# Patient Record
Sex: MALE | Race: WHITE | Employment: FULL TIME | URBAN - METROPOLITAN AREA
[De-identification: names, ages, dates, MRNs, and addresses within clinical notes are randomized per-mention and may not be internally consistent; named-entity substitution may affect disease eponyms.]

---

## 2017-01-03 ENCOUNTER — OFFICE VISIT - HEALTHEAST (OUTPATIENT)
Dept: PHYSICAL THERAPY | Facility: REHABILITATION | Age: 57
End: 2017-01-03

## 2017-01-03 DIAGNOSIS — M62.81 MUSCLE WEAKNESS (GENERALIZED): ICD-10-CM

## 2017-01-03 DIAGNOSIS — M53.84 DECREASED ROM OF THORACIC SPINE: ICD-10-CM

## 2017-01-03 DIAGNOSIS — M54.6 ACUTE RIGHT-SIDED THORACIC BACK PAIN: ICD-10-CM

## 2017-01-03 DIAGNOSIS — M53.86 DECREASED ROM OF LUMBAR SPINE: ICD-10-CM

## 2017-01-03 DIAGNOSIS — M54.50 ACUTE RIGHT-SIDED LOW BACK PAIN WITHOUT SCIATICA: ICD-10-CM

## 2017-01-05 ENCOUNTER — OFFICE VISIT - HEALTHEAST (OUTPATIENT)
Dept: PHYSICAL THERAPY | Facility: REHABILITATION | Age: 57
End: 2017-01-05

## 2017-01-05 ENCOUNTER — COMMUNICATION - HEALTHEAST (OUTPATIENT)
Dept: ONCOLOGY | Facility: HOSPITAL | Age: 57
End: 2017-01-05

## 2017-01-05 DIAGNOSIS — M53.84 DECREASED ROM OF THORACIC SPINE: ICD-10-CM

## 2017-01-05 DIAGNOSIS — M54.50 ACUTE RIGHT-SIDED LOW BACK PAIN WITHOUT SCIATICA: ICD-10-CM

## 2017-01-05 DIAGNOSIS — M62.81 MUSCLE WEAKNESS (GENERALIZED): ICD-10-CM

## 2017-01-05 DIAGNOSIS — M53.86 DECREASED ROM OF LUMBAR SPINE: ICD-10-CM

## 2017-01-05 DIAGNOSIS — M54.6 ACUTE RIGHT-SIDED THORACIC BACK PAIN: ICD-10-CM

## 2017-01-06 ENCOUNTER — OFFICE VISIT - HEALTHEAST (OUTPATIENT)
Dept: PULMONOLOGY | Facility: OTHER | Age: 57
End: 2017-01-06

## 2017-01-06 DIAGNOSIS — J90 PLEURAL EFFUSION: ICD-10-CM

## 2017-01-06 ASSESSMENT — MIFFLIN-ST. JEOR: SCORE: 2028.23

## 2017-01-10 ENCOUNTER — OFFICE VISIT - HEALTHEAST (OUTPATIENT)
Dept: PHYSICAL THERAPY | Facility: REHABILITATION | Age: 57
End: 2017-01-10

## 2017-01-10 DIAGNOSIS — M53.86 DECREASED ROM OF LUMBAR SPINE: ICD-10-CM

## 2017-01-10 DIAGNOSIS — M62.81 MUSCLE WEAKNESS (GENERALIZED): ICD-10-CM

## 2017-01-10 DIAGNOSIS — M54.6 ACUTE RIGHT-SIDED THORACIC BACK PAIN: ICD-10-CM

## 2017-01-10 DIAGNOSIS — M54.50 ACUTE RIGHT-SIDED LOW BACK PAIN WITHOUT SCIATICA: ICD-10-CM

## 2017-01-10 DIAGNOSIS — M53.84 DECREASED ROM OF THORACIC SPINE: ICD-10-CM

## 2017-01-11 ENCOUNTER — AMBULATORY - HEALTHEAST (OUTPATIENT)
Dept: ONCOLOGY | Facility: HOSPITAL | Age: 57
End: 2017-01-11

## 2017-01-12 ENCOUNTER — OFFICE VISIT - HEALTHEAST (OUTPATIENT)
Dept: ONCOLOGY | Facility: HOSPITAL | Age: 57
End: 2017-01-12

## 2017-01-12 ENCOUNTER — AMBULATORY - HEALTHEAST (OUTPATIENT)
Dept: INFUSION THERAPY | Facility: HOSPITAL | Age: 57
End: 2017-01-12

## 2017-01-12 DIAGNOSIS — C91.10 CHRONIC LYMPHOCYTIC LEUKEMIA (H): ICD-10-CM

## 2017-01-17 ENCOUNTER — OFFICE VISIT - HEALTHEAST (OUTPATIENT)
Dept: PHYSICAL THERAPY | Facility: REHABILITATION | Age: 57
End: 2017-01-17

## 2017-01-17 DIAGNOSIS — M54.50 ACUTE RIGHT-SIDED LOW BACK PAIN WITHOUT SCIATICA: ICD-10-CM

## 2017-01-17 DIAGNOSIS — M53.86 DECREASED ROM OF LUMBAR SPINE: ICD-10-CM

## 2017-01-17 DIAGNOSIS — M62.81 MUSCLE WEAKNESS (GENERALIZED): ICD-10-CM

## 2017-01-17 DIAGNOSIS — M54.6 ACUTE RIGHT-SIDED THORACIC BACK PAIN: ICD-10-CM

## 2017-01-17 DIAGNOSIS — M53.84 DECREASED ROM OF THORACIC SPINE: ICD-10-CM

## 2017-01-25 ENCOUNTER — APPOINTMENT (OUTPATIENT)
Dept: CT IMAGING | Facility: CLINIC | Age: 57
End: 2017-01-25
Attending: EMERGENCY MEDICINE
Payer: COMMERCIAL

## 2017-01-25 ENCOUNTER — HOSPITAL ENCOUNTER (EMERGENCY)
Facility: CLINIC | Age: 57
Discharge: HOME | End: 2017-01-25
Attending: EMERGENCY MEDICINE | Admitting: EMERGENCY MEDICINE
Payer: COMMERCIAL

## 2017-01-25 ENCOUNTER — RECORDS - HEALTHEAST (OUTPATIENT)
Dept: ADMINISTRATIVE | Facility: OTHER | Age: 57
End: 2017-01-25

## 2017-01-25 VITALS
TEMPERATURE: 98.3 F | OXYGEN SATURATION: 94 % | HEIGHT: 75 IN | SYSTOLIC BLOOD PRESSURE: 151 MMHG | BODY MASS INDEX: 31.71 KG/M2 | DIASTOLIC BLOOD PRESSURE: 85 MMHG | RESPIRATION RATE: 16 BRPM | WEIGHT: 255 LBS

## 2017-01-25 DIAGNOSIS — S09.90XA CLOSED HEAD INJURY, INITIAL ENCOUNTER: ICD-10-CM

## 2017-01-25 DIAGNOSIS — S16.1XXA CERVICAL STRAIN, INITIAL ENCOUNTER: ICD-10-CM

## 2017-01-25 PROCEDURE — 70450 CT HEAD/BRAIN W/O DYE: CPT

## 2017-01-25 PROCEDURE — 72125 CT NECK SPINE W/O DYE: CPT

## 2017-01-25 PROCEDURE — 99284 EMERGENCY DEPT VISIT MOD MDM: CPT | Mod: 25

## 2017-01-25 ASSESSMENT — ENCOUNTER SYMPTOMS
WEAKNESS: 0
ARTHRALGIAS: 0
CONFUSION: 1
WOUND: 1
NECK PAIN: 1
VOMITING: 0
BACK PAIN: 0
NUMBNESS: 0
HEADACHES: 1
ABDOMINAL PAIN: 0
MYALGIAS: 0

## 2017-01-25 NOTE — ED PROVIDER NOTES
"  History     Chief Complaint:  Fall      The history is provided by the patient and the EMS personnel.      Ramu Espinoza is a 56 year old male who presents via EMS after a fall.  While walking into work the patient slipped and fell backwards, hitting his head and suffering loss of consciousness.  A coworker found him lying in the snow and patient is amnestic from between when he was walking into work and when he was sitting in a chair at work.  EMS was contacted who brought him to the emergency department.  Patient has contusion, hematoma, and abrasion to occiput and complains of pain in posterior head and neck.  He denies chest pain, abdominal pain, back pain, vomiting, other injury, numbness or weakness in extremities, anticoagulation, or other complaint.     Allergies:  Zithromax       Medications:    Sertraline  Ativan  Trazodone     Past Medical History:    Depression  Anxiety     Past Surgical History:    History reviewed. No pertinent surgical history.      Family History:    History reviewed. No pertinent family history.       Social History:  Presents via EMS   Tobacco use: Never  Alcohol use: Positive  PCP: Esperanza Talamantes    Marital Status:         Review of Systems   Cardiovascular: Negative for chest pain.   Gastrointestinal: Negative for vomiting and abdominal pain.   Musculoskeletal: Positive for neck pain. Negative for myalgias, back pain and arthralgias.   Skin: Positive for wound.   Neurological: Positive for headaches. Negative for weakness and numbness.   Psychiatric/Behavioral: Positive for confusion.   All other systems reviewed and are negative.      Physical Exam     Patient Vitals for the past 24 hrs:   BP Temp Temp src Heart Rate Resp SpO2 Height Weight   01/25/17 0845 - - - - - 94 % - -   01/25/17 0832 151/85 mmHg 98.3  F (36.8  C) Oral 80 14 95 % 1.905 m (6' 3\") 115.667 kg (255 lb)       Physical Exam  GENERAL: well developed, pleasant  HEAD: Contusion to posterior " occiput with a 6 cm hematoma.    EYES: pupils reactive, extraocular muscles intact, conjunctivae normal  ENT:  mucus membranes moist  NECK:  trachea midline. In a C-collar.  RESPIRATORY: no tachypnea, breath sounds clear to auscultation   CVS: normal S1/S2, no murmurs, intact distal pulses  ABDOMEN: soft, nontender, nondistention  MUSCULOSKELETAL: no deformities  SKIN: warm and dry, no acute rashes or ulceration  NEURO: GCS 15, cranial nerves intact, alert and oriented x3  PSYCH:  Mood/affect normal    Emergency Department Course   Imaging:  Radiographic findings were communicated with the patient who voiced understanding of the findings.    CT Head without contrast:  IMPRESSION:  1. Extracalvarial soft tissue swelling in the posterior midline vertex region. No evidence for intracranial hemorrhage or skull fracture.  2. Normal brain parenchyma.    CT Cervical spine without contrast:  IMPRESSION: Degenerative changes. No evidence for fracture or  malalignment.    Preliminary result per radiology.    Emergency Department Course:  The patient arrived in the emergency department via EMS with C-collar in place.  Past medical records, nursing notes, and vitals reviewed.  0839: I performed an exam of the patient and obtained history, as documented above. GCS 15.   The patient was sent for CT while in the emergency department, findings above.   0952: I rechecked the patient.  C-collar removed.  Findings and plan explained to the Patient. Patient discharged home with instructions regarding supportive care, medications, and reasons to return. The importance of close follow-up was reviewed.      Impression & Plan    Medical Decision Making:  Ramu Espinoza is a 56 year old male presenting after a fall with icy, snowy conditions and resulting loss of consciousness and contusion of posterior occiput.  He has some mild cervical spine tenderness with movement so C-collar was left in place.  Imaging was obtained showing no acute  findings resolving from trauma.  His wounds were cleansed but do not require sutures.      Plan:  Wound care, Tylenol or Motrin.  Work note provided.  Return if worse.    Diagnosis:    ICD-10-CM    1. Closed head injury, initial encounter S09.90XA    2. Cervical strain, initial encounter S16.1XXA        Disposition:  Discharged to home with plan as outlined.      Hiro Borrego  1/25/2017    EMERGENCY DEPARTMENT    I, Hiro Borrego, am serving as a scribe at 8:39 AM on 1/25/2017 to document services personally performed by Laureano Gilmore MD based on my observations and the provider's statements to me.      Laureano Gilmore MD  01/26/17 1437    Laureano Gilmore MD  01/26/17 1438    Laureano Gilmore MD  01/26/17 1431

## 2017-01-25 NOTE — ED AVS SNAPSHOT
Emergency Department    [address] [place] [address]    Phone:  [phone]    Fax:  [phone]                                       Ramu Espinoza   MRN: [id number]    Department:   Emergency Department   Date of Visit:  1/25/2017           After Visit Summary Signature Page     I have received my discharge instructions, and my questions have been answered. I have discussed any challenges I see with this plan with the nurse or doctor.    ..........................................................................................................................................  Patient/Patient Representative Signature      ..........................................................................................................................................  Patient Representative Print Name and Relationship to Patient    ..................................................               ................................................  Date                                            Time    ..........................................................................................................................................  Reviewed by Signature/Title    ...................................................              ..............................................  Date                                                            Time

## 2017-01-25 NOTE — ED AVS SNAPSHOT
Emergency Department    [address] [facility] [address]    Phone:  [phone]    Fax:  [phone]                                       Ramu Espinoza   MRN: [id number]    Department:   Emergency Department   Date of Visit:  1/25/2017           Patient Information     Date Of Birth          1960        Your diagnoses for this visit were:     Closed head injury, initial encounter     Cervical strain, initial encounter        You were seen by Laureano Gilmore MD.      Follow-up Information     Follow up with Esperanza Talamantes MD.    Specialty:  Family Practice    Contact information:    [facility]  [address] [facility] [address]  [phone]          Discharge Instructions       Discharge Instructions  Head Injury    You have been seen today for a head injury. You were checked for serious problems, like bleeding on the brain, but these problems cannot always be found right away.  Due to this risk, you should not be alone for 24 hours after your injury.  Follow up with your regular physician in 2-3 days. If you are taking a blood thinner, such as aspirin, Pradaxa  (dabigatran), Coumadin  (warfarin), or Plavix  (clopidogrel), you are at especially high risk for immediate or delayed bleeding, and need to re-check with a physician in 24 hours, or sooner if any of the symptoms below happen.     Return to the Emergency Department if:    You are confused, have amnesia, or you are not acting right.    Your headache gets worse or you start to have a really bad headache even with your recommended treatment plan.    You vomit more than once.    You have a convulsion or seizure.    You have trouble walking.    You have weakness or paralysis in an arm or a leg.    You have blood or fluid coming from your ears or nose.    You have new symptoms or anything that worries you.    Sleeping:  It is okay for you to sleep, but someone should wake you up as instructed by your doctor, and  someone should check on you at your usual time to wake up.     Activity:    Do not drive for at least 24 hours.    Do not drive if you have dizzy spells or trouble concentrating, or remembering things.    Do not return to any contact sports until cleared by your regular doctor.     Follow-up:  It is very important that you make an appointment with your clinic and go to the appointment.  If you do not follow-up with your regular doctor, it may result in missing an important development which could result in permanent injury or disability and/or lasting pain.  If there is any problem keeping your appointment, call your doctor or return to the Emergency Department.    MORE INFORMATION:    Concussion:  A concussion is a minor head injury that may cause temporary problems with the way your brain works.  Some symptoms include:  confusion, amnesia, nausea and vomiting, dizziness, fatigue, memory or concentration problems, irritability and sleep problems.    CT Scans: Your evaluation today may have included a CT scan (CAT scan) to look for things like bleeding or a skull fracture (break).  CT scans involve radiation and too many CT scans can cause serious health problems like cancer, especially in children.  Because of this, your doctor may not have ordered a CT scan today if they think you are at low risk for a serious or life threatening problem.    If you were given a prescription for medicine here today, be sure to read all of the information (including the package insert) that comes with your prescription.  This will include important information about the medicine, its side effects, and any warnings that you need to know about.  The pharmacist who fills the prescription can provide more information and answer questions you may have about the medicine.  If you have questions or concerns that the pharmacist cannot address, please call or return to the Emergency Department.     Opioid Medication Information    Pain  medications are among the most commonly prescribed medicines, so we are including this information for all our patients. If you did not receive pain medication or get a prescription for pain medicine, you can ignore it.     You may have been given a prescription for an opioid (narcotic) pain medicine and/or have received a pain medicine while here in the Emergency Department. These medicines can make you drowsy or impaired. You must not drive, operate dangerous equipment, or engage in any other dangerous activities while taking these medications. If you drive while taking these medications, you could be arrested for DUI, or driving under the influence. Do not drink any alcohol while you are taking these medications.     Opioid pain medications can cause addiction. If you have a history of chemical dependency of any type, you are at a higher risk of becoming addicted to pain medications.  Only take these prescribed medications to treat your pain when all other options have been tried. Take it for as short a time and as few doses as possible. Store your pain pills in a secure place, as they are frequently stolen and provide a dangerous opportunity for children or visitors in your house to start abusing these powerful medications. We will not replace any lost or stolen medicine.  As soon as your pain is better, you should flush all your remaining medication.     Many prescription pain medications contain Tylenol  (acetaminophen), including Vicodin , Tylenol #3 , Norco , Lortab , and Percocet .  You should not take any extra pills of Tylenol  if you are using these prescription medications or you can get very sick.  Do not ever take more than 3000 mg of acetaminophen in any 24 hour period.    All opioids tend to cause constipation. Drink plenty of water and eat foods that have a lot of fiber, such as fruits, vegetables, prune juice, apple juice and high fiber cereal.  Take a laxative if you don t move your bowels at  least every other day. Miralax , Milk of Magnesia, Colace , or Senna  can be used to keep you regular.      Remember that you can always come back to the Emergency Department if you are not able to see your regular doctor in the amount of time listed above, if you get any new symptoms, or if there is anything that worries you.            Neck Sprain or Strain  A sudden force that causes turning or bending of the neck can cause sprain or strain. An example would be the force from a car accident. This can stretch or tear muscles called a strain. It can also stretch or tear ligaments called a sprain. Either of these can cause neck pain. Sometimes neck pain occurs after a simple awkward movement. In either case, muscle spasm is commonly present and contributes to the pain.    Unless you had a forceful physical injury (for example, a car accident or fall), X-rays are usually not ordered for the initial evaluation of neck pain. If pain continues and dose not respond to medical treatment, X-rays and other tests may be performed at a later time.  Home care    You may feel more soreness and spasm the first few days after the injury. Rest until symptoms begin to improve.    When lying down, use a comfortable pillow or a rolled towel that supports the head and keeps the spine in a neutral position. The position of the head should not be tilted forward or backward.    Apply an ice pack over the injured area for 15 to 20 minutes every 3 to 6 hours. You should do this for the first 24 to 48 hours. You can make an ice pack by filling a plastic bag that seals at the top with ice cubes and then wrapping it with a thin towel. After 48 hours, apply heat (warm shower or warm bath) for 15 to 20 minutes several times a day, or alternate ice and heat.    You may use over-the-counter pain medicine to control pain, unless another pain medicine was prescribed. If you have chronic liver or kidney disease or ever had a stomach ulcer or GI  bleeding, talk with your healthcare provider before using these medicines.    If a soft cervical collar was prescribed, it should be worn only for periods of increased pain. It should not be worn for more than 3 hours a day, or for a period longer than 1 to 2 weeks.  Follow-up care  Follow up with your healthcare provider as directed. Physical therapy may be needed.  Sometimes fractures don t show up on the first X-ray. Bruises and sprains can sometimes hurt as much as a fracture. These injuries can take time to heal completely. If your symptoms don t improve or they get worse, talk with your healthcare provider. You may need a repeat X-ray or other tests. If X-rays were taken, you will be told of any new findings that may affect your care.  Call 911  Call 911 if you have:     Neck swelling, difficulty or painful swallowing    Difficulty breathing    Chest pain  When to seek medical advice  Call your healthcare provider right away if any of these occur:    Pain becomes worse or spreads into your arms    Weakness or numbness in one or both arms    1712-9322 [employer]. [address] [place] [address]. All rights reserved. This information is not intended as a substitute for professional medical care. Always follow your healthcare professional's instructions.          24 Hour Appointment Hotline       To make an appointment at any [facility], call [phone] ([phone]). If you don't have a family doctor or clinic, we will help you find one. [facility] are conveniently located to serve the needs of you and your family.             Review of your medicines      Our records show that you are taking the medicines listed below. If these are incorrect, please call your family doctor or clinic.        Dose / Directions Last dose taken    ACYCLOVIR PO        Take by mouth 2 times daily   Refills:  0        HYDROXYZINE HCL PO        Refills:  0        LORazepam 1 MG tablet    Commonly known as:  ATIVAN   Dose:  1 mg   Quantity:  30 tablet        Take 1 tablet by mouth 2 times daily as needed for anxiety. Take 30 minutes prior to departure.  Do not operate a vehicle after taking this medication   Refills:  0        MELATONIN PO        Refills:  0        sertraline 25 MG tablet   Commonly known as:  ZOLOFT   Dose:  25 mg        Take 25 mg by mouth daily.   Refills:  0        traZODone 50 MG tablet   Commonly known as:  DESYREL   Quantity:  30        ONE TO TWO TABLETS AT BEDTIME AS NEEDED FOR SLEEP   Refills:  0                Procedures and tests performed during your visit     CT Cervical Spine w/o Contrast    CT Head w/o Contrast      Orders Needing Specimen Collection     None      Pending Results     Date and Time Order Name Status Description    1/25/2017 0850 CT Cervical Spine w/o Contrast Preliminary     1/25/2017 0850 CT Head w/o Contrast Preliminary             Pending Culture Results     No orders found from 1/24/2017 to 1/26/2017.       Test Results from your hospital stay           1/25/2017  9:48 AM - Interface, Radiant Ib      Narrative     CT SCAN OF THE HEAD WITHOUT CONTRAST   1/25/2017 9:43 AM     HISTORY: Closed head injury, plus loss of consciousness, contusion to  posterior head.    TECHNIQUE:  Axial images of the head and coronal reformations without  IV contrast material.  Radiation dose for this scan was reduced using  automated exposure control, adjustment of the mA and/or kV according  to patient size, or iterative reconstruction technique.    COMPARISON: None.    FINDINGS: There is some extracalvarial soft tissue swelling in the  posterior midline near the vertex. There is no evidence for any  underlying fracture or any intracranial hemorrhage. The brain  parenchyma is normal. Ventricles and subarachnoid spaces are normal.  The mastoids are clear. Visualized paranasal sinuses are unremarkable.        Impression     IMPRESSION:  1. Extracalvarial soft tissue  swelling in the posterior midline vertex  region. No evidence for intracranial hemorrhage or skull fracture.  2. Normal brain parenchyma.         1/25/2017  9:50 AM - Interface, Radiant Ib      Narrative     CT CERVICAL SPINE WITHOUT CONTRAST   1/25/2017 9:44 AM     HISTORY: Trauma, closed head injury.     TECHNIQUE: Axial images of the cervical spine were obtained without  intravenous contrast. Multiplanar reformations were performed.  Radiation dose for this scan was reduced using automated exposure  control, adjustment of the mA and/or kV according to patient size, or  iterative reconstruction technique.     COMPARISON: None.    FINDINGS: Sagittal reconstructions demonstrate normal posterior  alignment. There is no evidence for fracture. There is mild multilevel  degenerative disc and facet disease. There is no significant central  canal stenosis. Mild to moderate bilateral neural foraminal stenoses  are present at C4-C5 and there is mild right-sided foraminal stenosis  at C5-C6.        Impression     IMPRESSION: Degenerative changes. No evidence for fracture or  malalignment.                Clinical Quality Measure: Blood Pressure Screening     Your blood pressure was checked while you were in the emergency department today. The last reading we obtained was  BP: 151/85 mmHg . Please read the guidelines below about what these numbers mean and what you should do about them.  If your systolic blood pressure (the top number) is less than 120 and your diastolic blood pressure (the bottom number) is less than 80, then your blood pressure is normal. There is nothing more that you need to do about it.  If your systolic blood pressure (the top number) is 120-139 or your diastolic blood pressure (the bottom number) is 80-89, your blood pressure may be higher than it should be. You should have your blood pressure rechecked within a year by a primary care provider.  If your systolic blood pressure (the top number) is 140 or  "greater or your diastolic blood pressure (the bottom number) is 90 or greater, you may have high blood pressure. High blood pressure is treatable, but if left untreated over time it can put you at risk for heart attack, stroke, or kidney failure. You should have your blood pressure rechecked by a primary care provider within the next 4 weeks.  If your provider in the emergency department today gave you specific instructions to follow-up with your doctor or provider even sooner than that, you should follow that instruction and not wait for up to 4 weeks for your follow-up visit.        Thank you for choosing [facility]       Thank you for choosing [facility] for your care. Our goal is always to provide you with excellent care. Hearing back from our patients is one way we can continue to improve our services. Please take a few minutes to complete the written survey that you may receive in the mail after you visit with us. Thank you!        [employer] Information     [employer] lets you send messages to your doctor, view your test results, renew your prescriptions, schedule appointments and more. To sign up, go to [email or web address] . Click on \"Log in\" on the left side of the screen, which will take you to the Welcome page. Then click on \"Sign up Now\" on the right side of the page.     You will be asked to enter the access code listed below, as well as some personal information. Please follow the directions to create your username and password.     Your access code is: [email or web address]  Expires: 2017  9:55 AM     Your access code will  in 90 days. If you need help or a new code, please call your [facility] clinic or [phone].        Care EveryWhere ID     This is your Care EveryWhere ID. This could be used by other organizations to access your [facility] medical records  [id number]        After Visit Summary       This is your record. Keep this with you and show to your community pharmacist(s) and doctor(s) at your " next visit.

## 2017-01-25 NOTE — ED NOTES
Bed: ED15  Expected date:   Expected time:   Means of arrival:   Comments:  rickey Lynch 515 - fall head injury eta 5767

## 2017-01-25 NOTE — DISCHARGE INSTRUCTIONS
Discharge Instructions  Head Injury    You have been seen today for a head injury. You were checked for serious problems, like bleeding on the brain, but these problems cannot always be found right away.  Due to this risk, you should not be alone for 24 hours after your injury.  Follow up with your regular physician in 2-3 days. If you are taking a blood thinner, such as aspirin, Pradaxa  (dabigatran), Coumadin  (warfarin), or Plavix  (clopidogrel), you are at especially high risk for immediate or delayed bleeding, and need to re-check with a physician in 24 hours, or sooner if any of the symptoms below happen.     Return to the Emergency Department if:    You are confused, have amnesia, or you are not acting right.    Your headache gets worse or you start to have a really bad headache even with your recommended treatment plan.    You vomit more than once.    You have a convulsion or seizure.    You have trouble walking.    You have weakness or paralysis in an arm or a leg.    You have blood or fluid coming from your ears or nose.    You have new symptoms or anything that worries you.    Sleeping:  It is okay for you to sleep, but someone should wake you up as instructed by your doctor, and someone should check on you at your usual time to wake up.     Activity:    Do not drive for at least 24 hours.    Do not drive if you have dizzy spells or trouble concentrating, or remembering things.    Do not return to any contact sports until cleared by your regular doctor.     Follow-up:  It is very important that you make an appointment with your clinic and go to the appointment.  If you do not follow-up with your regular doctor, it may result in missing an important development which could result in permanent injury or disability and/or lasting pain.  If there is any problem keeping your appointment, call your doctor or return to the Emergency Department.    MORE INFORMATION:    Concussion:  A concussion is a minor head  injury that may cause temporary problems with the way your brain works.  Some symptoms include:  confusion, amnesia, nausea and vomiting, dizziness, fatigue, memory or concentration problems, irritability and sleep problems.    CT Scans: Your evaluation today may have included a CT scan (CAT scan) to look for things like bleeding or a skull fracture (break).  CT scans involve radiation and too many CT scans can cause serious health problems like cancer, especially in children.  Because of this, your doctor may not have ordered a CT scan today if they think you are at low risk for a serious or life threatening problem.    If you were given a prescription for medicine here today, be sure to read all of the information (including the package insert) that comes with your prescription.  This will include important information about the medicine, its side effects, and any warnings that you need to know about.  The pharmacist who fills the prescription can provide more information and answer questions you may have about the medicine.  If you have questions or concerns that the pharmacist cannot address, please call or return to the Emergency Department.     Opioid Medication Information    Pain medications are among the most commonly prescribed medicines, so we are including this information for all our patients. If you did not receive pain medication or get a prescription for pain medicine, you can ignore it.     You may have been given a prescription for an opioid (narcotic) pain medicine and/or have received a pain medicine while here in the Emergency Department. These medicines can make you drowsy or impaired. You must not drive, operate dangerous equipment, or engage in any other dangerous activities while taking these medications. If you drive while taking these medications, you could be arrested for DUI, or driving under the influence. Do not drink any alcohol while you are taking these medications.     Opioid pain  medications can cause addiction. If you have a history of chemical dependency of any type, you are at a higher risk of becoming addicted to pain medications.  Only take these prescribed medications to treat your pain when all other options have been tried. Take it for as short a time and as few doses as possible. Store your pain pills in a secure place, as they are frequently stolen and provide a dangerous opportunity for children or visitors in your house to start abusing these powerful medications. We will not replace any lost or stolen medicine.  As soon as your pain is better, you should flush all your remaining medication.     Many prescription pain medications contain Tylenol  (acetaminophen), including Vicodin , Tylenol #3 , Norco , Lortab , and Percocet .  You should not take any extra pills of Tylenol  if you are using these prescription medications or you can get very sick.  Do not ever take more than 3000 mg of acetaminophen in any 24 hour period.    All opioids tend to cause constipation. Drink plenty of water and eat foods that have a lot of fiber, such as fruits, vegetables, prune juice, apple juice and high fiber cereal.  Take a laxative if you don t move your bowels at least every other day. Miralax , Milk of Magnesia, Colace , or Senna  can be used to keep you regular.      Remember that you can always come back to the Emergency Department if you are not able to see your regular doctor in the amount of time listed above, if you get any new symptoms, or if there is anything that worries you.            Neck Sprain or Strain  A sudden force that causes turning or bending of the neck can cause sprain or strain. An example would be the force from a car accident. This can stretch or tear muscles called a strain. It can also stretch or tear ligaments called a sprain. Either of these can cause neck pain. Sometimes neck pain occurs after a simple awkward movement. In either case, muscle spasm is commonly  present and contributes to the pain.    Unless you had a forceful physical injury (for example, a car accident or fall), X-rays are usually not ordered for the initial evaluation of neck pain. If pain continues and dose not respond to medical treatment, X-rays and other tests may be performed at a later time.  Home care    You may feel more soreness and spasm the first few days after the injury. Rest until symptoms begin to improve.    When lying down, use a comfortable pillow or a rolled towel that supports the head and keeps the spine in a neutral position. The position of the head should not be tilted forward or backward.    Apply an ice pack over the injured area for 15 to 20 minutes every 3 to 6 hours. You should do this for the first 24 to 48 hours. You can make an ice pack by filling a plastic bag that seals at the top with ice cubes and then wrapping it with a thin towel. After 48 hours, apply heat (warm shower or warm bath) for 15 to 20 minutes several times a day, or alternate ice and heat.    You may use over-the-counter pain medicine to control pain, unless another pain medicine was prescribed. If you have chronic liver or kidney disease or ever had a stomach ulcer or GI bleeding, talk with your healthcare provider before using these medicines.    If a soft cervical collar was prescribed, it should be worn only for periods of increased pain. It should not be worn for more than 3 hours a day, or for a period longer than 1 to 2 weeks.  Follow-up care  Follow up with your healthcare provider as directed. Physical therapy may be needed.  Sometimes fractures don t show up on the first X-ray. Bruises and sprains can sometimes hurt as much as a fracture. These injuries can take time to heal completely. If your symptoms don t improve or they get worse, talk with your healthcare provider. You may need a repeat X-ray or other tests. If X-rays were taken, you will be told of any new findings that may affect your  care.  Call 911  Call 911 if you have:     Neck swelling, difficulty or painful swallowing    Difficulty breathing    Chest pain  When to seek medical advice  Call your healthcare provider right away if any of these occur:    Pain becomes worse or spreads into your arms    Weakness or numbness in one or both arms    [id number] [employer]. [address] [place] [id number]. All rights reserved. This information is not intended as a substitute for professional medical care. Always follow your healthcare professional's instructions.

## 2017-01-27 ENCOUNTER — COMMUNICATION - HEALTHEAST (OUTPATIENT)
Dept: TELEHEALTH | Facility: CLINIC | Age: 57
End: 2017-01-27

## 2017-01-27 ENCOUNTER — OFFICE VISIT - HEALTHEAST (OUTPATIENT)
Dept: FAMILY MEDICINE | Facility: CLINIC | Age: 57
End: 2017-01-27

## 2017-01-27 ENCOUNTER — COMMUNICATION - HEALTHEAST (OUTPATIENT)
Dept: FAMILY MEDICINE | Facility: CLINIC | Age: 57
End: 2017-01-27

## 2017-01-27 ENCOUNTER — COMMUNICATION - HEALTHEAST (OUTPATIENT)
Dept: ONCOLOGY | Facility: HOSPITAL | Age: 57
End: 2017-01-27

## 2017-01-27 DIAGNOSIS — S09.90XD HEAD INJURIES, SUBSEQUENT ENCOUNTER: ICD-10-CM

## 2017-01-30 ENCOUNTER — COMMUNICATION - HEALTHEAST (OUTPATIENT)
Dept: FAMILY MEDICINE | Facility: CLINIC | Age: 57
End: 2017-01-30

## 2017-01-30 DIAGNOSIS — F41.9 ANXIETY: ICD-10-CM

## 2017-01-30 DIAGNOSIS — G47.00 INSOMNIA: ICD-10-CM

## 2017-01-31 ENCOUNTER — COMMUNICATION - HEALTHEAST (OUTPATIENT)
Dept: FAMILY MEDICINE | Facility: CLINIC | Age: 57
End: 2017-01-31

## 2017-02-03 ENCOUNTER — COMMUNICATION - HEALTHEAST (OUTPATIENT)
Dept: FAMILY MEDICINE | Facility: CLINIC | Age: 57
End: 2017-02-03

## 2017-02-08 ENCOUNTER — OFFICE VISIT - HEALTHEAST (OUTPATIENT)
Dept: FAMILY MEDICINE | Facility: CLINIC | Age: 57
End: 2017-02-08

## 2017-02-08 ENCOUNTER — COMMUNICATION - HEALTHEAST (OUTPATIENT)
Dept: NURSING | Facility: CLINIC | Age: 57
End: 2017-02-08

## 2017-02-08 DIAGNOSIS — M54.9 BACK PAIN: ICD-10-CM

## 2017-02-08 ASSESSMENT — MIFFLIN-ST. JEOR: SCORE: 1991.94

## 2017-02-09 ENCOUNTER — RECORDS - HEALTHEAST (OUTPATIENT)
Dept: GENERAL RADIOLOGY | Facility: CLINIC | Age: 57
End: 2017-02-09

## 2017-02-09 ENCOUNTER — HOSPITAL ENCOUNTER (OUTPATIENT)
Dept: RADIOLOGY | Facility: CLINIC | Age: 57
Discharge: HOME | End: 2017-02-09
Attending: FAMILY MEDICINE

## 2017-02-09 DIAGNOSIS — M54.9 BACK PAIN: ICD-10-CM

## 2017-02-09 DIAGNOSIS — M54.9 DORSALGIA, UNSPECIFIED: ICD-10-CM

## 2017-02-14 ENCOUNTER — AMBULATORY - HEALTHEAST (OUTPATIENT)
Dept: FAMILY MEDICINE | Facility: CLINIC | Age: 57
End: 2017-02-14

## 2017-02-14 ENCOUNTER — COMMUNICATION - HEALTHEAST (OUTPATIENT)
Dept: FAMILY MEDICINE | Facility: CLINIC | Age: 57
End: 2017-02-14

## 2017-02-14 DIAGNOSIS — M54.9 BACK PAIN: ICD-10-CM

## 2017-02-16 ENCOUNTER — COMMUNICATION - HEALTHEAST (OUTPATIENT)
Dept: FAMILY MEDICINE | Facility: CLINIC | Age: 57
End: 2017-02-16

## 2017-02-21 ENCOUNTER — OFFICE VISIT - HEALTHEAST (OUTPATIENT)
Dept: PHYSICAL THERAPY | Facility: REHABILITATION | Age: 57
End: 2017-02-21

## 2017-02-21 DIAGNOSIS — M54.2 NECK PAIN ON LEFT SIDE: ICD-10-CM

## 2017-02-21 DIAGNOSIS — M62.81 MUSCLE WEAKNESS (GENERALIZED): ICD-10-CM

## 2017-02-21 DIAGNOSIS — M54.6 ACUTE LEFT-SIDED THORACIC BACK PAIN: ICD-10-CM

## 2017-02-21 DIAGNOSIS — M54.50 ACUTE LEFT-SIDED LOW BACK PAIN WITHOUT SCIATICA: ICD-10-CM

## 2017-02-28 ENCOUNTER — OFFICE VISIT - HEALTHEAST (OUTPATIENT)
Dept: PHYSICAL THERAPY | Facility: REHABILITATION | Age: 57
End: 2017-02-28

## 2017-02-28 DIAGNOSIS — M62.81 MUSCLE WEAKNESS (GENERALIZED): ICD-10-CM

## 2017-02-28 DIAGNOSIS — M54.50 ACUTE LEFT-SIDED LOW BACK PAIN WITHOUT SCIATICA: ICD-10-CM

## 2017-02-28 DIAGNOSIS — M54.6 ACUTE LEFT-SIDED THORACIC BACK PAIN: ICD-10-CM

## 2017-02-28 DIAGNOSIS — M54.2 NECK PAIN ON LEFT SIDE: ICD-10-CM

## 2017-03-07 ENCOUNTER — OFFICE VISIT - HEALTHEAST (OUTPATIENT)
Dept: PHYSICAL THERAPY | Facility: REHABILITATION | Age: 57
End: 2017-03-07

## 2017-03-07 DIAGNOSIS — M54.6 ACUTE LEFT-SIDED THORACIC BACK PAIN: ICD-10-CM

## 2017-03-07 DIAGNOSIS — M54.2 NECK PAIN ON LEFT SIDE: ICD-10-CM

## 2017-03-07 DIAGNOSIS — M62.81 MUSCLE WEAKNESS (GENERALIZED): ICD-10-CM

## 2017-03-07 DIAGNOSIS — M54.50 ACUTE LEFT-SIDED LOW BACK PAIN WITHOUT SCIATICA: ICD-10-CM

## 2017-03-14 ENCOUNTER — OFFICE VISIT - HEALTHEAST (OUTPATIENT)
Dept: PHYSICAL THERAPY | Facility: REHABILITATION | Age: 57
End: 2017-03-14

## 2017-03-14 DIAGNOSIS — M54.50 ACUTE LEFT-SIDED LOW BACK PAIN WITHOUT SCIATICA: ICD-10-CM

## 2017-03-14 DIAGNOSIS — M54.6 ACUTE LEFT-SIDED THORACIC BACK PAIN: ICD-10-CM

## 2017-03-14 DIAGNOSIS — M54.2 NECK PAIN ON LEFT SIDE: ICD-10-CM

## 2017-03-14 DIAGNOSIS — M62.81 MUSCLE WEAKNESS (GENERALIZED): ICD-10-CM

## 2017-03-16 ENCOUNTER — OFFICE VISIT - HEALTHEAST (OUTPATIENT)
Dept: NURSING | Facility: CLINIC | Age: 57
End: 2017-03-16

## 2017-03-16 DIAGNOSIS — G47.00 INSOMNIA, UNSPECIFIED: ICD-10-CM

## 2017-03-16 DIAGNOSIS — F41.1 GENERALIZED ANXIETY DISORDER: ICD-10-CM

## 2017-03-16 DIAGNOSIS — F32.A DEPRESSION, UNSPECIFIED DEPRESSION TYPE: ICD-10-CM

## 2017-03-16 DIAGNOSIS — K21.9 GASTROESOPHAGEAL REFLUX DISEASE WITHOUT ESOPHAGITIS: ICD-10-CM

## 2017-03-16 DIAGNOSIS — E78.00 PURE HYPERCHOLESTEROLEMIA: ICD-10-CM

## 2017-04-11 ENCOUNTER — COMMUNICATION - HEALTHEAST (OUTPATIENT)
Dept: ONCOLOGY | Facility: HOSPITAL | Age: 57
End: 2017-04-11

## 2017-04-13 ENCOUNTER — HOSPITAL ENCOUNTER (OUTPATIENT)
Dept: CARDIOLOGY | Facility: HOSPITAL | Age: 57
Discharge: HOME | End: 2017-04-14

## 2017-04-13 ENCOUNTER — AMBULATORY - HEALTHEAST (OUTPATIENT)
Dept: ONCOLOGY | Facility: HOSPITAL | Age: 57
End: 2017-04-13

## 2017-04-13 ENCOUNTER — OFFICE VISIT - HEALTHEAST (OUTPATIENT)
Dept: ONCOLOGY | Facility: HOSPITAL | Age: 57
End: 2017-04-13

## 2017-04-13 ENCOUNTER — AMBULATORY - HEALTHEAST (OUTPATIENT)
Dept: INFUSION THERAPY | Facility: HOSPITAL | Age: 57
End: 2017-04-13

## 2017-04-13 DIAGNOSIS — C91.10 CHRONIC LYMPHOCYTIC LEUKEMIA (H): ICD-10-CM

## 2017-04-13 DIAGNOSIS — R00.2 PALPITATIONS: ICD-10-CM

## 2017-04-14 ENCOUNTER — OFFICE VISIT - HEALTHEAST (OUTPATIENT)
Dept: FAMILY MEDICINE | Facility: CLINIC | Age: 57
End: 2017-04-14

## 2017-04-14 DIAGNOSIS — R00.2 PALPITATIONS: ICD-10-CM

## 2017-04-14 LAB
ATRIAL RATE - MUSE: 69 BPM
DIASTOLIC BLOOD PRESSURE - MUSE: NORMAL MMHG
INTERPRETATION ECG - MUSE: NORMAL
P AXIS - MUSE: 30 DEGREES
PR INTERVAL - MUSE: 164 MS
QRS DURATION - MUSE: 100 MS
QT - MUSE: 368 MS
QTC - MUSE: 394 MS
R AXIS - MUSE: -53 DEGREES
SYSTOLIC BLOOD PRESSURE - MUSE: NORMAL MMHG
T AXIS - MUSE: 14 DEGREES
VENTRICULAR RATE- MUSE: 69 BPM

## 2017-04-14 ASSESSMENT — MIFFLIN-ST. JEOR: SCORE: 2005.55

## 2017-04-17 ENCOUNTER — COMMUNICATION - HEALTHEAST (OUTPATIENT)
Dept: FAMILY MEDICINE | Facility: CLINIC | Age: 57
End: 2017-04-17

## 2017-04-25 ENCOUNTER — HOSPITAL ENCOUNTER (OUTPATIENT)
Dept: CARDIOLOGY | Facility: CLINIC | Age: 57
Discharge: HOME | End: 2017-04-25
Attending: FAMILY MEDICINE

## 2017-04-25 ENCOUNTER — RECORDS - HEALTHEAST (OUTPATIENT)
Dept: ADMINISTRATIVE | Facility: OTHER | Age: 57
End: 2017-04-25

## 2017-04-25 DIAGNOSIS — R00.2 PALPITATIONS: ICD-10-CM

## 2017-04-27 ENCOUNTER — RECORDS - HEALTHEAST (OUTPATIENT)
Dept: ADMINISTRATIVE | Facility: OTHER | Age: 57
End: 2017-04-27

## 2017-04-28 ENCOUNTER — RECORDS - HEALTHEAST (OUTPATIENT)
Dept: ADMINISTRATIVE | Facility: OTHER | Age: 57
End: 2017-04-28

## 2017-04-28 ENCOUNTER — COMMUNICATION - HEALTHEAST (OUTPATIENT)
Dept: FAMILY MEDICINE | Facility: CLINIC | Age: 57
End: 2017-04-28

## 2017-04-29 ENCOUNTER — RECORDS - HEALTHEAST (OUTPATIENT)
Dept: ADMINISTRATIVE | Facility: OTHER | Age: 57
End: 2017-04-29

## 2017-04-30 ENCOUNTER — RECORDS - HEALTHEAST (OUTPATIENT)
Dept: ADMINISTRATIVE | Facility: OTHER | Age: 57
End: 2017-04-30

## 2017-05-02 ENCOUNTER — RECORDS - HEALTHEAST (OUTPATIENT)
Dept: ADMINISTRATIVE | Facility: OTHER | Age: 57
End: 2017-05-02

## 2017-05-03 ENCOUNTER — RECORDS - HEALTHEAST (OUTPATIENT)
Dept: ADMINISTRATIVE | Facility: OTHER | Age: 57
End: 2017-05-03

## 2017-05-04 ENCOUNTER — RECORDS - HEALTHEAST (OUTPATIENT)
Dept: ADMINISTRATIVE | Facility: OTHER | Age: 57
End: 2017-05-04

## 2017-05-05 ENCOUNTER — RECORDS - HEALTHEAST (OUTPATIENT)
Dept: ADMINISTRATIVE | Facility: OTHER | Age: 57
End: 2017-05-05

## 2017-05-10 ENCOUNTER — COMMUNICATION - HEALTHEAST (OUTPATIENT)
Dept: FAMILY MEDICINE | Facility: CLINIC | Age: 57
End: 2017-05-10

## 2017-05-11 ENCOUNTER — AMBULATORY - HEALTHEAST (OUTPATIENT)
Dept: FAMILY MEDICINE | Facility: CLINIC | Age: 57
End: 2017-05-11

## 2017-05-11 DIAGNOSIS — I48.91 ATRIAL FIBRILLATION (H): ICD-10-CM

## 2017-05-31 ENCOUNTER — OFFICE VISIT - HEALTHEAST (OUTPATIENT)
Dept: CARDIOLOGY | Facility: CLINIC | Age: 57
End: 2017-05-31

## 2017-05-31 DIAGNOSIS — C91.10 CHRONIC LYMPHOCYTIC LEUKEMIA (H): ICD-10-CM

## 2017-05-31 DIAGNOSIS — I48.0 PAROXYSMAL ATRIAL FIBRILLATION (H): ICD-10-CM

## 2017-05-31 ASSESSMENT — MIFFLIN-ST. JEOR: SCORE: 2028.23

## 2017-06-05 ENCOUNTER — COMMUNICATION - HEALTHEAST (OUTPATIENT)
Dept: CARDIOLOGY | Facility: CLINIC | Age: 57
End: 2017-06-05

## 2017-06-06 ENCOUNTER — COMMUNICATION - HEALTHEAST (OUTPATIENT)
Dept: FAMILY MEDICINE | Facility: CLINIC | Age: 57
End: 2017-06-06

## 2017-06-09 ENCOUNTER — AMBULATORY - HEALTHEAST (OUTPATIENT)
Dept: FAMILY MEDICINE | Facility: CLINIC | Age: 57
End: 2017-06-09

## 2017-06-09 DIAGNOSIS — M25.569 KNEE PAIN: ICD-10-CM

## 2017-06-14 ENCOUNTER — RECORDS - HEALTHEAST (OUTPATIENT)
Dept: ADMINISTRATIVE | Facility: OTHER | Age: 57
End: 2017-06-14

## 2017-06-27 ENCOUNTER — OFFICE VISIT - HEALTHEAST (OUTPATIENT)
Dept: PHYSICAL THERAPY | Facility: REHABILITATION | Age: 57
End: 2017-06-27

## 2017-06-27 DIAGNOSIS — M76.51 PATELLAR TENDINITIS OF BOTH KNEES: ICD-10-CM

## 2017-06-27 DIAGNOSIS — C91.10 CHRONIC LYMPHOCYTIC LEUKEMIA (H): ICD-10-CM

## 2017-06-27 DIAGNOSIS — M25.561 ACUTE BILATERAL KNEE PAIN: ICD-10-CM

## 2017-06-27 DIAGNOSIS — M25.562 ACUTE BILATERAL KNEE PAIN: ICD-10-CM

## 2017-06-27 DIAGNOSIS — M76.52 PATELLAR TENDINITIS OF BOTH KNEES: ICD-10-CM

## 2017-06-27 DIAGNOSIS — R29.898 BILATERAL LEG WEAKNESS: ICD-10-CM

## 2017-06-27 DIAGNOSIS — E78.00 PURE HYPERCHOLESTEROLEMIA: ICD-10-CM

## 2017-06-27 DIAGNOSIS — I48.0 PAROXYSMAL ATRIAL FIBRILLATION (H): ICD-10-CM

## 2017-06-27 DIAGNOSIS — M76.50 PATELLAR TENDONITIS: ICD-10-CM

## 2017-07-06 ENCOUNTER — OFFICE VISIT - HEALTHEAST (OUTPATIENT)
Dept: NURSING | Facility: CLINIC | Age: 57
End: 2017-07-06

## 2017-07-06 DIAGNOSIS — F41.1 GENERALIZED ANXIETY DISORDER: ICD-10-CM

## 2017-07-06 DIAGNOSIS — R53.82 CHRONIC FATIGUE: ICD-10-CM

## 2017-07-06 DIAGNOSIS — K21.9 GASTROESOPHAGEAL REFLUX DISEASE WITHOUT ESOPHAGITIS: ICD-10-CM

## 2017-07-06 DIAGNOSIS — G47.00 INSOMNIA, UNSPECIFIED: ICD-10-CM

## 2017-07-10 ENCOUNTER — COMMUNICATION - HEALTHEAST (OUTPATIENT)
Dept: ADMINISTRATIVE | Facility: HOSPITAL | Age: 57
End: 2017-07-10

## 2017-07-11 ENCOUNTER — COMMUNICATION - HEALTHEAST (OUTPATIENT)
Dept: PHYSICAL THERAPY | Facility: REHABILITATION | Age: 57
End: 2017-07-11

## 2017-07-12 ENCOUNTER — AMBULATORY - HEALTHEAST (OUTPATIENT)
Dept: INFUSION THERAPY | Facility: HOSPITAL | Age: 57
End: 2017-07-12

## 2017-07-12 ENCOUNTER — OFFICE VISIT - HEALTHEAST (OUTPATIENT)
Dept: ONCOLOGY | Facility: HOSPITAL | Age: 57
End: 2017-07-12

## 2017-07-12 DIAGNOSIS — C91.10 CHRONIC LYMPHOCYTIC LEUKEMIA (H): ICD-10-CM

## 2017-07-12 ASSESSMENT — MIFFLIN-ST. JEOR: SCORE: 2031.41

## 2017-07-17 ENCOUNTER — COMMUNICATION - HEALTHEAST (OUTPATIENT)
Dept: CARDIOLOGY | Facility: CLINIC | Age: 57
End: 2017-07-17

## 2017-07-17 DIAGNOSIS — R00.2 HEART PALPITATIONS: ICD-10-CM

## 2017-07-18 ENCOUNTER — OFFICE VISIT - HEALTHEAST (OUTPATIENT)
Dept: PHYSICAL THERAPY | Facility: REHABILITATION | Age: 57
End: 2017-07-18

## 2017-07-18 DIAGNOSIS — M25.561 ACUTE BILATERAL KNEE PAIN: ICD-10-CM

## 2017-07-18 DIAGNOSIS — R29.898 BILATERAL LEG WEAKNESS: ICD-10-CM

## 2017-07-18 DIAGNOSIS — E78.00 PURE HYPERCHOLESTEROLEMIA: ICD-10-CM

## 2017-07-18 DIAGNOSIS — M25.562 ACUTE BILATERAL KNEE PAIN: ICD-10-CM

## 2017-07-18 DIAGNOSIS — M76.52 PATELLAR TENDINITIS OF BOTH KNEES: ICD-10-CM

## 2017-07-18 DIAGNOSIS — I48.0 PAROXYSMAL ATRIAL FIBRILLATION (H): ICD-10-CM

## 2017-07-18 DIAGNOSIS — M76.51 PATELLAR TENDINITIS OF BOTH KNEES: ICD-10-CM

## 2017-07-27 ENCOUNTER — COMMUNICATION - HEALTHEAST (OUTPATIENT)
Dept: FAMILY MEDICINE | Facility: CLINIC | Age: 57
End: 2017-07-27

## 2017-08-03 ENCOUNTER — OFFICE VISIT - HEALTHEAST (OUTPATIENT)
Dept: PHYSICAL THERAPY | Facility: REHABILITATION | Age: 57
End: 2017-08-03

## 2017-08-03 DIAGNOSIS — M76.52 PATELLAR TENDINITIS OF BOTH KNEES: ICD-10-CM

## 2017-08-03 DIAGNOSIS — M25.562 ACUTE BILATERAL KNEE PAIN: ICD-10-CM

## 2017-08-03 DIAGNOSIS — M76.51 PATELLAR TENDINITIS OF BOTH KNEES: ICD-10-CM

## 2017-08-03 DIAGNOSIS — R29.898 BILATERAL LEG WEAKNESS: ICD-10-CM

## 2017-08-03 DIAGNOSIS — M25.561 ACUTE BILATERAL KNEE PAIN: ICD-10-CM

## 2017-08-17 ENCOUNTER — OFFICE VISIT - HEALTHEAST (OUTPATIENT)
Dept: PHYSICAL THERAPY | Facility: REHABILITATION | Age: 57
End: 2017-08-17

## 2017-08-17 DIAGNOSIS — M25.562 ACUTE BILATERAL KNEE PAIN: ICD-10-CM

## 2017-08-17 DIAGNOSIS — R29.898 BILATERAL LEG WEAKNESS: ICD-10-CM

## 2017-08-17 DIAGNOSIS — M25.561 ACUTE BILATERAL KNEE PAIN: ICD-10-CM

## 2017-08-17 DIAGNOSIS — M76.52 PATELLAR TENDINITIS OF BOTH KNEES: ICD-10-CM

## 2017-08-17 DIAGNOSIS — M76.51 PATELLAR TENDINITIS OF BOTH KNEES: ICD-10-CM

## 2017-10-09 ENCOUNTER — COMMUNICATION - HEALTHEAST (OUTPATIENT)
Dept: ONCOLOGY | Facility: HOSPITAL | Age: 57
End: 2017-10-09

## 2017-10-11 ENCOUNTER — AMBULATORY - HEALTHEAST (OUTPATIENT)
Dept: ONCOLOGY | Facility: HOSPITAL | Age: 57
End: 2017-10-11

## 2017-10-11 ENCOUNTER — AMBULATORY - HEALTHEAST (OUTPATIENT)
Dept: INFUSION THERAPY | Facility: HOSPITAL | Age: 57
End: 2017-10-11

## 2017-10-11 ENCOUNTER — OFFICE VISIT - HEALTHEAST (OUTPATIENT)
Dept: ONCOLOGY | Facility: HOSPITAL | Age: 57
End: 2017-10-11

## 2017-10-11 DIAGNOSIS — C91.10 CHRONIC LYMPHOCYTIC LEUKEMIA (H): ICD-10-CM

## 2017-10-11 DIAGNOSIS — I48.0 PAROXYSMAL ATRIAL FIBRILLATION (H): ICD-10-CM

## 2017-10-12 ENCOUNTER — OFFICE VISIT - HEALTHEAST (OUTPATIENT)
Dept: NURSING | Facility: CLINIC | Age: 57
End: 2017-10-12

## 2017-10-12 DIAGNOSIS — I48.0 PAROXYSMAL ATRIAL FIBRILLATION (H): ICD-10-CM

## 2017-10-12 DIAGNOSIS — F41.1 GENERALIZED ANXIETY DISORDER: ICD-10-CM

## 2017-10-12 DIAGNOSIS — F32.A DEPRESSION, UNSPECIFIED DEPRESSION TYPE: ICD-10-CM

## 2017-10-22 ENCOUNTER — OFFICE VISIT - HEALTHEAST (OUTPATIENT)
Dept: FAMILY MEDICINE | Facility: CLINIC | Age: 57
End: 2017-10-22

## 2017-10-22 DIAGNOSIS — M25.561 CHRONIC PAIN OF RIGHT KNEE: ICD-10-CM

## 2017-10-22 DIAGNOSIS — G89.29 CHRONIC PAIN OF RIGHT KNEE: ICD-10-CM

## 2017-10-24 ENCOUNTER — COMMUNICATION - HEALTHEAST (OUTPATIENT)
Dept: FAMILY MEDICINE | Facility: CLINIC | Age: 57
End: 2017-10-24

## 2017-11-01 ENCOUNTER — OFFICE VISIT - HEALTHEAST (OUTPATIENT)
Dept: FAMILY MEDICINE | Facility: CLINIC | Age: 57
End: 2017-11-01

## 2017-11-01 ENCOUNTER — HOSPITAL ENCOUNTER (OUTPATIENT)
Dept: MRI IMAGING | Facility: CLINIC | Age: 57
Discharge: HOME | End: 2017-11-01
Attending: NURSE PRACTITIONER

## 2017-11-01 DIAGNOSIS — M25.561 RIGHT KNEE PAIN: ICD-10-CM

## 2017-11-02 ENCOUNTER — AMBULATORY - HEALTHEAST (OUTPATIENT)
Dept: FAMILY MEDICINE | Facility: CLINIC | Age: 57
End: 2017-11-02

## 2017-11-02 DIAGNOSIS — S83.249A MEDIAL MENISCUS TEAR: ICD-10-CM

## 2017-11-06 ENCOUNTER — COMMUNICATION - HEALTHEAST (OUTPATIENT)
Dept: FAMILY MEDICINE | Facility: CLINIC | Age: 57
End: 2017-11-06

## 2017-11-07 ENCOUNTER — COMMUNICATION - HEALTHEAST (OUTPATIENT)
Dept: FAMILY MEDICINE | Facility: CLINIC | Age: 57
End: 2017-11-07

## 2017-11-07 DIAGNOSIS — G47.00 INSOMNIA: ICD-10-CM

## 2017-11-13 ENCOUNTER — OFFICE VISIT - HEALTHEAST (OUTPATIENT)
Dept: FAMILY MEDICINE | Facility: CLINIC | Age: 57
End: 2017-11-13

## 2017-11-13 DIAGNOSIS — Z01.818 PREOPERATIVE CLEARANCE: ICD-10-CM

## 2017-11-13 DIAGNOSIS — M23.209 CHRONIC MENISCAL TEAR OF KNEE: ICD-10-CM

## 2017-11-13 LAB
ATRIAL RATE - MUSE: 68 BPM
DIASTOLIC BLOOD PRESSURE - MUSE: NORMAL MMHG
INTERPRETATION ECG - MUSE: NORMAL
P AXIS - MUSE: 25 DEGREES
PR INTERVAL - MUSE: 170 MS
QRS DURATION - MUSE: 104 MS
QT - MUSE: 366 MS
QTC - MUSE: 389 MS
R AXIS - MUSE: -46 DEGREES
SYSTOLIC BLOOD PRESSURE - MUSE: NORMAL MMHG
T AXIS - MUSE: 20 DEGREES
VENTRICULAR RATE- MUSE: 68 BPM

## 2017-11-13 ASSESSMENT — MIFFLIN-ST. JEOR: SCORE: 2041.84

## 2017-11-14 ENCOUNTER — COMMUNICATION - HEALTHEAST (OUTPATIENT)
Dept: FAMILY MEDICINE | Facility: CLINIC | Age: 57
End: 2017-11-14

## 2017-11-14 DIAGNOSIS — G47.00 INSOMNIA: ICD-10-CM

## 2017-11-17 ENCOUNTER — RECORDS - HEALTHEAST (OUTPATIENT)
Dept: ADMINISTRATIVE | Facility: OTHER | Age: 57
End: 2017-11-17

## 2017-11-30 ENCOUNTER — COMMUNICATION - HEALTHEAST (OUTPATIENT)
Dept: FAMILY MEDICINE | Facility: CLINIC | Age: 57
End: 2017-11-30

## 2017-12-05 ENCOUNTER — OFFICE VISIT - HEALTHEAST (OUTPATIENT)
Dept: CARDIOLOGY | Facility: CLINIC | Age: 57
End: 2017-12-05

## 2017-12-05 DIAGNOSIS — I48.0 PAROXYSMAL ATRIAL FIBRILLATION (H): ICD-10-CM

## 2017-12-05 DIAGNOSIS — F41.1 GENERALIZED ANXIETY DISORDER: ICD-10-CM

## 2017-12-05 DIAGNOSIS — C91.10 CHRONIC LYMPHOCYTIC LEUKEMIA (H): ICD-10-CM

## 2017-12-05 ASSESSMENT — MIFFLIN-ST. JEOR: SCORE: 2037.3

## 2017-12-06 ENCOUNTER — AMBULATORY - HEALTHEAST (OUTPATIENT)
Dept: FAMILY MEDICINE | Facility: CLINIC | Age: 57
End: 2017-12-06

## 2017-12-06 ENCOUNTER — COMMUNICATION - HEALTHEAST (OUTPATIENT)
Dept: FAMILY MEDICINE | Facility: CLINIC | Age: 57
End: 2017-12-06

## 2017-12-06 DIAGNOSIS — M25.569 KNEE PAIN: ICD-10-CM

## 2017-12-07 ENCOUNTER — OFFICE VISIT - HEALTHEAST (OUTPATIENT)
Dept: NURSING | Facility: CLINIC | Age: 57
End: 2017-12-07

## 2017-12-07 ENCOUNTER — OFFICE VISIT - HEALTHEAST (OUTPATIENT)
Dept: PHYSICAL THERAPY | Facility: REHABILITATION | Age: 57
End: 2017-12-07

## 2017-12-07 DIAGNOSIS — F32.A DEPRESSION, UNSPECIFIED DEPRESSION TYPE: ICD-10-CM

## 2017-12-07 DIAGNOSIS — M25.661 DECREASED RANGE OF MOTION OF RIGHT KNEE: ICD-10-CM

## 2017-12-07 DIAGNOSIS — F41.1 GENERALIZED ANXIETY DISORDER: ICD-10-CM

## 2017-12-07 DIAGNOSIS — M25.561 CHRONIC PAIN OF RIGHT KNEE: ICD-10-CM

## 2017-12-07 DIAGNOSIS — G89.29 CHRONIC PAIN OF RIGHT KNEE: ICD-10-CM

## 2017-12-14 ENCOUNTER — OFFICE VISIT - HEALTHEAST (OUTPATIENT)
Dept: PHYSICAL THERAPY | Facility: REHABILITATION | Age: 57
End: 2017-12-14

## 2017-12-14 DIAGNOSIS — M25.661 DECREASED RANGE OF MOTION OF RIGHT KNEE: ICD-10-CM

## 2017-12-14 DIAGNOSIS — G89.29 CHRONIC PAIN OF RIGHT KNEE: ICD-10-CM

## 2017-12-14 DIAGNOSIS — M25.561 CHRONIC PAIN OF RIGHT KNEE: ICD-10-CM

## 2017-12-21 ENCOUNTER — COMMUNICATION - HEALTHEAST (OUTPATIENT)
Dept: FAMILY MEDICINE | Facility: CLINIC | Age: 57
End: 2017-12-21

## 2017-12-21 ENCOUNTER — OFFICE VISIT - HEALTHEAST (OUTPATIENT)
Dept: PHYSICAL THERAPY | Facility: REHABILITATION | Age: 57
End: 2017-12-21

## 2017-12-21 DIAGNOSIS — G89.29 CHRONIC PAIN OF RIGHT KNEE: ICD-10-CM

## 2017-12-21 DIAGNOSIS — M25.561 CHRONIC PAIN OF RIGHT KNEE: ICD-10-CM

## 2017-12-21 DIAGNOSIS — M25.661 DECREASED RANGE OF MOTION OF RIGHT KNEE: ICD-10-CM

## 2017-12-22 ENCOUNTER — AMBULATORY - HEALTHEAST (OUTPATIENT)
Dept: FAMILY MEDICINE | Facility: CLINIC | Age: 57
End: 2017-12-22

## 2017-12-26 ENCOUNTER — COMMUNICATION - HEALTHEAST (OUTPATIENT)
Dept: ONCOLOGY | Facility: HOSPITAL | Age: 57
End: 2017-12-26

## 2017-12-28 ENCOUNTER — OFFICE VISIT - HEALTHEAST (OUTPATIENT)
Dept: NURSING | Facility: CLINIC | Age: 57
End: 2017-12-28

## 2017-12-28 ENCOUNTER — COMMUNICATION - HEALTHEAST (OUTPATIENT)
Dept: NURSING | Facility: CLINIC | Age: 57
End: 2017-12-28

## 2017-12-28 DIAGNOSIS — F32.A DEPRESSION, UNSPECIFIED DEPRESSION TYPE: ICD-10-CM

## 2017-12-28 DIAGNOSIS — F41.1 GENERALIZED ANXIETY DISORDER: ICD-10-CM

## 2017-12-28 DIAGNOSIS — F41.1 GAD (GENERALIZED ANXIETY DISORDER): ICD-10-CM

## 2018-01-02 ENCOUNTER — OFFICE VISIT - HEALTHEAST (OUTPATIENT)
Dept: NURSING | Facility: CLINIC | Age: 58
End: 2018-01-02

## 2018-01-02 ENCOUNTER — COMMUNICATION - HEALTHEAST (OUTPATIENT)
Dept: NURSING | Facility: CLINIC | Age: 58
End: 2018-01-02

## 2018-01-02 DIAGNOSIS — G47.00 INSOMNIA, UNSPECIFIED TYPE: ICD-10-CM

## 2018-01-02 DIAGNOSIS — F41.1 GENERALIZED ANXIETY DISORDER: ICD-10-CM

## 2018-01-08 ENCOUNTER — RECORDS - HEALTHEAST (OUTPATIENT)
Dept: ADMINISTRATIVE | Facility: OTHER | Age: 58
End: 2018-01-08

## 2018-01-10 ENCOUNTER — OFFICE VISIT - HEALTHEAST (OUTPATIENT)
Dept: PHYSICAL THERAPY | Facility: REHABILITATION | Age: 58
End: 2018-01-10

## 2018-01-10 DIAGNOSIS — M25.661 DECREASED RANGE OF MOTION OF RIGHT KNEE: ICD-10-CM

## 2018-01-10 DIAGNOSIS — G89.29 CHRONIC PAIN OF RIGHT KNEE: ICD-10-CM

## 2018-01-10 DIAGNOSIS — M25.561 CHRONIC PAIN OF RIGHT KNEE: ICD-10-CM

## 2018-01-11 ENCOUNTER — OFFICE VISIT - HEALTHEAST (OUTPATIENT)
Dept: ONCOLOGY | Facility: HOSPITAL | Age: 58
End: 2018-01-11

## 2018-01-11 ENCOUNTER — AMBULATORY - HEALTHEAST (OUTPATIENT)
Dept: ONCOLOGY | Facility: HOSPITAL | Age: 58
End: 2018-01-11

## 2018-01-11 ENCOUNTER — AMBULATORY - HEALTHEAST (OUTPATIENT)
Dept: INFUSION THERAPY | Facility: HOSPITAL | Age: 58
End: 2018-01-11

## 2018-01-11 DIAGNOSIS — C91.10 CHRONIC LYMPHOCYTIC LEUKEMIA (H): ICD-10-CM

## 2018-01-11 LAB
ALBUMIN SERPL-MCNC: 3.7 G/DL (ref 3.5–5)
ALP SERPL-CCNC: 60 U/L (ref 45–120)
ALT SERPL W P-5'-P-CCNC: 17 U/L (ref 0–45)
ANION GAP SERPL CALCULATED.3IONS-SCNC: 7 MMOL/L (ref 5–18)
AST SERPL W P-5'-P-CCNC: 18 U/L (ref 0–40)
BASOPHILS # BLD AUTO: 0 THOU/UL (ref 0–0.2)
BASOPHILS NFR BLD AUTO: 0 % (ref 0–2)
BILIRUB SERPL-MCNC: 0.9 MG/DL (ref 0–1)
BUN SERPL-MCNC: 17 MG/DL (ref 8–22)
CALCIUM SERPL-MCNC: 9.3 MG/DL (ref 8.5–10.5)
CHLORIDE BLD-SCNC: 103 MMOL/L (ref 98–107)
CO2 SERPL-SCNC: 27 MMOL/L (ref 22–31)
CREAT SERPL-MCNC: 0.86 MG/DL (ref 0.7–1.3)
EOSINOPHIL # BLD AUTO: 0.1 THOU/UL (ref 0–0.4)
EOSINOPHIL NFR BLD AUTO: 1 % (ref 0–6)
ERYTHROCYTE [DISTWIDTH] IN BLOOD BY AUTOMATED COUNT: 12.3 % (ref 11–14.5)
GFR SERPL CREATININE-BSD FRML MDRD: >60 ML/MIN/1.73M2
GLUCOSE BLD-MCNC: 96 MG/DL (ref 70–125)
HCT VFR BLD AUTO: 42.6 % (ref 40–54)
HGB BLD-MCNC: 14.2 G/DL (ref 14–18)
LYMPHOCYTES # BLD AUTO: 2.7 THOU/UL (ref 0.8–4.4)
LYMPHOCYTES NFR BLD AUTO: 32 % (ref 20–40)
MCH RBC QN AUTO: 29.3 PG (ref 27–34)
MCHC RBC AUTO-ENTMCNC: 33.3 G/DL (ref 32–36)
MCV RBC AUTO: 88 FL (ref 80–100)
MONOCYTES # BLD AUTO: 0.8 THOU/UL (ref 0–0.9)
MONOCYTES NFR BLD AUTO: 10 % (ref 2–10)
NEUTROPHILS # BLD AUTO: 4.7 THOU/UL (ref 2–7.7)
NEUTROPHILS NFR BLD AUTO: 56 % (ref 50–70)
PLATELET # BLD AUTO: 311 THOU/UL (ref 140–440)
PMV BLD AUTO: 9.9 FL (ref 8.5–12.5)
POTASSIUM BLD-SCNC: 4.6 MMOL/L (ref 3.5–5)
PROT SERPL-MCNC: 6.5 G/DL (ref 6–8)
RBC # BLD AUTO: 4.85 MILL/UL (ref 4.4–6.2)
SODIUM SERPL-SCNC: 137 MMOL/L (ref 136–145)
WBC: 8.4 THOU/UL (ref 4–11)

## 2018-01-15 ENCOUNTER — COMMUNICATION - HEALTHEAST (OUTPATIENT)
Dept: ONCOLOGY | Facility: HOSPITAL | Age: 58
End: 2018-01-15

## 2018-01-15 DIAGNOSIS — C91.10 CHRONIC LYMPHOCYTIC LEUKEMIA (H): ICD-10-CM

## 2018-01-16 ENCOUNTER — COMMUNICATION - HEALTHEAST (OUTPATIENT)
Dept: FAMILY MEDICINE | Facility: CLINIC | Age: 58
End: 2018-01-16

## 2018-01-16 ENCOUNTER — AMBULATORY - HEALTHEAST (OUTPATIENT)
Dept: BEHAVIORAL HEALTH | Facility: CLINIC | Age: 58
End: 2018-01-16

## 2018-01-16 ENCOUNTER — OFFICE VISIT - HEALTHEAST (OUTPATIENT)
Dept: NURSING | Facility: CLINIC | Age: 58
End: 2018-01-16

## 2018-01-16 ENCOUNTER — OFFICE VISIT - HEALTHEAST (OUTPATIENT)
Dept: BEHAVIORAL HEALTH | Facility: CLINIC | Age: 58
End: 2018-01-16

## 2018-01-16 DIAGNOSIS — G47.00 INSOMNIA, UNSPECIFIED TYPE: ICD-10-CM

## 2018-01-16 DIAGNOSIS — F41.1 GENERALIZED ANXIETY DISORDER: ICD-10-CM

## 2018-01-17 ENCOUNTER — OFFICE VISIT - HEALTHEAST (OUTPATIENT)
Dept: FAMILY MEDICINE | Facility: CLINIC | Age: 58
End: 2018-01-17

## 2018-01-17 DIAGNOSIS — E78.00 PURE HYPERCHOLESTEROLEMIA: ICD-10-CM

## 2018-01-17 DIAGNOSIS — F51.01 PRIMARY INSOMNIA: ICD-10-CM

## 2018-01-17 DIAGNOSIS — Z00.00 ROUTINE GENERAL MEDICAL EXAMINATION AT A HEALTH CARE FACILITY: ICD-10-CM

## 2018-01-17 DIAGNOSIS — Z12.5 SCREENING FOR PROSTATE CANCER: ICD-10-CM

## 2018-01-17 DIAGNOSIS — E78.00 HYPERCHOLESTEROLEMIA: ICD-10-CM

## 2018-01-17 DIAGNOSIS — F41.1 GENERALIZED ANXIETY DISORDER: ICD-10-CM

## 2018-01-17 ASSESSMENT — MIFFLIN-ST. JEOR: SCORE: 2010.09

## 2018-01-22 ENCOUNTER — COMMUNICATION - HEALTHEAST (OUTPATIENT)
Dept: NURSING | Facility: CLINIC | Age: 58
End: 2018-01-22

## 2018-01-23 ENCOUNTER — COMMUNICATION - HEALTHEAST (OUTPATIENT)
Dept: FAMILY MEDICINE | Facility: CLINIC | Age: 58
End: 2018-01-23

## 2018-01-23 DIAGNOSIS — G47.00 INSOMNIA: ICD-10-CM

## 2018-01-31 ENCOUNTER — COMMUNICATION - HEALTHEAST (OUTPATIENT)
Dept: ONCOLOGY | Facility: HOSPITAL | Age: 58
End: 2018-01-31

## 2018-02-01 ENCOUNTER — OFFICE VISIT - HEALTHEAST (OUTPATIENT)
Dept: PHYSICAL THERAPY | Facility: REHABILITATION | Age: 58
End: 2018-02-01

## 2018-02-01 DIAGNOSIS — G89.29 CHRONIC PAIN OF RIGHT KNEE: ICD-10-CM

## 2018-02-01 DIAGNOSIS — M25.661 DECREASED RANGE OF MOTION OF RIGHT KNEE: ICD-10-CM

## 2018-02-01 DIAGNOSIS — M25.561 CHRONIC PAIN OF RIGHT KNEE: ICD-10-CM

## 2018-02-06 ENCOUNTER — OFFICE VISIT - HEALTHEAST (OUTPATIENT)
Dept: BEHAVIORAL HEALTH | Facility: CLINIC | Age: 58
End: 2018-02-06

## 2018-02-06 DIAGNOSIS — F41.1 GENERALIZED ANXIETY DISORDER: ICD-10-CM

## 2018-02-08 ENCOUNTER — OFFICE VISIT - HEALTHEAST (OUTPATIENT)
Dept: PHYSICAL THERAPY | Facility: REHABILITATION | Age: 58
End: 2018-02-08

## 2018-02-08 DIAGNOSIS — M25.661 DECREASED RANGE OF MOTION OF RIGHT KNEE: ICD-10-CM

## 2018-02-08 DIAGNOSIS — M25.561 CHRONIC PAIN OF RIGHT KNEE: ICD-10-CM

## 2018-02-08 DIAGNOSIS — G89.29 CHRONIC PAIN OF RIGHT KNEE: ICD-10-CM

## 2018-02-11 ENCOUNTER — COMMUNICATION - HEALTHEAST (OUTPATIENT)
Dept: FAMILY MEDICINE | Facility: CLINIC | Age: 58
End: 2018-02-11

## 2018-02-12 ENCOUNTER — COMMUNICATION - HEALTHEAST (OUTPATIENT)
Dept: ONCOLOGY | Facility: HOSPITAL | Age: 58
End: 2018-02-12

## 2018-02-16 ENCOUNTER — COMMUNICATION - HEALTHEAST (OUTPATIENT)
Dept: NURSING | Facility: CLINIC | Age: 58
End: 2018-02-16

## 2018-02-16 ENCOUNTER — OFFICE VISIT - HEALTHEAST (OUTPATIENT)
Dept: NURSING | Facility: CLINIC | Age: 58
End: 2018-02-16

## 2018-02-16 DIAGNOSIS — F41.1 GENERALIZED ANXIETY DISORDER: ICD-10-CM

## 2018-02-16 DIAGNOSIS — F41.1 GAD (GENERALIZED ANXIETY DISORDER): ICD-10-CM

## 2018-02-16 DIAGNOSIS — F51.01 PRIMARY INSOMNIA: ICD-10-CM

## 2018-03-05 ENCOUNTER — OFFICE VISIT - HEALTHEAST (OUTPATIENT)
Dept: PHYSICAL THERAPY | Facility: REHABILITATION | Age: 58
End: 2018-03-05

## 2018-03-05 DIAGNOSIS — M25.561 CHRONIC PAIN OF RIGHT KNEE: ICD-10-CM

## 2018-03-05 DIAGNOSIS — G89.29 CHRONIC PAIN OF RIGHT KNEE: ICD-10-CM

## 2018-03-05 DIAGNOSIS — M25.661 DECREASED RANGE OF MOTION OF RIGHT KNEE: ICD-10-CM

## 2018-03-06 ENCOUNTER — OFFICE VISIT - HEALTHEAST (OUTPATIENT)
Dept: BEHAVIORAL HEALTH | Facility: CLINIC | Age: 58
End: 2018-03-06

## 2018-03-06 DIAGNOSIS — F41.1 GENERALIZED ANXIETY DISORDER: ICD-10-CM

## 2018-03-08 ENCOUNTER — AMBULATORY - HEALTHEAST (OUTPATIENT)
Dept: NURSING | Facility: CLINIC | Age: 58
End: 2018-03-08

## 2018-03-08 DIAGNOSIS — F41.1 GAD (GENERALIZED ANXIETY DISORDER): ICD-10-CM

## 2018-03-09 ENCOUNTER — COMMUNICATION - HEALTHEAST (OUTPATIENT)
Dept: FAMILY MEDICINE | Facility: CLINIC | Age: 58
End: 2018-03-09

## 2018-03-14 ENCOUNTER — COMMUNICATION - HEALTHEAST (OUTPATIENT)
Dept: ADMINISTRATIVE | Facility: CLINIC | Age: 58
End: 2018-03-14

## 2018-03-29 ENCOUNTER — OFFICE VISIT - HEALTHEAST (OUTPATIENT)
Dept: PHYSICAL THERAPY | Facility: REHABILITATION | Age: 58
End: 2018-03-29

## 2018-03-29 DIAGNOSIS — M25.561 CHRONIC PAIN OF RIGHT KNEE: ICD-10-CM

## 2018-03-29 DIAGNOSIS — M25.661 DECREASED RANGE OF MOTION OF RIGHT KNEE: ICD-10-CM

## 2018-03-29 DIAGNOSIS — G89.29 CHRONIC PAIN OF RIGHT KNEE: ICD-10-CM

## 2018-04-03 ENCOUNTER — COMMUNICATION - HEALTHEAST (OUTPATIENT)
Dept: FAMILY MEDICINE | Facility: CLINIC | Age: 58
End: 2018-04-03

## 2018-04-10 ENCOUNTER — COMMUNICATION - HEALTHEAST (OUTPATIENT)
Dept: ONCOLOGY | Facility: HOSPITAL | Age: 58
End: 2018-04-10

## 2018-04-12 ENCOUNTER — OFFICE VISIT - HEALTHEAST (OUTPATIENT)
Dept: ONCOLOGY | Facility: HOSPITAL | Age: 58
End: 2018-04-12

## 2018-04-12 ENCOUNTER — AMBULATORY - HEALTHEAST (OUTPATIENT)
Dept: INFUSION THERAPY | Facility: HOSPITAL | Age: 58
End: 2018-04-12

## 2018-04-12 DIAGNOSIS — C91.10 CHRONIC LYMPHOCYTIC LEUKEMIA (H): ICD-10-CM

## 2018-04-12 LAB
ALBUMIN SERPL-MCNC: 3.7 G/DL (ref 3.5–5)
ALP SERPL-CCNC: 65 U/L (ref 45–120)
ALT SERPL W P-5'-P-CCNC: 19 U/L (ref 0–45)
ANION GAP SERPL CALCULATED.3IONS-SCNC: 10 MMOL/L (ref 5–18)
AST SERPL W P-5'-P-CCNC: 17 U/L (ref 0–40)
BASOPHILS # BLD AUTO: 0.1 THOU/UL (ref 0–0.2)
BASOPHILS NFR BLD AUTO: 1 % (ref 0–2)
BILIRUB SERPL-MCNC: 0.8 MG/DL (ref 0–1)
BUN SERPL-MCNC: 20 MG/DL (ref 8–22)
CALCIUM SERPL-MCNC: 9.4 MG/DL (ref 8.5–10.5)
CHLORIDE BLD-SCNC: 102 MMOL/L (ref 98–107)
CO2 SERPL-SCNC: 28 MMOL/L (ref 22–31)
CREAT SERPL-MCNC: 0.84 MG/DL (ref 0.7–1.3)
EOSINOPHIL # BLD AUTO: 0.2 THOU/UL (ref 0–0.4)
EOSINOPHIL NFR BLD AUTO: 2 % (ref 0–6)
ERYTHROCYTE [DISTWIDTH] IN BLOOD BY AUTOMATED COUNT: 12.3 % (ref 11–14.5)
GFR SERPL CREATININE-BSD FRML MDRD: >60 ML/MIN/1.73M2
GLUCOSE BLD-MCNC: 89 MG/DL (ref 70–125)
HCT VFR BLD AUTO: 42.1 % (ref 40–54)
HGB BLD-MCNC: 14.1 G/DL (ref 14–18)
LYMPHOCYTES # BLD AUTO: 2.9 THOU/UL (ref 0.8–4.4)
LYMPHOCYTES NFR BLD AUTO: 28 % (ref 20–40)
MCH RBC QN AUTO: 29.6 PG (ref 27–34)
MCHC RBC AUTO-ENTMCNC: 33.5 G/DL (ref 32–36)
MCV RBC AUTO: 88 FL (ref 80–100)
MONOCYTES # BLD AUTO: 0.8 THOU/UL (ref 0–0.9)
MONOCYTES NFR BLD AUTO: 8 % (ref 2–10)
NEUTROPHILS # BLD AUTO: 6.4 THOU/UL (ref 2–7.7)
NEUTROPHILS NFR BLD AUTO: 62 % (ref 50–70)
PLATELET # BLD AUTO: 258 THOU/UL (ref 140–440)
PMV BLD AUTO: 10 FL (ref 8.5–12.5)
POTASSIUM BLD-SCNC: 4.1 MMOL/L (ref 3.5–5)
PROT SERPL-MCNC: 6.9 G/DL (ref 6–8)
RBC # BLD AUTO: 4.76 MILL/UL (ref 4.4–6.2)
SODIUM SERPL-SCNC: 140 MMOL/L (ref 136–145)
WBC: 10.4 THOU/UL (ref 4–11)

## 2018-04-13 ENCOUNTER — COMMUNICATION - HEALTHEAST (OUTPATIENT)
Dept: ONCOLOGY | Facility: HOSPITAL | Age: 58
End: 2018-04-13

## 2018-04-19 ENCOUNTER — OFFICE VISIT - HEALTHEAST (OUTPATIENT)
Dept: PHYSICAL THERAPY | Facility: REHABILITATION | Age: 58
End: 2018-04-19

## 2018-04-19 DIAGNOSIS — M25.561 CHRONIC PAIN OF RIGHT KNEE: ICD-10-CM

## 2018-04-19 DIAGNOSIS — G89.29 CHRONIC PAIN OF RIGHT KNEE: ICD-10-CM

## 2018-04-19 DIAGNOSIS — M25.661 DECREASED RANGE OF MOTION OF RIGHT KNEE: ICD-10-CM

## 2018-04-28 ENCOUNTER — COMMUNICATION - HEALTHEAST (OUTPATIENT)
Dept: FAMILY MEDICINE | Facility: CLINIC | Age: 58
End: 2018-04-28

## 2018-05-27 ENCOUNTER — COMMUNICATION - HEALTHEAST (OUTPATIENT)
Dept: FAMILY MEDICINE | Facility: CLINIC | Age: 58
End: 2018-05-27

## 2018-06-12 ENCOUNTER — OFFICE VISIT - HEALTHEAST (OUTPATIENT)
Dept: CARDIOLOGY | Facility: CLINIC | Age: 58
End: 2018-06-12

## 2018-06-12 DIAGNOSIS — R00.2 HEART PALPITATIONS: ICD-10-CM

## 2018-06-12 DIAGNOSIS — R07.89 ATYPICAL CHEST PAIN: ICD-10-CM

## 2018-06-12 DIAGNOSIS — I48.0 PAROXYSMAL ATRIAL FIBRILLATION (H): ICD-10-CM

## 2018-06-12 ASSESSMENT — MIFFLIN-ST. JEOR: SCORE: 2046.38

## 2018-06-25 ENCOUNTER — COMMUNICATION - HEALTHEAST (OUTPATIENT)
Dept: FAMILY MEDICINE | Facility: CLINIC | Age: 58
End: 2018-06-25

## 2018-07-01 ENCOUNTER — COMMUNICATION - HEALTHEAST (OUTPATIENT)
Dept: ONCOLOGY | Facility: HOSPITAL | Age: 58
End: 2018-07-01

## 2018-07-01 DIAGNOSIS — C91.10 CHRONIC LYMPHOCYTIC LEUKEMIA (H): ICD-10-CM

## 2018-07-12 ENCOUNTER — OFFICE VISIT - HEALTHEAST (OUTPATIENT)
Dept: ONCOLOGY | Facility: HOSPITAL | Age: 58
End: 2018-07-12

## 2018-07-12 ENCOUNTER — AMBULATORY - HEALTHEAST (OUTPATIENT)
Dept: INFUSION THERAPY | Facility: HOSPITAL | Age: 58
End: 2018-07-12

## 2018-07-12 DIAGNOSIS — C91.10 CHRONIC LYMPHOCYTIC LEUKEMIA (H): ICD-10-CM

## 2018-07-12 LAB
ALBUMIN SERPL-MCNC: 3.9 G/DL (ref 3.5–5)
ALP SERPL-CCNC: 68 U/L (ref 45–120)
ALT SERPL W P-5'-P-CCNC: 24 U/L (ref 0–45)
ANION GAP SERPL CALCULATED.3IONS-SCNC: 9 MMOL/L (ref 5–18)
AST SERPL W P-5'-P-CCNC: 21 U/L (ref 0–40)
BASOPHILS # BLD AUTO: 0.1 THOU/UL (ref 0–0.2)
BASOPHILS NFR BLD AUTO: 1 % (ref 0–2)
BILIRUB SERPL-MCNC: 0.7 MG/DL (ref 0–1)
BUN SERPL-MCNC: 18 MG/DL (ref 8–22)
CALCIUM SERPL-MCNC: 9.4 MG/DL (ref 8.5–10.5)
CHLORIDE BLD-SCNC: 106 MMOL/L (ref 98–107)
CO2 SERPL-SCNC: 25 MMOL/L (ref 22–31)
CREAT SERPL-MCNC: 0.75 MG/DL (ref 0.7–1.3)
EOSINOPHIL # BLD AUTO: 0.3 THOU/UL (ref 0–0.4)
EOSINOPHIL NFR BLD AUTO: 3 % (ref 0–6)
ERYTHROCYTE [DISTWIDTH] IN BLOOD BY AUTOMATED COUNT: 12.3 % (ref 11–14.5)
GFR SERPL CREATININE-BSD FRML MDRD: >60 ML/MIN/1.73M2
GLUCOSE BLD-MCNC: 81 MG/DL (ref 70–125)
HCT VFR BLD AUTO: 41.7 % (ref 40–54)
HGB BLD-MCNC: 14 G/DL (ref 14–18)
LYMPHOCYTES # BLD AUTO: 2.1 THOU/UL (ref 0.8–4.4)
LYMPHOCYTES NFR BLD AUTO: 26 % (ref 20–40)
MCH RBC QN AUTO: 29.2 PG (ref 27–34)
MCHC RBC AUTO-ENTMCNC: 33.6 G/DL (ref 32–36)
MCV RBC AUTO: 87 FL (ref 80–100)
MONOCYTES # BLD AUTO: 0.7 THOU/UL (ref 0–0.9)
MONOCYTES NFR BLD AUTO: 8 % (ref 2–10)
NEUTROPHILS # BLD AUTO: 5 THOU/UL (ref 2–7.7)
NEUTROPHILS NFR BLD AUTO: 63 % (ref 50–70)
PLATELET # BLD AUTO: 235 THOU/UL (ref 140–440)
PMV BLD AUTO: 10.2 FL (ref 8.5–12.5)
POTASSIUM BLD-SCNC: 4.2 MMOL/L (ref 3.5–5)
PROT SERPL-MCNC: 6.6 G/DL (ref 6–8)
RBC # BLD AUTO: 4.79 MILL/UL (ref 4.4–6.2)
SODIUM SERPL-SCNC: 140 MMOL/L (ref 136–145)
WBC: 8 THOU/UL (ref 4–11)

## 2018-07-16 ENCOUNTER — RECORDS - HEALTHEAST (OUTPATIENT)
Dept: ADMINISTRATIVE | Facility: OTHER | Age: 58
End: 2018-07-16

## 2018-07-24 ENCOUNTER — COMMUNICATION - HEALTHEAST (OUTPATIENT)
Dept: CARDIOLOGY | Facility: CLINIC | Age: 58
End: 2018-07-24

## 2018-07-24 DIAGNOSIS — R00.2 HEART PALPITATIONS: ICD-10-CM

## 2018-07-31 ENCOUNTER — OFFICE VISIT - HEALTHEAST (OUTPATIENT)
Dept: FAMILY MEDICINE | Facility: CLINIC | Age: 58
End: 2018-07-31

## 2018-07-31 DIAGNOSIS — R19.7 DIARRHEA: ICD-10-CM

## 2018-07-31 DIAGNOSIS — I48.0 PAROXYSMAL ATRIAL FIBRILLATION (H): ICD-10-CM

## 2018-07-31 DIAGNOSIS — H93.11 TINNITUS, RIGHT: ICD-10-CM

## 2018-07-31 DIAGNOSIS — F41.1 GENERALIZED ANXIETY DISORDER: ICD-10-CM

## 2018-07-31 DIAGNOSIS — G47.00 INSOMNIA: ICD-10-CM

## 2018-07-31 DIAGNOSIS — R53.83 FATIGUE: ICD-10-CM

## 2018-08-13 ENCOUNTER — AMBULATORY - HEALTHEAST (OUTPATIENT)
Dept: FAMILY MEDICINE | Facility: CLINIC | Age: 58
End: 2018-08-13

## 2018-08-13 ENCOUNTER — AMBULATORY - HEALTHEAST (OUTPATIENT)
Dept: LAB | Facility: CLINIC | Age: 58
End: 2018-08-13

## 2018-08-13 DIAGNOSIS — Z13.220 LIPID SCREENING: ICD-10-CM

## 2018-08-13 DIAGNOSIS — R53.83 FATIGUE: ICD-10-CM

## 2018-08-13 DIAGNOSIS — Z13.1 SCREENING FOR DIABETES MELLITUS: ICD-10-CM

## 2018-08-13 DIAGNOSIS — Z12.5 SCREENING FOR PROSTATE CANCER: ICD-10-CM

## 2018-08-13 LAB
CHOLEST SERPL-MCNC: 266 MG/DL
FASTING STATUS PATIENT QL REPORTED: YES
FASTING STATUS PATIENT QL REPORTED: YES
GLUCOSE BLD-MCNC: 84 MG/DL (ref 70–99)
HDLC SERPL-MCNC: 42 MG/DL
LDLC SERPL CALC-MCNC: 184 MG/DL
PSA SERPL-MCNC: 0.2 NG/ML (ref 0–4.5)
TRIGL SERPL-MCNC: 201 MG/DL
TSH SERPL DL<=0.005 MIU/L-ACNC: 2.23 UIU/ML (ref 0.3–5)

## 2018-08-15 ENCOUNTER — COMMUNICATION - HEALTHEAST (OUTPATIENT)
Dept: FAMILY MEDICINE | Facility: CLINIC | Age: 58
End: 2018-08-15

## 2018-08-26 ENCOUNTER — COMMUNICATION - HEALTHEAST (OUTPATIENT)
Dept: NURSING | Facility: CLINIC | Age: 58
End: 2018-08-26

## 2018-08-26 DIAGNOSIS — H93.19 TINNITUS: ICD-10-CM

## 2018-08-26 DIAGNOSIS — G47.00 INSOMNIA: ICD-10-CM

## 2018-09-26 ENCOUNTER — COMMUNICATION - HEALTHEAST (OUTPATIENT)
Dept: ADMINISTRATIVE | Facility: HOSPITAL | Age: 58
End: 2018-09-26

## 2018-09-28 ENCOUNTER — COMMUNICATION - HEALTHEAST (OUTPATIENT)
Dept: ONCOLOGY | Facility: HOSPITAL | Age: 58
End: 2018-09-28

## 2018-10-01 ENCOUNTER — AMBULATORY - HEALTHEAST (OUTPATIENT)
Dept: INFUSION THERAPY | Facility: HOSPITAL | Age: 58
End: 2018-10-01

## 2018-10-01 ENCOUNTER — OFFICE VISIT - HEALTHEAST (OUTPATIENT)
Dept: ONCOLOGY | Facility: HOSPITAL | Age: 58
End: 2018-10-01

## 2018-10-01 DIAGNOSIS — I48.0 PAROXYSMAL ATRIAL FIBRILLATION (H): ICD-10-CM

## 2018-10-01 DIAGNOSIS — C91.10 CHRONIC LYMPHOCYTIC LEUKEMIA (H): ICD-10-CM

## 2018-10-01 LAB
ALBUMIN SERPL-MCNC: 4 G/DL (ref 3.5–5)
ALP SERPL-CCNC: 68 U/L (ref 45–120)
ALT SERPL W P-5'-P-CCNC: 23 U/L (ref 0–45)
ANION GAP SERPL CALCULATED.3IONS-SCNC: 8 MMOL/L (ref 5–18)
AST SERPL W P-5'-P-CCNC: 20 U/L (ref 0–40)
BASOPHILS # BLD AUTO: 0.1 THOU/UL (ref 0–0.2)
BASOPHILS NFR BLD AUTO: 1 % (ref 0–2)
BILIRUB SERPL-MCNC: 0.6 MG/DL (ref 0–1)
BUN SERPL-MCNC: 17 MG/DL (ref 8–22)
CALCIUM SERPL-MCNC: 9.5 MG/DL (ref 8.5–10.5)
CHLORIDE BLD-SCNC: 105 MMOL/L (ref 98–107)
CO2 SERPL-SCNC: 26 MMOL/L (ref 22–31)
CREAT SERPL-MCNC: 0.84 MG/DL (ref 0.7–1.3)
EOSINOPHIL # BLD AUTO: 0.3 THOU/UL (ref 0–0.4)
EOSINOPHIL NFR BLD AUTO: 4 % (ref 0–6)
ERYTHROCYTE [DISTWIDTH] IN BLOOD BY AUTOMATED COUNT: 12.5 % (ref 11–14.5)
GFR SERPL CREATININE-BSD FRML MDRD: >60 ML/MIN/1.73M2
GLUCOSE BLD-MCNC: 93 MG/DL (ref 70–125)
HCT VFR BLD AUTO: 43.1 % (ref 40–54)
HGB BLD-MCNC: 14.5 G/DL (ref 14–18)
LYMPHOCYTES # BLD AUTO: 2.2 THOU/UL (ref 0.8–4.4)
LYMPHOCYTES NFR BLD AUTO: 24 % (ref 20–40)
MCH RBC QN AUTO: 29.8 PG (ref 27–34)
MCHC RBC AUTO-ENTMCNC: 33.6 G/DL (ref 32–36)
MCV RBC AUTO: 89 FL (ref 80–100)
MONOCYTES # BLD AUTO: 0.8 THOU/UL (ref 0–0.9)
MONOCYTES NFR BLD AUTO: 8 % (ref 2–10)
NEUTROPHILS # BLD AUTO: 6 THOU/UL (ref 2–7.7)
NEUTROPHILS NFR BLD AUTO: 64 % (ref 50–70)
PLATELET # BLD AUTO: 270 THOU/UL (ref 140–440)
PMV BLD AUTO: 9.9 FL (ref 8.5–12.5)
POTASSIUM BLD-SCNC: 4.7 MMOL/L (ref 3.5–5)
PROT SERPL-MCNC: 6.7 G/DL (ref 6–8)
RBC # BLD AUTO: 4.87 MILL/UL (ref 4.4–6.2)
SODIUM SERPL-SCNC: 139 MMOL/L (ref 136–145)
WBC: 9.5 THOU/UL (ref 4–11)

## 2018-10-10 ENCOUNTER — COMMUNICATION - HEALTHEAST (OUTPATIENT)
Dept: ONCOLOGY | Facility: HOSPITAL | Age: 58
End: 2018-10-10

## 2018-10-25 ENCOUNTER — OFFICE VISIT - HEALTHEAST (OUTPATIENT)
Dept: FAMILY MEDICINE | Facility: CLINIC | Age: 58
End: 2018-10-25

## 2018-10-25 DIAGNOSIS — H93.19 TINNITUS: ICD-10-CM

## 2018-10-25 DIAGNOSIS — M72.2 PLANTAR FASCIITIS: ICD-10-CM

## 2018-10-25 DIAGNOSIS — G47.00 INSOMNIA: ICD-10-CM

## 2018-10-25 DIAGNOSIS — E78.5 HYPERLIPIDEMIA: ICD-10-CM

## 2018-10-25 DIAGNOSIS — Z98.890 S/P RIGHT KNEE ARTHROSCOPY: ICD-10-CM

## 2018-10-25 DIAGNOSIS — F41.1 GAD (GENERALIZED ANXIETY DISORDER): ICD-10-CM

## 2018-10-29 ENCOUNTER — RECORDS - HEALTHEAST (OUTPATIENT)
Dept: ADMINISTRATIVE | Facility: OTHER | Age: 58
End: 2018-10-29

## 2018-10-30 ENCOUNTER — OFFICE VISIT - HEALTHEAST (OUTPATIENT)
Dept: CARDIOLOGY | Facility: CLINIC | Age: 58
End: 2018-10-30

## 2018-10-30 DIAGNOSIS — R00.2 PALPITATIONS: ICD-10-CM

## 2018-10-30 DIAGNOSIS — I48.0 PAROXYSMAL ATRIAL FIBRILLATION (H): ICD-10-CM

## 2018-10-30 ASSESSMENT — MIFFLIN-ST. JEOR: SCORE: 2010.09

## 2018-11-01 ENCOUNTER — HOSPITAL ENCOUNTER (OUTPATIENT)
Dept: CARDIOLOGY | Facility: CLINIC | Age: 58
Discharge: HOME | End: 2018-11-01
Attending: INTERNAL MEDICINE

## 2018-11-01 DIAGNOSIS — I48.0 PAROXYSMAL ATRIAL FIBRILLATION (H): ICD-10-CM

## 2018-11-01 DIAGNOSIS — R00.2 PALPITATIONS: ICD-10-CM

## 2018-11-05 ENCOUNTER — RECORDS - HEALTHEAST (OUTPATIENT)
Dept: ADMINISTRATIVE | Facility: OTHER | Age: 58
End: 2018-11-05

## 2018-11-14 ENCOUNTER — RECORDS - HEALTHEAST (OUTPATIENT)
Dept: ADMINISTRATIVE | Facility: OTHER | Age: 58
End: 2018-11-14

## 2018-11-16 ENCOUNTER — COMMUNICATION - HEALTHEAST (OUTPATIENT)
Dept: CARDIOLOGY | Facility: CLINIC | Age: 58
End: 2018-11-16

## 2018-11-16 DIAGNOSIS — R00.2 HEART PALPITATIONS: ICD-10-CM

## 2019-01-04 ENCOUNTER — OFFICE VISIT - HEALTHEAST (OUTPATIENT)
Dept: ONCOLOGY | Facility: HOSPITAL | Age: 59
End: 2019-01-04

## 2019-01-04 ENCOUNTER — AMBULATORY - HEALTHEAST (OUTPATIENT)
Dept: INFUSION THERAPY | Facility: HOSPITAL | Age: 59
End: 2019-01-04

## 2019-01-04 DIAGNOSIS — C91.10 CHRONIC LYMPHOCYTIC LEUKEMIA (H): ICD-10-CM

## 2019-01-04 LAB
ALBUMIN SERPL-MCNC: 3.7 G/DL (ref 3.5–5)
ALP SERPL-CCNC: 73 U/L (ref 45–120)
ALT SERPL W P-5'-P-CCNC: 27 U/L (ref 0–45)
ANION GAP SERPL CALCULATED.3IONS-SCNC: 7 MMOL/L (ref 5–18)
AST SERPL W P-5'-P-CCNC: 22 U/L (ref 0–40)
BASOPHILS # BLD AUTO: 0.1 THOU/UL (ref 0–0.2)
BASOPHILS NFR BLD AUTO: 1 % (ref 0–2)
BILIRUB SERPL-MCNC: 1.2 MG/DL (ref 0–1)
BUN SERPL-MCNC: 17 MG/DL (ref 8–22)
CALCIUM SERPL-MCNC: 9.3 MG/DL (ref 8.5–10.5)
CHLORIDE BLD-SCNC: 103 MMOL/L (ref 98–107)
CO2 SERPL-SCNC: 29 MMOL/L (ref 22–31)
CREAT SERPL-MCNC: 0.78 MG/DL (ref 0.7–1.3)
EOSINOPHIL # BLD AUTO: 0.3 THOU/UL (ref 0–0.4)
EOSINOPHIL NFR BLD AUTO: 3 % (ref 0–6)
ERYTHROCYTE [DISTWIDTH] IN BLOOD BY AUTOMATED COUNT: 12.7 % (ref 11–14.5)
GFR SERPL CREATININE-BSD FRML MDRD: >60 ML/MIN/1.73M2
GLUCOSE BLD-MCNC: 97 MG/DL (ref 70–125)
HCT VFR BLD AUTO: 43.9 % (ref 40–54)
HGB BLD-MCNC: 14.4 G/DL (ref 14–18)
LYMPHOCYTES # BLD AUTO: 1.7 THOU/UL (ref 0.8–4.4)
LYMPHOCYTES NFR BLD AUTO: 20 % (ref 20–40)
MCH RBC QN AUTO: 29.1 PG (ref 27–34)
MCHC RBC AUTO-ENTMCNC: 32.8 G/DL (ref 32–36)
MCV RBC AUTO: 89 FL (ref 80–100)
MONOCYTES # BLD AUTO: 0.7 THOU/UL (ref 0–0.9)
MONOCYTES NFR BLD AUTO: 8 % (ref 2–10)
NEUTROPHILS # BLD AUTO: 5.7 THOU/UL (ref 2–7.7)
NEUTROPHILS NFR BLD AUTO: 68 % (ref 50–70)
PLATELET # BLD AUTO: 235 THOU/UL (ref 140–440)
PMV BLD AUTO: 10.6 FL (ref 8.5–12.5)
POTASSIUM BLD-SCNC: 4.2 MMOL/L (ref 3.5–5)
PROT SERPL-MCNC: 6.5 G/DL (ref 6–8)
RBC # BLD AUTO: 4.94 MILL/UL (ref 4.4–6.2)
SODIUM SERPL-SCNC: 139 MMOL/L (ref 136–145)
WBC: 8.5 THOU/UL (ref 4–11)

## 2019-01-21 ENCOUNTER — COMMUNICATION - HEALTHEAST (OUTPATIENT)
Dept: FAMILY MEDICINE | Facility: CLINIC | Age: 59
End: 2019-01-21

## 2019-01-21 DIAGNOSIS — G47.00 INSOMNIA: ICD-10-CM

## 2019-01-29 ENCOUNTER — COMMUNICATION - HEALTHEAST (OUTPATIENT)
Dept: CARDIOLOGY | Facility: CLINIC | Age: 59
End: 2019-01-29

## 2019-01-29 DIAGNOSIS — R00.2 HEART PALPITATIONS: ICD-10-CM

## 2019-02-06 ENCOUNTER — OFFICE VISIT - HEALTHEAST (OUTPATIENT)
Dept: CARDIOLOGY | Facility: CLINIC | Age: 59
End: 2019-02-06

## 2019-02-06 DIAGNOSIS — C91.10 CHRONIC LYMPHOCYTIC LEUKEMIA (H): ICD-10-CM

## 2019-02-06 DIAGNOSIS — R00.2 PALPITATIONS: ICD-10-CM

## 2019-02-06 DIAGNOSIS — I48.0 PAROXYSMAL ATRIAL FIBRILLATION (H): ICD-10-CM

## 2019-02-06 ASSESSMENT — MIFFLIN-ST. JEOR: SCORE: 2010.09

## 2019-02-15 ENCOUNTER — HOSPITAL ENCOUNTER (OUTPATIENT)
Dept: CARDIOLOGY | Facility: CLINIC | Age: 59
Discharge: HOME | End: 2019-02-15
Attending: INTERNAL MEDICINE

## 2019-02-15 DIAGNOSIS — I48.0 PAROXYSMAL ATRIAL FIBRILLATION (H): ICD-10-CM

## 2019-02-15 DIAGNOSIS — C91.10 CHRONIC LYMPHOCYTIC LEUKEMIA (H): ICD-10-CM

## 2019-02-15 DIAGNOSIS — C91.90 LYMPHOID LEUKEMIA, UNSPECIFIED NOT HAVING ACHIEVED REMISSION (H): ICD-10-CM

## 2019-02-15 LAB
AORTIC ROOT: 3.5 CM
BSA FOR ECHO PROCEDURE: 2.44 M2
CV BLOOD PRESSURE: ABNORMAL MMHG
CV ECHO HEIGHT: 73 IN
CV ECHO WEIGHT: 256 LBS
DOP CALC LVOT AREA: 3.8 CM2
DOP CALC LVOT DIAMETER: 2.2 CM
DOP CALC LVOT PEAK VEL: 117 CM/S
DOP CALC LVOT STROKE VOLUME: 93.5 CM3
DOP CALCLVOT PEAK VEL VTI: 24.6 CM
EJECTION FRACTION: 66 % (ref 55–75)
FRACTIONAL SHORTENING: 19.6 % (ref 28–44)
INTERVENTRICULAR SEPTUM IN END DIASTOLE: 0.99 CM (ref 0.6–1)
IVS/PW RATIO: 1
LA AREA 1: 19.8 CM2
LA AREA 2: 25.5 CM2
LEFT ATRIUM LENGTH: 6.47 CM
LEFT ATRIUM TO AORTIC ROOT RATIO: 1.2 NO UNITS
LEFT ATRIUM VOLUME INDEX: 27.2 ML/M2
LEFT ATRIUM VOLUME: 66.3 ML
LEFT VENTRICLE CARDIAC INDEX: 2.5 L/MIN/M2
LEFT VENTRICLE CARDIAC OUTPUT: 6.2 L/MIN
LEFT VENTRICLE DIASTOLIC VOLUME INDEX: 88.5 CM3/M2 (ref 34–74)
LEFT VENTRICLE DIASTOLIC VOLUME: 216 CM3 (ref 62–150)
LEFT VENTRICLE HEART RATE: 66 BPM
LEFT VENTRICLE MASS INDEX: 78.9 G/M2
LEFT VENTRICLE SYSTOLIC VOLUME INDEX: 29.9 CM3/M2 (ref 11–31)
LEFT VENTRICLE SYSTOLIC VOLUME: 73 CM3 (ref 21–61)
LEFT VENTRICULAR INTERNAL DIMENSION IN DIASTOLE: 5.2 CM (ref 4.2–5.8)
LEFT VENTRICULAR INTERNAL DIMENSION IN SYSTOLE: 4.18 CM (ref 2.5–4)
LEFT VENTRICULAR MASS: 192.6 G
LEFT VENTRICULAR OUTFLOW TRACT MEAN GRADIENT: 3 MMHG
LEFT VENTRICULAR OUTFLOW TRACT MEAN VELOCITY: 78.7 CM/S
LEFT VENTRICULAR OUTFLOW TRACT PEAK GRADIENT: 5 MMHG
LEFT VENTRICULAR POSTERIOR WALL IN END DIASTOLE: 1 CM (ref 0.6–1)
LV STROKE VOLUME INDEX: 38.3 ML/M2
MITRAL VALVE E/A RATIO: 1.2
MV AVERAGE E/E' RATIO: 8.1 CM/S
MV DECELERATION TIME: 222 MS
MV E'TISSUE VEL-LAT: 12.6 CM/S
MV E'TISSUE VEL-MED: 7.21 CM/S
MV LATERAL E/E' RATIO: 6.3
MV MEDIAL E/E' RATIO: 11.1
MV PEAK A VELOCITY: 64.7 CM/S
MV PEAK E VELOCITY: 80 CM/S
NUC REST DIASTOLIC VOLUME INDEX: 4096 LBS
NUC REST SYSTOLIC VOLUME INDEX: 73 IN
PR MAX PG: 5 MMHG
PR PEAK VELOCITY: 110 CM/S
TRICUSPID VALVE ANULAR PLANE SYSTOLIC EXCURSION: 2.3 CM

## 2019-02-15 ASSESSMENT — MIFFLIN-ST. JEOR: SCORE: 2010.09

## 2019-03-12 ENCOUNTER — COMMUNICATION - HEALTHEAST (OUTPATIENT)
Dept: CARDIOLOGY | Facility: CLINIC | Age: 59
End: 2019-03-12

## 2019-03-15 ENCOUNTER — OFFICE VISIT - HEALTHEAST (OUTPATIENT)
Dept: CARDIOLOGY | Facility: CLINIC | Age: 59
End: 2019-03-15

## 2019-03-15 DIAGNOSIS — I48.0 PAF (PAROXYSMAL ATRIAL FIBRILLATION) (H): ICD-10-CM

## 2019-03-15 DIAGNOSIS — F41.1 GENERALIZED ANXIETY DISORDER: ICD-10-CM

## 2019-03-15 DIAGNOSIS — R07.89 ATYPICAL CHEST PAIN: ICD-10-CM

## 2019-03-15 DIAGNOSIS — I48.0 PAROXYSMAL ATRIAL FIBRILLATION WITH RAPID VENTRICULAR RESPONSE (H): ICD-10-CM

## 2019-03-15 ASSESSMENT — MIFFLIN-ST. JEOR: SCORE: 1991.94

## 2019-03-16 ASSESSMENT — MIFFLIN-ST. JEOR: SCORE: 2003.73

## 2019-03-17 ASSESSMENT — MIFFLIN-ST. JEOR: SCORE: 2007.81

## 2019-03-18 ENCOUNTER — SURGERY - HEALTHEAST (OUTPATIENT)
Dept: CARDIOLOGY | Facility: CLINIC | Age: 59
End: 2019-03-18

## 2019-03-18 ENCOUNTER — COMMUNICATION - HEALTHEAST (OUTPATIENT)
Dept: ONCOLOGY | Facility: HOSPITAL | Age: 59
End: 2019-03-18

## 2019-03-18 LAB
ATRIAL RATE - MUSE: 85 BPM
DIASTOLIC BLOOD PRESSURE - MUSE: NORMAL MMHG
INTERPRETATION ECG - MUSE: NORMAL
P AXIS - MUSE: NORMAL DEGREES
PR INTERVAL - MUSE: NORMAL MS
QRS DURATION - MUSE: 94 MS
QT - MUSE: 344 MS
QTC - MUSE: 459 MS
R AXIS - MUSE: -22 DEGREES
SYSTOLIC BLOOD PRESSURE - MUSE: NORMAL MMHG
T AXIS - MUSE: 14 DEGREES
VENTRICULAR RATE- MUSE: 107 BPM

## 2019-03-18 ASSESSMENT — MIFFLIN-ST. JEOR: SCORE: 2008.72

## 2019-03-19 ASSESSMENT — MIFFLIN-ST. JEOR: SCORE: 2012.35

## 2019-03-20 ASSESSMENT — MIFFLIN-ST. JEOR: SCORE: 2012.35

## 2019-03-21 ENCOUNTER — COMMUNICATION - HEALTHEAST (OUTPATIENT)
Dept: CARE COORDINATION | Facility: CLINIC | Age: 59
End: 2019-03-21

## 2019-03-21 ENCOUNTER — AMBULATORY - HEALTHEAST (OUTPATIENT)
Dept: SLEEP MEDICINE | Facility: CLINIC | Age: 59
End: 2019-03-21

## 2019-03-22 ENCOUNTER — HOSPITAL ENCOUNTER (OUTPATIENT)
Dept: ULTRASOUND IMAGING | Facility: CLINIC | Age: 59
Discharge: HOME | End: 2019-03-22
Attending: FAMILY MEDICINE

## 2019-03-22 ENCOUNTER — OFFICE VISIT - HEALTHEAST (OUTPATIENT)
Dept: FAMILY MEDICINE | Facility: CLINIC | Age: 59
End: 2019-03-22

## 2019-03-22 DIAGNOSIS — E04.1 THYROID NODULE: ICD-10-CM

## 2019-03-22 DIAGNOSIS — I31.39 PERICARDIAL EFFUSION: ICD-10-CM

## 2019-03-22 DIAGNOSIS — C91.10 CHRONIC LYMPHOCYTIC LEUKEMIA (H): ICD-10-CM

## 2019-03-22 DIAGNOSIS — I48.0 PAROXYSMAL ATRIAL FIBRILLATION WITH RAPID VENTRICULAR RESPONSE (H): ICD-10-CM

## 2019-03-22 DIAGNOSIS — H93.11 TINNITUS OF RIGHT EAR: ICD-10-CM

## 2019-03-22 LAB — TSH SERPL DL<=0.005 MIU/L-ACNC: 3.11 UIU/ML (ref 0.3–5)

## 2019-03-25 ENCOUNTER — COMMUNICATION - HEALTHEAST (OUTPATIENT)
Dept: FAMILY MEDICINE | Facility: CLINIC | Age: 59
End: 2019-03-25

## 2019-03-26 ENCOUNTER — COMMUNICATION - HEALTHEAST (OUTPATIENT)
Dept: FAMILY MEDICINE | Facility: CLINIC | Age: 59
End: 2019-03-26

## 2019-03-26 LAB — THYROID PEROXIDASE ANTIBODIES - HISTORICAL: <3 IU/ML (ref 0–5.6)

## 2019-03-27 ENCOUNTER — AMBULATORY - HEALTHEAST (OUTPATIENT)
Dept: PHARMACY | Facility: CLINIC | Age: 59
End: 2019-03-27

## 2019-03-27 DIAGNOSIS — C91.10 CHRONIC LYMPHOCYTIC LEUKEMIA (H): ICD-10-CM

## 2019-03-27 DIAGNOSIS — I48.0 PAROXYSMAL ATRIAL FIBRILLATION WITH RAPID VENTRICULAR RESPONSE (H): ICD-10-CM

## 2019-03-27 DIAGNOSIS — F51.01 PRIMARY INSOMNIA: ICD-10-CM

## 2019-03-27 DIAGNOSIS — N40.0 BENIGN PROSTATIC HYPERPLASIA WITHOUT LOWER URINARY TRACT SYMPTOMS: ICD-10-CM

## 2019-03-27 DIAGNOSIS — E78.5 HYPERLIPIDEMIA, UNSPECIFIED HYPERLIPIDEMIA TYPE: ICD-10-CM

## 2019-03-27 DIAGNOSIS — F41.1 GENERALIZED ANXIETY DISORDER: ICD-10-CM

## 2019-03-27 DIAGNOSIS — I31.39 PERICARDIAL EFFUSION: ICD-10-CM

## 2019-04-01 ENCOUNTER — COMMUNICATION - HEALTHEAST (OUTPATIENT)
Dept: ONCOLOGY | Facility: HOSPITAL | Age: 59
End: 2019-04-01

## 2019-04-01 ENCOUNTER — COMMUNICATION - HEALTHEAST (OUTPATIENT)
Dept: NURSING | Facility: CLINIC | Age: 59
End: 2019-04-01

## 2019-04-01 ENCOUNTER — COMMUNICATION - HEALTHEAST (OUTPATIENT)
Dept: CARDIOLOGY | Facility: CLINIC | Age: 59
End: 2019-04-01

## 2019-04-01 DIAGNOSIS — F41.1 GAD (GENERALIZED ANXIETY DISORDER): ICD-10-CM

## 2019-04-01 DIAGNOSIS — F41.1 GENERALIZED ANXIETY DISORDER: ICD-10-CM

## 2019-04-01 DIAGNOSIS — C91.10 CHRONIC LYMPHOCYTIC LEUKEMIA (H): ICD-10-CM

## 2019-04-01 DIAGNOSIS — I31.9 PERICARDITIS: ICD-10-CM

## 2019-04-02 ENCOUNTER — COMMUNICATION - HEALTHEAST (OUTPATIENT)
Dept: PHARMACY | Facility: CLINIC | Age: 59
End: 2019-04-02

## 2019-04-04 ENCOUNTER — AMBULATORY - HEALTHEAST (OUTPATIENT)
Dept: INFUSION THERAPY | Facility: HOSPITAL | Age: 59
End: 2019-04-04

## 2019-04-04 ENCOUNTER — OFFICE VISIT - HEALTHEAST (OUTPATIENT)
Dept: ONCOLOGY | Facility: HOSPITAL | Age: 59
End: 2019-04-04

## 2019-04-04 DIAGNOSIS — C91.10 CHRONIC LYMPHOCYTIC LEUKEMIA (H): ICD-10-CM

## 2019-04-04 DIAGNOSIS — I31.39 PERICARDIAL EFFUSION: ICD-10-CM

## 2019-04-04 LAB
ALBUMIN SERPL-MCNC: 3.4 G/DL (ref 3.5–5)
ALP SERPL-CCNC: 83 U/L (ref 45–120)
ALT SERPL W P-5'-P-CCNC: 18 U/L (ref 0–45)
ANION GAP SERPL CALCULATED.3IONS-SCNC: 9 MMOL/L (ref 5–18)
AST SERPL W P-5'-P-CCNC: 14 U/L (ref 0–40)
BASOPHILS # BLD AUTO: 0 THOU/UL (ref 0–0.2)
BASOPHILS NFR BLD AUTO: 0 % (ref 0–2)
BILIRUB SERPL-MCNC: 0.5 MG/DL (ref 0–1)
BUN SERPL-MCNC: 17 MG/DL (ref 8–22)
CALCIUM SERPL-MCNC: 9.4 MG/DL (ref 8.5–10.5)
CHLORIDE BLD-SCNC: 103 MMOL/L (ref 98–107)
CO2 SERPL-SCNC: 27 MMOL/L (ref 22–31)
CREAT SERPL-MCNC: 0.74 MG/DL (ref 0.7–1.3)
EOSINOPHIL # BLD AUTO: 0.8 THOU/UL (ref 0–0.4)
EOSINOPHIL NFR BLD AUTO: 7 % (ref 0–6)
ERYTHROCYTE [DISTWIDTH] IN BLOOD BY AUTOMATED COUNT: 12.2 % (ref 11–14.5)
GFR SERPL CREATININE-BSD FRML MDRD: >60 ML/MIN/1.73M2
GLUCOSE BLD-MCNC: 96 MG/DL (ref 70–125)
HCT VFR BLD AUTO: 42.5 % (ref 40–54)
HGB BLD-MCNC: 14 G/DL (ref 14–18)
LYMPHOCYTES # BLD AUTO: 1.1 THOU/UL (ref 0.8–4.4)
LYMPHOCYTES NFR BLD AUTO: 10 % (ref 20–40)
MCH RBC QN AUTO: 29 PG (ref 27–34)
MCHC RBC AUTO-ENTMCNC: 32.9 G/DL (ref 32–36)
MCV RBC AUTO: 88 FL (ref 80–100)
MONOCYTES # BLD AUTO: 0.7 THOU/UL (ref 0–0.9)
MONOCYTES NFR BLD AUTO: 7 % (ref 2–10)
NEUTROPHILS # BLD AUTO: 8.4 THOU/UL (ref 2–7.7)
NEUTROPHILS NFR BLD AUTO: 76 % (ref 50–70)
PLATELET # BLD AUTO: 343 THOU/UL (ref 140–440)
PMV BLD AUTO: 9 FL (ref 8.5–12.5)
POTASSIUM BLD-SCNC: 4.5 MMOL/L (ref 3.5–5)
PROT SERPL-MCNC: 6.6 G/DL (ref 6–8)
RBC # BLD AUTO: 4.82 MILL/UL (ref 4.4–6.2)
SODIUM SERPL-SCNC: 139 MMOL/L (ref 136–145)
WBC: 11.2 THOU/UL (ref 4–11)

## 2019-04-09 ENCOUNTER — OFFICE VISIT - HEALTHEAST (OUTPATIENT)
Dept: CARDIOLOGY | Facility: CLINIC | Age: 59
End: 2019-04-09

## 2019-04-09 ENCOUNTER — AMBULATORY - HEALTHEAST (OUTPATIENT)
Dept: CARDIOLOGY | Facility: CLINIC | Age: 59
End: 2019-04-09

## 2019-04-09 DIAGNOSIS — I31.9 PERICARDITIS: ICD-10-CM

## 2019-04-09 DIAGNOSIS — I31.39 PERICARDIAL EFFUSION: ICD-10-CM

## 2019-04-09 DIAGNOSIS — C91.10 CHRONIC LYMPHOCYTIC LEUKEMIA (H): ICD-10-CM

## 2019-04-09 DIAGNOSIS — I30.0 ACUTE IDIOPATHIC PERICARDITIS: ICD-10-CM

## 2019-04-09 DIAGNOSIS — I48.0 PAROXYSMAL ATRIAL FIBRILLATION WITH RAPID VENTRICULAR RESPONSE (H): ICD-10-CM

## 2019-04-09 LAB — C REACTIVE PROTEIN LHE: 4.3 MG/DL (ref 0–0.8)

## 2019-04-09 ASSESSMENT — MIFFLIN-ST. JEOR: SCORE: 2012.35

## 2019-04-16 ENCOUNTER — COMMUNICATION - HEALTHEAST (OUTPATIENT)
Dept: ADMINISTRATIVE | Facility: CLINIC | Age: 59
End: 2019-04-16

## 2019-05-08 ENCOUNTER — AMBULATORY - HEALTHEAST (OUTPATIENT)
Dept: CARDIOLOGY | Facility: CLINIC | Age: 59
End: 2019-05-08

## 2019-05-08 DIAGNOSIS — I31.9 PERICARDITIS: ICD-10-CM

## 2019-05-08 LAB — C REACTIVE PROTEIN LHE: 1.9 MG/DL (ref 0–0.8)

## 2019-05-19 ENCOUNTER — RECORDS - HEALTHEAST (OUTPATIENT)
Dept: SLEEP MEDICINE | Age: 59
End: 2019-05-19

## 2019-05-19 DIAGNOSIS — I48.0 PAROXYSMAL ATRIAL FIBRILLATION (H): ICD-10-CM

## 2019-05-28 ENCOUNTER — COMMUNICATION - HEALTHEAST (OUTPATIENT)
Dept: CARDIOLOGY | Facility: CLINIC | Age: 59
End: 2019-05-28

## 2019-06-06 ENCOUNTER — AMBULATORY - HEALTHEAST (OUTPATIENT)
Dept: SLEEP MEDICINE | Facility: CLINIC | Age: 59
End: 2019-06-06

## 2019-06-06 ENCOUNTER — OFFICE VISIT - HEALTHEAST (OUTPATIENT)
Dept: SLEEP MEDICINE | Facility: CLINIC | Age: 59
End: 2019-06-06

## 2019-06-06 DIAGNOSIS — G47.52 REM SLEEP BEHAVIOR DISORDER: ICD-10-CM

## 2019-06-06 DIAGNOSIS — I48.0 PAROXYSMAL ATRIAL FIBRILLATION WITH RAPID VENTRICULAR RESPONSE (H): ICD-10-CM

## 2019-06-06 DIAGNOSIS — E66.09 CLASS 1 OBESITY DUE TO EXCESS CALORIES WITH SERIOUS COMORBIDITY AND BODY MASS INDEX (BMI) OF 32.0 TO 32.9 IN ADULT: ICD-10-CM

## 2019-06-06 DIAGNOSIS — E66.811 CLASS 1 OBESITY DUE TO EXCESS CALORIES WITH SERIOUS COMORBIDITY AND BODY MASS INDEX (BMI) OF 32.0 TO 32.9 IN ADULT: ICD-10-CM

## 2019-06-06 DIAGNOSIS — G47.33 OSA (OBSTRUCTIVE SLEEP APNEA): ICD-10-CM

## 2019-06-06 ASSESSMENT — MIFFLIN-ST. JEOR: SCORE: 1998.74

## 2019-06-10 ENCOUNTER — COMMUNICATION - HEALTHEAST (OUTPATIENT)
Dept: FAMILY MEDICINE | Facility: CLINIC | Age: 59
End: 2019-06-10

## 2019-06-10 ENCOUNTER — AMBULATORY - HEALTHEAST (OUTPATIENT)
Dept: FAMILY MEDICINE | Facility: CLINIC | Age: 59
End: 2019-06-10

## 2019-06-13 ENCOUNTER — COMMUNICATION - HEALTHEAST (OUTPATIENT)
Dept: FAMILY MEDICINE | Facility: CLINIC | Age: 59
End: 2019-06-13

## 2019-06-17 ENCOUNTER — OFFICE VISIT - HEALTHEAST (OUTPATIENT)
Dept: FAMILY MEDICINE | Facility: CLINIC | Age: 59
End: 2019-06-17

## 2019-06-17 DIAGNOSIS — L84 CORN OR CALLUS: ICD-10-CM

## 2019-06-17 DIAGNOSIS — R07.89 CHEST WALL PAIN: ICD-10-CM

## 2019-06-17 DIAGNOSIS — L60.8 TOENAIL DEFORMITY: ICD-10-CM

## 2019-06-17 DIAGNOSIS — T73.3XXD FATIGUE DUE TO EXCESSIVE EXERTION, SUBSEQUENT ENCOUNTER: ICD-10-CM

## 2019-06-17 DIAGNOSIS — I31.39 PERICARDIAL EFFUSION: ICD-10-CM

## 2019-06-17 LAB — C REACTIVE PROTEIN LHE: 0.7 MG/DL (ref 0–0.8)

## 2019-06-18 LAB — NT-PROBNP SERPL-MCNC: 62 PG/ML (ref 0–125)

## 2019-06-25 ENCOUNTER — COMMUNICATION - HEALTHEAST (OUTPATIENT)
Dept: FAMILY MEDICINE | Facility: CLINIC | Age: 59
End: 2019-06-25

## 2019-06-26 ENCOUNTER — AMBULATORY - HEALTHEAST (OUTPATIENT)
Dept: ONCOLOGY | Facility: HOSPITAL | Age: 59
End: 2019-06-26

## 2019-06-26 DIAGNOSIS — C91.10 CHRONIC LYMPHOCYTIC LEUKEMIA (H): ICD-10-CM

## 2019-06-27 ENCOUNTER — OFFICE VISIT - HEALTHEAST (OUTPATIENT)
Dept: ONCOLOGY | Facility: HOSPITAL | Age: 59
End: 2019-06-27

## 2019-06-27 ENCOUNTER — AMBULATORY - HEALTHEAST (OUTPATIENT)
Dept: INFUSION THERAPY | Facility: HOSPITAL | Age: 59
End: 2019-06-27

## 2019-06-27 ENCOUNTER — COMMUNICATION - HEALTHEAST (OUTPATIENT)
Dept: NURSING | Facility: CLINIC | Age: 59
End: 2019-06-27

## 2019-06-27 DIAGNOSIS — C91.10 CHRONIC LYMPHOCYTIC LEUKEMIA (H): ICD-10-CM

## 2019-06-27 DIAGNOSIS — G47.00 INSOMNIA: ICD-10-CM

## 2019-06-27 DIAGNOSIS — H93.19 TINNITUS: ICD-10-CM

## 2019-06-27 LAB
ALBUMIN SERPL-MCNC: 3.5 G/DL (ref 3.5–5)
ALP SERPL-CCNC: 85 U/L (ref 45–120)
ALT SERPL W P-5'-P-CCNC: 15 U/L (ref 0–45)
ANION GAP SERPL CALCULATED.3IONS-SCNC: 8 MMOL/L (ref 5–18)
AST SERPL W P-5'-P-CCNC: 15 U/L (ref 0–40)
BASOPHILS # BLD AUTO: 0.1 THOU/UL (ref 0–0.2)
BASOPHILS NFR BLD AUTO: 1 % (ref 0–2)
BILIRUB SERPL-MCNC: 0.5 MG/DL (ref 0–1)
BUN SERPL-MCNC: 17 MG/DL (ref 8–22)
CALCIUM SERPL-MCNC: 9.6 MG/DL (ref 8.5–10.5)
CHLORIDE BLD-SCNC: 106 MMOL/L (ref 98–107)
CO2 SERPL-SCNC: 28 MMOL/L (ref 22–31)
CREAT SERPL-MCNC: 0.77 MG/DL (ref 0.7–1.3)
EOSINOPHIL # BLD AUTO: 0.4 THOU/UL (ref 0–0.4)
EOSINOPHIL NFR BLD AUTO: 5 % (ref 0–6)
ERYTHROCYTE [DISTWIDTH] IN BLOOD BY AUTOMATED COUNT: 13.5 % (ref 11–14.5)
GFR SERPL CREATININE-BSD FRML MDRD: >60 ML/MIN/1.73M2
GLUCOSE BLD-MCNC: 71 MG/DL (ref 70–125)
HCT VFR BLD AUTO: 41.1 % (ref 40–54)
HGB BLD-MCNC: 13.5 G/DL (ref 14–18)
LYMPHOCYTES # BLD AUTO: 1.5 THOU/UL (ref 0.8–4.4)
LYMPHOCYTES NFR BLD AUTO: 19 % (ref 20–40)
MCH RBC QN AUTO: 28.4 PG (ref 27–34)
MCHC RBC AUTO-ENTMCNC: 32.8 G/DL (ref 32–36)
MCV RBC AUTO: 87 FL (ref 80–100)
MONOCYTES # BLD AUTO: 0.7 THOU/UL (ref 0–0.9)
MONOCYTES NFR BLD AUTO: 9 % (ref 2–10)
NEUTROPHILS # BLD AUTO: 5.4 THOU/UL (ref 2–7.7)
NEUTROPHILS NFR BLD AUTO: 68 % (ref 50–70)
PLATELET # BLD AUTO: 273 THOU/UL (ref 140–440)
PMV BLD AUTO: 10 FL (ref 8.5–12.5)
POTASSIUM BLD-SCNC: 4.6 MMOL/L (ref 3.5–5)
PROT SERPL-MCNC: 6.4 G/DL (ref 6–8)
RBC # BLD AUTO: 4.75 MILL/UL (ref 4.4–6.2)
SODIUM SERPL-SCNC: 142 MMOL/L (ref 136–145)
WBC: 8.1 THOU/UL (ref 4–11)

## 2019-07-11 ENCOUNTER — COMMUNICATION - HEALTHEAST (OUTPATIENT)
Dept: SCHEDULING | Facility: CLINIC | Age: 59
End: 2019-07-11

## 2019-07-12 ENCOUNTER — OFFICE VISIT - HEALTHEAST (OUTPATIENT)
Dept: FAMILY MEDICINE | Facility: CLINIC | Age: 59
End: 2019-07-12

## 2019-07-12 DIAGNOSIS — L60.0 INGROWN TOENAIL OF LEFT FOOT WITH INFECTION: ICD-10-CM

## 2019-07-22 ENCOUNTER — RECORDS - HEALTHEAST (OUTPATIENT)
Dept: ADMINISTRATIVE | Facility: OTHER | Age: 59
End: 2019-07-22

## 2019-07-26 ENCOUNTER — COMMUNICATION - HEALTHEAST (OUTPATIENT)
Dept: ONCOLOGY | Facility: HOSPITAL | Age: 59
End: 2019-07-26

## 2019-07-29 ENCOUNTER — COMMUNICATION - HEALTHEAST (OUTPATIENT)
Dept: CARDIOLOGY | Facility: CLINIC | Age: 59
End: 2019-07-29

## 2019-07-29 DIAGNOSIS — R00.2 HEART PALPITATIONS: ICD-10-CM

## 2019-07-30 ENCOUNTER — OFFICE VISIT - HEALTHEAST (OUTPATIENT)
Dept: CARDIOLOGY | Facility: CLINIC | Age: 59
End: 2019-07-30

## 2019-07-30 DIAGNOSIS — I48.0 PAROXYSMAL ATRIAL FIBRILLATION WITH RAPID VENTRICULAR RESPONSE (H): ICD-10-CM

## 2019-07-30 DIAGNOSIS — C91.10 CHRONIC LYMPHOCYTIC LEUKEMIA (H): ICD-10-CM

## 2019-07-30 DIAGNOSIS — I30.9 ACUTE PERICARDITIS: ICD-10-CM

## 2019-07-30 DIAGNOSIS — I31.39 PERICARDIAL EFFUSION: ICD-10-CM

## 2019-07-30 ASSESSMENT — MIFFLIN-ST. JEOR: SCORE: 2030.49

## 2019-08-01 ENCOUNTER — OFFICE VISIT - HEALTHEAST (OUTPATIENT)
Dept: PODIATRY | Facility: CLINIC | Age: 59
End: 2019-08-01

## 2019-08-01 DIAGNOSIS — M79.673 PAIN OF FOOT, UNSPECIFIED LATERALITY: ICD-10-CM

## 2019-08-06 ENCOUNTER — AMBULATORY - HEALTHEAST (OUTPATIENT)
Dept: SLEEP MEDICINE | Facility: CLINIC | Age: 59
End: 2019-08-06

## 2019-08-09 ENCOUNTER — COMMUNICATION - HEALTHEAST (OUTPATIENT)
Dept: SCHEDULING | Facility: CLINIC | Age: 59
End: 2019-08-09

## 2019-08-09 ENCOUNTER — AMBULATORY - HEALTHEAST (OUTPATIENT)
Dept: FAMILY MEDICINE | Facility: CLINIC | Age: 59
End: 2019-08-09

## 2019-08-09 DIAGNOSIS — L03.039 PARONYCHIA OF TOE, UNSPECIFIED LATERALITY: ICD-10-CM

## 2019-08-15 ENCOUNTER — OFFICE VISIT - HEALTHEAST (OUTPATIENT)
Dept: PODIATRY | Facility: CLINIC | Age: 59
End: 2019-08-15

## 2019-08-15 DIAGNOSIS — L60.0 INGROWN NAIL: ICD-10-CM

## 2019-08-15 DIAGNOSIS — L03.031 PARONYCHIA OF TOE, RIGHT: ICD-10-CM

## 2019-08-15 ASSESSMENT — MIFFLIN-ST. JEOR: SCORE: 2030.49

## 2019-08-26 ENCOUNTER — OFFICE VISIT - HEALTHEAST (OUTPATIENT)
Dept: PODIATRY | Facility: CLINIC | Age: 59
End: 2019-08-26

## 2019-08-26 DIAGNOSIS — L60.0 INGROWN NAIL: ICD-10-CM

## 2019-08-26 ASSESSMENT — MIFFLIN-ST. JEOR: SCORE: 2030.49

## 2019-08-27 ENCOUNTER — COMMUNICATION - HEALTHEAST (OUTPATIENT)
Dept: ONCOLOGY | Facility: HOSPITAL | Age: 59
End: 2019-08-27

## 2019-09-11 ENCOUNTER — RECORDS - HEALTHEAST (OUTPATIENT)
Dept: ADMINISTRATIVE | Facility: OTHER | Age: 59
End: 2019-09-11

## 2019-09-23 ENCOUNTER — AMBULATORY - HEALTHEAST (OUTPATIENT)
Dept: ONCOLOGY | Facility: HOSPITAL | Age: 59
End: 2019-09-23

## 2019-09-23 DIAGNOSIS — Z00.6 RESEARCH STUDY PATIENT: ICD-10-CM

## 2019-09-23 DIAGNOSIS — C91.10 CHRONIC LYMPHOCYTIC LEUKEMIA (H): ICD-10-CM

## 2019-09-25 ENCOUNTER — AMBULATORY - HEALTHEAST (OUTPATIENT)
Dept: INFUSION THERAPY | Facility: HOSPITAL | Age: 59
End: 2019-09-25

## 2019-09-25 ENCOUNTER — OFFICE VISIT - HEALTHEAST (OUTPATIENT)
Dept: ONCOLOGY | Facility: HOSPITAL | Age: 59
End: 2019-09-25

## 2019-09-25 ENCOUNTER — COMMUNICATION - HEALTHEAST (OUTPATIENT)
Dept: FAMILY MEDICINE | Facility: CLINIC | Age: 59
End: 2019-09-25

## 2019-09-25 DIAGNOSIS — Z00.6 RESEARCH STUDY PATIENT: ICD-10-CM

## 2019-09-25 DIAGNOSIS — C91.10 CHRONIC LYMPHOCYTIC LEUKEMIA (H): ICD-10-CM

## 2019-09-25 DIAGNOSIS — G47.00 INSOMNIA: ICD-10-CM

## 2019-09-25 LAB
ALBUMIN SERPL-MCNC: 3.7 G/DL (ref 3.5–5)
ALP SERPL-CCNC: 76 U/L (ref 45–120)
ALT SERPL W P-5'-P-CCNC: 17 U/L (ref 0–45)
AST SERPL W P-5'-P-CCNC: 17 U/L (ref 0–40)
BASOPHILS # BLD AUTO: 0.1 THOU/UL (ref 0–0.2)
BASOPHILS NFR BLD AUTO: 1 % (ref 0–2)
BILIRUB DIRECT SERPL-MCNC: 0.2 MG/DL
BILIRUB SERPL-MCNC: 0.5 MG/DL (ref 0–1)
CREAT SERPL-MCNC: 0.85 MG/DL (ref 0.7–1.3)
EOSINOPHIL # BLD AUTO: 0.2 THOU/UL (ref 0–0.4)
EOSINOPHIL NFR BLD AUTO: 2 % (ref 0–6)
ERYTHROCYTE [DISTWIDTH] IN BLOOD BY AUTOMATED COUNT: 13.4 % (ref 11–14.5)
GFR SERPL CREATININE-BSD FRML MDRD: >60 ML/MIN/1.73M2
HCT VFR BLD AUTO: 45.1 % (ref 40–54)
HGB BLD-MCNC: 14.7 G/DL (ref 14–18)
LYMPHOCYTES # BLD AUTO: 1.4 THOU/UL (ref 0.8–4.4)
LYMPHOCYTES NFR BLD AUTO: 18 % (ref 20–40)
MCH RBC QN AUTO: 28.5 PG (ref 27–34)
MCHC RBC AUTO-ENTMCNC: 32.6 G/DL (ref 32–36)
MCV RBC AUTO: 88 FL (ref 80–100)
MONOCYTES # BLD AUTO: 0.6 THOU/UL (ref 0–0.9)
MONOCYTES NFR BLD AUTO: 7 % (ref 2–10)
NEUTROPHILS # BLD AUTO: 5.8 THOU/UL (ref 2–7.7)
NEUTROPHILS NFR BLD AUTO: 71 % (ref 50–70)
PLATELET # BLD AUTO: 280 THOU/UL (ref 140–440)
PMV BLD AUTO: 10.1 FL (ref 8.5–12.5)
PROT SERPL-MCNC: 6.3 G/DL (ref 6–8)
RBC # BLD AUTO: 5.15 MILL/UL (ref 4.4–6.2)
WBC: 8.1 THOU/UL (ref 4–11)

## 2019-09-25 ASSESSMENT — MIFFLIN-ST. JEOR: SCORE: 2029.13

## 2019-10-09 ENCOUNTER — OFFICE VISIT - HEALTHEAST (OUTPATIENT)
Dept: SLEEP MEDICINE | Facility: CLINIC | Age: 59
End: 2019-10-09

## 2019-10-09 DIAGNOSIS — G47.33 OSA (OBSTRUCTIVE SLEEP APNEA): ICD-10-CM

## 2019-10-09 ASSESSMENT — MIFFLIN-ST. JEOR: SCORE: 2032.31

## 2019-10-15 ENCOUNTER — COMMUNICATION - HEALTHEAST (OUTPATIENT)
Dept: SCHEDULING | Facility: CLINIC | Age: 59
End: 2019-10-15

## 2019-10-16 ENCOUNTER — OFFICE VISIT - HEALTHEAST (OUTPATIENT)
Dept: INTERNAL MEDICINE | Facility: CLINIC | Age: 59
End: 2019-10-16

## 2019-10-16 DIAGNOSIS — M71.21 BAKER'S CYST OF KNEE, RIGHT: ICD-10-CM

## 2019-10-16 ASSESSMENT — MIFFLIN-ST. JEOR: SCORE: 2044.1

## 2019-12-23 ENCOUNTER — OFFICE VISIT - HEALTHEAST (OUTPATIENT)
Dept: ONCOLOGY | Facility: HOSPITAL | Age: 59
End: 2019-12-23

## 2019-12-23 ENCOUNTER — AMBULATORY - HEALTHEAST (OUTPATIENT)
Dept: INFUSION THERAPY | Facility: HOSPITAL | Age: 59
End: 2019-12-23

## 2019-12-23 DIAGNOSIS — C91.10 CHRONIC LYMPHOCYTIC LEUKEMIA (H): ICD-10-CM

## 2019-12-23 DIAGNOSIS — I31.39 PERICARDIAL EFFUSION: ICD-10-CM

## 2019-12-23 LAB
ALBUMIN SERPL-MCNC: 3.8 G/DL (ref 3.5–5)
ALP SERPL-CCNC: 73 U/L (ref 45–120)
ALT SERPL W P-5'-P-CCNC: 22 U/L (ref 0–45)
ANION GAP SERPL CALCULATED.3IONS-SCNC: 7 MMOL/L (ref 5–18)
AST SERPL W P-5'-P-CCNC: 19 U/L (ref 0–40)
BASOPHILS # BLD AUTO: 0.1 THOU/UL (ref 0–0.2)
BASOPHILS NFR BLD AUTO: 1 % (ref 0–2)
BILIRUB SERPL-MCNC: 0.5 MG/DL (ref 0–1)
BUN SERPL-MCNC: 23 MG/DL (ref 8–22)
CALCIUM SERPL-MCNC: 9.4 MG/DL (ref 8.5–10.5)
CHLORIDE BLD-SCNC: 106 MMOL/L (ref 98–107)
CO2 SERPL-SCNC: 28 MMOL/L (ref 22–31)
CREAT SERPL-MCNC: 0.88 MG/DL (ref 0.7–1.3)
EOSINOPHIL # BLD AUTO: 0.4 THOU/UL (ref 0–0.4)
EOSINOPHIL NFR BLD AUTO: 5 % (ref 0–6)
ERYTHROCYTE [DISTWIDTH] IN BLOOD BY AUTOMATED COUNT: 12.6 % (ref 11–14.5)
GFR SERPL CREATININE-BSD FRML MDRD: >60 ML/MIN/1.73M2
GLUCOSE BLD-MCNC: 70 MG/DL (ref 70–125)
HCT VFR BLD AUTO: 42.1 % (ref 40–54)
HGB BLD-MCNC: 13.2 G/DL (ref 14–18)
LYMPHOCYTES # BLD AUTO: 1.3 THOU/UL (ref 0.8–4.4)
LYMPHOCYTES NFR BLD AUTO: 17 % (ref 20–40)
MCH RBC QN AUTO: 28.4 PG (ref 27–34)
MCHC RBC AUTO-ENTMCNC: 31.4 G/DL (ref 32–36)
MCV RBC AUTO: 91 FL (ref 80–100)
MONOCYTES # BLD AUTO: 0.6 THOU/UL (ref 0–0.9)
MONOCYTES NFR BLD AUTO: 8 % (ref 2–10)
NEUTROPHILS # BLD AUTO: 5.4 THOU/UL (ref 2–7.7)
NEUTROPHILS NFR BLD AUTO: 69 % (ref 50–70)
PLATELET # BLD AUTO: 296 THOU/UL (ref 140–440)
PMV BLD AUTO: 10.1 FL (ref 8.5–12.5)
POTASSIUM BLD-SCNC: 4.3 MMOL/L (ref 3.5–5)
PROT SERPL-MCNC: 6.5 G/DL (ref 6–8)
RBC # BLD AUTO: 4.64 MILL/UL (ref 4.4–6.2)
SODIUM SERPL-SCNC: 141 MMOL/L (ref 136–145)
WBC: 7.8 THOU/UL (ref 4–11)

## 2019-12-31 ENCOUNTER — COMMUNICATION - HEALTHEAST (OUTPATIENT)
Dept: ONCOLOGY | Facility: HOSPITAL | Age: 59
End: 2019-12-31

## 2019-12-31 DIAGNOSIS — C91.10 CHRONIC LYMPHOCYTIC LEUKEMIA (H): ICD-10-CM

## 2020-01-09 ENCOUNTER — OFFICE VISIT - HEALTHEAST (OUTPATIENT)
Dept: SLEEP MEDICINE | Facility: CLINIC | Age: 60
End: 2020-01-09

## 2020-01-09 DIAGNOSIS — G47.10 HYPERSOMNIA: ICD-10-CM

## 2020-01-09 DIAGNOSIS — G47.33 OBSTRUCTIVE SLEEP APNEA: ICD-10-CM

## 2020-01-09 ASSESSMENT — MIFFLIN-ST. JEOR: SCORE: 2089.46

## 2020-02-26 ENCOUNTER — RECORDS - HEALTHEAST (OUTPATIENT)
Dept: ADMINISTRATIVE | Facility: OTHER | Age: 60
End: 2020-02-26

## 2020-03-02 ENCOUNTER — OFFICE VISIT - HEALTHEAST (OUTPATIENT)
Dept: FAMILY MEDICINE | Facility: CLINIC | Age: 60
End: 2020-03-02

## 2020-03-02 DIAGNOSIS — C91.10 CHRONIC LYMPHOCYTIC LEUKEMIA (H): ICD-10-CM

## 2020-03-02 DIAGNOSIS — I48.0 PAROXYSMAL ATRIAL FIBRILLATION WITH RAPID VENTRICULAR RESPONSE (H): ICD-10-CM

## 2020-03-02 DIAGNOSIS — M25.532 PAIN IN BOTH WRISTS: ICD-10-CM

## 2020-03-02 DIAGNOSIS — M25.572 PAIN IN JOINT, ANKLE AND FOOT, LEFT: ICD-10-CM

## 2020-03-02 DIAGNOSIS — M25.531 PAIN IN BOTH WRISTS: ICD-10-CM

## 2020-03-02 LAB
BASOPHILS # BLD AUTO: 0.1 THOU/UL (ref 0–0.2)
BASOPHILS NFR BLD AUTO: 1 % (ref 0–2)
EOSINOPHIL # BLD AUTO: 0.1 THOU/UL (ref 0–0.4)
EOSINOPHIL NFR BLD AUTO: 1 % (ref 0–6)
ERYTHROCYTE [DISTWIDTH] IN BLOOD BY AUTOMATED COUNT: 13.1 % (ref 11–14.5)
ERYTHROCYTE [SEDIMENTATION RATE] IN BLOOD BY WESTERGREN METHOD: 4 MM/HR (ref 0–15)
HCT VFR BLD AUTO: 47 % (ref 40–54)
HGB BLD-MCNC: 15 G/DL (ref 14–18)
LYMPHOCYTES # BLD AUTO: 1.3 THOU/UL (ref 0.8–4.4)
LYMPHOCYTES NFR BLD AUTO: 17 % (ref 20–40)
MCH RBC QN AUTO: 28.5 PG (ref 27–34)
MCHC RBC AUTO-ENTMCNC: 31.9 G/DL (ref 32–36)
MCV RBC AUTO: 89 FL (ref 80–100)
MONOCYTES # BLD AUTO: 0.7 THOU/UL (ref 0–0.9)
MONOCYTES NFR BLD AUTO: 9 % (ref 2–10)
NEUTROPHILS # BLD AUTO: 5.8 THOU/UL (ref 2–7.7)
NEUTROPHILS NFR BLD AUTO: 72 % (ref 50–70)
PLATELET # BLD AUTO: 315 THOU/UL (ref 140–440)
PMV BLD AUTO: 11 FL (ref 8.5–12.5)
RBC # BLD AUTO: 5.26 MILL/UL (ref 4.4–6.2)
WBC: 8.1 THOU/UL (ref 4–11)

## 2020-03-02 ASSESSMENT — ANXIETY QUESTIONNAIRES
2. NOT BEING ABLE TO STOP OR CONTROL WORRYING: NOT AT ALL
4. TROUBLE RELAXING: NOT AT ALL
3. WORRYING TOO MUCH ABOUT DIFFERENT THINGS: NOT AT ALL
1. FEELING NERVOUS, ANXIOUS, OR ON EDGE: SEVERAL DAYS
6. BECOMING EASILY ANNOYED OR IRRITABLE: NOT AT ALL
GAD7 TOTAL SCORE: 1
5. BEING SO RESTLESS THAT IT IS HARD TO SIT STILL: NOT AT ALL
IF YOU CHECKED OFF ANY PROBLEMS ON THIS QUESTIONNAIRE, HOW DIFFICULT HAVE THESE PROBLEMS MADE IT FOR YOU TO DO YOUR WORK, TAKE CARE OF THINGS AT HOME, OR GET ALONG WITH OTHER PEOPLE: NOT DIFFICULT AT ALL
7. FEELING AFRAID AS IF SOMETHING AWFUL MIGHT HAPPEN: NOT AT ALL

## 2020-03-02 ASSESSMENT — MIFFLIN-ST. JEOR: SCORE: 2054.31

## 2020-03-02 ASSESSMENT — PATIENT HEALTH QUESTIONNAIRE - PHQ9: SUM OF ALL RESPONSES TO PHQ QUESTIONS 1-9: 2

## 2020-03-03 LAB
CCP AB SER IA-ACNC: <0.5 U/ML
CRP SERPL HS-MCNC: 1.4 MG/L (ref 0–3)
RHEUMATOID FACT SERPL-ACNC: <15 IU/ML (ref 0–30)
URATE SERPL-MCNC: 6 MG/DL (ref 3–8)

## 2020-03-04 ENCOUNTER — OFFICE VISIT - HEALTHEAST (OUTPATIENT)
Dept: PHARMACY | Facility: CLINIC | Age: 60
End: 2020-03-04

## 2020-03-04 ENCOUNTER — COMMUNICATION - HEALTHEAST (OUTPATIENT)
Dept: PHARMACY | Facility: CLINIC | Age: 60
End: 2020-03-04

## 2020-03-04 DIAGNOSIS — E78.5 HYPERLIPIDEMIA, UNSPECIFIED HYPERLIPIDEMIA TYPE: ICD-10-CM

## 2020-03-04 DIAGNOSIS — N40.0 BENIGN PROSTATIC HYPERPLASIA WITHOUT LOWER URINARY TRACT SYMPTOMS: ICD-10-CM

## 2020-03-04 DIAGNOSIS — C91.10 CHRONIC LYMPHOCYTIC LEUKEMIA (H): ICD-10-CM

## 2020-03-04 DIAGNOSIS — F41.1 GENERALIZED ANXIETY DISORDER: ICD-10-CM

## 2020-03-04 DIAGNOSIS — I31.39 PERICARDIAL EFFUSION: ICD-10-CM

## 2020-03-04 DIAGNOSIS — F51.01 PRIMARY INSOMNIA: ICD-10-CM

## 2020-03-04 DIAGNOSIS — E55.9 VITAMIN D DEFICIENCY: ICD-10-CM

## 2020-03-04 DIAGNOSIS — M19.90 ARTHRITIS: ICD-10-CM

## 2020-03-04 DIAGNOSIS — I48.0 PAROXYSMAL ATRIAL FIBRILLATION WITH RAPID VENTRICULAR RESPONSE (H): ICD-10-CM

## 2020-03-06 ENCOUNTER — COMMUNICATION - HEALTHEAST (OUTPATIENT)
Dept: FAMILY MEDICINE | Facility: CLINIC | Age: 60
End: 2020-03-06

## 2020-03-11 ENCOUNTER — COMMUNICATION - HEALTHEAST (OUTPATIENT)
Dept: FAMILY MEDICINE | Facility: CLINIC | Age: 60
End: 2020-03-11

## 2020-03-18 ENCOUNTER — COMMUNICATION - HEALTHEAST (OUTPATIENT)
Dept: PHARMACY | Facility: CLINIC | Age: 60
End: 2020-03-18

## 2020-03-23 ENCOUNTER — AMBULATORY - HEALTHEAST (OUTPATIENT)
Dept: INFUSION THERAPY | Facility: HOSPITAL | Age: 60
End: 2020-03-23

## 2020-03-23 ENCOUNTER — OFFICE VISIT - HEALTHEAST (OUTPATIENT)
Dept: ONCOLOGY | Facility: HOSPITAL | Age: 60
End: 2020-03-23

## 2020-03-23 ENCOUNTER — COMMUNICATION - HEALTHEAST (OUTPATIENT)
Dept: NURSING | Facility: CLINIC | Age: 60
End: 2020-03-23

## 2020-03-23 ENCOUNTER — COMMUNICATION - HEALTHEAST (OUTPATIENT)
Dept: ONCOLOGY | Facility: HOSPITAL | Age: 60
End: 2020-03-23

## 2020-03-23 DIAGNOSIS — G47.00 INSOMNIA: ICD-10-CM

## 2020-03-23 DIAGNOSIS — C91.10 CHRONIC LYMPHOCYTIC LEUKEMIA (H): ICD-10-CM

## 2020-03-23 DIAGNOSIS — F41.1 GAD (GENERALIZED ANXIETY DISORDER): ICD-10-CM

## 2020-03-23 LAB
ALBUMIN SERPL-MCNC: 3.7 G/DL (ref 3.5–5)
ALP SERPL-CCNC: 70 U/L (ref 45–120)
ALT SERPL W P-5'-P-CCNC: 17 U/L (ref 0–45)
ANION GAP SERPL CALCULATED.3IONS-SCNC: 8 MMOL/L (ref 5–18)
AST SERPL W P-5'-P-CCNC: 13 U/L (ref 0–40)
BASOPHILS # BLD AUTO: 0.1 THOU/UL (ref 0–0.2)
BASOPHILS NFR BLD AUTO: 1 % (ref 0–2)
BILIRUB SERPL-MCNC: 0.6 MG/DL (ref 0–1)
BUN SERPL-MCNC: 19 MG/DL (ref 8–22)
CALCIUM SERPL-MCNC: 9.6 MG/DL (ref 8.5–10.5)
CHLORIDE BLD-SCNC: 106 MMOL/L (ref 98–107)
CO2 SERPL-SCNC: 26 MMOL/L (ref 22–31)
CREAT SERPL-MCNC: 0.84 MG/DL (ref 0.7–1.3)
EOSINOPHIL # BLD AUTO: 0.3 THOU/UL (ref 0–0.4)
EOSINOPHIL NFR BLD AUTO: 4 % (ref 0–6)
ERYTHROCYTE [DISTWIDTH] IN BLOOD BY AUTOMATED COUNT: 13.3 % (ref 11–14.5)
GFR SERPL CREATININE-BSD FRML MDRD: >60 ML/MIN/1.73M2
GLUCOSE BLD-MCNC: 98 MG/DL (ref 70–125)
HCT VFR BLD AUTO: 44.4 % (ref 40–54)
HGB BLD-MCNC: 14.5 G/DL (ref 14–18)
LYMPHOCYTES # BLD AUTO: 1.3 THOU/UL (ref 0.8–4.4)
LYMPHOCYTES NFR BLD AUTO: 16 % (ref 20–40)
MCH RBC QN AUTO: 28.5 PG (ref 27–34)
MCHC RBC AUTO-ENTMCNC: 32.7 G/DL (ref 32–36)
MCV RBC AUTO: 87 FL (ref 80–100)
MONOCYTES # BLD AUTO: 0.7 THOU/UL (ref 0–0.9)
MONOCYTES NFR BLD AUTO: 8 % (ref 2–10)
NEUTROPHILS # BLD AUTO: 5.9 THOU/UL (ref 2–7.7)
NEUTROPHILS NFR BLD AUTO: 71 % (ref 50–70)
PLATELET # BLD AUTO: 257 THOU/UL (ref 140–440)
PMV BLD AUTO: 9.9 FL (ref 8.5–12.5)
POTASSIUM BLD-SCNC: 4.3 MMOL/L (ref 3.5–5)
PROT SERPL-MCNC: 6.7 G/DL (ref 6–8)
RBC # BLD AUTO: 5.08 MILL/UL (ref 4.4–6.2)
SODIUM SERPL-SCNC: 140 MMOL/L (ref 136–145)
WBC: 8.3 THOU/UL (ref 4–11)

## 2020-03-24 ENCOUNTER — COMMUNICATION - HEALTHEAST (OUTPATIENT)
Dept: ONCOLOGY | Facility: HOSPITAL | Age: 60
End: 2020-03-24

## 2020-03-30 ENCOUNTER — COMMUNICATION - HEALTHEAST (OUTPATIENT)
Dept: FAMILY MEDICINE | Facility: CLINIC | Age: 60
End: 2020-03-30

## 2020-03-30 DIAGNOSIS — F41.1 GENERALIZED ANXIETY DISORDER: ICD-10-CM

## 2020-04-10 ENCOUNTER — COMMUNICATION - HEALTHEAST (OUTPATIENT)
Dept: CARDIOLOGY | Facility: CLINIC | Age: 60
End: 2020-04-10

## 2020-04-10 DIAGNOSIS — R00.2 HEART PALPITATIONS: ICD-10-CM

## 2020-04-26 ENCOUNTER — COMMUNICATION - HEALTHEAST (OUTPATIENT)
Dept: FAMILY MEDICINE | Facility: CLINIC | Age: 60
End: 2020-04-26

## 2020-04-26 DIAGNOSIS — F41.1 GENERALIZED ANXIETY DISORDER: ICD-10-CM

## 2020-05-26 ENCOUNTER — COMMUNICATION - HEALTHEAST (OUTPATIENT)
Dept: FAMILY MEDICINE | Facility: CLINIC | Age: 60
End: 2020-05-26

## 2020-05-26 DIAGNOSIS — F41.1 GENERALIZED ANXIETY DISORDER: ICD-10-CM

## 2020-06-03 ENCOUNTER — OFFICE VISIT - HEALTHEAST (OUTPATIENT)
Dept: FAMILY MEDICINE | Facility: CLINIC | Age: 60
End: 2020-06-03

## 2020-06-03 DIAGNOSIS — L60.0 INGROWN RIGHT GREATER TOENAIL: ICD-10-CM

## 2020-06-08 ENCOUNTER — OFFICE VISIT - HEALTHEAST (OUTPATIENT)
Dept: FAMILY MEDICINE | Facility: CLINIC | Age: 60
End: 2020-06-08

## 2020-06-08 DIAGNOSIS — L30.9 DERMATITIS: ICD-10-CM

## 2020-06-12 ENCOUNTER — OFFICE VISIT - HEALTHEAST (OUTPATIENT)
Dept: PODIATRY | Facility: CLINIC | Age: 60
End: 2020-06-12

## 2020-06-12 DIAGNOSIS — L60.0 INGROWN NAIL: ICD-10-CM

## 2020-06-12 DIAGNOSIS — L03.031 PARONYCHIA OF TOE, RIGHT: ICD-10-CM

## 2020-06-12 DIAGNOSIS — B35.1 ONYCHOMYCOSIS: ICD-10-CM

## 2020-06-23 ENCOUNTER — OFFICE VISIT - HEALTHEAST (OUTPATIENT)
Dept: ONCOLOGY | Facility: HOSPITAL | Age: 60
End: 2020-06-23

## 2020-06-23 ENCOUNTER — AMBULATORY - HEALTHEAST (OUTPATIENT)
Dept: INFUSION THERAPY | Facility: HOSPITAL | Age: 60
End: 2020-06-23

## 2020-06-23 DIAGNOSIS — C91.10 CHRONIC LYMPHOCYTIC LEUKEMIA (H): ICD-10-CM

## 2020-06-23 LAB
ALBUMIN SERPL-MCNC: 3.7 G/DL (ref 3.5–5)
ALP SERPL-CCNC: 69 U/L (ref 45–120)
ALT SERPL W P-5'-P-CCNC: 21 U/L (ref 0–45)
ANION GAP SERPL CALCULATED.3IONS-SCNC: 10 MMOL/L (ref 5–18)
AST SERPL W P-5'-P-CCNC: 17 U/L (ref 0–40)
BASOPHILS # BLD AUTO: 0.1 THOU/UL (ref 0–0.2)
BASOPHILS NFR BLD AUTO: 1 % (ref 0–2)
BILIRUB SERPL-MCNC: 0.6 MG/DL (ref 0–1)
BUN SERPL-MCNC: 17 MG/DL (ref 8–22)
CALCIUM SERPL-MCNC: 8.9 MG/DL (ref 8.5–10.5)
CHLORIDE BLD-SCNC: 105 MMOL/L (ref 98–107)
CO2 SERPL-SCNC: 25 MMOL/L (ref 22–31)
CREAT SERPL-MCNC: 0.86 MG/DL (ref 0.7–1.3)
EOSINOPHIL # BLD AUTO: 0.5 THOU/UL (ref 0–0.4)
EOSINOPHIL NFR BLD AUTO: 6 % (ref 0–6)
ERYTHROCYTE [DISTWIDTH] IN BLOOD BY AUTOMATED COUNT: 12.7 % (ref 11–14.5)
GFR SERPL CREATININE-BSD FRML MDRD: >60 ML/MIN/1.73M2
GLUCOSE BLD-MCNC: 86 MG/DL (ref 70–125)
HCT VFR BLD AUTO: 43.6 % (ref 40–54)
HGB BLD-MCNC: 14.1 G/DL (ref 14–18)
LYMPHOCYTES # BLD AUTO: 1.5 THOU/UL (ref 0.8–4.4)
LYMPHOCYTES NFR BLD AUTO: 18 % (ref 20–40)
MCH RBC QN AUTO: 29.5 PG (ref 27–34)
MCHC RBC AUTO-ENTMCNC: 32.3 G/DL (ref 32–36)
MCV RBC AUTO: 91 FL (ref 80–100)
MONOCYTES # BLD AUTO: 0.7 THOU/UL (ref 0–0.9)
MONOCYTES NFR BLD AUTO: 8 % (ref 2–10)
NEUTROPHILS # BLD AUTO: 5.7 THOU/UL (ref 2–7.7)
NEUTROPHILS NFR BLD AUTO: 67 % (ref 50–70)
PLATELET # BLD AUTO: 241 THOU/UL (ref 140–440)
PMV BLD AUTO: 10.5 FL (ref 8.5–12.5)
POTASSIUM BLD-SCNC: 4 MMOL/L (ref 3.5–5)
PROT SERPL-MCNC: 6.6 G/DL (ref 6–8)
RBC # BLD AUTO: 4.78 MILL/UL (ref 4.4–6.2)
SODIUM SERPL-SCNC: 140 MMOL/L (ref 136–145)
WBC: 8.4 THOU/UL (ref 4–11)

## 2020-06-23 ASSESSMENT — MIFFLIN-ST. JEOR: SCORE: 2106.7

## 2020-06-24 ENCOUNTER — OFFICE VISIT - HEALTHEAST (OUTPATIENT)
Dept: PODIATRY | Facility: CLINIC | Age: 60
End: 2020-06-24

## 2020-06-24 ENCOUNTER — COMMUNICATION - HEALTHEAST (OUTPATIENT)
Dept: VASCULAR SURGERY | Facility: CLINIC | Age: 60
End: 2020-06-24

## 2020-06-24 DIAGNOSIS — L60.0 INGROWN NAIL: ICD-10-CM

## 2020-07-04 ENCOUNTER — COMMUNICATION - HEALTHEAST (OUTPATIENT)
Dept: ONCOLOGY | Facility: HOSPITAL | Age: 60
End: 2020-07-04

## 2020-07-04 DIAGNOSIS — C91.10 CHRONIC LYMPHOCYTIC LEUKEMIA (H): ICD-10-CM

## 2020-07-23 ENCOUNTER — COMMUNICATION - HEALTHEAST (OUTPATIENT)
Dept: ONCOLOGY | Facility: HOSPITAL | Age: 60
End: 2020-07-23

## 2020-07-23 ENCOUNTER — COMMUNICATION - HEALTHEAST (OUTPATIENT)
Dept: CARDIOLOGY | Facility: CLINIC | Age: 60
End: 2020-07-23

## 2020-07-23 DIAGNOSIS — C91.10 CHRONIC LYMPHOCYTIC LEUKEMIA (H): ICD-10-CM

## 2020-07-23 DIAGNOSIS — R00.2 HEART PALPITATIONS: ICD-10-CM

## 2020-07-24 ENCOUNTER — COMMUNICATION - HEALTHEAST (OUTPATIENT)
Dept: ONCOLOGY | Facility: HOSPITAL | Age: 60
End: 2020-07-24

## 2020-07-24 DIAGNOSIS — C91.10 CHRONIC LYMPHOCYTIC LEUKEMIA (H): ICD-10-CM

## 2020-08-04 ENCOUNTER — COMMUNICATION - HEALTHEAST (OUTPATIENT)
Dept: ONCOLOGY | Facility: HOSPITAL | Age: 60
End: 2020-08-04

## 2020-08-04 DIAGNOSIS — C91.10 CHRONIC LYMPHOCYTIC LEUKEMIA (H): ICD-10-CM

## 2020-08-16 ENCOUNTER — COMMUNICATION - HEALTHEAST (OUTPATIENT)
Dept: NURSING | Facility: CLINIC | Age: 60
End: 2020-08-16

## 2020-08-16 DIAGNOSIS — G47.00 INSOMNIA: ICD-10-CM

## 2020-08-16 DIAGNOSIS — H93.19 TINNITUS: ICD-10-CM

## 2020-09-23 ENCOUNTER — AMBULATORY - HEALTHEAST (OUTPATIENT)
Dept: INFUSION THERAPY | Facility: HOSPITAL | Age: 60
End: 2020-09-23

## 2020-09-23 ENCOUNTER — OFFICE VISIT - HEALTHEAST (OUTPATIENT)
Dept: ONCOLOGY | Facility: HOSPITAL | Age: 60
End: 2020-09-23

## 2020-09-23 DIAGNOSIS — C91.10 CHRONIC LYMPHOCYTIC LEUKEMIA (H): ICD-10-CM

## 2020-09-23 LAB
ALBUMIN SERPL-MCNC: 3.8 G/DL (ref 3.5–5)
ALP SERPL-CCNC: 76 U/L (ref 45–120)
ALT SERPL W P-5'-P-CCNC: 23 U/L (ref 0–45)
ANION GAP SERPL CALCULATED.3IONS-SCNC: 9 MMOL/L (ref 5–18)
AST SERPL W P-5'-P-CCNC: 21 U/L (ref 0–40)
BASOPHILS # BLD AUTO: 0.1 THOU/UL (ref 0–0.2)
BASOPHILS NFR BLD AUTO: 1 % (ref 0–2)
BILIRUB SERPL-MCNC: 0.5 MG/DL (ref 0–1)
BUN SERPL-MCNC: 15 MG/DL (ref 8–22)
CALCIUM SERPL-MCNC: 9.1 MG/DL (ref 8.5–10.5)
CHLORIDE BLD-SCNC: 106 MMOL/L (ref 98–107)
CO2 SERPL-SCNC: 25 MMOL/L (ref 22–31)
CREAT SERPL-MCNC: 0.83 MG/DL (ref 0.7–1.3)
EOSINOPHIL # BLD AUTO: 0.3 THOU/UL (ref 0–0.4)
EOSINOPHIL NFR BLD AUTO: 3 % (ref 0–6)
ERYTHROCYTE [DISTWIDTH] IN BLOOD BY AUTOMATED COUNT: 12.8 % (ref 11–14.5)
GFR SERPL CREATININE-BSD FRML MDRD: >60 ML/MIN/1.73M2
GLUCOSE BLD-MCNC: 87 MG/DL (ref 70–125)
HCT VFR BLD AUTO: 42.3 % (ref 40–54)
HGB BLD-MCNC: 13.6 G/DL (ref 14–18)
IMM GRANULOCYTES # BLD: 0.1 THOU/UL
IMM GRANULOCYTES NFR BLD: 1 %
LYMPHOCYTES # BLD AUTO: 1.5 THOU/UL (ref 0.8–4.4)
LYMPHOCYTES NFR BLD AUTO: 17 % (ref 20–40)
MCH RBC QN AUTO: 29.1 PG (ref 27–34)
MCHC RBC AUTO-ENTMCNC: 32.2 G/DL (ref 32–36)
MCV RBC AUTO: 91 FL (ref 80–100)
MONOCYTES # BLD AUTO: 0.6 THOU/UL (ref 0–0.9)
MONOCYTES NFR BLD AUTO: 7 % (ref 2–10)
NEUTROPHILS # BLD AUTO: 6.1 THOU/UL (ref 2–7.7)
NEUTROPHILS NFR BLD AUTO: 71 % (ref 50–70)
PLATELET # BLD AUTO: 286 THOU/UL (ref 140–440)
PMV BLD AUTO: 10.3 FL (ref 8.5–12.5)
POTASSIUM BLD-SCNC: 4.6 MMOL/L (ref 3.5–5)
PROT SERPL-MCNC: 6.7 G/DL (ref 6–8)
RBC # BLD AUTO: 4.67 MILL/UL (ref 4.4–6.2)
SODIUM SERPL-SCNC: 140 MMOL/L (ref 136–145)
WBC: 8.6 THOU/UL (ref 4–11)

## 2020-10-19 ENCOUNTER — COMMUNICATION - HEALTHEAST (OUTPATIENT)
Dept: CARDIOLOGY | Facility: CLINIC | Age: 60
End: 2020-10-19

## 2020-10-19 DIAGNOSIS — R00.2 HEART PALPITATIONS: ICD-10-CM

## 2020-12-03 ENCOUNTER — COMMUNICATION - HEALTHEAST (OUTPATIENT)
Dept: FAMILY MEDICINE | Facility: CLINIC | Age: 60
End: 2020-12-03

## 2020-12-03 DIAGNOSIS — R00.2 HEART PALPITATIONS: ICD-10-CM

## 2020-12-08 ENCOUNTER — COMMUNICATION - HEALTHEAST (OUTPATIENT)
Dept: ONCOLOGY | Facility: HOSPITAL | Age: 60
End: 2020-12-08

## 2020-12-18 ENCOUNTER — COMMUNICATION - HEALTHEAST (OUTPATIENT)
Dept: NURSING | Facility: CLINIC | Age: 60
End: 2020-12-18

## 2020-12-18 DIAGNOSIS — G47.00 INSOMNIA: ICD-10-CM

## 2020-12-18 DIAGNOSIS — H93.19 TINNITUS: ICD-10-CM

## 2020-12-23 ENCOUNTER — AMBULATORY - HEALTHEAST (OUTPATIENT)
Dept: INFUSION THERAPY | Facility: HOSPITAL | Age: 60
End: 2020-12-23

## 2020-12-23 ENCOUNTER — OFFICE VISIT - HEALTHEAST (OUTPATIENT)
Dept: ONCOLOGY | Facility: HOSPITAL | Age: 60
End: 2020-12-23

## 2020-12-23 DIAGNOSIS — C91.10 CHRONIC LYMPHOCYTIC LEUKEMIA (H): ICD-10-CM

## 2020-12-23 LAB
ALBUMIN SERPL-MCNC: 3.7 G/DL (ref 3.5–5)
ALP SERPL-CCNC: 71 U/L (ref 45–120)
ALT SERPL W P-5'-P-CCNC: 21 U/L (ref 0–45)
ANION GAP SERPL CALCULATED.3IONS-SCNC: 6 MMOL/L (ref 5–18)
AST SERPL W P-5'-P-CCNC: 20 U/L (ref 0–40)
BASOPHILS # BLD AUTO: 0.1 THOU/UL (ref 0–0.2)
BASOPHILS NFR BLD AUTO: 1 % (ref 0–2)
BILIRUB SERPL-MCNC: 0.9 MG/DL (ref 0–1)
BUN SERPL-MCNC: 16 MG/DL (ref 8–22)
CALCIUM SERPL-MCNC: 8.9 MG/DL (ref 8.5–10.5)
CHLORIDE BLD-SCNC: 105 MMOL/L (ref 98–107)
CO2 SERPL-SCNC: 29 MMOL/L (ref 22–31)
CREAT SERPL-MCNC: 0.8 MG/DL (ref 0.7–1.3)
EOSINOPHIL # BLD AUTO: 0.3 THOU/UL (ref 0–0.4)
EOSINOPHIL NFR BLD AUTO: 4 % (ref 0–6)
ERYTHROCYTE [DISTWIDTH] IN BLOOD BY AUTOMATED COUNT: 12.5 % (ref 11–14.5)
GFR SERPL CREATININE-BSD FRML MDRD: >60 ML/MIN/1.73M2
GLUCOSE BLD-MCNC: 96 MG/DL (ref 70–125)
HCT VFR BLD AUTO: 46.9 % (ref 40–54)
HGB BLD-MCNC: 15.2 G/DL (ref 14–18)
IMM GRANULOCYTES # BLD: 0 THOU/UL
IMM GRANULOCYTES NFR BLD: 1 %
LYMPHOCYTES # BLD AUTO: 1.5 THOU/UL (ref 0.8–4.4)
LYMPHOCYTES NFR BLD AUTO: 19 % (ref 20–40)
MCH RBC QN AUTO: 29.2 PG (ref 27–34)
MCHC RBC AUTO-ENTMCNC: 32.4 G/DL (ref 32–36)
MCV RBC AUTO: 90 FL (ref 80–100)
MONOCYTES # BLD AUTO: 0.8 THOU/UL (ref 0–0.9)
MONOCYTES NFR BLD AUTO: 11 % (ref 2–10)
NEUTROPHILS # BLD AUTO: 5.2 THOU/UL (ref 2–7.7)
NEUTROPHILS NFR BLD AUTO: 65 % (ref 50–70)
PLATELET # BLD AUTO: 261 THOU/UL (ref 140–440)
PMV BLD AUTO: 10.5 FL (ref 8.5–12.5)
POTASSIUM BLD-SCNC: 4.6 MMOL/L (ref 3.5–5)
PROT SERPL-MCNC: 6.4 G/DL (ref 6–8)
RBC # BLD AUTO: 5.21 MILL/UL (ref 4.4–6.2)
SODIUM SERPL-SCNC: 140 MMOL/L (ref 136–145)
WBC: 8 THOU/UL (ref 4–11)

## 2021-01-22 ENCOUNTER — OFFICE VISIT - HEALTHEAST (OUTPATIENT)
Dept: FAMILY MEDICINE | Facility: CLINIC | Age: 61
End: 2021-01-22

## 2021-01-22 DIAGNOSIS — C91.10 CHRONIC LYMPHOCYTIC LEUKEMIA (H): ICD-10-CM

## 2021-01-22 DIAGNOSIS — I48.0 PAROXYSMAL ATRIAL FIBRILLATION WITH RAPID VENTRICULAR RESPONSE (H): ICD-10-CM

## 2021-01-22 DIAGNOSIS — Z12.11 SPECIAL SCREENING FOR MALIGNANT NEOPLASMS, COLON: ICD-10-CM

## 2021-01-22 DIAGNOSIS — R15.9 INCONTINENCE OF FECES, UNSPECIFIED FECAL INCONTINENCE TYPE: ICD-10-CM

## 2021-01-22 DIAGNOSIS — M76.60 ACHILLES BURSITIS, UNSPECIFIED LATERALITY: ICD-10-CM

## 2021-01-22 DIAGNOSIS — Z80.0 FAMILY HISTORY OF COLON CANCER: ICD-10-CM

## 2021-01-22 DIAGNOSIS — M79.661 PAIN OF RIGHT LOWER LEG: ICD-10-CM

## 2021-01-22 ASSESSMENT — MIFFLIN-ST. JEOR: SCORE: 2075.85

## 2021-01-22 ASSESSMENT — ANXIETY QUESTIONNAIRES
5. BEING SO RESTLESS THAT IT IS HARD TO SIT STILL: NOT AT ALL
2. NOT BEING ABLE TO STOP OR CONTROL WORRYING: NOT AT ALL
3. WORRYING TOO MUCH ABOUT DIFFERENT THINGS: NOT AT ALL
1. FEELING NERVOUS, ANXIOUS, OR ON EDGE: NOT AT ALL
7. FEELING AFRAID AS IF SOMETHING AWFUL MIGHT HAPPEN: NOT AT ALL
4. TROUBLE RELAXING: NOT AT ALL
GAD7 TOTAL SCORE: 0
6. BECOMING EASILY ANNOYED OR IRRITABLE: NOT AT ALL

## 2021-01-22 ASSESSMENT — PATIENT HEALTH QUESTIONNAIRE - PHQ9: SUM OF ALL RESPONSES TO PHQ QUESTIONS 1-9: 1

## 2021-01-22 NOTE — ASSESSMENT & PLAN NOTE
"Gee inner ankle  Into achilles  \  Sitting better  Climb stairs >> worst    1st steps \"painful\" tendon back achilles    Injury age 18 tib fib and femur \"oins aknle\"    Decrease mobility     Seen Orthopedic in past \"ankle and it worked\"  \"now its different     Xray 2020  MTP and       "

## 2021-01-25 ENCOUNTER — COMMUNICATION - HEALTHEAST (OUTPATIENT)
Dept: LAB | Facility: CLINIC | Age: 61
End: 2021-01-25

## 2021-01-26 ENCOUNTER — AMBULATORY - HEALTHEAST (OUTPATIENT)
Dept: FAMILY MEDICINE | Facility: CLINIC | Age: 61
End: 2021-01-26

## 2021-01-26 DIAGNOSIS — E04.1 THYROID NODULE: ICD-10-CM

## 2021-01-26 DIAGNOSIS — E66.09 CLASS 1 OBESITY DUE TO EXCESS CALORIES WITH SERIOUS COMORBIDITY AND BODY MASS INDEX (BMI) OF 34.0 TO 34.9 IN ADULT: ICD-10-CM

## 2021-01-26 DIAGNOSIS — E78.5 HYPERLIPIDEMIA, UNSPECIFIED HYPERLIPIDEMIA TYPE: ICD-10-CM

## 2021-01-26 DIAGNOSIS — E66.811 CLASS 1 OBESITY DUE TO EXCESS CALORIES WITH SERIOUS COMORBIDITY AND BODY MASS INDEX (BMI) OF 34.0 TO 34.9 IN ADULT: ICD-10-CM

## 2021-01-26 DIAGNOSIS — Z12.5 SCREENING FOR PROSTATE CANCER: ICD-10-CM

## 2021-01-26 DIAGNOSIS — Z13.1 SCREENING FOR DIABETES MELLITUS: ICD-10-CM

## 2021-01-26 DIAGNOSIS — Z13.21 ENCOUNTER FOR VITAMIN DEFICIENCY SCREENING: ICD-10-CM

## 2021-01-27 ENCOUNTER — AMBULATORY - HEALTHEAST (OUTPATIENT)
Dept: LAB | Facility: CLINIC | Age: 61
End: 2021-01-27

## 2021-01-27 DIAGNOSIS — Z12.5 SCREENING FOR PROSTATE CANCER: ICD-10-CM

## 2021-01-27 DIAGNOSIS — E78.5 HYPERLIPIDEMIA, UNSPECIFIED HYPERLIPIDEMIA TYPE: ICD-10-CM

## 2021-01-27 DIAGNOSIS — E04.1 THYROID NODULE: ICD-10-CM

## 2021-01-27 DIAGNOSIS — Z13.1 SCREENING FOR DIABETES MELLITUS: ICD-10-CM

## 2021-01-27 DIAGNOSIS — Z13.21 ENCOUNTER FOR VITAMIN DEFICIENCY SCREENING: ICD-10-CM

## 2021-01-27 DIAGNOSIS — E66.09 CLASS 1 OBESITY DUE TO EXCESS CALORIES WITH SERIOUS COMORBIDITY AND BODY MASS INDEX (BMI) OF 34.0 TO 34.9 IN ADULT: ICD-10-CM

## 2021-01-27 DIAGNOSIS — E66.811 CLASS 1 OBESITY DUE TO EXCESS CALORIES WITH SERIOUS COMORBIDITY AND BODY MASS INDEX (BMI) OF 34.0 TO 34.9 IN ADULT: ICD-10-CM

## 2021-01-27 LAB
ALBUMIN SERPL-MCNC: 3.9 G/DL (ref 3.5–5)
ALBUMIN UR-MCNC: NEGATIVE MG/DL
ALP SERPL-CCNC: 87 U/L (ref 45–120)
ALT SERPL W P-5'-P-CCNC: 30 U/L (ref 0–45)
ANION GAP SERPL CALCULATED.3IONS-SCNC: 9 MMOL/L (ref 5–18)
APPEARANCE UR: CLEAR
AST SERPL W P-5'-P-CCNC: 26 U/L (ref 0–40)
BACTERIA #/AREA URNS HPF: ABNORMAL HPF
BASOPHILS # BLD AUTO: 0.1 THOU/UL (ref 0–0.2)
BASOPHILS NFR BLD AUTO: 1 % (ref 0–2)
BILIRUB SERPL-MCNC: 1.4 MG/DL (ref 0–1)
BILIRUB UR QL STRIP: NEGATIVE
BUN SERPL-MCNC: 17 MG/DL (ref 8–22)
CALCIUM SERPL-MCNC: 8.9 MG/DL (ref 8.5–10.5)
CHLORIDE BLD-SCNC: 104 MMOL/L (ref 98–107)
CHOLEST SERPL-MCNC: 269 MG/DL
CO2 SERPL-SCNC: 27 MMOL/L (ref 22–31)
COLOR UR AUTO: YELLOW
CREAT SERPL-MCNC: 0.84 MG/DL (ref 0.7–1.3)
CREAT UR-MCNC: 260.4 MG/DL
EOSINOPHIL # BLD AUTO: 0.2 THOU/UL (ref 0–0.4)
EOSINOPHIL NFR BLD AUTO: 3 % (ref 0–6)
ERYTHROCYTE [DISTWIDTH] IN BLOOD BY AUTOMATED COUNT: 12.8 % (ref 11–14.5)
FASTING STATUS PATIENT QL REPORTED: YES
GFR SERPL CREATININE-BSD FRML MDRD: >60 ML/MIN/1.73M2
GLUCOSE BLD-MCNC: 86 MG/DL (ref 70–125)
GLUCOSE UR STRIP-MCNC: NEGATIVE MG/DL
HCT VFR BLD AUTO: 47 % (ref 40–54)
HDLC SERPL-MCNC: 41 MG/DL
HGB BLD-MCNC: 15 G/DL (ref 14–18)
HGB UR QL STRIP: ABNORMAL
IMM GRANULOCYTES # BLD: 0 THOU/UL
IMM GRANULOCYTES NFR BLD: 1 %
KETONES UR STRIP-MCNC: NEGATIVE MG/DL
LDLC SERPL CALC-MCNC: 191 MG/DL
LEUKOCYTE ESTERASE UR QL STRIP: NEGATIVE
LYMPHOCYTES # BLD AUTO: 1.3 THOU/UL (ref 0.8–4.4)
LYMPHOCYTES NFR BLD AUTO: 19 % (ref 20–40)
MCH RBC QN AUTO: 28.6 PG (ref 27–34)
MCHC RBC AUTO-ENTMCNC: 31.9 G/DL (ref 32–36)
MCV RBC AUTO: 90 FL (ref 80–100)
MICROALBUMIN UR-MCNC: 1.01 MG/DL (ref 0–1.99)
MICROALBUMIN/CREAT UR: 3.9 MG/G
MONOCYTES # BLD AUTO: 0.7 THOU/UL (ref 0–0.9)
MONOCYTES NFR BLD AUTO: 9 % (ref 2–10)
MUCOUS THREADS #/AREA URNS LPF: ABNORMAL LPF
NEUTROPHILS # BLD AUTO: 4.8 THOU/UL (ref 2–7.7)
NEUTROPHILS NFR BLD AUTO: 67 % (ref 50–70)
NITRATE UR QL: NEGATIVE
PH UR STRIP: 6 [PH] (ref 5–8)
PLATELET # BLD AUTO: 257 THOU/UL (ref 140–440)
PMV BLD AUTO: 11.1 FL (ref 8.5–12.5)
POTASSIUM BLD-SCNC: 4.6 MMOL/L (ref 3.5–5)
PROT SERPL-MCNC: 6.4 G/DL (ref 6–8)
PSA SERPL-MCNC: 0.3 NG/ML (ref 0–4.5)
RBC # BLD AUTO: 5.25 MILL/UL (ref 4.4–6.2)
RBC #/AREA URNS AUTO: ABNORMAL HPF
SODIUM SERPL-SCNC: 140 MMOL/L (ref 136–145)
SP GR UR STRIP: 1.02 (ref 1–1.03)
SQUAMOUS #/AREA URNS AUTO: ABNORMAL LPF
T4 FREE SERPL-MCNC: 1 NG/DL (ref 0.7–1.8)
TRIGL SERPL-MCNC: 185 MG/DL
TSH SERPL DL<=0.005 MIU/L-ACNC: 5.16 UIU/ML (ref 0.3–5)
UROBILINOGEN UR STRIP-ACNC: ABNORMAL
WBC #/AREA URNS AUTO: ABNORMAL HPF
WBC: 7.1 THOU/UL (ref 4–11)

## 2021-01-28 LAB — 25(OH)D3 SERPL-MCNC: 34.5 NG/ML (ref 30–80)

## 2021-01-31 ENCOUNTER — COMMUNICATION - HEALTHEAST (OUTPATIENT)
Dept: FAMILY MEDICINE | Facility: CLINIC | Age: 61
End: 2021-01-31

## 2021-02-03 ENCOUNTER — COMMUNICATION - HEALTHEAST (OUTPATIENT)
Dept: FAMILY MEDICINE | Facility: CLINIC | Age: 61
End: 2021-02-03

## 2021-02-10 ENCOUNTER — RECORDS - HEALTHEAST (OUTPATIENT)
Dept: ADMINISTRATIVE | Facility: OTHER | Age: 61
End: 2021-02-10

## 2021-02-11 ENCOUNTER — RECORDS - HEALTHEAST (OUTPATIENT)
Dept: ADMINISTRATIVE | Facility: OTHER | Age: 61
End: 2021-02-11

## 2021-02-17 ENCOUNTER — COMMUNICATION - HEALTHEAST (OUTPATIENT)
Dept: FAMILY MEDICINE | Facility: CLINIC | Age: 61
End: 2021-02-17

## 2021-02-18 ENCOUNTER — COMMUNICATION - HEALTHEAST (OUTPATIENT)
Dept: FAMILY MEDICINE | Facility: CLINIC | Age: 61
End: 2021-02-18

## 2021-02-22 ENCOUNTER — OFFICE VISIT - HEALTHEAST (OUTPATIENT)
Dept: FAMILY MEDICINE | Facility: CLINIC | Age: 61
End: 2021-02-22

## 2021-02-22 DIAGNOSIS — R79.89 ELEVATED TSH: ICD-10-CM

## 2021-02-22 DIAGNOSIS — E66.811 CLASS 1 OBESITY DUE TO EXCESS CALORIES WITH SERIOUS COMORBIDITY AND BODY MASS INDEX (BMI) OF 34.0 TO 34.9 IN ADULT: ICD-10-CM

## 2021-02-22 DIAGNOSIS — E66.09 CLASS 1 OBESITY DUE TO EXCESS CALORIES WITH SERIOUS COMORBIDITY AND BODY MASS INDEX (BMI) OF 34.0 TO 34.9 IN ADULT: ICD-10-CM

## 2021-02-22 DIAGNOSIS — Z91.89 AT RISK FOR CORONARY ARTERY DISEASE: ICD-10-CM

## 2021-02-22 DIAGNOSIS — E78.5 HYPERLIPIDEMIA, UNSPECIFIED HYPERLIPIDEMIA TYPE: ICD-10-CM

## 2021-02-24 ENCOUNTER — COMMUNICATION - HEALTHEAST (OUTPATIENT)
Dept: FAMILY MEDICINE | Facility: CLINIC | Age: 61
End: 2021-02-24

## 2021-02-24 DIAGNOSIS — R00.2 HEART PALPITATIONS: ICD-10-CM

## 2021-03-01 ENCOUNTER — COMMUNICATION - HEALTHEAST (OUTPATIENT)
Dept: PHARMACY | Facility: CLINIC | Age: 61
End: 2021-03-01

## 2021-03-01 DIAGNOSIS — F41.1 GENERALIZED ANXIETY DISORDER: ICD-10-CM

## 2021-03-03 ENCOUNTER — COMMUNICATION - HEALTHEAST (OUTPATIENT)
Dept: FAMILY MEDICINE | Facility: CLINIC | Age: 61
End: 2021-03-03

## 2021-03-03 DIAGNOSIS — N40.0 BENIGN PROSTATIC HYPERPLASIA, UNSPECIFIED WHETHER LOWER URINARY TRACT SYMPTOMS PRESENT: ICD-10-CM

## 2021-03-05 ENCOUNTER — HOSPITAL ENCOUNTER (OUTPATIENT)
Dept: CT IMAGING | Facility: CLINIC | Age: 61
Discharge: HOME | End: 2021-03-05
Attending: FAMILY MEDICINE

## 2021-03-05 DIAGNOSIS — E78.5 HYPERLIPIDEMIA, UNSPECIFIED HYPERLIPIDEMIA TYPE: ICD-10-CM

## 2021-03-05 DIAGNOSIS — Z91.89 AT RISK FOR CORONARY ARTERY DISEASE: ICD-10-CM

## 2021-03-05 DIAGNOSIS — E66.811 CLASS 1 OBESITY DUE TO EXCESS CALORIES WITH SERIOUS COMORBIDITY AND BODY MASS INDEX (BMI) OF 34.0 TO 34.9 IN ADULT: ICD-10-CM

## 2021-03-05 DIAGNOSIS — E66.09 CLASS 1 OBESITY DUE TO EXCESS CALORIES WITH SERIOUS COMORBIDITY AND BODY MASS INDEX (BMI) OF 34.0 TO 34.9 IN ADULT: ICD-10-CM

## 2021-03-05 LAB
CV CALCIUM SCORE AGATSTON LM: 0
CV CALCIUM SCORING AGATSON LAD: 0
CV CALCIUM SCORING AGATSTON CX: 0
CV CALCIUM SCORING AGATSTON RCA: 0
CV CALCIUM SCORING AGATSTON TOTAL: 0

## 2021-03-18 ENCOUNTER — COMMUNICATION - HEALTHEAST (OUTPATIENT)
Dept: NURSING | Facility: CLINIC | Age: 61
End: 2021-03-18

## 2021-03-18 DIAGNOSIS — G47.00 INSOMNIA: ICD-10-CM

## 2021-03-18 DIAGNOSIS — F41.1 GENERALIZED ANXIETY DISORDER: ICD-10-CM

## 2021-03-18 DIAGNOSIS — H93.19 TINNITUS: ICD-10-CM

## 2021-03-31 ENCOUNTER — OFFICE VISIT - HEALTHEAST (OUTPATIENT)
Dept: ONCOLOGY | Facility: HOSPITAL | Age: 61
End: 2021-03-31

## 2021-03-31 ENCOUNTER — AMBULATORY - HEALTHEAST (OUTPATIENT)
Dept: INFUSION THERAPY | Facility: HOSPITAL | Age: 61
End: 2021-03-31

## 2021-03-31 DIAGNOSIS — C91.10 CHRONIC LYMPHOCYTIC LEUKEMIA (H): ICD-10-CM

## 2021-03-31 LAB
ALBUMIN SERPL-MCNC: 3.6 G/DL (ref 3.5–5)
ALP SERPL-CCNC: 69 U/L (ref 45–120)
ALT SERPL W P-5'-P-CCNC: 15 U/L (ref 0–45)
ANION GAP SERPL CALCULATED.3IONS-SCNC: 4 MMOL/L (ref 5–18)
AST SERPL W P-5'-P-CCNC: 16 U/L (ref 0–40)
BASOPHILS # BLD AUTO: 0 THOU/UL (ref 0–0.2)
BASOPHILS NFR BLD AUTO: 1 % (ref 0–2)
BILIRUB SERPL-MCNC: 0.4 MG/DL (ref 0–1)
BUN SERPL-MCNC: 18 MG/DL (ref 8–22)
CALCIUM SERPL-MCNC: 8.5 MG/DL (ref 8.5–10.5)
CHLORIDE BLD-SCNC: 106 MMOL/L (ref 98–107)
CO2 SERPL-SCNC: 30 MMOL/L (ref 22–31)
CREAT SERPL-MCNC: 0.74 MG/DL (ref 0.7–1.3)
EOSINOPHIL # BLD AUTO: 0.4 THOU/UL (ref 0–0.4)
EOSINOPHIL NFR BLD AUTO: 5 % (ref 0–6)
ERYTHROCYTE [DISTWIDTH] IN BLOOD BY AUTOMATED COUNT: 12.7 % (ref 11–14.5)
GFR SERPL CREATININE-BSD FRML MDRD: >60 ML/MIN/1.73M2
GLUCOSE BLD-MCNC: 106 MG/DL (ref 70–125)
HCT VFR BLD AUTO: 45.3 % (ref 40–54)
HGB BLD-MCNC: 14.6 G/DL (ref 14–18)
IMM GRANULOCYTES # BLD: 0 THOU/UL
IMM GRANULOCYTES NFR BLD: 0 %
LYMPHOCYTES # BLD AUTO: 1.1 THOU/UL (ref 0.8–4.4)
LYMPHOCYTES NFR BLD AUTO: 15 % (ref 20–40)
MCH RBC QN AUTO: 29.4 PG (ref 27–34)
MCHC RBC AUTO-ENTMCNC: 32.2 G/DL (ref 32–36)
MCV RBC AUTO: 91 FL (ref 80–100)
MONOCYTES # BLD AUTO: 0.5 THOU/UL (ref 0–0.9)
MONOCYTES NFR BLD AUTO: 7 % (ref 2–10)
NEUTROPHILS # BLD AUTO: 5.3 THOU/UL (ref 2–7.7)
NEUTROPHILS NFR BLD AUTO: 72 % (ref 50–70)
PLATELET # BLD AUTO: 251 THOU/UL (ref 140–440)
PMV BLD AUTO: 10.3 FL (ref 8.5–12.5)
POTASSIUM BLD-SCNC: 4.4 MMOL/L (ref 3.5–5)
PROT SERPL-MCNC: 6.3 G/DL (ref 6–8)
RBC # BLD AUTO: 4.97 MILL/UL (ref 4.4–6.2)
SODIUM SERPL-SCNC: 140 MMOL/L (ref 136–145)
WBC: 7.3 THOU/UL (ref 4–11)

## 2021-05-24 ENCOUNTER — COMMUNICATION - HEALTHEAST (OUTPATIENT)
Dept: INTERNAL MEDICINE | Facility: CLINIC | Age: 61
End: 2021-05-24

## 2021-05-24 ENCOUNTER — AMBULATORY - HEALTHEAST (OUTPATIENT)
Dept: FAMILY MEDICINE | Facility: CLINIC | Age: 61
End: 2021-05-24

## 2021-05-24 DIAGNOSIS — S99.919D ANKLE INJURY, UNSPECIFIED LATERALITY, SUBSEQUENT ENCOUNTER: ICD-10-CM

## 2021-05-26 VITALS
TEMPERATURE: 98.4 F | RESPIRATION RATE: 16 BRPM | SYSTOLIC BLOOD PRESSURE: 122 MMHG | HEART RATE: 60 BPM | DIASTOLIC BLOOD PRESSURE: 78 MMHG

## 2021-05-26 NOTE — PATIENT INSTRUCTIONS - HE
Okay to return to work    Finish up your colchicine as well as Advil this is for pericardial effusion    Reviewing your medical cytology there is no evidence of malignancy    Accomplish your sleep test as this is important to sort out whether this is contributing to atrial fibrillation and this might help with her sleep grogginess    On exam today have us tiny nodular thyroid texture I would like you to do a thyroid ultrasound

## 2021-05-26 NOTE — PROGRESS NOTES
ASSESSMENT & PLAN    No problem-specific Assessment & Plan notes found for this encounter.      Ramu was seen today for hospital visit follow up.    Diagnoses and all orders for this visit:    Paroxysmal atrial fibrillation with rapid ventricular response (H)    Chronic lymphocytic leukemia (H)    Pericardial effusion    Tinnitus of right ear    Thyroid nodule  -     US Thyroid; Future  -     Thyroid Stimulating Hormone (TSH)  -     Thyroid Peroxidase Antibody        Patient Instructions   Okay to return to work    Finish up your colchicine as well as Advil this is for pericardial effusion    Reviewing your medical cytology there is no evidence of malignancy    Accomplish your sleep test as this is important to sort out whether this is contributing to atrial fibrillation and this might help with her sleep grogginess    On exam today have us tiny nodular thyroid texture I would like you to do a thyroid ultrasound          No Follow-up on file.            CHIEF COMPLAINT: Ramu Espinoza had concerns including Hospital Visit Follow Up ([facility] 3/16/19- 3/20/19 Pericardial Effusion).    [place]: 1.............. had concerns including Hospital Visit Follow Up ([facility] 3/16/19- 3/20/19 Pericardial Effusion).    1. Paroxysmal atrial fibrillation with rapid ventricular response (H)    2. Chronic lymphocytic leukemia (H)    3. Pericardial effusion    4. Tinnitus of right ear    5. Thyroid nodule          CC:             Why are you here today?                              Hospital follow up [facility], 3/16/19- 3/20/19 medications reconciled    Short version of the story  Patient developed chest pain worse with deep breathing on or around 316 started Saturday morning as he states took a few ibuprofen a little bit better later in the afternoon was worse when in the hospital was found to have a pericardial effusion  Had drainage  Showed white blood cells red blood cells cultures were negative no malignancy and was asked to stop  his study drug  Ibrutinib    This is for CLL  Discharged on Advil and colchicine he takes colchicine for 3 months Advil for 5 days is on the last days of this he had intermittent atrial fibrillation he is scheduled for sleep study this was reiterated the importance of this he otherwise has a little bit of sleep hangover from his trazodone and hydroxyzine complex we asked him to continue this until he has a sleep study    Disc describes no pain describes good stamina he wishes to go back to work he needs a note to go back to work he does have a follow-up with cardiology        CLL in 2012    Any other Problems in order of Priority:        SUBJECTIVE:  Ramu Espinoza is a 58 y.o. male    Past Medical History:   Diagnosis Date     BPH (benign prostatic hyperplasia)      CLL (chronic lymphocytic leukemia) (H)      Lymphadenopathy     Created by Conversion  Replacement Utility updated for latest IMO load     Paroxysmal atrial fibrillation with rapid ventricular response (H)      Past Surgical History:   Procedure Laterality Date     MT TREAT INTER/SUBTROCH FX,W/PLATE/SCREW      Description: Open Treatment Of An Intertrochanteric Fracture;  Recorded: 04/01/2009;     Azithromycin  Current Outpatient Medications   Medication Sig Dispense Refill     acyclovir (ZOVIRAX) 400 MG tablet TAKE 1 TABLET BY MOUTH TWICE A  tablet 2     cholecalciferol, vitamin D3, 1,000 unit tablet Take 2,000 Units by mouth daily.              colchicine 0.6 mg tablet Take 1 tablet (0.6 mg total) by mouth daily. 60 tablet 2     hydrOXYzine HCl (ATARAX) 25 MG tablet Take 12.5 mg by mouth at bedtime.       ibuprofen (ADVIL,MOTRIN) 400 MG tablet Take 1 tablet (400 mg total) by mouth every 6 (six) hours for 5 days. 20 tablet 0     LORazepam (ATIVAN) 1 MG tablet TAKE 1 TABLET BY MOUTH DAILY AS NEEDED. 30 tablet 0     metoprolol succinate (TOPROL-XL) 50 MG 24 hr tablet Take 2 tablets (100 mg total) by mouth daily. 180 tablet 2     sertraline  (ZOLOFT) 100 MG tablet Take 1 tablet (100 mg total) by mouth daily. 90 tablet 1     tamsulosin (FLOMAX) 0.4 mg cap Take 0.4 mg by mouth daily after supper.       traZODone (DESYREL) 50 MG tablet Take 1 tablet (50 mg total) by mouth at bedtime. 90 tablet 2     No current facility-administered medications for this visit.      Family History   Problem Relation Age of Onset     Cancer Father         pacreatic      Prostate cancer Father      Colon cancer Mother      Cancer Mother      Cancer Brother      Lung cancer Brother      Hypertension Brother      No Medical Problems Sister      No Medical Problems Brother      Cancer Brother      Anuerysm Brother      Hypertension Brother      Hypertension Brother      No Medical Problems Brother      Diabetes Paternal Aunt      Diabetes Maternal Aunt      Diabetes Maternal Aunt      Cancer Maternal Grandfather      Social History     Socioeconomic History     Marital status:      Spouse name: None     Number of children: None     Years of education: None     Highest education level: None   Occupational History     None   Social Needs     Financial resource strain: None     Food insecurity:     Worry: None     Inability: None     Transportation needs:     Medical: None     Non-medical: None   Tobacco Use     Smoking status: Former Smoker     Packs/day: 0.50     Years: 25.00     Pack years: 12.50     Types: Cigarettes     Last attempt to quit: 1/3/2005     Years since quittin.2     Smokeless tobacco: Never Used   Substance and Sexual Activity     Alcohol use: Yes     Alcohol/week: 1.8 oz     Types: 1 Glasses of wine, 2 Cans of beer per week     Comment: only on  the weekends     Drug use: No     Sexual activity: No     Partners: Female   Lifestyle     Physical activity:     Days per week: None     Minutes per session: None     Stress: None   Relationships     Social connections:     Talks on phone: None     Gets together: None     Attends Hindu service: None      Active member of club or organization: None     Attends meetings of clubs or organizations: None     Relationship status: None     Intimate partner violence:     Fear of current or ex partner: None     Emotionally abused: None     Physically abused: None     Forced sexual activity: None   Other Topics Concern     None   Social History Narrative     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 His smoking use included cigarettes. He has a 12.50 pack-year smoking history. he has never used smokeless tobacco. He reports that he drinks about 1.8 oz of alcohol per week. He reports that he does not use drugs.    REVIEW OF SYSTEMS:   Family history not pertinent to chief complaint or presenting problem    Review of Systems:      Nervous System:  No headache, paresthesia or dizziness or fainting                                  Ears: No hearing loss or he does have ongoing ringing in the ears    Eyes: No blurring of vision, Double Vision            No redness, itching or dryness.    Nose: No nosebleed or loss of smell    Mouth: No mouth sores or  coated tongue    Throat: No hoarseness or difficulty swallowing    Neck: No enlarged thyroid or lymph nodes.    Heart: Pleuritic chest pain is resolved chest pain, palpitation or irregular heartbeat.                  Lungs: Denies any shortness of breath, wheezing or hemoptysis.    Gastrointestinal: No nausea or vomiting, melena or blood in stools.    Kidney/Bladdr: No polyuria, polydipsia, or hematuria.                              Genital/Sexual: No Sex function Changes                                Skin: No rash    Muscles/Joints/Bones: No Muscle morning stiffness, No Effusion of a Joint     Review of systems otherwise negative as requested from patient, except   Those positive ROS outlined and discussed in [place].    OBJECTIVE:  /70 (Patient Site: Left Arm, Patient Position: Sitting, Cuff Size: Adult Regular)   Wt (!) 255 lb (115.7 kg)   BMI 32.74 kg/m      GENERAL:     No acute distress.   Alert and oriented X 3         Physical:    Oropharynx clear ground off lower teeth bridge on the upper  No cervical or subclavicular clavicular nodes  Thyroid slightly nodular in palpation  Lungs today clear with equal and symmetric range next  Cardiac S1-S2 he is in a regular rhythm no appreciable murmur  Lower extremity without edema  No tremulousness      Recent Results (from the past 240 hour(s))   Comprehensive Metabolic Panel   Result Value Ref Range    Sodium 139 136 - 145 mmol/L    Potassium 4.5 3.5 - 5.0 mmol/L    Chloride 103 98 - 107 mmol/L    CO2 27 22 - 31 mmol/L    Anion Gap, Calculation 9 5 - 18 mmol/L    Glucose 96 70 - 125 mg/dL    BUN 17 8 - 22 mg/dL    Creatinine 0.74 0.70 - 1.30 mg/dL    GFR MDRD Af Amer >60 >60 mL/min/1.73m2    GFR MDRD Non Af Amer >60 >60 mL/min/1.73m2    Bilirubin, Total 0.5 0.0 - 1.0 mg/dL    Calcium 9.4 8.5 - 10.5 mg/dL    Protein, Total 6.6 6.0 - 8.0 g/dL    Albumin 3.4 (L) 3.5 - 5.0 g/dL    Alkaline Phosphatase 83 45 - 120 U/L    AST 14 0 - 40 U/L    ALT 18 0 - 45 U/L   HM1 (CBC with Diff)   Result Value Ref Range    WBC 11.2 (H) 4.0 - 11.0 thou/uL    RBC 4.82 4.40 - 6.20 mill/uL    Hemoglobin 14.0 14.0 - 18.0 g/dL    Hematocrit 42.5 40.0 - 54.0 %    MCV 88 80 - 100 fL    MCH 29.0 27.0 - 34.0 pg    MCHC 32.9 32.0 - 36.0 g/dL    RDW 12.2 11.0 - 14.5 %    Platelets 343 140 - 440 thou/uL    MPV 9.0 8.5 - 12.5 fL    Neutrophils % 76 (H) 50 - 70 %    Lymphocytes % 10 (L) 20 - 40 %    Monocytes % 7 2 - 10  %    Eosinophils % 7 (H) 0 - 6 %    Basophils % 0 0 - 2 %    Neutrophils Absolute 8.4 (H) 2.0 - 7.7 thou/uL    Lymphocytes Absolute 1.1 0.8 - 4.4 thou/uL    Monocytes Absolute 0.7 0.0 - 0.9 thou/uL    Eosinophils Absolute 0.8 (H) 0.0 - 0.4 thou/uL    Basophils Absolute 0.0 0.0 - 0.2 thou/uL       ASSESSMENT & PLAN      Ramu was seen today for hospital visit follow up.    Diagnoses and all orders for this visit:    Paroxysmal atrial fibrillation with rapid ventricular response (H)    Chronic lymphocytic leukemia (H)    Pericardial effusion    Tinnitus of right ear    Thyroid nodule  -     US Thyroid; Future  -     Thyroid Stimulating Hormone (TSH)  -     Thyroid Peroxidase Antibody        No Follow-up on file.       Anticipatory Guidance and Symptomatic Cares Discussed   Advised to call back directly if there are further questions, or if these symptoms fail to improve as anticipated or worsen.  Return to clinic if patient has a clinical concern that warrants an exam.         I spent 25  minutes with this patient face to face, of which 50% or greater was spent in counseling and coordination of care with regards to Ramu was seen today for hospital visit follow up.    Diagnoses and all orders for this visit:    Paroxysmal atrial fibrillation with rapid ventricular response (H)    Chronic lymphocytic leukemia (H)    Pericardial effusion    Tinnitus of right ear    Thyroid nodule  -     US Thyroid; Future  -     Thyroid Stimulating Hormone (TSH)  -     Thyroid Peroxidase Antibody        Ramu Jiménez MD  Family Medicine   [facility] [id number]  [phone]

## 2021-05-27 ENCOUNTER — RECORDS - HEALTHEAST (OUTPATIENT)
Dept: ADMINISTRATIVE | Facility: CLINIC | Age: 61
End: 2021-05-27

## 2021-05-27 ASSESSMENT — PATIENT HEALTH QUESTIONNAIRE - PHQ9
SUM OF ALL RESPONSES TO PHQ QUESTIONS 1-9: 1
SUM OF ALL RESPONSES TO PHQ QUESTIONS 1-9: 2

## 2021-05-27 NOTE — TELEPHONE ENCOUNTER
----- Message -----  From: Ino Arana DO  Sent: 4/3/2019   8:54 AM  To: Lino Parr RN    Also have the patient take omeprazole 20 mg daily while on ibuprofen.      Thanks,     Ronaldo

## 2021-05-27 NOTE — TELEPHONE ENCOUNTER
Patient called and LM for return call regarding recent hospitalization. Reviewed records/notes Will return call for patient. CMM,RN

## 2021-05-27 NOTE — TELEPHONE ENCOUNTER
"Already addressed by Dr. Arana and communicated to patient. Unfortunately, the discharge summary did suggest that ibuprofen should be discontinued, \"patient has been started on ibuprofen and colchicine cardiology recommending  5 more days  of ibuprofen and colchicine 0.6 mg daily for 3 months  cytology pending hematology has also evaluated recommended holding Imbruvica until cytology results back.. Repeat echo essentially unchanged today.\"     "

## 2021-05-27 NOTE — TELEPHONE ENCOUNTER
LM for patient to call back for results.   If patient calls back, ok to relay message below.     KEVEN/ISABELL

## 2021-05-27 NOTE — PROGRESS NOTES
[facility] Hematology and Oncology Progress Note    Patient: Ramu Espinoza  MRN: [id number]  Date of Service: 04/04/19          Reason for Visit    Chief Complaint   Patient presents with     Malignant Hematology     Chronic lymphocytic leukemia       Assessment and Plan    1.  A 59 yo gentleman with CLL stage 4 now, diagnosed in February 2012. 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 Great clinical response to Imbruvica. His lymph nodes have normalized. His CBC is basically normal today. Restart  Imbruvica 140 mg- 3 pills daily. He has been holding for 2-3 weeks due to recent hospitalization and procedure. Return in 3 months with labs and to see Dr. Garza.     2. Paroxysmal A. Fib: could be from Imbruvica (<9% risk) on metoprolol. Continue to follow with Dr. Arana.     3.  Hospitalization for pericardial effusion with pericarditis: He is continuing on colchicine as well as ibuprofen per Dr. Arana from cardiology.  He will follow-up with him and he will manage this going forward.      ECOG Performance   ECOG Performance Status: 1     Distress Assessment  Distress Assessment Score: No distress     Pain  Currently in Pain: Yes  Pain Score (Initial OR Reassessment): 3  Location: chest    Problem List    1. Chronic lymphocytic leukemia (H)  Study Drug Ibrutinib 140 mg capsule 420 mg   2. Pericardial effusion        ______________________________________________________________________________    History of Present Illness    Ramu Espinoza is a very pleasant 58 y.o. old male with a history of CLL diagnosed in 02/2012 presenting with elevated white count in upper 10s/lower 20s with a peripheral blood smear showing atypical lymphocytosis suspicious for CLL.  He was completely asymptomatic, normal hemoglobin and platelet count.  His peripheral blood flow cytometry revealed CD5 co-expressing kappa light chains restricted B cells confirming the diagnosis of CLL.  He has been on observation.  His white count has  been slowly going up.      In June 2014 his white count was over 100,000. So he had a CAT scan and that showed increasing lymphadenopathy and splenectomy. Platelet count has been going down.      I offered participation in clinical study in which patients are randomized between fludarabine and cyclophosphamide Rituxan or Imbruvica and Rituxan. He has been randomized to Imbruvica and rituximab arm. He started on October 2014. In November 2014 he got Rituxan for the first time. Tolerated well. Finished that. Now on Imbruvica alone. Tolerating it well.      Comes in today for a hospital follow up.   For pleuritic chest pain and was found to have a pericardial effusion with acute pericarditis.  He had a pericardiocentesis and they removed 350ml of serosanguinous fluid. He was discharged on ibuprofen as well as colchicine.  He had a pericardial effusion drained and has been holding the Imbruvica for about 2 weeks.  He is now here to get lab work and to likely resume this.  He is feeling good.  He says he has not noticed any episodes of A. fib since his been off the Imbruvica.  He is just very anxious about his cardiac issues and if the Imbruvica is causing them and if they are going to happen again.      Review of Systems    Constitutional  Constitutional (WDL): Exceptions to WDL  Fatigue: No Concerns  Fever: No Concerns  Chills: No Concerns  Weight Gain: No Concerns  Weight Loss: No Concerns  Hot flashes/Night Sweats: Concerns  Neurosensory  Neurosensory (WDL): All neurosensory elements are within defined limits  Peripheral Motor Neuropathy: No Concerns  Ataxia: No Concerns  Peripheral Sensory Neuropathy: No Concerns  Confusion: No Concerns  Dizziness: No Concerns  Syncope: No Concerns  Eye   Eye Disorder (WDL): All eye disorder elements are within defined limits(glasses)  Blurred Vision: No Concerns  Dry Eye: No Concerns  Eye Pain: No Concerns  Watering Eyes: No Concerns  Ear  Ear Disorder (WDL): Exceptions to WDL  Ear  Pain: No Concerns  Tinnitus: Concerns(ongoing for years)  Cardiovascular  Cardiovascular (WDL): Exceptions to WDL  Palpitations: No Concerns(afib)  Edema: No Concerns  SVT, DVT/PE: No Concerns  Chest Pain - Cardiac: Concerns(was inpatient for chest pain)  Pulmonary  Respiratory (WDL): Exceptions to WDL  Cough: No Concerns  Dyspnea: Concerns  Hypoxia: No Concerns  Gastrointestinal  Gastrointestinal (WDL): All gastrointestinal elements are within defined limits  Anorexia: No Concerns  Nausea: No Concerns  Vomiting: No Concerns  Dehydration: No Concerns  Dysgeusia: No Concerns  Dysphagia: No Concerns  Mucositis Oral: No Concerns  Esophagitis: No Concerns  Constipation: No Concerns  Diarrhea: No Concerns  Pharyngitis: No Concerns  Dry Mouth: No Concerns  Genitourinary  Genitourinary (WDL): All genitourinary elements are within defined limits  Urinary Frequency: No Concerns  Urinary Retention: No Concerns  Urinary Tract Pain: No Concerns  Lymphatic  Lymph (WDL): All lymph disorder elements are within defined limits  Lymphedema: No Concerns  Lymph Node Discomfort: No Concerns  Musculoskeletal and Connective Tissue  Musculoskeletal and Connetive Tissue Disorders (WDL): All Musculoskeletal and Connetive Tissue Disorder elements are within defined limits  Arthralgia: No Concerns(relieved with ibuprofen)  Bone Pain: No Concerns  Muscle Weakness : No Concerns  Myalgia: No Concerns  Integumentary  Integumentary (WDL): All integumentary elements are within defined limits  Alopecia: No Concerns  Rash Maculo-Papular: No Concerns  Pruritus: No Concerns  Urticaria: No Concerns  Palmar-Plantar Erythrodysesthesia Syndrome: No Concerns  Flushing: No Concerns  Patient Coping  Patient Coping: Accepting  Accompanied by  Accompanied by: Family Member  Oral Chemo Adherence         Past History  Past Medical History:   Diagnosis Date     BPH (benign prostatic hyperplasia)      CLL (chronic lymphocytic leukemia) (H)      Lymphadenopathy      Created by Conversion  Replacement Utility updated for latest IMO load     Paroxysmal atrial fibrillation with rapid ventricular response (H)        PHYSICAL EXAM:  BP 99/60   Pulse 73   Temp 98.2  F (36.8  C) (Oral)   Wt (!) 248 lb 14.4 oz (112.9 kg)   SpO2 94%   BMI 31.96 kg/m    GENERAL: no acute distress. Cooperative in conversation. Here alone  HEENT: pupils are equal, round and reactive. Oromucosa is clean and intact. No ulcerations or mucositis noted. No bleeding noted.  RESP: lungs are clear bilaterally per auscultation. Regular respiratory rate. No wheezes or rhonchi.  CV: Regular, rate and rhythm. No murmurs.  ABD: soft, nontender. Positive bowel sounds. No organomegaly.   MUSCULOSKELETAL: No lower extremity swelling.   NEURO: non focal. Alert and oriented x3.   PSYCH: within normal limits. No depression or anxiety.  SKIN: warm dry intact   LYMPH: no cervical, supraclavicular, inguinal, or axillary lymphadenopathy      Lab Results    Recent Results (from the past 168 hour(s))   Comprehensive Metabolic Panel   Result Value Ref Range    Sodium 139 136 - 145 mmol/L    Potassium 4.5 3.5 - 5.0 mmol/L    Chloride 103 98 - 107 mmol/L    CO2 27 22 - 31 mmol/L    Anion Gap, Calculation 9 5 - 18 mmol/L    Glucose 96 70 - 125 mg/dL    BUN 17 8 - 22 mg/dL    Creatinine 0.74 0.70 - 1.30 mg/dL    GFR MDRD Af Amer >60 >60 mL/min/1.73m2    GFR MDRD Non Af Amer >60 >60 mL/min/1.73m2    Bilirubin, Total 0.5 0.0 - 1.0 mg/dL    Calcium 9.4 8.5 - 10.5 mg/dL    Protein, Total 6.6 6.0 - 8.0 g/dL    Albumin 3.4 (L) 3.5 - 5.0 g/dL    Alkaline Phosphatase 83 45 - 120 U/L    AST 14 0 - 40 U/L    ALT 18 0 - 45 U/L   HM1 (CBC with Diff)   Result Value Ref Range    WBC 11.2 (H) 4.0 - 11.0 thou/uL    RBC 4.82 4.40 - 6.20 mill/uL    Hemoglobin 14.0 14.0 - 18.0 g/dL    Hematocrit 42.5 40.0 - 54.0 %    MCV 88 80 - 100 fL    MCH 29.0 27.0 - 34.0 pg    MCHC 32.9 32.0 - 36.0 g/dL    RDW 12.2 11.0 - 14.5 %    Platelets 343 140 - 440  thou/uL    MPV 9.0 8.5 - 12.5 fL    Neutrophils % 76 (H) 50 - 70 %    Lymphocytes % 10 (L) 20 - 40 %    Monocytes % 7 2 - 10 %    Eosinophils % 7 (H) 0 - 6 %    Basophils % 0 0 - 2 %    Neutrophils Absolute 8.4 (H) 2.0 - 7.7 thou/uL    Lymphocytes Absolute 1.1 0.8 - 4.4 thou/uL    Monocytes Absolute 0.7 0.0 - 0.9 thou/uL    Eosinophils Absolute 0.8 (H) 0.0 - 0.4 thou/uL    Basophils Absolute 0.0 0.0 - 0.2 thou/uL       Imaging    Us Thyroid    Result Date: 3/22/2019   US THYROID 3/22/2019 1:45 PM INDICATION: Nontoxic single thyroid nodule TECHNIQUE: Routine. COMPARISON: CT chest 3/16/2019 FINDINGS: RIGHT thyroid lobe measures 4.4 x 1.7 x 1.5 cm. Normal echotexture with no focal nodule.   LEFT thyroid lobe measures 4.1 x 1.9 x 1.2 cm. Normal echotexture with no focal nodule.  Isthmus measures 4 mm. No additional findings. No cervical lymphadenopathy.     CONCLUSION: 1.  Normal thyroid ultrasound. No thyromegaly or thyroid nodule.     Cta Chest Pe Run    Result Date: 3/16/2019  EXAM: CTA CHEST PE RUN LOCATION: [facility] DATE/TIME: 3/16/2019 7:50 PM INDICATION: Tachycardia, chest pain, concerning for pe concerning for pe COMPARISON: 12/13/2016. TECHNIQUE: Helical acquisition through the chest was performed during the arterial phase of contrast enhancement using IV contrast. 2D and 3D reconstructions were performed by the CT technologist. Dose reduction techniques were used. IV CONTRAST: Iohexol (Omni) 100 mL FINDINGS: ANGIOGRAM CHEST: No pulmonary embolus. No aortic dissection or aneurysm. LUNGS AND PLEURA: Streaky and basilar atelectasis. Small bilateral pleural effusions. MEDIASTINUM: Moderate pericardial effusion. LIMITED UPPER ABDOMEN: Hepatic cysts. MUSCULOSKELETAL: Degenerative changes of the spine.     CONCLUSION: 1.  Moderate pericardial effusion. 2.  Small bilateral pleural effusions. 3.  No pulmonary embolus.        Signed by: Radha Alarcon, CNP

## 2021-05-27 NOTE — TELEPHONE ENCOUNTER
"Discussion and review of recommendations with patient. Pt verbalized understanding. Pt will start Omeprazole, and continue Ibuprofen taper. Pt was instructed at discharge to \"finish the bottle of Ibuprofen/Rx and stop\". Also reports that during hospital stay, he was instructed by partner of Dr. Garza to stop Ibrutinib. Pt has follow-up visit tomorrow 4/4/19 to discuss plan of care/when to resume medication. Will send letter to the patient with Ibuprofen taper. Pt has OV with MAT 4/9/19. CMM,RN  "

## 2021-05-27 NOTE — TELEPHONE ENCOUNTER
Refill Approved    Rx renewed per Medication Renewal Policy. Medication was last renewed on 10/25/18 for 90/1  Last OV 3/22/19.    Shruti Castaneda, [facility] Triage/Med Refill 4/2/2019     Requested Prescriptions   Pending Prescriptions Disp Refills     sertraline (ZOLOFT) 100 MG tablet [Pharmacy Med Name: SERTRALINE 100MG TABLETS] 90 tablet 0     Sig: TAKE 1 TABLET BY MOUTH EVERY DAY    SSRI Refill Protocol  Passed - 4/2/2019  4:23 AM       Passed - PCP or prescribing provider visit in last year    Last office visit with prescriber/PCP: 4/14/2017 Esperanza Talamantes MD OR same dept: Visit date not found OR same specialty: Visit date not found  Last physical: 1/17/2018 Last MTM visit: 2/16/2018 Che Silva, PharmD   Next visit within 3 mo: Visit date not found  Next physical within 3 mo: Visit date not found  Prescriber OR PCP: Esperanza Talamantes MD  Last diagnosis associated with med order: 1. BRANDEN (generalized anxiety disorder)  - sertraline (ZOLOFT) 100 MG tablet [Pharmacy Med Name: SERTRALINE 100MG TABLETS]; TAKE 1 TABLET BY MOUTH EVERY DAY  Dispense: 90 tablet; Refill: 0    If protocol passes may refill for 12 months if within 3 months of last provider visit (or a total of 15 months).

## 2021-05-27 NOTE — TELEPHONE ENCOUNTER
Patient Returning Call  Reason for call:  Return call  Information relayed to patient:  Patient was informed of his US thyroid results below. Patient was transferred to scheduling.  Patient has additional questions:  No  If YES, what are your questions/concerns:  n/a  Okay to leave a detailed message?: No call back needed

## 2021-05-27 NOTE — TELEPHONE ENCOUNTER
----- Message from Lino Parr RN sent at 4/2/2019 10:09 AM CDT -----  Regarding: Medicaiton safety question  Linn Robertson,    This patient was on Ibuprofen scheduled, then was told to stop and then started colchicine. Currently on Colchicine 0.6 daily. Pt reports that he has been taking Ibuprofen 400-600 mg twice daily to help.    Looking for any safety recommendations in regards to taking both.   Thank you for your time,   Magalys Parr RN

## 2021-05-27 NOTE — TELEPHONE ENCOUNTER
"Spoke with patient today calling in with update.  Pt reports that he restarted taking Ibuprofen, took 600 mg last evening when he got home from work as it was hurting to take a deep breath, the Ibuprofen made it tolerable. He was able to sleep. Pt returned to work today, taking Ibuprofen again this morning. Pt reports that he has some left pectoral area discomfort in one spot. He pushes on it, he can feel it more. Denies that this is the area/spot where his pericardial drain was.  Discussion that this could be muscular pain. Remains concerned that it is hard and uncomfortable to take a deep breath. Encouraged to be cautious at work, as he reports yesterday was his \"hardest day work/activity wise\". Discussion that if the sharp pain returns as before or breathing increasingly becomes difficult to go to an ER to be evaluated. Pt reports that he \"feels good, just hard and uncomforatble to take a deep breath\". Provided reassurance. Pt verbalized understanding. Will call back if any recommendations from MAT.  KUMARRN  "

## 2021-05-27 NOTE — PROGRESS NOTES
MTM Transition of Care Encounter  Assessment & Plan                                                     Post Discharge Medication Reconciliation Status: discharge medications reconciled, continue medications without change    Pericardial effusion: Improved. Completed ibuprofen today. Reviewed role of colchicine and side effects. He will take for 3 months total. Cardiology follow up 4/9/19.     Atrial Fibrillation: Patient is following up with cardiology. Continue current medication at this time.     CLL: Patient to follow up with heme/onc. I will verify with Radha Alarcon CNP about Clive's concern about holding Imbruvica until 4/4.  Per Radha:   OK for him to hold until I see him. We will get his labs and then decide to continue. I feel comfortable with him holding.   K   Called patient and let him know 3/28/19 MK    Anxiety: Anxiety uncontrolled at this time, patient has stressor of cardiac issues currently. Encouraged him to minimize use of lorazepam when he can. Per , he has not refilled for almost 1 year.     Insomnia: Stable. Recommended to continue current regimen. He will discuss a sleep study with Dr. Jiménez and if CPAP started then can consider decreasing insomnia medications.     Hyperlipidemia: Currently taking no medication.  Due to 10-year ASCVD risk =12% a moderate intensity statin would be indicated. However, I think it would be reasonable to check at his upcoming appointment in June with Dr. Jiménez since he claims he is more active. Recommended that he fast.     BPH: Stable. Recommended to continue current regimen.     Follow Up  As needed with MTM     Subjective & Objective                                                       Ramu Espinoza is a 58 y.o. male called for a transitions of care visit. he was discharged from [facility] on 3/20/2019 for chest pain.    Pericardial effusion: Reports prior to admission every time he would take a deep breath it would hurt and would not go  away. CT of chest showed moderate-sized pericardial effusion.  Patient underwent pericardiocentesis on 3/18/19 and started on ibuprofen 400 mg every 6 hours for 5 days and colchicine 0.6 mg daily for 3 months.  Imbruvica was held by hematology for 7 days until cytology results return.  No chest pain now, yesterday he did but it was a different kind. During the day more active. No afib since holding Imbruvica -- follow up 4/4/19. Wonders about an alternative. Denies GI issues from colchicine. Last dose of ibuprofen today.     Atrial Fibrillation: Currently taking metoprolol succinate 50 mg x2 (100 mg) daily. Dose increased on 11/16 after wearing Holter monitor.   Has not noticed any afib since holding Imbruvica.   Imbruvica can increase risk of atrial fib/flutter.   Currently sees a cardiologist.  Is not on anticoagulation.   [id number] score = 0.     CLL: Follows with Radha Alarcon CNP.  Diagnosed February 2012 with great response to Imbruvica -- currently on hold.  No longer on Bactrim, but recommended to continue acyclovir. Follow up on 4/4 and he is worried to hold Imbruvica that long.     Anxiety: Currently taking sertraline 100 mg daily.  Has been needing lorazepam 1 mg about every day recently -- recent heart is a stressor. Helps his sleep -- uses half a tablet. Was using it less before, about 4 times a month. Work is tiring and he sleeps due to fatigue from the day. He is worried about being addicted and is aware to minimize use.    Per [place] , last picked up lorazepam #30 5/30/18  BRANDEN-7 Total: 2 (10/25/2018 10:00 AM)    Insomnia: Still taking hydroxyzine and trazodone. Working nights and wakes up no alarm which is nice. Interested in a sleep study because he snores.     Hyperlipidemia: Currently taking no medication.  10-year ASCVD risk =12%.  Last lipids checked 8/16/18.  Patient wanted to work on diet and exercise and recheck in 6 months. Read about statins and is not interested. Is now a   and is very active.     BPH: Currently taking tamsulosin 0.4 mg daily. Working great. Steady stream. Nocturia rare. Emptying bladder.     PMH: reviewed in EPIC   Allergies/ADRs: reviewed in EPIC   Alcohol: reviewed in EPIC   Tobacco:   Social History     Tobacco Use   Smoking Status Former Smoker     Packs/day: 0.50     Years: 25.00     Pack years: 12.50     Types: Cigarettes     Last attempt to quit: 1/3/2005     Years since quittin.2   Smokeless Tobacco Never Used     Recent Vitals:   BP Readings from Last 3 Encounters:   19 130/70   19 121/67   19 109/70      Wt Readings from Last 3 Encounters:   19 (!) 255 lb (115.7 kg)   19 (!) 253 lb (114.8 kg)   19 (!) 255 lb (115.7 kg)     ----------------    Much or all of the text in this note was generated through the use of Dragon Dictate voice-to-text software. Errors in spelling or words which seem out of context are unintentional. Sound alike errors, in particular, may have escaped editing.    The patient declined an after visit summary    I spent 20 minutes with this patient today;  . All changes were made via collaborative practice agreement with Ramu Jiménez MD. A copy of the visit note was provided to the patient's provider.     Che Silva, Pharm.D., [facility]  Medication Therapy Management Pharmacist  [facility] and [facility]     Current Outpatient Medications   Medication Sig Dispense Refill     acyclovir (ZOVIRAX) 400 MG tablet TAKE 1 TABLET BY MOUTH TWICE A  tablet 2     cholecalciferol, vitamin D3, 1,000 unit tablet Take 2,000 Units by mouth daily.              colchicine 0.6 mg tablet Take 1 tablet (0.6 mg total) by mouth daily. 60 tablet 2     hydrOXYzine HCl (ATARAX) 25 MG tablet Take 12.5 mg by mouth at bedtime.       LORazepam (ATIVAN) 1 MG tablet TAKE 1 TABLET BY MOUTH DAILY AS NEEDED. 30 tablet 0     metoprolol succinate (TOPROL-XL) 50 MG 24 hr tablet Take 2 tablets (100 mg total) by  mouth daily. 180 tablet 2     sertraline (ZOLOFT) 100 MG tablet Take 1 tablet (100 mg total) by mouth daily. 90 tablet 1     tamsulosin (FLOMAX) 0.4 mg cap Take 0.4 mg by mouth daily after supper.       traZODone (DESYREL) 50 MG tablet Take 1 tablet (50 mg total) by mouth at bedtime. 90 tablet 2     No current facility-administered medications for this visit.

## 2021-05-27 NOTE — TELEPHONE ENCOUNTER
Che spoke with the patient last week as part of a hospital follow-up phone call, could you please address this question?

## 2021-05-27 NOTE — TELEPHONE ENCOUNTER
"Incoming call from patient.     Patient went in to ER for sharp, pinpoint chest pain below his breast on left side. Had pericardiocentesis on 3/20 with approx 400cc fluid removal. Discharged once stable on Colchicine and Ibuprofen.    Pt seen by PCP was still on higher dose Ibuprofen, and returned to a full 40 hour work week, last week.     Now noticing, when he isn't doing anything/distracted some discomfort has returned described as, \"not my heart, under my ribcage on the left side, not localized or sharp like before. \"  Description of pain is more diffuse left lower ribcage area. Denies sharp chest pain, denies breathing difficulty, and reports that his Afib has been totally gone away since stopping the IBURUTINIB. Pt is able to work, getting good sleep, but with sleeping propped up with pillows.     Pt took 400 mg of Ibuprofen this am before going to work, and states that it has helped some. Pt calling in concerned that fluid may be returning. Pt unsure if discomfort started because he stopped Ibuprofen (as instructed).     Pt remains working, and doesn't feel he is limited at work by this (he has a very active job, janitorial work/cleaning/moving furniture and items, etc.). Verified with patient that he is still taking Colchicine.  Has OV with MAT 4/8/19. Provided reassurance. Dr. Arana any recommendations prior to OV? KUMAR,RN  "

## 2021-05-27 NOTE — TELEPHONE ENCOUNTER
----- Message from Ramu Jiménez MD sent at 3/23/2019  9:27 AM CDT -----  I am happy to inform Clive that there are no palpable bumps on thyroid.  Let's have him check in for follow up with me in 3 months.  Gali

## 2021-05-28 ASSESSMENT — ANXIETY QUESTIONNAIRES
GAD7 TOTAL SCORE: 1
GAD7 TOTAL SCORE: 0

## 2021-05-29 NOTE — PROGRESS NOTES
Order for Durable Medical Equipment was processed and equipment ordered.     DME provider: [facility]    Date Faxed: 6/6/2019    Ordering Provider: Tyler Mcpherson MD    PAP Order Type: New Device Order    Fax Number: IN HOUSE DME: [id number]

## 2021-05-29 NOTE — PROGRESS NOTES
ASSESSMENT & PLAN    No problem-specific Assessment & Plan notes found for this encounter.      Ramu was seen today for follow up.    Diagnoses and all orders for this visit:    Chest wall pain  -     C-Reactive Protein  -     N-Terminal PRO BNP Outpatient (NTBNP)    Pericardial effusion  -     C-Reactive Protein  -     N-Terminal PRO BNP Outpatient (NTBNP)    Fatigue due to excessive exertion, subsequent encounter  -     C-Reactive Protein  -     N-Terminal PRO BNP Outpatient (NTBNP)    Toenail deformity    Corn or callus        There are no Patient Instructions on file for this visit.    No follow-ups on file.            CHIEF COMPLAINT: Ramu Espinoza had concerns including Follow Up (medication).    [place]: 1.............. had concerns including Follow Up (medication).    1. Chest wall pain    2. Pericardial effusion    3. Fatigue due to excessive exertion, subsequent encounter    4. Toenail deformity    5. Corn or callus          CC:             Why are you here today?                              Medication check    Clive comes in today to follow-up on chronic medical problems he is a 59-year-old history of CLL currently on Ibrutinib which is a CYP 3 A4 inducer    History of pericardial effusion  Has been having more limiting of his exercise  Describes it just does not have the same get up and go  Has a little bit of atypical chest wall pain in the front  Has not gone back and exercise because he is a little afraid    Following up with Dr. Arana in cardiology in July  Scheduled to do obstructive sleep apnea follow-up does have sleep apnea with a 7 cm of water compensation with the use of CPAP    Couple of minor problems  Second toe has thickened toenail yellow discoloration on left foot  He also has a callus to the bottom of his foot is been that way since age 18    Right foot he damaged his nail ended up having a broken off nail which is slowly growing in    He also recently chipped the tooth        Any  other Problems in order of Priority:        SUBJECTIVE:  Ramu Espinoza is a 59 y.o. male    Past Medical History:   Diagnosis Date     BPH (benign prostatic hyperplasia)      CLL (chronic lymphocytic leukemia) (H)      Lymphadenopathy     Created by Conversion  Replacement Utility updated for latest IMO load     Paroxysmal atrial fibrillation with rapid ventricular response (H)      Past Surgical History:   Procedure Laterality Date     ME TREAT INTER/SUBTROCH FX,W/PLATE/SCREW      Description: Open Treatment Of An Intertrochanteric Fracture;  Recorded: 04/01/2009;     Azithromycin  Current Outpatient Medications   Medication Sig Dispense Refill     acyclovir (ZOVIRAX) 400 MG tablet TAKE 1 TABLET BY MOUTH TWICE A  tablet 2     acyclovir (ZOVIRAX) 400 MG tablet TAKE 1 TABLET BY MOUTH TWICE A  tablet 2     cholecalciferol, vitamin D3, 1,000 unit tablet Take 2,000 Units by mouth daily.              colchicine 0.6 mg tablet Take 1 tablet (0.6 mg total) by mouth daily. 60 tablet 2     hydrOXYzine HCl (ATARAX) 25 MG tablet Take 12.5 mg by mouth at bedtime.       LORazepam (ATIVAN) 1 MG tablet TAKE 1 TABLET BY MOUTH DAILY AS NEEDED. 30 tablet 0     metoprolol succinate (TOPROL-XL) 50 MG 24 hr tablet Take 2 tablets (100 mg total) by mouth daily. 180 tablet 2     omeprazole (PRILOSEC OTC) 20 MG tablet Take 20 mg by mouth daily before breakfast. While on Ibuprofen             sertraline (ZOLOFT) 100 MG tablet Take 1 tablet (100 mg total) by mouth daily. 90 tablet 3     tamsulosin (FLOMAX) 0.4 mg cap Take 0.4 mg by mouth daily after supper.       traZODone (DESYREL) 50 MG tablet Take 1 tablet (50 mg total) by mouth at bedtime. 90 tablet 2     No current facility-administered medications for this visit.      Family History   Problem Relation Age of Onset     Cancer Father         pacreatic      Prostate cancer Father      Colon cancer Mother      Cancer Mother      Cancer Brother      Lung cancer Brother       Hypertension Brother      No Medical Problems Sister      No Medical Problems Brother      Cancer Brother      Anuerysm Brother      Hypertension Brother      Hypertension Brother      No Medical Problems Brother      Diabetes Paternal Aunt      Diabetes Maternal Aunt      Diabetes Maternal Aunt      Cancer Maternal Grandfather      Social History     Socioeconomic History     Marital status:      Spouse name: None     Number of children: None     Years of education: None     Highest education level: None   Occupational History     None   Social Needs     Financial resource strain: None     Food insecurity:     Worry: None     Inability: None     Transportation needs:     Medical: None     Non-medical: None   Tobacco Use     Smoking status: Former Smoker     Packs/day: 0.50     Years: 25.00     Pack years: 12.50     Types: Cigarettes     Last attempt to quit: 1/3/2005     Years since quittin.4     Smokeless tobacco: Never Used   Substance and Sexual Activity     Alcohol use: Yes     Alcohol/week: 1.8 oz     Types: 1 Glasses of wine, 2 Cans of beer per week     Comment: only on  the weekends     Drug use: No     Sexual activity: Never     Partners: Female   Lifestyle     Physical activity:     Days per week: None     Minutes per session: None     Stress: None   Relationships     Social connections:     Talks on phone: None     Gets together: None     Attends [employer] service: None     Active member of club or organization: None     Attends meetings of clubs or organizations: None     Relationship status: None     Intimate partner violence:     Fear of current or ex partner: None     Emotionally abused: None     Physically abused: None     Forced sexual activity: None   Other Topics Concern     None   Social History Narrative     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     Patient Active Problem List   Diagnosis     Tinnitus     Generalized Anxiety Disorder     Hyperlipidemia     Dupuytren's Contracture      Insomnia     Chronic lymphocytic leukemia (H)  Cy inducer Ibrutinib     Internal Hemorrhoids With Bleeding     Depression     Paroxysmal atrial fibrillation with rapid ventricular response (H)     Pericardial effusion     Thyroid nodule     DAVID (obstructive sleep apnea)     REM sleep behavior disorder     Class 1 obesity due to excess calories with serious comorbidity in adult                                              SOCIAL: He  reports that he quit smoking about 14 years ago. His smoking use included cigarettes. He has a 12.50 pack-year smoking history. He has never used smokeless tobacco. He reports that he drinks about 1.8 oz of alcohol per week. He reports that he does not use drugs.    REVIEW OF SYSTEMS:   Family history not pertinent to chief complaint or presenting problem    Review of Systems:      Nervous System:  No headache, paresthesia or dizziness or fainting                                  Ears: No hearing loss or ringing in the ears    Eyes: No blurring of vision, Double Vision            No redness, itching or dryness.    Nose: No nosebleed or loss of smell    Mouth: No mouth sores or  coated tongue    Throat: No hoarseness or difficulty swallowing    Neck: No enlarged thyroid or lymph nodes.    Heart: No chest pain, palpitation or irregular heartbeat.                  Lungs: No shortness of breath, wheezing or hemoptysis.    Gastrointestinal: No nausea or vomiting, melena or blood in stools.    Kidney/Bladdr: No polyuria, polydipsia, or hematuria.                             Genital/Sexual: No Sex function Changes                                Skin: No rash    Muscles/Joints/Bones: No Muscle morning stiffness, No Effusion of a Joint     Review of systems otherwise negative as requested from patient, except   Those positive ROS outlined and discussed in [place].    OBJECTIVE:  /64 (Patient Site: Right Arm, Patient Position: Sitting, Cuff Size: Adult Regular)   Pulse 62   Wt (!) 252 lb  (114.3 kg)   SpO2 96%   BMI 32.35 kg/m      GENERAL:     No acute distress.   Alert and oriented X 3         Physical:    Right upper tooth just to the right of the front 2 teeth chipped  Callus 1 x 1 cm ball of his foot over the third metatarsal head left foot  Second toe toenail slightly thickened curled    Right great toe new nail coming in    Lungs are clear  Cardiac S1-S2 regular sinus no appreciable murmur gallop  No crackles  No JVP lying down  No lower extremity edema  Thickened toenail left consistent with normal dystrophic or fungal toenail changes  Callus bottom left foot 1 x 1 cm consistent with a corn over the third metatarsal head  New nail growing in the right great toe  Mild scoliosis of the back        ASSESSMENT & PLAN  For the callus and dystrophic toenails soak 10% solution of vinegar bareback the corn pops possibility there is a foreign body even after 40 years  Trim the toenail straight across  Slowly but get back and exercise and watch her for exercise intolerance  Likely this is deconditioning  We will check a BNP to exclude heart strain  Currently he is a chads score 2 0 not on anticoagulation  Follows with oncology for his CLL  He will get set up for CPAP machine      Ramu was seen today for follow up.    Diagnoses and all orders for this visit:    Chest wall pain  -     C-Reactive Protein  -     N-Terminal PRO BNP Outpatient (NTBNP)    Pericardial effusion  -     C-Reactive Protein  -     N-Terminal PRO BNP Outpatient (NTBNP)    Fatigue due to excessive exertion, subsequent encounter  -     C-Reactive Protein  -     N-Terminal PRO BNP Outpatient (NTBNP)    Toenail deformity    Corn or callus        No follow-ups on file.       Anticipatory Guidance and Symptomatic Cares Discussed   Advised to call back directly if there are further questions, or if these symptoms fail to improve as anticipated or worsen.  Return to clinic if patient has a clinical concern that warrants an exam.          I spent 30 minutes with this patient face to face, of which 50% or greater was spent in counseling and coordination of care with regards to Ramu was seen today for follow up.    Diagnoses and all orders for this visit:    Chest wall pain  -     C-Reactive Protein  -     N-Terminal PRO BNP Outpatient (NTBNP)    Pericardial effusion  -     C-Reactive Protein  -     N-Terminal PRO BNP Outpatient (NTBNP)    Fatigue due to excessive exertion, subsequent encounter  -     C-Reactive Protein  -     N-Terminal PRO BNP Outpatient (NTBNP)    Toenail deformity    Corn or callus        Ramu Jiménez MD  [facility] [id number] [phone]

## 2021-05-29 NOTE — PATIENT INSTRUCTIONS - HE
Toenail deformity of the right likely dated the trauma continue to follow trimmed straight across    Toenail deformity left second toe likely mild fungal issue 10% vinegar soaks trimmed straight across    Deconditioning likely secondary to the fact the have not done much in the in the last several months  Slowly get back into physical exercise if you have any significant chest changes in your concerned about heart we can always do a stress test  We will check his heart strain test called a BNP today  We will check a CRP to show that this is back to normal    Keep your appointment with Dr. Arana    If you would ever like to appear back to [place] your foot to see if there is a foreign body we can do this and a 40-minute visit

## 2021-05-29 NOTE — TELEPHONE ENCOUNTER
Patient is questioning if he should be on colchicine still. Review of patient records, shows continuation for 3 months. Pt verbalized that he is still taking it, just checking. Discussion with patient to schedule a follow-up appointment with MAT. Pt reports that he will call back to make this appt. CHELLE HEATH

## 2021-05-29 NOTE — TELEPHONE ENCOUNTER
Orders being requested: Massage Therapy   Reason service is needed/diagnosis: Patient states that he has been seeing a massage therapist and pays out of pocket for these sessions. Patient states that the owner at [facility] in [place] told him that if his PCP writes an order/prescription that the massages are medically necessary that he will no longer have to pay the sales tax for each session.    Will Ramu Jiménez MD write an order?    When are orders needed by: Today if possible  Where to send Orders: Other:  Patient will come to clinic to  the order once completed. Please call him at [phone]  Okay to leave detailed message?  Yes

## 2021-05-29 NOTE — TELEPHONE ENCOUNTER
LVM: if patient calls back please relay message below and forward to PCP once he lets us know the diagnosis (reason) for massage therapy order.     KEVEN/DOUG

## 2021-05-29 NOTE — TELEPHONE ENCOUNTER
Spoke with patient and he reports he uses the massages for his arthritis, lower back pain, and LT shoulder pain.    Patient does not have any specific injury or concern that he does the massages for, but that he is a  and the massages make him feel better overall.    Patient will  RX/order from the clinic once completed - please call when they are ready for .     Beverly MICHAEL LPN .......... 11:40 AM  06/12/19
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
## 2021-05-30 VITALS — HEIGHT: 73 IN | BODY MASS INDEX: 34.46 KG/M2 | WEIGHT: 260 LBS

## 2021-05-30 VITALS — HEIGHT: 73 IN | BODY MASS INDEX: 33.8 KG/M2 | WEIGHT: 255 LBS

## 2021-05-30 VITALS — BODY MASS INDEX: 31.75 KG/M2 | WEIGHT: 254 LBS

## 2021-05-30 VITALS — WEIGHT: 258.1 LBS | BODY MASS INDEX: 34.05 KG/M2

## 2021-05-30 VITALS — HEIGHT: 73 IN | WEIGHT: 252 LBS | BODY MASS INDEX: 33.4 KG/M2

## 2021-05-30 VITALS — BODY MASS INDEX: 33.54 KG/M2 | WEIGHT: 254.2 LBS

## 2021-05-30 NOTE — TELEPHONE ENCOUNTER
----- Message from Ramu Jiménez MD sent at 6/19/2019  9:49 PM CDT -----  Inform Clive    Stress on the Heart Test BNP  Normal    Inflammation Test NOrmal      No further investigation    Good News      Gali

## 2021-05-30 NOTE — PROGRESS NOTES
[facility] Cancer Care Progress Note    Patient: Ramu Espinoza  MRN: [id number]  Date of Service: 2019        Reason for visit      1. Chronic lymphocytic leukemia (H)  Cy inducer Ibrutinib        Assessment     1.  A 59 y.o. gentleman with CLL stage 4 now, diagnosed in 2012.  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  2.  Very good clinical response to Imbruvica. His lymphnodes have normalized.  His CBC is normal.   3.   In May 2017 diagnosis of paroxysmal Atrial fibrillation. This could be caused by Imbruvica. He also drinks a lot of coffee.  4.  Pericardial effusion in 2019. Held imbruvica for 3 weeks. Had it tapped. Now resumed it. Doing OK.  5.  History of pleurisy right side.  6.  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    Plan     1.   Continue Imbruvica 140 mg 3 pills daily.  He will be on Imbruvica maintenance.  2.  Have him come back in 4 months time .  3.   Continue Bactrim and Acyclovir.  4. Continue with good diet and exercise.  5. Continue close monitoring of his cardiac issues.    Clinical stage      Stage IV    History     Ramu Espinoza is a very pleasant 59 y.o. old male with a history of CLL diagnosed in 2012 presenting with elevated white count in upper 10s/lower 20s with a peripheral blood smear showing atypical lymphocytosis suspicious for CLL.  He was completely asymptomatic, normal hemoglobin and platelet count.  His peripheral blood flow cytometry revealed CD5 co-expressing kappa light chains restricted B cells confirming the diagnosis of CLL.  He has been on observation.  His white count has been slowly going up.    In 2014 his white count was over 100,000.  So he had a CAT scan and that showed increasing lymphadenopathy and splenomegaly.  Platelet count has been going down.    I offered participation in clinical study in which patients are randomized between fludarabine and cyclophosphamide Rituxan or Imbruvica and Rituxan.  He has  been randomized to Imbruvica and rituximab arm. He started on October 2014.  In November 2014 he got Rituxan for the first time.  Tolerated well. Finished that. Now on Imbruvica alone. Tolerating it well.     In May 2017 he was found to have paroxysmal afib when he complained of some fluttering sensation. He had holter monitor which led to the diagnosis. Most days he feels well.     In March 2019 he was found to have pericardial effusion when he started have some chest pain on deep inspiration. Had it tapped. Held Imbruvica for 3 weeks. Then resumed it.  Comes in today for scheduled follow-up. Overall feels good. He feels his heart is doing well.    Past Medical History     Past Medical History:   Diagnosis Date     BPH (benign prostatic hyperplasia)      CLL (chronic lymphocytic leukemia) (H)      Lymphadenopathy     Created by Conversion  Replacement Utility updated for latest IMO load     Paroxysmal atrial fibrillation with rapid ventricular response (H)      Sleep apnea     hypertension, being slightly overweight, anemic, having reflux, anxiety, epididymitis, hyperlipidemia and tinnitus      Review of Systems   Constitutional  Constitutional (WDL): Exceptions to WDL  Fatigue: Fatigue relieved by rest  Neurosensory  Neurosensory (WDL): All neurosensory elements are within defined limits  Cardiovascular  Cardiovascular (WDL): All cardiovascular elements are within defined limits  Pulmonary  Respiratory (WDL): Within Defined Limits  Gastrointestinal  Gastrointestinal (WDL): All gastrointestinal elements are within defined limits  Genitourinary  Genitourinary (WDL): All genitourinary elements are within defined limits  Integumentary  Integumentary (WDL): All integumentary elements are within defined limits  Patient Coping  Patient Coping: Accepting  Accompanied by  Accompanied by: Alone    ECOG performance status and Distress Assessment      ECOG Performance:    ECOG Performance Status: 1    Distress  Assessment  Distress Assessment Score: No distress:     Pain Status  Currently in Pain: No/denies      Vital Signs     Vitals:    06/27/19 1459   BP: 111/67   Pulse: 62   Temp: 98.2  F (36.8  C)   SpO2: 96%       Physical Exam     GENERAL: No acute distress. Cooperative in conversation.   HEENT: Pupils are equal, round and reactive. Oral mucosa is clean and intact. No ulcerations or mucositis noted. No bleeding noted.  RESP:Chest symmetric lungs are clear bilaterally per auscultation. Regular respiratory rate. No wheezes or rhonchi.  CV: Normal S1 S2 Regular, rate and rhythm. No murmurs.  ABD: Nondistended, soft, nontender. Positive bowel sounds. No organomegaly.   EXTREMITIES: No lower extremity edema.   NEURO: Non- focal. Alert and oriented x3.  Cranial nerves appear intact.  PSYCH: Within normal limits. No depression or anxiety.  SKIN: Warm dry intact.  Slight rash on his shoulder and legs.  LYMPH NODES: Bilateral cervical, supraclavicular, axillary lymph node examination was done.  Negative for any palpable adenopathy.      Lab Results     Results for orders placed or performed in visit on 06/27/19   Comprehensive Metabolic Panel   Result Value Ref Range    Sodium 142 136 - 145 mmol/L    Potassium 4.6 3.5 - 5.0 mmol/L    Chloride 106 98 - 107 mmol/L    CO2 28 22 - 31 mmol/L    Anion Gap, Calculation 8 5 - 18 mmol/L    Glucose 71 70 - 125 mg/dL    BUN 17 8 - 22 mg/dL    Creatinine 0.77 0.70 - 1.30 mg/dL    GFR MDRD Af Amer >60 >60 mL/min/1.73m2    GFR MDRD Non Af Amer >60 >60 mL/min/1.73m2    Bilirubin, Total 0.5 0.0 - 1.0 mg/dL    Calcium 9.6 8.5 - 10.5 mg/dL    Protein, Total 6.4 6.0 - 8.0 g/dL    Albumin 3.5 3.5 - 5.0 g/dL    Alkaline Phosphatase 85 45 - 120 U/L    AST 15 0 - 40 U/L    ALT 15 0 - 45 U/L   HM1 (CBC with Diff)   Result Value Ref Range    WBC 8.1 4.0 - 11.0 thou/uL    RBC 4.75 4.40 - 6.20 mill/uL    Hemoglobin 13.5 (L) 14.0 - 18.0 g/dL    Hematocrit 41.1 40.0 - 54.0 %    MCV 87 80 - 100 fL    MCH  28.4 27.0 - 34.0 pg    MCHC 32.8 32.0 - 36.0 g/dL    RDW 13.5 11.0 - 14.5 %    Platelets 273 140 - 440 thou/uL    MPV 10.0 8.5 - 12.5 fL    Neutrophils % 68 50 - 70 %    Lymphocytes % 19 (L) 20 - 40 %    Monocytes % 9 2 - 10 %    Eosinophils % 5 0 - 6 %    Basophils % 1 0 - 2 %    Neutrophils Absolute 5.4 2.0 - 7.7 thou/uL    Lymphocytes Absolute 1.5 0.8 - 4.4 thou/uL    Monocytes Absolute 0.7 0.0 - 0.9 thou/uL    Eosinophils Absolute 0.4 0.0 - 0.4 thou/uL    Basophils Absolute 0.1 0.0 - 0.2 thou/uL         Imaging Results     No results found.      Dennis Garza MD

## 2021-05-30 NOTE — TELEPHONE ENCOUNTER
Left message to call back for: Patient  Information to relay to patient:  Need to inform patient of providers notes below:    ----- Message from Ramu Jiménez MD sent at 6/19/2019  9:49 PM CDT -----  Inform Clive     Stress on the Heart Test BNP  Normal     Inflammation Test NOrmal       No further investigation     Good News       Gali

## 2021-05-30 NOTE — TELEPHONE ENCOUNTER
Refill Approved    Rx renewed per Medication Renewal Policy. Medication was last renewed on 3/16/19.    Sandra Dalal, [facility] Triage/Med Refill 6/27/2019     Requested Prescriptions   Pending Prescriptions Disp Refills     hydrOXYzine HCl (ATARAX) 25 MG tablet [Pharmacy Med Name: HYDROXYZINE HCL 25MG TABS (WHITE)] 45 tablet 0     Sig: TAKE 1/2 TABLET(12.5 MG) BY MOUTH AT BEDTIME AS NEEDED FOR ITCHING       Antihistamine Refill Protocol Passed - 6/27/2019  5:12 AM        Passed - Patient has had office visit/physical in last year     Last office visit with prescriber/PCP: 10/25/2018 Emily Lipscomb CNP OR same dept: Visit date not found OR same specialty: Visit date not found  Last physical: Visit date not found Last MTM visit: 2/16/2018 Che Silva, PharmD   Next visit within 3 mo: Visit date not found  Next physical within 3 mo: Visit date not found  Prescriber OR PCP: Emily Lipscomb CNP  Last diagnosis associated with med order: 1. Tinnitus  - hydrOXYzine HCl (ATARAX) 25 MG tablet [Pharmacy Med Name: HYDROXYZINE HCL 25MG TABS (WHITE)]; TAKE 1/2 TABLET(12.5 MG) BY MOUTH AT BEDTIME AS NEEDED FOR ITCHING  Dispense: 45 tablet; Refill: 0    2. Insomnia  - hydrOXYzine HCl (ATARAX) 25 MG tablet [Pharmacy Med Name: HYDROXYZINE HCL 25MG TABS (WHITE)]; TAKE 1/2 TABLET(12.5 MG) BY MOUTH AT BEDTIME AS NEEDED FOR ITCHING  Dispense: 45 tablet; Refill: 0    If protocol passes may refill for 12 months if within 3 months of last provider visit (or a total of 15 months).

## 2021-05-30 NOTE — TELEPHONE ENCOUNTER
Pt called in states he has toe pain.  It is his right big toe.  The toe look swelling and tender outside of the toe.  It started couple of days ago.  There is pain 1/10 but the pain will increased if he kick something.  It is red.  No fever, no shaking, no chills.  The Pt states it has pus under toenail.  The disposition is to be seen with in 24 hours.  Care advice given per protocol.  Patient agrees with care advice given.   Agreed to call back if he has additional symptoms or questions.      Ken Landaverde RN, [facility] Triage/Med Refill 7/11/2019 3:55 PM      Reason for Disposition    Yellow pus seen in skin around toenail (cuticle area), or pus seen under toenail    Protocols used: TOENAIL - INGROWN-A-

## 2021-05-30 NOTE — PROGRESS NOTES
Assessment/Plan:         Ramu was seen today for toe pain.    Ingrown toenail of left foot with infection: infected ingrown toenail. Discussed options for treatment including removal of the lateral component of the nail. He however is not sure he can do good toenail cares due to limited ROM in his back and knees. So will treat conservatively with antibiotics, soaks (instructions given -see patient instructions), follow-up with podiatry next week.   -     doxycycline (VIBRA-TABS) 100 MG tablet; Take 1 tablet (100 mg total) by mouth 2 (two) times a day for 10 days.  -     Ambulatory referral to Podiatry                Plan of care was discussed with the patient and/or guardian. They verbalize understanding of the treatment options and plan of care.    Sandra Pyle       Subjective:        Ramu Espinoza is a 59 y.o. male who presents for left toenail ingrown.   Has had an ingrown toenail on his left great toe for several months, in the last week or so it has become more swollen and a little red and more painful. Not severe. He can walk without difficulty.   No fever/chills.   No previous ingrown or infected toenails.   He is not diabetic.          Objective:       Blood pressure 122/73, pulse 63, temperature 98.3  F (36.8  C), temperature source Oral, resp. rate 16, weight (!) 256 lb (116.1 kg), SpO2 95 %.   Gen: well appearing, no distress  Toe: left great toe with a lateral ingrown toenail. There is a paronychial abscess that is small but painful to the touch. Normal ROM in the toe itself.No pain on palpation over the toenail or under the toe.

## 2021-05-31 VITALS — WEIGHT: 256 LBS | HEIGHT: 73 IN | BODY MASS INDEX: 33.93 KG/M2

## 2021-05-31 VITALS — BODY MASS INDEX: 34.55 KG/M2 | HEIGHT: 73 IN | WEIGHT: 260.7 LBS

## 2021-05-31 VITALS — BODY MASS INDEX: 34.96 KG/M2 | WEIGHT: 265 LBS

## 2021-05-31 VITALS — WEIGHT: 261 LBS | BODY MASS INDEX: 34.43 KG/M2

## 2021-05-31 VITALS — BODY MASS INDEX: 34.72 KG/M2 | HEIGHT: 73 IN | WEIGHT: 262 LBS

## 2021-05-31 VITALS — BODY MASS INDEX: 34.46 KG/M2 | HEIGHT: 73 IN | WEIGHT: 260 LBS

## 2021-05-31 VITALS — BODY MASS INDEX: 33.65 KG/M2 | WEIGHT: 255.06 LBS

## 2021-05-31 VITALS — BODY MASS INDEX: 34.85 KG/M2 | WEIGHT: 263 LBS | HEIGHT: 73 IN

## 2021-05-31 VITALS — BODY MASS INDEX: 35.29 KG/M2 | WEIGHT: 267.5 LBS

## 2021-05-31 NOTE — PROGRESS NOTES
Podiatry Progress Note        ASSESSMENT: S/P Nail avulsion       TREATMENT:  -Surgical site on the right hallux is progressing well.   -The patient will continue to apply a band aid during the day if drainage is noted, otherwise will avoid a dressing.   -The patient is discharged at this time, but encouraged to return as symptoms dictate.       Ephraim Machado DPM  [facility] Foot & Ankle Surgery/Podiatry      HPI: Ramu Espinoza was seen again today s/p nail avulsion on the right hallux. Doing well. Denies pain.     Past Medical History:   Diagnosis Date     BPH (benign prostatic hyperplasia)      CLL (chronic lymphocytic leukemia) (H)      Lymphadenopathy     Created by Conversion  Replacement Utility updated for latest IMO load     Paroxysmal atrial fibrillation with rapid ventricular response (H)      Sleep apnea        Allergies   Allergen Reactions     Azithromycin Rash         Current Outpatient Medications:      acyclovir (ZOVIRAX) 400 MG tablet, TAKE 1 TABLET BY MOUTH TWICE A DAY, Disp: 120 tablet, Rfl: 2     cholecalciferol, vitamin D3, 1,000 unit tablet, Take 2,000 Units by mouth daily.    , Disp: , Rfl:      colchicine 0.6 mg tablet, Take 1 tablet (0.6 mg total) by mouth daily., Disp: 60 tablet, Rfl: 2     hydrOXYzine HCl (ATARAX) 25 MG tablet, Take 12.5 mg by mouth at bedtime., Disp: , Rfl:      hydrOXYzine HCl (ATARAX) 25 MG tablet, TAKE 1/2 TABLET(12.5 MG) BY MOUTH AT BEDTIME AS NEEDED FOR ITCHING, Disp: 45 tablet, Rfl: 3     ibuprofen (ADVIL,MOTRIN) 200 MG tablet, Take 200 mg by mouth every 6 (six) hours as needed for pain., Disp: , Rfl:      LORazepam (ATIVAN) 1 MG tablet, TAKE 1 TABLET BY MOUTH DAILY AS NEEDED., Disp: 30 tablet, Rfl: 0     metoprolol succinate (TOPROL-XL) 50 MG 24 hr tablet, Take 2 tablets (100 mg total) by mouth daily., Disp: 180 tablet, Rfl: 2     sertraline (ZOLOFT) 100 MG tablet, Take 1 tablet (100 mg total) by mouth daily., Disp: 90 tablet, Rfl: 3     tamsulosin (FLOMAX) 0.4  mg cap, Take 0.4 mg by mouth daily after supper., Disp: , Rfl:      traZODone (DESYREL) 50 MG tablet, Take 1 tablet (50 mg total) by mouth at bedtime., Disp: 90 tablet, Rfl: 2    Review of Systems - Negative       OBJECTIVE:  Appearance: alert, well appearing, and in no distress.    Vitals:    08/26/19 1346   BP: 131/68   Pulse: 68   Resp: 16   Temp: 97.6  F (36.4  C)       Surgical site on the medial border right hallux is intact, no erythema. Neurovascular status intact right.     Imaging: None

## 2021-05-31 NOTE — PROGRESS NOTES
"FOOT AND ANKLE SURGERY/PODIATRY Progress Note        ASSESSMENT:   Ingrown Nail       TREATMENT:  -There is an ingrown nail on the medial border right hallux. We discussed both temporary and permanent nail removal today. Because he has not had an ingrown nail procedure performed in the past, I recommend a temporary nail avulsion today. He understands that the nail may become symptomatic in the future and require a permanent nail removal. He consents to the procedure today.  -5 cc of 1% lidocaine plain was injected into the right hallux. The digit was cleansed with betadine swab. Following the application of a digital Tourni-cot the following procedures were performed.  Attention was directed to the medial border of the right hallux toenail where utilizing an Devens elevator and an English anvil nail splitter the nail was split from distal to proximal to the level of the epionychium.  Next the offending border was removed including any non-viable tissue. The Tourni-cot was removed. A dressing was applied comprising of bacitracin 2x2 and 1\" coban wrap.  The tourniquet was removed and normal color returned.    -Post-op instructions were dispensed. All questions invited and answered. D/c doxycycline. Begin Keflex.   -I would like to see the patient again in one week for follow-up.     Ephraim Machado DPM  [facility] Foot & Ankle Surgery/Podiatry       HPI: I was asked to see Ramu RIVER Hutchinspanfilo today for a painful ingrown nail on the right hallux. He is currently on doxycycline and has noticed a decrease in pain since starting this antibiotics but erythema has persisted. He denies having ingrown nails in the past.     Past Medical History:   Diagnosis Date     BPH (benign prostatic hyperplasia)      CLL (chronic lymphocytic leukemia) (H)      Lymphadenopathy     Created by Conversion  Replacement Utility updated for latest IMO load     Paroxysmal atrial fibrillation with rapid ventricular response (H)      Sleep apnea  "       Past Surgical History:   Procedure Laterality Date     IL TREAT INTER/SUBTROCH FX,W/PLATE/SCREW      Description: Open Treatment Of An Intertrochanteric Fracture;  Recorded: 04/01/2009;       Allergies   Allergen Reactions     Azithromycin Rash         Current Outpatient Medications:      acyclovir (ZOVIRAX) 400 MG tablet, TAKE 1 TABLET BY MOUTH TWICE A DAY, Disp: 120 tablet, Rfl: 2     cholecalciferol, vitamin D3, 1,000 unit tablet, Take 2,000 Units by mouth daily.    , Disp: , Rfl:      colchicine 0.6 mg tablet, Take 1 tablet (0.6 mg total) by mouth daily., Disp: 60 tablet, Rfl: 2     doxycycline (MONODOX) 100 MG capsule, Take 1 capsule (100 mg total) by mouth 2 (two) times a day for 10 days., Disp: 20 capsule, Rfl: 0     hydrOXYzine HCl (ATARAX) 25 MG tablet, Take 12.5 mg by mouth at bedtime., Disp: , Rfl:      hydrOXYzine HCl (ATARAX) 25 MG tablet, TAKE 1/2 TABLET(12.5 MG) BY MOUTH AT BEDTIME AS NEEDED FOR ITCHING, Disp: 45 tablet, Rfl: 3     ibuprofen (ADVIL,MOTRIN) 200 MG tablet, Take 200 mg by mouth every 6 (six) hours as needed for pain., Disp: , Rfl:      LORazepam (ATIVAN) 1 MG tablet, TAKE 1 TABLET BY MOUTH DAILY AS NEEDED., Disp: 30 tablet, Rfl: 0     metoprolol succinate (TOPROL-XL) 50 MG 24 hr tablet, Take 2 tablets (100 mg total) by mouth daily., Disp: 180 tablet, Rfl: 2     sertraline (ZOLOFT) 100 MG tablet, Take 1 tablet (100 mg total) by mouth daily., Disp: 90 tablet, Rfl: 3     tamsulosin (FLOMAX) 0.4 mg cap, Take 0.4 mg by mouth daily after supper., Disp: , Rfl:      traZODone (DESYREL) 50 MG tablet, Take 1 tablet (50 mg total) by mouth at bedtime., Disp: 90 tablet, Rfl: 2    Family History   Problem Relation Age of Onset     Cancer Father         pacreatic      Prostate cancer Father      Colon cancer Mother      Cancer Mother      Cancer Brother      Lung cancer Brother      Hypertension Brother      No Medical Problems Sister      No Medical Problems Brother      Cancer Brother       Anuerysm Brother      Hypertension Brother      Hypertension Brother      No Medical Problems Brother      Diabetes Paternal Aunt      Diabetes Maternal Aunt      Diabetes Maternal Aunt      Cancer Maternal Grandfather        Social History     Socioeconomic History     Marital status:      Spouse name: Not on file     Number of children: Not on file     Years of education: Not on file     Highest education level: Not on file   Occupational History     Not on file   Social Needs     Financial resource strain: Not on file     Food insecurity:     Worry: Not on file     Inability: Not on file     Transportation needs:     Medical: Not on file     Non-medical: Not on file   Tobacco Use     Smoking status: Former Smoker     Packs/day: 0.50     Years: 25.00     Pack years: 12.50     Types: Cigarettes     Last attempt to quit: 1/3/2005     Years since quittin.6     Smokeless tobacco: Never Used   Substance and Sexual Activity     Alcohol use: Yes     Alcohol/week: 1.2 oz     Types: 1 Glasses of wine, 1 Cans of beer per week     Comment: on  a bottle of wine     Drug use: No     Sexual activity: Never     Partners: Female   Lifestyle     Physical activity:     Days per week: Not on file     Minutes per session: Not on file     Stress: Not on file   Relationships     Social connections:     Talks on phone: Not on file     Gets together: Not on file     Attends Spiritism service: Not on file     Active member of club or organization: Not on file     Attends meetings of clubs or organizations: Not on file     Relationship status: Not on file     Intimate partner violence:     Fear of current or ex partner: Not on file     Emotionally abused: Not on file     Physically abused: Not on file     Forced sexual activity: Not on file   Other Topics Concern     Not on file   Social History Narrative     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      OBJECTIVE:  Appearance: alert, well appearing, and in no distress.    Vitals:    08/15/19 1334   BP: 123/64   Pulse: 72   Resp: 16   Temp: 97.9  F (36.6  C)       Vascular: Dorsalis pedis and posterior tibial pulses are palpable. There is pedal hair growth.  CFT < 3 sec from anterior tibial surface to distal digits bilaterally. There is no appreciable edema noted.  Dermatologic: Incurvated nail border along the medial border right hallux. Localized erythema.   Neurologic: All epicritic and proprioceptive sensations are grossly intact bilaterally.   Musculoskeletal: Pain along medial border right hallux.     Imaging: None

## 2021-05-31 NOTE — TELEPHONE ENCOUNTER
Patient is due for follow-up in October 2019.  Nai, his wife calls in asking for a refill to get him to his October appt.  He will run out prior to that appt.  Since he is a research patient, I transferred her through to Chasity Gray, research nurse to discuss.    Penny Mcfadden RN

## 2021-05-31 NOTE — PROGRESS NOTES
Patient was offered choice of vendor and chose [facility].  Patient Ramu Espinoza was set up at [facility] on 8/6/19. Patient received a [employer]med [id number] Auto. Pressures were set at 6-9.   Patient s ramp is 6 cm H2O for off and FLEX/EPR is EPR 2.  Patient received a [employer] Mask name: [id number]  nasal Size med, heated tubing and heated humidifier.    Pacee Her

## 2021-05-31 NOTE — PATIENT INSTRUCTIONS - HE
Post Nail Excision Instructions      Keep bandages clean, dry, and intact for the rest of the day of surgery.     The day after surgery, remove all bandages    Soak foot in warm water with Epsom Salt for 10 minutes    Dry feet thoroughly     Apply a Band-Aid to the affected area    Continue this process daily for the next two weeks following the procedure    General bathing does not replace daily foot soaks    Do not anticipate severe pain. But if you do experience some discomfort, you can take Ibuprofen (Advil)    You can expect some drainage and weeping from the area. This may last anywhere from several days to several weeks. This is NORMAL.    If you experience any increase in pain, any swelling, or odor call our office immediately. As this may be a sign of infection.    If you have any questions in the meantime, please do not hesitate to call Podiatry at [phone]

## 2021-05-31 NOTE — TELEPHONE ENCOUNTER
Doxycycline 100 mg two times a day   Faxed in     Discussed with Patient     Appt with Podiatry 8/15  OK to wait   Gali  RN triage  Patient states that he has been dealing with an ingrown toenail issue. He has been seen in [facility] for this and has a referral to Podiatry appointment on 8/15/19  He is reporting some pus and drainage swelling and redness Some pain when the toe is bumped  Denies  red streaks and he is walking okay. Denies fever.  Patient is requesting an antibiotic so that when he is seen by Podiatry he will be able to it removed.  He states he has used doxycycline in the past that worked well. Please advise thank you.  Patient declines appointment preferring an Rx. Reviewed signs and symptoms of when to call back.    He is requesting [facility] pharmacy for any medications.  Please advise thank you.  Natty Bill, RN  [facility] Triage Nurse  11:09 AM  8/9/2019      Reason for Disposition    Yellow pus seen in skin around toenail (cuticle area), or pus seen under toenail    Protocols used: TOENAIL - INGROWN-A-

## 2021-06-01 VITALS — WEIGHT: 265.06 LBS | BODY MASS INDEX: 34.97 KG/M2

## 2021-06-01 VITALS — BODY MASS INDEX: 34.99 KG/M2 | HEIGHT: 73 IN | WEIGHT: 264 LBS

## 2021-06-01 VITALS — BODY MASS INDEX: 34.93 KG/M2 | WEIGHT: 264.75 LBS

## 2021-06-01 VITALS — WEIGHT: 262.3 LBS | BODY MASS INDEX: 34.61 KG/M2

## 2021-06-01 NOTE — TELEPHONE ENCOUNTER
Refill Approved    Rx renewed per Medication Renewal Policy. Medication was last renewed on 1/21/19.    Sandra Dalal, [facility] Triage/Med Refill 9/26/2019     Requested Prescriptions   Pending Prescriptions Disp Refills     traZODone (DESYREL) 50 MG tablet [Pharmacy Med Name: TRAZODONE 50MG TABLETS] 90 tablet 0     Sig: TAKE 1 TABLET(50 MG) BY MOUTH AT BEDTIME       Tricyclics/Misc Antidepressant/Antianxiety Meds Refill Protocol Passed - 9/25/2019  5:12 AM        Passed - PCP or prescribing provider visit in last year     Last office visit with prescriber/PCP: 10/25/2018 Emily Lipscomb CNP OR same dept: 6/17/2019 Ramu Jiménez MD OR same specialty: 6/17/2019 Ramu Jiménez MD  Last physical: Visit date not found Last MTM visit: Visit date not found   Next visit within 3 mo: Visit date not found  Next physical within 3 mo: Visit date not found  Prescriber OR PCP: Emily Lipscomb CNP  Last diagnosis associated with med order: 1. Insomnia  - traZODone (DESYREL) 50 MG tablet [Pharmacy Med Name: TRAZODONE 50MG TABLETS]; TAKE 1 TABLET(50 MG) BY MOUTH AT BEDTIME  Dispense: 90 tablet; Refill: 0    If protocol passes may refill for 12 months if within 3 months of last provider visit (or a total of 15 months).

## 2021-06-01 NOTE — PROGRESS NOTES
[facility] Hematology and Oncology Progress Note    Patient: Ramu Espinoza  MRN: [id number]  Date of Service: 19          Reason for Visit    Chief Complaint   Patient presents with     HE Cancer     Chronic lymphocytic leukemia       Assessment and Plan    1.  A 59 yo gentleman with CLL stage 4 now, diagnosed in 2012. 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 Great clinical response to Imbruvica. His lymph nodes have normalized. His CBC is basically normal today. Continue Imbruvica 140 mg- 3 pills daily. Return in 3 months with labs and to see Dr. Garza.     2. Paroxysmal A. Fib: could be from Imbruvica (<9% risk) on metoprolol. Continue to follow with Dr. Arana.     3.  Chronic fatigue: I encouraged patient to get good sleep and uses CPAP.  Encouraged him to have a healthy diet and get lots of fluids.    ECOG Performance   ECOG Performance Status: 1     Distress Assessment  Distress Assessment Score: No distress     Pain  Currently in Pain: No/denies    Problem List    1. Chronic lymphocytic leukemia (H)  Cy inducer Ibrutinib  Study Drug Ibrutinib 140 mg capsule 420 mg      ______________________________________________________________________________    History of Present Illness    Ramu Espinoza is a very pleasant 58 y.o. old male with a history of CLL diagnosed in 2012 presenting with elevated white count in upper 10s/lower 20s with a peripheral blood smear showing atypical lymphocytosis suspicious for CLL.  He was completely asymptomatic, normal hemoglobin and platelet count.  His peripheral blood flow cytometry revealed CD5 co-expressing kappa light chains restricted B cells confirming the diagnosis of CLL.  He has been on observation.  His white count has been slowly going up.      In 2014 his white count was over 100,000. So he had a CAT scan and that showed increasing lymphadenopathy and splenectomy. Platelet count has been going down.      I offered participation in  clinical study in which patients are randomized between fludarabine and cyclophosphamide Rituxan or Imbruvica and Rituxan. He has been randomized to Imbruvica and rituximab arm. He started on October 2014. In November 2014 he got Rituxan for the first time. Tolerated well. Finished that. Now on Imbruvica alone. Tolerating it well.      Comes in today for a hospital follow up.   For pleuritic chest pain and was found to have a pericardial effusion with acute pericarditis.  He had a pericardiocentesis and they removed 350ml of serosanguinous fluid. He was discharged on ibuprofen as well as colchicine.  He had a pericardial effusion drained and has been holding the Imbruvica for about 2 weeks.  He is now here to get lab work and to likely resume this.  He is feeling good.  Only issue is chronic fatigue.       Review of Systems    Constitutional  Constitutional (WDL): All constitutional elements are within defined limits  Neurosensory  Neurosensory (WDL): Exceptions to WDL  Ataxia: Concerns  Dizziness: Concerns  Eye   Eye Disorder (WDL): All eye disorder elements are within defined limits(glasses)  Ear  Ear Disorder (WDL): Exceptions to WDL  Tinnitus: Concerns(bilateral)  Cardiovascular  Cardiovascular (WDL): All cardiovascular elements are within defined limits  Pulmonary  Respiratory (WDL): Within Defined Limits  Gastrointestinal  Gastrointestinal (WDL): Exceptions to WDL  Diarrhea: Concerns  Genitourinary  Genitourinary (WDL): All genitourinary elements are within defined limits  Lymphatic  Lymph (WDL): All lymph disorder elements are within defined limits  Musculoskeletal and Connective Tissue  Musculoskeletal and Connetive Tissue Disorders (WDL): Exceptions to WDL  Arthralgia: Concerns  Myalgia: Concerns(hands)  Integumentary  Integumentary (WDL): All integumentary elements are within defined limits  Patient Coping  Patient Coping: Accepting;Open/discussion  Accompanied by  Accompanied by: Alone  Oral Chemo  "Adherence         Past History  Past Medical History:   Diagnosis Date     BPH (benign prostatic hyperplasia)      CLL (chronic lymphocytic leukemia) (H)      Lymphadenopathy     Created by Conversion  Replacement Utility updated for latest IMO load     Paroxysmal atrial fibrillation with rapid ventricular response (H)      Sleep apnea        PHYSICAL EXAM:  /74   Pulse 60   Ht 6' 2\" (1.88 m)   Wt (!) 256 lb 11.2 oz (116.4 kg)   SpO2 98%   BMI 32.96 kg/m    GENERAL: no acute distress. Cooperative in conversation. Here alone  HEENT: pupils are equal, round and reactive. Oromucosa is clean and intact. No ulcerations or mucositis noted. No bleeding noted.  RESP: lungs are clear bilaterally per auscultation. Regular respiratory rate. No wheezes or rhonchi.  CV: Regular, rate and rhythm. No murmurs.  ABD: soft, nontender. Positive bowel sounds. No organomegaly.   MUSCULOSKELETAL: No lower extremity swelling.   NEURO: non focal. Alert and oriented x3.   PSYCH: within normal limits. No depression or anxiety.  SKIN: warm dry intact   LYMPH: no cervical, supraclavicular, inguinal, or axillary lymphadenopathy      Lab Results    Recent Results (from the past 168 hour(s))   Creatinine   Result Value Ref Range    Creatinine 0.85 0.70 - 1.30 mg/dL    GFR MDRD Af Amer >60 >60 mL/min/1.73m2    GFR MDRD Non Af Amer >60 >60 mL/min/1.73m2   Hepatic Profile   Result Value Ref Range    Bilirubin, Total 0.5 0.0 - 1.0 mg/dL    Bilirubin, Direct 0.2 <=0.5 mg/dL    Protein, Total 6.3 6.0 - 8.0 g/dL    Albumin 3.7 3.5 - 5.0 g/dL    Alkaline Phosphatase 76 45 - 120 U/L    AST 17 0 - 40 U/L    ALT 17 0 - 45 U/L   HM1 (CBC with Diff)   Result Value Ref Range    WBC 8.1 4.0 - 11.0 thou/uL    RBC 5.15 4.40 - 6.20 mill/uL    Hemoglobin 14.7 14.0 - 18.0 g/dL    Hematocrit 45.1 40.0 - 54.0 %    MCV 88 80 - 100 fL    MCH 28.5 27.0 - 34.0 pg    MCHC 32.6 32.0 - 36.0 g/dL    RDW 13.4 11.0 - 14.5 %    Platelets 280 140 - 440 thou/uL    MPV " 10.1 8.5 - 12.5 fL    Neutrophils % 71 (H) 50 - 70 %    Lymphocytes % 18 (L) 20 - 40 %    Monocytes % 7 2 - 10 %    Eosinophils % 2 0 - 6 %    Basophils % 1 0 - 2 %    Neutrophils Absolute 5.8 2.0 - 7.7 thou/uL    Lymphocytes Absolute 1.4 0.8 - 4.4 thou/uL    Monocytes Absolute 0.6 0.0 - 0.9 thou/uL    Eosinophils Absolute 0.2 0.0 - 0.4 thou/uL    Basophils Absolute 0.1 0.0 - 0.2 thou/uL       Imaging    No results found.      Signed by: Radha Alarcon, CNP

## 2021-06-02 VITALS — BODY MASS INDEX: 34.63 KG/M2 | WEIGHT: 262.5 LBS

## 2021-06-02 VITALS — WEIGHT: 262 LBS | BODY MASS INDEX: 34.57 KG/M2

## 2021-06-02 VITALS — BODY MASS INDEX: 32.47 KG/M2 | HEIGHT: 74 IN | WEIGHT: 253 LBS

## 2021-06-02 VITALS — BODY MASS INDEX: 33.93 KG/M2 | WEIGHT: 256 LBS | HEIGHT: 73 IN

## 2021-06-02 VITALS — HEIGHT: 73 IN | BODY MASS INDEX: 33.93 KG/M2 | WEIGHT: 256 LBS

## 2021-06-02 VITALS — HEIGHT: 73 IN | WEIGHT: 256 LBS | BODY MASS INDEX: 33.93 KG/M2

## 2021-06-02 VITALS — BODY MASS INDEX: 32.74 KG/M2 | WEIGHT: 255 LBS

## 2021-06-02 VITALS — BODY MASS INDEX: 33.4 KG/M2 | WEIGHT: 252 LBS | HEIGHT: 73 IN

## 2021-06-02 VITALS — BODY MASS INDEX: 33.51 KG/M2 | WEIGHT: 254 LBS

## 2021-06-02 VITALS — BODY MASS INDEX: 31.96 KG/M2 | WEIGHT: 248.9 LBS

## 2021-06-02 NOTE — TELEPHONE ENCOUNTER
Right knee surgery few years ago.    3-4 months ago that same right knee would get sore.    Made appt [facility] and had cortisone shot, pain improved.    Last night back of leg swelled up back of knee and up.    No known new injury.    No shortness of breath    No prior blood clots.    Able to walk.  Hurts more with using it.  Feels better with leg up.    I recommend he be seen today.  Possible DVT.  Transferred to scheduling for an appointment.    Patient states he has to work so he decided to make appointment for tomorrow.    Monique Malcolm, RN, [facility] Nurse Triage/Med Refills RN

## 2021-06-02 NOTE — TELEPHONE ENCOUNTER
Reason for Disposition    SEVERE pain (e.g., excruciating, unable to walk)    Protocols used: KNEE PAIN-A-OH

## 2021-06-02 NOTE — PROGRESS NOTES
"He is a 59 y.o. y/o male patient who comes to the sleep medicine clinic for PAP therapy follow up.    [facility], DME: [facility]    5/19/2019 Polysomnography Split (wt 253 lbs).  Respiratory monitoring showed moderate obstructive sleep apnea (AHI=18/hr).  CPAP pressures from 5 to 7 were sampled during the night. CPAP of 7 was effective at eliminating obstructive events. This was an optimal titration study as demonstrated by the reduction in AHI and sampling of at least a 15-min of supine REM sleep.  There was evidence of increased muscle tone during stage REM sleep that met criteria for REM sleep without atonia (RSWA).   Frequent PVCs with short run of SVT (5 beats).    Initial PAP settings: Auto 6 - 9 cmH2O with nasal mask  Current PAP settings: same    Reports when he wears CPAP he falls asleep fine, sleeps fine and wakes up more rested.  He denies any PAP intolerance.  He has difficulty because he likes to read at bedtime and can't fit reading glasses with CPAP mask so he reads and falls asleep. Lately he hasn't really even been trying to wear CPAP.    30-Days Efficacy and Compliance Data  Residual AHI: 1.9/hr  Leak: 29.9 LPM (95%)  Days used > 4 hours: 7/30 (9/30 nights with any use)  Average usage per night: 4:52 hours  95th percentile P: 9.0 cmH2O  Mask Tolerance: Fine  Skin irritation: Some around nose.    Physical Exam:  /79   Pulse 66   Ht 6' 2\" (1.88 m)   Wt (!) 257 lb 6.4 oz (116.8 kg)   SpO2 95%   BMI 33.05 kg/m    General appearance: No apparent distress, well groomed.  Head: Normocephalic, atraumatic.  Musculoskeletal: No edema noted.  No clubbing or cyanosis.  Skin: Warm, dry, intact.  Neurologic: Alert and oriented to person, place and time   Gait is normal.  Psychiatric: Affect pleasant.  Mood good.     Labs/Studies:  I reviewed the efficacy and compliance report from his device. Data summarized on the HPI.     Assessment and Plan:  1. Moderate Obstructive Sleep Apnea.  Residual " AHI is fine  Compliance is poor  Patient is benefiting from using PAP therapy.    We will change the pressure settings to: 7 - 10 cmH2O    We counseled the patient on the importance of using his PAP device every night and the risks of not treating sleep apnea.      Patient verbalized understanding of these issues, agrees with the plan and all questions were answered today. Patient was given an opportuntity to voice any other symptoms or concerns not listed above. Patient did not have any other symptoms or concerns.      Patient told to return in 2 - 3 months.     Tyler LEIGH Board Certified in Internal Medicine and Sleep Medicine  [facility].

## 2021-06-02 NOTE — PROGRESS NOTES
Order for Durable Medical Equipment was processed and equipment ordered.     DME provider: [facility]    Date Faxed: 10/9/2019    Ordering Provider: Tyler Mcpherson MD    PAP Order Type: Pressure/Settings adjustment order    Fax Number: IN HOUSE DME: [facility]

## 2021-06-03 VITALS
DIASTOLIC BLOOD PRESSURE: 74 MMHG | SYSTOLIC BLOOD PRESSURE: 124 MMHG | HEART RATE: 60 BPM | BODY MASS INDEX: 32.94 KG/M2 | HEIGHT: 74 IN | OXYGEN SATURATION: 98 % | WEIGHT: 256.7 LBS

## 2021-06-03 VITALS
WEIGHT: 260 LBS | OXYGEN SATURATION: 97 % | BODY MASS INDEX: 33.37 KG/M2 | RESPIRATION RATE: 20 BRPM | DIASTOLIC BLOOD PRESSURE: 80 MMHG | HEIGHT: 74 IN | HEART RATE: 61 BPM | SYSTOLIC BLOOD PRESSURE: 116 MMHG | TEMPERATURE: 97.6 F

## 2021-06-03 VITALS — WEIGHT: 257 LBS | BODY MASS INDEX: 32.98 KG/M2 | HEIGHT: 74 IN

## 2021-06-03 VITALS — WEIGHT: 250 LBS | HEIGHT: 74 IN | BODY MASS INDEX: 32.08 KG/M2

## 2021-06-03 VITALS
BODY MASS INDEX: 33.03 KG/M2 | OXYGEN SATURATION: 95 % | WEIGHT: 257.4 LBS | HEART RATE: 66 BPM | DIASTOLIC BLOOD PRESSURE: 79 MMHG | SYSTOLIC BLOOD PRESSURE: 117 MMHG | HEIGHT: 74 IN

## 2021-06-03 VITALS — BODY MASS INDEX: 32.33 KG/M2 | WEIGHT: 251.8 LBS

## 2021-06-03 VITALS — WEIGHT: 257 LBS | HEIGHT: 74 IN | BODY MASS INDEX: 32.98 KG/M2

## 2021-06-03 VITALS — BODY MASS INDEX: 32.98 KG/M2 | WEIGHT: 257 LBS | HEIGHT: 74 IN

## 2021-06-03 VITALS — WEIGHT: 252 LBS | BODY MASS INDEX: 32.35 KG/M2

## 2021-06-03 VITALS — BODY MASS INDEX: 32.87 KG/M2 | WEIGHT: 256 LBS

## 2021-06-04 ENCOUNTER — OFFICE VISIT - HEALTHEAST (OUTPATIENT)
Dept: PHYSICAL THERAPY | Facility: REHABILITATION | Age: 61
End: 2021-06-04

## 2021-06-04 ENCOUNTER — MEDICAL CORRESPONDENCE (OUTPATIENT)
Dept: HEALTH INFORMATION MANAGEMENT | Facility: CLINIC | Age: 61
End: 2021-06-04

## 2021-06-04 VITALS
WEIGHT: 265.7 LBS | HEART RATE: 69 BPM | SYSTOLIC BLOOD PRESSURE: 112 MMHG | BODY MASS INDEX: 34.11 KG/M2 | DIASTOLIC BLOOD PRESSURE: 67 MMHG | TEMPERATURE: 97.7 F | OXYGEN SATURATION: 96 %

## 2021-06-04 VITALS
BODY MASS INDEX: 35.14 KG/M2 | TEMPERATURE: 97.6 F | HEIGHT: 74 IN | WEIGHT: 273.8 LBS | DIASTOLIC BLOOD PRESSURE: 72 MMHG | OXYGEN SATURATION: 96 % | HEART RATE: 63 BPM | SYSTOLIC BLOOD PRESSURE: 128 MMHG

## 2021-06-04 VITALS
HEIGHT: 74 IN | HEART RATE: 64 BPM | DIASTOLIC BLOOD PRESSURE: 80 MMHG | WEIGHT: 262.25 LBS | BODY MASS INDEX: 33.65 KG/M2 | SYSTOLIC BLOOD PRESSURE: 132 MMHG

## 2021-06-04 VITALS
WEIGHT: 270 LBS | DIASTOLIC BLOOD PRESSURE: 78 MMHG | SYSTOLIC BLOOD PRESSURE: 128 MMHG | OXYGEN SATURATION: 95 % | BODY MASS INDEX: 34.65 KG/M2 | HEIGHT: 74 IN | HEART RATE: 71 BPM

## 2021-06-04 VITALS
DIASTOLIC BLOOD PRESSURE: 81 MMHG | SYSTOLIC BLOOD PRESSURE: 121 MMHG | TEMPERATURE: 98.4 F | RESPIRATION RATE: 16 BRPM | OXYGEN SATURATION: 97 % | WEIGHT: 268 LBS | BODY MASS INDEX: 34.41 KG/M2 | HEART RATE: 63 BPM

## 2021-06-04 VITALS
SYSTOLIC BLOOD PRESSURE: 116 MMHG | HEART RATE: 66 BPM | OXYGEN SATURATION: 96 % | TEMPERATURE: 98 F | WEIGHT: 265 LBS | BODY MASS INDEX: 34.02 KG/M2 | RESPIRATION RATE: 16 BRPM | DIASTOLIC BLOOD PRESSURE: 77 MMHG

## 2021-06-04 VITALS
WEIGHT: 263.9 LBS | SYSTOLIC BLOOD PRESSURE: 125 MMHG | TEMPERATURE: 97.9 F | HEART RATE: 61 BPM | BODY MASS INDEX: 33.88 KG/M2 | OXYGEN SATURATION: 93 % | DIASTOLIC BLOOD PRESSURE: 67 MMHG

## 2021-06-04 DIAGNOSIS — M25.571 CHRONIC PAIN OF RIGHT ANKLE: ICD-10-CM

## 2021-06-04 DIAGNOSIS — M19.071 ARTHRITIS OF RIGHT ANKLE: ICD-10-CM

## 2021-06-04 DIAGNOSIS — G89.29 CHRONIC PAIN OF RIGHT ANKLE: ICD-10-CM

## 2021-06-04 NOTE — PROGRESS NOTES
Patient here for labs and provider follow up for Chronic lymphocytic leukemia (H)  Cy inducer Ibrutinib

## 2021-06-04 NOTE — PROGRESS NOTES
[facility] Progress Note    Patient: Ramu Espinoza  MRN: [id number]  Date of Service: 2019        Reason for visit      1. Chronic lymphocytic leukemia (H)  Cy inducer Ibrutinib    2. Pericardial effusion        Assessment     1.  A 59 y.o. gentleman with CLL stage 4 now, diagnosed in 2012.  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  2.  Very good clinical response to Imbruvica. His lymphnodes have normalized.  His CBC is normal.   3.  In May 2017 diagnosis of paroxysmal Atrial fibrillation. This could be caused by Imbruvica. He also drinks a lot of coffee.  4.  Pericardial effusion in 2019. Held imbruvica for 3 weeks.  Now resumed it. Doing OK. We have continued it.  5.  History of pleurisy right side. No recurrence.  6.  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    Plan     1.   Continue Imbruvica 140 mg 3 pills daily.  He will be on Imbruvica maintenance.  2.  Have him come back in 4 months time .  3.   Continue Bactrim and Acyclovir.  4. Continue with good diet and exercise.  5. Continue close monitoring of his cardiac issues. OK to work out if ok with cardiology.    Clinical stage      Stage IV    History     Ramu Espinoza is a very pleasant 59 y.o. old male with a history of CLL diagnosed in 2012 presenting with elevated white count in upper 10s/lower 20s with a peripheral blood smear showing atypical lymphocytosis suspicious for CLL.  He was completely asymptomatic, normal hemoglobin and platelet count.  His peripheral blood flow cytometry revealed CD5 co-expressing kappa light chains restricted B cells confirming the diagnosis of CLL.  He has been on observation.  His white count has been slowly going up.    In 2014 his white count was over 100,000.  So he had a CAT scan and that showed increasing lymphadenopathy and splenomegaly.  Platelet count has been going down.    I offered participation in clinical study in which patients are  randomized between fludarabine and cyclophosphamide Rituxan or Imbruvica and Rituxan.  He has been randomized to Imbruvica and rituximab arm. He started on October 2014.  In November 2014 he got Rituxan for the first time.  Tolerated well. Finished that. Now on Imbruvica alone. Tolerating it well.     In May 2017 he was found to have paroxysmal afib when he complained of some fluttering sensation. He had holter monitor which led to the diagnosis. Most days he feels well.     In March 2019 he was found to have pericardial effusion when he started have some chest pain on deep inspiration. Had it tapped. Held Imbruvica for 3 weeks. Then resumed it.  Tolerated it fine. So continues it.     Comes in today for scheduled follow-up. Overall feels good. He feels his heart is doing well.    Past Medical History     Past Medical History:   Diagnosis Date     BPH (benign prostatic hyperplasia)      CLL (chronic lymphocytic leukemia) (H)      Lymphadenopathy     Created by Conversion  Replacement Utility updated for latest IMO load     Paroxysmal atrial fibrillation with rapid ventricular response (H)      Sleep apnea     hypertension, being slightly overweight, anemic, having reflux, anxiety, epididymitis, hyperlipidemia and tinnitus      Review of Systems   Constitutional  Constitutional (WDL): Exceptions to WDL  Fatigue: Fatigue relieved by rest  Neurosensory  Neurosensory (WDL): Exceptions to WDL  Cardiovascular  Cardiovascular (WDL): All cardiovascular elements are within defined limits  Pulmonary  Respiratory (WDL): Exceptions to WDL  Cough: Mild symptoms, nonprescription intervention indicated(green phlegm worse in am)  Dyspnea: Shortness of breath with moderate exertion  Gastrointestinal  Gastrointestinal (WDL): Exceptions to WDL  Dry Mouth: Symptomatic (e.g., dry or thick saliva) without significant dietary alteration, unstimulated saliva flow >0.2 ml/min(worse at night)  Genitourinary  Genitourinary (WDL): All  genitourinary elements are within defined limits  Integumentary  Integumentary (WDL): All integumentary elements are within defined limits  Patient Coping  Patient Coping: Accepting  Accompanied by  Accompanied by: Alone    ECOG performance status and Distress Assessment      ECOG Performance:    ECOG Performance Status: 0    Distress Assessment  Distress Assessment Score: 3:     Pain Status  Currently in Pain: Yes      Vital Signs     Vitals:    12/23/19 0958   BP: 112/67   Pulse: 69   Temp: 97.7  F (36.5  C)   SpO2: 96%       Physical Exam     GENERAL: No acute distress. Cooperative in conversation.   HEENT: Pupils are equal, round and reactive. Oral mucosa is clean and intact. No ulcerations or mucositis noted. No bleeding noted.  RESP:Chest symmetric lungs are clear bilaterally per auscultation. Regular respiratory rate. No wheezes or rhonchi.  CV: Normal S1 S2 Regular, rate and rhythm. No murmurs.  ABD: Nondistended, soft, nontender. Positive bowel sounds. No organomegaly.   EXTREMITIES: No lower extremity edema.   NEURO: Non- focal. Alert and oriented x3.  Cranial nerves appear intact.  PSYCH: Within normal limits. No depression or anxiety.  SKIN: Warm dry intact.  Slight rash on his shoulder and legs.  LYMPH NODES: Bilateral cervical, supraclavicular, axillary lymph node examination was done.  Negative for any palpable adenopathy.      Lab Results     Results for orders placed or performed in visit on 12/23/19   Comprehensive Metabolic Panel   Result Value Ref Range    Sodium 141 136 - 145 mmol/L    Potassium 4.3 3.5 - 5.0 mmol/L    Chloride 106 98 - 107 mmol/L    CO2 28 22 - 31 mmol/L    Anion Gap, Calculation 7 5 - 18 mmol/L    Glucose 70 70 - 125 mg/dL    BUN 23 (H) 8 - 22 mg/dL    Creatinine 0.88 0.70 - 1.30 mg/dL    GFR MDRD Af Amer >60 >60 mL/min/1.73m2    GFR MDRD Non Af Amer >60 >60 mL/min/1.73m2    Bilirubin, Total 0.5 0.0 - 1.0 mg/dL    Calcium 9.4 8.5 - 10.5 mg/dL    Protein, Total 6.5 6.0 - 8.0  g/dL    Albumin 3.8 3.5 - 5.0 g/dL    Alkaline Phosphatase 73 45 - 120 U/L    AST 19 0 - 40 U/L    ALT 22 0 - 45 U/L   HM1 (CBC with Diff)   Result Value Ref Range    WBC 7.8 4.0 - 11.0 thou/uL    RBC 4.64 4.40 - 6.20 mill/uL    Hemoglobin 13.2 (L) 14.0 - 18.0 g/dL    Hematocrit 42.1 40.0 - 54.0 %    MCV 91 80 - 100 fL    MCH 28.4 27.0 - 34.0 pg    MCHC 31.4 (L) 32.0 - 36.0 g/dL    RDW 12.6 11.0 - 14.5 %    Platelets 296 140 - 440 thou/uL    MPV 10.1 8.5 - 12.5 fL    Neutrophils % 69 50 - 70 %    Lymphocytes % 17 (L) 20 - 40 %    Monocytes % 8 2 - 10 %    Eosinophils % 5 0 - 6 %    Basophils % 1 0 - 2 %    Neutrophils Absolute 5.4 2.0 - 7.7 thou/uL    Lymphocytes Absolute 1.3 0.8 - 4.4 thou/uL    Monocytes Absolute 0.6 0.0 - 0.9 thou/uL    Eosinophils Absolute 0.4 0.0 - 0.4 thou/uL    Basophils Absolute 0.1 0.0 - 0.2 thou/uL         Imaging Results     No results found.      Dennis Garza MD

## 2021-06-05 VITALS
WEIGHT: 262.6 LBS | DIASTOLIC BLOOD PRESSURE: 68 MMHG | SYSTOLIC BLOOD PRESSURE: 125 MMHG | TEMPERATURE: 98 F | BODY MASS INDEX: 33.72 KG/M2 | HEART RATE: 68 BPM | OXYGEN SATURATION: 95 %

## 2021-06-05 VITALS
HEART RATE: 62 BPM | WEIGHT: 268.8 LBS | BODY MASS INDEX: 34.51 KG/M2 | OXYGEN SATURATION: 94 % | TEMPERATURE: 98.4 F | SYSTOLIC BLOOD PRESSURE: 122 MMHG | DIASTOLIC BLOOD PRESSURE: 69 MMHG

## 2021-06-05 VITALS
BODY MASS INDEX: 34.27 KG/M2 | WEIGHT: 267 LBS | HEART RATE: 66 BPM | OXYGEN SATURATION: 98 % | DIASTOLIC BLOOD PRESSURE: 60 MMHG | SYSTOLIC BLOOD PRESSURE: 128 MMHG | HEIGHT: 74 IN

## 2021-06-05 VITALS
WEIGHT: 265 LBS | SYSTOLIC BLOOD PRESSURE: 116 MMHG | HEART RATE: 65 BPM | OXYGEN SATURATION: 95 % | DIASTOLIC BLOOD PRESSURE: 62 MMHG | TEMPERATURE: 98.5 F | BODY MASS INDEX: 34.02 KG/M2

## 2021-06-05 NOTE — PROGRESS NOTES
Dear Dr. Jiménez, Ramu HOSKINS Md  [address] [place] [address]    Thank you for the opportunity to participate in the care of . Ramu Espinoza.    Assessment and Plan:    In summary Ramu Espinoza is a 59 y.o. year old male here for transfer of care.  1. Obstructive sleep apnea  I encouraged the patient try to increase his hours of usage and so far as he can.  I will keep him on same pressure settings for now.  I welcome the patient to follow-up with me in 2 years.    2. Hypersomnia  Improving    History of present illness:    He is a 59 y.o. male who comes to the clinic for the transfer of care of his obstructive sleep apnea.  The patient was actually diagnosed with obstructive sleep apnea on 05/19/2019 with an apnea hypopnea index of 18 events per hour with the lowest O2 sat of 88%.  The patient was started on CPAP therapy.  He admits that he has not been using his CPAP as much as he should due to the fact that he would fall asleep while reading for you to put on the CPAP mask.  When he is able to use it he does feel much more refreshed upon awakening.  He intends to try to increase his hours of usage.  The patient's review of systems otherwise negative.     Ideal Sleep-Wake Cycle(devoid of societal pressure):    Patient would try to initiate sleep at around 11-12 at night with a sleep latency of less than 20 minutes. The patient would have zero awakening. Final wake up time is around 7-9AM.    Compliance Download data for 30 days:  Pressure setting: APAP 7-10 CWP  Residual AHI: 2.8 events per hour  Leak: Minimal  Compliance: 20%  Mask Tolerance: Good  Skin irritation: None    Past Medical History  Past Medical History:   Diagnosis Date     BPH (benign prostatic hyperplasia)      CLL (chronic lymphocytic leukemia) (H)      Lymphadenopathy     Created by Conversion  Replacement Utility updated for latest IMO load     Paroxysmal atrial fibrillation with rapid ventricular response (H)      Sleep apnea          Past Surgical History  Past Surgical History:   Procedure Laterality Date     IA TREAT INTER/SUBTROCH FX,W/PLATE/SCREW      Description: Open Treatment Of An Intertrochanteric Fracture;  Recorded: 04/01/2009;        Meds  Current Outpatient Medications   Medication Sig Dispense Refill     acyclovir (ZOVIRAX) 400 MG tablet Take 1 tablet (400 mg total) by mouth 2 (two) times a day. 120 tablet 2     cholecalciferol, vitamin D3, 1,000 unit tablet Take 2,000 Units by mouth daily.              hydrOXYzine HCl (ATARAX) 25 MG tablet TAKE 1/2 TABLET(12.5 MG) BY MOUTH AT BEDTIME AS NEEDED FOR ITCHING 45 tablet 3     ibrutinib 420 mg Tab Take 420 mg by mouth daily.       ibuprofen (ADVIL,MOTRIN) 200 MG tablet Take 200 mg by mouth every 6 (six) hours as needed for pain.       LORazepam (ATIVAN) 1 MG tablet TAKE 1 TABLET BY MOUTH DAILY AS NEEDED. 30 tablet 0     metoprolol succinate (TOPROL-XL) 50 MG 24 hr tablet Take 2 tablets (100 mg total) by mouth daily. 180 tablet 2     sertraline (ZOLOFT) 100 MG tablet Take 1 tablet (100 mg total) by mouth daily. 90 tablet 3     tamsulosin (FLOMAX) 0.4 mg cap Take 0.4 mg by mouth daily after supper.       traZODone (DESYREL) 50 MG tablet TAKE 1 TABLET(50 MG) BY MOUTH AT BEDTIME 90 tablet 2     No current facility-administered medications for this visit.         Allergies  Azithromycin     Social History  Social History     Socioeconomic History     Marital status:      Spouse name: Not on file     Number of children: Not on file     Years of education: Not on file     Highest education level: Not on file   Occupational History     Not on file   Social Needs     Financial resource strain: Not on file     Food insecurity:     Worry: Not on file     Inability: Not on file     Transportation needs:     Medical: Not on file     Non-medical: Not on file   Tobacco Use     Smoking status: Former Smoker     Packs/day: 0.50     Years: 25.00     Pack years: 12.50     Types: Cigarettes      Last attempt to quit: 1/3/2005     Years since quitting: 15.0     Smokeless tobacco: Never Used   Substance and Sexual Activity     Alcohol use: Yes     Alcohol/week: 14.0 standard drinks     Types: 7 Cans of beer, 7 Glasses of wine per week     Comment: on Fridays a bottle of wine     Drug use: No     Sexual activity: Never     Partners: Female   Lifestyle     Physical activity:     Days per week: Not on file     Minutes per session: Not on file     Stress: Not on file   Relationships     Social connections:     Talks on phone: Not on file     Gets together: Not on file     Attends Latter day service: Not on file     Active member of club or organization: Not on file     Attends meetings of clubs or organizations: Not on file     Relationship status: Not on file     Intimate partner violence:     Fear of current or ex partner: Not on file     Emotionally abused: Not on file     Physically abused: Not on file     Forced sexual activity: Not on file   Other Topics Concern     Not on file   Social History Narrative     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        Family History  Family History   Problem Relation Age of Onset     Cancer Father         pacreatic      Prostate cancer Father      Colon cancer Mother      Cancer Mother      Cancer Brother      Lung cancer Brother      Hypertension Brother      No Medical Problems Sister      Clotting disorder Brother      COPD Brother      Cancer Brother      Anuerysm Brother      Hypertension Brother      Hypertension Brother      No Medical Problems Brother      Diabetes Paternal Aunt      Diabetes Maternal Aunt      Diabetes Maternal Aunt      Cancer Maternal Grandfather      Patient's family history was not pertinent to chief complaint.     Review of Systems:  Constitutional: Negative except as noted in HPI.   Eyes: Negative except as noted in HPI.   ENT: Negative except as noted in HPI.   Cardiovascular: Negative except as noted in HPI.   Respiratory: Negative except as  "noted in HPI.   Gastrointestinal: Negative except as noted in HPI.   Genitourinary: Negative except as noted in HPI.   Musculoskeletal: Negative except as noted in HPI.   Integumentary: Negative except as noted in HPI.   Neurological: Negative except as noted in HPI.   Psychiatric: Negative except as noted in HPI.   Endocrine: Negative except as noted in HPI.   Hematologic/Lymphatic: Negative except as noted in HPI.      STOP BANG 6/6/2019   Do you snore loudly (louder than talking or loud enough to be heard through closed doors)? 0   Do you often feel tired, fatigued, or sleepy during daytime? 1   Has anyone observed you stop breathing in your sleep? 0   Do you have or are you being treated for high blood pressure? 0   BMI more than 35 kg/m2 1   Age over 50 years old? 1   Neck circumference greater than 16 inches? 1   Gender male? 1   Total Score 5     Epworths Sleepiness Scale 6/6/2019 10/9/2019 1/9/2020   Sitting and reading 1 3 3   Watching TV 1 2 1   Sitting, inactive in a public place (e.g. a theatre or a meeting) 0 0 2   As a passenger in a car for an hour without a break 0 0 1   Lying down to rest in the afternoon when circumstances permit 0 2 3   Sitting and talking to someone 0 0 0   Sitting quietly after a lunch without alcohol 0 2 2   In a car, while stopped for a few minutes in traffic 0 0 0   Total score 2 9 12     Rooming 6/6/2019   Usual bedtime 10:30-11:30   Sleep Latency 30-60 minutes   Awakenings maybe 1 time   Wake Up Time 8:30-9   Weekends varies   Energy Drinks 0   Coffee 2-4 cups   Cola 0   Difficulty falling asleep Yes   Difficulty staying asleep No   Excessive daytime tiredness Yes   Excessive daytime sleepiness Yes   Dozing off while driving No   Shift Worker Yes   Sleep Walking? No   Sleep Talking? Yes   Kicking or punching? Yes   Restless legs symptoms No       Physical Exam:  /78   Pulse 71   Ht 6' 2\" (1.88 m)   Wt (!) 270 lb (122.5 kg)   SpO2 95%   BMI 34.67 kg/m    BMI:Body " mass index is 34.67 kg/m .   GEN: NAD, obese  Head: Normocephalic.  EYES: PERRLA, EOMI  ENT: Oropharynx is clear, Tobias class 4+ airway.   Nasal mucosa is moist without erythema  Neck : Thyroid is within normal limits.   CV: Regular rate and rhythm, S1 & S2 positive.  LUNGS: Bilateral breathsounds heard.   ABDOMEN: Positive bowel sounds in all quadrants, soft, no rebound or guarding  MUSCULOSKELETAL: Bilateral trace leg swelling  SKIN: warm, dry, no rashes  Neurological: Alert, oriented to time, place, and person.  Psych: normal mood, normal affect     Labs/Studies:     Lab Results   Component Value Date    WBC 7.8 12/23/2019    HGB 13.2 (L) 12/23/2019    HCT 42.1 12/23/2019    MCV 91 12/23/2019     12/23/2019         Chemistry        Component Value Date/Time     12/23/2019 0953    K 4.3 12/23/2019 0953     12/23/2019 0953    CO2 28 12/23/2019 0953    BUN 23 (H) 12/23/2019 0953    CREATININE 0.88 12/23/2019 0953    GLU 70 12/23/2019 0953        Component Value Date/Time    CALCIUM 9.4 12/23/2019 0953    ALKPHOS 73 12/23/2019 0953    AST 19 12/23/2019 0953    ALT 22 12/23/2019 0953    BILITOT 0.5 12/23/2019 0953            No results found for: FERRITIN  Lab Results   Component Value Date    TSH 3.11 03/22/2019         Patient verbalized understanding of these issues, agrees with the plan and all questions were answered today. Patient was given an opportuntity to voice any other symptoms or concerns not listed above. Patient did not have any other symptoms or concerns.         Thomas Meyer DO  Board Certified in Internal Medicine and Sleep Medicine    (Note created with Dragon voice recognition and unintended spelling errors and word substitutions may occur)

## 2021-06-06 NOTE — TELEPHONE ENCOUNTER
----- Message from Ramu Jiménez MD sent at 3/5/2020  8:50 PM CST -----  Let Clive know     Inflammation tests   Rheumatoid Factor /CRP /CCP antibodies /CBC and ESR are all OK    aGli

## 2021-06-06 NOTE — TELEPHONE ENCOUNTER
Left message to call back for: Patient  Information to relay to patient:  LM please relay providers message below when pt calls back.  Thank you

## 2021-06-06 NOTE — PROGRESS NOTES
ASSESSMENT & PLAN    No problem-specific Assessment & Plan notes found for this encounter.      Ramu was seen today for wrist pain and ankle pain.    Diagnoses and all orders for this visit:    Pain in both wrists  -     Uric Acid  -     Rheumatoid Factor Quant  -     CCP Antibodies  -     Erythrocyte Sedimentation Rate  -     C -Reactive Protein, High Sensitivity  -     HM1(CBC and Differential)  -     HM1 (CBC with Diff)  -     diclofenac sodium (VOLTAREN) 1 % Gel; Apply 4 times daily to ankle or wrist as needed for pain    Chronic lymphocytic leukemia (H)  -     HM1(CBC and Differential)  -     HM1 (CBC with Diff)    Paroxysmal atrial fibrillation with rapid ventricular response (H)    Pain in joint, ankle and foot, left  -     Uric Acid  -     Rheumatoid Factor Quant  -     CCP Antibodies  -     Erythrocyte Sedimentation Rate  -     C -Reactive Protein, High Sensitivity  -     HM1(CBC and Differential)  -     HM1 (CBC with Diff)  -     diclofenac sodium (VOLTAREN) 1 % Gel; Apply 4 times daily to ankle or wrist as needed for pain        Patient Instructions   I think that your pain in the wrist is likely related to some arthritic wear and tear of the wrist joint as well as mild carpal tunnel symptoms  You can use prescription diclofenac gel on the area as needed  Use your wrist brace      Ankle if it is troublesome follow through with Dr. Roland  Likely you have some mild arthritis and nerve irritation which is driven from wear-and-tear from you being active  Try the diclofenac gel in this area as well    Add Benefiber over-the-counter if you want to add a pot version of increasing fiber content  Try ground up Steven seeds 2 tablespoons each morning and a half a cup of yogurt this should help with bulking up the stools    I am checking today for rheumatologic abnormalities    Follow through with Dr. Arana I believe you are in a sinus rhythm today    Other over-the-counter supplements which may help  joints  Omega-3 fatty acids  Curcumin  Lisa and lisa all  Tart cherry juice  Glucosamine/chondroitin MSM        Return in about 3 months (around 6/2/2020).       Little interest or pleasure in doing things: Not at all  Feeling down, depressed, or hopeless: Not at all  Trouble falling or staying asleep, or sleeping too much: Not at all  Feeling tired or having little energy: Several days  Poor appetite or overeating: Several days  Feeling bad about yourself - or that you are a failure or have let yourself or your family down: Not at all  Trouble concentrating on things, such as reading the newspaper or watching television: Not at all  Moving or speaking so slowly that other people could have noticed. Or the opposite - being so fidgety or restless that you have been moving around a lot more than usual: Not at all  Thoughts that you would be better off dead, or of hurting yourself in some way: Not at all  PHQ-9 Total Score: 2  If you checked off any problems, how difficult have these problems made it for you to do your work, take care of things at home, or get along with other people?: Not difficult at all    CHIEF COMPLAINT: Ramu Espinoza had concerns including Wrist Pain (Bilateral wrists. Ongoing x 6 months. ) and Ankle Pain (Cortisone injection right ankle.).    Huslia: 1.............. had concerns including Wrist Pain (Bilateral wrists. Ongoing x 6 months. ) and Ankle Pain (Cortisone injection right ankle.).    1. Pain in both wrists    2. Chronic lymphocytic leukemia (H)    3. Paroxysmal atrial fibrillation with rapid ventricular response (H)    4. Pain in joint, ankle and foot, left          CC:             Why are you here today?                                   Wrist pain    Is it getting better / worse /same ?                                         Same  WHAT IS IT LIKE in one word?:                                            Joint pain  HOW LONG is it ongoing: days, weeks,months?:                 6  "months  What is it WORSE WITH activity? Eating? Movement? :      Twisting/ movement   What makes it BETTER?:                                                      Nothing  Severity:  0/10-10/10:Pain/Intesity                                         3-4      Is this NEW or Recurrent/Continued?                                    New          Pain bilateral wrist left greater than right he is able to shovel he is able to manipulate tools at work  Comes and goes  More pain less numbness  Has had carpal tunnel in the past    He is also seen Dr. Roland for ankle pain right greater than left is a burning type pain at the medial aspect worse when he crosses his legs and puts pressure on better when he just alleviates and stay with sits with his legs apart    He has been going to the bathroom \"more often  Has little bit of hemorrhoids  Is wondering he is never been constipated he does not have diarrhea his stools are not solid but they are not loose  He is wondering if he can do anything about this last colonoscopy was in 2016  No known history of CLL              SUBJECTIVE:  Ramu Espinoza is a 59 y.o. male                                SOCIAL: He  reports that he quit smoking about 15 years ago. His smoking use included cigarettes. He has a 12.50 pack-year smoking history. He has never used smokeless tobacco. He reports current alcohol use of about 14.0 standard drinks of alcohol per week. He reports that he does not use drugs.    REVIEW OF SYSTEMS:   Family history not pertinent to chief complaint or presenting problem    Review of Systems:      Nervous System:  No new or change in headache, paresthesia or tremor                                  Ears: No new hearing loss or ringing in the ears    Eyes: No new blurring of vision, Double Vision                Nose: No new nosebleed or loss of smell    Mouth: No new mouth sores or  coated tongue    Throat: No new hoarseness or difficulty swallowing    Neck: No new neck pain " or mass    Heart: No history of A. fib  Occasional palpitations but currently chest wall pain    Lungs: No new shortness of breath, wheezing or hemoptysis.    Gastrointestinal: No new nausea or vomiting, melena or blood in stools.    Kidney/Bladder: No new polyuria, polydipsia, or hematuria.                             Genital/Sexual: No new Sex function Changes                                Skin: No new rash    Muscles/Joints/Bones: Pain left wrist greater than right medial right ankle    Review of systems otherwise negative as requested from patient, except   Those positive ROS outlined and discussed in [place].      VITALS:      Physical Exam:  Full range of affect  Carpal tunnel provocation maneuvers with Phalen's Tinel's mild numbness left wrist  Some synovial thickening of the wrist  No PIP or DIP abnormalities  No nail pitting  No visualized psoriasis  Crepitus with passive range of motion of right ankle  Lower extremity edema  No ankle clonus  No edema  and calves are supple    Recent Results (from the past 240 hour(s))   Uric Acid   Result Value Ref Range    Uric Acid 6.0 3.0 - 8.0 mg/dL   Rheumatoid Factor Quant   Result Value Ref Range    Rheumatoid Factor Quantitative <15.0 0 - 30 IU/mL   CCP Antibodies   Result Value Ref Range    CCP IgG Antibodies <0.5 <=4.9 U/mL   Erythrocyte Sedimentation Rate   Result Value Ref Range    Sed Rate 4 0 - 15 mm/hr   C -Reactive Protein, High Sensitivity   Result Value Ref Range    CRP, High Sensitivity 1.4 0.0 - 3.0 mg/L   HM1 (CBC with Diff)   Result Value Ref Range    WBC 8.1 4.0 - 11.0 thou/uL    RBC 5.26 4.40 - 6.20 mill/uL    Hemoglobin 15.0 14.0 - 18.0 g/dL    Hematocrit 47.0 40.0 - 54.0 %    MCV 89 80 - 100 fL    MCH 28.5 27.0 - 34.0 pg    MCHC 31.9 (L) 32.0 - 36.0 g/dL    RDW 13.1 11.0 - 14.5 %    Platelets 315 140 - 440 thou/uL    MPV 11.0 8.5 - 12.5 fL    Neutrophils % 72 (H) 50 - 70 %    Lymphocytes % 17 (L) 20 - 40 %    Monocytes % 9 2 - 10 %    Eosinophils %  "1 0 - 6 %    Basophils % 1 0 - 2 %    Neutrophils Absolute 5.8 2.0 - 7.7 thou/uL    Lymphocytes Absolute 1.3 0.8 - 4.4 thou/uL    Monocytes Absolute 0.7 0.0 - 0.9 thou/uL    Eosinophils Absolute 0.1 0.0 - 0.4 thou/uL    Basophils Absolute 0.1 0.0 - 0.2 thou/uL         Vitals:    03/02/20 0942   BP: 132/80   Patient Site: Right Arm   Patient Position: Sitting   Cuff Size: Adult Large   Pulse: 64   Weight: (!) 262 lb 4 oz (119 kg)   Height: 6' 2\" (1.88 m)     Wt Readings from Last 3 Encounters:   03/02/20 (!) 262 lb 4 oz (119 kg)   01/09/20 (!) 270 lb (122.5 kg)   12/23/19 (!) 265 lb 11.2 oz (120.5 kg)     Body mass index is 33.67 kg/m .    PFSH:    Social History     Tobacco Use   Smoking Status Former Smoker     Packs/day: 0.50     Years: 25.00     Pack years: 12.50     Types: Cigarettes     Last attempt to quit: 1/3/2005     Years since quitting: 15.1   Smokeless Tobacco Never Used       Family History   Problem Relation Age of Onset     Cancer Father         pacreatic      Prostate cancer Father      Colon cancer Mother      Cancer Mother      Cancer Brother      Lung cancer Brother      Hypertension Brother      No Medical Problems Sister      Clotting disorder Brother      COPD Brother      Cancer Brother      Anuerysm Brother      Hypertension Brother      Hypertension Brother      No Medical Problems Brother      Diabetes Paternal Aunt      Diabetes Maternal Aunt      Diabetes Maternal Aunt      Cancer Maternal Grandfather        Social History     Socioeconomic History     Marital status:      Spouse name: Not on file     Number of children: Not on file     Years of education: Not on file     Highest education level: Not on file   Occupational History     Not on file   Social Needs     Financial resource strain: Not on file     Food insecurity:     Worry: Not on file     Inability: Not on file     Transportation needs:     Medical: Not on file     Non-medical: Not on file   Tobacco Use     Smoking " status: Former Smoker     Packs/day: 0.50     Years: 25.00     Pack years: 12.50     Types: Cigarettes     Last attempt to quit: 1/3/2005     Years since quitting: 15.1     Smokeless tobacco: Never Used   Substance and Sexual Activity     Alcohol use: Yes     Alcohol/week: 14.0 standard drinks     Types: 7 Cans of beer, 7 Glasses of wine per week     Comment: on  a bottle of wine     Drug use: No     Sexual activity: Never     Partners: Female   Lifestyle     Physical activity:     Days per week: Not on file     Minutes per session: Not on file     Stress: Not on file   Relationships     Social connections:     Talks on phone: Not on file     Gets together: Not on file     Attends Buddhism service: Not on file     Active member of club or organization: Not on file     Attends meetings of clubs or organizations: Not on file     Relationship status: Not on file     Intimate partner violence:     Fear of current or ex partner: Not on file     Emotionally abused: Not on file     Physically abused: Not on file     Forced sexual activity: Not on file   Other Topics Concern     Not on file   Social History Narrative     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       Past Surgical History:   Procedure Laterality Date     [place] TREAT INTER/SUBTROCH FX,W/PLATE/SCREW      Description: Open Treatment Of An Intertrochanteric Fracture;  Recorded: 2009;       Allergies   Allergen Reactions     Azithromycin Rash       Active Ambulatory Problems     Diagnosis Date Noted     Tinnitus      Generalized Anxiety Disorder      Hyperlipidemia      Dupuytren's Contracture      Insomnia      Chronic lymphocytic leukemia (H)  Cy inducer Ibrutinib      Internal Hemorrhoids With Bleeding      Depression 03/10/2016     Paroxysmal atrial fibrillation with rapid ventricular response (H) 2017     Pericardial effusion 2019     Thyroid nodule 2019     DAVID (obstructive sleep apnea) 2019     REM sleep behavior  disorder 06/06/2019     Class 1 obesity due to excess calories with serious comorbidity in adult 06/06/2019     Resolved Ambulatory Problems     Diagnosis Date Noted     Backache      Peripheral Neuropathy      Lymphadenopathy      Foot Pain (Soft Tissue)      Lump In / On The Skin      Acute Bronchitis With Bronchospasm      Thoracic Sprain      Fatigue 03/26/2015     Atypical chest pain 03/15/2019     Chest pain 03/16/2019     Past Medical History:   Diagnosis Date     BPH (benign prostatic hyperplasia)      CLL (chronic lymphocytic leukemia) (H)      Sleep apnea          MEDICATIONS:  Current Outpatient Medications   Medication Sig Dispense Refill     acyclovir (ZOVIRAX) 400 MG tablet Take 1 tablet (400 mg total) by mouth 2 (two) times a day. 120 tablet 2     cholecalciferol, vitamin D3, 1,000 unit tablet Take 2,000 Units by mouth daily.              hydrOXYzine HCl (ATARAX) 25 MG tablet TAKE 1/2 TABLET(12.5 MG) BY MOUTH AT BEDTIME AS NEEDED FOR ITCHING 45 tablet 3     ibrutinib 420 mg Tab Take 420 mg by mouth daily.       ibuprofen (ADVIL,MOTRIN) 200 MG tablet Take 200 mg by mouth every 6 (six) hours as needed for pain.       metoprolol succinate (TOPROL-XL) 50 MG 24 hr tablet Take 2 tablets (100 mg total) by mouth daily. 180 tablet 2     sertraline (ZOLOFT) 100 MG tablet Take 1 tablet (100 mg total) by mouth daily. 90 tablet 3     tamsulosin (FLOMAX) 0.4 mg cap Take 0.4 mg by mouth daily after supper.       traZODone (DESYREL) 50 MG tablet TAKE 1 TABLET(50 MG) BY MOUTH AT BEDTIME 90 tablet 2     diclofenac sodium (VOLTAREN) 1 % Gel Apply 4 times daily to ankle or wrist as needed for pain 100 g 5     LORazepam (ATIVAN) 1 MG tablet Take 1 tablet (1 mg total) by mouth daily as needed. 30 tablet 0     No current facility-administered medications for this visit.               I spent 25 minutes with this patient face to face, of which 50% or greater was spent in counseling and coordination of care with regards to  Ramu was seen today for wrist pain and ankle pain.    Diagnoses and all orders for this visit:    Pain in both wrists  -     Uric Acid  -     Rheumatoid Factor Quant  -     CCP Antibodies  -     Erythrocyte Sedimentation Rate  -     C -Reactive Protein, High Sensitivity  -     HM1(CBC and Differential)  -     HM1 (CBC with Diff)  -     diclofenac sodium (VOLTAREN) 1 % Gel; Apply 4 times daily to ankle or wrist as needed for pain    Chronic lymphocytic leukemia (H)  -     HM1(CBC and Differential)  -     HM1 (CBC with Diff)    Paroxysmal atrial fibrillation with rapid ventricular response (H)    Pain in joint, ankle and foot, left  -     Uric Acid  -     Rheumatoid Factor Quant  -     CCP Antibodies  -     Erythrocyte Sedimentation Rate  -     C -Reactive Protein, High Sensitivity  -     HM1(CBC and Differential)  -     HM1 (CBC with Diff)  -     diclofenac sodium (VOLTAREN) 1 % Gel; Apply 4 times daily to ankle or wrist as needed for pain        Ramu Jiménez MD  [facility] [id number]  [phone]

## 2021-06-06 NOTE — PROGRESS NOTES
MTM Follow Up Encounter  Assessment & Plan                                                       Pain: Some improvement in pain. Recommending trying diclofenac gel per Dr. Jiménez, reviewed dosing.     Pericardial effusion: Symptoms resolved.      Atrial Fibrillation: Stable. Patient to follow with cardiology. Per Dr. Arana note from 5/31/17, Would not start aspirin therapy given it is generally not effective in preventing strokes in A. fib and also patient will have increased risk of bleeding with the use of aspirin and Imbruvica.       CLL: Continue to follow with [facility]. Reviewed adherence to Imbruvica. Per last note from Dr. Garza, was to continue Bactrim but I do not see an rx on file. Note from 4/13/18 states to d/c Bactrim. I will clarify.  PLAN:   1. I will clarify Bactrim with oncology     Anxiety: Well controlled. Is not using lorazepam often. Will request a refill from Dr. Jiménez.     Insomnia: Patient seems to be sleeping well, but might be having a morning hangover feeling possibly from trazodone. Will have him try 25 mg trazodone and monitor if he feels less hungover. If improves but still not sleeping well, could consider mirtazapine in the futrue. Ok to continue hydroxyzine HS. Encouraged him to use his CPAP.   PLAN:   1. Try trazodone 25 mg HS to see if AM hangover improves.     Hyperlipidemia: Currently taking no medication.  10-year ASCVD risk =12%.  Will recheck in 2023 per .      BPH: Stable. Recommended to continue current regimen.     Hx Vitamin D Deficiency: Last Vitamin D level WNL. Continue to monitor and take supplement.     Follow Up  2 week phone call     Subjective & Objective                                                       Ramu Espinoza is a 59 y.o. male coming in for a follow up visit for Medication Therapy Management. He was referred to me from self. Of note, last spoke with patient on phone 3/27/19 [facility] after admission for pericardial effusion.     Chief Complaint:  follow up on all medications. Last seen 3/27/19    Medication Adherence/Access: Admits that he is not as good with taking the Imbruvica on the weekends, but he is working on ways to remember to take.     Pain: Currently taking ibuprofen PRN. Reports using 2-3 tablets and not every day, only once in a while. Was prescribed diclofenac gel 1% by Dr. Jiménez but he did not pick it up yet. Following with [facility] for bilateral ankle arthrosis and bilateral hallux rigidus. Received injections which helped a little -- reports the overall arthritis and joint pain has improved, but he feels a burning sensation.     Pericardial effusion: At last [facility] call, patient was admitted for chest pain and CT of chest showed moderate-sized pericardial effusion.  Patient underwent pericardiocentesis on 3/18/19. Started on ibuprofen 400 mg every 6 hours for 5 days and colchicine 0.6 mg daily for 3 months - done. Denies any symptoms now, but once in a while will feel his heart beating which he wonders is afib.      Atrial Fibrillation: Currently taking metoprolol succinate 50 mg x2 (100 mg) daily. Dose increased on 11/16 after wearing Holter monitor. Reports heart beating hard once in a while but no other symptoms. He was told by Dr. Jiménez that he was not in afib when he was seen on 3/2 and the symptoms occurred.   Imbruvica is associated with atrial fib/flutter and rarely ventricular arrhythmia.  Currently sees a cardiologist.  Is not on anticoagulation.   [id number] score = 0.      CLL: Follows with [facility]. Currently taking Imbruvica 142 mg daily.  Diagnosed February 2012 with great response to Imbruvica. Was on hold when I last spoke with patient, but taking now. Sometimes does forget to take on weekend but he will take when he remembers but without doubling. No longer on Bactrim, but recommended to continue acyclovir for ppx, however last note states to continue Bactrim.      Anxiety: Currently taking sertraline 100 mg  daily.  Has been needing lorazepam 1 mg about once a month. Needs a refill. Denies any issues with his mood and medication is going well.   Per [place] , last picked up lorazepam #30 5/30/18  BRANDEN 7 Total Score: 1 (3/2/2020 10:00 AM)    Insomnia: Still taking hydroxyzine 25 mg half tablet (12.5 mg) and trazodone 50 mg HS. Thinks that the hydroxyzine is helpful -- when he skips a dose he does not sleep as well. Working nights and wakes up no alarm which is nice. Reports a hangover feeling in the morning and it takes him a while to feel normal. Drinking a lot more coffee now. Sleeping well, about 7-8 hours. Underwent sleep study on 5/23/19 which showed moderate obstructive sleep apnea. Was started on PAP, but he does not like wearing it. Thought that REM sleep behavior disorder was likely related to sertraline use.      Hyperlipidemia: Currently taking no medication.  10-year ASCVD risk =12%.  Last lipids checked 8/16/18.  Read about statins and is not interested.     BPH: Currently taking tamsulosin 0.4 mg daily. Working great. Steady stream. Nocturia rare. Emptying bladder.     Hx Vitamin D Deficiency: Taking Vitamin D 2000 IU daily, but not in the summer.   Vitamin D, Total (25-Hydroxy)   Date Value Ref Range Status   10/26/2016 43.6 30.0 - 80.0 ng/mL Final       PMH: reviewed in EPIC   Allergies/ADRs: reviewed in EPIC   Alcohol: reviewed in EPIC  Tobacco:   Social History     Tobacco Use   Smoking Status Former Smoker     Packs/day: 0.50     Years: 25.00     Pack years: 12.50     Types: Cigarettes     Last attempt to quit: 1/3/2005     Years since quitting: 15.1   Smokeless Tobacco Never Used     Today's Vitals: There were no vitals filed for this visit.  ----------------    The patient was given a summary of these recommendations as an after visit summary    I spent 30 minutes with this patient today;   All changes were made via collaborative practice agreement with Ramu Jiménez MD. A copy of the visit note was  provided to the patient's provider.     Katerin DhillonD., [facility]  Medication Therapy Management Pharmacist  [facility]     Current Outpatient Medications   Medication Sig Dispense Refill     acyclovir (ZOVIRAX) 400 MG tablet Take 1 tablet (400 mg total) by mouth 2 (two) times a day. 120 tablet 2     cholecalciferol, vitamin D3, 1,000 unit tablet Take 2,000 Units by mouth daily.              diclofenac sodium (VOLTAREN) 1 % Gel Apply 4 times daily to ankle or wrist as needed for pain 100 g 5     hydrOXYzine HCl (ATARAX) 25 MG tablet TAKE 1/2 TABLET(12.5 MG) BY MOUTH AT BEDTIME AS NEEDED FOR ITCHING 45 tablet 3     ibrutinib 420 mg Tab Take 420 mg by mouth daily.       ibuprofen (ADVIL,MOTRIN) 200 MG tablet Take 200 mg by mouth every 6 (six) hours as needed for pain.       LORazepam (ATIVAN) 1 MG tablet TAKE 1 TABLET BY MOUTH DAILY AS NEEDED. 30 tablet 0     metoprolol succinate (TOPROL-XL) 50 MG 24 hr tablet Take 2 tablets (100 mg total) by mouth daily. 180 tablet 2     sertraline (ZOLOFT) 100 MG tablet Take 1 tablet (100 mg total) by mouth daily. 90 tablet 3     tamsulosin (FLOMAX) 0.4 mg cap Take 0.4 mg by mouth daily after supper.       traZODone (DESYREL) 50 MG tablet TAKE 1 TABLET(50 MG) BY MOUTH AT BEDTIME 90 tablet 2     No current facility-administered medications for this visit.

## 2021-06-06 NOTE — PROGRESS NOTES
MTM Follow Up Encounter  Assessment & Plan                                                       Pain: Some improvement in pain. Recommending trying diclofenac gel per Dr. Jiménez, reviewed dosing.     Pericardial effusion: Symptoms resolved.      Atrial Fibrillation: Stable. Patient to follow with cardiology. Per Dr. Arana note from 5/31/17, Would not start aspirin therapy given it is generally not effective in preventing strokes in A. fib and also patient will have increased risk of bleeding with the use of aspirin and Imbruvica.       CLL: Continue to follow with [facility] oncology. Reviewed adherence to Imbruvica. Per last note from Dr. Garza, was to continue Bactrim but I do not see an rx on file. Note from 4/13/18 states to d/c Bactrim. I will clarify.  PLAN:   1. I will clarify Bactrim with oncology     Anxiety: Well controlled. Is not using lorazepam often. Will request a refill from Dr. Jiménez.     Insomnia: Patient seems to be sleeping well, but might be having a morning hangover feeling possibly from trazodone. Will have him try 25 mg trazodone and monitor if he feels less hungover. If improves but still not sleeping well, could consider mirtazapine in the futrue. Ok to continue hydroxyzine HS. Encouraged him to use his CPAP.   PLAN:   1. Try trazodone 25 mg HS to see if AM hangover improves.     Hyperlipidemia: Currently taking no medication.  10-year ASCVD risk =12%.  Will recheck in 2023 per .      BPH: Stable. Recommended to continue current regimen.     Hx Vitamin D Deficiency: Last Vitamin D level WNL. Continue to monitor and take supplement.     Follow Up  2 week phone call     Subjective & Objective                                                       Ramu Espinoza is a 59 y.o. male coming in for a follow up visit for Medication Therapy Management. He was referred to me from self. Of note, last spoke with patient on phone 3/27/19 [facility] after admission for pericardial effusion.     Chief Complaint:  follow up on all medications. Last seen 3/27/19    Medication Adherence/Access: Admits that he is not as good with taking the Imbruvica on the weekends, but he is working on ways to remember to take.     Pain: Currently taking ibuprofen PRN. Reports using 2-3 tablets and not every day, only once in a while. Was prescribed diclofenac gel 1% by Dr. Jiménez but he did not pick it up yet. Following with [facility] for bilateral ankle arthrosis and bilateral hallux rigidus. Received injections which helped a little -- reports the overall arthritis and joint pain has improved, but he feels a burning sensation.     Pericardial effusion: At last [facility] call, patient was admitted for chest pain and CT of chest showed moderate-sized pericardial effusion.  Patient underwent pericardiocentesis on 3/18/19. Started on ibuprofen 400 mg every 6 hours for 5 days and colchicine 0.6 mg daily for 3 months - done. Denies any symptoms now, but once in a while will feel his heart beating which he wonders is afib.      Atrial Fibrillation: Currently taking metoprolol succinate 50 mg x2 (100 mg) daily. Dose increased on 11/16 after wearing Holter monitor. Reports heart beating hard once in a while but no other symptoms. He was told by Dr. Jiménez that he was not in afib when he was seen on 3/2 and the symptoms occurred.   Imbruvica is associated with atrial fib/flutter and rarely ventricular arrhythmia.  Currently sees a cardiologist.  Is not on anticoagulation.   [id number] score = 0.      CLL: Follows with [facility] oncology. Currently taking Imbruvica 142 mg daily.  Diagnosed February 2012 with great response to Imbruvica. Was on hold when I last spoke with patient, but taking now. Sometimes does forget to take on weekend but he will take when he remembers but without doubling. No longer on Bactrim, but recommended to continue acyclovir for ppx, however last note states to continue Bactrim.      Anxiety: Currently taking sertraline 100 mg  daily.  Has been needing lorazepam 1 mg about once a month. Needs a refill. Denies any issues with his mood and medication is going well.   Per [place] , last picked up lorazepam #30 5/30/18  BRANDEN 7 Total Score: 1 (3/2/2020 10:00 AM)    Insomnia: Still taking hydroxyzine 25 mg half tablet (12.5 mg) and trazodone 50 mg HS. Thinks that the hydroxyzine is helpful -- when he skips a dose he does not sleep as well. Working nights and wakes up no alarm which is nice. Reports a hangover feeling in the morning and it takes him a while to feel normal. Drinking a lot more coffee now. Sleeping well, about 7-8 hours. Underwent sleep study on 5/23/19 which showed moderate obstructive sleep apnea. Was started on PAP, but he does not like wearing it. Thought that REM sleep behavior disorder was likely related to sertraline use.      Hyperlipidemia: Currently taking no medication.  10-year ASCVD risk =12%.  Last lipids checked 8/16/18.  Read about statins and is not interested.     BPH: Currently taking tamsulosin 0.4 mg daily. Working great. Steady stream. Nocturia rare. Emptying bladder.     Hx Vitamin D Deficiency: Taking Vitamin D 2000 IU daily, but not in the summer.   Vitamin D, Total (25-Hydroxy)   Date Value Ref Range Status   10/26/2016 43.6 30.0 - 80.0 ng/mL Final       PMH: reviewed in EPIC   Allergies/ADRs: reviewed in EPIC   Alcohol: reviewed in EPIC  Tobacco:   Social History     Tobacco Use   Smoking Status Former Smoker     Packs/day: 0.50     Years: 25.00     Pack years: 12.50     Types: Cigarettes     Last attempt to quit: 1/3/2005     Years since quitting: 15.1   Smokeless Tobacco Never Used     Today's Vitals: There were no vitals filed for this visit.  ----------------    The patient was given a summary of these recommendations as an after visit summary    I spent 30 minutes with this patient today;   All changes were made via collaborative practice agreement with Ramu Jiménez MD. A copy of the visit note was  provided to the patient's provider.     Katerin DhillonD., [facility]  Medication Therapy Management Pharmacist  [facility]     Current Outpatient Medications   Medication Sig Dispense Refill     acyclovir (ZOVIRAX) 400 MG tablet Take 1 tablet (400 mg total) by mouth 2 (two) times a day. 120 tablet 2     cholecalciferol, vitamin D3, 1,000 unit tablet Take 2,000 Units by mouth daily.              diclofenac sodium (VOLTAREN) 1 % Gel Apply 4 times daily to ankle or wrist as needed for pain 100 g 5     hydrOXYzine HCl (ATARAX) 25 MG tablet TAKE 1/2 TABLET(12.5 MG) BY MOUTH AT BEDTIME AS NEEDED FOR ITCHING 45 tablet 3     ibrutinib 420 mg Tab Take 420 mg by mouth daily.       ibuprofen (ADVIL,MOTRIN) 200 MG tablet Take 200 mg by mouth every 6 (six) hours as needed for pain.       LORazepam (ATIVAN) 1 MG tablet TAKE 1 TABLET BY MOUTH DAILY AS NEEDED. 30 tablet 0     metoprolol succinate (TOPROL-XL) 50 MG 24 hr tablet Take 2 tablets (100 mg total) by mouth daily. 180 tablet 2     sertraline (ZOLOFT) 100 MG tablet Take 1 tablet (100 mg total) by mouth daily. 90 tablet 3     tamsulosin (FLOMAX) 0.4 mg cap Take 0.4 mg by mouth daily after supper.       traZODone (DESYREL) 50 MG tablet TAKE 1 TABLET(50 MG) BY MOUTH AT BEDTIME 90 tablet 2     No current facility-administered medications for this visit.

## 2021-06-06 NOTE — TELEPHONE ENCOUNTER
Patient requests refill of lorazepam today during MTM appt. Per note from today:   Has been needing lorazepam 1 mg about once a month. Needs a refill. Denies any issues with his mood and medication is going well.   Per [place] , last picked up lorazepam #30 5/30/18  BRANDEN 7 Total Score: 1 (3/2/2020 10:00 AM)    Order pending to preferred pharmacy if you agree.     Che Silva, Pharm.D., BCACP  Medication Therapy Management Pharmacist  [facility]

## 2021-06-06 NOTE — PATIENT INSTRUCTIONS - HE
I think that your pain in the wrist is likely related to some arthritic wear and tear of the wrist joint as well as mild carpal tunnel symptoms  You can use prescription diclofenac gel on the area as needed  Use your wrist brace      Ankle if it is troublesome follow through with Dr. Roland  Likely you have some mild arthritis and nerve irritation which is driven from wear-and-tear from you being active  Try the diclofenac gel in this area as well    Add Benefiber over-the-counter if you want to add a pot version of increasing fiber content  Try ground up Steven seeds 2 tablespoons each morning and a half a cup of yogurt this should help with bulking up the stools    I am checking today for rheumatologic abnormalities    Follow through with Dr. Arana I believe you are in a sinus rhythm today    Other over-the-counter supplements which may help joints  Omega-3 fatty acids  Curcumin  Ginger and ginger all  Tart cherry juice  Glucosamine/chondroitin MSM

## 2021-06-06 NOTE — PATIENT INSTRUCTIONS - HE
Recommendations from today's PharmD medication management visit                                                       1. Try half tablet of trazodone at night. See if that helps your hangover. Iwill call in a few weeks to follow up.     2. When you use the diclofenac gel, measure using the card 2 grams for upper body and 4 grams for lower body.     3. I will ask Dr. Jiménez for a refill of lorazepam.     4. I will talk to Dr. Garza about the Bactrim.     Next pharmacist visit: Call in a few weeks     It was great to speak with you today.  I value your experience and would be very thankful for your time with providing feedback on our clinic survey. You may receive a survey via email or text message in the next few days.     My Pharmacist's contact information:                                                       It was a pleasure seeing you today to discuss your medications!  Please feel free to contact me with any questions or concerns you have. I look forward to seeing you again to help you get the most benefit from your medications!    To reschedule your PharmD appointment, please call the clinic directly or you may call the [facility] scheduling line at [phone] or toll-free at [phone]:   [facility] (available for appointments Mondays and Thursdays)  [facility] (available for appointments Tuesday, Wednesday & Friday)     Che Silva, [place], BCACP  Medication Therapy Management Pharmacist  [facility]

## 2021-06-06 NOTE — TELEPHONE ENCOUNTER
Called to follow up on sleep since last MTM appt. No answer, left voicemail. Recommended him to call me back if there are any issues.     No word from Dr. coffman about the Bactrim, therefore recommended to address at upcoming appointment.   Per last note from Dr. Coffman, was to continue Bactrim but I do not see an rx on file. Note from 4/13/18 states to d/c Bactrim. Please clarify.    Che Silva, Pharm.D., BCACP  Medication Therapy Management Pharmacist  [facility]

## 2021-06-07 ENCOUNTER — COMMUNICATION - HEALTHEAST (OUTPATIENT)
Dept: NURSING | Facility: CLINIC | Age: 61
End: 2021-06-07

## 2021-06-07 DIAGNOSIS — G47.00 INSOMNIA: ICD-10-CM

## 2021-06-07 NOTE — PROGRESS NOTES
[facility] Progress Note    Patient: Ramu Espinoza  MRN: [id number]  Date of Service: 3/23/2020        Reason for visit      1. Chronic lymphocytic leukemia (H)  Cy inducer Ibrutinib        Assessment     1.  A 59 y.o. gentleman with CLL stage 4 now, diagnosed in 2012.  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  2.  Very good clinical response to Imbruvica. His lymphnodes have normalized.  His CBC is normal.   3.  In May 2017 diagnosis of paroxysmal Atrial fibrillation. This could be caused by Imbruvica. He also drinks a lot of coffee.  He has not had any more recurrences since then.  4.  Pericardial effusion in 2019. Held imbruvica for 3 weeks.  Now resumed it. Doing OK. We have continued it.  He has not had any recurrence.  5.  History of pleurisy right side. No recurrence.  6.  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    Plan     1.   Continue Imbruvica 140 mg 3 pills daily.  He will be on Imbruvica maintenance.  No change in plan.  2.  Have him come back in 4 months time .  3.   Continue Acyclovir.  He does not need Bactrim since 2018.  4. Continue with good diet and exercise.  5. Continue close monitoring of his cardiac issues. OK to work out if ok with cardiology.    Clinical stage      Stage IV    History     Ramu Espinoza is a very pleasant 59 y.o. old male with a history of CLL diagnosed in 2012 presenting with elevated white count in upper 10s/lower 20s with a peripheral blood smear showing atypical lymphocytosis suspicious for CLL.  He was completely asymptomatic, normal hemoglobin and platelet count.  His peripheral blood flow cytometry revealed CD5 co-expressing kappa light chains restricted B cells confirming the diagnosis of CLL.  He has been on observation.  His white count has been slowly going up.    In 2014 his white count was over 100,000.  So he had a CAT scan and that showed increasing lymphadenopathy and splenomegaly.  Platelet  count has been going down.    I offered participation in clinical study in which patients are randomized between fludarabine and cyclophosphamide Rituxan or Imbruvica and Rituxan.  He has been randomized to Imbruvica and rituximab arm. He started on October 2014.  In November 2014 he got Rituxan for the first time.  Tolerated well. Finished that. Now on Imbruvica alone. Tolerating it well.     In May 2017 he was found to have paroxysmal afib when he complained of some fluttering sensation. He had holter monitor which led to the diagnosis. Most days he feels well.     In March 2019 he was found to have pericardial effusion when he started have some chest pain on deep inspiration. Had it tapped. Held Imbruvica for 3 weeks. Then resumed it.  Tolerated it fine. So continues it.     Comes in today for scheduled follow-up. Overall feels good. He feels his heart is doing well.  He is somewhat worried about the new virus that is going around.    Past Medical History     Past Medical History:   Diagnosis Date     BPH (benign prostatic hyperplasia)      CLL (chronic lymphocytic leukemia) (H)      Lymphadenopathy     Created by Conversion  Replacement Utility updated for latest IMO load     Paroxysmal atrial fibrillation with rapid ventricular response (H)      Sleep apnea     hypertension, being slightly overweight, anemic, having reflux, anxiety, epididymitis, hyperlipidemia and tinnitus      Review of Systems   Constitutional  Constitutional (WDL): Exceptions to WDL  Fatigue: Fatigue relieved by rest  Neurosensory  Neurosensory (WDL): Exceptions to WDL  Cardiovascular  Cardiovascular (WDL): Exceptions to WDL  Pulmonary  Respiratory (WDL): Within Defined Limits  Gastrointestinal  Gastrointestinal (WDL): All gastrointestinal elements are within defined limits  Genitourinary  Genitourinary (WDL): All genitourinary elements are within defined limits  Integumentary  Integumentary (WDL): All integumentary elements are within  defined limits  Patient Coping  Patient Coping: Accepting  Accompanied by  Accompanied by: Alone    ECOG performance status and Distress Assessment      ECOG Performance:    ECOG Performance Status: 0    Distress Assessment  Distress Assessment Score: 2:     Pain Status  Currently in Pain: No/denies      Vital Signs     Vitals:    03/23/20 1013   BP: 125/67   Pulse: 61   Temp: 97.9  F (36.6  C)   SpO2: 93%       Physical Exam     GENERAL: No acute distress. Cooperative in conversation.   HEENT: Pupils are equal, round and reactive. Oral mucosa is clean and intact. No ulcerations or mucositis noted. No bleeding noted.  RESP:Chest symmetric lungs are clear bilaterally per auscultation. Regular respiratory rate. No wheezes or rhonchi.  CV: Normal S1 S2 Regular, rate and rhythm. No murmurs.  ABD: Nondistended, soft, nontender. Positive bowel sounds. No organomegaly.   EXTREMITIES: No lower extremity edema.   NEURO: Non- focal. Alert and oriented x3.  Cranial nerves appear intact.  PSYCH: Within normal limits. No depression or anxiety.  SKIN: Warm dry intact.  Slight rash on his shoulder and legs.  LYMPH NODES: Bilateral cervical, supraclavicular, axillary lymph node examination was done.  Negative for any palpable adenopathy.      Lab Results     Results for orders placed or performed in visit on 03/23/20   Comprehensive Metabolic Panel   Result Value Ref Range    Sodium 140 136 - 145 mmol/L    Potassium 4.3 3.5 - 5.0 mmol/L    Chloride 106 98 - 107 mmol/L    CO2 26 22 - 31 mmol/L    Anion Gap, Calculation 8 5 - 18 mmol/L    Glucose 98 70 - 125 mg/dL    BUN 19 8 - 22 mg/dL    Creatinine 0.84 0.70 - 1.30 mg/dL    GFR MDRD Af Amer >60 >60 mL/min/1.73m2    GFR MDRD Non Af Amer >60 >60 mL/min/1.73m2    Bilirubin, Total 0.6 0.0 - 1.0 mg/dL    Calcium 9.6 8.5 - 10.5 mg/dL    Protein, Total 6.7 6.0 - 8.0 g/dL    Albumin 3.7 3.5 - 5.0 g/dL    Alkaline Phosphatase 70 45 - 120 U/L    AST 13 0 - 40 U/L    ALT 17 0 - 45 U/L   HM1  (CBC with Diff)   Result Value Ref Range    WBC 8.3 4.0 - 11.0 thou/uL    RBC 5.08 4.40 - 6.20 mill/uL    Hemoglobin 14.5 14.0 - 18.0 g/dL    Hematocrit 44.4 40.0 - 54.0 %    MCV 87 80 - 100 fL    MCH 28.5 27.0 - 34.0 pg    MCHC 32.7 32.0 - 36.0 g/dL    RDW 13.3 11.0 - 14.5 %    Platelets 257 140 - 440 thou/uL    MPV 9.9 8.5 - 12.5 fL    Neutrophils % 71 (H) 50 - 70 %    Lymphocytes % 16 (L) 20 - 40 %    Monocytes % 8 2 - 10 %    Eosinophils % 4 0 - 6 %    Basophils % 1 0 - 2 %    Neutrophils Absolute 5.9 2.0 - 7.7 thou/uL    Lymphocytes Absolute 1.3 0.8 - 4.4 thou/uL    Monocytes Absolute 0.7 0.0 - 0.9 thou/uL    Eosinophils Absolute 0.3 0.0 - 0.4 thou/uL    Basophils Absolute 0.1 0.0 - 0.2 thou/uL         Imaging Results     No results found.      Dennis Garza MD

## 2021-06-07 NOTE — TELEPHONE ENCOUNTER
Controlled Substance Refill Request  Medication Name:   Requested Prescriptions     Pending Prescriptions Disp Refills     LORazepam (ATIVAN) 1 MG tablet [Pharmacy Med Name: LORAZEPAM 1 MG TABLET 1 TAB] 30 tablet 0     Sig: TAKE 1 TABLET (1 MG TOTAL) BY MOUTH DAILY AS NEEDED.     Date Last Fill: 3/31/20  [facility]ed [facility]  Submit electronically to pharmacy  Controlled Substance Agreement on file:   Encounter-Level CSA Scan Date:    There are no encounter-level csa scan date.        Last office visit:  3/2/20

## 2021-06-07 NOTE — TELEPHONE ENCOUNTER
Patient had an appointment today in clinic.  Medication and diaries for upcoming cycles were given to patient by nursing staff and previous cycles were collected and given to research staff.  Labs were reviewed and no dose modifications needed per protocol.  Left a message for patient to return my call to discuss any new or worsening symptoms.  Per pill diaries for cycles 64-66, patient missed a total of 7 doses.  Will confirm over the phone that doses were indeed missed.    Ly Martinez RN, Research

## 2021-06-07 NOTE — TELEPHONE ENCOUNTER
Medication: LORazepam (ATIVAN) 1 MG tablet   Pharmacy: [facility]   Last OV: 3/2/20 with Dr. Jiménez  Last refill: 3/31/20, #30 with 0 refills authorized by Dr. Tino MOULTON, [facility]

## 2021-06-07 NOTE — TELEPHONE ENCOUNTER
Controlled Substance Refill Request  Medication Name:   Requested Prescriptions     Pending Prescriptions Disp Refills     LORazepam (ATIVAN) 1 MG tablet [Pharmacy Med Name: LORAZEPAM 1 MG TABLET 1 TAB] 30 tablet 0     Sig: TAKE 1 TABLET (1 MG TOTAL) BY MOUTH DAILY AS NEEDED.     Date Last Fill: 3/5/20  [facility]ed [facility]  Submit electronically to pharmacy  Controlled Substance Agreement on file:   Encounter-Level CSA Scan Date:    There are no encounter-level csa scan date.        Last office visit:  3/2/20

## 2021-06-07 NOTE — TELEPHONE ENCOUNTER
Refill Approved    Rx renewed per Medication Renewal Policy. Medication was last renewed on 4/2/19.9/26/19.    Sandra Dalal, [facility] Triage/Med Refill 3/24/2020     Requested Prescriptions   Pending Prescriptions Disp Refills     sertraline (ZOLOFT) 100 MG tablet [Pharmacy Med Name: SERTRALINE 100MG TABLETS] 90 tablet 3     Sig: TAKE 1 TABLET(100 MG) BY MOUTH DAILY       SSRI Refill Protocol  Passed - 3/23/2020  5:54 AM        Passed - PCP or prescribing provider visit in last year     Last office visit with prescriber/PCP: 3/2/2020 Ramu Jiménez MD OR same dept: Visit date not found OR same specialty: Visit date not found  Last physical: Visit date not found Last MTM visit: 2/16/2018 Che Silva, PharmD   Next visit within 3 mo: Visit date not found  Next physical within 3 mo: Visit date not found  Prescriber OR PCP: Ramu Jiménez MD  Last diagnosis associated with med order: 1. BRANDEN (generalized anxiety disorder)  - sertraline (ZOLOFT) 100 MG tablet [Pharmacy Med Name: SERTRALINE 100MG TABLETS]; TAKE 1 TABLET(100 MG) BY MOUTH DAILY  Dispense: 90 tablet; Refill: 3    2. Insomnia  - traZODone (DESYREL) 50 MG tablet [Pharmacy Med Name: TRAZODONE 50MG TABLETS]; TAKE 1 TABLET(50 MG) BY MOUTH AT BEDTIME  Dispense: 90 tablet; Refill: 2    If protocol passes may refill for 12 months if within 3 months of last provider visit (or a total of 15 months).                traZODone (DESYREL) 50 MG tablet [Pharmacy Med Name: TRAZODONE 50MG TABLETS] 90 tablet 2     Sig: TAKE 1 TABLET(50 MG) BY MOUTH AT BEDTIME       Tricyclics/Misc Antidepressant/Antianxiety Meds Refill Protocol Passed - 3/23/2020  5:54 AM        Passed - PCP or prescribing provider visit in last year     Last office visit with prescriber/PCP: 3/2/2020 Ramu Jiménez MD OR same dept: Visit date not found OR same specialty: Visit date not found  Last physical: Visit date not found Last MTM visit: 2/16/2018 Che Silva, PharmD   Next  visit within 3 mo: Visit date not found  Next physical within 3 mo: Visit date not found  Prescriber OR PCP: Ramu Jiménez MD  Last diagnosis associated with med order: 1. BRANDEN (generalized anxiety disorder)  - sertraline (ZOLOFT) 100 MG tablet [Pharmacy Med Name: SERTRALINE 100MG TABLETS]; TAKE 1 TABLET(100 MG) BY MOUTH DAILY  Dispense: 90 tablet; Refill: 3    2. Insomnia  - traZODone (DESYREL) 50 MG tablet [Pharmacy Med Name: TRAZODONE 50MG TABLETS]; TAKE 1 TABLET(50 MG) BY MOUTH AT BEDTIME  Dispense: 90 tablet; Refill: 2    If protocol passes may refill for 12 months if within 3 months of last provider visit (or a total of 15 months).

## 2021-06-07 NOTE — TELEPHONE ENCOUNTER
Medication: Lorazepam (ATIVAN) 1 mg tablet  Pharmacy: [facility] in [place]  Last OV: 3/2/20 with Dr. Jiménez  Last refill: 3/5/20, #30 no refill authorized by Dr. Tino Main, [facility]

## 2021-06-08 ENCOUNTER — COMMUNICATION - HEALTHEAST (OUTPATIENT)
Dept: NURSING | Facility: CLINIC | Age: 61
End: 2021-06-08

## 2021-06-08 DIAGNOSIS — H93.19 TINNITUS: ICD-10-CM

## 2021-06-08 DIAGNOSIS — F41.1 GENERALIZED ANXIETY DISORDER: ICD-10-CM

## 2021-06-08 DIAGNOSIS — G47.00 INSOMNIA: ICD-10-CM

## 2021-06-08 NOTE — PROGRESS NOTES
"FOOT AND ANKLE SURGERY/PODIATRY Progress Note        ASSESSMENT:   Ingrown Nail/Paronychia right hallux  Onychomycosis      TREATMENT:  Ingrown Nail: There is an ingrown nail on the medial border right hallux. We discussed both temporary and permanent nail removal today. Because she has had a temporary nail avulsion procedure performed in the past, I recommend a permanent nail avulsion today. He understands that the nail may become symptomatic in 2% of cases. He consents to the procedure today.  -5 cc of 1% lidocaine plain was injected into the right hallux. The digit was cleansed with betadine swab. Following the application of a digital Tourni-cot the following procedures were performed.  Attention was directed to the medial border of the right hallux toenail where utilizing an [place] elevator and an English anvil nail splitter the nail was split from distal to proximal to the level of the epionychium.  Next the offending border was removed.  3,30 second applications of phenol were applied to the matrix.  The wound was then flushed with copious amounts of alcohol. The Tourni-cot was removed. A dressing was applied comprising of bacitracin 2x2 and 1\" coban wrap.  The tourniquet was removed and normal color returned.    -Post-op instructions were dispensed. All questions invited and answered. He will finish course of keflex, and extend an additional 10 days.    Onychomycosis: I discussed topical and oral anti-fungal medication today including possible elevated LFT's with oral medication. He would like to try topical medication.     -I would like to see the patient again in one week for follow-up VV.     Ephraim Machado DPM  [facility]      HPI: Patient returns today complaining of painful ingrown nail on the right hallux. He has undergone a temporary nail avulsion in the past and developed pain recently. Currently on keflex after being seen at urgent care. He is also concerned " about nail discoloration on the 2nd digit left foot.     Past Medical History:   Diagnosis Date     BPH (benign prostatic hyperplasia)      CLL (chronic lymphocytic leukemia) (H)      Lymphadenopathy     Created by Conversion  Replacement Utility updated for latest IMO load     Paroxysmal atrial fibrillation with rapid ventricular response (H)      Sleep apnea        Past Surgical History:   Procedure Laterality Date     [place] TREAT INTER/SUBTROCH FX,W/PLATE/SCREW      Description: Open Treatment Of An Intertrochanteric Fracture;  Recorded: 04/01/2009;       Allergies   Allergen Reactions     Azithromycin Rash         Current Outpatient Medications:      acyclovir (ZOVIRAX) 400 MG tablet, Take 1 tablet (400 mg total) by mouth 2 (two) times a day., Disp: 120 tablet, Rfl: 2     calcium carbonate (CALCIUM 500 ORAL), None Entered, Disp: , Rfl:      cephalexin (KEFLEX) 500 MG capsule, Take 1 capsule (500 mg total) by mouth 4 (four) times a day for 10 days., Disp: 40 capsule, Rfl: 0     cholecalciferol, vitamin D3, 1,000 unit tablet, Take 2,000 Units by mouth daily.    , Disp: , Rfl:      diclofenac sodium (VOLTAREN) 1 % Gel, Apply 4 times daily to ankle or wrist as needed for pain, Disp: 100 g, Rfl: 5     hydrOXYzine HCl (ATARAX) 25 MG tablet, TAKE 1/2 TABLET(12.5 MG) BY MOUTH AT BEDTIME AS NEEDED FOR ITCHING, Disp: 45 tablet, Rfl: 3     ibrutinib 420 mg Tab, Take 420 mg by mouth daily., Disp: , Rfl:      ibuprofen (ADVIL,MOTRIN) 200 MG tablet, Take 200 mg by mouth every 6 (six) hours as needed for pain., Disp: , Rfl:      LORazepam (ATIVAN) 1 MG tablet, TAKE 1 TABLET (1 MG TOTAL) BY MOUTH DAILY AS NEEDED., Disp: 30 tablet, Rfl: 0     metoprolol succinate (TOPROL-XL) 50 MG 24 hr tablet, Take 2 tablets (100 mg total) by mouth daily., Disp: 180 tablet, Rfl: 0     sertraline (ZOLOFT) 100 MG tablet, TAKE 1 TABLET(100 MG) BY MOUTH DAILY, Disp: 90 tablet, Rfl: 3     tamsulosin (FLOMAX) 0.4 mg cap, Take 0.4 mg by mouth daily after  supper., Disp: , Rfl:      traZODone (DESYREL) 50 MG tablet, TAKE 1 TABLET(50 MG) BY MOUTH AT BEDTIME, Disp: 90 tablet, Rfl: 3    Family History   Problem Relation Age of Onset     Cancer Father         pacreatic      Prostate cancer Father      Colon cancer Mother      Cancer Mother      Cancer Brother      Lung cancer Brother      Hypertension Brother      No Medical Problems Sister      Clotting disorder Brother      COPD Brother      Cancer Brother      Anuerysm Brother      Hypertension Brother      Hypertension Brother      No Medical Problems Brother      Diabetes Paternal Aunt      Diabetes Maternal Aunt      Diabetes Maternal Aunt      Cancer Maternal Grandfather        Social History     Socioeconomic History     Marital status:      Spouse name: Not on file     Number of children: Not on file     Years of education: Not on file     Highest education level: Not on file   Occupational History     Not on file   Social Needs     Financial resource strain: Not on file     Food insecurity     Worry: Not on file     Inability: Not on file     Transportation needs     Medical: Not on file     Non-medical: Not on file   Tobacco Use     Smoking status: Former Smoker     Packs/day: 0.50     Years: 25.00     Pack years: 12.50     Types: Cigarettes     Last attempt to quit: 1/3/2005     Years since quitting: 15.4     Smokeless tobacco: Never Used   Substance and Sexual Activity     Alcohol use: Yes     Alcohol/week: 14.0 standard drinks     Types: 7 Cans of beer, 7 Glasses of wine per week     Comment: on Fridays a bottle of wine     Drug use: No     Sexual activity: Never     Partners: Female   Lifestyle     Physical activity     Days per week: Not on file     Minutes per session: Not on file     Stress: Not on file   Relationships     Social connections     Talks on phone: Not on file     Gets together: Not on file     Attends Holiness service: Not on file     Active member of club or organization: Not on  file     Attends meetings of clubs or organizations: Not on file     Relationship status: Not on file     Intimate partner violence     Fear of current or ex partner: Not on file     Emotionally abused: Not on file     Physically abused: Not on file     Forced sexual activity: Not on file   Other Topics Concern     Not on file   Social History Narrative     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       Vitals:    06/12/20 0846   BP: 122/78   Pulse: 60   Resp: 16   Temp: 98.4  F (36.9  C)       BMI= There is no height or weight on file to calculate BMI.      OBJECTIVE:  Appearance: alert, well appearing, and in no distress.    Vitals:    06/12/20 0846   BP: 122/78   Pulse: 60   Resp: 16   Temp: 98.4  F (36.9  C)       Vascular: Dorsalis pedis and posterior tibial pulses are palpable. There is pedal hair growth.  CFT < 3 sec from anterior tibial surface to distal digits bilaterally. There is no appreciable edema noted.  Dermatologic: Incurvated nail border along the medial border right hallux with localized erythema. Dystrophic nail 2nd digit left foot.   Neurologic: All epicritic and proprioceptive sensations are grossly intact bilaterally.   Musculoskeletal: Pain to palpation medial border right hallux.     Imaging: None

## 2021-06-08 NOTE — PROGRESS NOTES
[facility] Daily Progress     Patient Name: Ramu Espinoza  Date: 2017  Visit #: 8   Referring provider: Esperanza Talamantes*  Visit Diagnosis:     ICD-10-CM    1. Acute right-sided thoracic back pain M54.6    2. Acute right-sided low back pain without sciatica M54.5    3. Decreased ROM of thoracic spine M25.60    4. Decreased ROM of lumbar spine M25.60    5. Muscle weakness (generalized) M62.81          Assessment:     HEP/POC compliance is  good .  Patient demonstrates understanding/independence with home program.  Patient is benefitting from skilled physical therapy and is making steady progress toward functional goals.    Goal Status:  Pt. will be independent with home exercise program in : 4 weeks;Comment  Comment:: for pain <= 4/10 per PLOF; Progressing toward   Patient will decrease : AYESHA score;for improved quality of life;in 4 weeks;by _ points  by ___ points: >= 30% from IE; Progressing toward   Pt will: be able to successfully perform job and household chore without increase in pain per PLOF in 4 weeks.; Progressing toward     Plan / Patient Education:     Continue with initial plan of care.  Progress with home program as tolerated.    Subjective:     Pain Ratin-1    Pt reports that he is feeing good today.  Pt reports that he continues to do the stretching but has also started doing the strengthening exercises.  Pt notes that these exercises really help.      Pt reports that his movement is not limited by his sx.  Pt reports that he sleeps fine with no problems.     Objective:     Pt tolerates the exercises well with minimal cueing for correct technique and to avoid pain. Pt also continues to require cueing for core activation.      Pt with normal overall mobility with no pain.       Pt with increasing thoracic spine and rib mobility R>L = min-mod loss, but also decreased pain.       Pt with decreased postural and thoracolumbar strength.  This continues to be addressed by his HEP.    However, pt cued on the importance of also doing the strengthening portion of his HEP too.      Pt with decreasing right thoracolumbar muscle tightness and tenderness.  Pt still with a trigger point in the right upper lumbar paraspinals.  Pt also with improved rib mobility and no pain.      Current HEP  LTR  S/L thoracic rotation (reach and roll)   Quadruped cat/cow   Child's pose stretch   Thoracic - towel roll along spine - breathe, ABD, shoulder flex   Band core rotation - orange   Bird-dog     Pt demonstrates benefits from skilled PT.          Treatment Today     TREATMENT MINUTES COMMENTS   Evaluation     Self-care/ Home management     Manual therapy 10 STM/TPR to the bilateral thoracolumbar paraspinals R>L  and  Bilateral rib mobility and P/A mobility at the posterior angle of ribs 10-11   Neuromuscular Re-education     Therapeutic Activity     Therapeutic Exercises 18 Review of former HEP  See flow sheet    Gait training     Modality__________________                Total 28    Blank areas are intentional and mean the treatment did not include these items.       Patsy Figueroa, PT   1/5/2017

## 2021-06-08 NOTE — PATIENT INSTRUCTIONS - HE
Day before telephone visit please email pictures of right great toe to [email or web address]    POSTOPERATIVE INSTRUCTIONS AFTER CHEMICAL NAIL REMOVAL SURGERY    What to expect after surgery:  Your toe will be numb for around 2-8 hours after your nail procedure due to the shots that were given.  Expect some degree of soreness in your toe later today when the numbness wears off.  Rest, elevation and ice applied at the ankle will help ease the pain. Your bandage was wrapped snug to cut down on bleeding.    This may feel tight when the numbness wears off.  Please remove the bandage tomorrow morning and begin the foot soaks described below.  Warm water will feel good and helps to ease the pain  How to Care for Your Toe After Surgery  One daily foot soak will be necessary to heal properly.  Chemicals were used to kill the root of the nail.  Expect local redness, drainage and tenderness , this will last for 6-8 weeks.  Soaking helps loosen the scab and dried drainage.  Failure to soak leads to a hard scab that blocks drainage.  Back up drainage increases the pain and the scab may need to be removed with another office procedure.  You will heal much quicker and be more comfortable if you are consistent with local wound care and foot soaks.  Soak one time every day for 2 weeks.  Soaks should last 10 minutes.  Soak after taking a shower to get the germs out.  Soak in warm water with hydrogen peroxide 5 parts water to 1 part hydrogen peroxide.  A small amount of bleeding may be noted which is normal.   Clean the surgical site with a Q-tip during each soak.  Dip the Q-tip in the water and gently clean away any crusted drainage.  The area may be tender but you will not harm anything with the Q-tip.  Pat the area dry and allow a few minutes to air dry before applying any bandages.  Flexible fabric style band aids work best.  In general, the area will be wet from drainage.  A small dab of antibiotic ointment is okay but watch  out for excessive moisture.  White and wrinkled skin is a sign of too much moisture and that the skin needs to be dried out. It is ok to allow the toe some air by removing the band aid as needed.  Activity  Feel free to do normal activities as tolerated on the following day.  Wear open toe sandals if regular shoes are not comfortable.  Avoid shoes that are tight on the toe.  Medications   Finish any antibiotics if they have been prescribed.  Tylenol or ibuprofen may be used as needed for pain.  Icing and elevation also help with pain and swelling.  Risks  Watch for signs of infection.  It is normal to see redness, local tenderness, and drainage around the nail area for up to 8 weeks after permanent nail removal surgery.  Call the clinic at [phone] if you see red streaks spreading up the toe, foot, or leg.  Fever and chills are also concerning and you should notify the clinic if you are having these symptoms.  If these symptoms occur when the clinic is closed, please go to urgent care.  How Well Does Permanent Nail Surgery Work?  Permanent nail surgery means that we intend that the nail problem will not come back.  In a small percentage of patients, nail problems return in about 6 months to a year.  We would like to see you back in the office if you are experiencing problems in the future.  Please call [phone] with any additional questions.
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
## 2021-06-08 NOTE — PROGRESS NOTES
[facility] Daily Progress     Patient Name: Ramu Espinoza  Date: 1/10/2017  Visit #: 9  Referring provider: Esperanza Talamantes*  Visit Diagnosis:     ICD-10-CM    1. Acute right-sided thoracic back pain M54.6    2. Acute right-sided low back pain without sciatica M54.5    3. Decreased ROM of thoracic spine M25.60    4. Decreased ROM of lumbar spine M25.60    5. Muscle weakness (generalized) M62.81          Assessment:     HEP/POC compliance is  good .  Patient demonstrates understanding/independence with home program.  Patient is benefitting from skilled physical therapy and is making steady progress toward functional goals.    Goal Status:  Pt. will be independent with home exercise program in : 4 weeks;Comment  Comment:: for pain <= 4/10 per PLOF; Progressing toward   Patient will decrease : AYESHA score;for improved quality of life;in 4 weeks;by _ points  by ___ points: >= 30% from IE; Progressing toward   Pt will: be able to successfully perform job and household chore without increase in pain per PLOF in 4 weeks.; Progressing toward     Plan / Patient Education:     Continue with initial plan of care.  Progress with home program as tolerated.    Continue for 1 more visit and then likely independence.     Subjective:     Pain Ratin-1    Pt reports that he feel likes is strength is really coming back quickly.  However, pt reports that he continues to have soreness on the right side that he notices mostly this soreness during exercises and very slightly with painting etc.  However, there is no pain during every day tasks.       Pt reports that his movement is not limited by his sx.  Pt reports that he sleeps fine with no problems.     Pt reports that he has been consistent with his HEP exercises.     Objective:     Pt tolerates the exercises well with minimal cueing for correct technique and to avoid pain. Pt also continues to require cueing for core activation.      Pt with increasing thoracic  spine and rib mobility R>L = min-mod loss, but also decreased pain.       Pt with decreased postural and thoracolumbar strength.  This continues to be addressed by his HEP.   However, pt cued on the importance of also doing the strengthening portion of his HEP too.      Pt with decreasing right thoracolumbar muscle tightness and tenderness.  Pt still with a trigger point in the right upper lumbar paraspinals.  Pt also with improved rib mobility and no pain.      Current HEP  LTR  S/L thoracic rotation (reach and roll)   Quadruped cat/cow   Child's pose stretch   Thoracic - towel roll along spine - breathe, ABD, shoulder flex   Band core rotation - orange   Bird-dog     Pt demonstrates benefits from skilled PT.          Treatment Today     TREATMENT MINUTES COMMENTS   Evaluation     Self-care/ Home management     Manual therapy 12 STM/TPR to the bilateral thoracolumbar paraspinals R>L  and  Bilateral rib mobility and P/A mobility at the posterior angle of ribs 10-11   Neuromuscular Re-education     Therapeutic Activity     Therapeutic Exercises 18 Review of former HEP  See flow sheet    Gait training     Modality__________________                Total 30    Blank areas are intentional and mean the treatment did not include these items.       Patsy Figueroa, PT   1/10/2017

## 2021-06-08 NOTE — PROGRESS NOTES
[facility] Progress Note    Patient: Ramu Espinoza  MRN: [id number]  Date of Service: 1/12/2017        Reason for visit      1. Chronic lymphocytic leukemia        Assessment     1.  A 56 y.o. gentleman with CLL stage 4 now, diagnosed in February 2012.  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  2.  Impressive clinical response to Imbruvica. His lymphnodes have normalized.  His CBC is almost normal.   3.  Significant anxiety and some stress.  4.  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  5.  Recent diagnosis of pleurisy right side.    Plan     1.   Continue Imbruvica 140 mg 3 pills daily.  He will be on Imbruvica maintenance.  2.  Have him come back in 3 months time .  3.   Continue Bactrim and Acyclovir.  4. Continue with good diet and exercise.  5. Counseled about stress reduction.    Clinical stage      Stage IV    History     Ramu Espinoza is a very pleasant 56 y.o. old male with a history of CLL diagnosed in 02/2012 presenting with elevated white count in upper 10s/lower 20s with a peripheral blood smear showing atypical lymphocytosis suspicious for CLL.  He was completely asymptomatic, normal hemoglobin and platelet count.  His peripheral blood flow cytometry revealed CD5 co-expressing kappa light chains restricted B cells confirming the diagnosis of CLL.  He has been on observation.  His white count has been slowly going up.    In June 2014 his white count was over 100,000.  So he had a CAT scan and that showed increasing lymphadenopathy and splenectomy.  Platelet count has been going down.    I offered participation in clinical study in which patients are randomized between fludarabine and cyclophosphamide Rituxan or Imbruvica and Rituxan.  He has been randomized to Imbruvica and rituximab arm. He started on October 2014.  In November 2014 he got Rituxan for the first time.  Tolerated well. Finished that. Now on Imbruvica alone. Tolerating it well.    Comes in today for  scheduled follow-up. Most days he feels well. In December 2016 he was diagnosed with pleurisy        Past Medical History     Past Medical History   Diagnosis Date     BPH (benign prostatic hyperplasia)      CLL (chronic lymphocytic leukemia)      Lymphadenopathy      Created by Conversion  Replacement Utility updated for latest IMO load    hypertension, being slightly overweight, anemic, having reflux, anxiety, epididymitis, hyperlipidemia and tinnitus      Review of Systems   Constitutional  Constitutional (WDL): Exceptions to WDL  Fatigue: Fatigue relieved by rest  Neurosensory  Neurosensory (WDL): Exceptions to WDL  Cardiovascular  Cardiovascular (WDL): All cardiovascular elements are within defined limits  Pulmonary  Respiratory (WDL): Exceptions to WDL  Cough: Mild symptoms, nonprescription intervention indicated (dry cough)  Dyspnea: Shortness of breath with moderate exertion  Gastrointestinal  Gastrointestinal (WDL): All gastrointestinal elements are within defined limits  Genitourinary  Genitourinary (WDL): All genitourinary elements are within defined limits  Integumentary  Integumentary (WDL): All integumentary elements are within defined limits (dry skin)  Patient Coping  Patient Coping: Accepting  Accompanied by  Accompanied by: Alone    ECOG performance status and Distress Assessment      ECOG Performance:    ECOG Performance Status: 0    Distress Assessment  Distress Assessment Score: 2:     Pain Status  Currently in Pain: No/denies      Vital Signs     Vitals:    01/12/17 1519   BP: 122/75   Pulse: 74   Temp: 98.4  F (36.9  C)   SpO2: 95%       Physical Exam     GENERAL: No acute distress. Cooperative in conversation.   HEENT: Pupils are equal, round and reactive. Oral mucosa is clean and intact. No ulcerations or mucositis noted. No bleeding noted.  RESP:Chest symmetric lungs are clear bilaterally per auscultation. Regular respiratory rate. No wheezes or rhonchi.  CV: Normal S1 S2 Regular, rate and  rhythm. No murmurs.  ABD: Nondistended, soft, nontender. Positive bowel sounds. No organomegaly.   EXTREMITIES: No lower extremity edema.   NEURO: Non- focal. Alert and oriented x3.  Cranial nerves appear intact.  PSYCH: Within normal limits. No depression or anxiety.  SKIN: Warm dry intact.  Slight rash on his shoulder and legs.  LYMPH NODES: Bilateral cervical, supraclavicular, axillary lymph node examination was done.  Negative for any palpable adenopathy.      Lab Results     Results for orders placed or performed in visit on 01/12/17   Comprehensive Metabolic Panel   Result Value Ref Range    Sodium 137 136 - 145 mmol/L    Potassium 4.6 3.5 - 5.0 mmol/L    Chloride 104 98 - 107 mmol/L    CO2 27 22 - 31 mmol/L    Anion Gap, Calculation 6 5 - 18 mmol/L    Glucose 85 70 - 125 mg/dL    BUN 15 8 - 22 mg/dL    Creatinine 0.81 0.70 - 1.30 mg/dL    GFR MDRD Af Amer >60 >60 mL/min/1.73m2    GFR MDRD Non Af Amer >60 >60 mL/min/1.73m2    Bilirubin, Total 0.9 0.0 - 1.0 mg/dL    Calcium 9.6 8.5 - 10.5 mg/dL    Protein, Total 6.5 6.0 - 8.0 g/dL    Albumin 3.7 3.5 - 5.0 g/dL    Alkaline Phosphatase 83 45 - 120 U/L    AST 16 0 - 40 U/L    ALT 12 0 - 45 U/L   HM1 (CBC with Diff)   Result Value Ref Range    WBC 12.2 (H) 4.0 - 11.0 thou/uL    RBC 4.99 4.40 - 6.20 mill/uL    Hemoglobin 14.3 14.0 - 18.0 g/dL    Hematocrit 42.7 40.0 - 54.0 %    MCV 86 80 - 100 fL    MCH 28.6 27.0 - 34.0 pg    MCHC 33.3 32.0 - 36.0 g/dL    RDW 12.9 11.0 - 14.5 %    Platelets 297 140 - 440 thou/uL    MPV 7.3 7.0 - 10.0 fL    Neutrophils % 58 50 - 70 %    Lymphocytes % 31 20 - 40 %    Monocytes % 8 2 - 10 %    Eosinophils % 4 0 - 6 %    Basophils % 0 0 - 2 %    Neutrophils Absolute 7.1 2.0 - 7.7 thou/uL    Lymphocytes Absolute 3.8 0.8 - 4.4 thou/uL    Monocytes Absolute 1.0 (H) 0.0 - 0.9 thou/uL    Eosinophils Absolute 0.4 0.0 - 0.4 thou/uL    Basophils Absolute 0.0 0.0 - 0.2 thou/uL         Imaging Results     Xr Chest Pa And Lateral    Result Date:  12/16/2016  XR CHEST PA AND [id number]/16/2016 11:01 AMINDICATION: recheck pleural effusionCOMPARISON: 12/13/2016FINDINGS: There is blunting of the right lateral costophrenic angle consistent with small right pleural effusion. There is minimal atelectasis at the right base. The left lung is clear. The heart size and pulmonary vascularity are normal. There is no pneumothorax. The amount of pleural effusion appears similar to the chest CT 12/16/2013.This report was electronically interpreted by: Dr. Simone Garcia MD ON 12/16/2016 at 13:41    Xr Thoracic Spine 2 Vws    Result Date: 12/18/2016  XR THORACIC SPINE 2 VWS12/16/2016 11:25 AMINDICATION: back painCOMPARISON: Chest CT 10/3/2015.FINDINGS: There is good anatomic alignment of the thoracic spine. The vertebral body heights are well-maintained throughout and stable in the interval. There are degenerative changes of the the mid to lower thoracic disc spaces. These levels have a mild loss of height, endplate changes and small anterior osteophyte formations.This report was electronically interpreted by: Dr. Janelle Grace MD ON 12/18/2016 at 00:25        Dennis Garza MD

## 2021-06-08 NOTE — PROGRESS NOTES
"[facility] Daily Progress     Patient Name: Ramu Espinoza  Date: 1/3/2017  Visit #: 7   Referring provider: Esperanza Talamantes*  Visit Diagnosis:     ICD-10-CM    1. Acute right-sided thoracic back pain M54.6    2. Acute right-sided low back pain without sciatica M54.5    3. Decreased ROM of thoracic spine M25.60    4. Decreased ROM of lumbar spine M25.60    5. Muscle weakness (generalized) M62.81          Assessment:     HEP/POC compliance is  good .  Patient demonstrates understanding/independence with home program.  Patient is benefitting from skilled physical therapy and is making steady progress toward functional goals.    Goal Status:  Pt. will be independent with home exercise program in : 4 weeks;Comment  Comment:: for pain <= 4/10 per PLOF; Progressing toward   Patient will decrease : AYESHA score;for improved quality of life;in 4 weeks;by _ points  by ___ points: >= 30% from IE; Progressing toward   Pt will: be able to successfully perform job and household chore without increase in pain per PLOF in 4 weeks.; Progressing toward     Plan / Patient Education:     Continue with initial plan of care.  Progress with home program as tolerated.    Subjective:     Pain Ratin    Pt reports that he is getting better overall.  Pt reports that he feels frustrated because he continues to feel stiff and \"does not feel strong.\"  Pt reports that he does the stretching at home, but does not do the strengthening exercises, \"because after I stretch I don't feel like doing the the strengthening.\"     Pt reports that his movement is not limited by his sx.  Pt reports that he sleeps fine with no problems.  .    Objective:     Pt tolerates the exercises well with minimal cueing for correct technique and to avoid pain. Pt also continues to require cueing for core activation.      Pt with normal overall mobility with no pain.       Pt with increasing thoracic spine and rib mobility R>L = min-mod loss, but also " decreased pain.       Pt with decreased postural and thoracolumbar strength.  This continues to be addressed by his HEP.   However, pt cued on the importance of also doing the strengthening portion of his HEP too.      Pt with decreasing right thoracolumbar muscle tightness and tenderness.  Pt also with continued right rib tightness (pain at 10 and 11 ribs)    Current HEP  LTR  S/L thoracic rotation (reach and roll)   Quadruped cat/cow   Child's pose stretch   Thoracic - towel roll along spine - breathe, ABD, shoulder flex   Band core rotation - orange   Bird-dog     Pt demonstrates benefits from skilled PT.          Treatment Today     TREATMENT MINUTES COMMENTS   Evaluation     Self-care/ Home management     Manual therapy 10 STM/TPR to the bilateral thoracolumbar paraspinals R>L  and  Bilateral rib mobility and P/A mobility at the posterior angle of ribs 10-11   Neuromuscular Re-education     Therapeutic Activity     Therapeutic Exercises 18 Review of former HEP  See flow sheet    Gait training     Modality__________________                Total 28    Blank areas are intentional and mean the treatment did not include these items.       Patsy Figueroa, PT   1/3/2017

## 2021-06-08 NOTE — PROGRESS NOTES
Subjective:      Patient ID: Ramu Espinoza is a 60 y.o. male.    Chief Complaint:    HPI  Ramu Espinoza is a 60 y.o. male who presents today concerned that he may have ringworm or Lyme disease on his left forearm.  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  Patient then noticed that there was a red raised welt that was slightly itchy.  He was concerned that this may be ringworm or Lyme disease.  It subsequently has resolved.  He is now presenting to clinic for evaluation.    He had no known break in the skin bleeding known insect bite or other contact with poison ivy specifically but he was having contact with plant matter.    Lastly, the patient stated he may have had a allergic reaction to the Keflex medication he was taking for his toenail.  Patient states he is doing very well with the antibiotic treatment and the toenail treatment plan.  However, he has had no other rash difficulty with swallowing or breathing.      Past Medical History:   Diagnosis Date     BPH (benign prostatic hyperplasia)      CLL (chronic lymphocytic leukemia) (H)      Lymphadenopathy     Created by Conversion  Replacement Utility updated for latest IMO load     Paroxysmal atrial fibrillation with rapid ventricular response (H)      Sleep apnea        Past Surgical History:   Procedure Laterality Date     UT TREAT INTER/SUBTROCH FX,W/PLATE/SCREW      Description: Open Treatment Of An Intertrochanteric Fracture;  Recorded: 04/01/2009;       Family History   Problem Relation Age of Onset     Cancer Father         pacreatic      Prostate cancer Father      Colon cancer Mother      Cancer Mother      Cancer Brother      Lung cancer Brother      Hypertension Brother      No Medical Problems Sister      Clotting disorder Brother      COPD Brother      Cancer Brother      Anuerysm Brother      Hypertension Brother      Hypertension Brother      No Medical  Problems Brother      Diabetes Paternal Aunt      Diabetes Maternal Aunt      Diabetes Maternal Aunt      Cancer Maternal Grandfather        Social History     Tobacco Use     Smoking status: Former Smoker     Packs/day: 0.50     Years: 25.00     Pack years: 12.50     Types: Cigarettes     Last attempt to quit: 1/3/2005     Years since quitting: 15.4     Smokeless tobacco: Never Used   Substance Use Topics     Alcohol use: Yes     Alcohol/week: 14.0 standard drinks     Types: 7 Cans of beer, 7 Glasses of wine per week     Comment: on Fridays a bottle of wine     Drug use: No       Review of Systems  As above in HPI, otherwise balance of Review of Systems are negative.    Objective:     /81 (Patient Site: Right Arm, Patient Position: Sitting, Cuff Size: Adult Large)   Pulse 63   Temp 98.4  F (36.9  C) (Oral)   Resp 16   Wt (!) 268 lb (121.6 kg)   SpO2 97%   BMI 34.41 kg/m      Physical Exam  General: Patient is resting comfortably no acute distress is afebrile  HEENT: Head is normocephalic atraumatic   Skin: Without rash.  On the left volar forearm there is an area roughly 1 cm in diameter that appears to be resolving.  This had been slightly erythematous or raised wheal but is resolving.  There is no noted dermographism no break in the skin no lymphangitic streaking.      Assessment:     Procedures    1. Dermatitis         Plan:     At a discussion with the patient stating that this could be from a contact dermatitis such as plant matter or poison ivy, could be from a insect bite.  However it appears to be non-reacting and resolving at this time.  He will use topical hydrocortisone and over-the-counter Zyrtec to help with itching and resolution of the rash.  If he should develop a worsening contact dermatitis such as poison ivy or reaction to insect bite he can return to the walk-in care for reevaluation and treatment.  I did address his concerns that this was not consistent with either ringworm or Lyme  disease.  Lastly, he was concerned this may be a reaction to his treatment with Keflex.  I advised him this is not consistent with a drug eruption rash.  There is no other involvement on the face or the anterior posterior chest wall.  Since the rash had resolved already since this afternoon it would not fit the description of either of those 2 concerns.  Questions were answered to patient satisfaction before discharge.

## 2021-06-08 NOTE — PROGRESS NOTES
[facility] Daily Progress     Patient Name: Ramu Espinoza  Date: 2017  Visit #: 10  Referring provider: Esperanza Talamantes*  Visit Diagnosis:     ICD-10-CM    1. Acute right-sided thoracic back pain M54.6    2. Acute right-sided low back pain without sciatica M54.5    3. Decreased ROM of thoracic spine M25.60    4. Decreased ROM of lumbar spine M25.60    5. Muscle weakness (generalized) M62.81          Assessment:     HEP/POC compliance is  good .  Patient demonstrates understanding/independence with home program.  Patient demonstrates readiness for independent sx management and HEP.    Goal Status:  Pt. will be independent with home exercise program in : 4 weeks;Comment  Comment:: for pain <= 4/10 per PLOF; Met  Patient will decrease : AYESHA score;for improved quality of life;in 4 weeks;by _ points  by ___ points: >= 30% from IE; Met  Pt will: be able to successfully perform job and household chore without increase in pain per PLOF in 4 weeks.; Met     Plan / Patient Education:     Patient to try independent sx management and HEP.  PT will hold the chart for 30 days and then discharge the pt if he does not return.      Subjective:     Pain Ratin-1    Pt reports that he has not been having much pain lately.  Pt reports that he is much looser and much stronger since starting PT.      Pt reports that his movement is not limited by his sx.  Pt reports that he sleeps fine with no problems.     Pt reports that he has been consistent with his HEP exercises.     Pt reports that he is feeling 100% better and ready to try independent sx management and HEP.      Objective:     Pt has progressed slowly but well with return to full function and decreased pain.      Lumbar ROM: All % of WNL  Date:    17   *Indicate scale AROM AROM   Lumbar Flexion 100 100%, no pain75%    Lumbar Extension 50 and PF  75% stiffness      Right Left Right Left   Lumbar Sidebending 25 and PF! 75 with end-range stiffness   80% tightness 80% tightness   Lumbar Rotation 100 with end-range stiffness 100 with end-range stiffness  100%, no pain   100%, no pain   Pt with increasing thoracic spine and rib mobility R>L = min lloss, but also decreased pain.    Strength:  Pt with improved postural and thoracolumbar strength as evidenced by his posture and movement with exercises and lifting.  This continues to be addressed by his HEP.        Palpation:  Decreased right thoracolumbar muscle tightness and tenderness = minimal  Pt still with a trigger point in the right upper lumbar paraspinals.  Pt also with improved rib mobility and no pain.      Outcomes:  Modified Oswestry Low Back Pain Disablity Questionnaire  in %: 16  Eval score was 80%      Current HEP  LTR  S/L thoracic rotation (reach and roll)   Quadruped cat/cow   Child's pose stretch   Thoracic - towel roll along spine - breathe, ABD, shoulder flex   Band core rotation - orange   Bird-dog     Pt demonstrates benefits from skilled PT.          Treatment Today     TREATMENT MINUTES COMMENTS   Evaluation     Self-care/ Home management     Manual therapy 12 STM/TPR to the bilateral thoracolumbar paraspinals R>L  and  Bilateral rib mobility and P/A mobility at the posterior angle of ribs 10-11   Neuromuscular Re-education     Therapeutic Activity     Therapeutic Exercises 18 UBE x3 min   Reassessment of sx   Review and cues on HEP   Gait training     Modality__________________                Total 30    Blank areas are intentional and mean the treatment did not include these items.       Patsy Figueroa, PT   1/17/2017

## 2021-06-08 NOTE — PROGRESS NOTES
CCx: pleural effusion    HPI:   Mr. Espinoza is a 56-year-old gentleman who has been referred because of a pleural effusion found on CT scan.  Patient had an episode of some sharp chest pain that was somewhat on his right flank.  States it resolved after 2 days.  States he has sometimes get short of breath climbing up several flights of stairs.  Otherwise, patient denies any symptoms.  Patient denies any shortness of breath with minimal or moderate exertion.  Patient denies any cough.  Patient denies any fevers, chills, or night sweats.    The patient was diagnosed with CLL.  He had a CT scan in October which did not reveal any evidence of a pleural effusion.  Had a CT PE done in December showing a small right-sided pleural effusion with associated atelectasis.  No evidence of pulmonary embolus.  Patient denies any heart disease, liver disease, or renal disease.  Patient denies any recent URI symptoms.  Patient denies any recent pneumonia.  He has had pneumonia once before in the late 2000s, around 2006 or 2007.    Shortness of breath while climbing stairs    No recent pneumonia, pneumonia late 2000s.    No heart problems, normal kidney function, no history of liver disease    Work History: , [employer], maintenance, construction  Hobbies: piano, work out  Pets: none  Tobacco Use: quit 2005  Home Environment: single family home, built in 1912, no mold, city water    PMH:  Past Medical History   Diagnosis Date     BPH (benign prostatic hyperplasia)      CLL (chronic lymphocytic leukemia)      Lymphadenopathy      Created by Conversion  Replacement Utility updated for latest IMO load       PSH:  Past Surgical History   Procedure Laterality Date     Pr treat inter/subtroch fx,w/plate/screw       Description: Open Treatment Of An Intertrochanteric Fracture;  Recorded: 04/01/2009;       SH:  Social History     Social History     Marital status:      Spouse name: N/A     Number of children: N/A      Years of education: N/A     Occupational History     Not on file.     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       Family history:  Family History   Problem Relation Age of Onset     Cancer Father      Pancreatic cancer Father      d@67     Colon cancer Mother      Cancer Mother      Cancer Brother      Lung cancer Brother      Hypertension Brother      No Medical Problems Sister      No Medical Problems Brother      Cancer Brother      Anuerysm Brother      No Medical Problems Brother      No Medical Problems Brother      Diabetes Paternal Aunt      Diabetes Maternal Aunt      Diabetes Maternal Aunt      Cancer Maternal Grandfather        ROS:  Review of Systems - History obtained from the patient  General ROS: negative  Psychological ROS: negative  ENT ROS: negative  Allergy and Immunology ROS: negative  Endocrine ROS: negative  Respiratory ROS: positive for - none  negative for - cough, shortness of breath or tachypnea  Cardiovascular ROS: no chest pain or dyspnea on exertion  Gastrointestinal ROS: no abdominal pain, change in bowel habits, or black or bloody stools  Genito-Urinary ROS: no dysuria, trouble voiding, or hematuria  Musculoskeletal ROS: negative  Neurological ROS: no TIA or stroke symptoms  Dermatological ROS: negative      Current Meds:  Current Outpatient Prescriptions   Medication Sig Note     acyclovir (ZOVIRAX) 400 MG tablet TAKE 1 TABLET BY MOUTH TWICE DAILY      cholecalciferol, vitamin D3, 1,000 unit tablet Take 2,000 Units by mouth daily. 1-2 tablets      ibuprofen (ADVIL,MOTRIN) 200 MG tablet Take 800 mg by mouth as needed for pain.      Lactobacillus  rhamnosus GG (CULTURELLE) 10-15 Billion cell capsule Take 1 capsule by mouth daily.       LORazepam (ATIVAN) 1 MG tablet TAKE 1 TABLET BY MOUTH DAILY (Patient taking differently: TAKE 1 TABLET BY MOUTH DAILY/ PRN)      melatonin 5 mg TbDL disintegrating tablet Take 5 mg by mouth bedtime as needed.      Study Drug Ibrutinib 140 mg 140 mg cap capsule Take 420 mg by mouth daily.      sulfamethoxazole-trimethoprim (SEPTRA DS) 800-160 mg per tablet TAKE 1 TABLET BY MOUTH THREE TIMES A WEEK      tamsulosin (FLOMAX) 0.4 mg Cp24 TK 1 C PO ONCE D PC 4/14/2016: Received from: External Pharmacy     traZODone (DESYREL) 50 MG tablet Take 1 tablet (50 mg total) by mouth bedtime.        Labs:  No results found for this or any previous visit (from the past 72 hour(s)).    I have personally reviewed all imaging and PFT data available pertinent to this visit.    Imaging studies:  Ct Abdomen Pelvis Without Oral Without Iv Contrast    Result Date: 12/13/2016  CT ABDOMEN PELVIS WO ORAL WO IV CONTRAST 12/13/2016 3:54 AM INDICATION: Right flank pain - ? ureteral stone, CLL TECHNIQUE: Routine CT abdomen and pelvis without oral or IV contrast. Multiplanar reformation images (MPR). Dose reduction techniques were used. COMPARISON: 10/3/2015 FINDINGS: LUNG BASES: New small right effusion with adjacent consolidation. ABDOMEN: Hepatic cysts stable. No renal stones or hydronephrosis. Noncontrast images of spleen, adrenal glands, pancreas and gallbladder unremarkable. No aneurysm. No adenopathy. PELVIS: Diverticulosis without diverticulitis. Small paraumbilical hernia with fat within it. MUSCULOSKELETAL: Degenerative disease. Right hip hardware.     CONCLUSION: 1.  Diverticulosis without diverticulitis. 2.  New small right pleural effusion with adjacent consolidation. 3.  No renal stones or hydronephrosis.    Cta Chest Pe Run    Result Date: 12/13/2016  CTA CHEST PE RUN 12/13/2016 3:52 AM INDICATION: Pleuritic right low chest pain, elevated  "D-dimer - evaluate PE. TECHNIQUE: Helical acquisition through the chest was performed during the arterial phase of contrast enhancement using IV contrast. 2D and 3D reconstructions were performed by the CT technologist. Dose reduction techniques were used. IV CONTRAST: Omni 350, 100 mL. COMPARISON: Chest CT 10/3/2015. FINDINGS: ANGIOGRAM CHEST: Negative for pulmonary emboli. Negative for thoracic aortic dissection and aneurysms. LUNGS AND PLEURA: Small right pleural effusion has developed with minimal bibasilar atelectasis. MEDIASTINUM: Mildly prominent left axillary lymph node which has decreased in size since the prior study. No enlarged mediastinal or hilar lymph nodes. LIMITED UPPER ABDOMEN: Incompletely visualized hepatic cysts. MUSCULOSKELETAL: Negative.     CONCLUSION: 1.  No pulmonary embolism. There is a new small right pleural effusion present. 2.  Mildly prominent left axillary lymph node which has decreased in size since 10/3/2015.      PFTs:  none    Physical Exam:  Visit Vitals     /78     Pulse 79     Resp 18     Ht 6' 1\" (1.854 m)     Wt (!) 260 lb (117.9 kg)     SpO2 95%  Comment: RA     BMI 34.3 kg/m2     General - Well nourished  Ears/Mouth - TMs clear bilaterally,  OP pink moist, no thrush  Neck - no cervical lymphadenopathy  Lungs - Clear to ausculation bilaterally  CVS - regular rhythm with no murmurs, rubs or gallups  Abdomen - soft, NT, ND, NABS  Ext - no cyanosis, clubbing or edema  Skin - no rash  Psychology - alert and oriented, answers appropriate      Assessment and Plan:  1. Pleural effusion - unclear etiology of the pleural effusion at this time.  However, it is quite small measuring a little over a centimeter by my measurements.  At this time too small to try to pursue a thoracentesis.  Patient denies any history of liver, heart, or renal disease.  Additionally patient denies any recent history of pneumonia in the past few months.  CT scan did not reveal any pulmonary embolus.  " At this time think most likely etiology of it is his leukemia.  Approximately 15-20% may have a pleural effusion associated with it.  Again, think it is too small to perform a thoracentesis.  Additionally, patient has virtually no symptoms.  Advised him that if he were to start feeling more short of breath, he should be evaluated for possible enlarging effusion.  At which time perhaps he could pursue a diagnostic thoracentesis.  Advised him to call back if worse experiences symptoms.  For now, will follow up as needed.      Cj Mercado MD  Pulmonary and Critical Care Medicine  [phone]

## 2021-06-09 NOTE — PROGRESS NOTES
FOOT AND ANKLE SURGERY/PODIATRY Progress Note      ASSESSMENT:   S/p Nail avulsion     Type of service:  Video Visit       Telephone Start and End Time : 8:51/8:55    Originating Location (pt. Location):  home      Distant Location :  [facility] VASC[facility]         Mode of Communication: Gareth,     TREATMENT:  -Overall, he is doing well. I recommend he avoid soaking in hydrogen peroxide and simply soak in soapy water daily, avoid antibacterial creams/ointments. He will finish course of keflex.    -All questions invited and answered. He is discharged from my care and will follow-up as concerns develop.       MUSTAPHA Mason [facility]      HPI: Ramu Espinoza was seen again today s/p medial border right hallux. He is doing well, admits to mild pain. He has been soaking as directed and taking keflex.       Past Medical History:   Diagnosis Date     BPH (benign prostatic hyperplasia)      CLL (chronic lymphocytic leukemia) (H)      Lymphadenopathy     Created by Conversion  Replacement Utility updated for latest IMO load     Paroxysmal atrial fibrillation with rapid ventricular response (H)      Sleep apnea        Past Surgical History:   Procedure Laterality Date     MS TREAT INTER/SUBTROCH FX,W/PLATE/SCREW      Description: Open Treatment Of An Intertrochanteric Fracture;  Recorded: 04/01/2009;       Allergies   Allergen Reactions     Azithromycin Rash         Current Outpatient Medications:      acyclovir (ZOVIRAX) 400 MG tablet, Take 1 tablet (400 mg total) by mouth 2 (two) times a day., Disp: 120 tablet, Rfl: 2     calcium carbonate (CALCIUM 500 ORAL), None Entered, Disp: , Rfl:      cholecalciferol, vitamin D3, 1,000 unit tablet, Take 2,000 Units by mouth daily.    , Disp: , Rfl:      ciclopirox (PENLAC) 8 % solution, Apply topically daily. Apply daily to toenals, Disp: 1 Bottle, Rfl: 6     diclofenac sodium (VOLTAREN) 1 % Gel, Apply 4 times daily to ankle or wrist as  needed for pain, Disp: 100 g, Rfl: 5     hydrOXYzine HCl (ATARAX) 25 MG tablet, TAKE 1/2 TABLET(12.5 MG) BY MOUTH AT BEDTIME AS NEEDED FOR ITCHING, Disp: 45 tablet, Rfl: 3     ibrutinib 420 mg Tab, Take 420 mg by mouth daily., Disp: , Rfl:      ibuprofen (ADVIL,MOTRIN) 200 MG tablet, Take 200 mg by mouth every 6 (six) hours as needed for pain., Disp: , Rfl:      LORazepam (ATIVAN) 1 MG tablet, TAKE 1 TABLET (1 MG TOTAL) BY MOUTH DAILY AS NEEDED., Disp: 30 tablet, Rfl: 0     metoprolol succinate (TOPROL-XL) 50 MG 24 hr tablet, Take 2 tablets (100 mg total) by mouth daily., Disp: 180 tablet, Rfl: 0     tamsulosin (FLOMAX) 0.4 mg cap, Take 0.4 mg by mouth daily after supper., Disp: , Rfl:      traZODone (DESYREL) 50 MG tablet, TAKE 1 TABLET(50 MG) BY MOUTH AT BEDTIME, Disp: 90 tablet, Rfl: 3  No current facility-administered medications for this visit.     Review of Systems - 10 point Review of Systems is negative except for ingrown nail which is noted in HPI.      OBJECTIVE:  There were no vitals filed for this visit.  General appearance: Patient is alert and fully cooperative with history & exam.  No sign of distress is noted during the visit.

## 2021-06-09 NOTE — PATIENT INSTRUCTIONS - HE
Use soapy water for daily soaking instead of hydrogen peroxide soak.     Leave open to air when not wearing shoes.    Follow-up not needed at this time, please contact our office if any additional concerns.

## 2021-06-09 NOTE — PROGRESS NOTES
[facility] Hematology and Oncology Progress Note    Patient: Ramu Espinoza  MRN: [id number]  Date of Service: 20          Reason for Visit    Chief Complaint   Patient presents with     HE Cancer     Chronic lymphocytic leukemia (H)  Cy inducer Ibrutinib        Assessment and Plan    1.  A 57 yo gentleman with CLL stage 4 now, diagnosed in 2012. 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 Great clinical response to Imbruvica. His lymph nodes have normalized. His CBC is basically normal today. Continue Imbruvica 140 mg- 3 pills daily. Return in 3 months with labs and to see Dr. Garza. Continues on clinical trial.     2. Paroxysmal A. Fib: could be from Imbruvica (<9% risk) on metoprolol. Continue to follow with Dr. Arana.     3.  Chronic fatigue: I encouraged patient to get good sleep and uses CPAP. Still does not do that.  Encouraged him to have a healthy diet and get lots of fluids. Encourage him to only drink one glass of wine per night.     ECOG Performance   ECOG Performance Status: 1     Distress Assessment  Distress Assessment Score: 3     Pain  Currently in Pain: Yes  Pain Score (Initial OR Reassessment): 3  Location: body aches    Problem List    1. Chronic lymphocytic leukemia (H)  Cy inducer Ibrutinib  Study Drug Ibrutinib 140 mg capsule 420 mg      ______________________________________________________________________________    History of Present Illness    Ramu Espinoza is a very pleasant 58 y.o. old male with a history of CLL diagnosed in 2012 presenting with elevated white count in upper 10s/lower 20s with a peripheral blood smear showing atypical lymphocytosis suspicious for CLL.  He was completely asymptomatic, normal hemoglobin and platelet count.  His peripheral blood flow cytometry revealed CD5 co-expressing kappa light chains restricted B cells confirming the diagnosis of CLL.  He has been on observation.  His white count has been slowly going up.      In   2014 his white count was over 100,000. So he had a CAT scan and that showed increasing lymphadenopathy and splenectomy. Platelet count has been going down.      I offered participation in clinical study in which patients are randomized between fludarabine and cyclophosphamide Rituxan or Imbruvica and Rituxan. He has been randomized to Imbruvica and rituximab arm. He started on October 2014. In November 2014 he got Rituxan for the first time. Tolerated well. Finished that. Now on Imbruvica alone. Tolerating it well.      In September 2019 he had pleuritic chest pain and was found to have a pericardial effusion with acute pericarditis.  He had a pericardiocentesis and they removed 350ml of serosanguinous fluid. He was discharged on ibuprofen as well as colchicine.  He had a pericardial effusion drained and held the Imbruvica for about 2 weeks.  The resumed and has been doing fine since.   He is feeling good.  Only issue is chronic fatigue.       Review of Systems    Constitutional  Constitutional (WDL): Exceptions to WDL  Fatigue: Concerns  Weight Gain: Concerns  Neurosensory  Neurosensory (WDL): Exceptions to WDL  Peripheral Motor Neuropathy: Concerns(burning in toes intermittently)  Eye   Eye Disorder (WDL): All eye disorder elements are within defined limits(glasses)  Ear  Ear Disorder (WDL): Exceptions to WDL  Tinnitus: Concerns(bilateral)  Cardiovascular  Cardiovascular (WDL): Exceptions to WDL  Palpitations: Concerns(intermittently)  Pulmonary  Respiratory (WDL): Within Defined Limits  Gastrointestinal  Gastrointestinal (WDL): Exceptions to WDL  Diarrhea: Concerns  Genitourinary  Genitourinary (WDL): All genitourinary elements are within defined limits  Lymphatic  Lymph (WDL): All lymph disorder elements are within defined limits  Musculoskeletal and Connective Tissue  Musculoskeletal and Connetive Tissue Disorders (WDL): Exceptions to WDL  Arthralgia: Concerns  Myalgia: Concerns  Integumentary  Integumentary  "(WDL): All integumentary elements are within defined limits  Patient Coping  Patient Coping: Accepting;Open/discussion  Accompanied by  Accompanied by: Alone  Oral Chemo Adherence         Past History  Past Medical History:   Diagnosis Date     BPH (benign prostatic hyperplasia)      CLL (chronic lymphocytic leukemia) (H)      Lymphadenopathy     Created by Conversion  Replacement Utility updated for latest IMO load     Paroxysmal atrial fibrillation with rapid ventricular response (H)      Sleep apnea        PHYSICAL EXAM  /72   Pulse 63   Temp 97.6  F (36.4  C) (Oral)   Ht 6' 2\" (1.88 m)   Wt (!) 273 lb 12.8 oz (124.2 kg)   SpO2 96%   BMI 35.15 kg/m      GENERAL: no acute distress. Cooperative in conversation. Here alone due to visitor restrictions. Mask on  RESP: Regular respiratory rate. No expiratory wheezes   MUSCULOSKELETAL: no bilateral leg swelling  ABD: no splenomegaly or hepatomegaly  NEURO: non focal. Alert and oriented x3.   PSYCH: within normal limits. No depression or anxiety.  SKIN: exposed skin is dry intact.   LYMPH: no abnormal lymphadenopathy.     Lab Results    Recent Results (from the past 168 hour(s))   Comprehensive Metabolic Panel   Result Value Ref Range    Sodium 140 136 - 145 mmol/L    Potassium 4.0 3.5 - 5.0 mmol/L    Chloride 105 98 - 107 mmol/L    CO2 25 22 - 31 mmol/L    Anion Gap, Calculation 10 5 - 18 mmol/L    Glucose 86 70 - 125 mg/dL    BUN 17 8 - 22 mg/dL    Creatinine 0.86 0.70 - 1.30 mg/dL    GFR MDRD Af Amer >60 >60 mL/min/1.73m2    GFR MDRD Non Af Amer >60 >60 mL/min/1.73m2    Bilirubin, Total 0.6 0.0 - 1.0 mg/dL    Calcium 8.9 8.5 - 10.5 mg/dL    Protein, Total 6.6 6.0 - 8.0 g/dL    Albumin 3.7 3.5 - 5.0 g/dL    Alkaline Phosphatase 69 45 - 120 U/L    AST 17 0 - 40 U/L    ALT 21 0 - 45 U/L   HM1 (CBC with Diff)   Result Value Ref Range    WBC 8.4 4.0 - 11.0 thou/uL    RBC 4.78 4.40 - 6.20 mill/uL    Hemoglobin 14.1 14.0 - 18.0 g/dL    Hematocrit 43.6 40.0 - 54.0 " %    MCV 91 80 - 100 fL    MCH 29.5 27.0 - 34.0 pg    MCHC 32.3 32.0 - 36.0 g/dL    RDW 12.7 11.0 - 14.5 %    Platelets 241 140 - 440 thou/uL    MPV 10.5 8.5 - 12.5 fL    Neutrophils % 67 50 - 70 %    Lymphocytes % 18 (L) 20 - 40 %    Monocytes % 8 2 - 10 %    Eosinophils % 6 0 - 6 %    Basophils % 1 0 - 2 %    Neutrophils Absolute 5.7 2.0 - 7.7 thou/uL    Lymphocytes Absolute 1.5 0.8 - 4.4 thou/uL    Monocytes Absolute 0.7 0.0 - 0.9 thou/uL    Eosinophils Absolute 0.5 (H) 0.0 - 0.4 thou/uL    Basophils Absolute 0.1 0.0 - 0.2 thou/uL       Imaging    No results found.      Signed by: Radha Alarcon, CNP

## 2021-06-09 NOTE — PROGRESS NOTES
[facility]   [facility]-Pelvic Initial Evaluation    Patient Name: Ramu Espinoza  Date of evaluation: 2/21/2017  Referral Diagnosis: Back pain  Referring provider: Esperanza Talamantes*  Visit Diagnosis:     ICD-10-CM    1. Acute left-sided low back pain without sciatica M54.5    2. Acute left-sided thoracic back pain M54.6    3. Neck pain on left side M54.2    4. Muscle weakness (generalized) M62.81        Assessment:      Pt. is appropriate for skilled PT intervention as outlined in the Plan of Care (POC).    Goals:  Pt. will demonstrate/verbalize independence in self-management of condition in : 6 weeks  Pt. will be independent with home exercise program in : 4 weeks;Comment;Met  Comment:: for pain <= 4/10 per PLOF  Pt. will improve posture : and demonstrate posture with minimal to no cuing;and maintain posture for;5 minutes;in 6 weeks  Patient will decrease : AYESHA score;for improved quality of function;in 6 weeks  by ___ points: >= 30% from IE  Pt will: be able to lift for work with pain <2/10; in 6 weeks     Patient's expectations/goals are realistic.    Barriers to Learning or Achieving Goals:  No Barriers.       Plan / Patient Instructions:        Plan of Care:   Communication with: Referral Source; (CRC);Employer  Patient Related Instruction: Nature of Condition;Treatment plan and rationale;Self Care instruction;Basis of treatment;Body mechanics;Posture;Expected outcome  Times per Week: 2  Number of Weeks: 4  Number of Visits: 8 - PRN   Discharge Planning: Pt will be discharged upon meeting goals or plateau of progress   Therapeutic Exercise: ROM;Stretching;Strengthening  Neuromuscular Reeducation: kinesio tape;posture;core  Manual Therapy: soft tissue mobilization;myofascial release;joint mobilization;muscle energy;strain counterstrain  Modalities: electrical stimulation;TENS;ultrasound;cold pack;hot pack    Plan for next visit: Focus on exercises and MT     Subjective:          History of Present Illness:    Ramu is a 56 y.o. male who presents to therapy today with complaints of low back pain. Date of onset/duration of symptoms is 1-25-17 when the pt slipped on the sidewalk and landed on his back. Onset was sudden and related to trauma. Pt fell on his back and hit his head and lost consciousness.  Pt then when to the ER to get checked out and all imaging was fine.  Symptoms are intermittent. He reports  an episodic  history of low back pain, but this is different. He describes their previous level of function as not limited    Pt reports that now he has left sided low back, thoracic and some neck pain.  Pain does not stop him from his activity, but he just has pain.      Pt reports that he has been continuing his stretches everyday and has slowly been getting back into his strengthening.  Pt reports that he has started a stair climbing exercise program.     Pt reports that his current HEP is: squats, hip ABD/ER, bridge, ab set with leg lowers, quadruped bird dog, ab isometrics -straight and diagonal, prone trunk ext, trunk rotation with band  Hamstring stretch, reach and roll, LTR, cat/cow      Pain Rating:3   Pain rating at best: 1  Pain rating at worst: 6  Pain description: aching, dull and pain    Functional limitations are described as occurring with:   lifting    Patient reports benefit from:  rest  , pain medication, heat       Objective:      Note: Items left blank indicates the item was not performed or not indicated at the time of the evaluation.    Patient Outcome Measures :    Modified Oswestry Low Back Pain Disablity Questionnaire  in %: 20   Scores range from 0-100%, where a score of 0% represents minimal pain and maximal function. The minimal clinically important difference is a score reduction of 12%.    Examination  1. Acute left-sided low back pain without sciatica     2. Acute left-sided thoracic back pain     3. Neck pain on left side     4. Muscle weakness  (generalized)       Involved side: Left  Posture Observation:      General sitting posture is  fair.    Lumbar ROM:    Date: 2/21/17     *Indicate scale AROM AROM AROM   Lumbar Flexion No loss, no pain     Lumbar Extension Min loss, min L pain       Right Left Right Left Right Left   Lumbar Sidebending Min loss, no pain Min loss, no pain        Lumbar Rotation No loss, min p No loss, min p       Thoracic Flexion No loss, no pain      Thoracic Extension Mod+ loss, min p     Thoracic Sidebending Min loss, min p Min loss, min pain        Thoracic Rotation           Cervical ROM  Date: 2/21/16      *Indicate scale AROM AROM AROM   Cervical Flexion No loss, no pain      Cervical Extension No loss, no pain       Right Left Right Left Right Left   Cervical Sidebending Min loss R stretch  Min loss, no pain        Cervical Rotation Min loss, no pain  Min loss, min pain        Cervical Protraction No loss, no pan      Cervical Retraction No loss, no pain          Lower Extremity Strength:     Date: 2/21/17     LE strength/5 Right Left Right Left Right Left   Hip Flexion (L1-3) 5 5       Hip Extension (L5-S1)         Hip Abduction (L4-5) 5 5       Hip Adduction (L2-3) 5 5       Hip External Rotation 5 5       Hip Internal Rotation 5 5       Knee Extension (L3-4) 5 5       Knee Flexion 5 5       Ankle Dorsiflexion (L4-5) 5 5       Great Toe Extension (L5) 5 5       Ankle Plantar flexion (S1) 5 5       Abdominals        Sensation: WNL         Reflex Testing: NT    Palpation:  Increased tightness and tenderness over the left upper lumbar, thoracic and scapular paraspinals and muscles   Increased left scalene tightness and tenderness     Lumbar Special Tests:    Lumbar Special Tests Right Left SI Tests Right  Left   Quadrant test   SI Compression - -   Straight leg raise 50 50 SI Distraction - -   Crossover response - - POSH Test - -   Slump   Sacral Thrust - -   Sit-up test  FADIR     Trunk extensor endurance test  Resisted  Abduction     Prone instability test  Other:     Pubic shotgun  Other:       Repeated Motion Testing:  Does not centralize  Does not peripheralize    Passive Mobility - Joint Integrity:  Hypomobile over entire spine, no pain   Decreased hip mobiliy slightly     LE Screen:  Not indicated.    Treatment Today     TREATMENT MINUTES COMMENTS   Evaluation 30 Low    Self-care/ Home management     Manual therapy 10 STM to left scalenes, thoracic and lumbar paraspinals   Pt prone    Neuromuscular Re-education     Therapeutic Activity     Therapeutic Exercises 20 Pathology, POC, HEP  See flow sheet    Gait training     Modality__________________                Total 60    Blank areas are intentional and mean the treatment did not include these items.     PT Evaluation Code: (Please list factors)  Patient History/Comorbidities: None   Examination: decreased mobility, tightness, muscle weakness   Clinical Presentation: stable and uncomplicated   Clinical Decision Making: low     Patient History/  Comorbidities Examination  (body structures and functions, activity limitations, and/or participation restrictions) Clinical Presentation Clinical Decision Making (Complexity)   No documented Comorbidities or personal factors 1-2 Elements Stable and/or uncomplicated Low   1-2 documented comorbidities or personal factor 3 Elements Evolving clinical presentation with changing characteristics Moderate   3-4 documented comorbidities or personal factors 4 or more Unstable and unpredictable High            Patsy Figueroa  2/21/2017  3:32 PM

## 2021-06-09 NOTE — PROGRESS NOTES
[facility] Daily Progress     Patient Name: Ramu Espinoza  Date: 3/14/2017  Visit #: 4  Referring provider: Esperanza Talamantes*  Visit Diagnosis:     ICD-10-CM    1. Acute left-sided thoracic back pain M54.6    2. Neck pain on left side M54.2    3. Acute left-sided low back pain without sciatica M54.5    4. Muscle weakness (generalized) M62.81          Assessment:     HEP/POC compliance is  good .  Patient demonstrates understanding/independence with home program.  Patient demonstrates readiness for independent sx management and HEP.     Goal Status:  Pt. will demonstrate/verbalize independence in self-management of condition in : 6 weeks;Met  Pt. will be independent with home exercise program in : 4 weeks;Comment;Met  Comment:: for pain <= 4/10 per PLOF  Pt. will improve posture : and demonstrate posture with minimal to no cuing;and maintain posture for;5 minutes;in 6 weeks;Met  Patient will decrease : AYESHA score;for improved quality of function;in 6 weeks;Met  by ___ points: >= 30% from IE  Pt will: be able to lift for work with pain <2/10; in 6 weeks; Met    Plan / Patient Education:     Patient to try independent sx management and HEP.  PT will hold the chart for 30 days and then discharge the patient if he does not return.     Subjective:     Pain Ratin-2     Pt reports that his left side back pain is feeling a lot better. Pt reports that he has some of his typical right sided pain.     Pt reports that overall he is feeling stronger and has gotten into a good regimen with his strengthening and stretching.      Objective:     Pt has progressed well and has returned to full function with almost no pain.      Mobility:  General mobility and spine ROM = WNL and no pain     Strength:   Pt with increased postural and core strength with increased awareness.  This continues to be addressed by his HEP.      Palpation:  Pt with improving spine mobility with still moderate restriction but no  "pain.  Pt with decreased left cervical, thoracic and lumbar muscle tightness and tenderness = minimal     Outcomes:  Modified Oswestry Low Back Pain Disablity Questionnaire  in %: 12  Eval score was 20%      Current Exercises:  Exercise #1: Verbal review strengthening: squats, hip ABD/ER, bridge, ab set with leg lowers, quadruped bird dog, ab isometrics -straight and diagonal, prone trunk ext, trunk rotation with band  Comment #1: Verbal review stretching: Hamstring stretch, reach and roll, LTR, cat/cow   Exercise #2: Kneeling hip flexor stretch   Comment #2: Bx30\" each   Exercise #3: Scap retract  Comment #3: x7 with 5\"  Exercise #4: Scalene/UT stretch   Comment #4: Lx30\" each   Exercise #5: Foam roller - review         Treatment Today     TREATMENT MINUTES COMMENTS   Evaluation     Self-care/ Home management     Manual therapy 9 STM to left scalenes, thoracic and lumbar paraspinals   Pt prone    Neuromuscular Re-education     Therapeutic Activity     Therapeutic Exercises 21 Reviewed and reassessed entire HEP.  See flow sheet    Gait training     Modality__________________                Total 28    Blank areas are intentional and mean the treatment did not include these items.       Patsy Figueroa, PT   3/14/2017  "

## 2021-06-09 NOTE — PROGRESS NOTES
[facility] Discharge Summary    Patient Name: Ramu Espinoza  Date: 2/20/2017  Referring provider: Esperanza Talamantes*  Visit Diagnosis:   1. Acute right-sided thoracic back pain     2. Acute right-sided low back pain without sciatica     3. Decreased ROM of thoracic spine     4. Decreased ROM of lumbar spine     5. Muscle weakness (generalized)         Goals:  Pt. will be independent with home exercise program in : 4 weeks;Comment;Met  Comment:: for pain <= 4/10 per PLOF  Patient will decrease : AYESHA score;for improved quality of life;in 4 weeks;by _ points;Met  by ___ points: >= 30% from IE  Pt will: be able to successfully perform job and household chore without increase in pain per PLOF in 4 weeks.; Met    Patient was seen for 10 visits with the last visit on 1/17/17. .  The patient met goals and has demonstrated understanding of and independence in the home program for self-care, and progression to next steps.  He will initiate contact if questions or concerns arise.    Therapy will be discontinued at this time.  The patient will need a new referral to resume.    Thank you for your referral.  Patsy Figueroa  2/20/2017  5:08 PM

## 2021-06-09 NOTE — PROGRESS NOTES
[facility] Daily Progress     Patient Name: Ramu Espinoza  Date: 2017  Visit #: 2  Referring provider: Esperanza Talamantes*  Visit Diagnosis:     ICD-10-CM    1. Acute left-sided thoracic back pain M54.6    2. Neck pain on left side M54.2    3. Muscle weakness (generalized) M62.81    4. Acute left-sided low back pain without sciatica M54.5          Assessment:     HEP/POC compliance is  good .  Patient demonstrates understanding/independence with home program.  Patient is benefitting from skilled physical therapy and is making steady progress toward functional goals.    Goal Status:  Pt. will demonstrate/verbalize independence in self-management of condition in : 6 weeks  Pt. will be independent with home exercise program in : 4 weeks;Comment;Met  Comment:: for pain <= 4/10 per PLOF  Pt. will improve posture : and demonstrate posture with minimal to no cuing;and maintain posture for;5 minutes;in 6 weeks  Patient will decrease : AYESHA score;for improved quality of function;in 6 weeks  by ___ points: >= 30% from IE  Pt will: be able to lift for work with pain <2/10; in 6 weeks     Plan / Patient Education:     Continue with initial plan of care.  Progress with home program as tolerated.    Subjective:     Pain Ratin-2     Pt reports that the neck exercises made him sore, so he did not do them.  Pt reports that he is sore and achy but it is not impairing his activity.  Pt reports that his sx are intermittent.      Pt reports that he has done all of the rest of the exercises consistently.     Pt reports that he also stopped taking the Tylenol he had been taking for the past month.      Objective:     Pt tolerates the exercises well with moderate cueing for technique.      Pt with decreased postural and core strength with decreased awareness.      Pt with slightly decreased spine mobility and minimal pain.      Pt with increased left thoracic and lumbar muscle tightness and tenderness.  Pt also  "with increased left scalene muscle tightness and tenderness.  This decreased significantly with MT.      Current Exercises:  Exercise #1: Verbal review strengthening: squats, hip ABD/ER, bridge, ab set with leg lowers, quadruped bird dog, ab isometrics -straight and diagonal, prone trunk ext, trunk rotation with band  Comment #1: Verbal review stretching: Hamstring stretch, reach and roll, LTR, cat/cow   Exercise #2: Kneeling hip flexor stretch   Comment #2: Bx30\" each   Exercise #3: Scap retract  Comment #3: x7 with 5\"  Exercise #4: Scalene/UT stretch   Comment #4: Lx30\" each   Exercise #5: Foam roller - review         Treatment Today     TREATMENT MINUTES COMMENTS   Evaluation     Self-care/ Home management     Manual therapy 10 STM to left scalenes, thoracic and lumbar paraspinals   Pt prone    Neuromuscular Re-education     Therapeutic Activity     Therapeutic Exercises 16 See flow sheet    Gait training     Modality__________________                Total 26    Blank areas are intentional and mean the treatment did not include these items.       Patsy Figueroa, PT   2/28/2017      "

## 2021-06-09 NOTE — PROGRESS NOTES
Patient is here for labs and provider for Chronic lymphocytic leukemia (H)  Cy inducer Ibrutinib.

## 2021-06-09 NOTE — PROGRESS NOTES
Medication Therapy Management Follow Up Visit       ASSESSMENT AND PLAN  BRANDEN/Sleep: Controlled based on PHQ9/GAD7. No changes in anxiety. Due to side effects upon awakening and possible urinary retention, recommended Clive stop hydroxyzine. Discussed limiting caffeine intake to help with anxiety. Recommended he continue exercising for anxiety as well.   Plan  1. Trial off hydroxyzine  2. Continue trazodone to 50 mg     Enlarged Prostate: Controlled with daily Flomax. No changes made today. Will check to see if stopping hydroxyzine impacts urinary symptoms. In the future, could consider increasing dose of Flomax.   Plan  1. Continue current medication       GERD: No symptoms off PPI. Recommended he remain off omeprazole. No changes made today  Plan   1. Stay off PPI     RECOMMENDED FOLLOW-UP    Ramu Espinoza was recommended to follow up in: 6 months    SUBJECTIVE AND OBJECTIVE    aRmu Espinoza is a 56 y.o. male here for a medication therapy management (MTM)  follow up appointment.     BRANDEN/Sleep: Stated that he stopped citalopram months ago. Reported he still gets anxious. Stated he limits alcohol intake to Fridays/Saturdays. Has been using melatonin almost every night to help with sleep. Stated that he's taking trazodone 50 mg at bedtime nightly and lorazepam about once per week. Stated that he doesn't wake up with any side effects with trazodone. Continues hydroxyzine 25 mg at bedtime. Reported that he takes melatonin and about an hour later will take trazodone and hydroxyzine. Gets up in the middle of the night to use the bathroom almost nightly but stated he's able to go back to sleep. Stated that he has been getting 7 or 7.5 hours of sleep, but doesn't think this is enough.    Today  PHQ9 = 2  GAD7 = 1    October 2016  PHQ9 = 2  GAD7 = 1      CLL: Continues Ibrutinib 140 mg - 3 capsules (420 mg daily), Bactrim DS three times per week MWF, and acyclovir 400 mg twice daily. Reported that  "he tried taking probiotic, but described his digestive system going nuts. Wanted to know what other ways he could get probiotics into system.       GERD:  Reported that he has been drinking at least 5-6 cups of coffee per day during the work week, especially since he has lowered alcohol intake. Denied any issues with heartburn. Hasn't taken omeprazole, but will occasionally use an \"organic anti-acid\" supplement since our last visit.       Enlarged Prostate: Continues Flomax 0.4 mg once daily. Reported that he continues to have urinary urgency. Stated that it feels like he's emptying his bladder, but it's hard to say, because he drink a ton of water/coffee.     General Health: Stated that he's still taking vitamin D 3000 IU once daily. Stated that he knows \"high cholesterol\" is something that runs in his family. ASCVD risk based on most recent lipid panel is 6.7%. Stated that he's trying to stay in shape because he doesn't want to take medications.     The following are part of a depression follow up plan for the patient:  mental mamie screening education, mental health screening management, mental health treatment assessment and mental health treatment education    Munira Ashby, PharmD, [facility]                              We reviewed Ramu's medication list with them, discussing reason for use, directions for use, and potential side effects of each medication.  Indication, safety, efficacy, and convenience was assessed for all reviewed medications.  No environmental factors were noted currently affecting patient.  This care plan was communicated via EMR with his primary care provider, Esperanza Talamantes MD, and is the authorizing prescriber for this visit.  Direct supervision was available by either the patient's PCP or another available physician when needed. Time and complexity billing metrics are included in the docflowsheet linked to this visit. Time spent: 30 minutes    Other " pertinent information at the time of this visit:  Current Outpatient Prescriptions   Medication Sig Dispense Refill     acyclovir (ZOVIRAX) 400 MG tablet TAKE 1 TABLET BY MOUTH TWICE DAILY 120 tablet 6     cholecalciferol, vitamin D3, 1,000 unit tablet Take 2,000 Units by mouth daily. 1-2 tablets       hydrOXYzine (ATARAX) 25 MG tablet Take 25 mg by mouth bedtime.       hydrOXYzine (ATARAX) 25 MG tablet TAKE 1 TO 4 TABLETS BY MOUTH UP TO FOUR TIMES DAILY AS NEEDED FOR ANXIETY OR INSOMNIA 160 tablet 0     ibuprofen (ADVIL,MOTRIN) 200 MG tablet Take 800 mg by mouth as needed for pain.       LORazepam (ATIVAN) 1 MG tablet TAKE 1 TABLET BY MOUTH DAILY 30 tablet 0     melatonin 5 mg TbDL disintegrating tablet Take 5 mg by mouth bedtime as needed.       Study Drug Ibrutinib 140 mg 140 mg cap capsule Take 420 mg by mouth daily.       sulfamethoxazole-trimethoprim (SEPTRA DS) 800-160 mg per tablet TAKE 1 TABLET BY MOUTH THREE TIMES A WEEK 36 tablet 3     tamsulosin (FLOMAX) 0.4 mg Cp24 TK 1 C PO ONCE D PC  11     traZODone (DESYREL) 50 MG tablet Take 1 tablet (50 mg total) by mouth bedtime. 360 tablet 1     No current facility-administered medications for this visit.        This transcription uses voice recognition software, which may contain typographical errors.

## 2021-06-09 NOTE — PROGRESS NOTES
Assessment/Plan:        Diagnoses and all orders for this visit:    Back pain  -     Cancel: XR Thoracic Spine 2 VWS; Future; Expected date: 2/8/17  -     Cancel: XR Lumbar Spine 2 or 3 VWS; Future; Expected date: 2/8/17  -     Cancel: XR Ribs Left; Future; Expected date: 2/8/17    He is going to return for x-rays. In te meantime would use gentle heat to the area as I believe this is musculoskeletal in nature.         Subjective:    Patient ID: Ramu Espinoza is a 56 y.o. male.    Back Pain   This is a new problem. The current episode started more than 1 month ago. The problem occurs intermittently. The problem is unchanged. The pain is present in the thoracic spine. The quality of the pain is described as aching and cramping. The pain does not radiate. The pain is at a severity of 3/10. The pain is mild. The pain is the same all the time. The symptoms are aggravated by position. Stiffness is present all day. Risk factors include history of cancer. He has tried analgesics and heat for the symptoms. The treatment provided mild relief.       The following portions of the patient's history were reviewed and updated as appropriate: allergies, current medications, past family history, past medical history, past social history, past surgical history and problem list.    Review of Systems   Musculoskeletal: Positive for back pain and myalgias.   All other systems reviewed and are negative.            Objective:    Physical Exam   Constitutional: He appears well-developed and well-nourished. No distress.   Musculoskeletal: Normal range of motion. He exhibits no tenderness or deformity.   No pain to palpation over back. ROM at thoracic spine is normal. No bruising noted over area..    Skin: Skin is warm and dry. No rash noted. No erythema.   Nursing note and vitals reviewed.

## 2021-06-09 NOTE — PROGRESS NOTES
[facility] Daily Progress     Patient Name: Ramu Espinoza  Date: 3/7/2017  Visit #: 3  Referring provider: Esperanza Talamantes*  Visit Diagnosis:     ICD-10-CM    1. Acute left-sided thoracic back pain M54.6    2. Neck pain on left side M54.2    3. Acute left-sided low back pain without sciatica M54.5    4. Muscle weakness (generalized) M62.81          Assessment:     HEP/POC compliance is  good .  Patient demonstrates understanding/independence with home program.  Patient is benefitting from skilled physical therapy and is making steady progress toward functional goals.    Goal Status:  Pt. will demonstrate/verbalize independence in self-management of condition in : 6 weeks  Pt. will be independent with home exercise program in : 4 weeks;Comment;Met  Comment:: for pain <= 4/10 per PLOF  Pt. will improve posture : and demonstrate posture with minimal to no cuing;and maintain posture for;5 minutes;in 6 weeks  Patient will decrease : AYESHA score;for improved quality of function;in 6 weeks  by ___ points: >= 30% from IE  Pt will: be able to lift for work with pain <2/10; in 6 weeks     Plan / Patient Education:     Continue with initial plan of care.  Progress with home program as tolerated.     Continue x1 more and then likely independence.     Subjective:     Pain Ratin-2     Pt reports that overall he is feeling better and not having a lot pain.  Pt reports some soreness after stretching and feels great after strengthening. .      Pt reports that he does not to his neck exercises but has done all of the rest of the exercises consistently.       Objective:     Pt tolerates the exercises well with moderate cueing for technique.      Pt with increasing postural and core strength with increased awareness.  This continues to be addressed by his HEP.      Pt with improving spine mobility and no pain.      Pt still with increased left thoracic and decreased lumbar muscle tightness and tenderness.  Pt  "also with decreased left scalene muscle tightness and tenderness.  This decreased significantly with MT.      Current Exercises:  Exercise #1: Verbal review strengthening: squats, hip ABD/ER, bridge, ab set with leg lowers, quadruped bird dog, ab isometrics -straight and diagonal, prone trunk ext, trunk rotation with band  Comment #1: Verbal review stretching: Hamstring stretch, reach and roll, LTR, cat/cow   Exercise #2: Kneeling hip flexor stretch   Comment #2: Bx30\" each   Exercise #3: Scap retract  Comment #3: x7 with 5\"  Exercise #4: Scalene/UT stretch   Comment #4: Lx30\" each   Exercise #5: Foam roller - review         Treatment Today     TREATMENT MINUTES COMMENTS   Evaluation     Self-care/ Home management     Manual therapy 12 STM to left scalenes, thoracic and lumbar paraspinals   Pt prone    Neuromuscular Re-education     Therapeutic Activity     Therapeutic Exercises 16 See flow sheet    Gait training     Modality__________________                Total 28    Blank areas are intentional and mean the treatment did not include these items.       Patsy Figueroa, PT   3/7/2017    "

## 2021-06-10 NOTE — PROGRESS NOTES
[facility] Discharge Summary    Patient Name: Ramu Espinoza  Date: 4/18/2017  Referring provider: Esperanza Talamantes*  Visit Diagnosis:   1. Acute left-sided thoracic back pain     2. Neck pain on left side     3. Acute left-sided low back pain without sciatica     4. Muscle weakness (generalized)         Goals:  Pt. will demonstrate/verbalize independence in self-management of condition in : 6 weeks;Met  Pt. will improve posture : and demonstrate posture with minimal to no cuing;and maintain posture for;5 minutes;in 6 weeks;Met  Patient will decrease : AYESHA score;for improved quality of function;in 6 weeks;Met  Pt will: be able to lift for work with pain <2/10; in 6 weeks; Met    Patient was seen for 4 visits with the last visit on 3/14/17.  .  The patient met goals and has demonstrated understanding of and independence in the home program for self-care, and progression to next steps.  He will initiate contact if questions or concerns arise.    Therapy will be discontinued at this time.  The patient will need a new referral to resume.    Thank you for your referral.  Patsy Figueroa  4/18/2017  9:57 AM

## 2021-06-10 NOTE — PROGRESS NOTES
Assessment/Plan:        Diagnoses and all orders for this visit:    Palpitations  -     T4, Free  -     Thyroid Cascade  -     Magnesium  -     Phosphorus  -     One-Lead Patch Monitor; Future; Expected date: 4/14/17    Will check labs   Will set up event monitor to see if we can catch these onn the monitor.   FU will be after this is done         Subjective:    Patient ID: Ramu Espinoza is a 56 y.o. male.    Palpitations    This is a new problem. The current episode started more than 1 month ago. The problem occurs intermittently. The problem has been unchanged. Nothing aggravates the symptoms. Associated symptoms include anxiety and an irregular heartbeat. Pertinent negatives include no chest fullness, chest pain, coughing, diaphoresis, dizziness, fever, malaise/fatigue, nausea, near-syncope, numbness, shortness of breath, syncope or weakness. He has tried nothing for the symptoms. Risk factors include being male, dyslipidemia and stress. His past medical history is significant for anxiety.       The following portions of the patient's history were reviewed and updated as appropriate: allergies, current medications, past family history, past medical history, past social history, past surgical history and problem list.    Review of Systems   Constitutional: Negative for activity change, appetite change, diaphoresis, fatigue, fever and malaise/fatigue.   Respiratory: Negative for cough, shortness of breath and wheezing.    Cardiovascular: Positive for palpitations. Negative for chest pain, leg swelling, syncope and near-syncope.   Gastrointestinal: Negative for nausea.   Neurological: Negative for dizziness, tremors, weakness, light-headedness and numbness.   Psychiatric/Behavioral: The patient is nervous/anxious.              Objective:    Physical Exam   Constitutional: He appears well-developed and well-nourished. No distress.   Cardiovascular: Normal rate and regular rhythm.  Exam reveals friction rub. Exam  reveals no gallop.    No murmur heard.  Pulmonary/Chest: Effort normal and breath sounds normal. He has no wheezes. He has no rales. He exhibits no tenderness.   Musculoskeletal: He exhibits no edema.   Nursing note and vitals reviewed.

## 2021-06-10 NOTE — TELEPHONE ENCOUNTER
Refill Approved    Rx renewed per Medication Renewal Policy. Medication was last renewed on 6/27/19.    Sandra Dalal, [facility] Triage/Med Refill 8/17/2020     Requested Prescriptions   Pending Prescriptions Disp Refills     hydrOXYzine HCL (ATARAX) 25 MG tablet [Pharmacy Med Name: HYDROXYZINE HCL 25MG TABS] 45 tablet 3     Sig: TAKE 1/2 TABLET(12.5 MG) BY MOUTH AT BEDTIME AS NEEDED FOR ITCHING       Antihistamine Refill Protocol Passed - 8/16/2020  8:40 AM        Passed - Patient has had office visit/physical in last year     Last office visit with prescriber/PCP: 3/2/2020 Ramu Jiménez MD OR same dept: Visit date not found OR same specialty: Visit date not found  Last physical: Visit date not found Last MTM visit: 2/16/2018 Che Silva, PharmD   Next visit within 3 mo: Visit date not found  Next physical within 3 mo: Visit date not found  Prescriber OR PCP: Ramu Jiménez MD  Last diagnosis associated with med order: 1. Tinnitus  - hydrOXYzine HCL (ATARAX) 25 MG tablet [Pharmacy Med Name: HYDROXYZINE HCL 25MG TABS]; TAKE 1/2 TABLET(12.5 MG) BY MOUTH AT BEDTIME AS NEEDED FOR ITCHING  Dispense: 45 tablet; Refill: 3    2. Insomnia  - hydrOXYzine HCL (ATARAX) 25 MG tablet [Pharmacy Med Name: HYDROXYZINE HCL 25MG TABS]; TAKE 1/2 TABLET(12.5 MG) BY MOUTH AT BEDTIME AS NEEDED FOR ITCHING  Dispense: 45 tablet; Refill: 3    If protocol passes may refill for 12 months if within 3 months of last provider visit (or a total of 15 months).

## 2021-06-10 NOTE — PROGRESS NOTES
I met with Clive for emotional support visit in clinic today.  He feels that he is doing good and things are going better with his relationship with his wife.    Plan: I will continue to be available for emotional support and assistance as needed.    Mariah Trent MA, LP, LICSW

## 2021-06-10 NOTE — PROGRESS NOTES
"[facility] Hematology and Oncology Progress Note    Patient: Ramu Espinoza  MRN: [id number]  Date of Service: 04/13/2017        Reason for Visit    Chief Complaint   Patient presents with     HE Cancer       Assessment and Plan    1.  A 56 y.o. gentleman with CLL stage 4 now, diagnosed in February 2012. 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 Great clinical response to Imbruvica. His lymphnodes have normalized. His CBC is normal today. Continue Imbruvica 140 mg 3 pills daily. Return in 3 months with labs and to see Dr. Garza.     2. Heart fluttering: EKG looks normal. This was reviewed by Dr. Finch. The cardiologist will review as well. Encourage him to call PCP if this continues.     ECOG Performance   ECOG Performance Status: 0     Distress Assessment  Distress Assessment Score: 1 (\"normal stuff\")    Pain  Currently in Pain: No/denies      Problem List    1. Chronic lymphocytic leukemia  HM1(CBC and Differential)    Comprehensive Metabolic Panel    HM1 (CBC with Diff)    Schedule a 30 minute provider appointment.    DISCONTINUED: Study Drug Ibrutinib 140 mg capsule 420 mg   2. Palpitations  ECG 12 lead with MUSE      ______________________________________________________________________________    History of Present Illness    Ramu Espinoza is a very pleasant 56 y.o. old male with a history of CLL diagnosed in 02/2012 presenting with elevated white count in upper 10s/lower 20s with a peripheral blood smear showing atypical lymphocytosis suspicious for CLL.  He was completely asymptomatic, normal hemoglobin and platelet count.  His peripheral blood flow cytometry revealed CD5 co-expressing kappa light chains restricted B cells confirming the diagnosis of CLL.  He has been on observation.  His white count has been slowly going up.     In June 2014 his white count was over 100,000. So he had a CAT scan and that showed increasing lymphadenopathy and splenectomy. Platelet count has been going down.     I " "offered participation in clinical study in which patients are randomized between fludarabine and cyclophosphamide Rituxan or Imbruvica and Rituxan. He has been randomized to Imbruvica and rituximab arm. He started on October 2014. In November 2014 he got Rituxan for the first time. Tolerated well. Finished that. Now on Imbruvica alone. Tolerating it well.     Comes in today for scheduled follow-up. Most days he feels well.    Pain Status  Currently in Pain: No/denies    Review of Systems    Constitutional  Constitutional (WDL): All constitutional elements are within defined limits  Neurosensory  Neurosensory (WDL): All neurosensory elements are within defined limits  Eye   Eye Disorder (WDL): All eye disorder elements are within defined limits  Ear  Ear Disorder (WDL): All ear disorder elements are within defined limits  Cardiovascular  Cardiovascular (WDL): Exceptions to WDL  Palpitations: Definition: A disorder characterized by inflammation of the muscle tissue of the heart. (\"heart feels fluttery\"; last 1-2 months)  Pulmonary  Respiratory (WDL): Within Defined Limits  Gastrointestinal  Gastrointestinal (WDL): Exceptions to WDL  Nausea: Loss of appetite without alteration in eating habits (every once in a while)  Genitourinary  Genitourinary (WDL): All genitourinary elements are within defined limits  Lymphatic  Lymph (WDL): All lymph disorder elements are within defined limits  Musculoskeletal and Connective Tissue  Musculoskeletal and Connetive Tissue Disorders (WDL): Exceptions to WDL  Arthralgia: Mild pain  Muscle Weakness : Symptomatic, perceived by patient but not evident on physical exam  Integumentary  Integumentary (WDL): All integumentary elements are within defined limits  Patient Coping  Patient Coping: Anxiety;Accepting  Distress Assessment  Distress Assessment Score: 1 (\"normal stuff\")  Accompanied by  Accompanied by: Alone  Oral Chemo Adherence  Oral Chemo Adherence  What Oral Chemotherapy is the " patient taking?: Ibrutinib (study suppied)  Did patient start taking oral chemo on time?: Yes, patient started taking oral chemo on time  Does the patient report missing any doses?: Yes (marked it on his study paperwork)  Reason for missing dose: Just forgot  Any side effects to report that hasn't already been discussed?: heart feels fluttery    Past History  Past Medical History:   Diagnosis Date     BPH (benign prostatic hyperplasia)      CLL (chronic lymphocytic leukemia)      Lymphadenopathy     Created by Conversion  Replacement Utility updated for latest IMO load       PHYSICAL EXAM:  /79  Pulse 68  Temp 98  F (36.7  C) (Oral)   Wt (!) 254 lb 3.2 oz (115.3 kg)  SpO2 94%  BMI 33.54 kg/m2  GENERAL: no acute distress. Cooperative in conversation. Here alone  HEENT: pupils are equal, round and reactive. Oromucosa is clean and intact. No ulcerations or mucositis noted. No bleeding noted.  RESP: lungs are clear bilaterally per auscultation. Regular respiratory rate. No wheezes or rhonchi.  CV: Regular, rate and rhythm. No murmurs.  ABD: soft, nontender. Positive bowel sounds. No organomegaly.   MUSCULOSKELETAL: No lower extremity swelling.   NEURO: non focal. Alert and oriented x3.   PSYCH: within normal limits. No depression or anxiety.  SKIN: warm dry intact   LYMPH: no cervical, supraclavicular or axillary lymphadenopathy        Lab Results    Recent Results (from the past 168 hour(s))   Comprehensive Metabolic Panel   Result Value Ref Range    Sodium 139 136 - 145 mmol/L    Potassium 3.8 3.5 - 5.0 mmol/L    Chloride 105 98 - 107 mmol/L    CO2 27 22 - 31 mmol/L    Anion Gap, Calculation 7 5 - 18 mmol/L    Glucose 96 70 - 125 mg/dL    BUN 17 8 - 22 mg/dL    Creatinine 0.85 0.70 - 1.30 mg/dL    GFR MDRD Af Amer >60 >60 mL/min/1.73m2    GFR MDRD Non Af Amer >60 >60 mL/min/1.73m2    Bilirubin, Total 0.6 0.0 - 1.0 mg/dL    Calcium 9.5 8.5 - 10.5 mg/dL    Protein, Total 6.7 6.0 - 8.0 g/dL    Albumin 3.8 3.5  - 5.0 g/dL    Alkaline Phosphatase 67 45 - 120 U/L    AST 17 0 - 40 U/L    ALT 19 0 - 45 U/L   HM1 (CBC with Diff)   Result Value Ref Range    WBC 9.6 4.0 - 11.0 thou/uL    RBC 5.12 4.40 - 6.20 mill/uL    Hemoglobin 14.5 14.0 - 18.0 g/dL    Hematocrit 43.8 40.0 - 54.0 %    MCV 86 80 - 100 fL    MCH 28.3 27.0 - 34.0 pg    MCHC 33.1 32.0 - 36.0 g/dL    RDW 13.2 11.0 - 14.5 %    Platelets 280 140 - 440 thou/uL    MPV 10.2 8.5 - 12.5 fL    Neutrophils % 52 50 - 70 %    Lymphocytes % 38 20 - 40 %    Monocytes % 7 2 - 10 %    Eosinophils % 3 0 - 6 %    Basophils % 1 0 - 2 %    Neutrophils Absolute 5.0 2.0 - 7.7 thou/uL    Lymphocytes Absolute 3.7 0.8 - 4.4 thou/uL    Monocytes Absolute 0.6 0.0 - 0.9 thou/uL    Eosinophils Absolute 0.3 0.0 - 0.4 thou/uL    Basophils Absolute 0.1 0.0 - 0.2 thou/uL   ECG 12 lead with MUSE   Result Value Ref Range    SYSTOLIC BLOOD PRESSURE  mmHg    DIASTOLIC BLOOD PRESSURE  mmHg    VENTRICULAR RATE 69 BPM    ATRIAL RATE 69 BPM    P-R INTERVAL 164 ms    QRS DURATION 100 ms    Q-T INTERVAL 368 ms    QTC CALCULATION (BEZET) 394 ms    P Axis 30 degrees    R AXIS -53 degrees    T AXIS 14 degrees    MUSE DIAGNOSIS       Normal sinus rhythm  Pulmonary disease pattern  Left anterior fascicular block  Abnormal ECG  When compared with ECG of 05-MAY-2016 11:57,  No significant change was found         Imaging    No results found.      Signed by: Radha Alarcon, CNP

## 2021-06-11 NOTE — PROGRESS NOTES
[facility] Cancer Care Progress Note    Patient: Ramu Espinoza  MRN: [id number]  Date of Service: 7/12/2017        Reason for visit      1. Chronic lymphocytic leukemia        Assessment     1.  A 57 y.o. gentleman with CLL stage 4 now, diagnosed in February 2012.  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  2.  Impressive clinical response to Imbruvica. His lymphnodes have normalized.  His CBC is almost normal.   3.  Recent diagnosis of paroxysmal Atrial fibrillation. This could be caused by Imbruvica. He also drinks a lot of coffee.  4.  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  5.  History of pleurisy right side.    Plan     1.   Continue Imbruvica 140 mg 3 pills daily.  He will be on Imbruvica maintenance.  2.  Have him come back in 3 months time .  3.   Continue Bactrim and Acyclovir.  4. Continue with good diet and exercise.  5. Counseled about following with cardiology recommendations. Decrease coffee intake.    Clinical stage      Stage IV    History     Ramu Espinoza is a very pleasant 57 y.o. old male with a history of CLL diagnosed in 02/2012 presenting with elevated white count in upper 10s/lower 20s with a peripheral blood smear showing atypical lymphocytosis suspicious for CLL.  He was completely asymptomatic, normal hemoglobin and platelet count.  His peripheral blood flow cytometry revealed CD5 co-expressing kappa light chains restricted B cells confirming the diagnosis of CLL.  He has been on observation.  His white count has been slowly going up.    In June 2014 his white count was over 100,000.  So he had a CAT scan and that showed increasing lymphadenopathy and splenomegaly.  Platelet count has been going down.    I offered participation in clinical study in which patients are randomized between fludarabine and cyclophosphamide Rituxan or Imbruvica and Rituxan.  He has been randomized to Imbruvica and rituximab arm. He started on October 2014.  In November 2014 he got  Rituxan for the first time.  Tolerated well. Finished that. Now on Imbruvica alone. Tolerating it well.    Comes in today for scheduled follow-up. In May 2017 he was found to have paroxysmal afib when he complained of some fluttering sensation. He had holter monitor which led to the diagnosis. Most days he feels well.       Past Medical History     Past Medical History:   Diagnosis Date     BPH (benign prostatic hyperplasia)      CLL (chronic lymphocytic leukemia)      Lymphadenopathy     Created by Conversion  Replacement Utility updated for latest IMO load    hypertension, being slightly overweight, anemic, having reflux, anxiety, epididymitis, hyperlipidemia and tinnitus      Review of Systems   Constitutional  Fatigue: Fatigue relieved by rest  Fever: None  Chills: None  Weight Gain: None  Weight Loss: None  Neurosensory  Neurosensory (WDL): All neurosensory elements are within defined limits  Cardiovascular  Edema: No  Phlebitis: None  Superficial thrombophlebitis: None  Pulmonary     Gastrointestinal  Gastrointestinal (WDL): All gastrointestinal elements are within defined limits  Genitourinary  Urinary Frequency: Present (flomax)  Urinary Retention: None  Urinary Tract Pain: None  Integumentary  Integumentary (WDL): All integumentary elements are within defined limits  Patient Coping  Patient Coping: Accepting  Accompanied by  Accompanied by: Alone    ECOG performance status and Distress Assessment      ECOG Performance:    ECOG Performance Status: 0    Distress Assessment  Distress Assessment Score: 2:     Pain Status  Currently in Pain: No/denies (some pain, relieved by ibuprofen)      Vital Signs     Vitals:    07/12/17 1503   BP: 131/76   Pulse: 71   Temp: 98.6  F (37  C)   SpO2: 95%       Physical Exam     GENERAL: No acute distress. Cooperative in conversation.   HEENT: Pupils are equal, round and reactive. Oral mucosa is clean and intact. No ulcerations or mucositis noted. No bleeding  noted.  RESP:Chest symmetric lungs are clear bilaterally per auscultation. Regular respiratory rate. No wheezes or rhonchi.  CV: Normal S1 S2 Regular, rate and rhythm. No murmurs.  ABD: Nondistended, soft, nontender. Positive bowel sounds. No organomegaly.   EXTREMITIES: No lower extremity edema.   NEURO: Non- focal. Alert and oriented x3.  Cranial nerves appear intact.  PSYCH: Within normal limits. No depression or anxiety.  SKIN: Warm dry intact.  Slight rash on his shoulder and legs.  LYMPH NODES: Bilateral cervical, supraclavicular, axillary lymph node examination was done.  Negative for any palpable adenopathy.      Lab Results     Results for orders placed or performed in visit on 07/12/17   Comprehensive Metabolic Panel   Result Value Ref Range    Sodium 142 136 - 145 mmol/L    Potassium 4.1 3.5 - 5.0 mmol/L    Chloride 106 98 - 107 mmol/L    CO2 29 22 - 31 mmol/L    Anion Gap, Calculation 7 5 - 18 mmol/L    Glucose 94 70 - 125 mg/dL    BUN 17 8 - 22 mg/dL    Creatinine 1.03 0.70 - 1.30 mg/dL    GFR MDRD Af Amer >60 >60 mL/min/1.73m2    GFR MDRD Non Af Amer >60 >60 mL/min/1.73m2    Bilirubin, Total 0.8 0.0 - 1.0 mg/dL    Calcium 9.4 8.5 - 10.5 mg/dL    Protein, Total 6.8 6.0 - 8.0 g/dL    Albumin 3.8 3.5 - 5.0 g/dL    Alkaline Phosphatase 66 45 - 120 U/L    AST 15 0 - 40 U/L    ALT 16 0 - 45 U/L   HM1 (CBC with Diff)   Result Value Ref Range    WBC 11.3 (H) 4.0 - 11.0 thou/uL    RBC 4.84 4.40 - 6.20 mill/uL    Hemoglobin 14.3 14.0 - 18.0 g/dL    Hematocrit 42.2 40.0 - 54.0 %    MCV 87 80 - 100 fL    MCH 29.5 27.0 - 34.0 pg    MCHC 33.9 32.0 - 36.0 g/dL    RDW 12.7 11.0 - 14.5 %    Platelets 247 140 - 440 thou/uL    MPV 10.2 8.5 - 12.5 fL    Neutrophils % 60 50 - 70 %    Lymphocytes % 30 20 - 40 %    Monocytes % 7 2 - 10 %    Eosinophils % 2 0 - 6 %    Basophils % 0 0 - 2 %    Neutrophils Absolute 6.8 2.0 - 7.7 thou/uL    Lymphocytes Absolute 3.4 0.8 - 4.4 thou/uL    Monocytes Absolute 0.8 0.0 - 0.9 thou/uL     Eosinophils Absolute 0.3 0.0 - 0.4 thou/uL    Basophils Absolute 0.0 0.0 - 0.2 thou/uL         Imaging Results     No results found.      Dennis Garza MD

## 2021-06-11 NOTE — PROGRESS NOTES
[facility] Progress Note    Patient: Ramu Espinoza  MRN: [id number]  Date of Service: 2020        Reason for visit      1. Chronic lymphocytic leukemia (H)  Cy inducer Ibrutinib        Assessment     1.  A 60 y.o. gentleman with CLL stage 4 now, diagnosed in 2012.  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  Currently quite stable on Imbruvica.  2.  Very good clinical response to Imbruvica. His lymphnodes have normalized.  His CBC is normal.   3.  In May 2017 diagnosis of paroxysmal Atrial fibrillation. This could be caused by Imbruvica. He also drinks a lot of coffee.  He has not had any more recurrences since then.  4.  Pericardial effusion in 2019. Held imbruvica for 3 weeks.  Now resumed it. Doing OK. We have continued it.  He has not had any recurrence.  5.  History of pleurisy right side. No recurrence.  6.  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    Plan     1.   Continue Imbruvica 140 mg 3 pills daily.  He will be on Imbruvica maintenance.  No change in plan.  2.  Have him come back in 4 months time .  3.   Continue Acyclovir.  He does not need Bactrim.  4. Continue with good diet and exercise.  5. Follow-up with cardiology as indicated.  6. Advised to cut down on coffee.    Clinical stage      Stage IV    History     Ramu Espinoza is a very pleasant 60 y.o. old male with a history of CLL diagnosed in 2012 presenting with elevated white count in upper 10s/lower 20s with a peripheral blood smear showing atypical lymphocytosis suspicious for CLL.  He was completely asymptomatic, normal hemoglobin and platelet count.  His peripheral blood flow cytometry revealed CD5 co-expressing kappa light chains restricted B cells confirming the diagnosis of CLL.  He has been on observation.  His white count has been slowly going up.    In 2014 his white count was over 100,000.  So he had a CAT scan and that showed increasing lymphadenopathy and  splenomegaly.  Platelet count has been going down.    I offered participation in clinical study in which patients are randomized between fludarabine and cyclophosphamide Rituxan or Imbruvica and Rituxan.  He has been randomized to Imbruvica and rituximab arm. He started on October 2014.  In November 2014 he got Rituxan for the first time.  Tolerated well. Finished that. Now on Imbruvica alone. Tolerating it well.     In May 2017 he was found to have paroxysmal afib when he complained of some fluttering sensation. He had holter monitor which led to the diagnosis. Most days he feels well.     In March 2019 he was found to have pericardial effusion when he started have some chest pain on deep inspiration. Had it tapped. Held Imbruvica for 3 weeks. Then resumed it.  Tolerated it fine. So continues it.     Comes in today for scheduled follow-up. Overall feels good. He feels his heart is doing well.  He is somewhat worried about the new virus that is going around. Complaining of some loose stools.    Past Medical History     Past Medical History:   Diagnosis Date     BPH (benign prostatic hyperplasia)      CLL (chronic lymphocytic leukemia) (H)      Lymphadenopathy     Created by Conversion  Replacement Utility updated for latest IMO load     Paroxysmal atrial fibrillation with rapid ventricular response (H)      Sleep apnea     hypertension, being slightly overweight, anemic, having reflux, anxiety, epididymitis, hyperlipidemia and tinnitus      Review of Systems   Constitutional  Constitutional (WDL): Exceptions to WDL  Fatigue: Fatigue not relieved by rest - Limiting instrumental ADL  Weight Loss: to <10% from baseline, intervention not indicated  Neurosensory  Neurosensory (WDL): Exceptions to WDL  Peripheral Sensory Neuropathy: Asymptomatic, loss of deep tendon reflexes or paresthesia  Cardiovascular  Cardiovascular (WDL): Exceptions to WDL  Palpitations: Definition: A disorder characterized by inflammation of the  muscle tissue of the heart.  Edema: Yes  Edema Limbs: 5 - 10% inter-limb discrepancy in volume or circumference at point of greatest visible difference, swelling or obscuration of anatomic architecture on close inspection(right ankle)  Pulmonary  Respiratory (WDL): Within Defined Limits  Gastrointestinal  Gastrointestinal (WDL): Exceptions to WDL  Diarrhea: Increase of <4 stools per day over baseline, mild increase in ostomy output compared to baseline  Genitourinary  Genitourinary (WDL): All genitourinary elements are within defined limits  Integumentary  Integumentary (WDL): All integumentary elements are within defined limits  Patient Coping  Patient Coping: Accepting  Accompanied by  Accompanied by: Alone    ECOG performance status and Distress Assessment      ECOG Performance:    ECOG Performance Status: 0    Distress Assessment  Distress Assessment Score: Unable to rate:     Pain Status  Currently in Pain: Yes      Vital Signs     Vitals:    09/23/20 1433   BP: 116/62   Pulse: 65   Temp: 98.5  F (36.9  C)   SpO2: 95%       Physical Exam     GENERAL: No acute distress. Cooperative in conversation.   HEENT: Pupils are equal, round and reactive. Oral mucosa is clean and intact. No ulcerations or mucositis noted. No bleeding noted.  RESP:Chest symmetric lungs are clear bilaterally per auscultation. Regular respiratory rate. No wheezes or rhonchi.  CV: Normal S1 S2 Regular, rate and rhythm. No murmurs.  ABD: Nondistended, soft, nontender. Positive bowel sounds. No organomegaly.   EXTREMITIES: No lower extremity edema.   NEURO: Non- focal. Alert and oriented x3.  Cranial nerves appear intact.  PSYCH: Within normal limits. No depression or anxiety.  SKIN: Warm dry intact.  Slight rash on his shoulder and legs.  LYMPH NODES: Bilateral cervical, supraclavicular, axillary lymph node examination was done.  Negative for any palpable adenopathy.      Lab Results     Results for orders placed or performed in visit on  09/23/20   Comprehensive Metabolic Panel   Result Value Ref Range    Sodium 140 136 - 145 mmol/L    Potassium 4.6 3.5 - 5.0 mmol/L    Chloride 106 98 - 107 mmol/L    CO2 25 22 - 31 mmol/L    Anion Gap, Calculation 9 5 - 18 mmol/L    Glucose 87 70 - 125 mg/dL    BUN 15 8 - 22 mg/dL    Creatinine 0.83 0.70 - 1.30 mg/dL    GFR MDRD Af Amer >60 >60 mL/min/1.73m2    GFR MDRD Non Af Amer >60 >60 mL/min/1.73m2    Bilirubin, Total 0.5 0.0 - 1.0 mg/dL    Calcium 9.1 8.5 - 10.5 mg/dL    Protein, Total 6.7 6.0 - 8.0 g/dL    Albumin 3.8 3.5 - 5.0 g/dL    Alkaline Phosphatase 76 45 - 120 U/L    AST 21 0 - 40 U/L    ALT 23 0 - 45 U/L   HM1 (CBC with Diff)   Result Value Ref Range    WBC 8.6 4.0 - 11.0 thou/uL    RBC 4.67 4.40 - 6.20 mill/uL    Hemoglobin 13.6 (L) 14.0 - 18.0 g/dL    Hematocrit 42.3 40.0 - 54.0 %    MCV 91 80 - 100 fL    MCH 29.1 27.0 - 34.0 pg    MCHC 32.2 32.0 - 36.0 g/dL    RDW 12.8 11.0 - 14.5 %    Platelets 286 140 - 440 thou/uL    MPV 10.3 8.5 - 12.5 fL    Neutrophils % 71 (H) 50 - 70 %    Lymphocytes % 17 (L) 20 - 40 %    Monocytes % 7 2 - 10 %    Eosinophils % 3 0 - 6 %    Basophils % 1 0 - 2 %    Immature Granulocyte % 1 (H) <=0 %    Neutrophils Absolute 6.1 2.0 - 7.7 thou/uL    Lymphocytes Absolute 1.5 0.8 - 4.4 thou/uL    Monocytes Absolute 0.6 0.0 - 0.9 thou/uL    Eosinophils Absolute 0.3 0.0 - 0.4 thou/uL    Basophils Absolute 0.1 0.0 - 0.2 thou/uL    Immature Granulocyte Absolute 0.1 (H) <=0.0 thou/uL         Imaging Results     No results found.      Dennis Garza MD

## 2021-06-11 NOTE — PROGRESS NOTES
[facility]   Knee Initial Evaluation    Patient Name: Ramu Espinoza  Date of evaluation: 6/27/2017  Referral Diagnosis: knee pain   Referring provider: Esperanza Talamantes*  Visit Diagnosis:     ICD-10-CM    1. Acute bilateral knee pain M25.561     M25.562    2. Patellar tendonitis M76.50    3. Bilateral leg weakness M62.81    4. Patellar tendinitis of both knees M76.52     M76.51    5. Chronic lymphocytic leukemia C91.10    6. Paroxysmal atrial fibrillation I48.0    7. Pure hypercholesterolemia E78.00        Assessment:      Pt. is appropriate for skilled PT intervention as outlined in the Plan of Care (POC).    Goals:  Pt. will demonstrate/verbalize independence in self-management of condition in : 6 weeks  Patient will ascend / descend: stairs;with recipricol gait;with less pain;with less difficulty;in 6 weeks  Patient will transfer: sit/stand;with less pain;with less difficulty;in 6 weeks  Pt will: be able to kneel, squat, and crouch for work tasks with pain <2/10 in B knee's; in 6 weeks     Patient's expectations/goals are realistic.    Barriers to Learning or Achieving Goals:  No Barriers.       Plan / Patient Instructions:      Plan of Care:   Authorization / Certification Number of Visits: 8/20 visits used this 2017 calendar year   Communication with: Referral Source  Patient Related Instruction: Nature of Condition;Treatment plan and rationale;Self Care instruction;Basis of treatment;Body mechanics;Posture;Expected outcome  Times per Week: 2  Number of Weeks: 6  Number of Visits: 12 - PRN   Discharge Planning: Pt will be discharged upon meeting goals or plateau of progress   Therapeutic Exercise: ROM;Stretching;Strengthening  Neuromuscular Reeducation: kinesio tape;posture;balance/proprioception;core  Manual Therapy: soft tissue mobilization;myofascial release;joint mobilization;muscle energy  Modalities: electrical stimulation;TENS;cold pack;hot pack    Plan for next visit: Focus on  strengthening for knee's      Subjective:        History of Present Illness:    Ramu is a 57 y.o. male who presents to therapy today with complaints of bilateral knee pain L>R. Date of onset/duration of symptoms is May 2017. Onset was sudden. Symptoms are getting better. He denies history of similar symptoms. He describes their previous level of function as not limited    Pt reports that his knee's have improved some over the past few weeks.  Pt reports that he no longer has a lot of pain but his knee's just don't feel right.      Pt reports that he was running up 4 flights of stairs every other day,  Then he quit because he had to wear a heart monitor for a-fib.  Pt reports that it wasn't until a few weeks later after stopping that his knee pain increased     Pt kneel's, squats, crouches, etc for his job on a daily basis.  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  Pt reports that he now wears knee pads when he is kneeling.      Pain Ratin  Pain rating at best: 0  Pain rating at worst: 5  Pain description:tenderness and weakness     Functional limitations are described as occurring with:   ascending and descending stairs or curbs  work tasks including squatting, kneeling and crouching    Patient reports benefit from:  cold       Objective:      Note: Items left blank indicates the item was not performed or not indicated at the time of the evaluation.    Patient Outcome Measures :    Lower Extremity Functional Scale (_/80): 59   Scores range from 0-80, where a score of 80 represents maximum function. The minimal clinically important difference is a positive change of 9 points.    Knee Examination  1. Acute bilateral knee pain     2. Patellar tendonitis     3. Bilateral leg weakness     4. Patellar tendinitis of both knees     5. Chronic lymphocytic leukemia     6. Paroxysmal atrial fibrillation     7. Pure hypercholesterolemia       Precautions/Restrictions:   None  Involved Side: Bilateral  Posture Observation:      General sitting posture is  fair.  Assistive Device: None  Gait Observation: WNL  Lumbar Clearing: Does not provoke symptoms  Hip Clearing: Does not provoke symptoms      LE Strength                  Date:           6/27/17    Strength (MMT/5)  Right   Left  Right   Left  Right   Left       Hip Flexion   5   5                   Hip Abduction   4   4                   Hip Adduction   4+   4+                   Hip Extension   4   4                   Hip Internal Rotation   5   5                   Hip External Rotation   5   5                   Knee Extension   5   5                   Knee Flexion   5   5                   Ankle Dorsiflexion   5   5                   Ankle Plantarflexion   5   5                 Knee ROM     Date:           6/27/17     AROM in degrees  Right   Left  Right   Left  Right   Left       Knee Flexion  (130 )   WNL, p at end rg   WNL                   Knee Extension  (0 )   WNL   WNL                 Palpation:   Increased tenderness over the left superior patella tendon   Increased tenderness over the right inferior patella tendon   Increased tenderness over L>R medial knee joint line   Increased tenderness over L lateral joint line     Knee Special Tests (+/-):      Knee OA Cluster   Right   Left   Ligament Tests   Right   Left    1. > 51 y/o           Lachman   -   -    2. Stiffness > 30 min.           Anterior Drawer   -   -    3. Crepitus           Posterior Drawer   -   -    4. Bony tenderness           Posterior Sag   -   -    5. Bone enlargement           Valgus Stress   -   -    6. No warmth to the touch           Varus Stress   -   -     Meniscal Tests   Right   Left    Other   Right    Left       Jermaine's   -   -     Ely's             Joint line tenderness   -  -      Roula             Thessaly Thomas Apley's                      Bilateral H/S tightness = approx 45 degrees     Pt has a shoe lift on his  right tennis shoe.  Pt reports that he needs new shoes but it is difficult because had to add a lift.     Treatment Today    TREATMENT MINUTES COMMENTS   Evaluation 25 low   Self-care/ Home management     Manual therapy     Neuromuscular Re-education     Therapeutic Activity     Therapeutic Exercises 30 Pathology, POC, HEP, etc, improved shoe wear, exercise plan   See flow sheet    Gait training     Modality__________________                Total 55    Blank areas are intentional and mean the treatment did not include these items.     PT Evaluation Code: (Please list factors)  Patient History/Comorbidities: chronic lymphocytic leukemia, a-fib   Examination: patella tenderness, hip weakness  Clinical Presentation: stable and uncomplicated   Clinical Decision Making: low     Patient History/  Comorbidities Examination  (body structures and functions, activity limitations, and/or participation restrictions) Clinical Presentation Clinical Decision Making (Complexity)   No documented Comorbidities or personal factors 1-2 Elements Stable and/or uncomplicated Low   1-2 documented comorbidities or personal factor 3 Elements Evolving clinical presentation with changing characteristics Moderate   3-4 documented comorbidities or personal factors 4 or more Unstable and unpredictable High          Patsy Figueroa  6/27/2017  8:12 AM

## 2021-06-11 NOTE — PROGRESS NOTES
Medication Therapy Management Follow Up Visit       ASSESSMENT AND PLAN  BRANDEN/Sleep: Due to possible urinary retention and tiredness during the morning, recommended Clive stop hydroxyzine. Discussed limiting caffeine intake to help with anxiety. Recommended he continue exercising for anxiety as well. Educated Clive to take melatonin 2 hours prior to bedtime to help with sleep.  Plan  1. Trial off hydroxyzine  2. Continue trazodone to 50 mg       Enlarged Prostate: Controlled with daily Flomax. No changes made today. Will check to see if stopping hydroxyzine impacts urinary symptoms.  Plan  1. Continue current medication       Cardiology: Discussed mechanism of action of beta blockers today. Clive was not open to starting a new medication.     RECOMMENDED FOLLOW-UP    Ramu Espinoza was recommended to follow up in: 3 months    1. Trial off hydroxyzine - tired, urinary retention  2. Take melatonin 2 hours before sleeping    SUBJECTIVE AND OBJECTIVE    Ramu Espinoza is a 57 y.o. male here for a medication therapy management (MTM)  follow up appointment.     Cardiology: Per chart review, saw cardiology at the end of May due to paroxysmal symptomatic atrial fibrillation. CHADVASc:0. Cardiac monitor on May 3 demonstrated symptomatic  atrial fib with RVR (rate 130 bpm). Likely cause is Imbruvica use. Cardiology recommended not start aspirin therapy given it is generally not effective in preventing strokes in A. fib and also he would have an increased risk of bleeding with the use of aspirin and Imbruvica. Stated that he was recommended to possibly start metoprolol and wanted to know more about the medication. Reported he doesn't want a medication that will make him more tired.     BRANDEN/Sleep: Continues hydroxyzine 25 mg and trazodone 50 mg at bedtime. Stated he's also still taking melatonin 5 mg around 8:30 PM and then hydroxyzine and trazodone around 9 PM. Not waking up in the middle of the night.  "Taking lorazepam 1 mg every Friday or Saturday because he \"sleeps really well.\" Stated mood and anxiousness is fine -- home life is much better now. Still drinking five cups of coffee per day, but tries not to drink caffeine after 1 PM. Reported that he does sometimes feel \"wired.\" Stopped exercising/lifting weights and wonders if he should restart.       Enlarged Prostate: Continues Flomax 0.4 mg once daily. Stated that he has noticed decrease in urgency and waking up at night. Normal PSA results on 6/15/17.     CLL: Continues Ibrutinib 140 mg - 3 capsules (420 mg daily), Bactrim DS three times per week MWF, and acyclovir 400 mg twice daily. Has a follow up with outpatient oncology next week.       General Health: Stated that he's still taking vitamin D 3000 IU once daily. Stated that he knows \"high cholesterol\" is something that runs in his family. ASCVD risk based on most recent lipid panel is 6.7%. Eating more fruits and vegetables instead of meat for lunch. Trying to watch carbohydrate intake.     Munira Ashby, PharmD, [facility]                              We reviewed Ramu's medication list with them, discussing reason for use, directions for use, and potential side effects of each medication.  Indication, safety, efficacy, and convenience was assessed for all reviewed medications.  No environmental factors were noted currently affecting patient.  This care plan was communicated via EMR with his primary care provider, Esperanza Talamantes MD, and is the authorizing prescriber for this visit.  Direct supervision was available by either the patient's PCP or another available physician when needed. Time and complexity billing metrics are included in the docflowsheet linked to this visit. Time spent: 30 minutes    Other pertinent information at the time of this visit:  Current Outpatient Prescriptions   Medication Sig Dispense Refill     acyclovir (ZOVIRAX) 400 MG tablet TAKE 1 TABLET " BY MOUTH TWICE DAILY 120 tablet 6     cholecalciferol, vitamin D3, 1,000 unit tablet Take 2,000 Units by mouth daily. 1-2 tablets       hydrOXYzine (ATARAX) 25 MG tablet Take 25 mg by mouth bedtime.       ibuprofen (ADVIL,MOTRIN) 200 MG tablet Take 800 mg by mouth as needed for pain.       LORazepam (ATIVAN) 1 MG tablet TAKE 1 TABLET BY MOUTH DAILY (Patient taking differently: take 1 tablet as needed - taking once a week if at all) 30 tablet 0     melatonin 5 mg TbDL disintegrating tablet Take 5 mg by mouth bedtime as needed.       Study Drug Ibrutinib 140 mg 140 mg cap capsule Take 420 mg by mouth daily.       sulfamethoxazole-trimethoprim (SEPTRA DS) 800-160 mg per tablet TAKE 1 TABLET BY MOUTH THREE TIMES A WEEK 36 tablet 3     tamsulosin (FLOMAX) 0.4 mg Cp24 TK 1 C PO ONCE D PC  11     traZODone (DESYREL) 50 MG tablet Take 1 tablet (50 mg total) by mouth bedtime. 360 tablet 1     No current facility-administered medications for this visit.        This transcription uses voice recognition software, which may contain typographical errors.

## 2021-06-11 NOTE — PROGRESS NOTES
[facility] Daily Progress     Patient Name: Ramu Espinoza  Date: 2017  Visit #: 2  Referring provider: Esperanza Talamantes*  Visit Diagnosis:     ICD-10-CM    1. Acute bilateral knee pain M25.561     M25.562    2. Patellar tendinitis of both knees M76.52     M76.51    3. Bilateral leg weakness M62.81    4. Paroxysmal atrial fibrillation I48.0    5. Pure hypercholesterolemia E78.00          Assessment:     HEP/POC compliance is  fair .  Patient demonstrates understanding/independence with home program.  Patient is benefitting from skilled physical therapy and is making steady progress toward functional goals.    Goal Status:  Pt. will demonstrate/verbalize independence in self-management of condition in : 6 weeks  Patient will ascend / descend: stairs;with recipricol gait;with less pain;with less difficulty;in 6 weeks  Patient will transfer: sit/stand;with less pain;with less difficulty;in 6 weeks  Pt will: be able to kneel, squat, and crouch for work tasks with pain <2/10 in B knee's; in 6 weeks     Plan / Patient Education:     Continue with initial plan of care.  Progress with home program as tolerated.    Subjective:     Pain Ratin    Pt reports that his knee's are definitely way better than before.  However, pt reports that she continues to have soreness in both of his knee's.  Pt notes that he has been wearing knee pads with all of his kneeling.      Pt reports that he missed the past few visits because he kept forgetting.      Pt wanting to get back to walking up and down stairs like before as he does not seem to have pain with this activity.      Pt reports that he does the exercises every other day and they seem to help.      Objective:     Pt tolerates the exercises with moderate cueing for technique.      Pt with bilateral hamstring and quad tightness and decreased mobility.      Pt with increased tenderness over the right superior quad   Pt with increased tenderness over the  "left patella tendon     Pt with decreased bilateral LE and core strength.     Exercises:  Exercise #1: Bike Warm-up   Comment #1: SH: 7 x5 min   Exercise #2: SAQ  Comment #2: R, L x15 with 5\"  Exercise #3: SLR: Hip flexion, ABD, ext   Comment #3: x15 each   Exercise #4: Hamstring stretch -reviewed and discussion   Comment #4: Quad stretch Bx30\"  Exercise #5: Squats Bx15   Comment #5: Planks - discussed and cued       Treatment Today     TREATMENT MINUTES COMMENTS   Evaluation     Self-care/ Home management     Manual therapy     Neuromuscular Re-education     Therapeutic Activity     Therapeutic Exercises 30 See flow sheet    Gait training     Modality__________________                Total 30    Blank areas are intentional and mean the treatment did not include these items.       Patsy Figueroa, PT   7/18/2017      "

## 2021-06-12 NOTE — PROGRESS NOTES
[facility] Daily Progress     Patient Name: Ramu Espinoza  Date: 8/3/2017  Visit #: 3  Referring provider: Esperanza Talamantes*  Visit Diagnosis:     ICD-10-CM    1. Acute bilateral knee pain M25.561     M25.562    2. Patellar tendinitis of both knees M76.52     M76.51    3. Bilateral leg weakness M62.81          Assessment:     HEP/POC compliance is  fair .  Patient demonstrates understanding/independence with home program.  Patient is benefitting from skilled physical therapy and is making steady progress toward functional goals.    Goal Status:  Pt. will demonstrate/verbalize independence in self-management of condition in : 6 weeks  Patient will ascend / descend: stairs;with recipricol gait;with less pain;with less difficulty;in 6 weeks  Patient will transfer: sit/stand;with less pain;with less difficulty;in 6 weeks  Pt will: be able to kneel, squat, and crouch for work tasks with pain <2/10 in B knee's; in 6 weeks     Plan / Patient Education:     Continue with initial plan of care.  Progress with home program as tolerated.     Cued to follow specific HEP per PT description.  Pt also to ice knee's everyday     Subjective:     Pain Ratin    Pt reports that his knee pain is on/off.  Pt reports that he has better ROM for flexion.  Pt reports that he was on his knee's all day Monday and was sore at the end of the day.      Pt reports that his knee's and legs are feeling much stronger.  Pt reports that he does the exercises every other day.  He does 20 reps on the exercises.      He notes that overall his pain is less frequent but the intensity is the same when his knee's been hurting.      Pt has not been icing his knee's     Objective:     Pt tolerates the exercises with moderate cueing for technique.      Pt with bilateral hamstring and quad tightness and decreased mobility.      Pt with increased tenderness over the right patella tendon = minimal   Pt with decreased tenderness over the left  "patella tendon = none     Pt with decreased bilateral LE and core strength.  This is being addressed by his HEP.     Exercises:  Exercise #1: Bike Warm-up   Comment #1: SH: 7 x5 min   Exercise #2: SAQ  Comment #2: R, L x15 with 5\"  Exercise #3: SLR: Hip flexion, ABD, ext   Comment #3: x15 each   Exercise #4: Hamstring stretch -reviewed and discussion   Comment #4: Quad stretch Bx30\"  Exercise #5: Squats Bx15   Comment #5: Planks - discussed and cued       Treatment Today     TREATMENT MINUTES COMMENTS   Evaluation     Self-care/ Home management     Manual therapy     Neuromuscular Re-education     Therapeutic Activity     Therapeutic Exercises 30 See flow sheet    Gait training     Modality__________________                Total 30    Blank areas are intentional and mean the treatment did not include these items.       Patsy Fgiueroa, PT   8/3/2017    "

## 2021-06-12 NOTE — PROGRESS NOTES
[facility] Discharge Summary      Patient Name: Ramu Espinoza  Date: 2017  Visit #: 4  Referring provider: Esperanza Talamantes*  Visit Diagnosis:     ICD-10-CM    1. Acute bilateral knee pain M25.561     M25.562    2. Patellar tendinitis of both knees M76.52     M76.51    3. Bilateral leg weakness M62.81          Assessment:     HEP/POC compliance is  good .  Patient demonstrates understanding/independence with home program.  Patient is ready for independent sx management and HEP.      Goal Status:  Pt. will demonstrate/verbalize independence in self-management of condition in : 6 weeks; Met  Patient will ascend / descend: stairs;with recipricol gait;with less pain;with less difficulty;in 6 weeks; Met   Patient will transfer: sit/stand;with less pain;with less difficulty;in 6 weeks; Met  Pt will: be able to kneel, squat, and crouch for work tasks with pain <2/10 in B knee's; in 6 weeks; Met     Plan / Patient Education:     Patient ready for independent sx management and HEP.  Pt is discharged.     Subjective:     Pain Ratin    Pt reports that his knee's are feeling much better overall.  Pt reports that he now just feels some soreness and some vulnerability in his knee's.  Pt reports that he feels some soreness with stairs still or crossing his legs.  Pt reports that he is able to do everything he needs to do without major pain or issues.      Pt reports that his knee's and legs are feeling much stronger.  Pt reports that he does his exercises most days.      Pt reports that he is feeling 80% better and ready for independent sx management and HEP.      Objective:     Pt has progressed slowly but well with increased to full function with decreased pain.        LE Strength                  Date:            17   Strength (MMT/5)  Right   Left  Right   Left       Hip Flexion   5   5   5   5       Hip Abduction   4   4   5   5       Hip Adduction   4+   4+    "5   5       Hip Extension   4   4   4+   4+       Hip Internal Rotation   5   5   5   5       Hip External Rotation   5   5   5   5       Knee Extension   5   5   5   5       Knee Flexion   5   5   5   5       Ankle Dorsiflexion   5   5   5   5        Ankle Plantarflexion   5   5   5    5        Knee ROM                     Date:           6/27/17 8/17/17    AROM in degrees  Right   Left  Right   Left       Knee Flexion  (130 )   WNL, p at end rg   WNL  WNL, sore/ stretch  WNL         Knee Extension  (0 )   WNL   WNL   WNL   WNL      Palpation:   Decreased tenderness over the left superior patella tendon = none   Decreased tenderness over the right inferior patella tendon = none   Decreased tenderness over L>R medial knee joint line = none   Decreased tenderness over L lateral joint line = none     Mild discomfort with left patella mobility and no pain on the right.      Pt with mild bilateral hamstring and quad tightness.  This continues to be addressed by his HEP.     Outcomes:   Lower Extremity Functional Scale (_/80): 73  Eval score was 59/80.     Exercises:  Exercise #1: Bike Warm-up   Comment #1: SH: 7 x5 min   Exercise #2: SAQ  Comment #2: reviewed   Exercise #3: SLR: Hip flexion, ABD, ext   Comment #3: Pt demo all, ext Bx15 with cues for increased quad set  Exercise #4: Hamstring stretch  Comment #4: Quad stretch B x60\" manual   Exercise #5: Squats -BX15   Comment #5: Planks x60\"       Treatment Today     TREATMENT MINUTES COMMENTS   Evaluation     Self-care/ Home management     Manual therapy     Neuromuscular Re-education     Therapeutic Activity     Therapeutic Exercises 30 See flow sheet    Gait training     Modality__________________                Total 30    Blank areas are intentional and mean the treatment did not include these items.       Patsy Figueroa, PT   8/17/2017  "

## 2021-06-13 NOTE — PROGRESS NOTES
Subjective:      Patient ID: Ramu Espinoza is a 57 y.o. male.    Chief Complaint:    HPI  Patient comes in for advice on his chronic right knee pain.  He has been following physical therapy and has been given exercises.  He continues to do the exercises which seems to help.  He did participate in a sporting activity which involved some running and quick direction changes and he says this seems to have exacerbated his knee pain.  He is wondering if he can resume his exercises that were previously given by physical therapy.    He is also complaining of left buttock pain that goes down the back of his leg and sometimes up to the right side of the lumbar region.  This started after he had been wearing a new set of heel lifts to compensate for his right short leg due to a compound femur fracture many years ago . The buttock pain is not better since he went back to his previous heel lifts.     He works as a  and uses knee pads when kneeling.    Past Medical History:   Diagnosis Date     BPH (benign prostatic hyperplasia)      CLL (chronic lymphocytic leukemia)      Lymphadenopathy     Created by Conversion  Replacement Utility updated for latest IMO load       Past Surgical History:   Procedure Laterality Date     MS TREAT INTER/SUBTROCH FX,W/PLATE/SCREW      Description: Open Treatment Of An Intertrochanteric Fracture;  Recorded: 04/01/2009;       Family History   Problem Relation Age of Onset     Cancer Father      Prostate cancer Father      Colon cancer Mother      Cancer Mother      Cancer Brother      Lung cancer Brother      Hypertension Brother      No Medical Problems Sister      No Medical Problems Brother      Cancer Brother      Anuerysm Brother      Hypertension Brother      Hypertension Brother      No Medical Problems Brother      Diabetes Paternal Aunt      Diabetes Maternal Aunt      Diabetes Maternal Aunt      Cancer Maternal Grandfather        Social History   Substance Use Topics     Smoking  status: Former Smoker     Packs/day: 0.50     Years: 25.00     Quit date: 1/3/2005     Smokeless tobacco: Never Used     Alcohol use 1.8 - 3.0 oz/week     2 - 3 Cans of beer, 1 - 2 Glasses of wine per week      Comment: only on  the weekends       Review of Systems    Objective:     /74  Pulse 71  Temp 98.4  F (36.9  C) (Oral)   Resp 14  Wt (!) 265 lb (120.2 kg)  SpO2 100%  BMI 34.96 kg/m2    Physical Exam   Constitutional: No distress.   Musculoskeletal:   Back: No midline tenderness.  No tenderness over the left or right sciatic nerve.  He is able to flex and extend without difficulty discomfort.  Right knee: There is mild edema when compared with the right.  He is able to extend completely and only flex to 90 . (Left knee he can flex to 110 .)  He does not appear to have any pain when standing.  No joint line tenderness. Anterior and posterior drawer sign is negative.  Collateral ligaments are intact.  No crepitus appreciated.       Assessment:     Procedures    There were no encounter diagnoses.     Chronic right knee pain: No new injury noted.  He actually states that the pain has improved.  I think he can resume his exercises given to him by physical therapy.  He also mentioned that he did have a week gluteus muscles and I added a additional exercise involving squatting while supporting balance on a post or door knob.     History of right buttock pain that radiates down the posterior right leg and also the lumbar region.  Symptoms have improved since he stopped wearing the new heel lifts.  Symptoms do not appear to be due to radicular pain from the lumbar spine.    Plan:       Patient Instructions     I recommend continuing with your PT exercises for the knee.     Keep your appointment with your doctor in the next 2 weeks.     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     OK to use Ibuprofen 600-800 mg once or twice a day.

## 2021-06-13 NOTE — PROGRESS NOTES
"                        Medication Therapy Management Follow Up Visit       ASSESSMENT AND PLAN  BRANDEN/Sleep: No changes in sleep with discontinuing hydroxyzine and dry mouth has improved. Discussed limiting caffeine intake to help with anxiety. Recommended he continue exercising for anxiety as well. Educated Clive to take melatonin 2 hours prior to bedtime to help with sleep. No changes made to his medications   Plan  1. Continue current medication       Enlarged Prostate: Urgency has worsened. Discussed increasing dose of Flomax, but Clive declined today. Will follow up at our next visit.   Plan  1. Continue current medication       Cardiology: Tolerating beta blocker without side effects. Recommended he follow up with cardiology if symptoms increase. No changes made today.      RECOMMENDED FOLLOW-UP    Ramu Espinoza was recommended to follow up in: 6 months    SUBJECTIVE AND OBJECTIVE  Ramu Espinoza is a 57 y.o. male here for a medication therapy management (MTM)  follow up appointment.    Cardiology: Started metoprolol XL 25 mg once daily in the evening about a month ago. Stated it doesn't seem to bother him, but he's not sure the medication is helping with heart rate.  Seems to notice symptoms in the evening after he is done working. No chest pain. Doesn't have symptoms every day. Denied any dizziness.      BRANDEN/Sleep: Stopped hydroxyzine 25 mg at bedtime. Stated dry mouth has gotten better. Continues trazodone 50 mg at bedtime. Did try increasing dose to 100 mg, but reported he didn't like how he felt in the morning. Stated that he has to learn to go to bed earlier. Sleep is \"kind of okay.\" Hasn't been exercising due to tendon pain in knees. Stated mood and anxiousness has been good. Taking lorazepam once in awhile to help calm him down, usually on a Sunday night, to help him sleep better. Switching hours at work - plans on getting to work an hour later. Has been taking melatonin non-stop for a long time. " "      Enlarged Prostate: Continues Flomax 0.4 mg once daily at lunchtime.  Waking up around 4 AM almost every night to urinate. Stated that \"when I got to go, I have to go now.\" Reported that stream is not consistent. Reported that he's drinking a lot of water at work.     General Health: Started taking Tumeric once daily. Continues vitamin D 1000 IU. Hasn't been practicing PT exercises. Takes ibuprofen for knee pain. Plans to make appointment with PCP.     Andrew KruegerD, [facility]                              We reviewed Ramu's medication list with them, discussing reason for use, directions for use, and potential side effects of each medication.  Indication, safety, efficacy, and convenience was assessed for all reviewed medications.  No environmental factors were noted currently affecting patient.  This care plan was communicated via EMR with his primary care provider, Esperanza Talamantes MD, and is the authorizing prescriber for this visit.  Direct supervision was available by either the patient's PCP or another available physician when needed. Time and complexity billing metrics are included in the docflowsheet linked to this visit. Time spent: 25 minutes    Other pertinent information at the time of this visit:  Current Outpatient Prescriptions   Medication Sig Dispense Refill     acyclovir (ZOVIRAX) 400 MG tablet TAKE 1 TABLET BY MOUTH TWICE DAILY 120 tablet 6     cholecalciferol, vitamin D3, 1,000 unit tablet Take 2,000 Units by mouth daily. 1-2 tablets       ibuprofen (ADVIL,MOTRIN) 200 MG tablet Take 800 mg by mouth as needed for pain.       LORazepam (ATIVAN) 1 MG tablet TAKE 1 TABLET BY MOUTH DAILY (Patient taking differently: take 1 tablet as needed - taking once a week if at all) 30 tablet 0     melatonin 5 mg TbDL disintegrating tablet Take 5 mg by mouth bedtime as needed.       metoprolol succinate (TOPROL XL) 25 MG Take 1 tablet (25 mg total) by mouth daily. 90 " tablet 3     Study Drug Ibrutinib 140 mg 140 mg cap capsule Take 420 mg by mouth daily.       sulfamethoxazole-trimethoprim (SEPTRA DS) 800-160 mg per tablet TAKE 1 TABLET BY MOUTH THREE TIMES A WEEK 36 tablet 3     tamsulosin (FLOMAX) 0.4 mg Cp24 TK 1 C PO ONCE D PC  11     traZODone (DESYREL) 50 MG tablet Take 1 tablet (50 mg total) by mouth bedtime. 360 tablet 1     No current facility-administered medications for this visit.        This transcription uses voice recognition software, which may contain typographical errors.

## 2021-06-13 NOTE — PROGRESS NOTES
I met with Clive today in clinic.  He is doing well.  States he is more tired at times but not sure if it is the medicine he takes or the fact he is getting older!  No needs identified today.  I will continue to follow up with him.

## 2021-06-13 NOTE — PROGRESS NOTES
Assessment & Plan   1. Right knee pain:  Chronic knee pain with acute worsening following pickleball game.  Generalized edema of the knee, pain with flexion past 90 degrees and positive Jermaine.  Discussed differentials, will obtain MRI to evaluate.   - MR Knee Without Contrast Right; Future    Emily Lipscomb CNP    Subjective   Chief Complaint:  Knee Pain (bialteral knee pain, pt was seeing PT for the knee pain, the L knee has gotten better, the R knee is still really painful)    HPI:   Ramu Espinoza is a 57 y.o. male who presents for right knee pain.      He states pain in bilateral knees began 3-4 months ago, gradual onset.  He received a referral to PT at that time and had good improvement in the left knee but has persistent pain on the right side.  Pain worsened three weeks ago after playing Pickleball.  He does not recall a specific acute event during the game, though it was at that time he realized something might be wrong structurally with the knee.  He has experienced generalized edema. He describes pain as in the front of the knee.  He first denies instability or weakness but then states it feels very unreliable. No prior injury to knee.  Of note, he works as a  and does spend time on the knees with knee pads.      PMH:   Patient Active Problem List   Diagnosis     Tinnitus     Backache     Generalized Anxiety Disorder     Peripheral Neuropathy     Pure hypercholesterolemia     Dupuytren's Contracture     Insomnia     Chronic lymphocytic leukemia     Internal Hemorrhoids With Bleeding     Depression     Paroxysmal atrial fibrillation       Past Medical History:   Diagnosis Date     BPH (benign prostatic hyperplasia)      CLL (chronic lymphocytic leukemia)      Lymphadenopathy     Created by Conversion  Replacement Utility updated for latest IMO load       Current Medications:   Current Outpatient Prescriptions on File Prior to Visit   Medication Sig Dispense Refill     acyclovir (ZOVIRAX) 400 MG  tablet TAKE 1 TABLET BY MOUTH TWICE DAILY 120 tablet 6     cholecalciferol, vitamin D3, 1,000 unit tablet Take 2,000 Units by mouth daily. 1-2 tablets       ibuprofen (ADVIL,MOTRIN) 200 MG tablet Take 800 mg by mouth as needed for pain.       LORazepam (ATIVAN) 1 MG tablet take 1 tablet as needed - taking once a week if at all 30 tablet 0     melatonin 5 mg TbDL disintegrating tablet Take 5 mg by mouth bedtime as needed.       metoprolol succinate (TOPROL XL) 25 MG Take 1 tablet (25 mg total) by mouth daily. 90 tablet 3     Study Drug Ibrutinib 140 mg 140 mg cap capsule Take 420 mg by mouth daily.       sulfamethoxazole-trimethoprim (SEPTRA DS) 800-160 mg per tablet TAKE 1 TABLET BY MOUTH THREE TIMES A WEEK 36 tablet 3     tamsulosin (FLOMAX) 0.4 mg Cp24 TK 1 C PO ONCE D PC  11     traZODone (DESYREL) 50 MG tablet Take 1 tablet (50 mg total) by mouth bedtime. 360 tablet 1     UNABLE TO FIND Med Name: Tumeric once daily       No current facility-administered medications on file prior to visit.        Allergies:  is allergic to azithromycin.    SH/FH:  Social History and Family History reviewed and updated.   Tobacco Status:  He  reports that he quit smoking about 12 years ago. He has a 12.50 pack-year smoking history. He has never used smokeless tobacco.    Review of Systems:  A complete head to toe ROS is negative unless otherwise noted in HPI    Objective     Vitals:    11/01/17 0921   BP: 120/80   Patient Site: Left Arm   Patient Position: Sitting   Cuff Size: Adult Large   Pulse: 72   SpO2: 98%   Weight: (!) 261 lb (118.4 kg)     Wt Readings from Last 3 Encounters:   11/01/17 (!) 261 lb (118.4 kg)   10/22/17 (!) 265 lb (120.2 kg)   10/11/17 (!) 267 lb 8 oz (121.3 kg)       Physical Exam:  GENERAL: Alert, well-appearing male   KNEE:  Right sided effusion, no deformity.  PALPATION of: patella,  medial joint line, lateral joint line,  tibial tuberosity, posterior fossa,  distal femur / quad non tender.  ROM:  extension normal; flexion decreased. Pain with flexion past 90 degrees STRENGTH: extension 4/5; flexion 4/5. SPECIAL TESTS: Positive Jermaine for pain with valgus stress. Lateral collateral ligament intact; medial collateral ligament intact; ACL (anterior drawer and Lachman's) intact; PCL (posterior drawer) intact. No Crepitus. GAIT: antalgic.      Labs:    No results found for this or any previous visit (from the past 168 hour(s)).

## 2021-06-13 NOTE — TELEPHONE ENCOUNTER
Refill Approved    Rx renewed per Medication Renewal Policy. Medication was last renewed on 8/17/20.    Sandra Dalal, [facility] Triage/Med Refill 12/21/2020     Requested Prescriptions   Pending Prescriptions Disp Refills     hydrOXYzine HCL (ATARAX) 25 MG tablet [Pharmacy Med Name: HYDROXYZINE HCL 25MG TABS] 45 tablet 1     Sig: TAKE 1/2 TABLET(12.5 MG) BY MOUTH AT BEDTIME AS NEEDED FOR ITCHING       Antihistamine Refill Protocol Passed - 12/18/2020  6:04 AM        Passed - Patient has had office visit/physical in last year     Last office visit with prescriber/PCP: 3/2/2020 Ramu Jiménez MD OR same dept: Visit date not found OR same specialty: Visit date not found  Last physical: Visit date not found Last MTM visit: 2/16/2018 Che Silva, PharmD   Next visit within 3 mo: Visit date not found  Next physical within 3 mo: Visit date not found  Prescriber OR PCP: Ramu Jiménez MD  Last diagnosis associated with med order: 1. Tinnitus  - hydrOXYzine HCL (ATARAX) 25 MG tablet [Pharmacy Med Name: HYDROXYZINE HCL 25MG TABS]; TAKE 1/2 TABLET(12.5 MG) BY MOUTH AT BEDTIME AS NEEDED FOR ITCHING  Dispense: 45 tablet; Refill: 1    2. Insomnia  - hydrOXYzine HCL (ATARAX) 25 MG tablet [Pharmacy Med Name: HYDROXYZINE HCL 25MG TABS]; TAKE 1/2 TABLET(12.5 MG) BY MOUTH AT BEDTIME AS NEEDED FOR ITCHING  Dispense: 45 tablet; Refill: 1    If protocol passes may refill for 12 months if within 3 months of last provider visit (or a total of 15 months).

## 2021-06-13 NOTE — PROGRESS NOTES
[facility] Hematology and Oncology Progress Note    Patient: Ramu Espinoza  MRN: [id number]  Date of Service: 10/11/2017        Reason for Visit    Chief Complaint   Patient presents with     HE Cancer       Assessment and Plan    1.  A 56 y.o. gentleman with CLL stage 4 now, diagnosed in February 2012. 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 Great clinical response to Imbruvica. His lymphnodes have normalized. His CBC is normal today. Continue Imbruvica 140 mg 3 pills daily. Return in 3 months with labs and to see Dr. Garza.     2. Paroxysmal A. Fib: could be from Imbruvica (<9% risk) on metoprolol. Seems to notice symptoms in the evening after he is done working. No chest pain. Doesn't have symptoms every day. Will continue to see Dr. Arana.     ECOG Performance   ECOG Performance Status: 0     Distress Assessment  Distress Assessment Score: 3: nervous about side effects of Imbruvica, especially with his heart.     Pain  Currently in Pain: Yes  Pain Score (Initial OR Reassessment): 4  Location: right knee injury    Problem List    1. Chronic lymphocytic leukemia  Study Drug Ibrutinib 140 mg capsule 420 mg   2. Paroxysmal atrial fibrillation        ______________________________________________________________________________    History of Present Illness    Ramu Espinoza is a very pleasant 56 y.o. old male with a history of CLL diagnosed in 02/2012 presenting with elevated white count in upper 10s/lower 20s with a peripheral blood smear showing atypical lymphocytosis suspicious for CLL.  He was completely asymptomatic, normal hemoglobin and platelet count.  His peripheral blood flow cytometry revealed CD5 co-expressing kappa light chains restricted B cells confirming the diagnosis of CLL.  He has been on observation.  His white count has been slowly going up.      In June 2014 his white count was over 100,000. So he had a CAT scan and that showed increasing lymphadenopathy and splenectomy. Platelet  count has been going down.      I offered participation in clinical study in which patients are randomized between fludarabine and cyclophosphamide Rituxan or Imbruvica and Rituxan. He has been randomized to Imbruvica and rituximab arm. He started on October 2014. In November 2014 he got Rituxan for the first time. Tolerated well. Finished that. Now on Imbruvica alone. Tolerating it well.      Comes in today for scheduled follow-up. Most days he feels well.    Pain Status  Currently in Pain: Yes    Review of Systems    Constitutional  Constitutional (WDL): Exceptions to WDL  Fatigue: Fatigue relieved by rest  Weight Gain: 5 - <10% from baseline  Neurosensory  Neurosensory (WDL): All neurosensory elements are within defined limits  Eye   Eye Disorder (WDL): All eye disorder elements are within defined limits  Ear  Ear Disorder (WDL): All ear disorder elements are within defined limits  Cardiovascular  Cardiovascular (WDL): All cardiovascular elements are within defined limits  Pulmonary  Respiratory (WDL): Within Defined Limits  Gastrointestinal  Gastrointestinal (WDL): All gastrointestinal elements are within defined limits  Genitourinary  Genitourinary (WDL): All genitourinary elements are within defined limits  Lymphatic  Lymph (WDL): All lymph disorder elements are within defined limits  Musculoskeletal and Connective Tissue  Musculoskeletal and Connetive Tissue Disorders (WDL): Exceptions to WDL  Myalgia: Mild pain  Integumentary  Integumentary (WDL): All integumentary elements are within defined limits  Patient Coping  Patient Coping: Accepting  Distress Assessment  Distress Assessment Score: 3  Accompanied by  Accompanied by: Alone  Oral Chemo Adherence       Past History  Past Medical History:   Diagnosis Date     BPH (benign prostatic hyperplasia)      CLL (chronic lymphocytic leukemia)      Lymphadenopathy     Created by Conversion  Replacement Utility updated for latest IMO load       PHYSICAL EXAM:  BP  138/68  Pulse 72  Temp 97.6  F (36.4  C)  Wt (!) 267 lb 8 oz (121.3 kg)  SpO2 95%  BMI 35.29 kg/m2  GENERAL: no acute distress. Cooperative in conversation. Here alone  HEENT: pupils are equal, round and reactive. Oromucosa is clean and intact. No ulcerations or mucositis noted. No bleeding noted.  RESP: lungs are clear bilaterally per auscultation. Regular respiratory rate. No wheezes or rhonchi.  CV: Regular, rate and rhythm. No murmurs.  ABD: soft, nontender. Positive bowel sounds. No organomegaly.   MUSCULOSKELETAL: No lower extremity swelling.   NEURO: non focal. Alert and oriented x3.   PSYCH: within normal limits. No depression or anxiety.  SKIN: warm dry intact   LYMPH: no cervical, supraclavicular or axillary lymphadenopathy        Lab Results    Recent Results (from the past 168 hour(s))   Comprehensive Metabolic Panel   Result Value Ref Range    Sodium 142 136 - 145 mmol/L    Potassium 4.0 3.5 - 5.0 mmol/L    Chloride 104 98 - 107 mmol/L    CO2 29 22 - 31 mmol/L    Anion Gap, Calculation 9 5 - 18 mmol/L    Glucose 84 70 - 125 mg/dL    BUN 16 8 - 22 mg/dL    Creatinine 0.91 0.70 - 1.30 mg/dL    GFR MDRD Af Amer >60 >60 mL/min/1.73m2    GFR MDRD Non Af Amer >60 >60 mL/min/1.73m2    Bilirubin, Total 0.9 0.0 - 1.0 mg/dL    Calcium 9.4 8.5 - 10.5 mg/dL    Protein, Total 6.7 6.0 - 8.0 g/dL    Albumin 3.9 3.5 - 5.0 g/dL    Alkaline Phosphatase 60 45 - 120 U/L    AST 19 0 - 40 U/L    ALT 19 0 - 45 U/L   HM1 (CBC with Diff)   Result Value Ref Range    WBC 9.7 4.0 - 11.0 thou/uL    RBC 4.84 4.40 - 6.20 mill/uL    Hemoglobin 14.6 14.0 - 18.0 g/dL    Hematocrit 42.1 40.0 - 54.0 %    MCV 87 80 - 100 fL    MCH 30.2 27.0 - 34.0 pg    MCHC 34.7 32.0 - 36.0 g/dL    RDW 11.9 11.0 - 14.5 %    Platelets 255 140 - 440 thou/uL    MPV 9.9 8.5 - 12.5 fL    Neutrophils % 59 50 - 70 %    Lymphocytes % 31 20 - 40 %    Monocytes % 7 2 - 10 %    Eosinophils % 2 0 - 6 %    Basophils % 1 0 - 2 %    Neutrophils Absolute 5.6 2.0 -  7.7 thou/uL    Lymphocytes Absolute 3.0 0.8 - 4.4 thou/uL    Monocytes Absolute 0.7 0.0 - 0.9 thou/uL    Eosinophils Absolute 0.2 0.0 - 0.4 thou/uL    Basophils Absolute 0.1 0.0 - 0.2 thou/uL       Imaging    No results found.      Signed by: Radha Alarcon, CNP

## 2021-06-13 NOTE — TELEPHONE ENCOUNTER
Refill Request  Did you contact pharmacy: Yes  Medication name:   Requested Prescriptions     Pending Prescriptions Disp Refills     metoprolol succinate (TOPROL-XL) 50 MG 24 hr tablet 60 tablet 0     Sig: Take 2 tablets (100 mg total) by mouth daily.     Who prescribed the medication: Ramu Jiménez MD   Requested Pharmacy: [facility]  Is patient out of medication: Yes The caller from [facility] states the patient called several times to the pharmacy requesting the refill.  Patient notified refills processed in 3 business days:  no  Okay to leave a detailed message: yes

## 2021-06-13 NOTE — TELEPHONE ENCOUNTER
Refill Approved    Rx renewed per Medication Renewal Policy. Medication was last renewed on 10/19/20.    Sandra Dalal, [facility] Triage/Med Refill 12/3/2020     Requested Prescriptions   Pending Prescriptions Disp Refills     metoprolol succinate (TOPROL-XL) 50 MG 24 hr tablet 60 tablet 0     Sig: Take 2 tablets (100 mg total) by mouth daily.       Beta-Blockers Refill Protocol Passed - 12/3/2020 11:40 AM        Passed - PCP or prescribing provider visit in past 12 months or next 3 months     Last office visit with prescriber/PCP: 3/2/2020 Ramu Jiménez MD OR same dept: 3/2/2020 Ramu Jiménez MD OR same specialty: 3/2/2020 Ramu Jiménez MD  Last physical: Visit date not found Last MTM visit: Visit date not found   Next visit within 3 mo: Visit date not found  Next physical within 3 mo: Visit date not found  Prescriber OR PCP: Ramu Jiménez MD  Last diagnosis associated with med order: 1. Heart palpitations  - metoprolol succinate (TOPROL-XL) 50 MG 24 hr tablet; Take 2 tablets (100 mg total) by mouth daily.  Dispense: 60 tablet; Refill: 0    If protocol passes may refill for 12 months if within 3 months of last provider visit (or a total of 15 months).             Passed - Blood pressure filed in past 12 months     BP Readings from Last 1 Encounters:   09/23/20 116/62

## 2021-06-14 NOTE — TELEPHONE ENCOUNTER
Patient coming in this week for labs no orders please advise thanks. You stated to come in for fasting lab in a few month this is just a week after you seen him.

## 2021-06-14 NOTE — PROGRESS NOTES
[facility] Daily Progress     Patient Name: Ramu Espinoza  Date: 12/14/2017  Visit #: 2/12  Referral Diagnosis: R knee pain   Referring provider: Esperanza Talamantes*  Visit Diagnosis:     ICD-10-CM    1. Chronic pain of right knee M25.561     G89.29    2. Decreased range of motion of right knee M25.661        Assessment:     Patient is a 57 y.o. male that presents with signs and symptoms consistent with R knee pain and swelling secondary to status post menisectomy on 11/17/17 per patient report. Patient demonstrates impairments including decreased R knee joint mobility, flexibility, strength and range of motion, with increased swelling leading to impaired functional mobility. Patient's functional limitations include ambulation without pain, going up stairs, bending down, sitting for long periods of time and performing his work duties without pain.    Today patient reports he thinks this will be his last visit, he isn't proud of his progress and really wants to be better by now, he doesn't like that it is taking too long.     HEP/POC compliance is  good .  Patient demonstrates understanding/independence with home program.  Patient is appropriate to continue with skilled physical therapy intervention, as indicated by initial plan of care.    Goal Status:  Pt. will be independent with home exercise program in : 4 weeks  Pt. will be able to walk : 10 minutes;on uneven/inclined surfaces;on even surfaces;with no pain;with less difficulty;for exercise/recreation;for work;for community mobility;in 6 weeks  Patient will stand : 30 minutes;for work;with less pain;with less difficultty;in 4 weeks  Pt. will bend: to dress;to clean;to do yard work;with no pain;for work;with less difficulty;in 6 weeks  Patient will ascend / descend: stairs;with recipricol gait;without railing;with no pain;with less difficulty;in 6 weeks  Patient will sit: 30 minutes;with no pain;with less difficultty;in 6 weeks;for  driving  Patient will transfer: sit/stand;floor/stand;for in/out of chair;for in/out of bed;with less difficulty;with less pain;in 4 weeks  Patient will increase : LEFS score;for improved quality of function;for improved quality of life;in 6 weeks      Plan / Patient Education:     Continue with initial plan of care.  Progress with home program as tolerated.    Plan for next visit: review HEP, bike, wall slides, mobilizations to knee for improved joint mobility and ROM, standing hip abd/ext/add in standing, TKE, heel/toe raises    Subjective:     Pain Rating: 3 - it feels vulnerable   Patient reports he thinks this is going to be his last visit because he has too much pain and it isn't going fast enough for him, he wants to do his new job to make more money but he knows he can't with the pain and decreased mobility he is having in his knee. He is walking slower but he is walking a lot, stairs he is avoiding because he feels vulnerable in his knee and he sometimes feels a cracking with a piercing. He is pleased with how well going back to work is going but he is doing a lot of walking.     Functional limitations are described as occurring with:   ascending stairs or curbs  performing routine daily activities  sitting for longer periods he feels upper leg pain too   transitional movements getting in  chair and car, getting out of  chair and car and sit to stand    Objective:      AROM in degrees  Right 12/7   Left  Right 12/14   Left  Right   Left       Knee Flexion  (130 )   110    135   114  116 post treatment                Knee Extension  (0 )                           Treatment Today       Patient Education: Patient was educated on continuing plan of care, progress and review of current HEP. Patient educated on importance of consistency with exercise and therapy, as well as activity modification in order to see change and improvements. Patient was educated this may take a few weeks to see progression and change  of his pain. Patient demonstrated and verbalized understanding.     Manual Therapy:  Anterior glides to tib femoral to increase motion    Exercises:  HEP from [place]:   SAQ  SLR  Standing hamstring curls  Heels slides  Ankle pumps  Exercise #1: seated hamstring stretch - hold 30 sec x 2  Comment #1: standing gastroc stretch - hold 30 sec x 2  Exercise #2: SL balance on R - 10-30 second holds x 3      TREATMENT MINUTES COMMENTS   Evaluation     Self-care/ Home management     Manual therapy 10 Anterior glides to tib femoral to increase motion  kinesiotape for swelling and support   Neuromuscular Re-education     Therapeutic Activity     Therapeutic Exercises 15 See above flowsheet;  Upright bike half to full revolutions - 5 minutes   Gait training     Modality__________________                Total 25 Patient arrived 5 minutes late and then had to change into his shorts (10min)   Blank areas are intentional and mean the treatment did not include these items.       Kristin Muller, PT  12/14/2017

## 2021-06-14 NOTE — PROGRESS NOTES
"                        Medication Therapy Management Follow Up Visit       ASSESSMENT AND PLAN  BRANDEN/Sleep: Not well controlled. Increase in anxiety, likely situational due to recent knee surgery and new job offer. Increase in anxiety is also likely linked to trouble sleeping. Educated Clive to take lorazepam as needed, especially in the evenings to help with symptoms. Acknowledged that Clive doesn't want to take medications, given his medication history, but reviewed that we can taper off medications for anxiety as situation changes. Recommended talking to our LICSW, but he wasn't interested in this today. Therefore, recommended starting Buspar today to help with anxiety. Discussed effectiveness of Buspar will take 2-3 weeks, during which time he can take lorazepam as needed for anxiety. Recommended lowering dose of trazodone from 100 mg to 50 mg at bedtime to avoid side effects upon awakening.   Plan   1. Start Buspar 7.5 mg twice daily   2. Take lorazepam 1 mg as needed for the next 2 weeks  3. Decrease trazodone to 50 mg at bedtime    HTN: Controlled. No changes made today  Plan   1. Continue current medication    History of Knee Endoscopic Surgery: Reviewed proper disposal of medication with Clive today.     RECOMMENDED FOLLOW-UP    Ramu Espinoza was recommended to follow up in: 3 weeks    1. Take lorazepam 1 mg as needed for anxiety at bedtime  2. Start buspirone 7.5 mg twice daily. Monitor your anxiety.   3. Lower trazodone from 100 mg to 50 mg at bedtime.      SUBJECTIVE AND OBJECTIVE    Ramu Espinoza is a 57 y.o. male here for a medication therapy management (MTM)  follow up appointment. Had knee endoscopic surgery on November 17th and is still recovering. Started going to PT. 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 Has been worrying a lot about this future position and feels \"amped up.\" Trying to weigh benefits of " "new job versus current job.     Medication Experience: Doesn't want to take medications. \"That crap killed my brother.\" Brother  from recreational drugs. Has experienced many friends who have overdosed.     BRANDEN/Sleep: Reported he's more anxious thinking about his new position. Took hydroxyzine earlier in the week, and stated it made him more anxious and wired. Reported that he's not sleeping well - lavon if he's getting maybe 5 hours of sleep. Reported he does worry when he's staying up late at night. Reported going to bed is now anxious for him. Stated he has been taking trazodone 100 mg at bedtime. Has taken lorazepam 1 mg twice over this past week. Reported he has also been emotional lately. Has been using an wyatt at bedtime to help with sleep, along with deep breathing.     PHQ9 = 6  GAD7 = 6    Cardiology: Continues metoprolol XL 25 mg once daily in the evening. Stated he hasn't had caffeine coffee in the last week. Doesn't have symptoms every day. Denied any dizziness.       Enlarged Prostate: Continues Flomax 0.4 mg once daily at lunchtime. Has been waking up at night, but isn't sure this is due to urinary urgency compared to feeling anxious.     History of Knee Endoscopic Surgery: Was given hydrocodone-APAP post surgery, but hasn't been taking the medication for pain. Wanted to know how to discard the medication    The following are part of a depression follow up plan for the patient:  mental health screening assessment, mental health treatment assessment, mental health treatment education and management of mental health treatment    Munira Ashby, PharmD, BCACP  [facility]                              We reviewed Ramu's medication list with them, discussing reason for use, directions for use, and potential side effects of each medication.  Indication, safety, efficacy, and convenience was assessed for all reviewed medications.  No environmental factors were noted currently affecting " patient.  This care plan was communicated via EMR with his primary care provider, Esperanza Talamantes MD, and is the authorizing prescriber for this visit.  Direct supervision was available by either the patient's PCP or another available physician when needed. Time and complexity billing metrics are included in the docflowsheet linked to this visit. Time spent: 35 minutes    Other pertinent information at the time of this visit:  Current Outpatient Prescriptions   Medication Sig Dispense Refill     acyclovir (ZOVIRAX) 400 MG tablet TAKE 1 TABLET BY MOUTH TWICE DAILY 120 tablet 6     aspirin 81 MG EC tablet Take 81 mg by mouth daily.       cholecalciferol, vitamin D3, 1,000 unit tablet Take 2,000 Units by mouth daily. 1-2 tablets       hydrOXYzine (ATARAX) 25 MG tablet Take 1 tablet (25 mg total) by mouth at bedtime. 30 tablet 2     ibuprofen (ADVIL,MOTRIN) 200 MG tablet Take 800 mg by mouth as needed for pain.       LORazepam (ATIVAN) 1 MG tablet take 1 tablet as needed - taking once a week if at all 30 tablet 0     melatonin 5 mg TbDL disintegrating tablet Take 5 mg by mouth bedtime as needed.       metoprolol succinate (TOPROL XL) 25 MG Take 1 tablet (25 mg total) by mouth daily. 90 tablet 3     Study Drug Ibrutinib 140 mg 140 mg cap capsule Take 420 mg by mouth daily.       sulfamethoxazole-trimethoprim (SEPTRA DS) 800-160 mg per tablet TAKE 1 TABLET BY MOUTH THREE TIMES A WEEK 36 tablet 3     tamsulosin (FLOMAX) 0.4 mg Cp24 TK 1 C PO ONCE D PC  11     traZODone (DESYREL) 50 MG tablet Take 1 tablet (50 mg total) by mouth at bedtime. 360 tablet 1     UNABLE TO FIND Med Name: Tumeric once daily       No current facility-administered medications for this visit.        This transcription uses voice recognition software, which may contain typographical errors.

## 2021-06-14 NOTE — PATIENT INSTRUCTIONS - HE
It was very good to see you today Clive    Please ankle  I think you have a very tight Achilles tendon  You have some loosening of the bone calcaneus and talar joint  See Julieta Roland I think she can help you with this ongoing discomfort  You might want to use an Ace wrap to help with stability to see if this helps and deftly do Achilles stretching    From an anal leakage standpoint  Eliminate all dairy products  Eliminate beer  If you have to do alcohol use clear alcohol such as vodka always in moderation  You could try Imodium this slows down the conveyor belt and may help with leakage    You are always welcome to see a colorectal person about this problem    Come in in the near future for fasting blood work    After you have received your to Covid vaccines you could come in to check your antibody titers 4 weeks after you have gotten the second dose    Colorectal cancer screening I will recommend that you have a colonoscopy every 5 years given your mother's  history of colon cancer      Stay active    Continue to follow through with Dr. Garza and his crew    Gali    Adult Physical AVS      Thank you for scheduling and completing a Physical Check up!      There has been suggestions that this is an Unnecessary part of the current care for patients by some. I do not agree!    It is a reminder to take stock in an overall health plan.     It also hopefully will engage dialogue about wellness and focusing on measure to stay well and fit, hopefully avoiding extra trips to see us!      -----------Wellness Pillars-----------    1. Nutrient Dense Nutrition-- we are or will become and are activated by what we eat! It is paramount that we all focus on eating well. This means trying to eat ?hole Foods,  single ingredient foods that nourish and activate our bodies. Food that are high in nutrients help run our bodies in a way that vasquez and can transform our health and help us to heal and repair. Major groups include.  Fats----preferably plant based. These help ours brains and nervous system. Proteins---from plants and animals. Nuts, Beans and Animal proteins. These help our musculoskeletal system. Complex Carbohydrates---fresh fruits vegetables, and whole grains. In order to maintain balance, we must give our bodies balanced nutrition. There are things we should avoid. Most processed foods and all added sugar should be avoided. If you do not prepare your own food, order simple foods a la carte. Order a protein and pair it with a side of vegetables. Vegetable should occupy more than half of your plate. Try to avoid simple carbohydrates like chips or fries.   2. Restorative Sleep----we all need sleep to allow our bodies to heal and repair. Sleep is indispensable and necessary. Good quality sleep is different from the simple quantitative amount of sleep. It is possible to sleep without getting any rest or restorative sleep. This is why we ask about refreshing sleep, because it is possible to sleep and not have restorative sleep. If your sleep is not refreshing, you may requires a visit with a Sleep Specialist to help sort this out. For quantity, aim for 8-10 hours daily.   3. Movement---exercise has numerous health benefits. Bottom line, in order to do the things we do in life, it is necessary to condition, nurture and repair our machine. Food provides the tools and the basic repair structure and vital nutrients. Rest allows time and focus for repair and restoration. Exercise conditions and activated reparative mechanisms. I would suggest thinking about one's high school weight as a rule and aim for a weight plus 15 pounds for men and 10 pounds for women as a goal. Also we should strive to engage in intentional activity 3 to 4 days of cardio fitness 30-60 minutes and 1-3 days of strength training. We want to be fit as we age and have stamina to do activities of daily living and beyond. Walk, bike, swim, run, box, dance, and so on,  find your thing! The benefits are incredible! Exercise lowers blood pressure, helps treat and prevent diabetes and helps us live longer and in a way that is more satisfying. As Anthony says,  Just Do It!  4. Mindfulness----get in touch with your mind body connection. Take time daily to reflect on and be cognizant of how you are doing----eating, working, parenting, interacting with loved ones, friends and others. Be intentional and present in relating to things in which you are engaged.     Preventative Care    Colon Cancer screening. It may not be glamorous, but it saves lives and may save yours. There is colonoscopy and immuno-chemical testing. Pick your mode , but get it done. If there is a first degree relative that has had Colon Cancer, we may recommend screening 10 years before the age of that index case.     Breast Cancer Screening. This is mostly for women. Get to know the architecture of your breasts. If it makes you feel more comfortable, engage your partner as well. If something strikes you as ?ot right,  get it checked out. Mammogram breast cancer screening does detect cancers. Self breast exams can detect cancers. Guidelines vary but in general start screen in adulthood for self exams and mammography in your 40s. If there is a first degree relative or many with cancers, this may be earlier or involve genetic screening.     Pap Smears Cervical Cancer Screening---again women. Start screening after sexual activity or intercourse begins. Cervical cancer really is tied to exposure to HPV virus and this is related to sexual intercourse. Cervical cancer is treatable and preventable, as Pap smears should detect changes BEFORE cancerous transformation.   All women that have a cervix should be screen between 21-65 as a rule. Intervals vary based on age and medical history.     Vaccines. Yes there is controversy. But I still think vaccines save lives and reduce disease burden in our human populations. Please consider  getting vaccinated as you are due for Vaccines.     About Vitamins and Supplements     I do recommend that adult and kids consider a multivitamin as way to enrich our nutrition.     A solid multivitamin. Ask one of us for recommendations. Our pharmacy carry some high quality lower cost vitamins that we have recommends.     [place] 3/6 Oils Fish Oils, Flax Oils, Krill Oils, Algea Oils, and Cumin Seed and Borage Oils are among the Omega 3s and 6s. Good oils nourish our nervous system and engage our immune system in positive ways.     Vitamin D. If you are in [place]. You probably need some. We shoot for a level of 50-60. So sometime this takes staring lower and titrating up a supplement. Start at 6232-6651 IU wit a fatty meal and check levels.             Certain supplements may help with our gut's immune system or Microbiome.   Start with plants, many and of diverse colors.   Consider cultured foods with live active bacteria. Examples are Yogurt, Kefir and Kombucha.     Eat Prebiotic foods from the Chicori family, asparagus, bananas, inulin fibers and more.     Other supplements that may help are Zinc Carnosine, D-limonene, Vitamin D and Colostrum.     It may prudent to try the Elimination Diet and eliminate certain foods such as Wheat products, Dairy Products and Especially Refined Sugars.       We are screening multiple issues:     High Blood Pressure.   We ideally have readings less than 130 on the top number and less than 80 on the bottom.     Diabetes.   Diabetes is the body not processing sugars in an efficient way. When sugars are not dealt with in an efficient manner, every part of the body can be effected, the heart such as a heart attack or failure, the brain such as a stroke and really any part of the body. Insulin levels being overworked really drives diabetes. Lowering intake of simple sugar and exercising are two major ways to treat and stave off Diabetes.     Heart Disease:  Heart issues usually arise  out of a combination of genetic issues and life stressors and choices.   Focusing on the Pillars of Wellness are ways to help prevent heart disease. Avoiding tobacco, limiting alcohol intake to moderate intake and addressing out  of balance cholesterol, presence of diabetes and persistently elevated blood pressure may require medications in addition to life style changes.     Skin cancer is typically due to cumulative Sun Damage. There are other genetic factor as well. If skin looks irritated or a mole changes color, shape, size or has irregular borders, get it checked out. Skin exams can also save lives.     If you are Diabetic or Have Known Heart Disease. We have goals for your best Care     A1C. For Diabetics     This is a measure of how well controlled your sugars are over the last 3 months. In general, we would like the percent number less than 7% for most diabetics. In the morning and before all meals the sugar number should be less than 150. If you are diabetic and this is the case, you are managing well. The Pillars of Wellness are still a guide to keeping your body in balance.     Blood Pressure for Diabetics and Heart Disease     Top < 130 Bottom < 80  This goal of blood pressure control reduces Diabetes complications, such as stroke or heart attack. Life style first and add medication if consistently high.     No Tobacco. For Anyone!    Smoking tobacco or chewing produces by products that are particularly harmful to Diabetics, but really all of us. I implore you to not use tobacco products.     Aspirin for Heart Disease Patient     If you have high blood pressure, stroke or heart disease risk, you probably would benefit from a baby aspirin. There reasons to avoid aspirin such as allergies, risks for bleeding, etc. have the discussion of aspirin should be part of your therapy.     Statin Medications for Heart Disease, Vascular Disease or High Risk Patients    These medicine have statistically been shown to  benefit Diabetic patients, especially those with disordered cholesterol profiles. I like to do an NMR panel that shows Atherogenic Risk (Stroke/ Heart Attack) and Insulin Resistance. Likely this may be recommended as part of you treatment plan.       Gali

## 2021-06-14 NOTE — PROGRESS NOTES
[facility] Hematology and Oncology Progress Note    Patient: Ramu Espinoza  MRN: [id number]  Date of Service: 20          Reason for Visit    Chief Complaint   Patient presents with     HE Cancer     Chronic lymphocytic leukemia        Assessment and Plan    1.  A 59 yo gentleman with CLL stage 4 now, diagnosed in 2012. 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 Great clinical response to Imbruvica. His lymph nodes have normalized. His CBC is basically normal today. Continue Imbruvica 140 mg- 3 pills daily. Return in 3 months with labs and to see Dr. Garza. Continues on clinical trial.     2. Paroxysmal A. Fib: could be from Imbruvica (<9% risk) on metoprolol. Continue to follow with Dr. Arana.     3.  Chronic fatigue:, diarrhea: stable. Likely from imbruvica. Monitor closely. Encourage BRAT diet and increasing hydration.     ECOG Performance   ECOG Performance Status: 0     Distress Assessment  Distress Assessment Score: No distress     Pain  Currently in Pain: Yes  Location: rt ankle    Problem List    1. Chronic lymphocytic leukemia (H)  Cy inducer Ibrutinib  Study Drug Ibrutinib 140 mg capsule 420 mg      ______________________________________________________________________________    History of Present Illness    Ramu Espinoza is a very pleasant 58 y.o. old male with a history of CLL diagnosed in 2012 presenting with elevated white count in upper 10s/lower 20s with a peripheral blood smear showing atypical lymphocytosis suspicious for CLL.  He was completely asymptomatic, normal hemoglobin and platelet count.  His peripheral blood flow cytometry revealed CD5 co-expressing kappa light chains restricted B cells confirming the diagnosis of CLL.  He has been on observation.  His white count has been slowly going up.      In 2014 his white count was over 100,000. So he had a CAT scan and that showed increasing lymphadenopathy and splenectomy. Platelet count has been going  jennifer.      I offered participation in clinical study in which patients are randomized between fludarabine and cyclophosphamide Rituxan or Imbruvica and Rituxan. He has been randomized to Imbruvica and rituximab arm. He started on October 2014. In November 2014 he got Rituxan for the first time. Tolerated well. Finished that. Now on Imbruvica alone. Tolerating it well.      In September 2019 he had pleuritic chest pain and was found to have a pericardial effusion with acute pericarditis.  He had a pericardiocentesis and they removed 350ml of serosanguinous fluid. He was discharged on ibuprofen as well as colchicine.  He had a pericardial effusion drained and held the Imbruvica for about 2 weeks.  The resumed and has been doing fine since.   He is feeling good.  Only issue is chronic fatigue and mild intermittent diarrhea.       Review of Systems    Constitutional  Constitutional (WDL): All constitutional elements are within defined limits  Neurosensory  Neurosensory (WDL): Exceptions to WDL  Eye   Eye Disorder (WDL): Exceptions to WDL  Dry Eye: Asymptomatic, clinical or diagnostic observations only, mild symptoms relieved by lubricants  Ear  Ear Disorder (WDL): Exceptions to WDL  Tinnitus: Mild symptoms, intervention not indicated(chronic)  Cardiovascular  Cardiovascular (WDL): Exceptions to WDL(a-fib)  Pulmonary  Respiratory (WDL): Within Defined Limits  Gastrointestinal  Gastrointestinal (WDL): All gastrointestinal elements are within defined limits  Genitourinary  Genitourinary (WDL): All genitourinary elements are within defined limits  Lymphatic  Lymph (WDL): All lymph disorder elements are within defined limits  Musculoskeletal and Connective Tissue  Musculoskeletal and Connetive Tissue Disorders (WDL): Exceptions to WDL  Arthralgia: Mild pain(rt ankle)  Integumentary  Integumentary (WDL): All integumentary elements are within defined limits  Patient Coping  Patient Coping: Accepting;Anxiety  Distress  Assessment  Distress Assessment Score: No distress  Accompanied by  Accompanied by: Alone  Oral Chemo Adherence           Past History  Past Medical History:   Diagnosis Date     BPH (benign prostatic hyperplasia)      CLL (chronic lymphocytic leukemia) (H)      Lymphadenopathy     Created by Conversion  Replacement Utility updated for latest IMO load     Paroxysmal atrial fibrillation with rapid ventricular response (H)      Sleep apnea        PHYSICAL EXAM  /69   Pulse 62   Temp 98.4  F (36.9  C) (Oral)   Wt (!) 268 lb 12.8 oz (121.9 kg)   SpO2 94%   BMI 34.51 kg/m      GENERAL: no acute distress. Cooperative in conversation. Here alone due to visitor restrictions. Mask on  RESP: Regular respiratory rate. No expiratory wheezes   MUSCULOSKELETAL: no bilateral leg swelling  ABD: no splenomegaly or hepatomegaly  NEURO: non focal. Alert and oriented x3.   PSYCH: within normal limits. No depression or anxiety.  SKIN: exposed skin is dry intact.   LYMPH: no abnormal lymphadenopathy.     Lab Results    Recent Results (from the past 168 hour(s))   Comprehensive Metabolic Panel   Result Value Ref Range    Sodium 140 136 - 145 mmol/L    Potassium 4.6 3.5 - 5.0 mmol/L    Chloride 105 98 - 107 mmol/L    CO2 29 22 - 31 mmol/L    Anion Gap, Calculation 6 5 - 18 mmol/L    Glucose 96 70 - 125 mg/dL    BUN 16 8 - 22 mg/dL    Creatinine 0.80 0.70 - 1.30 mg/dL    GFR MDRD Af Amer >60 >60 mL/min/1.73m2    GFR MDRD Non Af Amer >60 >60 mL/min/1.73m2    Bilirubin, Total 0.9 0.0 - 1.0 mg/dL    Calcium 8.9 8.5 - 10.5 mg/dL    Protein, Total 6.4 6.0 - 8.0 g/dL    Albumin 3.7 3.5 - 5.0 g/dL    Alkaline Phosphatase 71 45 - 120 U/L    AST 20 0 - 40 U/L    ALT 21 0 - 45 U/L   HM1 (CBC with Diff)   Result Value Ref Range    WBC 8.0 4.0 - 11.0 thou/uL    RBC 5.21 4.40 - 6.20 mill/uL    Hemoglobin 15.2 14.0 - 18.0 g/dL    Hematocrit 46.9 40.0 - 54.0 %    MCV 90 80 - 100 fL    MCH 29.2 27.0 - 34.0 pg    MCHC 32.4 32.0 - 36.0 g/dL    RDW  12.5 11.0 - 14.5 %    Platelets 261 140 - 440 thou/uL    MPV 10.5 8.5 - 12.5 fL    Neutrophils % 65 50 - 70 %    Lymphocytes % 19 (L) 20 - 40 %    Monocytes % 11 (H) 2 - 10 %    Eosinophils % 4 0 - 6 %    Basophils % 1 0 - 2 %    Immature Granulocyte % 1 (H) <=0 %    Neutrophils Absolute 5.2 2.0 - 7.7 thou/uL    Lymphocytes Absolute 1.5 0.8 - 4.4 thou/uL    Monocytes Absolute 0.8 0.0 - 0.9 thou/uL    Eosinophils Absolute 0.3 0.0 - 0.4 thou/uL    Basophils Absolute 0.1 0.0 - 0.2 thou/uL    Immature Granulocyte Absolute 0.0 <=0.0 thou/uL       Imaging    No results found.      Signed by: Radha Alarcon, CNP

## 2021-06-14 NOTE — PROGRESS NOTES
"ASSESSMENT & PLAN    Pain of left lower leg  Burns inner ankle  Into achilles  \  Sitting better  Climb stairs >> worst    1st steps \"painful\" tendon back achilles    Injury age 18 tib fib and femur \"oins aknle\"    Decrease mobility     Seen Orthopedic in past \"ankle and it worked\"  \"now its different     Xray 2020  GANGA and           Ramu was seen today for annual exam.    Diagnoses and all orders for this visit:    Incontinence of feces, unspecified fecal incontinence type    Chronic lymphocytic leukemia (H)    Paroxysmal atrial fibrillation with rapid ventricular response (H)    Pain of right lower leg  -     Ambulatory referral to Orthopedics    Achilles bursitis, unspecified laterality  -     Ambulatory referral to Orthopedics        Patient Instructions   It was very good to see you today Clive    Please ankle  I think you have a very tight Achilles tendon  You have some loosening of the bone calcaneus and talar joint  See Julieta Roland I think she can help you with this ongoing discomfort  You might want to use an Ace wrap to help with stability to see if this helps and deftly do Achilles stretching    From an anal leakage standpoint  Eliminate all dairy products  Eliminate beer  If you have to do alcohol use clear alcohol such as vodka always in moderation  You could try Imodium this slows down the conveyor belt and may help with leakage    You are always welcome to see a colorectal person about this problem    Come in in the near future for fasting blood work    After you have received your to Covid vaccines you could come in to check your antibody titers 4 weeks after you have gotten the second dose    Colorectal cancer screening I will recommend that you have a colonoscopy every 5 years given your mother's  history of colon cancer      Stay active    Continue to follow through with Dr. Garza and his crew    Gali    Adult Physical AVS      Thank you for scheduling and completing a Physical Check " up!      There has been suggestions that this is an Unnecessary part of the current care for patients by some. I do not agree!    It is a reminder to take stock in an overall health plan.     It also hopefully will engage dialogue about wellness and focusing on measure to stay well and fit, hopefully avoiding extra trips to see us!      -----------Wellness Pillars-----------    1. Nutrient Dense Nutrition-- we are or will become and are activated by what we eat! It is paramount that we all focus on eating well. This means trying to eat ?hole Foods,  single ingredient foods that nourish and activate our bodies. Food that are high in nutrients help run our bodies in a way that vasquez and can transform our health and help us to heal and repair. Major groups include. Fats----preferably plant based. These help ours brains and nervous system. Proteins---from plants and animals. Nuts, Beans and Animal proteins. These help our musculoskeletal system. Complex Carbohydrates---fresh fruits vegetables, and whole grains. In order to maintain balance, we must give our bodies balanced nutrition. There are things we should avoid. Most processed foods and all added sugar should be avoided. If you do not prepare your own food, order simple foods a la carte. Order a protein and pair it with a side of vegetables. Vegetable should occupy more than half of your plate. Try to avoid simple carbohydrates like chips or fries.   2. Restorative Sleep----we all need sleep to allow our bodies to heal and repair. Sleep is indispensable and necessary. Good quality sleep is different from the simple quantitative amount of sleep. It is possible to sleep without getting any rest or restorative sleep. This is why we ask about refreshing sleep, because it is possible to sleep and not have restorative sleep. If your sleep is not refreshing, you may requires a visit with a Sleep Specialist to help sort this out. For quantity, aim for 8-10 hours daily.    3. Movement---exercise has numerous health benefits. Bottom line, in order to do the things we do in life, it is necessary to condition, nurture and repair our machine. Food provides the tools and the basic repair structure and vital nutrients. Rest allows time and focus for repair and restoration. Exercise conditions and activated reparative mechanisms. I would suggest thinking about one's high school weight as a rule and aim for a weight plus 15 pounds for men and 10 pounds for women as a goal. Also we should strive to engage in intentional activity 3 to 4 days of cardio fitness 30-60 minutes and 1-3 days of strength training. We want to be fit as we age and have stamina to do activities of daily living and beyond. Walk, bike, swim, run, box, dance, and so on, find your thing! The benefits are incredible! Exercise lowers blood pressure, helps treat and prevent diabetes and helps us live longer and in a way that is more satisfying. As Anthony says,  Just Do It!  4. Mindfulness----get in touch with your mind body connection. Take time daily to reflect on and be cognizant of how you are doing----eating, working, parenting, interacting with loved ones, friends and others. Be intentional and present in relating to things in which you are engaged.     Preventative Care    Colon Cancer screening. It may not be glamorous, but it saves lives and may save yours. There is colonoscopy and immuno-chemical testing. Pick your mode , but get it done. If there is a first degree relative that has had Colon Cancer, we may recommend screening 10 years before the age of that index case.     Breast Cancer Screening. This is mostly for women. Get to know the architecture of your breasts. If it makes you feel more comfortable, engage your partner as well. If something strikes you as ?ot right,  get it checked out. Mammogram breast cancer screening does detect cancers. Self breast exams can detect cancers. Guidelines vary but in general  start screen in adulthood for self exams and mammography in your 40s. If there is a first degree relative or many with cancers, this may be earlier or involve genetic screening.     Pap Smears Cervical Cancer Screening---again women. Start screening after sexual activity or intercourse begins. Cervical cancer really is tied to exposure to HPV virus and this is related to sexual intercourse. Cervical cancer is treatable and preventable, as Pap smears should detect changes BEFORE cancerous transformation.   All women that have a cervix should be screen between 21-65 as a rule. Intervals vary based on age and medical history.     Vaccines. Yes there is controversy. But I still think vaccines save lives and reduce disease burden in our human populations. Please consider getting vaccinated as you are due for Vaccines.     About Vitamins and Supplements     I do recommend that adult and kids consider a multivitamin as way to enrich our nutrition.     A solid multivitamin. Ask one of us for recommendations. Our pharmacy carry some high quality lower cost vitamins that we have recommends.     [place] 3/6 Oils Fish Oils, Flax Oils, Krill Oils, Algea Oils, and Cumin Seed and Borage Oils are among the Omega 3s and 6s. Good oils nourish our nervous system and engage our immune system in positive ways.     Vitamin D. If you are in [place]. You probably need some. We shoot for a level of 50-60. So sometime this takes staring lower and titrating up a supplement. Start at 2193-3421 IU wit a fatty meal and check levels.             Certain supplements may help with our gut's immune system or Microbiome.   Start with plants, many and of diverse colors.   Consider cultured foods with live active bacteria. Examples are Yogurt, Kefir and Kombucha.     Eat Prebiotic foods from the Chicori family, asparagus, bananas, inulin fibers and more.     Other supplements that may help are Zinc Carnosine, D-limonene, Vitamin D and Colostrum.     It  may prudent to try the Elimination Diet and eliminate certain foods such as Wheat products, Dairy Products and Especially Refined Sugars.       We are screening multiple issues:     High Blood Pressure.   We ideally have readings less than 130 on the top number and less than 80 on the bottom.     Diabetes.   Diabetes is the body not processing sugars in an efficient way. When sugars are not dealt with in an efficient manner, every part of the body can be effected, the heart such as a heart attack or failure, the brain such as a stroke and really any part of the body. Insulin levels being overworked really drives diabetes. Lowering intake of simple sugar and exercising are two major ways to treat and stave off Diabetes.     Heart Disease:  Heart issues usually arise out of a combination of genetic issues and life stressors and choices.   Focusing on the Pillars of Wellness are ways to help prevent heart disease. Avoiding tobacco, limiting alcohol intake to moderate intake and addressing out  of balance cholesterol, presence of diabetes and persistently elevated blood pressure may require medications in addition to life style changes.     Skin cancer is typically due to cumulative Sun Damage. There are other genetic factor as well. If skin looks irritated or a mole changes color, shape, size or has irregular borders, get it checked out. Skin exams can also save lives.     If you are Diabetic or Have Known Heart Disease. We have goals for your best Care     A1C. For Diabetics     This is a measure of how well controlled your sugars are over the last 3 months. In general, we would like the percent number less than 7% for most diabetics. In the morning and before all meals the sugar number should be less than 150. If you are diabetic and this is the case, you are managing well. The Pillars of Wellness are still a guide to keeping your body in balance.     Blood Pressure for Diabetics and Heart Disease     Top < 130  Bottom < 80  This goal of blood pressure control reduces Diabetes complications, such as stroke or heart attack. Life style first and add medication if consistently high.     No Tobacco. For Anyone!    Smoking tobacco or chewing produces by products that are particularly harmful to Diabetics, but really all of us. I implore you to not use tobacco products.     Aspirin for Heart Disease Patient     If you have high blood pressure, stroke or heart disease risk, you probably would benefit from a baby aspirin. There reasons to avoid aspirin such as allergies, risks for bleeding, etc. have the discussion of aspirin should be part of your therapy.     Statin Medications for Heart Disease, Vascular Disease or High Risk Patients    These medicine have statistically been shown to benefit Diabetic patients, especially those with disordered cholesterol profiles. I like to do an NMR panel that shows Atherogenic Risk (Stroke/ Heart Attack) and Insulin Resistance. Likely this may be recommended as part of you treatment plan.       Gali          Return in about 3 months (around 4/22/2021) for For fasting labs.            CHIEF COMPLAINT: Ramu Espinoza had concerns including Annual Exam (right leg concerns).    [place]: 1.............. SUBJECTIVE:  Ramu Espinoza is a 60 y.o. male had concerns including Annual Exam (right leg concerns).    1. Incontinence of feces, unspecified fecal incontinence type    2. Chronic lymphocytic leukemia (H)    3. Paroxysmal atrial fibrillation with rapid ventricular response (H)    4. Pain of right lower leg    5. Achilles bursitis, unspecified laterality          Allergies   Allergen Reactions     Azithromycin Rash                         SOCIAL: He  reports that he quit smoking about 16 years ago. His smoking use included cigarettes. He has a 12.50 pack-year smoking history. He has never used smokeless tobacco. He reports current alcohol use of about 14.0 standard drinks of alcohol per week. He  "reports that he does not use drugs.    REVIEW OF SYSTEMS:   Family history not pertinent to chief complaint or presenting problem    Review of systems otherwise negative as requested from patient, except   Those positive ROS outlined and discussed in [place].      VITALS:  Vitals:    01/22/21 0927   BP: 128/60   Pulse: 66   SpO2: 98%   Weight: (!) 267 lb (121.1 kg)   Height: 6' 2\" (1.88 m)     Wt Readings from Last 3 Encounters:   01/22/21 (!) 267 lb (121.1 kg)   12/23/20 (!) 268 lb 12.8 oz (121.9 kg)   09/23/20 (!) 265 lb (120.2 kg)     Body mass index is 34.28 kg/m .    Physical Exam:      Physical:  General Appearance: Healthy-appearingy.   Head:  No deformity  Eyes: Sclerae white, pupils equal and reactive, extra ocular movements intact   Ears: Well-positioned, well-formed pinnae; TM pearly white, translucent, no bulging   Nose: Clear, normal mucosa no drainage or crusting  Throat: Lips, tongue, and mucosa are moist, pink and intact; tongue no thrush oral pharynx no injection or lesions  Neck: Supple, symmetric ROM no nodes   No carotid Buits  Chest: Lungs clear to auscultation, no retractions  Heart: Regular rate & rhythm, S1 S2, no murmur  Abdomen: Soft, non-tender, no masses; umbilical area normal   Pulses: Equal femoral pulses  : No hernia palpable   Extremities: Well-perfused, warm and dry, No Edema loosening and crepitus with active manipulation of the calcaneal talar joint  Fullness in the Achilles bursa on the right   palpable Pulses Bilateral  Neuro: Easily aroused good tone NO tremor   Skin  No Rash           I spent  40 minutes with this patient.  This includes pre-visit, intra-visit and post visit work an evaluation with regards to Ramu was seen today for annual exam.    Diagnoses and all orders for this visit:    Incontinence of feces, unspecified fecal incontinence type    Chronic lymphocytic leukemia (H)    Paroxysmal atrial fibrillation with rapid ventricular response (H)    Pain of right lower " leg  -     Ambulatory referral to Orthopedics    Achilles bursitis, unspecified laterality  -     Ambulatory referral to Orthopedics        Ramu Jiménez MD  [facility] [id number]  [phone]

## 2021-06-14 NOTE — PROGRESS NOTES
[facility] Daily Progress     Patient Name: Ramu Espinoza  Date: 12/21/2017  Visit #: 3/12  Referral Diagnosis: R knee pain   Referring provider: Esperanza Talamantes*  Visit Diagnosis:     ICD-10-CM    1. Chronic pain of right knee M25.561     G89.29    2. Decreased range of motion of right knee M25.661        Assessment:     Patient is a 57 y.o. male that presents with signs and symptoms consistent with R knee pain and swelling secondary to status post menisectomy on 11/17/17 per patient report. Patient demonstrates impairments including decreased R knee joint mobility, flexibility, strength and range of motion, with increased swelling leading to impaired functional mobility. Patient's functional limitations include ambulation without pain, going up stairs, bending down, sitting for long periods of time and performing his work duties without pain.    Today patient reports he feels very vulnerable with walking, and his pain has decreased but he has good days and bad days. Patient feels very frusturated with progress and just wants to quit. Patient was educated that it has only been a month, and that this process will take time to improve.     HEP/POC compliance is  good .  Patient demonstrates understanding/independence with home program.  Patient is appropriate to continue with skilled physical therapy intervention, as indicated by initial plan of care.    Goal Status:  Pt. will be independent with home exercise program in : 4 weeks  Pt. will be able to walk : 10 minutes;on uneven/inclined surfaces;on even surfaces;with no pain;with less difficulty;for exercise/recreation;for work;for community mobility;in 6 weeks  Patient will stand : 30 minutes;for work;with less pain;with less difficultty;in 4 weeks  Pt. will bend: to dress;to clean;to do yard work;with no pain;for work;with less difficulty;in 6 weeks  Patient will ascend / descend: stairs;with recipricol gait;without railing;with no pain;with  less difficulty;in 6 weeks  Patient will sit: 30 minutes;with no pain;with less difficultty;in 6 weeks;for driving  Patient will transfer: sit/stand;floor/stand;for in/out of chair;for in/out of bed;with less difficulty;with less pain;in 4 weeks  Patient will increase : LEFS score;for improved quality of function;for improved quality of life;in 6 weeks      Plan / Patient Education:     Continue with initial plan of care.  Progress with home program as tolerated.    Plan for next visit: review HEP, bike, wall slides, mobilizations to knee for improved joint mobility and ROM, standing hip abd/ext/add in standing, TKE, heel/toe raises    Subjective:     Pain Rating: his knee pain comes and goes   Patient reports last night he felt it was pretty sore, then he did the exercises and it felt almost perfect. He hates feeling so weak and unable to walk as fast as normal, it is difficult for him. His pain comes and goes, when he sits there is no pain. He is pleased with how well going back to work is going but he is doing a lot of walking.     Functional limitations are described as occurring with:   ascending stairs or curbs  performing routine daily activities  sitting for longer periods he feels upper leg pain too   transitional movements getting in  chair and car, getting out of  chair and car and sit to stand    Objective:      AROM in degrees  Right 12/7   Left  Right 12/14   Left  Right  12/21   Left       Knee Flexion  (130 )   110    135   114  116 post treatment      118 post treatment          Knee Extension  (0 )                           Treatment Today       Patient Education: Patient was educated on continuing plan of care, progress and review of current HEP. Patient educated on importance of consistency with exercise and therapy, as well as activity modification in order to see change and improvements. Patient was educated this may take a few weeks to see progression and change of his pain. Patient  demonstrated and verbalized understanding.     Manual Therapy:  Anterior glides to tib femoral to increase motion    Exercises:  HEP from [place]:   SAQ  SLR  Standing hamstring curls  Heels slides  Ankle pumps  Exercise #1: seated hamstring stretch - hold 30 sec x 2  Comment #1: standing gastroc stretch - hold 30 sec x 2  Exercise #2: SL balance on R - 10-30 second holds x 3  Comment #2: upright bike - half to full revolutions 5 minutes   Exercise #3: TKE - orange band 10 reps x 2  Comment #3: heel toe raises - 10-20 reps   Exercise #4: hip abduction/extension with theraband - 10 reps x 2  Comment #4: total gym - 10 reps each  Exercise #5: wall slides - 10-20 reps       TREATMENT MINUTES COMMENTS   Evaluation     Self-care/ Home management     Manual therapy 3 Anterior glides to tib femoral to increase motion  kinesiotape for swelling and support   Neuromuscular Re-education     Therapeutic Activity     Therapeutic Exercises 23 See above flowsheet;  Upright bike half to full revolutions - 5 minutes   Gait training     Modality__________________                Total 26 Patient arrived 3 minutes late and then had to change into his shorts (6min)   Blank areas are intentional and mean the treatment did not include these items.       Kristin Muller, PT  12/21/2017

## 2021-06-14 NOTE — PROGRESS NOTES
[facility]   Knee Initial Evaluation    Patient Name: Ramu Espinoza  Date of evaluation: 12/7/2017  Referral Diagnosis: Knee pain  Referring provider: Esperanza Talamantes*  Visit Diagnosis:     ICD-10-CM    1. Chronic pain of right knee M25.561     G89.29    2. Decreased range of motion of right knee M25.661        Assessment:      Patient is a 57 y.o. male that presents with signs and symptoms consistent with R knee pain and swelling secondary to status post menisectomy on 11/17/17 per patient report. Patient demonstrates impairments including decreased R knee joint mobility, flexibility, strength and range of motion, with increased swelling leading to impaired functional mobility. Patient's functional limitations include ambulation without pain, going up stairs, bending down, sitting for long periods of time and performing his work duties without pain. Today patient responded well to manual therapy and therapeutic exercise.  Patient educated on and demonstrated understanding of nature of impairment, plan of care, patient role and HEP. Patient compliant with PT and prognosis is good. Patient would benefit from skilled PT to progress and improve above impairments.    The POC is dynamic and will be modified on an ongoing basis.  Patient will return to clinic if symptoms persist.  Barriers to achieving goals as noted in the assessment section may affect outcome.  Prognosis to achieve goals is  good   Pt. is appropriate for skilled PT intervention as outlined in the Plan of Care (POC).  Pt. is a good candidate for skilled PT services to improve pain levels and function.    Goals:  Pt. will be independent with home exercise program in : 4 weeks  Pt. will be able to walk : 10 minutes;on uneven/inclined surfaces;on even surfaces;with no pain;with less difficulty;for exercise/recreation;for work;for community mobility;in 6 weeks  Patient will stand : 30 minutes;for work;with less pain;with less  difficultty;in 4 weeks  Pt. will bend: to dress;to clean;to do yard work;with no pain;for work;with less difficulty;in 6 weeks  Patient will ascend / descend: stairs;with recipricol gait;without railing;with no pain;with less difficulty;in 6 weeks  Patient will sit: 30 minutes;with no pain;with less difficultty;in 6 weeks;for driving  Patient will transfer: sit/stand;floor/stand;for in/out of chair;for in/out of bed;with less difficulty;with less pain;in 4 weeks  Patient will increase : LEFS score;for improved quality of function;for improved quality of life;in 6 weeks    Patient's expectations/goals are realistic.    Barriers to Learning or Achieving Goals:  No Barriers.       Plan / Patient Instructions:      Plan of Care:   Authorization / Certification Number of Visits: 12/20 visits used this calendar year  Communication with: Referral Source  Patient Related Instruction: Treatment plan and rationale;Nature of Condition;Self Care instruction;Basis of treatment;Body mechanics;Posture;Next steps;Expected outcome  Times per Week: 1-2  Number of Weeks: 12  Number of Visits: 12  Therapeutic Exercise: ROM;Stretching;Strengthening  Neuromuscular Reeducation: kinesio tape;posture;balance/proprioception  Manual Therapy: soft tissue mobilization;myofascial release;joint mobilization;muscle energy  Modalities: TENS;ultrasound  Gait Training: as indicated    POC and pathology of condition were reviewed with patient.  Pt. is in agreement with the Plan of Care  A Home Exercise Program (HEP) was initiated today.  Pt. was instructed in exercises by PT and patient was given a handout with detailed instructions.    Plan for next visit: review HEP, bike, wall slides, mobilizations to knee for improved joint mobility and ROM, standing hip abd/ext/add in standing, TKE, heel/toe raises     Subjective:      History of Present Illness:    Ramu is a 57 y.o. male who presents to therapy today with complaints of R knee pain, his pain  wasn't really bad at first. Date of onset is October and onset was related to increased exercise. Symptoms are intermittent and getting better. Patient reports he hadn't gone back to the doctor to get his R knee out, he hasn't gotten any Xrays, he had been doing interval training. He was playing pickleball in October and that's when it started hurting more. He reports  an episodic  history of similar symptoms. He describes their previous level of function as not limited. It got to a point where he couldn't straighten or bend or kneel on it, the pain was pretty bad. They did meniscal surgery on , up until today the pain was really bothering him some, he doesn't want to take narcotics. Patient is interested in getting a better job so his goal is to get his knee back to 100% so he is more fit and strength wise because he will be on his feet at his new job. Patient was able to get out walking today with no increase of pain. Patient has been doing exercises 2x per day, hip abduction, SAQ, LAQ, ankle pumps and heels slides. Patient is going back to the surgeon on the . Patient reports he does have arthritis in both of his knees. Patient goes back to his normal job on the .    Pain Ratin   Pain rating at best: 0  Pain rating at worst: 6  Pain description:dull, sharp, weakness and soreness and the back of his leg    Functional limitations are described as occurring with:   ascending stairs or curbs  performing routine daily activities  sitting for longer periods he feels upper leg pain too   transitional movements getting in  chair and car, getting out of  chair and car and sit to stand    Patient reports benefit from:  pain medication, cold     Objective:      Note: Items left blank indicates the item was not performed or not indicated at the time of the evaluation.    Patient Outcome Measures :    Lower Extremity Functional Scale (_/80): 30   Scores range from 0-80, where a  score of 80 represents maximum function. The minimal clinically important difference is a positive change of 9 points.    Knee Examination  1. Chronic pain of right knee     2. Decreased range of motion of right knee       Involved Side: Right  Posture Observation:      General sitting posture is  normal.  General standing posture is normal.  Assistive Device: None  Gait Observation: decreased R knee flexion and extension with slight limp on R and decreased hip extension  Lumbar Clearing: his low back exercises help him    Knee ROM Within normal limits unless otherwise indicated     Date:  12/7    AROM in degrees  Right   Left  Right   Left  Right   Left       Knee Flexion  (130 )   110    135                   Knee Extension  (0 )                       PROM in degrees  Right   Left  Right   Left  Right   Left       Knee Flexion  (130 )                         Knee Extension  (0 )                       LE Strength    Within normal limits unless otherwise indicated               Date:  12/7   Strength (MMT/5)  Right   Left  Right   Left  Right   Left       Hip Flexion                         Hip Abduction   4                      Hip Adduction   4                      Hip Extension                         Hip Internal Rotation                         Hip External Rotation                         Knee Extension   4+ P                      Knee Flexion                         Ankle Dorsiflexion                         Ankle Plantarflexion                       Flexibility:  Decreased of hamstrings, gastroc/soleus and HF    Palpation:  TTP of R gastroc/soleus, patellar tendon and quadriceps, medial hamstring and adductor    Knee Special Tests (+/-):  Not indicated    Knee OA Cluster   Right   Left   Ligament Tests   Right   Left    1. > 51 y/o           Lachman          2. Stiffness > 30 min.           Anterior Drawer          3. Crepitus           Posterior Drawer          4. Bony tenderness           Posterior Sag           5. Bone enlargement           Valgus Stress          6. No warmth to the touch           Varus Stress           Meniscal Tests   Right   Left    Other   Right    Left       Jermaine's           Ely's             Joint line tenderness           Roula             Thessaly Thomas Apley's                        Treatment Today       Patient Education: Patient educated on plan of care, prognosis, PT/patient role and HEP. Patient educated on impairments related to condition and reproduction of symptoms. Patient instructed to focus on the small goals and this may be a long process to recovery, and that exercises at home are just as important as coming to therapy. Patient was educated on importance of exercise consistency and activity modification. Patient was educated on prone hangs and review of old HEP, patient can perform his back exercises as long as there is no pain. Patient demonstrated and verbalized understanding.     Manual Therapy:  STM to medial hamstring and gastroc/soleus in supine - very TTP    Exercises:  Exercise #1: seated hamstring stretch - hold 30 sec x 2  Comment #1: standing gastroc stretch - hold 30 sec x 2  Exercise #2: SL balance on R - 10-30 second holds x 3   HEP from [facility]:   SAQ  SLR  Standing hamstring curls  Heels slides  Ankle pumps        TREATMENT MINUTES COMMENTS   Evaluation 20    Self-care/ Home management     Manual therapy 10 STM to medial hamstring and gastroc/soleus in supine - very TTP  kinesiotape to knee to decrease swelling   Neuromuscular Re-education     Therapeutic Activity     Therapeutic Exercises 15 See Flowsheet   Gait training     Modality__________________                Total 45    Blank areas are intentional and mean the treatment did not include these items.     PT Evaluation Code: (Please list factors)  Patient History/Comorbidities: peripheral neuropathy, bilateral knee pain  Examination: decreased strength, joint mobility, flexibility  with increased pain  Clinical Presentation: uncomplicated  Clinical Decision Making: low    Patient History/  Comorbidities Examination  (body structures and functions, activity limitations, and/or participation restrictions) Clinical Presentation Clinical Decision Making (Complexity)   No documented Comorbidities or personal factors 1-2 Elements Stable and/or uncomplicated Low   1-2 documented comorbidities or personal factor 3 Elements Evolving clinical presentation with changing characteristics Moderate   3-4 documented comorbidities or personal factors 4 or more Unstable and unpredictable High                Kristin Muller, PT  12/7/2017  11:00 AM

## 2021-06-14 NOTE — PROGRESS NOTES
Assessment/Plan:      Visit for Preoperative Exam.     Patient approved for surgery with general or local anesthesia. Labs will be done as indicated. Follow up as needed.   Labs - Hgb, EKG   EKG - NSR, Rate about 70 Axis equivocal. NJ normal. QRS interval normal. RBBB.     2. Paroxysmal atrial fibrillation - in sinus rhythm at preset   3. Hx CLL - on study protocol and stable   4. Anxiety - stable   5, Peripheral neuropathy - stable      Subjective:     Scheduled Procedure: Arthroscopy R knee  Surgery Date:  11/17/17  Surgery Location:  [facility]  Surgeon:  Dr. Ivey  Current Outpatient Prescriptions on File Prior to Visit   Medication Sig Dispense Refill     acyclovir (ZOVIRAX) 400 MG tablet TAKE 1 TABLET BY MOUTH TWICE DAILY 120 tablet 6     cholecalciferol, vitamin D3, 1,000 unit tablet Take 2,000 Units by mouth daily. 1-2 tablets       ibuprofen (ADVIL,MOTRIN) 200 MG tablet Take 800 mg by mouth as needed for pain.       LORazepam (ATIVAN) 1 MG tablet take 1 tablet as needed - taking once a week if at all 30 tablet 0     melatonin 5 mg TbDL disintegrating tablet Take 5 mg by mouth bedtime as needed.       metoprolol succinate (TOPROL XL) 25 MG Take 1 tablet (25 mg total) by mouth daily. 90 tablet 3     Study Drug Ibrutinib 140 mg 140 mg cap capsule Take 420 mg by mouth daily.       sulfamethoxazole-trimethoprim (SEPTRA DS) 800-160 mg per tablet TAKE 1 TABLET BY MOUTH THREE TIMES A WEEK 36 tablet 3     tamsulosin (FLOMAX) 0.4 mg Cp24 TK 1 C PO ONCE D PC  11     traZODone (DESYREL) 50 MG tablet Take 1 tablet (50 mg total) by mouth at bedtime. 360 tablet 1     UNABLE TO FIND Med Name: Tumeric once daily       No current facility-administered medications on file prior to visit.          Current Outpatient Prescriptions   Medication Sig Dispense Refill     acyclovir (ZOVIRAX) 400 MG tablet TAKE 1 TABLET BY MOUTH TWICE DAILY 120 tablet 6     cholecalciferol, vitamin D3, 1,000 unit tablet Take 2,000 Units by mouth  daily. 1-2 tablets       ibuprofen (ADVIL,MOTRIN) 200 MG tablet Take 800 mg by mouth as needed for pain.       LORazepam (ATIVAN) 1 MG tablet take 1 tablet as needed - taking once a week if at all 30 tablet 0     melatonin 5 mg TbDL disintegrating tablet Take 5 mg by mouth bedtime as needed.       metoprolol succinate (TOPROL XL) 25 MG Take 1 tablet (25 mg total) by mouth daily. 90 tablet 3     Study Drug Ibrutinib 140 mg 140 mg cap capsule Take 420 mg by mouth daily.       sulfamethoxazole-trimethoprim (SEPTRA DS) 800-160 mg per tablet TAKE 1 TABLET BY MOUTH THREE TIMES A WEEK 36 tablet 3     tamsulosin (FLOMAX) 0.4 mg Cp24 TK 1 C PO ONCE D PC  11     traZODone (DESYREL) 50 MG tablet Take 1 tablet (50 mg total) by mouth at bedtime. 360 tablet 1     UNABLE TO FIND Med Name: Tumeric once daily       No current facility-administered medications for this visit.        Allergies   Allergen Reactions     Azithromycin Rash       Immunization History   Administered Date(s) Administered     DT (pediatric) 02/01/1999, 06/12/2002     Tdap 07/27/2011       Patient Active Problem List   Diagnosis     Tinnitus     Backache     Generalized Anxiety Disorder     Peripheral Neuropathy     Pure hypercholesterolemia     Dupuytren's Contracture     Insomnia     Chronic lymphocytic leukemia     Internal Hemorrhoids With Bleeding     Depression     Paroxysmal atrial fibrillation       Past Medical History:   Diagnosis Date     BPH (benign prostatic hyperplasia)      CLL (chronic lymphocytic leukemia)      Lymphadenopathy     Created by Conversion  Replacement Utility updated for latest IMO load       Social History     Social History     Marital status:      Spouse name: N/A     Number of children: N/A     Years of education: N/A     Occupational History     Not on file.     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       Past Surgical History:   Procedure Laterality Date     WI TREAT INTER/SUBTROCH FX,W/PLATE/SCREW      Description: Open Treatment Of An Intertrochanteric Fracture;  Recorded: 04/01/2009;       History of Present Illness  Recent Health  Fever: no  Chills: no  Fatigue: yes  Chest Pain: no  Cough: no  Dyspnea: no  Urinary Frequency: no  Nausea: no  Vomiting: no  Diarrhea: no  Abdominal Pain: no  Easy Bruising: no  Lower Extremity Swelling: no  Poor Exercise Tolerance: yes    Most recent Health Maintenance Visit:  1 year(s) ago    Pertinent History  Prior Anesthesia: yes  Previous Anesthesia Reaction:  no  Diabetes: no  Cardiovascular Disease: yes, paroxysmal atrial fibrilltion  Pulmonary Disease: no  Renal Disease: no  GI Disease: no  Sleep Apnea: no  Thromboembolic Problems: no  Clotting Disorder: no  Bleeding Disorder: no  Transfusion Reaction: no  Impaired Immunity: Yes - On study drug for CLL  Steroid use in the last 6 months: no  Frequent Aspirin use: no    Family history of heart disease iin brother     Social history of patient does not wear denture or partial plates, there is no transfusion refusal and there are no concerns regarding care after surgery    After surgery, the patient plans to recover at home with family.    Review of Systems  Review of Systems      Review of Systems - Negative except for HPI      Objective:         Vitals:    11/13/17 0934   BP: 130/76   Weight: (!) 263 lb (119.3 kg)   Height: 6' 1\" (1.854 m)       Physical Exam:  Physical Exam    Physical Examination: General appearance - alert, well appearing, and in no distress  Mental status - alert, oriented to person, place, and time  Eyes - pupils equal and " reactive, extraocular eye movements intact  Ears - bilateral TM's and external ear canals normal  Nose - normal and patent, no erythema, discharge or polyps  Mouth - mucous membranes moist, pharynx normal without lesions and dentition good  Neck - supple, no significant adenopathy, no thyromegaly  Lymphatics - no palpable lymphadenopathy, no hepatosplenomegaly  Chest - clear to auscultation, no wheezes, rales or rhonchi, symmetric air entry  Heart - normal rate, regular rhythm, normal S1, S2, no murmurs, rubs, clicks or gallops  Abdomen - soft, nontender, nondistended, no masses or organomegaly   Male - not done   Rectal - deferred, not clinically indicated  Neurological - alert, oriented, normal speech, no focal findings or movement disorder noted  Musculoskeletal - no joint tenderness, deformity or swelling  Extremities - peripheral pulses normal, no pedal edema, no clubbing or cyanosis  Skin - normal coloration and turgor, no rashes, no suspicious skin lesions noted

## 2021-06-15 NOTE — PROGRESS NOTES
Outpatient Mental Health Treatment Plan    Name:  Ramu Espinoza  :  1960  MRN:  [id number]    Treatment Plan:  Updated Treatment Plan  Intake/initial treatment plan date:  Original Treatment Plan: 16.  Patient is re-engaging in therapy today and completed an updated Treatment Plan: 18  Benefit and risks and alternatives have been discussed: Yes  Is this treatment appropriate with minimal intrusion/restrictions: Yes  Estimated duration of treatment:  Approximately 6-8 sessions.  Anticipated frequency of services:  Every 2-3 weeks  Necessity for frequency: This frequency is needed to establish therapeutic goals and for continuity of care in order to monitor progress.  Necessity for treatment: To address cognitive, behavioral, and/or emotional barriers in order to work toward goals and to improve quality of life.    Plan:           ?   ? Anxiety    Goal:  Decrease average anxiety level from 4/4 to 2/4.   Strategies: ? [x]Learn and practice relaxation techniques and other coping strategies (e.g., thought stopping, reframing, meditation)     ? [x] Increase involvement in meaningful activities     ? [x] Discuss sleep hygiene     ? [x] Explore thoughts and expectations about self and others     ? [x] Identify and monitor triggers for panic/anxiety symptoms     ? [x] Implement physical activity routine (with physician approval)     ? [x] Consider introduction of bibliotherapy and/or videos     ? [x] Continue compliance with medical treatment plan (or explore barriers)                                           ?Degree to which this is a problem: 4  Degree to which goal is met: goal in progressed (of note: patient experienced a reduction in symptoms since last Treatment Plan, but identified increased anxiety today due to life stressors).  Date of Review: in 3 months.        Functional Impairment:  1=Not at all/Rarely  2=Some days  3=Most Days  4=Every Day    Personal : 3  Family : 2  Social : 2  "  Work/school : 3    Diagnosis:  Generalized Anxiety disorder    WHODAS 2.0 12-item version 3      Scores presented in qualifiers to represent level of disability.   NO Problem (none, absent, negligible,...) 0-4%    H1= 30  H2= 0  H3= 0      Clinical assessments and measures completed:. BRANDEN-7, PHQ-9, CAGE-AID and PANSI Patient updated these inventories today (1/16/18):  PANSI: Positive ideation score=24<4; Negative ideation score 8>1. Patient denies suicidal thoughts and/or planning and commits to seeking safety if he is unsafe in his community. CAGE Aid=0/4, patient is not enrolled in chemical dependency program and denies substance use problem; no referrals made at this time.  PHQ-9=score of 4 Patient indicates it is not difficult at all to manage symptoms.  BRANDEN-7=score of 7 Patient indicates it is not difficult at all to manage symptoms.       Strengths:  Clive has a strong work ethic, values having a sense of humor and being a good listener.  He states, \"I try to not take anything nor anyone for granted.\"  Limitations:  Clive identifies a lack of self-confidence, self-doubt, feels lack of ambition and sometimes feels \"I just don't care.\"  Cultural Considerations:Clive is an [place] of  her[place].     Persons responsible for this plan: Patient            Psychotherapist Signature           Patient Signature:              Guardian Signature             Provider: Performed and documented by JESSICA Miller   Date:  1/16/2018      "

## 2021-06-15 NOTE — PROGRESS NOTES
"1/16/2018    Start time: 4:06 PM    Stop Time: 5:05 PM   Session # 1    Ramu Espinoza is a 57 y.o. male is being seen today for    Chief Complaint   Patient presents with     Follow-up     Anxiety   .     New symptoms or complaints: Patient is re-engaging in psychotherapy today.  Last seen on 6/23/16.  He completed updated psychological inventories.  He identified increased anxiety in the context of recent life stressors.  He started taking sertraline about 2 weeks ago.  He is taking lorazepam in the evening at 6PM - something he has discussed with PharmD, and has a goal of not taking it.  Discussed risk of developing addiction to it and he is aware of this, reiterating goal and commitment to work with his health care providers on this.      Functional Impairment:   Personal: 4  Family: 3  Work: 2  Social:2    Clinical assessment of mental status: The patient was on time for his scheduled session. He was pleasant and cooperative. He was oriented ×3. He made appropriate eye contact. He was well dressed with good grooming and hygiene. Recent and remote memories were intact. Concentration and focus: focused and on task. Speech (tone, volume, and rate) were intact. Mood and affect were congruently stable and appropriate for the content of the session. Fund of knowledge was adequate. Insight and judgment were adequate for therapy.     Suicidal/Homicidal Ideation present: None Reported This Session    Patient's impression of their current status: Patient stated, \"I'm doing a lot better since the last time we talked as far as me and my wife go. I just started doing more things around the house, but my anxiety went straight up the roof recently,\" as he elaborated on a situation concerning a job opportunity which he turned down that led to feeling \"guilty\" and like he let himself down and his wife down.  He discussed the factors that went into his decision to not change jobs and considers that it was ultimately the right " "decision for him, given his current job offers him greater autonomy, tasks that he for the most part enjoys, and generally greater security.  He recognized enjoying the people he works with and liking his job. While the job opportunity promised an increase in salary, the tasks were unappealing to him and there would be less autonomy at a time when he is recovering from knee surgery and right leg weakness.  He explored some of the thoughts that are associated with his declining the job offer, such as \"I'm a failure\", and \"I need to be more sure of myself.\"  He reflected on values that are most important to him (including, marriage, security, authenticity, courage) and the behaviors that serve them.      Therapist impression of patients current state: Patient's thoughts, feelings, and beliefs were processed.  He was receptive to exploring techniques for addressing anxiety and reducing intensity.  He was also willing to look at how fusing with unhelpful thoughts connects to challenging feelings. He was also willing to explore guilt and shame issues, and strategies for feeling relief and gaining healthy perspective on self.      Type of psychotherapeutic technique provided: Insight oriented, Client centered, Solution-focused and CBT    Progress toward short term goals:Progress as expected, therapeutic relationship building in process, patient is beginning to engage in therapeutic process.     Review of long term goals: Treatment Plan updated    Diagnosis:   1. Generalized anxiety disorder        Plan and Follow up: Patient plans to return for follow up in 3 weeks. Patient plans to continue healthy routines of daily physical activity (he identified helpful PT exercises that he does daily), regular and healthy meals, sleep hygiene, and continuing to nurture diaphragmatic breathing exercises. Provided 4-7-8 yoga breathing exercise in session today with a handout to use.  He also agreed to begin to notice his thoughts - " particularly ones which correlate with feeling badly about self (provided a list of 'group home words' to help him identify these thoughts) and agreed to complete a CBT thought exercise, The 3 C's. Also, provided some ACT (unhelpful) thought defusion techniques to apply when he finds himself stuck in a hard thought or difficult feeling to help him increase cognitive flexibility/perspective.   Plans to return for follow up in 3 weeks.      Discharge Criteria/Planning: Patient will continue with follow-up until therapy can be discontinued without return of signs and symptoms.    Martha Jha 1/16/2018

## 2021-06-15 NOTE — PROGRESS NOTES
[facility] Daily Progress     Patient Name: Ramu Espinoza  Date: 1/10/2018  Visit #: 4/12  Referral Diagnosis: R knee pain   Referring provider: Esperanza Talamantes*  Visit Diagnosis:     ICD-10-CM    1. Chronic pain of right knee M25.561     G89.29    2. Decreased range of motion of right knee M25.661        Assessment:     Patient is a 57 y.o. male that presents with signs and symptoms consistent with R knee pain and swelling secondary to status post menisectomy on 11/17/17 per patient report. Patient demonstrates impairments including decreased R knee joint mobility, flexibility, strength and range of motion, with increased swelling leading to impaired functional mobility. Patient's functional limitations include ambulation without pain, going up stairs, bending down, sitting for long periods of time and performing his work duties without pain.    Today patient reports he feels very vulnerable with walking, and his pain has decreased but he has good days and bad days. Patient feels very frusturated with progress and just wants to quit. Patient was educated that it has only been a month, and that this process will take time to improve.     HEP/POC compliance is  good .  Patient demonstrates understanding/independence with home program.  Patient is appropriate to continue with skilled physical therapy intervention, as indicated by initial plan of care.    Goal Status:  Pt. will be independent with home exercise program in : 4 weeks  Pt. will be able to walk : 10 minutes;on uneven/inclined surfaces;on even surfaces;with no pain;with less difficulty;for exercise/recreation;for work;for community mobility;in 6 weeks  Patient will stand : 30 minutes;for work;with less pain;with less difficultty;in 4 weeks  Pt. will bend: to dress;to clean;to do yard work;with no pain;for work;with less difficulty;in 6 weeks  Patient will ascend / descend: stairs;with recipricol gait;without railing;with no pain;with  less difficulty;in 6 weeks  Patient will sit: 30 minutes;with no pain;with less difficultty;in 6 weeks;for driving  Patient will transfer: sit/stand;floor/stand;for in/out of chair;for in/out of bed;with less difficulty;with less pain;in 4 weeks  Patient will increase : LEFS score;for improved quality of function;for improved quality of life;in 6 weeks      Plan / Patient Education:     Continue with initial plan of care.  Progress with home program as tolerated.    Plan for next visit: review HEP, bike, wall slides, mobilizations to knee for improved joint mobility and ROM, standing hip abd/ext/add in standing, TKE, heel/toe raises    Subjective:     Pain Rating: his knee pain comes and goes   Back of his knee is getting better, front of the knee is bugging him, stairs still bug him and he still feels weak. He went to orthopedist yesterday who told him to continue therapy. He does so much walking at work so it gets pretty tired, at least 10 miles a day.     Functional limitations are described as occurring with:   ascending stairs or curbs  performing routine daily activities  sitting for longer periods he feels upper leg pain too   transitional movements getting in  chair and car, getting out of  chair and car and sit to stand    Objective:      AROM in degrees  Right 12/7   Left  Right 12/14   Left  Right  12/21   Left       Knee Flexion  (130 )   110    135   114  116 post treatment      118 post treatment          Knee Extension  (0 )                           Treatment Today       Patient Education: Patient was educated on continuing plan of care, progress and review of current HEP. Patient educated on importance of consistency with exercise and therapy, as well as activity modification in order to see change and improvements. Patient was educated this may take a few weeks to see progression and change of his pain. Patient demonstrated and verbalized understanding.     Manual Therapy:  Anterior glides to tib  femoral to increase motion    Exercises:  HEP from [place]:   SAQ  SLR  Standing hamstring curls  Heels slides  Ankle pumps  Exercise #1: seated hamstring stretch - hold 30 sec x 2  Comment #1: standing gastroc stretch - hold 30 sec x 2  Exercise #2: SL balance on R - 10-30 second holds x 3  Comment #2: upright bike - half to full revolutions 5 minutes   Exercise #3: TKE - orange band 10 reps x 2  Comment #3: heel toe raises - 10-20 reps   Exercise #4: hip abduction/extension with theraband - 10 reps x 2  Comment #4: total gym - 10 reps each  Exercise #5: wall slides - 10-20 reps   Comment #5: squats and lunges       TREATMENT MINUTES COMMENTS   Evaluation     Self-care/ Home management     Manual therapy  Anterior glides to tib femoral to increase motion  kinesiotape for swelling and support   Neuromuscular Re-education     Therapeutic Activity     Therapeutic Exercises 26 See above flowsheet;  Nustep 5 minutes resistance 6  Review of HEP; added squats and lunges    Gait training     Modality__________________                Total 26    Blank areas are intentional and mean the treatment did not include these items.       Kristin Muller, PT  1/10/2018

## 2021-06-15 NOTE — PROGRESS NOTES
[facility] Hematology and Oncology Progress Note    Patient: Ramu Espinoza  MRN: [id number]  Date of Service:         Reason for Visit    Chief Complaint   Patient presents with     HE Cancer       Assessment and Plan    1.  A 56 y.o. gentleman with CLL stage 4 now, diagnosed in February 2012. 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 Great clinical response to Imbruvica. His lymphnodes have normalized. His CBC is normal today. Continue Imbruvica 140 mg 3 pills daily. Return in 3 months with labs and to see Dr. Garza.     2. Paroxysmal A. Fib: could be from Imbruvica (<9% risk) on metoprolol. Seems to notice symptoms in the evening after he is done working. No chest pain. Doesn't have symptoms every day. Will continue to see Dr. Arana.     ECOG Performance   ECOG Performance Status: 0     Distress Assessment  Distress Assessment Score: 3: nervous about side effects of Imbruvica, especially with his heart. Having some worsening anxiety, but sertraline is helping. Uses lorazepam PRN.     Pain  Currently in Pain: No/denies    Problem List    1. Chronic lymphocytic leukemia        ______________________________________________________________________________    History of Present Illness    Ramu Espinoza is a very pleasant 57 y.o. old male with a history of CLL diagnosed in 02/2012 presenting with elevated white count in upper 10s/lower 20s with a peripheral blood smear showing atypical lymphocytosis suspicious for CLL.  He was completely asymptomatic, normal hemoglobin and platelet count.  His peripheral blood flow cytometry revealed CD5 co-expressing kappa light chains restricted B cells confirming the diagnosis of CLL.  He has been on observation.  His white count has been slowly going up.      In June 2014 his white count was over 100,000. So he had a CAT scan and that showed increasing lymphadenopathy and splenectomy. Platelet count has been going down.      I offered participation in clinical study in  which patients are randomized between fludarabine and cyclophosphamide Rituxan or Imbruvica and Rituxan. He has been randomized to Imbruvica and rituximab arm. He started on October 2014. In November 2014 he got Rituxan for the first time. Tolerated well. Finished that. Now on Imbruvica alone. Tolerating it well.      Comes in today for scheduled follow-up. He is feeling well. Is having some worsening anxiety.     Pain Status  Currently in Pain: No/denies    Review of Systems    Constitutional  Constitutional (WDL): All constitutional elements are within defined limits  Neurosensory  Neurosensory (WDL): All neurosensory elements are within defined limits  Eye   Eye Disorder (WDL): All eye disorder elements are within defined limits  Ear  Ear Disorder (WDL): All ear disorder elements are within defined limits  Cardiovascular  Cardiovascular (WDL): All cardiovascular elements are within defined limits  Pulmonary  Respiratory (WDL): Exceptions to WDL  Cough: Mild symptoms, nonprescription intervention indicated  Dyspnea: Shortness of breath with moderate exertion  Gastrointestinal  Gastrointestinal (WDL): All gastrointestinal elements are within defined limits  Genitourinary  Genitourinary (WDL): All genitourinary elements are within defined limits  Lymphatic  Lymph (WDL): All lymph disorder elements are within defined limits  Musculoskeletal and Connective Tissue  Musculoskeletal and Connetive Tissue Disorders (WDL): All Musculoskeletal and Connetive Tissue Disorder elements are within defined limits  Integumentary  Integumentary (WDL): All integumentary elements are within defined limits  Patient Coping  Patient Coping: Accepting  Distress Assessment  Distress Assessment Score: 3  Accompanied by  Accompanied by: Alone  Oral Chemo Adherence       Past History  Past Medical History:   Diagnosis Date     BPH (benign prostatic hyperplasia)      CLL (chronic lymphocytic leukemia)      Lymphadenopathy     Created by  Conversion  Replacement Utility updated for latest IMO load       PHYSICAL EXAM:  /66  Pulse 71  Temp 97.8  F (36.6  C) (Oral)   Wt (!) 255 lb 1 oz (115.7 kg)  SpO2 97%  BMI 33.65 kg/m2  GENERAL: no acute distress. Cooperative in conversation. Here alone  HEENT: pupils are equal, round and reactive. Oromucosa is clean and intact. No ulcerations or mucositis noted. No bleeding noted.  RESP: lungs are clear bilaterally per auscultation. Regular respiratory rate. No wheezes or rhonchi.  CV: Regular, rate and rhythm. No murmurs.  ABD: soft, nontender. Positive bowel sounds. No organomegaly.   MUSCULOSKELETAL: No lower extremity swelling.   NEURO: non focal. Alert and oriented x3.   PSYCH: within normal limits. No depression or anxiety.  SKIN: warm dry intact   LYMPH: no cervical, supraclavicular or axillary lymphadenopathy        Lab Results    Recent Results (from the past 168 hour(s))   Comprehensive Metabolic Panel   Result Value Ref Range    Sodium 137 136 - 145 mmol/L    Potassium 4.6 3.5 - 5.0 mmol/L    Chloride 103 98 - 107 mmol/L    CO2 27 22 - 31 mmol/L    Anion Gap, Calculation 7 5 - 18 mmol/L    Glucose 96 70 - 125 mg/dL    BUN 17 8 - 22 mg/dL    Creatinine 0.86 0.70 - 1.30 mg/dL    GFR MDRD Af Amer >60 >60 mL/min/1.73m2    GFR MDRD Non Af Amer >60 >60 mL/min/1.73m2    Bilirubin, Total 0.9 0.0 - 1.0 mg/dL    Calcium 9.3 8.5 - 10.5 mg/dL    Protein, Total 6.5 6.0 - 8.0 g/dL    Albumin 3.7 3.5 - 5.0 g/dL    Alkaline Phosphatase 60 45 - 120 U/L    AST 18 0 - 40 U/L    ALT 17 0 - 45 U/L   HM1 (CBC with Diff)   Result Value Ref Range    WBC 8.4 4.0 - 11.0 thou/uL    RBC 4.85 4.40 - 6.20 mill/uL    Hemoglobin 14.2 14.0 - 18.0 g/dL    Hematocrit 42.6 40.0 - 54.0 %    MCV 88 80 - 100 fL    MCH 29.3 27.0 - 34.0 pg    MCHC 33.3 32.0 - 36.0 g/dL    RDW 12.3 11.0 - 14.5 %    Platelets 311 140 - 440 thou/uL    MPV 9.9 8.5 - 12.5 fL    Neutrophils % 56 50 - 70 %    Lymphocytes % 32 20 - 40 %    Monocytes % 10 2  - 10 %    Eosinophils % 1 0 - 6 %    Basophils % 0 0 - 2 %    Neutrophils Absolute 4.7 2.0 - 7.7 thou/uL    Lymphocytes Absolute 2.7 0.8 - 4.4 thou/uL    Monocytes Absolute 0.8 0.0 - 0.9 thou/uL    Eosinophils Absolute 0.1 0.0 - 0.4 thou/uL    Basophils Absolute 0.0 0.0 - 0.2 thou/uL       Imaging    No results found.      Signed by: Radha Alarcon, CNP

## 2021-06-15 NOTE — TELEPHONE ENCOUNTER
We would be happy to talk with Clive. He can call our coordinators to schedule an appointment with either myself or Che: [phone].

## 2021-06-15 NOTE — PATIENT INSTRUCTIONS - HE
tsh    Lipid     TSH is High      This is the Brain sensing the Thyroid Levels are low.    When thyroid levels are low, the brain produces more TSH to tell the THyroid to make more hormone.      Your level suggests that you are HYPOthyroid or your body is LOW in thyroid hormone.    This means either you need a supplement or an adjustment in the thyroid medication supplement to bring this level into balance.      When the TSH is high, it suggests that you may need to start a supplement like Levothyroxine or make an adjustment in your medication.     Some supplements and nutrients support thyroid function.  They are:    Selenium 200 mcg daily  Zinc 30 mg Daily/ Copper 1 mg   Vitamin D3 / K2  2000 IU /  mcg Daily  Iodine 150 mcg Daily  Vitamin A 5000 IU Daily    TPO Antibodies this could be related to Hashimoto's thyroiditis    I would like you to do a coronary calcium score to identify if you have any significant coronary damage that is been repaired    Your calculated risk is 24% over the next 10 years for a cardiac event  I think it is reasonable to begin a medication such as rosuvastatin  I would like you to start at 10 mg at bedtime for 4 weeks then if tolerated increase to 20 mg at bedtime    At the 8-week kristian I would like you to come in for a repeat set of labs  Cholesterol profile  Kidney liver test    I would also like you to repeat your TSH (thyroid test)  And thyroid peroxidase antibodies    Likely you may need thyroid supplementation with levothyroxine  This will also improve your cholesterol profile    To support your use of rosuvastatin or a statin medication you could consider taking co-Q10 100 mg daily    Housekeeping issues  You should probably have  Your pneumonia vaccine  Consider doing the shingles shot  You will be due for colorectal cancer screening I always prefer colonoscopy over  other screens      Gali

## 2021-06-15 NOTE — PROGRESS NOTES
Medication Therapy Management Follow Up Visit       ASSESSMENT AND PLAN  BRANDEN/Sleep: No significant improvements in anxiety, possibly linked to recent illness and declining new position/job. Discussed increasing dose of Buspar today to 15 mg twice daily, along with lorazepam as needed at bedtime to help with anxiety and sleep. Discussed non-pharmacological ways to help with anxiousness falling asleep such as listening to jazz music. Recommended scheduling a follow up appointment with [facility].   Plan   1. Increase Buspar 15 mg twice daily   2. Continue lorazepam 0.5 mg - 1 mg as needed at bedtime   3. Schedule follow up appointment with [facility]     HTN: Controlled. No changes made today  Plan   1. Continue current medication    Enlarged Prostate: No changes made today. Recommended discussing increasing dose of Flomax at urology follow up visit in January.     RECOMMENDED FOLLOW-UP    Ramu Espinoza was recommended to follow up in: 3 weeks     1. Increase Buspar to 15 mg twice daily (take two 7.5 mg tablets twice daily). Your next prescription will be for Buspar 15 mg, so you'll only have to take 1 tablet (15 mg) twice daily  2. Continue to take lorazepam 0.5 mg (1/2 tablet) - 1 mg (1 tablet) as needed for anxiety at bedtime  3. Follow up with urology in January regarding your dose of Flomax.      4. Consider scheduling follow up appointment with [facility]     Ramu Espinoza is a 57 y.o. male here for a medication therapy management (MTM)  follow up appointment. Decided not to take the new job. Wasn't sure if he'd be able to handle the physical aspect of the job. Wants to switch back to [employer].      BRANDEN/Sleep: Stated that he was doing fine with his anxiety until he got sick. Reported that he's just getting over the flu -- missed work this past Monday - Wednesday (went back to work today). Did try cutting dose of lorazepam in half, which he stated was helped until he got  "ill. Stated when he isn't able to sleep, he worries. Took an extra 1/2 dose of trazodone (25 mg) last night when he was worried he wouldn't be able to sleep and woke up feeling groggy this morning. Had coffee a few times today and reported he's feeling \"wired and tired.\" Hasn't been using his bedtime wyatt lately, because he's anxious about the wyatt turning off at night. Has been trying to listen to jazz music quietly at night with ear buds to help counterbalance the ringing in his ears. Plans to start working out again.     Today  PHQ9 = 5  GAD7 = 5    12/7/17  PHQ9 = 6  GAD7 = 6     Cardiology: Continues metoprolol XL 25 mg once daily in the evening. Denied any dizziness.       Enlarged Prostate: Thinks that he needs to increase dose of Flomax. Stated in the last month, when he gets up to use the bathroom at night, he has trouble falling asleep. Stated that he was supposed to go to urologist yesterday, but had to reschedule for January 8th. Stated that he thinks his stream is steady/continuous. Stated that he does sometimes have urinary urgency, but doesn't have trouble with stream.     Munira Ashby, PharmD, [facility]                              We reviewed Ramu's medication list with them, discussing reason for use, directions for use, and potential side effects of each medication.  Indication, safety, efficacy, and convenience was assessed for all reviewed medications.  No environmental factors were noted currently affecting patient.  This care plan was communicated via EMR with his primary care provider, Esperanza Talamantes MD, and is the authorizing prescriber for this visit.  Direct supervision was available by either the patient's PCP or another available physician when needed. Time and complexity billing metrics are included in the docflowsheet linked to this visit. Time spent: 30 minutes    Other pertinent information at the time of this visit:  Current Outpatient Prescriptions "   Medication Sig Dispense Refill     acyclovir (ZOVIRAX) 400 MG tablet TAKE 1 TABLET BY MOUTH TWICE DAILY 120 tablet 6     aspirin 81 MG EC tablet Take 81 mg by mouth daily.       busPIRone (BUSPAR) 15 MG tablet Take 1 tablet (15 mg total) by mouth 2 (two) times a day. 30 tablet 5     cholecalciferol, vitamin D3, 1,000 unit tablet Take 2,000 Units by mouth daily. 1-2 tablets       ibuprofen (ADVIL,MOTRIN) 200 MG tablet Take 800 mg by mouth as needed for pain.       LORazepam (ATIVAN) 1 MG tablet take 1 tablet as needed . 30 tablet 0     melatonin 5 mg TbDL disintegrating tablet Take 5 mg by mouth bedtime as needed.       metoprolol succinate (TOPROL XL) 25 MG Take 1 tablet (25 mg total) by mouth daily. 90 tablet 3     Study Drug Ibrutinib 140 mg 140 mg cap capsule Take 420 mg by mouth daily.       sulfamethoxazole-trimethoprim (SEPTRA DS) 800-160 mg per tablet TAKE 1 TABLET BY MOUTH 3 TIMES PER WEEK 36 tablet 1     tamsulosin (FLOMAX) 0.4 mg Cp24 TK 1 C PO ONCE D PC  11     traZODone (DESYREL) 50 MG tablet Take 1 tablet (50 mg total) by mouth at bedtime. 360 tablet 1     TURMERIC, BULK, MISC Take 2 tablets once daily in the morning       UNABLE TO FIND Med Name: Tumeric once daily       No current facility-administered medications for this visit.        This transcription uses voice recognition software, which may contain typographical errors.

## 2021-06-15 NOTE — TELEPHONE ENCOUNTER
Reason for Call:  Other call back      Detailed comments: Patient calling back to discuss some labs. Not sure exactly which labs they need to address. No documentation to share with the patient.    Phone Number Patient can be reached at:   Work: [phone]    Best Time:     Can we leave a detailed message on this number?: Yes    Call taken on 2/17/2021 at 12:11 PM by Monique Fitzgerald

## 2021-06-15 NOTE — PROGRESS NOTES
[facility] Daily Progress     Patient Name: Ramu Espinoza  Date: 2/1/2018  Visit #: 5/12  Referral Diagnosis: R knee pain   Referring provider: Esperanza Talamantes*  Visit Diagnosis:     ICD-10-CM    1. Chronic pain of right knee M25.561     G89.29    2. Decreased range of motion of right knee M25.661        Assessment:     Patient is a 57 y.o. male that presents with signs and symptoms consistent with R knee pain and swelling secondary to status post menisectomy on 11/17/17 per patient report. Patient demonstrates impairments including decreased R knee joint mobility, flexibility, strength and range of motion, with increased swelling leading to impaired functional mobility. Patient's functional limitations include ambulation without pain, going up stairs, bending down, sitting for long periods of time and performing his work duties without pain.    Today patient hasn't been seen since 1/10, he quit doing his exercises because they were bothering him too much. Now it seems to feel pretty good and he might start working with the exercises again.     HEP/POC compliance is  good .  Patient demonstrates understanding/independence with home program.  Patient is appropriate to continue with skilled physical therapy intervention, as indicated by initial plan of care.    Goal Status:  Pt. will be independent with home exercise program in : 4 weeks  Pt. will be able to walk : 10 minutes;on uneven/inclined surfaces;on even surfaces;with no pain;with less difficulty;for exercise/recreation;for work;for community mobility;in 6 weeks  Patient will stand : 30 minutes;for work;with less pain;with less difficultty;in 4 weeks  Pt. will bend: to dress;to clean;to do yard work;with no pain;for work;with less difficulty;in 6 weeks  Patient will ascend / descend: stairs;with recipricol gait;without railing;with no pain;with less difficulty;in 6 weeks  Patient will sit: 30 minutes;with no pain;with less difficultty;in 6  weeks;for driving  Patient will transfer: sit/stand;floor/stand;for in/out of chair;for in/out of bed;with less difficulty;with less pain;in 4 weeks  Patient will increase : LEFS score;for improved quality of function;for improved quality of life;in 6 weeks      Plan / Patient Education:     Continue with initial plan of care.  Progress with home program as tolerated.    Plan for next visit: review HEP, bike, wall slides, mobilizations to knee for improved joint mobility and ROM, standing hip abd/ext/add in standing, TKE, heel/toe raises    Subjective:     Pain Rating: his knee pain comes and goes   Feeling pain right under the kneecap, once he decided not to do the exercises he started to feel better with less pain. He is now taking the stairs not the elevator, his biggest difficulty is bending it. He is now kneeling on it, walking is fine, he is still limping a little bit.    Functional limitations are described as occurring with:   ascending stairs or curbs  performing routine daily activities  sitting for longer periods he feels upper leg pain too   transitional movements getting in  chair and car, getting out of  chair and car and sit to stand    Objective:      AROM in degrees  Right 12/7   Left  Right 12/14   Left  Right  12/21   Left       Knee Flexion  (130 )   110    135   114  116 post treatment      118 post treatment          Knee Extension  (0 )                           Treatment Today       Patient Education: Patient was educated on continuing plan of care, progress and review of current HEP. Patient educated on importance of consistency with exercise and therapy, as well as activity modification in order to see change and improvements. Patient was educated this may take a few weeks to see progression and change of his pain. Patient demonstrated and verbalized understanding.     Manual Therapy:  Anterior glides to tib femoral to increase motion    Exercises:  HEP from Hall:   SAQ  SLR  Standing  hamstring curls  Heels slides  Ankle pumps  Exercise #1: seated hamstring stretch - hold 30 sec x 2  Comment #1: standing gastroc stretch - hold 30 sec x 2  Exercise #2: SL balance on R - 10-30 second holds x 3  Comment #2: upright bike - half to full revolutions 5 minutes   Exercise #3: TKE - orange band 10 reps x 2  Comment #3: heel toe raises - 10-20 reps   Exercise #4: hip abduction/extension with theraband - 10 reps x 2  Comment #4: total gym - 10 reps each  Exercise #5: wall slides - 10-20 reps   Comment #5: squats and lunges       TREATMENT MINUTES COMMENTS   Evaluation     Self-care/ Home management     Manual therapy 26 Anterior glides to tib femoral to increase motion  STM to hamstrings and patellar tendon in supine with bolster under ankle  kinesiotape for swelling and support   Neuromuscular Re-education     Therapeutic Activity     Therapeutic Exercises 5 See above flowsheet;  upright bike 5 minutes resistance 6  Review of HEP; added squats and lunges    Gait training     Modality__________________                Total 31    Blank areas are intentional and mean the treatment did not include these items.       Kristin Muller, PT  2/1/2018

## 2021-06-15 NOTE — PROGRESS NOTES
"Subjective:       Patient ID: Nubia Riggs is a 51 y.o. male.    Vitals:  height is 5' 10" (1.778 m) and weight is 86.2 kg (190 lb). His oral temperature is 99.6 °F (37.6 °C). His blood pressure is 134/81 and his pulse is 71. His respiration is 18 and oxygen saturation is 93% (abnormal).     Chief Complaint: Shortness of Breath    Mr Riggs returns to the clinic today with worsening shortness of breath.  He was advised by his pharmacist that his pulse ox of 89% at home was below normal limits and to go to a clinic for further workup.  Pulse ox in the office today was 98% and patient agrees that his pulse ox at home may have been incorrect.  Patient was swabbed erroneously a gang today based on symptoms though patient did not mention his previous positive result to the triage person.    No new fever, and worsening symptom has been shortness of breath only.    Shortness of Breath  This is a new problem. The current episode started yesterday. The problem occurs intermittently (Goes up and down ). The problem has been unchanged. Associated symptoms include headaches. Pertinent negatives include no chest pain, fever, leg swelling, rash, sore throat or vomiting. The symptoms are aggravated by lying flat (moving). Associated symptoms comments: Pt states he went to ER yesterday and was given Rx for headache. He has tried ipratropium inhalers (Mucinex) for the symptoms. The treatment provided mild relief. (Reactive lungs)       Constitution: Negative for chills, fatigue and fever.   HENT: Negative for congestion and sore throat.    Neck: Negative for painful lymph nodes.   Cardiovascular: Negative for chest pain and leg swelling.   Eyes: Negative for double vision and blurred vision.   Respiratory: Positive for shortness of breath. Negative for cough.    Gastrointestinal: Negative for nausea, vomiting and diarrhea.   Genitourinary: Negative for dysuria, frequency and urgency.   Musculoskeletal: Negative for joint " MTM Follow Up Encounter  ASSESSMENT AND PLAN  Anxiety: Improved. Overall seems less anxious today and is no longer needing lorazepam during the day. Since it has only been 2 weeks, will wait at least 4 weeks of being on 50 mg sertraline, then consider increasing to 100 mg. He was agreeable with this plan.     Insomnia: Improved. Will have him continue current regimen for now. Discussed that we could consider d/cing trazodone -- he is ok with trying that by himself at home. Will continue to work on eventually tapering off lorazepam, once sleep remains improved longer and anxiety better at night.     MTM FOLLOW UP  1 month    SUBJECTIVE AND OBJECTIVE  Ramu Espinoza is a 57 y.o. male here for a follow-up medication therapy management (MTM) appointment.     Anxiety: Currently taking sertraline 50 mg for the past week -- started on 25 mg for 1 week. Reports he is no longer on buspirone and he is feeling better -- no twitching and dizziness. Tolerating sertraline ok. Going back to work has really helped. Taking now a full lorazepam 1 mg at 6pm. Has not needed any lorazepam during the day. At night anxiey is the worse, almost hurts. Since taking full at night, he has had the best week of sleep in his life, sleeping 8 hours though the night. Anxiety has been better since he has been able to sleep. He continues to want to be off lorazepam eventually. Getting back into working out.     Insomnia: In addition, is taking hydroxyzine and trazodone. Reports that with trazodone he can fall asleep, but wont stay asleep. No hangover feeling now.   Went back on hydroxyzine           Ramu's medication list was reviewed with them, discussing reason for use, directions for use, and potential side effects of each medication as needed. Indication, safety, efficacy, and convenience was assessed for all medications addressed above.  No environmental factors were noted currently affecting patient.  This care plan was communicated via EMR  pain, joint swelling, muscle cramps and muscle ache.   Skin: Negative for color change, pale and rash.   Allergic/Immunologic: Negative for seasonal allergies.   Neurological: Positive for headaches. Negative for dizziness, history of vertigo, light-headedness and passing out.   Hematologic/Lymphatic: Negative for swollen lymph nodes, easy bruising/bleeding and history of blood clots. Does not bruise/bleed easily.   Psychiatric/Behavioral: Negative for nervous/anxious, sleep disturbance and depression. The patient is not nervous/anxious.        Objective:      Physical Exam   Constitutional: He is oriented to person, place, and time. He appears well-developed. He is cooperative.  Non-toxic appearance. He does not appear ill. No distress.   HENT:   Head: Normocephalic and atraumatic.   Ears:   Right Ear: Hearing and external ear normal.   Left Ear: Hearing and external ear normal.   Nose: Nose normal. No mucosal edema, rhinorrhea or nasal deformity. No epistaxis.   Mouth/Throat: Mucous membranes are normal.   Eyes: Conjunctivae and lids are normal. No scleral icterus.   Neck: Trachea normal, full passive range of motion without pain and phonation normal. Neck supple. No neck rigidity. No edema and no erythema present.   Cardiovascular: Normal rate, regular rhythm, normal heart sounds and normal pulses.   Pulmonary/Chest: Effort normal. No respiratory distress. He has no decreased breath sounds. He has rhonchi in the right lower field and the left lower field.   Abdominal: Normal appearance.   Musculoskeletal: Normal range of motion.         General: No deformity.   Neurological: He is alert and oriented to person, place, and time. He exhibits normal muscle tone. Coordination normal.   Skin: Skin is warm, dry, intact, not diaphoretic and not pale. Psychiatric: His speech is normal and behavior is normal. Judgment and thought content normal.   Nursing note and vitals reviewed.    Results for orders placed or performed  with his primary care provider, Esperanza Talamantes MD, who is the authorizing prescriber for this visit.  Direct supervision was available by either the patient's PCP or other available provider.    Time and complexity billing metrics are included in the docflowsheet linked to this visit    Time spent: 30 min  Che Silva, Pharm.D., BCACP   MTM Pharmacist at [facility]     in visit on 10/17/20   POCT COVID-19 Rapid Screening   Result Value Ref Range    POC Rapid COVID Positive (A) Negative     Acceptable Yes      X-ray Chest Pa And Lateral    Result Date: 10/17/2020  EXAMINATION: XR CHEST PA AND LATERAL CLINICAL HISTORY: COVID-19 TECHNIQUE: PA and lateral views of the chest were performed. COMPARISON: 09/03/2020 FINDINGS: Cardiac silhouette and mediastinal contours are normal.  Lungs demonstrate patchy bilateral mid and lower lung opacities.  Osseous structures are intact.     Bilateral mid and lower lung ground-glass to airspace opacities consistent with history of COVID-19. Electronically signed by: Joe Hernandez MD Date:    10/17/2020 Time:    12:26        Assessment:       1. COVID-19    2. SOB (shortness of breath)    3. Pneumonia of both lungs due to infectious organism, unspecified part of lung        Plan:       Labs and Xrays ordered at this visit reviewed.     COVID-19  -     X-Ray Chest PA And Lateral; Future; Expected date: 10/17/2020  -     COVID-19 Surveillance Program    SOB (shortness of breath)  -     POCT COVID-19 Rapid Screening  -     X-Ray Chest PA And Lateral; Future; Expected date: 10/17/2020  -     COVID-19 Surveillance Program    Pneumonia of both lungs due to infectious organism, unspecified part of lung  -     levoFLOXacin (LEVAQUIN) 500 MG tablet; Take 1 tablet (500 mg total) by mouth once daily. for 7 days  Dispense: 7 tablet; Refill: 0  -     COVID-19 Surveillance Program      Patient Instructions       Treating Pneumonia  Pneumonia is an infection of one or both of the lungs. Pneumonia:  · Is usually caused by either a virus or a bacteria  · Can be very serious, especially in infants, young children, and older adults. Its also serious for those with other long-term health problems or weakened immune systems.  · Is sometimes treated at home and sometimes in the hospital    Antibiotic medicines  Antibiotics may be prescribed for  pneumonia caused by bacteria. They may be pills (oral medicines), or shots (injections). Or they may be given by IV (intravenously) into a vein. If you are taking oral medicines at home:  · Fill your prescription and start taking your medicine as soon as you can.  · You will likely start to feel better in a day or 2, but dont stop taking the antibiotic.  · Use a pill organizer to help you remember to take your medicine.  · Let your healthcare provider know if you have side effects.  · Take your medicine exactly as directed on the label. Talk to your provider or pharmacist if you have any questions.  Antiviral medicines  Antiviral medicine may be prescribed for pneumonia caused by a virus. For example, antiviral medicine may be prescribed for pneumonia caused by the flu virus. Antibiotics do not work against viruses. If you are taking antiviral medicine at home:  · Fill your prescription and start taking your medicine as soon as you can.  · Talk with your provider or pharmacist about possible side effects.  · Take the medicine exactly as instructed.  To relieve symptoms  There are many medicines that can help relieve symptoms of pneumonia. Some are prescription and some are over-the-counter.  Your healthcare provider may recommend:  · Acetaminophen or ibuprofen to lower your fever and to lessen headache or other pain  · Cough medicine to loosen mucus or to reduce coughing  Make sure you check with your healthcare provider or pharmacist before taking any over-the-counter medicines.  Special treatments  If you are hospitalized for pneumonia, you may have other therapies, including:  · Inhaled medicines to help with breathing or chest congestion  · Supplemental oxygen to increase low oxygen levels  Drink fluids and eat healthy  You should eat healthy to help your body fight the infection. Drinking a lot of fluids helps to replace fluids lost from fever and to loosen mucus in your chest.  · Diet. Make healthy food  choices, including fruits and vegetables, lean meats and other proteins, 100% whole grain and low- or no-fat dairy products.  · Fluids. Drink at least 6 to 8 tall glasses a day. Water and 100% fruit or vegetable juice are best.  Get plenty of rest and sleep  You may be more tired than usual for a while. It is important to get enough sleep at night. Its also important to rest during the day. Talk with your healthcare provider if coughing or other symptoms are interfering with your sleep.  Preventing the spread of germs  The best thing you can do to prevent spreading germs is to wash your hands often. You should:  · Rub your hand with soap and water for 20 to 30 seconds.  · Clean in between your fingers, the backs of your hands, and around your nails.  · Dry your hands on a separate towel or use paper towels.  You should also:  · Keep alcohol-based hand  nearby.  · Make sure you also clean surfaces that you touch. Use a product that kills all types of germs.  · Stay away from others until you are feeling better.  When to call your healthcare provider  Call your healthcare provider if you have any of the following:  · Symptoms get worse  · Fever continues  · Shortness of breath gets worse  · Increased mucus or mucus that is darker in color  · Coughing gets worse  · Lips or fingers are bluish in color  · Side effects from your medicine   Date Last Reviewed: 12/1/2016  © 5160-8121 [employer]. [address] [place] [address], [place] [address]. All rights reserved. This information is not intended as a substitute for professional medical care. Always follow your healthcare professional's instructions.

## 2021-06-15 NOTE — PROGRESS NOTES
MTM Follow Up Encounter  ASSESSMENT AND PLAN  Anxiety: Uncontrolled. Appears that buspirone has not been effective. WIll d/c today, no need to titrate. Reviewed that I would recommend restarting an SSRI to reduce anxiety and he can use lorazepam PRN for now. It does not sound like he is abusing it currently. Recommended to try to let it last until his physical with Dr. Talamantes on 1/17/18, but let us know once he gets to #5 tablets remaining. Will restart sertraline and titrate up, likely increasing to at least 100 mg. He was agreeable to this plan.   PLAN:   1. Stop buspirone  2. Start sertraline 25 mg x1 week, then 50 mg daily    Insomnia/Tinnitus: Continue to use trazodone + lorazepam for now. Will eventually be weaning off lorazepam, once SSRI has reached effective level.     MTM FOLLOW UP  2 weeks    SUBJECTIVE AND OBJECTIVE  Ramu Espinoza is a 57 y.o. male here for a follow-up medication therapy management (MTM) appointment.     Medication Concern(s)/Question(s): buspirone not helpful, continues to be very anxious.     Anxiety: Currently on bupspirone 15 mg BID -- initiated 12/7/17 and increased by Munira Ashby PharmD BCACP on 12/28/17. Also on lorazepam 1 mg 1-2 tablets/day. Reports that anxiety has been worse on buspirone, especially when the dose was increased. A situation initiated the anxiety, which has now resolved, but the anxiety has lingered for about a month. Describes that he feels close to a panic attack all day every day. Was taking lorazepam only at night, now needing it during the day. Is concerned about being addicted to lorazepam. Feels like buspirone makes him twitch, dizzy. Started to take lorazepam in the morning as well. Does not remember what happened with sertraline or escitalopram -- thinks she didn't need them anymore or they didn't help. Would like to be on low dose and eventually discontinue.   Upcoming appt with JESSICA Brandt on 1/16/18  Past therapies tried:   -  citalopram: Reports that it was not helpful  - hydroxyzine: dry mouth  - sertraline 50 mg: does not remember  - escitalopram: does not remember    Insomnia/Tinnitus: Reports that trazodone is to help him fall asleep from the tinnitus. Was on a higher strength, now ok with 50 mg. Lorazepam also helps at night, makes the tinnitus almost go away. Trazodone makes him dizzy, but being on buspirone makes it worse. Hx nightmares on zolpidem.           Ramu's medication list was reviewed with them, discussing reason for use, directions for use, and potential side effects of each medication as needed. Indication, safety, efficacy, and convenience was assessed for all medications addressed above.  No environmental factors were noted currently affecting patient.  This care plan was communicated via EMR with his primary care provider, Esperanza Talamantes MD, who is the authorizing prescriber for this visit.  Direct supervision was available by either the patient's PCP or other available provider.    Time and complexity billing metrics are included in the docflowsheet linked to this visit    Time spent: 45 min  Che Silva, Pharm.D., BCACP   MTM Pharmacist at [facility]

## 2021-06-15 NOTE — TELEPHONE ENCOUNTER
Patient is asking if you are seeing any of your previous patient's from [facility]?

## 2021-06-15 NOTE — TELEPHONE ENCOUNTER
Patient had a virtual visit with Dr. Jiménez today 02/22/21 and he address his test results along with his thyroid. See virtual visit note 02/22/21    KEVEN/DOUG

## 2021-06-15 NOTE — TELEPHONE ENCOUNTER
RN cannot approve Refill Request    RN can NOT refill this medication historical medication requested. Last office visit: 3/2/2020 Ramu Jiménez MD Last Physical: 1/22/2021 Last MTM visit: Visit date not found Last visit same specialty: 3/2/2020 Ramu Jiménez MD.  Next visit within 3 mo: Visit date not found  Next physical within 3 mo: Visit date not found      [facility] Uribe, [facility] Connection Triage/Med Refill 3/4/2021    Requested Prescriptions   Pending Prescriptions Disp Refills     tamsulosin (FLOMAX) 0.4 mg cap 90 capsule 3     Sig: Take 1 capsule (0.4 mg total) by mouth daily after supper.       Alfuzosin/Tamsulosin/Silodosin Refill Protocol  Passed - 3/3/2021  5:20 PM        Passed - PCP or prescribing provider visit in past 12 months       Last office visit with prescriber/PCP: 3/2/2020 Ramu Jiménez MD OR same dept: Visit date not found OR same specialty: 3/2/2020 Ramu Jiménez MD  Last physical: 1/22/2021 Last MTM visit: Visit date not found   Next visit within 3 mo: Visit date not found  Next physical within 3 mo: Visit date not found  Prescriber OR PCP: Ramu Jiménez MD  Last diagnosis associated with med order: There are no diagnoses linked to this encounter.  If protocol passes may refill for 12 months if within 3 months of last provider visit (or a total of 15 months).

## 2021-06-15 NOTE — TELEPHONE ENCOUNTER
----- Message from Ramu Jiménez MD sent at 1/31/2021  9:54 AM CST -----  Please inform Clive    TSH elevated this means that his thyroid is underactive  I would favor either repeating this in 6 months or starting levothyroxine 50 mcg daily  I would also recommend taking over-the-counter selenium 200 mcg daily as a supplement    PSA test stable no concerns  Urine microalbumin screen normal no suggestion of kidney damage  Kidney liver test within normal limits as evidenced by CMP, total bilirubin okay to monitor  Cholesterol level is elevated, this could be due to an underactive thyroid    Given the patient's age and risk he likely qualifies for statin therapy  Please schedule Clive for a follow-up visit via phone    Topics of discussion  Elevated TSH suggesting hypothyroidism  Elevated cholesterol suggesting risk for coronary disease as well as stroke, however cholesterol can be elevated in the setting of hypothyroidism    Gali

## 2021-06-15 NOTE — TELEPHONE ENCOUNTER
Letter was mailed to pt about labs, he is calling to talk with you about them    Zeinab Monsalve CMA ([facility])

## 2021-06-15 NOTE — PROGRESS NOTES
"2/6/2018    Start time: 4:13 PM    Stop Time: 5:12 PM   Session # 2    Ramu Espinoza is a 57 y.o. male is being seen today for    Chief Complaint   Patient presents with     Follow-up     Anxiety   .     New symptoms or complaints: He is taking sertraline, started about 6 weeks ago.  He reports taking lorazepam daily, typically around noon or early afternoon, indicating it helps him sleep. Revisisted discussion of risk of developing addiction to it and he is aware of this, reiterating goal and commitment to work with his health care providers on this and engage in therapy.    Functional Impairment:   Personal: 4  Family: 3  Work: 2  Social:2    Clinical assessment of mental status: The patient was on time for his scheduled session. He was pleasant and cooperative. He was oriented ×3. He made appropriate eye contact. He was well dressed with good grooming and hygiene. Recent and remote memories were intact. Concentration and focus: focused and on task. Speech (tone, volume, and rate) were intact. Mood and affect were congruently stable and appropriate for the content of the session. Fund of knowledge was adequate. Insight and judgment were adequate for therapy.     Suicidal/Homicidal Ideation present: None Reported This Session    Patient's impression of their current status: Patient discussed feeling anxiety and described it as \"cats\" and/or \"jacked,\" as he considered that it is correlated with his decision to decline a job offer last month.  He recognized ambivalence, stating, \"I'm missing out on a lot of money and benefits, but how much money do you need?\" and he spoke about the poor timing as he is recovering from knee surgery which would have been a barrier with some of the physical demands of the job. He reflected on the possibility that he inherited a genetic predisposition to anxiety from his mother.  He struggled to connect his feelings to his thoughts until later in the session as he stated, \"There's " "something stuck in me, like I got caught up in it and there's no need.\"  He reflected on values that are most important to him (including, health, marriage, security, authenticity) and the behaviors that serve them.      Therapist impression of patients current state: Patient's thoughts, feelings, and beliefs were processed.  He was receptive to exploring techniques for addressing anxiety and reducing intensity.  He was also willing to look at the role of acceptance in navigating life with greater ease, as a way to nurture a different relationship with thoughts and feelings (acceptance of thoughts as thoughts and feelings as feelings) and how being unwilling to have a thought as a thought can perpetuate the struggle.      Type of psychotherapeutic technique provided: Insight oriented, Client centered, Solution-focused and CBT    Progress toward short term goals:Progress as expected, patient is engaging in therapeutic process, shows receptivity to strategies for symptom management.     Review of long term goals: Patient is making progress on his long-term goals.    Diagnosis:   1. Generalized anxiety disorder        Plan and Follow up: Patient plans to continue healthy routines of daily physical activity (he identified helpful PT exercises that he does daily), regular and healthy meals, sleep hygiene, and continuing to nurture diaphragmatic breathing exercises (recommended 4-7-8 yoga breathing exercise).  He also agreed to begin to notice his thoughts - particularly ones which correlate with feeling badly about self (refered to list of 'care home words' to help him identify these thoughts) and agreed to make an effort to complete a CBT thought exercise, The 3 C's, as a way to catch or identify his thoughts that are associated with feeling anxious). Plans to return for follow up in 3-4 weeks.      Discharge Criteria/Planning: Patient will continue with follow-up until therapy can be discontinued without return of signs " and symptoms.    Martha Jha 2/6/2018

## 2021-06-15 NOTE — PROGRESS NOTES
[facility] Daily Progress     Patient Name: Ramu Espinoza  Date: 2/8/2018  Visit #: 6/12  Referral Diagnosis: R knee pain   Referring provider: Esperanza Talamantes*  Visit Diagnosis:     ICD-10-CM    1. Chronic pain of right knee M25.561     G89.29    2. Decreased range of motion of right knee M25.661        Assessment:     Patient is a 57 y.o. male that presents with signs and symptoms consistent with R knee pain and swelling secondary to status post menisectomy on 11/17/17 per patient report. Patient demonstrates impairments including decreased R knee joint mobility, flexibility, strength and range of motion, with increased swelling leading to impaired functional mobility. Patient's functional limitations include ambulation without pain, going up stairs, bending down, sitting for long periods of time and performing his work duties without pain.    Today patient started his exercises again, still frustrated with his progress and knows it will take time. Now it seems to feel pretty good and he might start working with the exercises again.     HEP/POC compliance is  good .  Patient demonstrates understanding/independence with home program.  Patient is appropriate to continue with skilled physical therapy intervention, as indicated by initial plan of care.    Goal Status:  Pt. will be independent with home exercise program in : 4 weeks  Pt. will be able to walk : 10 minutes;on uneven/inclined surfaces;on even surfaces;with no pain;with less difficulty;for exercise/recreation;for work;for community mobility;in 6 weeks  Patient will stand : 30 minutes;for work;with less pain;with less difficultty;in 4 weeks  Pt. will bend: to dress;to clean;to do yard work;with no pain;for work;with less difficulty;in 6 weeks  Patient will ascend / descend: stairs;with recipricol gait;without railing;with no pain;with less difficulty;in 6 weeks  Patient will sit: 30 minutes;with no pain;with less difficultty;in 6  weeks;for driving  Patient will transfer: sit/stand;floor/stand;for in/out of chair;for in/out of bed;with less difficulty;with less pain;in 4 weeks  Patient will increase : LEFS score;for improved quality of function;for improved quality of life;in 6 weeks      Plan / Patient Education:     Continue with initial plan of care.  Progress with home program as tolerated.    Plan for next visit: review HEP, bike, wall slides, mobilizations to knee for improved joint mobility and ROM, standing hip abd/ext/add in standing, TKE, heel/toe raises    Subjective:     Pain Rating: his knee pain comes and goes   Feeling pain right under the kneecap, once he decided not to do the exercises he started to feel better with less pain. He is now taking the stairs not the elevator, his biggest difficulty is bending it. He is now kneeling on it, walking is fine, he is still limping a little bit.    Functional limitations are described as occurring with:   ascending stairs or curbs  performing routine daily activities  sitting for longer periods he feels upper leg pain too   transitional movements getting in  chair and car, getting out of  chair and car and sit to stand    Objective:      AROM in degrees  Right 12/7   Left  Right 12/14   Left  Right  12/21   Left       Knee Flexion  (130 )   110    135   114  116 post treatment      118 post treatment          Knee Extension  (0 )                           Treatment Today       Patient Education: Patient was educated on continuing plan of care, progress and review of current HEP. Patient educated on importance of consistency with exercise and therapy, as well as activity modification in order to see change and improvements. Patient was educated this may take a few weeks to see progression and change of his pain. Patient demonstrated and verbalized understanding.     Manual Therapy:  Anterior glides to tib femoral to increase motion    Exercises:  HEP from [place]:   SAQ  SLR  Standing  hamstring curls  Heels slides  Ankle pumps  Exercise #1: seated hamstring stretch - hold 30 sec x 2  Comment #1: standing gastroc stretch - hold 30 sec x 2  Exercise #2: SL balance on R - 10-30 second holds x 3  Comment #2: upright bike - half to full revolutions 5 minutes   Exercise #3: TKE - orange band 10 reps x 2  Comment #3: heel toe raises - 10-20 reps   Exercise #4: hip abduction/extension with theraband - 10 reps x 2  Comment #4: total gym - 10 reps each  Exercise #5: wall slides - 10-20 reps   Comment #5: squats and lunges       TREATMENT MINUTES COMMENTS   Evaluation     Self-care/ Home management     Manual therapy 25 Anterior glides to tib femoral to increase motion  STM to hamstrings and patellar tendon in supine with bolster under ankle  kinesiotape for swelling and support   Neuromuscular Re-education     Therapeutic Activity     Therapeutic Exercises 5 See above flowsheet;  upright bike 5 minutes resistance 6  Review of HEP; added squats and lunges    Gait training     Modality__________________                Total 30    Blank areas are intentional and mean the treatment did not include these items.       Kristin Muller, PT  2/8/2018

## 2021-06-15 NOTE — PROGRESS NOTES
Assessment:      Healthy male exam.    2. Hx of CLL - presently in remission  3. Anxiety disorder  4. Depression - stable   5. Hx of PA fibrillatin - in sinus rhythm at present   6. Hx Timmitus   7. Insomnia   Plan:       All questions answered.  Blood tests: PSA, screening, Lipoproteins and Total cholesterol.  Follow up as needed.     Subjective:      Ramu Espinoza is a 57 y.o. male who presents for an annual exam.  CC - He is feeling pretty good. Following up with his oncologist regularly and very stable from   That standpoint.     Healthy Habits:   Regular Exercise: Yes  Sunscreen Use: Yes  Healthy Diet: Yes  Dental Visits Regularly: Yes  Seat Belt: Yes  Sexually active: Yes  Monthly Self Testicular Exams:  No  Hemoccults: No  Flex Sig: No  Colonoscopy: 3.16  Lipid Profile: Yes  Glucose Screen: Yes  Prevention of Osteoporosis: N/A  Last Dexa: N/A  Guns at Home:  No      Immunization History   Administered Date(s) Administered     DT (pediatric) 02/01/1999, 06/12/2002     Tdap 07/27/2011     Immunization status: up to date and documented.    No exam data present     Current Outpatient Prescriptions   Medication Sig Dispense Refill     acyclovir (ZOVIRAX) 400 MG tablet TAKE 1 TABLET BY MOUTH TWICE DAILY 120 tablet 6     cholecalciferol, vitamin D3, 1,000 unit tablet Take 2,000 Units by mouth daily. 1-2 tablets       hydrOXYzine HCl (ATARAX) 25 MG tablet 12.5 mg.       ibuprofen (ADVIL,MOTRIN) 200 MG tablet Take 800 mg by mouth as needed for pain.       LORazepam (ATIVAN) 1 MG tablet TAKE 1 TABLET AS NEEDED . 30 tablet 0     metoprolol succinate (TOPROL XL) 25 MG Take 1 tablet (25 mg total) by mouth daily. 90 tablet 3     sertraline (ZOLOFT) 50 MG tablet Take 1 tablet (50 mg total) by mouth daily. 90 tablet 0     Study Drug Ibrutinib 140 mg 140 mg cap capsule Take 420 mg by mouth daily.       sulfamethoxazole-trimethoprim (SEPTRA DS) 800-160 mg per tablet TAKE 1 TABLET BY MOUTH 3 TIMES PER WEEK 36 tablet 1      tamsulosin (FLOMAX) 0.4 mg Cp24 TK 1 C PO ONCE D PC  11     traZODone (DESYREL) 50 MG tablet Take 1 tablet (50 mg total) by mouth at bedtime. 360 tablet 1     TURMERIC, BULK, MISC Take 2 tablets once daily in the morning       UNABLE TO FIND Med Name: Tumeric once daily       melatonin 5 mg TbDL disintegrating tablet Take 5 mg by mouth bedtime as needed.       No current facility-administered medications for this visit.      Past Medical History:   Diagnosis Date     BPH (benign prostatic hyperplasia)      CLL (chronic lymphocytic leukemia)      Lymphadenopathy     Created by Conversion  Replacement Utility updated for latest IMO load     Past Surgical History:   Procedure Laterality Date     [place] TREAT INTER/SUBTROCH FX,W/PLATE/SCREW      Description: Open Treatment Of An Intertrochanteric Fracture;  Recorded: 04/01/2009;     Azithromycin  Family History   Problem Relation Age of Onset     Cancer Father      pacreatic      Prostate cancer Father      Colon cancer Mother      Cancer Mother      Cancer Brother      Lung cancer Brother      Hypertension Brother      No Medical Problems Sister      No Medical Problems Brother      Cancer Brother      Anuerysm Brother      Hypertension Brother      Hypertension Brother      No Medical Problems Brother      Diabetes Paternal Aunt      Diabetes Maternal Aunt      Diabetes Maternal Aunt      Cancer Maternal Grandfather      Social History     Social History     Marital status:      Spouse name: N/A     Number of children: N/A     Years of education: N/A     Occupational History     Not on file.     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    Physical  Physical Exam     Physical Examination: General appearance - alert, well appearing, and in no distress  Mental status - alert, oriented to person, place, and time  Eyes - pupils equal and reactive, extraocular eye movements intact  Ears - bilateral TM's and external ear canals normal  Nose - normal and patent, no erythema, discharge or polyps  Mouth - mucous membranes moist, pharynx normal without lesions and dental hygiene good  Neck - supple, no significant adenopathy, no thyromegaly  Lymphatics - no palpable lymphadenopathy, no hepatosplenomegaly  Chest - clear to auscultation, no wheezes, rales or rhonchi, symmetric air entry  Heart - normal rate, regular rhythm, normal S1, S2, no murmurs, rubs, clicks or gallops  Abdomen - soft, nontender, nondistended, no masses or organomegaly  Rectal - negative without mass, lesions or tenderness  Back exam - full range of motion, no tenderness, palpable spasm or pain on motion  Neurological - alert, oriented, normal speech, no focal findings or movement disorder noted  Musculoskeletal - no joint tenderness, deformity or swelling  Extremities - peripheral pulses normal, no pedal edema, no clubbing or cyanosis  Skin - normal coloration and turgor, no rashes, no suspicious skin lesions noted        "

## 2021-06-15 NOTE — TELEPHONE ENCOUNTER
Left message to call back for: Clive  Information to relay to patient:   See note    Zeinab Monsalve CMA ([facility])

## 2021-06-15 NOTE — TELEPHONE ENCOUNTER
Refill Approved    Rx renewed per Medication Renewal Policy. Medication was last renewed on 12/3/20.    Davis Uribe, [facility] Triage/Med Refill 2/24/2021     Requested Prescriptions   Pending Prescriptions Disp Refills     metoprolol succinate (TOPROL-XL) 50 MG 24 hr tablet [Pharmacy Med Name: METOPROLOL ER 50 MG TAB 50 Tablet] 180 tablet 0     Sig: TAKE 2 TABLETS (100 MG TOTAL) BY MOUTH DAILY.       Beta-Blockers Refill Protocol Passed - 2/24/2021  7:51 AM        Passed - PCP or prescribing provider visit in past 12 months or next 3 months     Last office visit with prescriber/PCP: 3/2/2020 Ramu Jiménez MD OR same dept: 3/2/2020 Ramu Jiménez MD OR same specialty: 3/2/2020 Ramu Jiménez MD  Last physical: 1/22/2021 Last MTM visit: Visit date not found   Next visit within 3 mo: Visit date not found  Next physical within 3 mo: Visit date not found  Prescriber OR PCP: Ramu Jiménez MD  Last diagnosis associated with med order: 1. Heart palpitations  - metoprolol succinate (TOPROL-XL) 50 MG 24 hr tablet [Pharmacy Med Name: METOPROLOL ER 50 MG TAB 50 Tablet]; Take 2 tablets (100 mg total) by mouth daily.  Dispense: 180 tablet; Refill: 0    If protocol passes may refill for 12 months if within 3 months of last provider visit (or a total of 15 months).             Passed - Blood pressure filed in past 12 months     BP Readings from Last 1 Encounters:   01/22/21 128/60

## 2021-06-15 NOTE — TELEPHONE ENCOUNTER
Please inform Clive     TSH elevated this means that his thyroid is underactive  I would favor either repeating this in 6 months or starting levothyroxine 50 mcg daily  I would also recommend taking over-the-counter selenium 200 mcg daily as a supplement     PSA test stable no concerns  Urine microalbumin screen normal no suggestion of kidney damage  Kidney liver test within normal limits as evidenced by CMP, total bilirubin okay to monitor  Cholesterol level is elevated, this could be due to an underactive thyroid     Given the patient's age and risk he likely qualifies for statin therapy  Please schedule Clive for a follow-up visit via phone     Topics of discussion  Elevated TSH suggesting hypothyroidism  Elevated cholesterol suggesting risk for coronary disease as well as stroke, however cholesterol can be elevated in the setting of hypothyroidism       Gali

## 2021-06-16 PROBLEM — E66.09 CLASS 1 OBESITY DUE TO EXCESS CALORIES WITH SERIOUS COMORBIDITY IN ADULT: Status: ACTIVE | Noted: 2019-06-06

## 2021-06-16 PROBLEM — E66.811 CLASS 1 OBESITY DUE TO EXCESS CALORIES WITH SERIOUS COMORBIDITY IN ADULT: Status: ACTIVE | Noted: 2019-06-06

## 2021-06-16 PROBLEM — M79.662 PAIN OF LEFT LOWER LEG: Status: ACTIVE | Noted: 2021-01-22

## 2021-06-16 PROBLEM — I31.39 PERICARDIAL EFFUSION: Status: ACTIVE | Noted: 2019-03-16

## 2021-06-16 PROBLEM — E04.1 THYROID NODULE: Status: ACTIVE | Noted: 2019-03-22

## 2021-06-16 PROBLEM — G47.52 REM SLEEP BEHAVIOR DISORDER: Status: ACTIVE | Noted: 2019-06-06

## 2021-06-16 NOTE — PROGRESS NOTES
[facility] Progress Note    Patient: Ramu Espinoza  MRN: [id number]  Date of Service: 3/31/2021        Reason for visit      1. Chronic lymphocytic leukemia (H)  Cy inducer Ibrutinib        Assessment     1.  A 60 y.o. gentleman with CLL stage 4 now, diagnosed in 2012.  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  Currently quite stable on Imbruvica.  He is on a clinical trial.  2.  Very good clinical response to Imbruvica. His lymphnodes have normalized.  His CBC is normal.   3.  In May 2017 diagnosis of paroxysmal Atrial fibrillation. This could be caused by Imbruvica. He also drinks a lot of coffee.  He has not had any more recurrences since then.  4.  Pericardial effusion in 2019. Held imbruvica for 3 weeks.  Now resumed it. Doing OK. We have continued it.  He has not had any recurrence.  5.  History of pleurisy right side. No recurrence.  6.  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  7.  Covid vaccination done.    Plan     1.   Continue Imbruvica 140 mg 3 pills daily.  He will be on Imbruvica maintenance.  No change in plan.  2.  Have him come back in 4 months time .  3.   Continue Acyclovir.  He does not need Bactrim.  Advised the patient to get a shingles shot as well.  4. Continue with good diet and exercise.  5. Follow-up with cardiology as indicated.  6. Advised to cut down on coffee.    Clinical stage      Stage IV    History     Ramu Espinoza is a very pleasant 60 y.o. old male with a history of CLL diagnosed in 2012 presenting with elevated white count in upper 10s/lower 20s with a peripheral blood smear showing atypical lymphocytosis suspicious for CLL.  He was completely asymptomatic, normal hemoglobin and platelet count.  His peripheral blood flow cytometry revealed CD5 co-expressing kappa light chains restricted B cells confirming the diagnosis of CLL.  He has been on observation.  His white count has been slowly going up.    In 2014 his  white count was over 100,000.  So he had a CAT scan and that showed increasing lymphadenopathy and splenomegaly.  Platelet count has been going down.    I offered participation in clinical study in which patients are randomized between fludarabine and cyclophosphamide Rituxan or Imbruvica and Rituxan.  He has been randomized to Imbruvica and rituximab arm. He started on October 2014.  In November 2014 he got Rituxan for the first time.  Tolerated well. Finished that. Now on Imbruvica alone. Tolerating it well.     In May 2017 he was found to have paroxysmal afib when he complained of some fluttering sensation. He had holter monitor which led to the diagnosis. Most days he feels well.     In March 2019 he was found to have pericardial effusion when he started have some chest pain on deep inspiration. Had it tapped. Held Imbruvica for 3 weeks. Then resumed it.  Tolerated it fine. So continues it.     Comes in today for scheduled follow-up. Overall feels good. He feels his heart is doing well.  He is working 2 jobs.  Feels a little bit more tired.  Does not sleep very well.  Drinks a lot of coffee.  He has been told not to.  He is also received his Covid vaccination.    Past Medical History     Past Medical History:   Diagnosis Date     BPH (benign prostatic hyperplasia)      CLL (chronic lymphocytic leukemia) (H)      Lymphadenopathy     Created by Conversion  Replacement Utility updated for latest IMO load     Paroxysmal atrial fibrillation with rapid ventricular response (H)      Sleep apnea     hypertension, being slightly overweight, anemic, having reflux, anxiety, epididymitis, hyperlipidemia and tinnitus      Review of Systems   Constitutional  Constitutional (WDL): Exceptions to WDL  Fatigue: Fatigue not relieved by rest - Limiting instrumental ADL  Weight Loss: to <10% from baseline, intervention not indicated(down 5 lbs)  Neurosensory  Neurosensory (WDL): Exceptions to WDL  Cardiovascular  Cardiovascular  (WDL): All cardiovascular elements are within defined limits  Pulmonary  Respiratory (WDL): Within Defined Limits  Gastrointestinal  Gastrointestinal (WDL): All gastrointestinal elements are within defined limits  Genitourinary  Genitourinary (WDL): All genitourinary elements are within defined limits  Integumentary  Integumentary (WDL): All integumentary elements are within defined limits  Patient Coping  Patient Coping: Accepting  Accompanied by  Accompanied by: Alone    ECOG performance status and Distress Assessment      ECOG Performance:    ECOG Performance Status: 1    Distress Assessment  Distress Assessment Score: No distress:     Pain Status  Currently in Pain: Yes      Vital Signs     Vitals:    03/31/21 0905   BP: 125/68   Pulse: 68   Temp: 98  F (36.7  C)   SpO2: 95%       Physical Exam     GENERAL: No acute distress. Cooperative in conversation.   HEENT: Pupils are equal, round and reactive. Oral mucosa is clean and intact. No ulcerations or mucositis noted. No bleeding noted.  RESP:Chest symmetric lungs are clear bilaterally per auscultation. Regular respiratory rate. No wheezes or rhonchi.  CV: Normal S1 S2 Regular, rate and rhythm. No murmurs.  ABD: Nondistended, soft, nontender. Positive bowel sounds. No organomegaly.   EXTREMITIES: No lower extremity edema.   NEURO: Non- focal. Alert and oriented x3.  Cranial nerves appear intact.  PSYCH: Within normal limits. No depression or anxiety.  SKIN: Warm dry intact.  Slight rash on his shoulder and legs.  LYMPH NODES: Bilateral cervical, supraclavicular, axillary lymph node examination was done.  Negative for any palpable adenopathy.      Lab Results     Results for orders placed or performed in visit on 03/31/21   HM1 (CBC with Diff)   Result Value Ref Range    WBC 7.3 4.0 - 11.0 thou/uL    RBC 4.97 4.40 - 6.20 mill/uL    Hemoglobin 14.6 14.0 - 18.0 g/dL    Hematocrit 45.3 40.0 - 54.0 %    MCV 91 80 - 100 fL    MCH 29.4 27.0 - 34.0 pg    MCHC 32.2 32.0 -  36.0 g/dL    RDW 12.7 11.0 - 14.5 %    Platelets 251 140 - 440 thou/uL    MPV 10.3 8.5 - 12.5 fL    Neutrophils % 72 (H) 50 - 70 %    Lymphocytes % 15 (L) 20 - 40 %    Monocytes % 7 2 - 10 %    Eosinophils % 5 0 - 6 %    Basophils % 1 0 - 2 %    Immature Granulocyte % 0 <=0 %    Neutrophils Absolute 5.3 2.0 - 7.7 thou/uL    Lymphocytes Absolute 1.1 0.8 - 4.4 thou/uL    Monocytes Absolute 0.5 0.0 - 0.9 thou/uL    Eosinophils Absolute 0.4 0.0 - 0.4 thou/uL    Basophils Absolute 0.0 0.0 - 0.2 thou/uL    Immature Granulocyte Absolute 0.0 <=0.0 thou/uL         Imaging Results     Ct Chest Over Read    Result Date: 3/5/2021  EXAM: OVERREAD: DETAILED [facility] EXTRACARDIAC OVERREAD OF CARDIAC CT LOCATION: [facility] DATE/TIME: 3/5/2021 3:57 PM INDICATION: Hyperlipidemia CAD risk, high, asymptomatic TECHNIQUE: Dose reduction techniques were used. CONTRAST: None COMPARISON: CT pulmonary angiogram 3/16/2019 FINDINGS:  LIMITED CHEST: Negative. LIMITED MEDIASTINUM: Negative. LIMITED UPPER ABDOMEN: A few benign liver cysts are present.     1.  No actionable incidental extracardiac findings. 2.  Please refer to cardiologist's dictation for the cardiac CT report.     Ct Cardiac Calcium Score    Result Date: 3/5/2021    The total Agatston calcium score is 0. A calcium score of zero places the individual in the lowest quartile when compared to an age and gender matched control group and implies a low risk of cardiac events in the next 10 years. (less than 1% per year).   The ascending aorta appears potentially enlarged on limited imaging. Consider full imaging study of the thoracic aorta to further define.   Please see separate report for radiology for additional findings.          Dennis Garza MD

## 2021-06-16 NOTE — TELEPHONE ENCOUNTER
RN cannot approve Refill Request    RN can NOT refill this medication historical medication requested. Last office visit: 3/2/2020 Ramu Jiménez MD Last Physical: 1/22/2021 Last MTM visit: 2/16/2018 Che Silva, PharmD Last visit same specialty: Visit date not found.  Next visit within 3 mo: Visit date not found  Next physical within 3 mo: Visit date not found      Marta Valiente, [facility] Triage/Med Refill 3/20/2021    Requested Prescriptions   Pending Prescriptions Disp Refills     hydrOXYzine HCL (ATARAX) 25 MG tablet [Pharmacy Med Name: HYDROXYZINE HCL 25MG TABS (WHITE)] 45 tablet 0     Sig: TAKE 1/2 TABLET(12.5 MG) BY MOUTH AT BEDTIME AS NEEDED FOR ITCHING       Antihistamine Refill Protocol Passed - 3/18/2021  3:40 PM        Passed - Patient has had office visit/physical in last year     Last office visit with prescriber/PCP: 3/2/2020 Ramu Jiménez MD OR same dept: Visit date not found OR same specialty: Visit date not found  Last physical: 1/22/2021 Last MTM visit: 2/16/2018 Che Silva, PharmD   Next visit within 3 mo: Visit date not found  Next physical within 3 mo: Visit date not found  Prescriber OR PCP: Ramu Jiménez MD  Last diagnosis associated with med order: 1. Tinnitus  - hydrOXYzine HCL (ATARAX) 25 MG tablet [Pharmacy Med Name: HYDROXYZINE HCL 25MG TABS (WHITE)]; TAKE 1/2 TABLET(12.5 MG) BY MOUTH AT BEDTIME AS NEEDED FOR ITCHING  Dispense: 45 tablet; Refill: 0    2. Insomnia  - hydrOXYzine HCL (ATARAX) 25 MG tablet [Pharmacy Med Name: HYDROXYZINE HCL 25MG TABS (WHITE)]; TAKE 1/2 TABLET(12.5 MG) BY MOUTH AT BEDTIME AS NEEDED FOR ITCHING  Dispense: 45 tablet; Refill: 0    If protocol passes may refill for 12 months if within 3 months of last provider visit (or a total of 15 months).                sertraline (ZOLOFT) 100 MG tablet [Pharmacy Med Name: SERTRALINE 100MG TABLETS] 90 tablet 0     Sig: TAKE 1 TABLET(100 MG) BY MOUTH DAILY       [facility] Refill Protocol  Passed  - 3/18/2021  3:40 PM        Passed - PCP or prescribing provider visit in last year     Last office visit with prescriber/PCP: 3/2/2020 Ramu Jiménez MD OR same dept: Visit date not found OR same specialty: Visit date not found  Last physical: 1/22/2021 Last MTM visit: 2/16/2018 Che Silva, PharmD   Next visit within 3 mo: Visit date not found  Next physical within 3 mo: Visit date not found  Prescriber OR PCP: Ramu Jiménez MD  Last diagnosis associated with med order: 1. Tinnitus  - hydrOXYzine HCL (ATARAX) 25 MG tablet [Pharmacy Med Name: HYDROXYZINE HCL 25MG TABS (WHITE)]; TAKE 1/2 TABLET(12.5 MG) BY MOUTH AT BEDTIME AS NEEDED FOR ITCHING  Dispense: 45 tablet; Refill: 0    2. Insomnia  - hydrOXYzine HCL (ATARAX) 25 MG tablet [Pharmacy Med Name: HYDROXYZINE HCL 25MG TABS (WHITE)]; TAKE 1/2 TABLET(12.5 MG) BY MOUTH AT BEDTIME AS NEEDED FOR ITCHING  Dispense: 45 tablet; Refill: 0    If protocol passes may refill for 12 months if within 3 months of last provider visit (or a total of 15 months).

## 2021-06-16 NOTE — PROGRESS NOTES
"3/6/2018    Start time: 5:12 PM    Stop Time: 6:11 PM   Session # 3    Ramu Espinoza is a 57 y.o. male is being seen today for    Chief Complaint   Patient presents with     Follow-up   .     New symptoms or complaints: Patient stated, \"2 days after we last met, I stopped taking Lorazepam everyday.\"  He described feeling good.  He is taking sertraline as prescribed.    Functional Impairment:   Personal: 2  Family: 2  Work: 2  Social:2    Clinical assessment of mental status: The patient was on time for his scheduled session. He was pleasant and cooperative. He was oriented ×3. He made appropriate eye contact. He was well dressed with good grooming and hygiene. Recent and remote memories were intact. Concentration and focus: focused and on task. Speech (tone, volume, and rate) were intact. Mood and affect were congruently stable and appropriate for the content of the session. Fund of knowledge was adequate. Insight and judgment were adequate for therapy.     Suicidal/Homicidal Ideation present: None Reported This Session    Patient's impression of their current status: Patient described feeling better since last session.  He considered that it has been helpful to stop taking the Lorazepam on a daily basis and has found it helpful to engage in healthy routines.  He discussed the application of some of the mindfulness techniques that have been useful to him.  He acknowledged that the new job offer that he had declined may call him again, and that he will accept it when they do.  He noted that his knee feels better which eliminates the main concern he had of not being able to fulfill certain work tasks and not wanting to fail.  He reflected on his family system, and identified the value of work ethic imparted to him by his father.  He also considered the impact of his father's alcoholism.  He authored the values that are most important to him (including, health, marriage, security, authenticity) and the behaviors that " serve them.      Therapist impression of patients current state: Patient's thoughts, feelings, and beliefs were processed.  He was receptive to exploring techniques for addressing anxiety and reducing intensity.  He was also willing to look at the role of acceptance in navigating life with greater ease, as a way to nurture a different relationship with thoughts and feelings (acceptance of thoughts as thoughts and feelings as feelings) and how being unwilling to have a thought as a thought can perpetuate the struggle.      Type of psychotherapeutic technique provided: Insight oriented, Client centered, Solution-focused and CBT    Progress toward short term goals:Progress as expected, patient is engaging in therapeutic process, shows receptivity to strategies for symptom management.     Review of long term goals: Patient is making progress on his long-term goals.    Diagnosis:   1. Generalized anxiety disorder        Plan and Follow up: Patient plans to continue healthy routines of daily physical activity (he identified helpful PT exercises that he does daily), regular and healthy meals, sleep hygiene, and continuing to nurture diaphragmatic breathing exercises (recommended 4-7-8 yoga breathing exercise).  He also agreed to continue to notice his thoughts - particularly ones which correlate with feeling badly about self (refered to list of 'prison words' to help him identify these thoughts) and agreed to make an apply CBT thought exercise, The 3 C's. Plans to return for follow up in 3 weeks, or earlier, as needed.      Discharge Criteria/Planning: Patient will continue with follow-up until therapy can be discontinued without return of signs and symptoms.    Martha Jha 3/6/2018

## 2021-06-16 NOTE — PROGRESS NOTES
MTM Follow Up Encounter  ASSESSMENT AND PLAN  Anxiety: Significantly improved.  BRANDEN score today was very well controlled.  Recommended that he continue current dose of 100 mg, and to continue this dose for at least a year.  He is using Lorazepam less, and discussed that this is appropriate and to only use it as needed.    Insomnia: Reviewed that he is using a low dose of hydroxyzine, however since it is an anticholinergic medication there is a risk of blurry vision.  Unclear if the blurry vision is related to hydroxyzine or needing glasses.  Recommended that he get his eyes checked first in new glasses.  If blurry vision occurs then we can try without hydroxyzine.  Could try Benadryl instead and see if he has less side effects.  Will refill hydroxyzine today and recommended him to follow-up with eye doctor.    MTM FOLLOW UP  As needed    SUBJECTIVE AND OBJECTIVE  Ramu Espinoza is a 57 y.o. male here for a follow-up medication therapy management (MTM) appointment.     Anxiety: Currently taking sertraline 50 mg ×2 (100 mg) daily.  When we spoke on 1/22/2018, he was agreeable to increasing sertraline to 75 mg by taking 25 mg +50 mg.  Reports that he ran out of 25 mg, therefore he started taking 2 tablets of 50 mg on his own.  He does not remember how long ago he started this.  Denies any side effects.  Reports that he has not used Lorazepam for over a week.  He felt like he needed it last night, but he was afraid to take it due to the risk of addiction.  He wonders if being on Lorazepam made his anxiety worse.  Reports that he does feel less anxious recently and it is easier to let things go.  BRANDEN today = 1    Insomnia: Brings a bottle of hydroxyzine 25 mg and wonders about the morning of blurry vision.  Has been taking half a tablet (12.5 mg) nightly.  After seeing the morning about blurry vision, he did not take it last night and reports that he woke up at 4 in the morning.  In addition to trazodone, it does help  him fall asleep.  He does have dry mouth.  One day when he was driving he notices vision was blurry, but he admits that he is overdue for an eye exam and new glasses.  Has also restarted melatonin.          Ramu's medication list was reviewed with them, discussing reason for use, directions for use, and potential side effects of each medication as needed. Indication, safety, efficacy, and convenience was assessed for all medications addressed above.  No environmental factors were noted currently affecting patient.  This care plan was communicated via EMR with his primary care provider, Esperanza Talamantes MD, who is the authorizing prescriber for this visit.  Direct supervision was available by either the patient's PCP or other available provider.    Time and complexity billing metrics are included in the docflowsheet linked to this visit    Time spent: 30 min  Che Silva, Pharm.D., BCACP   MTM Pharmacist at [facility] and [facility]

## 2021-06-16 NOTE — PROGRESS NOTES
[facility] Daily Progress     Patient Name: Ramu Espinoza  Date: 3/5/2018  Visit #: 7/12  Referral Diagnosis: R knee pain   Referring provider: Esperanza Talamantes*  Visit Diagnosis:     ICD-10-CM    1. Chronic pain of right knee M25.561     G89.29    2. Decreased range of motion of right knee M25.661        Assessment:     Patient is a 57 y.o. male that presents with signs and symptoms consistent with R knee pain and swelling secondary to status post menisectomy on 11/17/17 per patient report. Patient demonstrates impairments including decreased R knee joint mobility, flexibility, strength and range of motion, with increased swelling leading to impaired functional mobility. Patient's functional limitations include ambulation without pain, going up stairs, bending down, sitting for long periods of time and performing his work duties without pain.    Today patient was last seen on 2/8. He reports his kneecap is hurting. He has been working on his exercises come and go, still frustrated with his progress and knows it will take time. Now it seems to feel pretty good and he is going to start going to the gym to use the bike and treadmill. Patient was educated to pick 4 exercises to add back into his HEP. If patient does not see progress by next visit, may send back to referring provider as arthritis may be playing a roll in his pain.     HEP/POC compliance is  good .  Patient demonstrates understanding/independence with home program.  Patient is appropriate to continue with skilled physical therapy intervention, as indicated by initial plan of care.    Goal Status:  Pt. will be independent with home exercise program in : 4 weeks  Pt. will be able to walk : 10 minutes;on uneven/inclined surfaces;on even surfaces;with no pain;with less difficulty;for exercise/recreation;for work;for community mobility;in 6 weeks  Patient will stand : 30 minutes;for work;with less pain;with less difficultty;in 4  weeks  Pt. will bend: to dress;to clean;to do yard work;with no pain;for work;with less difficulty;in 6 weeks  Patient will ascend / descend: stairs;with recipricol gait;without railing;with no pain;with less difficulty;in 6 weeks  Patient will sit: 30 minutes;with no pain;with less difficultty;in 6 weeks;for driving  Patient will transfer: sit/stand;floor/stand;for in/out of chair;for in/out of bed;with less difficulty;with less pain;in 4 weeks  Patient will increase : LEFS score;for improved quality of function;for improved quality of life;in 6 weeks      Plan / Patient Education:     Continue with initial plan of care.  Progress with home program as tolerated.    Plan for next visit: review HEP, bike, wall slides, mobilizations to knee for improved joint mobility and ROM, standing hip abd/ext/add in standing    Subjective:     Pain Rating: his knee pain comes and goes   When he sits down the pain doesn't persist, but when it's moving. It isn't stopping him from doing anything, it takes him a little bit of time to get going. Still limping a little bit, he is kneeling on it no problem.     Functional limitations are described as occurring with:   ascending stairs or curbs  performing routine daily activities  sitting for longer periods he feels upper leg pain too   transitional movements getting in  chair and car, getting out of  chair and car and sit to stand    Objective:      AROM in degrees  Right 12/7   Left  Right 12/14   Left  Right  12/21   Left       Knee Flexion  (130 )   110    135   114  116 post treatment      118 post treatment          Knee Extension  (0 )                           Treatment Today       Patient Education: Patient was educated on continuing plan of care, progress and review of current HEP. Patient educated on importance of consistency with exercise and therapy, as well as activity modification in order to see change and improvements. Patient was educated this may take a few weeks to  see progression and change of his pain. Patient demonstrated and verbalized understanding.       Exercises:  HEP from [place]:   SAQ  SLR  Standing hamstring curls  Heels slides  Ankle pumps  Exercise #1: seated hamstring stretch - hold 30 sec x 2  Comment #1: standing gastroc stretch - hold 30 sec x 2  Exercise #2: SL balance on R - 10-30 second holds x 3  Comment #2: upright bike - half to full revolutions 5 minutes   Exercise #3: TKE - orange band 10 reps x 2  Comment #3: heel toe raises - 10-20 reps   Exercise #4: hip abduction/extension with theraband - 10 reps x 2  Comment #4: total gym - 10 reps each  Exercise #5: wall slides - 10-20 reps   Comment #5: squats and lunges       TREATMENT MINUTES COMMENTS   Evaluation     Self-care/ Home management     Manual therapy 25 Anterior and posterior glides to tib femoral to increase motion  STM to hamstrings and patellar tendon in supine with bolster under ankle  Patellar mobilizations in supine - most pain with lateral glide    Neuromuscular Re-education     Therapeutic Activity     Therapeutic Exercises 5 See above flowsheet;  upright bike 5 minutes resistance 6  Review of HEP   Gait training     Modality__________________                Total 30    Blank areas are intentional and mean the treatment did not include these items.       Kristin Muller, PT  3/5/2018

## 2021-06-17 ENCOUNTER — OFFICE VISIT - HEALTHEAST (OUTPATIENT)
Dept: PHYSICAL THERAPY | Facility: CLINIC | Age: 61
End: 2021-06-17

## 2021-06-17 DIAGNOSIS — M19.071 ARTHRITIS OF RIGHT ANKLE: ICD-10-CM

## 2021-06-17 DIAGNOSIS — M25.571 CHRONIC PAIN OF RIGHT ANKLE: ICD-10-CM

## 2021-06-17 DIAGNOSIS — G89.29 CHRONIC PAIN OF RIGHT ANKLE: ICD-10-CM

## 2021-06-17 NOTE — PATIENT INSTRUCTIONS - HE
Patient Instructions by Sondra Bush CNP at 10/16/2019  9:05 AM     Author: Sondra Bush CNP Service: -- Author Type: Nurse Practitioner    Filed: 10/16/2019  1:32 PM Encounter Date: 10/16/2019 Status: Addendum    : Sondra Bush CNP (Nurse Practitioner)    Related Notes: Original Note by Sondra Bush CNP (Nurse Practitioner) filed at 10/16/2019  1:32 PM         Patient Education     Treatment for Bakers Cyst (Popliteal Cyst)  A Bakers cyst (popliteal cyst) is a fluid-filled sac that forms behind the knee.  Types of treatment  You likely wont need any treatment if you dont have any symptoms from your Bakers cyst. Some Bakers cysts go away without any treatment. If your cyst starts causing symptoms, you might need treatment at that time.  If you do have symptoms, you may be treated depending on the cause of your cyst. For example, you may need medicine for rheumatoid arthritis.  Other treatments for a Bakers cyst can include:    Over-the-counter pain medicines    Arthrocentesis, where a needle is used to remove extra fluid from the joint space    Steroid injection into the joint to reduce cyst size    Surgery to remove the cyst  Possible complications of a Bakers cyst  In rare cases, a Bakers cyst may cause complications. The cyst may get larger, which may cause redness and swelling. The cyst may also rupture, causing warmth, redness, and pain in your calf.  The symptoms may be the same as a blood clot in the veins of the legs. Your healthcare provider may need imaging tests of your leg to make sure you dont have a clot. Rupture can also lead to its own complications, such as:    Trapping of a tibial nerve. This causes calf pain and numbness behind the leg. It can be treated with arthrocentesis and steroid injections.    Blockage of the popliteal artery. This causes pain and lack of blood flow to the leg. It can be treated with arthrocentesis and steroid injections or  surgery.    Compartment syndrome. This causes intense pain and problems moving the foot or toes. It is the result of pressure building in the muscles causing blood flow to decrease. Compartment syndrome is a medical emergency that requires immediate surgery. It can lead to permanent muscle damage if not treated right away.  When to call the healthcare provider  If your cyst starts causing mild symptoms, plan to see your healthcare provider soon. See him or her right away if you have symptoms such as redness and swelling of your leg, or numbness and discoloration of the foot. These symptoms may mean your Bakers cyst has ruptured or causing other problems.  Date Last Reviewed: 5/1/2018 2000-2019 [employer]. [address] [place] [address], [place] [address]. All rights reserved. This information is not intended as a substitute for professional medical care. Always follow your healthcare professional's instructions.           Patient Education     Understanding Bakers Cyst (Popliteal Cyst)  A Bakers cyst (popliteal cyst) is a fluid-filled sac that forms behind the knee.  Parts of the knee  The knee is a complex joint that has many parts. The lower end of the thighbone (femur) rotates on the upper end of the shinbone (tibia). There are several small bursae around the knee joint. These are small sacs filled with a special fluid (synovial fluid) that cushions the rest of the joint. Between the bones is a space that also contains this fluid.  What causes a Bakers cyst?  It is caused when extra fluid from the knee joint flows into the small bursa that sits behind the knee. When this sac fills with too much fluid, its called a Bakers cyst. This might happen when an injury or disease irritates the knee joint.   In adults, other problems with the knee joint often cause the Bakers cyst. Injury or a knee disorder can change the normal structure of the knee joint. This can cause a cyst to form.  The synovial fluid inside  the joint space may build up as a result of injury or disease. As the pressure builds up, the fluid may bulge into the back of the knee. This can cause the cyst.  Symptoms of a Bakers cyst  A Bakers cyst often doesnt cause symptoms. A cyst will more often be seen on an imaging test, like MRI, done for other reasons. If you do have symptoms, they may include:    Pain in the back of the knee    Knee stiffness    Sense of swelling or fullness behind the knee, especially when you straighten your leg    A swelling behind the knee that goes away when you bend your knee  These symptoms tend to get worse when standing for a long time, or being active.  Diagnosing a Bakers cyst  Your healthcare provider will ask you about your medical history and your symptoms. He or she will give you a physical exam, which will include a careful exam of your knee. Its important to make sure your symptoms are caused by a Bakers cyst and not a tumor or a blood clot.  If the cause of your symptoms is not clear, you may have imaging tests, such as:    Ultrasound, to look at the cyst in more detail    X-ray, to get more information about the bones of the joint    MRI, if the diagnosis is still unclear after ultrasound, or if your health care provider is considering surgery  Date Last Reviewed: 4/1/2017 2000-2019 [employer]. [address] [place] [address], [place] [address]. All rights reserved. This information is not intended as a substitute for professional medical care. Always follow your healthcare professional's instructions.

## 2021-06-17 NOTE — PATIENT INSTRUCTIONS - HE
Patient Instructions by Ino Arana DO at 2/6/2019  9:10 AM     Author: Ino Arana DO Service: -- Author Type: Physician    Filed: 2/6/2019  9:55 AM Encounter Date: 2/6/2019 Status: Addendum    : Ino Arana DO (Physician)    Related Notes: Original Note by Ino Arana DO (Physician) filed at 2/6/2019  9:51 AM       Below is a list of instructions we discussed today in clinic:   1. Check heart ultrasound   2. Sotalol would be an alternative therapy for paroxsymal atrial fibrillations but would need close monitoring.  3. Continue current medications at this time.         It was a pleasure to meet with you today in clinic.  Please do not hesitate to call the [facility] clinic with any questions or concerns at [phone].    Sincerely,

## 2021-06-17 NOTE — TELEPHONE ENCOUNTER
Do you want to see him or can you place the orders for PT for him    Zeinab Monsalve CMA ([facility])

## 2021-06-17 NOTE — PROGRESS NOTES
[facility] Daily Progress     Patient Name: Ramu Espinoza  Date: 3/29/2018  Visit #: 8/12  Referral Diagnosis: R knee pain   Referring provider: Esperanza Talamantes*  Visit Diagnosis:     ICD-10-CM    1. Chronic pain of right knee M25.561     G89.29    2. Decreased range of motion of right knee M25.661        Assessment:     Patient is a 57 y.o. male that presents with signs and symptoms consistent with R knee pain and swelling secondary to status post menisectomy on 11/17/17 per patient report. Patient demonstrates impairments including decreased R knee joint mobility, flexibility, strength and range of motion, with increased swelling leading to impaired functional mobility. Patient's functional limitations include ambulation without pain, going up stairs, bending down, sitting for long periods of time and performing his work duties without pain.    Today patient was last seen on 2/8. He reports his kneecap is hurting. He has been working on his exercises come and go, still frustrated with his progress and knows it will take time. Now it seems to feel pretty good and he is going to start going to the gym to use the bike and treadmill. Patient was educated to pick 4 exercises to add back into his HEP. If patient does not see progress by next visit, may send back to referring provider as arthritis may be playing a roll in his pain.     HEP/POC compliance is  good .  Patient demonstrates understanding/independence with home program.  Patient is appropriate to continue with skilled physical therapy intervention, as indicated by initial plan of care.    Goal Status:  Pt. will be independent with home exercise program in : 4 weeks  Pt. will be able to walk : 10 minutes;on uneven/inclined surfaces;on even surfaces;with no pain;with less difficulty;for exercise/recreation;for work;for community mobility;in 6 weeks  Patient will stand : 30 minutes;for work;with less pain;with less difficultty;in 4  weeks  Pt. will bend: to dress;to clean;to do yard work;with no pain;for work;with less difficulty;in 6 weeks  Patient will ascend / descend: stairs;with recipricol gait;without railing;with no pain;with less difficulty;in 6 weeks  Patient will sit: 30 minutes;with no pain;with less difficultty;in 6 weeks;for driving  Patient will transfer: sit/stand;floor/stand;for in/out of chair;for in/out of bed;with less difficulty;with less pain;in 4 weeks  Patient will increase : LEFS score;for improved quality of function;for improved quality of life;in 6 weeks    Plan / Patient Education:     Continue with initial plan of care.  Progress with home program as tolerated.    Plan for next visit: review HEP, bike, wall slides, mobilizations to knee for improved joint mobility and ROM, standing hip abd/ext/add in standing    Subjective:     Pain Rating: his knee pain comes and goes   He feels it is getting better, some days he feels good and other days he feels down in the dumps. He has gotten back to his back exercises, he is doing them a couple times a week. Still limping a little bit, he is kneeling on it no problem.     Functional limitations are described as occurring with:   ascending stairs or curbs  performing routine daily activities  sitting for longer periods he feels upper leg pain too   transitional movements getting in  chair and car, getting out of  chair and car and sit to stand    Objective:      AROM in degrees  Right 12/7   Left  Right 12/14   Left  Right  12/21   Left       Knee Flexion  (130 )   110    135   114  116 post treatment      118 post treatment          Knee Extension  (0 )                           Treatment Today       Patient Education: Patient was educated on continuing plan of care, progress and review of current HEP. Patient educated on importance of consistency with exercise and therapy, as well as activity modification in order to see change and improvements. Patient was educated this may  take a few weeks to see progression and change of his pain. Patient demonstrated and verbalized understanding.       Exercises:  HEP from [place]:   SAQ  SLR  Standing hamstring curls  Heels slides  Ankle pumps  Exercise #4: hip abduction/extension with theraband - 10 reps x 2  Comment #5: squats and lunges       TREATMENT MINUTES COMMENTS   Evaluation     Self-care/ Home management     Manual therapy 20 Anterior and posterior glides to tib femoral to increase motion  STM to hamstrings and patellar tendon in supine with bolster under ankle  Patellar mobilizations in supine - most pain with lateral glide    Neuromuscular Re-education     Therapeutic Activity     Therapeutic Exercises 10 See above flowsheet;  upright bike 5 minutes resistance 4  Review of HEP  Total gym squats - 20 reps    Gait training     Modality__________________                Total 30    Blank areas are intentional and mean the treatment did not include these items.       Kristin Muller, PT  3/29/2018

## 2021-06-17 NOTE — PROGRESS NOTES
[facility] Discharge Summary  Patient Name: Ramu Espinoza  Date: 5/29/2018  Referral Diagnosis: R knee pain  Referring provider: Esperanza Talamantes  Visit Diagnosis:   1. Chronic pain of right knee     2. Decreased range of motion of right knee         Goals:  Pt. will be independent with home exercise program in : 4 weeks  Pt. will be able to walk : 10 minutes;on uneven/inclined surfaces;on even surfaces;with no pain;with less difficulty;for exercise/recreation;for work;for community mobility;in 6 weeks  Patient will stand : 30 minutes;for work;with less pain;with less difficultty;in 4 weeks  Pt. will bend: to dress;to clean;to do yard work;with no pain;for work;with less difficulty;in 6 weeks  Patient will ascend / descend: stairs;with recipricol gait;without railing;with no pain;with less difficulty;in 6 weeks  Patient will sit: 30 minutes;with no pain;with less difficultty;in 6 weeks;for driving  Patient will transfer: sit/stand;floor/stand;for in/out of chair;for in/out of bed;with less difficulty;with less pain;in 4 weeks  Patient will increase : LEFS score;for improved quality of function;for improved quality of life;in 6 weeks       Patient was seen for 9 visits for physical therapy of R knee pain from 12/7/17 to 4/19/18 with no follow up appointments.   The patient met goals and has demonstrated understanding of and independence in the home program for self-care, and progression to next steps.  He will initiate contact if questions or concerns arise.  The patient reports feeling better and did not wish to schedule further therapy at this time.    Therapy will be discontinued at this time.  The patient will need a new referral to resume physical therapy treatment. Please see below for patient's current status.    Thank you for your referral.  Kristin Muller, PT, DPT  5/29/2018   8:21 AM      [facility] Daily Progress     Patient Name: Ramu Espinoza  Date: 4/19/2018  Visit  #: 9/12  Referral Diagnosis: R knee pain   Referring provider: Esperanza Talamantes*  Visit Diagnosis:     ICD-10-CM    1. Chronic pain of right knee M25.561     G89.29    2. Decreased range of motion of right knee M25.661        Assessment:     Patient is a 57 y.o. male that presents with signs and symptoms consistent with R knee pain and swelling secondary to status post menisectomy on 11/17/17 per patient report. Patient demonstrates impairments including decreased R knee joint mobility, flexibility, strength and range of motion, with increased swelling leading to impaired functional mobility. Patient's functional limitations include ambulation without pain, going up stairs, bending down, sitting for long periods of time and performing his work duties without pain.    Today patient was last seen on 3/29. He has been working on his exercises pretty regularly and he feels that he is getting stronger, still slightly frustrated with his progress and knows it will take time. Now it seems to feel pretty good and he is going to start going to the gym to use the bike and treadmill. Patient was educated to pick 4 exercises to add back into his HEP. If patient does not see progress by next visit, may send back to referring provider as arthritis may be playing a roll in his pain.     HEP/POC compliance is  good .  Patient demonstrates understanding/independence with home program.  Patient is appropriate to continue with skilled physical therapy intervention, as indicated by initial plan of care.    Goal Status:  Pt. will be independent with home exercise program in : 4 weeks  Pt. will be able to walk : 10 minutes;on uneven/inclined surfaces;on even surfaces;with no pain;with less difficulty;for exercise/recreation;for work;for community mobility;in 6 weeks  Patient will stand : 30 minutes;for work;with less pain;with less difficultty;in 4 weeks  Pt. will bend: to dress;to clean;to do yard work;with no pain;for work;with  "less difficulty;in 6 weeks  Patient will ascend / descend: stairs;with recipricol gait;without railing;with no pain;with less difficulty;in 6 weeks  Patient will sit: 30 minutes;with no pain;with less difficultty;in 6 weeks;for driving  Patient will transfer: sit/stand;floor/stand;for in/out of chair;for in/out of bed;with less difficulty;with less pain;in 4 weeks  Patient will increase : LEFS score;for improved quality of function;for improved quality of life;in 6 weeks    Plan / Patient Education:     Continue with initial plan of care.  Progress with home program as tolerated.    Plan for next visit: trial independence, return to referring provider if pain persists    Subjective:     Pain Rating: his knee pain comes and goes   He feels it is getting better, no difficulty he finds at work. Some days it is okay, he feels vulnerable because of the pain in the scars. Exercising every other day, doing back exercises even more consistently. Starting next week at work they have this \"Shape up\" class that goes for about 6 weeks.     Functional limitations are described as occurring with:   ascending stairs or curbs  performing routine daily activities  sitting for longer periods he feels upper leg pain too   transitional movements getting in  chair and car, getting out of  chair and car and sit to stand    Objective:      AROM in degrees  Right 12/7   Left  Right 12/14   Left  Right  12/21   Left       Knee Flexion  (130 )   110    135   114  116 post treatment      118 post treatment          Knee Extension  (0 )                           Treatment Today       Patient Education: Patient was educated on continuing plan of care, progress and review of current HEP. Patient educated on importance of consistency with exercise and therapy, as well as activity modification in order to see change and improvements. Patient was educated this may take a few weeks to see progression and change of his pain. Patient demonstrated and " verbalized understanding.       Exercises:  HEP from [place]:   SAQ  SLR  Standing hamstring curls  Heels slides  Ankle pumps  No Data Recorded      TREATMENT MINUTES COMMENTS   Evaluation     Self-care/ Home management     Manual therapy 8 Anterior and posterior glides to tib femoral to increase motion  STM to hamstrings and patellar tendon in supine with bolster under ankle  Patellar mobilizations in supine - most pain with lateral glide    Neuromuscular Re-education     Therapeutic Activity     Therapeutic Exercises 20 See above flowsheet;  upright bike 5 minutes resistance 4  Review of HEP  Total gym squats - 20 reps   SL deadlifts  SL squats   Gait training     Modality__________________                Total 28    Blank areas are intentional and mean the treatment did not include these items.       Kristin Muller, PT  4/19/2018

## 2021-06-17 NOTE — PATIENT INSTRUCTIONS - HE
"Patient Instructions by Thomas Meyer DO at 1/9/2020  8:30 AM     Author: Thomas Meyer DO Service: -- Author Type: Physician    Filed: 1/9/2020  8:51 AM Encounter Date: 1/9/2020 Status: Signed    : Thomas Meyer DO (Physician)         SLEEP HYGIENE    Sleep only as much as you need to feel rested and then get out of bed   Keep a regular sleep schedule   Avoid forcing sleep   Exercise regularly for at least 20 minutes, preferably 4 to 5 hours before bedtime   Avoid caffeinated beverages after lunch   Avoid alcohol near bedtime: no \"night cap\"   Avoid smoking, especially in the evening   Do not go to bed hungry   Adjust bedroom environment   Avoid prolonged use of light-emitting screens before bedtime    Deal with your worries before bedtime              "

## 2021-06-17 NOTE — TELEPHONE ENCOUNTER
Telephone Encounter by Che Silva, PharmD at 3/1/2021  4:50 PM     Author: Che Silva, PharmD Service: -- Author Type: Pharmacist    Filed: 3/1/2021  4:59 PM Encounter Date: 3/1/2021 Status: Signed    : Che Silva, PharmD (Pharmacist)       Called [facility] -- he needs a refill of lorazepam. He does not need it often. Has a few left but would like to have at home.     Reports that he also has a lot of diarrhea. He does not feel sick. On the days that he has the diarrhea he will go back 1-3 times but it is watery. Not tried imodium.     Recommended that he tried immodium. Discussed the directions. encouraged him to stay hydrated and let us know if it does not get better.     Will defer to Dr. Jiménez for the lorazepam -- just seen on 2/22/21. Last filled April 2020 #30 per :

## 2021-06-17 NOTE — PATIENT INSTRUCTIONS - HE
Patient Instructions by Sandra Pyle MD at 7/12/2019  4:30 PM     Author: Sandra Pyle MD Service: -- Author Type: Physician    Filed: 7/12/2019  5:09 PM Encounter Date: 7/12/2019 Status: Signed    : Sandra Pyle MD (Physician)         Patient Education     Ingrown Toenail, Infected (Antibiotics, No Excision)  An ingrown toenail occurs when the nail grows sideways into the skin alongside the nail. This can cause pain. It can also lead to an infection with redness, swelling, and sometimes drainage.  The most common cause of an ingrown toenail is trimming your nails wrong. Most people trim the nails too close to the skin and try to round the nail too tightly around the shape of the toe. When you do this, the nail can grow into the skin of your toe. It is safer to trim the nail ending in a straight line rather than a curve.  Other causes include injury or wearing shoes that are too short or tight. This can cause the same problem that happens when trimming your nails. Your genetics can also make this more likely to happen.  The following are the most common symptoms of an ingrown toenail:     Pain    Redness    Swelling    Drainage  If the infection is mild, you may be able to take care of it at home with the following measures:    Frequent warm water soaks    Keeping it clean    Wearing loose, comfortable shoes or sandals  Another method involves using a small piece of cotton or waxed dental floss to gently lift up the corner of the problem nail. Change the cotton or floss frequently, especially if it gets dirty.  If your infection is mild, and the above methods arent working, or if the infection gets worse, see your healthcare provider. Signs of worsening infection include:    Swelling    Redness    Pus drainage  In some cases, you may need antibiotics along with warm soaks. If after 2 to 3 days of antibiotics the toenail doesn't get better or gets worse, part of the nail may need to be  removed to drain the infection. With treatment, it can take 1 to 2 weeks to clear up completely.  Home care  Wound care  For the next 3 days, soak and clean your toe in warm water a few times a day.    Twice a day for the first 3 days, clean and soak the toe as follows:  1. Soak your foot in a tub of warm water for 5 minutes. Or, hold your toe under a faucet of warm running water for 5 minute  2. Clean any remaining crust away with soap and water using a cotton swab.  3. Put a small amount of antibiotic ointment on the infected area.    Change the dressing or bandage every time you soak or clean it, or whenever it becomes wet or dirty.    If you were prescribed antibiotics, take them as directed until they are all gone.    Wear comfortable shoes with a lot of toe room, or open-toe sandals, while your toe is healing.  Medicines    You can take over-the-counter medicine for pain, unless you were given a different pain medicine to use. Note: Talk with your provider before using these medicines if you have chronic liver or kidney disease, ever had a stomach ulcer or GI (gastrointestinal) bleeding, or are taking blood thinner medicines.    If you were given antibiotics, take them until they are used up or your provider tells you to stop, even if the wound looks better. This ensures that the infection clears up.  Prevention  To prevent ingrown toenails:    Wear shoes that fit well. Avoid shoes that pinch the toes together.    When you trim your toenails, do not cut them too short. Cut straight across at the top and dont round the edges.    Dont use a sharp object to clean under your nail since this might cause an infection.    If the toenail starts to grow into the skin again, put a small piece of waxed dental floss or cotton under that side of the nail to help it grow out straight.  Follow-up care  Follow up with your healthcare provider, or as advised.  When to seek medical advice  Call your healthcare provider right  away if any of the following occur:    Increasing redness, pain, or swelling of the toe    Red streaks in the skin leading away from the wound    Pus or fluid drainage    Fever of 100.4 F (38 C) or higher, or as directed by your provider  Date Last Reviewed: 12/1/2016 2000-2017 [employer]. [address] [place] [id number]. All rights reserved. This information is not intended as a substitute for professional medical care. Always follow your healthcare professional's instructions.

## 2021-06-17 NOTE — TELEPHONE ENCOUNTER
Telephone Encounter by Demetra Blanco RN at 6/24/2020  8:11 AM     Author: Demetra Blanco RN Service: -- Author Type: Registered Nurse    Filed: 6/24/2020  8:11 AM Encounter Date: 6/24/2020 Status: Signed    : Demetra Blanco RN (Registered Nurse)

## 2021-06-17 NOTE — TELEPHONE ENCOUNTER
Clive came by the clinic today to request a referral for PT from Dr. Jmiénez for his ankle. He has seen Dr. Jiménez for the injury and also saw [facility] as recommended. He received a cortisone injection but thinks he would also benefit from PT.

## 2021-06-17 NOTE — PROGRESS NOTES
[facility] Hematology and Oncology Progress Note    Patient: Ramu Espinoza  MRN: [id number]  Date of Service:         Reason for Visit    Chief Complaint   Patient presents with     HE Cancer       Assessment and Plan    1.  A 56 y.o. gentleman with CLL stage 4 now, diagnosed in February 2012. 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 Great clinical response to Imbruvica. His lymphnodes have normalized. His CBC is normal today. Continue Imbruvica 140 mg 3 pills daily. Return in 3 months with labs and to see Dr. Garza.     2. Paroxysmal A. Fib: could be from Imbruvica (<9% risk) on metoprolol. Seems to notice symptoms 1 or 2 times a day. No chest pain. Will continue to see Dr. Arana.     ECOG Performance   ECOG Performance Status: 1     Distress Assessment  Distress Assessment Score: No distress: nervous about being on so many medications.     Pain  Currently in Pain: Yes  Pain Score (Initial OR Reassessment): 3  Location: legs    Problem List    1. Chronic lymphocytic leukemia  Study Drug Ibrutinib 140 mg capsule 420 mg      ______________________________________________________________________________    History of Present Illness    Ramu Espinoza is a very pleasant 57 y.o. old male with a history of CLL diagnosed in 02/2012 presenting with elevated white count in upper 10s/lower 20s with a peripheral blood smear showing atypical lymphocytosis suspicious for CLL.  He was completely asymptomatic, normal hemoglobin and platelet count.  His peripheral blood flow cytometry revealed CD5 co-expressing kappa light chains restricted B cells confirming the diagnosis of CLL.  He has been on observation.  His white count has been slowly going up.      In June 2014 his white count was over 100,000. So he had a CAT scan and that showed increasing lymphadenopathy and splenectomy. Platelet count has been going down.      I offered participation in clinical study in which patients are randomized between fludarabine and  cyclophosphamide Rituxan or Imbruvica and Rituxan. He has been randomized to Imbruvica and rituximab arm. He started on October 2014. In November 2014 he got Rituxan for the first time. Tolerated well. Finished that. Now on Imbruvica alone. Tolerating it well.      Comes in today for scheduled follow-up. He is feeling well. He is wondering if he can stop any of his medications.     Pain Status  Currently in Pain: Yes    Review of Systems    Constitutional  Constitutional (WDL): Exceptions to WDL  Fatigue: Fatigue relieved by rest  Weight Gain: 5 - <10% from baseline (up 6 lbs)  Neurosensory  Neurosensory (WDL): All neurosensory elements are within defined limits  Eye   Eye Disorder (WDL): Exceptions to WDL (glasses)  Blurred Vision: Intervention not indicated  Ear  Ear Disorder (WDL): Exceptions to WDL  Tinnitus: Mild symptoms, intervention not indicated (chronic)  Cardiovascular  Cardiovascular (WDL): Exceptions to WDL  Pulmonary  Respiratory (WDL): Within Defined Limits  Gastrointestinal  Gastrointestinal (WDL): Exceptions to WDL  Dry Mouth: Symptomatic (e.g., dry or thick saliva) without significant dietary alteration, unstimulated saliva flow >0.2 ml/min  Genitourinary  Genitourinary (WDL): All genitourinary elements are within defined limits (on flomax)  Lymphatic  Lymph (WDL): Assessment not pertinent to visit  Musculoskeletal and Connective Tissue  Musculoskeletal and Connetive Tissue Disorders (WDL): Exceptions to WDL  Arthralgia: Mild pain (ankles)  Myalgia: Mild pain (legs)  Integumentary  Integumentary (WDL): All integumentary elements are within defined limits  Patient Coping  Patient Coping: Accepting  Distress Assessment  Distress Assessment Score: No distress  Accompanied by  Accompanied by: Alone  Oral Chemo Adherence       Past History  Past Medical History:   Diagnosis Date     BPH (benign prostatic hyperplasia)      CLL (chronic lymphocytic leukemia)      Lymphadenopathy     Created by Conversion   Replacement Utility updated for latest IMO load       PHYSICAL EXAM:  /63  Pulse 68  Temp 97.9  F (36.6  C) (Oral)   Wt (!) 262 lb 4.8 oz (119 kg)  SpO2 93%  BMI 34.61 kg/m2  GENERAL: no acute distress. Cooperative in conversation. Here alone  HEENT: pupils are equal, round and reactive. Oromucosa is clean and intact. No ulcerations or mucositis noted. No bleeding noted.  RESP: lungs are clear bilaterally per auscultation. Regular respiratory rate. No wheezes or rhonchi.  CV: Regular, rate and rhythm. No murmurs.  ABD: soft, nontender. Positive bowel sounds. No organomegaly.   MUSCULOSKELETAL: No lower extremity swelling.   NEURO: non focal. Alert and oriented x3.   PSYCH: within normal limits. No depression or anxiety.  SKIN: warm dry intact   LYMPH: no cervical, supraclavicular, inguinal, or axillary lymphadenopathy        Lab Results    Recent Results (from the past 168 hour(s))   Comprehensive Metabolic Panel   Result Value Ref Range    Sodium 140 136 - 145 mmol/L    Potassium 4.1 3.5 - 5.0 mmol/L    Chloride 102 98 - 107 mmol/L    CO2 28 22 - 31 mmol/L    Anion Gap, Calculation 10 5 - 18 mmol/L    Glucose 89 70 - 125 mg/dL    BUN 20 8 - 22 mg/dL    Creatinine 0.84 0.70 - 1.30 mg/dL    GFR MDRD Af Amer >60 >60 mL/min/1.73m2    GFR MDRD Non Af Amer >60 >60 mL/min/1.73m2    Bilirubin, Total 0.8 0.0 - 1.0 mg/dL    Calcium 9.4 8.5 - 10.5 mg/dL    Protein, Total 6.9 6.0 - 8.0 g/dL    Albumin 3.7 3.5 - 5.0 g/dL    Alkaline Phosphatase 65 45 - 120 U/L    AST 17 0 - 40 U/L    ALT 19 0 - 45 U/L   HM1 (CBC with Diff)   Result Value Ref Range    WBC 10.4 4.0 - 11.0 thou/uL    RBC 4.76 4.40 - 6.20 mill/uL    Hemoglobin 14.1 14.0 - 18.0 g/dL    Hematocrit 42.1 40.0 - 54.0 %    MCV 88 80 - 100 fL    MCH 29.6 27.0 - 34.0 pg    MCHC 33.5 32.0 - 36.0 g/dL    RDW 12.3 11.0 - 14.5 %    Platelets 258 140 - 440 thou/uL    MPV 10.0 8.5 - 12.5 fL    Neutrophils % 62 50 - 70 %    Lymphocytes % 28 20 - 40 %    Monocytes % 8 2  - 10 %    Eosinophils % 2 0 - 6 %    Basophils % 1 0 - 2 %    Neutrophils Absolute 6.4 2.0 - 7.7 thou/uL    Lymphocytes Absolute 2.9 0.8 - 4.4 thou/uL    Monocytes Absolute 0.8 0.0 - 0.9 thou/uL    Eosinophils Absolute 0.2 0.0 - 0.4 thou/uL    Basophils Absolute 0.1 0.0 - 0.2 thou/uL       Imaging    No results found.      Signed by: Radha Alarcon, CNP

## 2021-06-17 NOTE — TELEPHONE ENCOUNTER
Telephone Encounter by Elena Benavides at 6/24/2020  1:51 PM     Author: Elena Benavides Service: -- Author Type: Patient Access    Filed: 6/24/2020  1:52 PM Encounter Date: 6/24/2020 Status: Signed    : Elena Benavides (Patient Access)

## 2021-06-17 NOTE — PATIENT INSTRUCTIONS - HE
Patient Instructions by Ino Arana DO at 7/30/2019  1:30 PM     Author: Ino Arana DO Service: -- Author Type: Physician    Filed: 7/30/2019  1:50 PM Encounter Date: 7/30/2019 Status: Addendum    : Ino Arana DO (Physician)    Related Notes: Original Note by Ino Arana DO (Physician) filed at 7/30/2019  1:50 PM         It was a pleasure to meet with you today in clinic.  Please do not hesitate to call the [facility] clinic with any questions or concerns at [phone].    Additional Provider Instructions:   Below is a list of any additional instructions from your provider:  1. Agree with stopping colchicine  2. Continue other medications.      Patient Survey:   You may be asked to participate in a survey regarding your experience during today's office visit.  The information from the survey is reviewed by both the provider and the clinic management in an effort to provider the best care for all patients.  Please consider taking the time to complete the survey.      Sincerely,

## 2021-06-17 NOTE — PATIENT INSTRUCTIONS - HE
Patient Instructions by Ino Arana DO at 4/9/2019  9:50 AM     Author: Ino Arana DO Service: -- Author Type: Physician    Filed: 4/9/2019 10:55 AM Encounter Date: 4/9/2019 Status: Addendum    : Ino Arana DO (Physician)    Related Notes: Original Note by Ino Arana DO (Physician) filed at 4/9/2019 10:52 AM       Below is a list of instructions we discussed today in clinic:   1. Continue with current taper of ibuprofen   2. Continue with 3 month course of colchicine   3. Check inflammatory markers (CRP).       It was a pleasure to meet with you today in clinic.  Please do not hesitate to call the [facility] clinic with any questions or concerns at [phone].    Sincerely,

## 2021-06-18 NOTE — PATIENT INSTRUCTIONS - HE
"Patient Instructions by Milan Turner PA-C at 6/3/2020  9:00 AM     Author: Milan Turner PA-C Service: -- Author Type: Physician Assistant    Filed: 6/3/2020  9:04 AM Encounter Date: 6/3/2020 Status: Addendum    : Milan Turner PA-C (Physician Assistant)    Related Notes: Original Note by Milan Turner PA-C (Physician Assistant) filed at 6/3/2020  9:03 AM         Elevate the foot continuously above the level of the heart \"toes above the nose\" continuously over the next 24 to 48 hours  Use an open toed shoe to avoid pressure on the toe.  Take antibiotic as written.  Follow-up with podiatry for definitive evaluation and treatment of your ingrown toenail.   Follow the suggestions in the handout below until seen by podiatry    Patient Education     Ingrown Toenail, Infected (Antibiotics, No Excision)  An ingrown toenail occurs when the nail grows sideways into the skin alongside the nail. This can cause pain. It can also lead to an infection with redness, swelling, and sometimes drainage.  The most common cause of an ingrown toenail is trimming your nails wrong. Most people trim the nails too close to the skin and try to round the nail too tightly around the shape of the toe. When you do this, the nail can grow into the skin of your toe. It is safer to trim the nail ending in a straight line rather than a curve.  Other causes include injury or wearing shoes that are too short or tight. This can cause the same problem that happens when trimming your nails. Your genetics can also make this more likely to happen.  The following are the most common symptoms of an ingrown toenail:     Pain    Redness    Swelling    Drainage  If the infection is mild, you may be able to take care of it at home with the following measures:    Frequent warm water soaks    Keeping it clean    Wearing loose, comfortable shoes or sandals  Another method involves using a small piece of cotton or waxed dental floss to gently lift up the " corner of the problem nail. Change the cotton or floss frequently, especially if it gets dirty.  If your infection is mild, and the above methods arent working, or if the infection gets worse, see your healthcare provider. Signs of worsening infection include:    Swelling    Redness    Pus drainage  In some cases, you may need antibiotics along with warm soaks. If after 2 to 3 days of antibiotics the toenail doesn't get better or gets worse, part of the nail may need to be removed to drain the infection. With treatment, it can take 1 to 2 weeks to clear up completely.  Home care  Wound care  For the next 3 days, soak and clean your toe in warm water a few times a day.    Twice a day for the first 3 days, clean and soak the toe as follows:  1. Soak your foot in a tub of warm water for 5 minutes. Or, hold your toe under a faucet of warm running water for 5 minute  2. Clean any remaining crust away with soap and water using a cotton swab.  3. Put a small amount of antibiotic ointment on the infected area.    Change the dressing or bandage every time you soak or clean it, or whenever it becomes wet or dirty.    If you were prescribed antibiotics, take them as directed until they are all gone.    Wear comfortable shoes with a lot of toe room, or open-toe sandals, while your toe is healing.  Medicines    You can take over-the-counter medicine for pain, unless you were given a different pain medicine to use. Note: Talk with your provider before using these medicines if you have chronic liver or kidney disease, ever had a stomach ulcer or GI (gastrointestinal) bleeding, or are taking blood thinner medicines.    If you were given antibiotics, take them until they are used up or your provider tells you to stop, even if the wound looks better. This ensures that the infection clears up.  Prevention  To prevent ingrown toenails:    Wear shoes that fit well. Avoid shoes that pinch the toes together.    When you trim your  toenails, do not cut them too short. Cut straight across at the top and dont round the edges.    Dont use a sharp object to clean under your nail since this might cause an infection.    If the toenail starts to grow into the skin again, put a small piece of waxed dental floss or cotton under that side of the nail to help it grow out straight.  Follow-up care  Follow up with your healthcare provider, or as advised.  When to seek medical advice  Call your healthcare provider right away if any of the following occur:    Increasing redness, pain, or swelling of the toe    Red streaks in the skin leading away from the wound    Pus or fluid drainage    Fever of 100.4 F (38 C) or higher, or as directed by your provider  Date Last Reviewed: 12/1/2016 2000-2017 [employer]. [address] [place] [address], [place] [address]. All rights reserved. This information is not intended as a substitute for professional medical care. Always follow your healthcare professional's instructions.

## 2021-06-19 NOTE — LETTER
Letter by Ino Arana DO at      Author: Ino Arana DO Service: -- Author Type: --    Filed:  Encounter Date: 4/16/2019 Status: (Other)         aRmu Espinoza  [address] [place] [address]      April 16, 2019      Dear Ramu,    This letter is to remind you that you will be due for your follow up appointment with Dr. Ino Arana  . To help ensure you are in the best health possible, a regular follow-up with your cardiologist is essential.     Please call our Patient Scheduling Line at [phone] to schedule your appointment at your earliest convenience.  If you have recently scheduled an appointment, please disregard this letter.    We look forward to seeing you again. As always, we are available at the number  above for any questions or concerns you may have.      Sincerely,     The Physicians and Staff of [facility]

## 2021-06-19 NOTE — LETTER
Letter by Ramu Jiménez MD at      Author: Ramu Jiménez MD Service: -- Author Type: --    Filed:  Encounter Date: 3/22/2019 Status: (Other)         March 22, 2019     Patient: Ramu Espinoza   YOB: 1960   Date of Visit: 3/22/2019       To Whom it May Concern:    Ramu Espinoza was seen in my clinic on 3/22/2019.  He may return to work without restrictions on 3/25/2019    If you have any questions or concerns, please don't hesitate to call.    Sincerely,         Electronically signed by Ramu Jiménez MD

## 2021-06-19 NOTE — PROGRESS NOTES
[facility] Hematology and Oncology Progress Note    Patient: Ramu Espinoza  MRN: [id number]  Date of Service:         Reason for Visit    Chief Complaint   Patient presents with     HE Cancer       Assessment and Plan    1.  A 56 y.o. gentleman with CLL stage 4 now, diagnosed in February 2012. 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 Great clinical response to Imbruvica. His lymphnodes have normalized. His CBC is normal today. Continue Imbruvica 140 mg 3 pills daily. Return in 3 months with labs and to see Dr. Garza.     2. Paroxysmal A. Fib: could be from Imbruvica (<9% risk) on metoprolol. Was increased last month    3. Fatigue: could be from increase in metoprolol. Will call cardiology if continues to be an issue    4. Diarrhea; not from imbruvica. Would contact PCP if does not improve.     ECOG Performance    0     Distress Assessment  Distress Assessment Score: 2: nervous about being on so many medications.     Pain  Currently in Pain: No/denies    Problem List    1. Chronic lymphocytic leukemia (H)  Study Drug Ibrutinib 140 mg capsule 420 mg      ______________________________________________________________________________    History of Present Illness    Ramu Espinoza is a very pleasant 58 y.o. old male with a history of CLL diagnosed in 02/2012 presenting with elevated white count in upper 10s/lower 20s with a peripheral blood smear showing atypical lymphocytosis suspicious for CLL.  He was completely asymptomatic, normal hemoglobin and platelet count.  His peripheral blood flow cytometry revealed CD5 co-expressing kappa light chains restricted B cells confirming the diagnosis of CLL.  He has been on observation.  His white count has been slowly going up.      In June 2014 his white count was over 100,000. So he had a CAT scan and that showed increasing lymphadenopathy and splenectomy. Platelet count has been going down.      I offered participation in clinical study in which patients are  randomized between fludarabine and cyclophosphamide Rituxan or Imbruvica and Rituxan. He has been randomized to Imbruvica and rituximab arm. He started on October 2014. In November 2014 he got Rituxan for the first time. Tolerated well. Finished that. Now on Imbruvica alone. Tolerating it well.      Comes in today for scheduled follow-up. He is feeling ok. A little more tired and has had some diarrhea.    Pain Status  Currently in Pain: No/denies    Review of Systems    Constitutional  Constitutional (WDL): Exceptions to WDL  Fatigue: Fatigue not relieved by rest - Limiting instrumental ADL  Neurosensory     Eye   Eye Disorder (WDL): All eye disorder elements are within defined limits  Ear  Ear Disorder (WDL): Exceptions to WDL  Tinnitus: Mild symptoms, intervention not indicated  Cardiovascular  Cardiovascular (WDL): All cardiovascular elements are within defined limits  Pulmonary  Respiratory (WDL): Within Defined Limits  Gastrointestinal  Gastrointestinal (WDL): Exceptions to WDL  Diarrhea: Increase of <4 stools per day over baseline, mild increase in ostomy output compared to baseline (2-4 diarrhea stools/day for lst 3 weeks)  Genitourinary  Genitourinary (WDL): All genitourinary elements are within defined limits  Lymphatic  Lymph (WDL): Assessment not pertinent to visit  Musculoskeletal and Connective Tissue  Musculoskeletal and Connetive Tissue Disorders (WDL): Exceptions to WDL  Myalgia: Mild pain  Integumentary  Integumentary (WDL): All integumentary elements are within defined limits  Patient Coping  Patient Coping: Accepting;Anxiety  Distress Assessment  Distress Assessment Score: 2  Accompanied by  Accompanied by: Alone  Oral Chemo Adherence       Past History  Past Medical History:   Diagnosis Date     BPH (benign prostatic hyperplasia)      CLL (chronic lymphocytic leukemia) (H)      Lymphadenopathy     Created by Conversion  Replacement Utility updated for latest IMO load       PHYSICAL EXAM:  BP  135/78  Pulse 63  Temp 97.5  F (36.4  C) (Oral)   Wt (!) 265 lb 1 oz (120.2 kg)  SpO2 96%  BMI 34.97 kg/m2  GENERAL: no acute distress. Cooperative in conversation. Here alone  HEENT: pupils are equal, round and reactive. Oromucosa is clean and intact. No ulcerations or mucositis noted. No bleeding noted.  RESP: lungs are clear bilaterally per auscultation. Regular respiratory rate. No wheezes or rhonchi.  CV: Regular, rate and rhythm. No murmurs.  ABD: soft, nontender. Positive bowel sounds. No organomegaly.   MUSCULOSKELETAL: No lower extremity swelling.   NEURO: non focal. Alert and oriented x3.   PSYCH: within normal limits. No depression or anxiety.  SKIN: warm dry intact   LYMPH: no cervical, supraclavicular, inguinal, or axillary lymphadenopathy        Lab Results    Recent Results (from the past 168 hour(s))   Comprehensive Metabolic Panel   Result Value Ref Range    Sodium 140 136 - 145 mmol/L    Potassium 4.2 3.5 - 5.0 mmol/L    Chloride 106 98 - 107 mmol/L    CO2 25 22 - 31 mmol/L    Anion Gap, Calculation 9 5 - 18 mmol/L    Glucose 81 70 - 125 mg/dL    BUN 18 8 - 22 mg/dL    Creatinine 0.75 0.70 - 1.30 mg/dL    GFR MDRD Af Amer >60 >60 mL/min/1.73m2    GFR MDRD Non Af Amer >60 >60 mL/min/1.73m2    Bilirubin, Total 0.7 0.0 - 1.0 mg/dL    Calcium 9.4 8.5 - 10.5 mg/dL    Protein, Total 6.6 6.0 - 8.0 g/dL    Albumin 3.9 3.5 - 5.0 g/dL    Alkaline Phosphatase 68 45 - 120 U/L    AST 21 0 - 40 U/L    ALT 24 0 - 45 U/L   HM1 (CBC with Diff)   Result Value Ref Range    WBC 8.0 4.0 - 11.0 thou/uL    RBC 4.79 4.40 - 6.20 mill/uL    Hemoglobin 14.0 14.0 - 18.0 g/dL    Hematocrit 41.7 40.0 - 54.0 %    MCV 87 80 - 100 fL    MCH 29.2 27.0 - 34.0 pg    MCHC 33.6 32.0 - 36.0 g/dL    RDW 12.3 11.0 - 14.5 %    Platelets 235 140 - 440 thou/uL    MPV 10.2 8.5 - 12.5 fL    Neutrophils % 63 50 - 70 %    Lymphocytes % 26 20 - 40 %    Monocytes % 8 2 - 10 %    Eosinophils % 3 0 - 6 %    Basophils % 1 0 - 2 %    Neutrophils  Absolute 5.0 2.0 - 7.7 thou/uL    Lymphocytes Absolute 2.1 0.8 - 4.4 thou/uL    Monocytes Absolute 0.7 0.0 - 0.9 thou/uL    Eosinophils Absolute 0.3 0.0 - 0.4 thou/uL    Basophils Absolute 0.1 0.0 - 0.2 thou/uL       Imaging    No results found.      Signed by: Radha Alarcon, CNP

## 2021-06-19 NOTE — LETTER
Letter by Ramu Jiménez MD at      Author: Ramu Jiménez MD Service: -- Author Type: --    Filed:  Encounter Date: 3/26/2019 Status: (Other)         Ramu Espinoza  [address] [place] [address]             March 26, 2019         Dear Mr. Espinoza,    Below are the results from your recent visit:    Resulted Orders   Thyroid Stimulating Hormone (TSH)   Result Value Ref Range    TSH 3.11 0.30 - 5.00 uIU/mL   Thyroid Peroxidase Antibody   Result Value Ref Range    Thyroid Peroxidase Ab <3.0 0.0 - 5.6 IU/mL       Clive these are OK    Your ultrasound shows no abnormality.  Thsi means thyroid has texture but no abnormality to pusue.    DanL    Please call with questions or contact us using [employer].    Sincerely,        Electronically signed by Ramu Jiménez MD

## 2021-06-19 NOTE — PROGRESS NOTES
Assessment & Plan   1. Generalized anxiety disorder  Reports anxiety symptoms well controlled.  Leon 7 score today equals 0.  Continue on sertraline 100 mg and recheck in 1 year.    2. Insomnia  Reports that she is largely related to tinnitus.  Doing well with current regimen of trazodone and hydroxyzine with occasional use of lorazepam approximately 3-4 times per month.  Will refer to ENT to discuss workup and additional options for tinnitus    3. Tinnitus, right    - Ambulatory referral to ENT    4. Paroxysmal atrial fibrillation (H)  He is continuing to experience palpitations despite the higher dose of metoprolol.  Additionally this seems to be causing significant fatigue for him.  He plans to follow-up with Dr. Arana, per last office visit notes plan to consider Holter monitor and stress testing.    5. Fatigue  - Thyroid Cascade; Future    6. Diarrhea  Now resolved.  Continue to monitor and he will let me know if he has any further symptoms.      Emily Lipscomb, CNP    Subjective   Chief Complaint:  Establish Care; Med check; and Diarrhea (pt states a couple weeks ago he was experiencing some diarrhea, sx have seemed to calm down now)    HPI:   Ramu Espinoza is a 58 y.o. male who presents for establish care and medication check.      He is a previous patient of Dr. Talamantes. History is notable for CLL, anxiety, insomnia, paroxysmal atrial fibrillation    Anxiety:  Sertraline 100mg, restarted in December 2017.  He reports anxiety is well controlled, he denies symptoms during the day. Continues to require assistance with sleep at night though he feels this is almost exclusively related to tinnitus rather than anxiety. Taking trazodone 50mg and hydroxyzine 12.5mg.  He states most nights he sleeps well with this combination for 6-7 hours.  Approximately once weekly he also uses lorazepam 1mg at bedtime.  With this he is able to sleep 10-12 hours.  Does not use lorazepam during the day.    Tinnitus: Present for  over 10 years in right ear.  Quite bothersome for him.  States he was evaluated at time of onset though has not followed up since that time.  Believes mild hearing loss.  Tinnitus is nonpulsatile, constant low level tone.  Again quite bothersome for sleep    Atrial fibrillation:  Possible relation to Imbruvica? Follows with cardiology. CHADSVASC = 0. On metoprolol XL 50mg, recent increase six weeks ago from 25mg. He has noted some improvement in palpitations though continues to experience this sensation daily and is fairly bothersome for him. No chest pain, shortness of breath.   Is feeling more fatigued these past two months.  Less energy, worn out at the end of the day.     CLL:  Diagnosed in 2012.  On Imbruvica with good response. Following with hematology every three months.     Diarrhea:  Now resolved.  Present two weeks ago. Bowel movements have returned to soft, formed.  Twice a daily, slightly more than typical for him.  Blood in the stool on occasion with straining.  Colonoscopy normal 2016    Mouth pain:  Hard and soft palate for 3-4 days.  Unsure if he burned his mouth.    Allergies:  is allergic to azithromycin.    SH/FH:  Social History and Family History reviewed and updated.   Tobacco Status:  He  reports that he quit smoking about 13 years ago. He has a 12.50 pack-year smoking history. He has never used smokeless tobacco.    Review of Systems:  A complete head to toe ROS is negative unless otherwise noted in HPI    Objective     Vitals:    07/31/18 1133   BP: 122/76   Patient Site: Right Arm   Patient Position: Sitting   Cuff Size: Adult Large   Pulse: 69   SpO2: 96%   Weight: (!) 264 lb 12 oz (120.1 kg)       Physical Exam:  GENERAL: Alert, well-appearing male  PSYCH: Pleasant mood, affect appropriate.  Does not appear anxious or depressed.    EARS:  Bilateral TMs grey, pearly without redness or retraction. No effusion.  MOUTH:  OP pink without erythema or tonsilar enlargement. 3-4 ulcerated lesions  on soft palate. Erythematous base.   CV: Regular rate and rhythm without murmurs, rubs or gallops.  RESP: Lung sounds clear  PV :  No edema  ABD:  Soft, nontender to palpation

## 2021-06-19 NOTE — LETTER
Letter by Lino Parr RN at      Author: Lino Parr RN Service: -- Author Type: --    Filed:  Encounter Date: 4/1/2019 Status: (Other)       Ramu Espinoza          April 3, 2019  1960    Good day Mr. Espinoza,     Glad to talk with you today. Here are the recommendations from Dr. Arana as discussed. I wanted to send this in written form as you were at work when we talked.     The patient MUST remain on ibuprofen and colchicine. Take ibuprofen at 400 mg three times a day for two weeks, then decrease to 400 mg  two times a day for two weeks, then decrease to 200 mg two times a day for two weeks, then 200 mg daily for two weeks.  Recommend patient decreases activity level until pain has completely resolved.  High activity will increase recurrence rate.       Clive when I calculate out this taper for the Ibuprofen your last dose should be around 5/29/19. So, approximately 8 weeks of the medication. Also as recommended, while on Ibuprofen to take Omeprazole 20 mg once daily in the morning before breakfast. As we discussed, this medication will protect your stomach while on the Ibuprofen and colchicine.  Also, whenever possible please take the Ibuprofen and colchicine with food to help protect your stomach.       Please keep your Office visit with Dr. Arana on 4/9/19 at 9:50 am at [facility]  location.      If you have any questions or concerns, please give a call.    Have a great day!    Magalys Parr RN @ [facility]    [phone]

## 2021-06-19 NOTE — LETTER
Letter by Ramu Jiménez MD at      Author: Ramu Jiménez MD Service: -- Author Type: --    Filed:  Encounter Date: 6/13/2019 Status: (Other)         June 13, 2019     Patient: Ramu Espinoza   YOB: 1960   Date of Visit: 6/13/2019       To Whom It May Concern:    It is my medical opinion that Ramu Espinoza has ongoing back and shoulder musculoskeletal issues.  Part of his ongoing Plan of Care includes massage therapy.  He currently has this therapy provided by [facility] in [place].      If you have any questions or concerns, please don't hesitate to call.    Sincerely,        Electronically signed by Ramu Jiménez MD

## 2021-06-20 NOTE — PROGRESS NOTES
[facility] Hematology and Oncology Progress Note    Patient: Ramu Espinoza  MRN: [id number]  Date of Service:         Reason for Visit    Chief Complaint   Patient presents with     Chronic lymphocytic leukemia       Assessment and Plan    1.  A 59 yo gentleman with CLL stage 4 now, diagnosed in February 2012. 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 Great clinical response to Imbruvica. His lymph nodes have normalized. His CBC is normal today. Continue Imbruvica 140 mg- 3 pills daily. Return in 3 months with labs and to see Dr. Garza.     2. Paroxysmal A. Fib: could be from Imbruvica (<9% risk) on metoprolol.     3. Hypercholesterolemia: was supposed to start medicatioin, but pt worried about interactions and side effects, so is starting to exercise and diet to try to lose weight. Encourage him to talk with PCP      ECOG Performance   ECOG Performance Status: 1     Distress Assessment  Distress Assessment Score: No distress     Pain  Currently in Pain: No/denies    Problem List    1. Chronic lymphocytic leukemia (H)  Study Drug Ibrutinib 140 mg capsule 420 mg   2. Paroxysmal atrial fibrillation (H)     3. Chronic lymphocytic leukemia (H)        ______________________________________________________________________________    History of Present Illness    Ramu Espinoza is a very pleasant 58 y.o. old male with a history of CLL diagnosed in 02/2012 presenting with elevated white count in upper 10s/lower 20s with a peripheral blood smear showing atypical lymphocytosis suspicious for CLL.  He was completely asymptomatic, normal hemoglobin and platelet count.  His peripheral blood flow cytometry revealed CD5 co-expressing kappa light chains restricted B cells confirming the diagnosis of CLL.  He has been on observation.  His white count has been slowly going up.      In June 2014 his white count was over 100,000. So he had a CAT scan and that showed increasing lymphadenopathy and splenectomy. Platelet count has  been going down.      I offered participation in clinical study in which patients are randomized between fludarabine and cyclophosphamide Rituxan or Imbruvica and Rituxan. He has been randomized to Imbruvica and rituximab arm. He started on October 2014. In November 2014 he got Rituxan for the first time. Tolerated well. Finished that. Now on Imbruvica alone. Tolerating it well.      Comes in today for scheduled follow-up. He is feeling ok. Intermittent fatigue.     Pain Status  Currently in Pain: No/denies    Review of Systems    Constitutional  Constitutional (WDL): Exceptions to WDL  Fatigue: Fatigue not relieved by rest - Limiting instrumental ADL  Neurosensory  Neurosensory (WDL): All neurosensory elements are within defined limits  Eye   Eye Disorder (WDL): All eye disorder elements are within defined limits  Ear  Ear Disorder (WDL): Exceptions to WDL  Tinnitus: Mild symptoms, intervention not indicated (Chronic.)  Cardiovascular  Cardiovascular (WDL): All cardiovascular elements are within defined limits  Pulmonary  Respiratory (WDL): Within Defined Limits  Gastrointestinal  Gastrointestinal (WDL): Exceptions to WDL  Diarrhea: Increase of <4 stools per day over baseline, mild increase in ostomy output compared to baseline  Genitourinary  Genitourinary (WDL): All genitourinary elements are within defined limits  Lymphatic  Lymph (WDL): All lymph disorder elements are within defined limits  Musculoskeletal and Connective Tissue  Musculoskeletal and Connetive Tissue Disorders (WDL): Exceptions to WDL  Myalgia: Mild pain  Integumentary  Integumentary (WDL): All integumentary elements are within defined limits  Patient Coping  Patient Coping: Accepting  Distress Assessment  Distress Assessment Score: No distress  Accompanied by  Accompanied by: Alone  Oral Chemo Adherence       Past History  Past Medical History:   Diagnosis Date     BPH (benign prostatic hyperplasia)      CLL (chronic lymphocytic leukemia) (H)       Lymphadenopathy     Created by Conversion  Replacement Utility updated for latest IMO load       PHYSICAL EXAM:  /76  Pulse 64  Temp 98.4  F (36.9  C) (Oral)   Wt (!) 262 lb 8 oz (119.1 kg)  SpO2 94%  BMI 34.63 kg/m2  GENERAL: no acute distress. Cooperative in conversation. Here alone  HEENT: pupils are equal, round and reactive. Oromucosa is clean and intact. No ulcerations or mucositis noted. No bleeding noted.  RESP: lungs are clear bilaterally per auscultation. Regular respiratory rate. No wheezes or rhonchi.  CV: Regular, rate and rhythm. No murmurs.  ABD: soft, nontender. Positive bowel sounds. No organomegaly.   MUSCULOSKELETAL: No lower extremity swelling.   NEURO: non focal. Alert and oriented x3.   PSYCH: within normal limits. No depression or anxiety.  SKIN: warm dry intact   LYMPH: no cervical, supraclavicular, inguinal, or axillary lymphadenopathy      Lab Results    Recent Results (from the past 168 hour(s))   Comprehensive Metabolic Panel   Result Value Ref Range    Sodium 139 136 - 145 mmol/L    Potassium 4.7 3.5 - 5.0 mmol/L    Chloride 105 98 - 107 mmol/L    CO2 26 22 - 31 mmol/L    Anion Gap, Calculation 8 5 - 18 mmol/L    Glucose 93 70 - 125 mg/dL    BUN 17 8 - 22 mg/dL    Creatinine 0.84 0.70 - 1.30 mg/dL    GFR MDRD Af Amer >60 >60 mL/min/1.73m2    GFR MDRD Non Af Amer >60 >60 mL/min/1.73m2    Bilirubin, Total 0.6 0.0 - 1.0 mg/dL    Calcium 9.5 8.5 - 10.5 mg/dL    Protein, Total 6.7 6.0 - 8.0 g/dL    Albumin 4.0 3.5 - 5.0 g/dL    Alkaline Phosphatase 68 45 - 120 U/L    AST 20 0 - 40 U/L    ALT 23 0 - 45 U/L   HM1 (CBC with Diff)   Result Value Ref Range    WBC 9.5 4.0 - 11.0 thou/uL    RBC 4.87 4.40 - 6.20 mill/uL    Hemoglobin 14.5 14.0 - 18.0 g/dL    Hematocrit 43.1 40.0 - 54.0 %    MCV 89 80 - 100 fL    MCH 29.8 27.0 - 34.0 pg    MCHC 33.6 32.0 - 36.0 g/dL    RDW 12.5 11.0 - 14.5 %    Platelets 270 140 - 440 thou/uL    MPV 9.9 8.5 - 12.5 fL    Neutrophils % 64 50 - 70 %     Lymphocytes % 24 20 - 40 %    Monocytes % 8 2 - 10 %    Eosinophils % 4 0 - 6 %    Basophils % 1 0 - 2 %    Neutrophils Absolute 6.0 2.0 - 7.7 thou/uL    Lymphocytes Absolute 2.2 0.8 - 4.4 thou/uL    Monocytes Absolute 0.8 0.0 - 0.9 thou/uL    Eosinophils Absolute 0.3 0.0 - 0.4 thou/uL    Basophils Absolute 0.1 0.0 - 0.2 thou/uL       Imaging    No results found.      Signed by: Radha Alarcon, CNP

## 2021-06-20 NOTE — LETTER
Letter by Ramu Jiménez MD at      Author: Ramu Jiménez MD Service: -- Author Type: --    Filed:  Encounter Date: 3/11/2020 Status: (Other)         Ramu Espinoza  [address] [place] [address]             March 11, 2020         Dear Mr. Espinoza,    Below are the results from your recent visit:    Resulted Orders   Uric Acid   Result Value Ref Range    Uric Acid 6.0 3.0 - 8.0 mg/dL   Rheumatoid Factor Quant   Result Value Ref Range    Rheumatoid Factor Quantitative <15.0 0 - 30 IU/mL   CCP Antibodies   Result Value Ref Range    CCP IgG Antibodies <0.5 <=4.9 U/mL   Erythrocyte Sedimentation Rate   Result Value Ref Range    Sed Rate 4 0 - 15 mm/hr   C -Reactive Protein, High Sensitivity   Result Value Ref Range    CRP, High Sensitivity 1.4 0.0 - 3.0 mg/L    Narrative    low risk < 1.0 mg/l  average risk 1.0 to 3.0 mg/l  hi risk > 3.0 mg/l  acute inflamation > 10.0 mg/l         HM1 (CBC with Diff)   Result Value Ref Range    WBC 8.1 4.0 - 11.0 thou/uL    RBC 5.26 4.40 - 6.20 mill/uL    Hemoglobin 15.0 14.0 - 18.0 g/dL    Hematocrit 47.0 40.0 - 54.0 %    MCV 89 80 - 100 fL    MCH 28.5 27.0 - 34.0 pg    MCHC 31.9 (L) 32.0 - 36.0 g/dL    RDW 13.1 11.0 - 14.5 %    Platelets 315 140 - 440 thou/uL    MPV 11.0 8.5 - 12.5 fL    Neutrophils % 72 (H) 50 - 70 %    Lymphocytes % 17 (L) 20 - 40 %    Monocytes % 9 2 - 10 %    Eosinophils % 1 0 - 6 %    Basophils % 1 0 - 2 %    Neutrophils Absolute 5.8 2.0 - 7.7 thou/uL    Lymphocytes Absolute 1.3 0.8 - 4.4 thou/uL    Monocytes Absolute 0.7 0.0 - 0.9 thou/uL    Eosinophils Absolute 0.1 0.0 - 0.4 thou/uL    Basophils Absolute 0.1 0.0 - 0.2 thou/uL       Inflammation tests   Rheumatoid Factor /CRP /CCP antibodies /CBC and ESR are all OK     DanL     Please call with questions or contact us using [employer].    Sincerely,        Electronically signed by Ramu Jiménez MD

## 2021-06-20 NOTE — LETTER
Letter by Ramu Jiménez MD at      Author: Ramu Jiménez MD Service: -- Author Type: --    Filed:  Encounter Date: 3/2/2020 Status: (Other)                    My Depression Action Plan  Name: Ramu Espinoza   Date of Birth 1960  Date: 3/2/2020    My Doctor: Ramu Jiménez MD   My Clinic: [facility]  [address] [address]  [phone]          GREEN    ZONE   Good Control    What it looks like:     Things are going generally well. You have normal ups and downs. You may even feel depressed from time to time, but bad moods usually last less than a day.   What you need to do:  1. Continue to care for yourself (see self care plan)  2. Check your depression survival kit and update it as needed  3. Follow your physicians recommendations including any medication.  4. Do not stop taking medication unless you consult with your physician first.           YELLOW         ZONE Getting Worse    What it looks like:     Depression is starting to interfere with your life.     It may be hard to get out of bed; you may be starting to isolate yourself from others.    Symptoms of depression are starting to last most all day and this has happened for several days.     You may have suicidal thoughts but they are not constant.   What you need to do:     1. Call your care team. Your response to treatment will improve if you keep your care team informed of your progress. Yellow periods are signs an adjustment may need to be made.     2. Continue your self-care.  Just get dressed and ready for the day.  Don't give yourself time to talk yourself out of it.    3. Talk to someone in your support network.    4. Open up your depression Depression Self-Care Plan / Wellness kit.           RED    ZONE Medical Alert - Get Help    What it looks like:     Depression is seriously interfering with your life.     You may experience these or other symptoms: You cant get out of bed most days, cant  work or engage in other necessary activities, you have trouble taking care of basic hygiene, or basic responsibilities, thoughts of suicide or death that will not go away, self-injurious behavior.     What you need to do:  1. Call your care team and request a same-day appointment. If they are not available (weekends or after hours) call your local crisis line, emergency room or 911.            Self-Care Plan / Wellness Kit    Self-Care for Depression  Heres the deal. Your body and mind are really not as separate as most people think.  What you do and think affects how you feel and how you feel influences what you do and think. This means if you do things that people who feel good do, it will help you feel better.  Sometimes this is all it takes.  There is also a place for medication and therapy depending on how severe your depression is, so be sure to consult with your medical provider and/ or Behavioral Health Consultant if your symptoms are worsening or not improving.     In order to better manage my stress, I will:    Exercise  Get some form of exercise, every day. This will help reduce pain and release endorphins, the feel good chemicals in your brain. This is almost as good as taking antidepressants!  This is not the same as joining a gym and then never going! (they count on that by the way?) It can be as simple as just going for a walk or doing some gardening, anything that will get you moving.      Hygiene   Maintain good hygiene (get out of bed in the morning, make your bed, brush your teeth, take a shower, and get dressed like you were going to work, even if you are unemployed).  If your clothes don't fit try to get ones that do.    Diet  Strive to eat foods that are good for me, drink plenty of water, and avoid excessive sugar, caffeine, alcohol, and other mood-altering substances.  Some foods that are helpful in depression are: complex carbohydrates, B vitamins, flaxseed, fish or fish oil, fresh fruits  and vegetables.    Psychotherapy  Agree to participate in Individual Therapy (if recommended).    Medication  If prescribed medications, I agree to take them.  Missing doses can result in serious side effects.  I understand that drinking alcohol, or other illicit drug use, may cause potential side effects.  I will not stop my medication abruptly without first discussing it with my provider.    Staying Connected With Others  Stay in touch with my friends, family members, and my primary care provider/team.    Use your imagination  Be creative.  We all have a creative side; it doesnt matter if its oil painting, sand castles, or mud pies! This will also kick up the endorphins.    Witness Beauty  (AKA stop and smell the roses) Take a look outside, even in mid-winter. Notice colors, textures. Watch the squirrels and birds.     Service to others  Be of service to others.  There is always someone else in need.  By helping others we can get out of ourselves and remember the really important things.  This also provides opportunities for practicing all the other parts of the program.    Humor  Laugh and be silly!  Adjust your TV habits for less news and crime-drama and more comedy.    Control your stress  Try breathing deep, massage therapy, biofeedback, and meditation. Find time to relax each day.     Crisis Text Line  http://www.crisistextline.org    The Crisis Text Line serves anyone, in any type of crisis, providing access to free, 24/7 support and information via the medium people already use and trust:    Here's how it works:  1.  Text [phone] from anywhere in the [place], anytime, about any type of crisis.  2.  A live, trained Crisis Counselor receives the text and responds quickly.  3.  The volunteer Crisis Counselor will help you move from a 'hot moment to a cool moment'.  My support system    Clinic Contact:  Phone number:    Contact 1:  Phone number:    Contact 2:  Phone number:    [employer]/:  Phone  number:    Therapist:  Phone number:    [facility] crisis center:    Phone number:    Other community support:  Phone number:

## 2021-06-21 NOTE — LETTER
Letter by Ramu Jiménez MD at      Author: Ramu Jiménez MD Service: -- Author Type: --    Filed:  Encounter Date: 2/3/2021 Status: (Other)         Ramu Espinoza  [address] [place] [address]      February 11, 2021      Dear Mr. Espinoza,    Below are the recent results of your laboratory tests:    TSH elevated this means that his thyroid is underactive  I would favor either repeating this in 6 months or starting levothyroxine 50 mcg daily  I would also recommend taking over-the-counter selenium 200 mcg daily as a supplement     PSA test stable no concerns  Urine microalbumin screen normal no suggestion of kidney damage  Kidney liver test within normal limits as evidenced by CMP, total bilirubin okay to monitor  Cholesterol level is elevated, this could be due to an underactive thyroid     Given the patient's age and risk he likely qualifies for statin therapy  Please schedule Clive for a follow-up visit via phone     Topics of discussion  Elevated TSH suggesting hypothyroidism  Elevated cholesterol suggesting risk for coronary disease as well as stroke, however cholesterol can be elevated in the setting of hypothyroidism    Lab on 01/27/2021   Component Date Value Ref Range Status   ? Sodium 01/27/2021 140  136 - 145 mmol/L Final   ? Potassium 01/27/2021 4.6  3.5 - 5.0 mmol/L Final   ? Chloride 01/27/2021 104  98 - 107 mmol/L Final   ? CO2 01/27/2021 27  22 - 31 mmol/L Final   ? Anion Gap, Calculation 01/27/2021 9  5 - 18 mmol/L Final   ? Glucose 01/27/2021 86  70 - 125 mg/dL Final   ? BUN 01/27/2021 17  8 - 22 mg/dL Final   ? Creatinine 01/27/2021 0.84  0.70 - 1.30 mg/dL Final   ? GFR MDRD Af Amer 01/27/2021 >60  >60 mL/min/1.73m2 Final   ? GFR MDRD Non Af Amer 01/27/2021 >60  >60 mL/min/1.73m2 Final   ? Bilirubin, Total 01/27/2021 1.4* 0.0 - 1.0 mg/dL Final   ? Calcium 01/27/2021 8.9  8.5 - 10.5 mg/dL Final   ? Protein, Total 01/27/2021 6.4  6.0 - 8.0 g/dL Final   ? Albumin 01/27/2021 3.9  3.5 - 5.0  g/dL Final   ? Alkaline Phosphatase 01/27/2021 87  45 - 120 U/L Final   ? AST 01/27/2021 26  0 - 40 U/L Final   ? ALT 01/27/2021 30  0 - 45 U/L Final   ? Vitamin D, Total (25-Hydroxy) 01/27/2021 34.5  30.0 - 80.0 ng/mL Final   ? Color, UA 01/27/2021 Yellow  Colorless, Yellow, Straw, Light Yellow Final   ? Clarity, UA 01/27/2021 Clear  Clear Final   ? Glucose, UA 01/27/2021 Negative  Negative Final   ? Bilirubin, UA 01/27/2021 Negative  Negative Final   ? Ketones, UA 01/27/2021 Negative  Negative Final   ? Specific Gravity, UA 01/27/2021 1.025  1.005 - 1.030 Final   ? Blood, UA 01/27/2021 Trace* Negative Final   ? pH, UA 01/27/2021 6.0  5.0 - 8.0 Final   ? Protein, UA 01/27/2021 Negative  Negative mg/dL Final   ? Urobilinogen, UA 01/27/2021 0.2 E.U./dL  0.2 E.U./dL, 1.0 E.U./dL Final   ? Nitrite, UA 01/27/2021 Negative  Negative Final   ? Leukocytes, UA 01/27/2021 Negative  Negative Final   ? Bacteria, UA 01/27/2021 Few* None Seen hpf Final   ? RBC, UA 01/27/2021 0-2  None Seen, 0-2 hpf Final   ? WBC, UA 01/27/2021 None Seen  None Seen, 0-5 hpf Final   ? Squam Epithel, UA 01/27/2021 0-5  None Seen, 0-5 lpf Final   ? Mucus, UA 01/27/2021 Moderate* None Seen lpf Final   ? PSA 01/27/2021 0.3  0.0 - 4.5 ng/mL Final   ? TSH 01/27/2021 5.16* 0.30 - 5.00 uIU/mL Final   ? Cholesterol 01/27/2021 269* <=199 mg/dL Final   ? Triglycerides 01/27/2021 185* <=149 mg/dL Final   ? HDL Cholesterol 01/27/2021 41  >=40 mg/dL Final   ? LDL Calculated 01/27/2021 191* <=129 mg/dL Final   ? Patient Fasting > 8hrs? 01/27/2021 Yes   Final   ? Microalbumin, Random Urine 01/27/2021 1.01  0.00 - 1.99 mg/dL Final   ? Creatinine, Urine 01/27/2021 260.4  mg/dL Final   ? Microalbumin/Creatinine Ratio Rand* 01/27/2021 3.9  <=19.9 mg/g Final   ? WBC 01/27/2021 7.1  4.0 - 11.0 thou/uL Final   ? RBC 01/27/2021 5.25  4.40 - 6.20 mill/uL Final   ? Hemoglobin 01/27/2021 15.0  14.0 - 18.0 g/dL Final   ? Hematocrit 01/27/2021 47.0  40.0 - 54.0 % Final   ?  MCV 01/27/2021 90  80 - 100 fL Final   ? MCH 01/27/2021 28.6  27.0 - 34.0 pg Final   ? MCHC 01/27/2021 31.9* 32.0 - 36.0 g/dL Final   ? RDW 01/27/2021 12.8  11.0 - 14.5 % Final   ? Platelets 01/27/2021 257  140 - 440 thou/uL Final   ? MPV 01/27/2021 11.1  8.5 - 12.5 fL Final   ? Neutrophils % 01/27/2021 67  50 - 70 % Final   ? Lymphocytes % 01/27/2021 19* 20 - 40 % Final   ? Monocytes % 01/27/2021 9  2 - 10 % Final   ? Eosinophils % 01/27/2021 3  0 - 6 % Final   ? Basophils % 01/27/2021 1  0 - 2 % Final   ? Immature Granulocyte % 01/27/2021 1* <=0 % Final   ? Neutrophils Absolute 01/27/2021 4.8  2.0 - 7.7 thou/uL Final   ? Lymphocytes Absolute 01/27/2021 1.3  0.8 - 4.4 thou/uL Final   ? Monocytes Absolute 01/27/2021 0.7  0.0 - 0.9 thou/uL Final   ? Eosinophils Absolute 01/27/2021 0.2  0.0 - 0.4 thou/uL Final   ? Basophils Absolute 01/27/2021 0.1  0.0 - 0.2 thou/uL Final   ? Immature Granulocyte Absolute 01/27/2021 0.0  <=0.0 thou/uL Final   ? Free T4 01/27/2021 1.0  0.7 - 1.8 ng/dL Final

## 2021-06-21 NOTE — LETTER
Letter by Ramu Jiménez MD at      Author: Ramu Jiménez MD Service: -- Author Type: --    Filed:  Encounter Date: 1/31/2021 Status: (Other)         Ramu Espinoza  [address] [place] [address]             January 31, 2021         Dear Mr. Espinoza,    Below are the results from your recent visit:    Resulted Orders   Comprehensive Metabolic Panel   Result Value Ref Range    Sodium 140 136 - 145 mmol/L    Potassium 4.6 3.5 - 5.0 mmol/L    Chloride 104 98 - 107 mmol/L    CO2 27 22 - 31 mmol/L    Anion Gap, Calculation 9 5 - 18 mmol/L    Glucose 86 70 - 125 mg/dL    BUN 17 8 - 22 mg/dL    Creatinine 0.84 0.70 - 1.30 mg/dL    GFR MDRD Af Amer >60 >60 mL/min/1.73m2    GFR MDRD Non Af Amer >60 >60 mL/min/1.73m2    Bilirubin, Total 1.4 (H) 0.0 - 1.0 mg/dL    Calcium 8.9 8.5 - 10.5 mg/dL    Protein, Total 6.4 6.0 - 8.0 g/dL    Albumin 3.9 3.5 - 5.0 g/dL    Alkaline Phosphatase 87 45 - 120 U/L    AST 26 0 - 40 U/L    ALT 30 0 - 45 U/L    Narrative    Fasting Glucose reference range is 70-99 mg/dL per  American Diabetes Association (ADA) guidelines.   Vitamin D, Total (25-Hydroxy)   Result Value Ref Range    Vitamin D, Total (25-Hydroxy) 34.5 30.0 - 80.0 ng/mL    Narrative    Deficiency <10.0 ng/mL  Insufficiency 10.0-29.9 ng/mL  Sufficiency 30.0-80.0 ng/mL  Toxicity (possible) >100.0 ng/mL   Urinalysis-UC if Indicated   Result Value Ref Range    Color, UA Yellow Colorless, Yellow, Straw, Light Yellow    Clarity, UA Clear Clear    Glucose, UA Negative Negative    Bilirubin, UA Negative Negative    Ketones, UA Negative Negative    Specific Gravity, UA 1.025 1.005 - 1.030    Blood, UA Trace (!) Negative    pH, UA 6.0 5.0 - 8.0    Protein, UA Negative Negative mg/dL    Urobilinogen, UA 0.2 E.U./dL 0.2 E.U./dL, 1.0 E.U./dL    Nitrite, UA Negative Negative    Leukocytes, UA Negative Negative    Bacteria, UA Few (!) None Seen hpf    RBC, UA 0-2 None Seen, 0-2 hpf    WBC, UA None Seen None Seen, 0-5 hpf    Squam  Epithel, UA 0-5 None Seen, 0-5 lpf    Mucus, UA Moderate (!) None Seen lpf    Narrative    UC not indicated   PSA (Prostatic-Specific Antigen), Annual Screen   Result Value Ref Range    PSA 0.3 0.0 - 4.5 ng/mL    Narrative    Method is Abbott Prostate-Specific Antigen (PSA)  Standard-WHO 1st International (90:10)   Thyroid Cascade   Result Value Ref Range    TSH 5.16 (H) 0.30 - 5.00 uIU/mL   Lipid Cascade   Result Value Ref Range    Cholesterol 269 (H) <=199 mg/dL    Triglycerides 185 (H) <=149 mg/dL    HDL Cholesterol 41 >=40 mg/dL    LDL Calculated 191 (H) <=129 mg/dL    Patient Fasting > 8hrs? Yes    Microalbumin, Random Urine   Result Value Ref Range    Microalbumin, Random Urine 1.01 0.00 - 1.99 mg/dL    Creatinine, Urine 260.4 mg/dL    Microalbumin/Creatinine Ratio Random Urine 3.9 <=19.9 mg/g    Narrative    Microalbumin, Random Urine  <2.0 mg/dL . . . . . . . . Normal  3.0-30.0 mg/dL . . . . . . Microalbuminuria  >30.0 mg/dL . . . . . .  . Clinical Proteinuria    Microalbumin/Creatinine Ratio, Random Urine  <20 mg/g . . . . .. . . . Normal   mg/g . . . . . . . Microalbuminuria  >300 mg/g . . . . . . . . Clinical Proteinuria       HM1 (CBC with Diff)   Result Value Ref Range    WBC 7.1 4.0 - 11.0 thou/uL    RBC 5.25 4.40 - 6.20 mill/uL    Hemoglobin 15.0 14.0 - 18.0 g/dL    Hematocrit 47.0 40.0 - 54.0 %    MCV 90 80 - 100 fL    MCH 28.6 27.0 - 34.0 pg    MCHC 31.9 (L) 32.0 - 36.0 g/dL    RDW 12.8 11.0 - 14.5 %    Platelets 257 140 - 440 thou/uL    MPV 11.1 8.5 - 12.5 fL    Neutrophils % 67 50 - 70 %    Lymphocytes % 19 (L) 20 - 40 %    Monocytes % 9 2 - 10 %    Eosinophils % 3 0 - 6 %    Basophils % 1 0 - 2 %    Immature Granulocyte % 1 (H) <=0 %    Neutrophils Absolute 4.8 2.0 - 7.7 thou/uL    Lymphocytes Absolute 1.3 0.8 - 4.4 thou/uL    Monocytes Absolute 0.7 0.0 - 0.9 thou/uL    Eosinophils Absolute 0.2 0.0 - 0.4 thou/uL    Basophils Absolute 0.1 0.0 - 0.2 thou/uL    Immature Granulocyte Absolute  0.0 <=0.0 thou/uL   T4, Free   Result Value Ref Range    Free T4 1.0 0.7 - 1.8 ng/dL       Please inform Clive     TSH elevated this means that his thyroid is underactive  I would favor either repeating this in 6 months or starting levothyroxine 50 mcg daily  I would also recommend taking over-the-counter selenium 200 mcg daily as a supplement     PSA test stable no concerns  Urine microalbumin screen normal no suggestion of kidney damage  Kidney liver test within normal limits as evidenced by CMP, total bilirubin okay to monitor  Cholesterol level is elevated, this could be due to an underactive thyroid     Given the patient's age and risk he likely qualifies for statin therapy  Please schedule Clive for a follow-up visit via phone     Topics of discussion  Elevated TSH suggesting hypothyroidism  Elevated cholesterol suggesting risk for coronary disease as well as stroke, however cholesterol can be elevated in the setting of hypothyroidism     Gali    Please call with questions or contact us using [employer].    Sincerely,        Electronically signed by Ramu Jiménez MD

## 2021-06-21 NOTE — LETTER
Letter by Ramu Jiménez MD at      Author: Ramu Jiménez MD Service: -- Author Type: --    Filed:  Encounter Date: 2/22/2021 Status: (Other)         February 22, 2021     Patient: Ramu Espinoza   YOB: 1960   Date of Visit: 2/22/2021       Clive:        Today's Topics    Lipids cholesterol    TSH is High TSH is the brain signal to the thyroid to make hormone because it senses it is low    This is the Brain sensing the Thyroid Levels are low.    When thyroid levels are low, the brain produces more TSH to tell the THyroid to make more hormone.      Your level suggests that you are HYPOthyroid or your body is LOW in thyroid hormone.    This means either you need a supplement or an adjustment in the thyroid medication supplement to bring this level into balance.      When the TSH is high, it suggests that you may need to start a supplement like Levothyroxine or make an adjustment in your medication.     Some supplements and nutrients support thyroid function.  They are:    Selenium 200 mcg daily  Zinc 30 mg Daily/ Copper 1 mg   Vitamin D3 / K2  2000 IU /  mcg Daily  Iodine 150 mcg Daily  Vitamin A 5000 IU Daily    TPO Antibodies this could be related to Hashimoto's thyroiditis    I would like you to do a coronary calcium score to identify if you have any significant coronary damage that is been repaired    Your calculated risk is 24% over the next 10 years for a cardiac event  I think it is reasonable to begin a medication such as rosuvastatin  I would like you to start at 10 mg at bedtime for 4 weeks then if tolerated increase to 20 mg at bedtime    At the 8-week kristian I would like you to come in for a repeat set of labs  Cholesterol profile  Kidney liver test    I would also like you to repeat your TSH (thyroid test)  And thyroid peroxidase antibodies    Likely you may need thyroid supplementation with levothyroxine  This will also improve your cholesterol profile    To support your use of  rosuvastatin or a statin medication you could consider taking co-Q10 100 mg daily    Housekeeping issues  You should probably have  Your pneumonia vaccine  Consider doing the shingles shot  You will be due for colorectal cancer screening I always prefer colonoscopy over  other screens      DanL    If you have any questions or concerns, please don't hesitate to call.    Sincerely,        Electronically signed by Ramu Jiménez MD
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
## 2021-06-22 NOTE — PROGRESS NOTES
[facility] Hematology and Oncology Progress Note    Patient: Ramu Espinoza  MRN: [id number]  Date of Service: 01/04/19          Reason for Visit    Chief Complaint   Patient presents with     HE Cancer     Chronic lymphocytic leukemia        Assessment and Plan    1.  A 59 yo gentleman with CLL stage 4 now, diagnosed in February 2012. 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 Great clinical response to Imbruvica. His lymph nodes have normalized. His CBC is normal today. Continue Imbruvica 140 mg- 3 pills daily. Return in 3 months with labs and to see Dr. Garza.     2. Paroxysmal A. Fib: could be from Imbruvica (<9% risk) on metoprolol.     3. Hypercholesterolemia: was supposed to start medicatioin, but pt worried about interactions and side effects, so is starting to exercise and diet to try to lose weight. Encourage him to talk with PCP      ECOG Performance   ECOG Performance Status: 1     Distress Assessment  Distress Assessment Score: 2     Pain  Currently in Pain: No/denies    Problem List    1. Chronic lymphocytic leukemia (H)  Study Drug Ibrutinib 140 mg capsule 420 mg      ______________________________________________________________________________    History of Present Illness    Ramu Espinoza is a very pleasant 58 y.o. old male with a history of CLL diagnosed in 02/2012 presenting with elevated white count in upper 10s/lower 20s with a peripheral blood smear showing atypical lymphocytosis suspicious for CLL.  He was completely asymptomatic, normal hemoglobin and platelet count.  His peripheral blood flow cytometry revealed CD5 co-expressing kappa light chains restricted B cells confirming the diagnosis of CLL.  He has been on observation.  His white count has been slowly going up.      In June 2014 his white count was over 100,000. So he had a CAT scan and that showed increasing lymphadenopathy and splenectomy. Platelet count has been going down.      I offered participation in clinical study in  which patients are randomized between fludarabine and cyclophosphamide Rituxan or Imbruvica and Rituxan. He has been randomized to Imbruvica and rituximab arm. He started on October 2014. In November 2014 he got Rituxan for the first time. Tolerated well. Finished that. Now on Imbruvica alone. Tolerating it well.      Comes in today for scheduled follow-up. He is feeling ok. Intermittent fatigue and diarrhea.     Pain Status  Currently in Pain: No/denies    Review of Systems    Constitutional  Constitutional (WDL): Exceptions to WDL  Fatigue: Fatigue relieved by rest  Weight Gain: 5 - <10% from baseline(6#)  Neurosensory  Neurosensory (WDL): All neurosensory elements are within defined limits  Eye   Eye Disorder (WDL): All eye disorder elements are within defined limits  Ear  Ear Disorder (WDL): Exceptions to WDL  Tinnitus: Mild symptoms, intervention not indicated  Cardiovascular  Cardiovascular (WDL): All cardiovascular elements are within defined limits  Pulmonary  Respiratory (WDL): Within Defined Limits  Gastrointestinal  Gastrointestinal (WDL): All gastrointestinal elements are within defined limits  Diarrhea: Increase of <4 stools per day over baseline, mild increase in ostomy output compared to baseline(occ)  Genitourinary  Genitourinary (WDL): All genitourinary elements are within defined limits  Lymphatic  Lymph (WDL): All lymph disorder elements are within defined limits  Musculoskeletal and Connective Tissue  Musculoskeletal and Connetive Tissue Disorders (WDL): Exceptions to WDL  Arthralgia: Mild pain(back, legs)  Integumentary  Integumentary (WDL): All integumentary elements are within defined limits  Patient Coping  Patient Coping: Accepting  Distress Assessment  Distress Assessment Score: 2  Accompanied by  Accompanied by: Alone  Oral Chemo Adherence       Past History  Past Medical History:   Diagnosis Date     BPH (benign prostatic hyperplasia)      CLL (chronic lymphocytic leukemia) (H)       Lymphadenopathy     Created by Conversion  Replacement Utility updated for latest IMO load       PHYSICAL EXAM:  /69   Pulse 62   Temp 98.2  F (36.8  C) (Oral)   Wt (!) 262 lb (118.8 kg)   SpO2 96%   BMI 34.57 kg/m    GENERAL: no acute distress. Cooperative in conversation. Here alone  HEENT: pupils are equal, round and reactive. Oromucosa is clean and intact. No ulcerations or mucositis noted. No bleeding noted.  RESP: lungs are clear bilaterally per auscultation. Regular respiratory rate. No wheezes or rhonchi.  CV: Regular, rate and rhythm. No murmurs.  ABD: soft, nontender. Positive bowel sounds. No organomegaly.   MUSCULOSKELETAL: No lower extremity swelling.   NEURO: non focal. Alert and oriented x3.   PSYCH: within normal limits. No depression or anxiety.  SKIN: warm dry intact   LYMPH: no cervical, supraclavicular, inguinal, or axillary lymphadenopathy      Lab Results    Recent Results (from the past 168 hour(s))   Comprehensive Metabolic Panel   Result Value Ref Range    Sodium 139 136 - 145 mmol/L    Potassium 4.2 3.5 - 5.0 mmol/L    Chloride 103 98 - 107 mmol/L    CO2 29 22 - 31 mmol/L    Anion Gap, Calculation 7 5 - 18 mmol/L    Glucose 97 70 - 125 mg/dL    BUN 17 8 - 22 mg/dL    Creatinine 0.78 0.70 - 1.30 mg/dL    GFR MDRD Af Amer >60 >60 mL/min/1.73m2    GFR MDRD Non Af Amer >60 >60 mL/min/1.73m2    Bilirubin, Total 1.2 (H) 0.0 - 1.0 mg/dL    Calcium 9.3 8.5 - 10.5 mg/dL    Protein, Total 6.5 6.0 - 8.0 g/dL    Albumin 3.7 3.5 - 5.0 g/dL    Alkaline Phosphatase 73 45 - 120 U/L    AST 22 0 - 40 U/L    ALT 27 0 - 45 U/L   HM1 (CBC with Diff)   Result Value Ref Range    WBC 8.5 4.0 - 11.0 thou/uL    RBC 4.94 4.40 - 6.20 mill/uL    Hemoglobin 14.4 14.0 - 18.0 g/dL    Hematocrit 43.9 40.0 - 54.0 %    MCV 89 80 - 100 fL    MCH 29.1 27.0 - 34.0 pg    MCHC 32.8 32.0 - 36.0 g/dL    RDW 12.7 11.0 - 14.5 %    Platelets 235 140 - 440 thou/uL    MPV 10.6 8.5 - 12.5 fL    Neutrophils % 68 50 - 70 %     Lymphocytes % 20 20 - 40 %    Monocytes % 8 2 - 10 %    Eosinophils % 3 0 - 6 %    Basophils % 1 0 - 2 %    Neutrophils Absolute 5.7 2.0 - 7.7 thou/uL    Lymphocytes Absolute 1.7 0.8 - 4.4 thou/uL    Monocytes Absolute 0.7 0.0 - 0.9 thou/uL    Eosinophils Absolute 0.3 0.0 - 0.4 thou/uL    Basophils Absolute 0.1 0.0 - 0.2 thou/uL       Imaging    No results found.      Signed by: Radha Alarcon, CNP

## 2021-06-23 NOTE — TELEPHONE ENCOUNTER
Refill Approved    Rx renewed per Medication Renewal Policy.   Medication was last renewed on 11/7/2017.  Last OV 10/25/2018.    Marylu Key, [facility] Connection Triage/Med Refill 1/21/2019     Requested Prescriptions   Pending Prescriptions Disp Refills     traZODone (DESYREL) 50 MG tablet 360 tablet 1     Sig: Take 1 tablet (50 mg total) by mouth at bedtime.    Tricyclics/Misc Antidepressant/Antianxiety Meds Refill Protocol Passed - 1/21/2019  2:23 PM       Passed - PCP or prescribing provider visit in last year    Last office visit with prescriber/PCP: 10/25/2018 Emily Lipscomb CNP OR same dept: 10/25/2018 Emily Lipscomb CNP OR same specialty: 10/25/2018 Emily Lipscomb CNP  Last physical: Visit date not found Last MTM visit: Visit date not found   Next visit within 3 mo: Visit date not found  Next physical within 3 mo: Visit date not found  Prescriber OR PCP: Emily Lipscomb CNP  Last diagnosis associated with med order: 1. Insomnia  - traZODone (DESYREL) 50 MG tablet; Take 1 tablet (50 mg total) by mouth at bedtime.  Dispense: 360 tablet; Refill: 1    If protocol passes may refill for 12 months if within 3 months of last provider visit (or a total of 15 months).

## 2021-06-24 NOTE — TELEPHONE ENCOUNTER
"  Situation: Pt calling in reporting not feeling well the past 1-3 days.     Background: Called and spoke with patient. Review of OV with MAT 10/2018 and 2/2019. Also recent ECHO and HM. Pt with increased dose of Metoprolol to 100 mg daily, last recommendation by MAT. History of CLL.    Assessment: Pt reports that the last 3 days he has had this ongoing ache in his chest, fairly steady over his sternum, non-radiating. He describes it as feeling \"like when you go out side and run in the cold, and your lungs hurt from the cold air\". Discussion if it could be acid reflux, and patient reports that, \"I have been taking the purple pill for the past 3 days without relief\". Instructed patient to push on the area of ache to see if pain changes, and it doesn't. Denies dizziness or feeling faint. Denies shortness of breath. Denies increase of chest ache with exertion. He has been working today and the chest ache doesn't progress. Complained of a dry mouth, and began drinking more water. Reports he drinks 3, 8-10 ou cups of coffee per day. This ache does come and go. He reports that he is also feeling tired. Denies palpitations but states that he checks his heart beat it is around \"25 beats in 15 seconds\" and not regular.     Recommendation: Discussion of recommendation to go to an ER, pt declined. Offered RAC appointment, pt agreed. Will transfer to schedulers to set-up. Recurrent discussion that if any of his symptoms progress or he becomes more concerned before RAC appointment to go to an ER, pt verbalized understanding.    Discussion of increasing fluids, decreasing caffeine intake to 2 cups of coffee or less. Pt agreeable. KUMAR,RN    "

## 2021-06-24 NOTE — TELEPHONE ENCOUNTER
----- Message from Ben Moreira sent at 3/12/2019  2:46 PM CDT -----  Contact: Pt  General phone call:    Caller: Pt    Primary cardiologist: Dr Arana    Detailed reason for call: Pt states having some chest pains-getting worse today, symptoms of AFIB, HR ~ 100 at rest - overall not feeling well.  Would like a call to discuss please    Pt is on [employer] which may be playing a part    New or active symptoms? New 1-3 days    Best phone number: [phone]   / or if he doesn't answer please call - [phone]    Best time to contact: any - today please    Ok to leave a detailed message? Yes    Device? No    Additional Info:

## 2021-06-24 NOTE — TELEPHONE ENCOUNTER
"Called patient again and re-discussed when to go to an ER. Pt would like to change his appt to this Friday for RAC instead of Monday. Offered and completed. Has RAC with [facility] 3/15. Patient now at home resting in his recliner. Has \"finger pulse oximeter\" and placed on his finger. HR started at 115, then went down to 70-78, O2 sats 94%. Chest ache still present, but a little less. Pt states that it is irregular as he watches the monitor. During conversation HR did increase to 115-120 again for a brief second then returned to 70's. Discussion of increasing his hydration, resting, and decreasing caffeine tomorrow. Also discussion as pt reported that he took an \"extra metoprolol yesterday and today\". Discussion of current prescribed dose. Which patient states that he has been taking. Encouraged to not self-medicate unless instructed by a provider. Pt verbalized understanding.   Plan discussed with patient again to go to an ER if any symptoms change, chest ache intensifies, or develops shortness of breath, or palpitations. Will make follow-up phone call in am tomorrow. Pt has writers' phone number to call if any questions or concerns. CHELLE HEATH  "

## 2021-06-25 NOTE — TELEPHONE ENCOUNTER
Refill Approved    Rx renewed per Medication Renewal Policy. Medication was last renewed on 3/24/20.    Davis Uribe, [facility] Connection Triage/Med Refill 6/8/2021     Requested Prescriptions   Pending Prescriptions Disp Refills     traZODone (DESYREL) 50 MG tablet [Pharmacy Med Name: TRAZODONE 50MG TABLETS] 90 tablet 3     Sig: TAKE 1 TABLET(50 MG) BY MOUTH AT BEDTIME       Tricyclics/Misc Antidepressant/Antianxiety Meds Refill Protocol Passed - 6/7/2021 10:16 AM        Passed - PCP or prescribing provider visit in last year     Last office visit with prescriber/PCP: 3/2/2020 Ramu Jimnéez MD OR same dept: Visit date not found OR same specialty: Visit date not found  Last physical: 1/22/2021 Last MTM visit: 2/16/2018 Che Silva, PharmD   Next visit within 3 mo: Visit date not found  Next physical within 3 mo: Visit date not found  Prescriber OR PCP: Ramu Jiménez MD  Last diagnosis associated with med order: 1. Insomnia  - traZODone (DESYREL) 50 MG tablet [Pharmacy Med Name: TRAZODONE 50MG TABLETS]; TAKE 1 TABLET(50 MG) BY MOUTH AT BEDTIME  Dispense: 90 tablet; Refill: 3    If protocol passes may refill for 12 months if within 3 months of last provider visit (or a total of 15 months).

## 2021-06-25 NOTE — TELEPHONE ENCOUNTER
Refill Approved    Rx renewed per Medication Renewal Policy. Medication was last renewed on 3/22/21.    Davis Uribe, [facility] Connection Triage/Med Refill 6/9/2021     Requested Prescriptions   Pending Prescriptions Disp Refills     sertraline (ZOLOFT) 100 MG tablet [Pharmacy Med Name: SERTRALINE 100MG TABLETS] 90 tablet 0     Sig: TAKE 1 TABLET(100 MG) BY MOUTH DAILY       [facility] Refill Protocol  Passed - 6/8/2021  6:09 AM        Passed - PCP or prescribing provider visit in last year     Last office visit with prescriber/PCP: 3/2/2020 Ramu Jiménez MD OR same dept: Visit date not found OR same specialty: Visit date not found  Last physical: 1/22/2021 Last MTM visit: 2/16/2018 hCe Silva, PharmD   Next visit within 3 mo: Visit date not found  Next physical within 3 mo: Visit date not found  Prescriber OR PCP: Ramu Jiménez MD  Last diagnosis associated with med order: 1. Generalized anxiety disorder  - sertraline (ZOLOFT) 100 MG tablet [Pharmacy Med Name: SERTRALINE 100MG TABLETS]; TAKE 1 TABLET(100 MG) BY MOUTH DAILY  Dispense: 90 tablet; Refill: 0    2. Tinnitus  - hydrOXYzine HCL (ATARAX) 25 MG tablet [Pharmacy Med Name: HYDROXYZINE HCL 25MG TABS (WHITE)]; TAKE 1/2 TABLET(12.5 MG) BY MOUTH AT BEDTIME AS NEEDED FOR ITCHING  Dispense: 45 tablet; Refill: 0    3. Insomnia  - hydrOXYzine HCL (ATARAX) 25 MG tablet [Pharmacy Med Name: HYDROXYZINE HCL 25MG TABS (WHITE)]; TAKE 1/2 TABLET(12.5 MG) BY MOUTH AT BEDTIME AS NEEDED FOR ITCHING  Dispense: 45 tablet; Refill: 0    If protocol passes may refill for 12 months if within 3 months of last provider visit (or a total of 15 months).                hydrOXYzine HCL (ATARAX) 25 MG tablet [Pharmacy Med Name: HYDROXYZINE HCL 25MG TABS (WHITE)] 45 tablet 0     Sig: TAKE 1/2 TABLET(12.5 MG) BY MOUTH AT BEDTIME AS NEEDED FOR ITCHING       Antihistamine Refill Protocol Passed - 6/8/2021  6:09 AM        Passed - Patient has had office visit/physical in last year     Last  office visit with prescriber/PCP: 3/2/2020 Ramu Jiménez MD OR same dept: Visit date not found OR same specialty: Visit date not found  Last physical: 1/22/2021 Last MTM visit: 2/16/2018 Che Silva, PharmD   Next visit within 3 mo: Visit date not found  Next physical within 3 mo: Visit date not found  Prescriber OR PCP: Ramu Jiménez MD  Last diagnosis associated with med order: 1. Generalized anxiety disorder  - sertraline (ZOLOFT) 100 MG tablet [Pharmacy Med Name: SERTRALINE 100MG TABLETS]; TAKE 1 TABLET(100 MG) BY MOUTH DAILY  Dispense: 90 tablet; Refill: 0    2. Tinnitus  - hydrOXYzine HCL (ATARAX) 25 MG tablet [Pharmacy Med Name: HYDROXYZINE HCL 25MG TABS (WHITE)]; TAKE 1/2 TABLET(12.5 MG) BY MOUTH AT BEDTIME AS NEEDED FOR ITCHING  Dispense: 45 tablet; Refill: 0    3. Insomnia  - hydrOXYzine HCL (ATARAX) 25 MG tablet [Pharmacy Med Name: HYDROXYZINE HCL 25MG TABS (WHITE)]; TAKE 1/2 TABLET(12.5 MG) BY MOUTH AT BEDTIME AS NEEDED FOR ITCHING  Dispense: 45 tablet; Refill: 0    If protocol passes may refill for 12 months if within 3 months of last provider visit (or a total of 15 months).

## 2021-06-25 NOTE — TELEPHONE ENCOUNTER
"Late entry from 3/14/19. Patient called in and left VM of an update. Sternal chest discomfort has lessened some, slept well HR staying 80-90 range. Did try some Ibuprofen at 1500 day before, \"a little helpful\". Feeling pretty ok this am and is at work. KUMAR,RN  "

## 2021-06-25 NOTE — TELEPHONE ENCOUNTER
Patient wife is calling to let us know that Ramu is having a procedure today and was told he needs to hold his Ibrutinib.  She is wondering if that is OK.  His procedure was a pericardiocentesis.  Per Radha WATKINS, patient to hold his Ibrutinib for a total of 7 days and then he came resume his medication.  She verbalized understanding and had no further questions or concerns.

## 2021-06-25 NOTE — PROGRESS NOTES
Hospital discharge follow up call to pt, no answer.  Left VM message reminding patient of their upcoming hospital f/u appt 3/22/19 w/Dr. Jiménez at 10:20am.  RN will attempt call back at another time.    Nai Vargas RN Care Manager, Population Health

## 2021-06-25 NOTE — PROGRESS NOTES
CARDIOLOGY RAPID ACCESS CLINIC CONSULT NOTE     Assessment/Plan:   1. Chest pain, atypical for angina with no evidence of ischemia on ECG or worsening with activities.  Will check stress echocardiogram to exclude coronary disease as cause, assess exercise capacity.    2. Paroxysmal atrial fibrillation with rapid ventricular response.  This appears to be frequently symptomatic, though not necessarily interfering with activities.  [id number] score suggests low stroke risk.  He is reluctant to add any additional medications at this time, either rate controlling or rhythm controlling.  Discussed potential need for anticoagulation if cardioversion is planned.  3. Hyperlipidemia, currently not on treatment  4. Probable obstructive sleep apnea based on symptoms.  Agrees to sleep apnea evaluation    Follow up Dr. Arana 2 months     History of Present Illness:     It is my pleasure to see Ramu Espinoza at the [facility] clinic for evaluation of paroxysmal atrial fibrillation with rapid ventricular response.    Ramu Espinoza is a 58 y.o. male with a past medical history of CLL on treatment, who was seen last month by Dr. Arana for follow-up of paroxysmal atrial fibrillation.  An echocardiogram was performed which showed normal left ventricular function and normal right ventricular function without significant valvular abnormalities.  His [id number] score is 0 suggesting low risk of stroke.    Over the last few days he has begun to experience chest pains, substernal in location.  These pains are not worsened with his work as a , and are usually associated with the palpitations, but not always.  He feels that the palpitations are lasting longer than usual also.  Pain, however, can last hours.  He denies any pleuritic nature to it or relation to activities.  Pain is not awakened him from his sleep.  His weight is stable    Past Medical History:     Patient Active Problem List   Diagnosis      Tinnitus     Generalized Anxiety Disorder     Hyperlipidemia     Dupuytren's Contracture     Insomnia     Chronic lymphocytic leukemia (H)     Internal Hemorrhoids With Bleeding     Depression     Paroxysmal atrial fibrillation (H)       Past Surgical History:     Past Surgical History:   Procedure Laterality Date     [place] TREAT INTER/SUBTROCH FX,W/PLATE/SCREW      Description: Open Treatment Of An Intertrochanteric Fracture;  Recorded: 04/01/2009;       Family History:     Family History   Problem Relation Age of Onset     Cancer Father         pacreatic      Prostate cancer Father      Colon cancer Mother      Cancer Mother      Cancer Brother      Lung cancer Brother      Hypertension Brother      No Medical Problems Sister      No Medical Problems Brother      Cancer Brother      Anuerysm Brother      Hypertension Brother      Hypertension Brother      No Medical Problems Brother      Diabetes Paternal Aunt      Diabetes Maternal Aunt      Diabetes Maternal Aunt      Cancer Maternal Grandfather          Social History:    reports that he quit smoking about 14 years ago. He has a 12.50 pack-year smoking history. he has never used smokeless tobacco. He reports that he drinks about 1.8 oz of alcohol per week. He reports that he does not use drugs.    Exercise: Active, physical work as a .  No aerobic exercise    Sleep: Snores, awakens himself with snoring.  Apnea has been observed.  Daytime sleepiness, but unable to nap.  Reports chronic tinnitus prevents sleep unless medicated    Meds:     Current Outpatient Medications on File Prior to Visit   Medication Sig Dispense Refill     acyclovir (ZOVIRAX) 400 MG tablet TAKE 1 TABLET BY MOUTH TWICE A  tablet 2     cholecalciferol, vitamin D3, 1,000 unit tablet Take 2,000 Units by mouth daily. 1-2 tablets       hydrOXYzine HCl (ATARAX) 25 MG tablet TAKE 1/2 TABLET(12.5 MG) BY MOUTH AT BEDTIME AS NEEDED FOR ITCHING 45 tablet 3     ibuprofen (ADVIL,MOTRIN)  "200 MG tablet Take 800 mg by mouth as needed for pain.       LORazepam (ATIVAN) 1 MG tablet TAKE 1 TABLET BY MOUTH DAILY AS NEEDED. 30 tablet 0     metoprolol succinate (TOPROL-XL) 50 MG 24 hr tablet Take 2 tablets (100 mg total) by mouth daily. 180 tablet 2     sertraline (ZOLOFT) 100 MG tablet Take 1 tablet (100 mg total) by mouth daily. 90 tablet 1     Study Drug Ibrutinib 140 mg 140 mg cap capsule Take 420 mg by mouth daily.       tamsulosin (FLOMAX) 0.4 mg Cp24 TK 1 C PO ONCE D PC  11     traZODone (DESYREL) 50 MG tablet Take 1 tablet (50 mg total) by mouth at bedtime. 90 tablet 2     melatonin 5 mg TbDL disintegrating tablet Take 5 mg by mouth bedtime as needed.       No current facility-administered medications on file prior to visit.        Allergies:   Azithromycin    Review of Systems:     General: Night Sweats  Eyes: WNL  Ears/Nose/Throat: WNL  Lungs: Snoring, Wheezing  Heart: Chest Pain, Shortness of Breath with activity, Irregular Heartbeat  Stomach: WNL  Bladder: WNL  Muscle/Joints: WNL  Skin: WNL  Nervous System: Daytime Sleepiness, Dizziness  Mental Health: Anxiety     Blood: WNL        Objective:      Physical Exam  (!) 252 lb (114.3 kg)  6' 1\" (1.854 m)  Body mass index is 33.25 kg/m .  BP 92/58 (Patient Site: Left Arm, Patient Position: Sitting, Cuff Size: Adult Large)   Pulse 84   Resp 16   Ht 6' 1\" (1.854 m)   Wt (!) 252 lb (114.3 kg)   BMI 33.25 kg/m      General Appearance : Awake, Alert, anxious  HEENT: No Scleral icterus; the mucous membranes were pink and moist.  Conjunctivae slightly injected  Neck:  No cervical bruits, jugular venous distention, or thyromegaly   Chest: The spine was straight.  Some costochondral tenderness, but this does not reproduce chest pain  Lungs: Respirations unlabored; the lungs are clear to auscultation.  No wheezing   Cardiovascular:   Normal point of maximal impulse.  Auscultation reveals irregularly irregular, rapid first and second heart sounds with no " murmurs, rubs, or gallops.  Carotid, radial, and dorsalis pedal pulses are intact and symmetric.  Abdomen: Obese.  No organomegaly, masses, bruits, or tenderness. Bowels sounds are present  Extremities: No edema  Skin: No xanthelasma. Warm, Dry.  Musculoskeletal: No tenderness.  Neurologic: Alert and oriented ×3.      EKG(personally reviewed):  Atrial fibrillation at 107 bpm.  Nonspecific T wave changes.  QTc 459    Cardiac Imaging Studies:  Echocardiogram 2/2019:      Left ventricle ejection fraction is normal. The calculated left ventricular ejection fraction is 66%.    Normal left ventricular size and systolic function.    Normal right ventricular size and systolic function.    No hemodynamically significant valvular heart abnormalities.    No previous study for comparison.    Holter monitor 11/2018:  CONCLUSION:  Several short episodes of paroxysmal atrial fibrillation with average  ventricular response of approximately 110 to 120 beats per minute.  The longest  episode lasted approximately 50 minutes at approximately 3:00 a.m.  Longest daytime  episode was approximately 4 minutes.    CTA chest PE run 12/2016:  1.  No pulmonary embolism. There is a new small right pleural effusion present.  2.  Mildly prominent left axillary lymph node which has decreased in size since 10/3/2015.    Lab Review   Lab Results   Component Value Date     01/04/2019    K 4.2 01/04/2019     01/04/2019    CO2 29 01/04/2019    BUN 17 01/04/2019    CREATININE 0.78 01/04/2019    CALCIUM 9.3 01/04/2019     Lab Results   Component Value Date    WBC 8.5 01/04/2019    HGB 14.4 01/04/2019    HCT 43.9 01/04/2019    MCV 89 01/04/2019     01/04/2019     Lab Results   Component Value Date    CHOL 266 (H) 08/13/2018    TRIG 201 (H) 08/13/2018    HDL 42 08/13/2018    LDLCALC 184 (H) 08/13/2018     Lab Results   Component Value Date    TROPONINI <0.01 01/31/2012     No results found for: BNP  Lab Results   Component Value Date     TSH 2.23 08/13/2018       Marv Mirza MD [facility]    His note created using Dragon voice recognition software. Sound alike errors may have escaped editing.

## 2021-06-25 NOTE — PROGRESS NOTES
Progress Notes by Ino Arana DO at 12/5/2017  8:10 AM     Author: Ino Arana DO Service: -- Author Type: Physician    Filed: 12/5/2017  8:48 AM Encounter Date: 12/5/2017 Status: Signed    : Ino Arana DO (Physician)           Click to link to [facility]     [facility] HEART CARE CONSULTATION NOTE    Thank you, Dr. Talamantes, for asking the [facility] team to see Mr. Ramu Espinoza to evaluate atrial fibrilation.      Assessment/Recommendations   Assessment:    1.  Paroxysmal symptomatic atrial fibrillation. CHADVASc:0. Cardiac monitor on May 3 demonstrated symptomatic  atrial fib with RVR (rate 130 bpm). TSH normal.  Possible cause is Imbruvica use.   2. CLL on Imbruvica.       Plan:  1.  Continue metoprolol XL 25 mg daily.  2.  Discussed ASA 81 mg daily therapy.    3.  Recommend continuation of Imbruvica.   4.  Patient will follow-up in 6 months.    5.  Patient monitor for cardiac symptoms as he rehabs his recent right knee surgery.     History of Present Illness/Subjective    Mr. Ramu Espinoza is a 57 y.o. male with CLL who presents in cardiology clinic in follow-up for symptomatic atrial fibrillation.  According to the patient since starting metoprolol XL 25 mg daily he is noted resolution of his palpitation symptoms.  He underwent recent right knee endoscopic surgery which he is currently rehabbing.    He denies any anginal chest pain, palpitations, orthopnea or PND symptoms.  He has no significant dyspnea on exertion.  Has any complications related to use of metoprolol.  Remains very anxious about his cardiac history.       Physical Examination Review of Systems   Vitals:    12/05/17 0813   BP: 124/68   Pulse: 76   Resp: 18     Body mass index is 34.57 kg/(m^2).  Wt Readings from Last 3 Encounters:   12/05/17 (!) 262 lb (118.8 kg)   11/13/17 (!) 263 lb (119.3 kg)   11/01/17 (!) 261 lb (118.4 kg)     [unfilled]  General Appearance:   no  distress, normal body habitus   ENT/Mouth: membranes moist or bleeding gums.      EYES:  no scleral icterus, normal conjunctivae   Neck: no carotid bruits   Chest/Lungs:   lungs are clear to auscultation, no rales or wheezing, no sternal scar, equal chest wall expansion    Cardiovascular:   Regular. Normal first and second heart sounds with no murmurs, rubs, or gallops; the carotid, radial and posterior tibial pulses are intact, Jugular venous pressure normal, no edema bilaterally. (No change)    Abdomen:  no tenderness; bowel sounds are present   Extremities: no cyanosis or clubbing   Skin: no xanthelasma, warm.    Neurologic: normal  bilateral, no tremors     Psychiatric: alert and oriented x3, calm     General: Weight Gain  Eyes: WNL  Ears/Nose/Throat: WNL  Lungs: WNL  Heart: WNL  Stomach: WNL  Bladder: WNL  Muscle/Joints: WNL  Skin: WNL  Nervous System: WNL  Mental Health: WNL     Blood: WNL         Medical History  Surgical History Family History Social History   Past Medical History:   Diagnosis Date   ? BPH (benign prostatic hyperplasia)    ? CLL (chronic lymphocytic leukemia)    ? Lymphadenopathy     Created by Conversion  Replacement Utility updated for latest IMO load    Past Surgical History:   Procedure Laterality Date   ? [place] TREAT INTER/SUBTROCH FX,W/PLATE/SCREW      Description: Open Treatment Of An Intertrochanteric Fracture;  Recorded: 04/01/2009;    Family History   Problem Relation Age of Onset   ? Cancer Father    ? Prostate cancer Father    ? Colon cancer Mother    ? Cancer Mother    ? Cancer Brother    ? Lung cancer Brother    ? Hypertension Brother    ? No Medical Problems Sister    ? No Medical Problems Brother    ? Cancer Brother    ? Anuerysm Brother    ? Hypertension Brother    ? Hypertension Brother    ? No Medical Problems Brother    ? Diabetes Paternal Aunt    ? Diabetes Maternal Aunt    ? Diabetes Maternal Aunt    ? Cancer Maternal Grandfather     Social History     Social History    ? Marital status:      Spouse name: N/A   ? Number of children: N/A   ? Years of education: N/A     Occupational History   ? Not on file.     Social History Main Topics   ? Smoking status: Former Smoker     Packs/day: 0.50     Years: 25.00     Quit date: 1/3/2005   ? Smokeless tobacco: Never Used   ? Alcohol use 1.8 - 3.0 oz/week     2 - 3 Cans of beer, 1 - 2 Glasses of wine per week      Comment: only on  the weekends   ? Drug use: No   ? Sexual activity: No     Other Topics Concern   ? 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 Allergies   Allergen Reactions   ? Azithromycin Rash         Lab Results    Chemistry/lipid CBC Cardiac Enzymes/BNP/TSH/INR   Lab Results   Component Value Date    CHOL 229 (H) 10/26/2016    HDL 43 10/26/2016    LDLCALC 157 (H) 10/26/2016    TRIG 144 10/26/2016    CREATININE 0.91 10/11/2017    BUN 16 10/11/2017    K 4.0 10/11/2017     10/11/2017     10/11/2017    CO2 29 10/11/2017    Lab Results   Component Value Date    WBC 9.7 10/11/2017    HGB 15.0 11/13/2017    HCT 42.1 10/11/2017    MCV 87 10/11/2017     10/11/2017    Lab Results   Component Value Date    CKMB 2 01/30/2012    TROPONINI <0.01 01/31/2012    TSH 3.44 04/14/2017

## 2021-06-25 NOTE — PATIENT INSTRUCTIONS - HE
1. We will schedule you for a stress echocardiogram to make sure that heart artery blockages are not causing your chest pains.  2. We will schedule you for a sleep consultation, and a sleep study to look for evidence of sleep apnea  3. We will schedule follow-up in 2 months to discuss with Dr. Arana whether to pursue a rhythm control strategy or continue with a rate control strategy for your atrial fibrillation

## 2021-06-25 NOTE — PROGRESS NOTES
Order for sleep study received, reviewed, and approved.   Comments for study:   none  Tyler Mcpherson MD  4:00 PM, 3/21/2019

## 2021-06-25 NOTE — PROGRESS NOTES
Progress Notes by Ino Arana DO at 5/31/2017  2:30 PM     Author: Ino Arana DO Service: -- Author Type: Physician    Filed: 6/1/2017 12:41 PM Encounter Date: 5/31/2017 Status: Signed    : Ino Arana DO (Physician)           Click to link to [facility] HEART CARE CONSULTATION NOTE    Thank you, Dr. Talamantes, for asking the [facility] team to see Mr. Ramu Espinoza to evaluate atrial fibrilation.      Assessment/Recommendations   Assessment:    1.  Paroxysmal symptomatic atrial fibrillation. CHADVASc:0. Cardiac monitor on May 3 demonstrated symptomatic  atrial fib with RVR (rate 130 bpm). TSH normal.  Likely cause is Imbruvica use.   2. CLL on Imbruvica.       Plan:  1. Patient does not want to start any medical therapy at this time given symptoms are minimal. Long discussion with recommendation to consider taking metoprolol daily given intermittent atrial fib with RVR.   2. Recommended TTE to assess LV function but patient does not want any further testing despite education that atrial fibrillation can result in tachycardia medicated cardiomyopathy.   3. Given minimal symptoms of palpitations would recommend continuation of Imbruvica.   4. Patient will follow-up in 6 months.    5. Call instructions provided to patient and wife.  Patient will call if any worsening symptoms.  Will then start metoprolol and obtain TTE.   6. No indication for anticoagulation at this time given CHADSVASc:0.  Patient aware of embolic stroke risk with atrial fib.  Would not start aspirin therapy given it is generally not effective in preventing strokes in A. fib and also patient will have increased risk of bleeding with the use of aspirin and Imbruvica.         History of Present Illness/Subjective    Mr. Ramu Espinoza is a 57 y.o. male with CLL who presents in cardiology clinic for recurrent episodes of palpitations related to atrial fibrillation.    According to the patient over the past few years he has noticed intermittent episodes of palpitations last a few minutes.  Recently he underwent cardiac monitoring which demonstrated intermittent episodes of symptomatic atrial fibrillation with rapid ventricular response.  Given these findings he was referred to cardiology clinic and presents today in consultation.    In further discussion with the patient he notes no symptoms throughout the day but night when he is at rest he does note intermittent episodes of palpitations lasting a few minutes.  Currently denies any other active cardiac symptoms including anginal chest pain, dyspnea on exertion, orthopnea, PND or lower extremity edema.  His TSH was recently checked which was normal.  He is very active as a .  Negative sleep apnea screening.     He has been on long term therapy for his CLL with Imbruvica.  This drug has a known side effect of inducing atrial fibrillation.  Review of outpatient oncology reports by Dr. Garza appears that his overall course for his CLL is been well controlled with this drug.    Cussing therapy options with the patient at this time he does not want to take any further medications.  He feels his symptoms are minimal.  He prefer to monitor his symptoms further without starting metoprolol at this time.  If he notes worsening symptoms he will call the clinic and at that time will proceed with starting metoprolol therapy and obtaining echocardiogram.  Cardiac monitor was report was reviewed.       Physical Examination Review of Systems   Vitals:    05/31/17 1432   BP: 122/72   Pulse: 80   Resp: 18     Body mass index is 34.3 kg/(m^2).  Wt Readings from Last 3 Encounters:   04/14/17 (!) 255 lb (115.7 kg)   04/13/17 (!) 254 lb 3.2 oz (115.3 kg)   02/08/17 (!) 252 lb (114.3 kg)     [unfilled]  General Appearance:   no distress, normal body habitus   ENT/Mouth: membranes moist, no oral lesions or bleeding gums.      EYES:  no scleral  icterus, normal conjunctivae   Neck: no carotid bruits or thyromegaly   Chest/Lungs:   lungs are clear to auscultation, no rales or wheezing, no sternal scar, equal chest wall expansion    Cardiovascular:   Regular. Normal first and second heart sounds with no murmurs, rubs, or gallops; the carotid, radial and posterior tibial pulses are intact, Jugular venous pressure normal, no edema bilaterally    Abdomen:  no organomegaly, masses, bruits, or tenderness; bowel sounds are present   Extremities: no cyanosis or clubbing   Skin: no xanthelasma, warm.    Neurologic: normal  bilateral, no tremors     Psychiatric: alert and oriented x3, calm     Review of Systems - History obtained from the patient  General ROS: negative  Psychological ROS: negative  Ophthalmic ROS: negative  ENT ROS: negative  Allergy and Immunology ROS: negative  Hematological and Lymphatic ROS: negative  Endocrine ROS: negative  Respiratory ROS: no cough, shortness of breath, or wheezing  Cardiovascular ROS: no chest pain or dyspnea on exertion  positive for - palpitations  negative for - chest pain, dyspnea on exertion, edema, loss of consciousness, murmur, orthopnea, paroxysmal nocturnal dyspnea, rapid heart rate or shortness of breath  Gastrointestinal ROS: no abdominal pain, change in bowel habits, or black or bloody stools  Genito-Urinary ROS: no dysuria, trouble voiding, or hematuria  Musculoskeletal ROS: negative  Neurological ROS: no TIA or stroke symptoms  Dermatological ROS: negative          Medical History  Surgical History Family History Social History   Past Medical History:   Diagnosis Date   ? BPH (benign prostatic hyperplasia)    ? CLL (chronic lymphocytic leukemia)    ? Lymphadenopathy     Created by Conversion  Replacement Utility updated for latest IMO load    Past Surgical History:   Procedure Laterality Date   ? [place] TREAT INTER/SUBTROCH FX,W/PLATE/SCREW      Description: Open Treatment Of An Intertrochanteric Fracture;   Recorded: 04/01/2009;    Family History   Problem Relation Age of Onset   ? Cancer Father    ? Prostate cancer Father    ? Colon cancer Mother    ? Cancer Mother    ? Cancer Brother    ? Lung cancer Brother    ? Hypertension Brother    ? No Medical Problems Sister    ? No Medical Problems Brother    ? Cancer Brother    ? Anuerysm Brother    ? Hypertension Brother    ? Hypertension Brother    ? No Medical Problems Brother    ? Diabetes Paternal Aunt    ? Diabetes Maternal Aunt    ? Diabetes Maternal Aunt    ? Cancer Maternal Grandfather     Social History     Social History   ? Marital status:      Spouse name: N/A   ? Number of children: N/A   ? Years of education: N/A     Occupational History   ? Not on file.     Social History Main Topics   ? Smoking status: Former Smoker     Packs/day: 0.50     Years: 25.00     Quit date: 1/3/2005   ? Smokeless tobacco: Never Used   ? Alcohol use 1.8 - 3.0 oz/week     2 - 3 Cans of beer, 1 - 2 Glasses of wine per week      Comment: only on  the weekends   ? Drug use: No   ? Sexual activity: No     Other Topics Concern   ? 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 Allergies   Allergen Reactions   ? Azithromycin Rash         Lab Results    Chemistry/lipid CBC Cardiac Enzymes/BNP/TSH/INR   Lab Results   Component Value Date    CHOL 229 (H) 10/26/2016    HDL 43 10/26/2016    LDLCALC 157 (H) 10/26/2016    TRIG 144 10/26/2016    CREATININE 0.85 04/13/2017    BUN 17 04/13/2017    K 3.8 04/13/2017     04/13/2017     04/13/2017    CO2 27 04/13/2017    Lab Results   Component Value Date    WBC 9.6 04/13/2017    HGB 14.5 04/13/2017    HCT 43.8 04/13/2017    MCV 86 04/13/2017     04/13/2017    Lab Results   Component Value Date    CKMB 2 01/30/2012    TROPONINI <0.01 01/31/2012    TSH 3.44 04/14/2017

## 2021-06-25 NOTE — TELEPHONE ENCOUNTER
RN cannot approve Refill Request    RN can NOT refill this medication patient reports taking medication differently. Last office visit: 3/2/2020 Ramu Jiménez MD Last Physical: 1/22/2021 Last MTM visit: 2/16/2018 Che Silva, PharmD Last visit same specialty: Visit date not found.  Next visit within 3 mo: Visit date not found  Next physical within 3 mo: Visit date not found      [facility] Uribe, [facility] Connection Triage/Med Refill 6/9/2021    Requested Prescriptions   Pending Prescriptions Disp Refills     hydrOXYzine HCL (ATARAX) 25 MG tablet [Pharmacy Med Name: HYDROXYZINE HCL 25MG TABS (WHITE)] 45 tablet 0     Sig: TAKE 1/2 TABLET(12.5 MG) BY MOUTH AT BEDTIME AS NEEDED FOR ITCHING       Antihistamine Refill Protocol Passed - 6/8/2021  6:09 AM        Passed - Patient has had office visit/physical in last year     Last office visit with prescriber/PCP: 3/2/2020 Ramu Jiménez MD OR same dept: Visit date not found OR same specialty: Visit date not found  Last physical: 1/22/2021 Last MTM visit: 2/16/2018 Che Silva, PharmD   Next visit within 3 mo: Visit date not found  Next physical within 3 mo: Visit date not found  Prescriber OR PCP: Ramu Jiménez MD  Last diagnosis associated with med order: 1. Generalized anxiety disorder  - sertraline (ZOLOFT) 100 MG tablet; TAKE 1 TABLET(100 MG) BY MOUTH DAILY  Dispense: 90 tablet; Refill: 2    2. Tinnitus  - hydrOXYzine HCL (ATARAX) 25 MG tablet [Pharmacy Med Name: HYDROXYZINE HCL 25MG TABS (WHITE)]; TAKE 1/2 TABLET(12.5 MG) BY MOUTH AT BEDTIME AS NEEDED FOR ITCHING  Dispense: 45 tablet; Refill: 0    3. Insomnia  - hydrOXYzine HCL (ATARAX) 25 MG tablet [Pharmacy Med Name: HYDROXYZINE HCL 25MG TABS (WHITE)]; TAKE 1/2 TABLET(12.5 MG) BY MOUTH AT BEDTIME AS NEEDED FOR ITCHING  Dispense: 45 tablet; Refill: 0    If protocol passes may refill for 12 months if within 3 months of last provider visit (or a total of 15 months).              Signed Prescriptions  Disp Refills    sertraline (ZOLOFT) 100 MG tablet 90 tablet 2     Sig: TAKE 1 TABLET(100 MG) BY MOUTH DAILY       [facility] Refill Protocol  Passed - 6/8/2021  6:09 AM        Passed - PCP or prescribing provider visit in last year     Last office visit with prescriber/PCP: 3/2/2020 Ramu Jiménez MD OR same dept: Visit date not found OR same specialty: Visit date not found  Last physical: 1/22/2021 Last MTM visit: 2/16/2018 Che Silva, PharmD   Next visit within 3 mo: Visit date not found  Next physical within 3 mo: Visit date not found  Prescriber OR PCP: Ramu Jiménez MD  Last diagnosis associated with med order: 1. Generalized anxiety disorder  - sertraline (ZOLOFT) 100 MG tablet; TAKE 1 TABLET(100 MG) BY MOUTH DAILY  Dispense: 90 tablet; Refill: 2    2. Tinnitus  - hydrOXYzine HCL (ATARAX) 25 MG tablet [Pharmacy Med Name: HYDROXYZINE HCL 25MG TABS (WHITE)]; TAKE 1/2 TABLET(12.5 MG) BY MOUTH AT BEDTIME AS NEEDED FOR ITCHING  Dispense: 45 tablet; Refill: 0    3. Insomnia  - hydrOXYzine HCL (ATARAX) 25 MG tablet [Pharmacy Med Name: HYDROXYZINE HCL 25MG TABS (WHITE)]; TAKE 1/2 TABLET(12.5 MG) BY MOUTH AT BEDTIME AS NEEDED FOR ITCHING  Dispense: 45 tablet; Refill: 0    If protocol passes may refill for 12 months if within 3 months of last provider visit (or a total of 15 months).

## 2021-06-25 NOTE — TELEPHONE ENCOUNTER
Called patient to check in and discuss VM received. Pt states that he is coming in this am for his appointment. Went into work earlier to open, so he could make appointment. Patient was able to work yesterday without increase in symptoms but still has them. Reassured patient to come in to clinic and get assessed. Pt agrees. KUMAR,RN

## 2021-06-26 NOTE — PROGRESS NOTES
"[facility] Daily Progress     Patient Name: Ramu Espinoza  Date: 6/17/2021  Visit #: 2/12  Referral Diagnosis: R ankle pain  Referring provider: Ramu Jiménez MD  Visit Diagnosis:     ICD-10-CM    1. Chronic pain of right ankle  M25.571     G89.29    2. Arthritis of right ankle  M19.071        Assessment:     Today patient returns for 1st follow up and reports he \"sprained\" his ankle. Patient tolerated manual therapy today and left feeling better.     From Eval:  Patient is a 61 y.o. male that presents with signs and symptoms consistent with R medial>lateral ankle pain secondary to arthritic changes and ankle instability. Patient demonstrates impairments including decreased R>L ankle range of motion, joint mobility and flexibility, with TTP at medial ankle joint leading to impaired functional mobility. Patient's functional limitations include walking uphill or climbing stairs and working for longer periods of time.       HEP/POC compliance is  good .  Patient demonstrates understanding/independence with home program.  Patient is appropriate to continue with skilled physical therapy intervention, as indicated by initial plan of care.    Goal Status:  Pt. will be independent with home exercise program in : 6 weeks    Pt will: be able to climb stairs without increased pain in R ankle for working by 6 weeks.  Pt will: be able to report 0/10 pain or difficulty in R ankle by end of work day by 6 weeks.  Pt will: demonstrate more than 0deg of dorsiflexion without increased pain by 6 weeks.        Plan / Patient Education:     Continue with initial plan of care.  Progress with home program as tolerated.       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    Functional limitations are described as occurring with:   ascending stairs or curbs  bending  performing routine daily activities  walking uphill, for longer periods of time    Objective:     Foot/Ankle ROM:          Date: 6/4/2021       Ankle ROM( ) AROM in degrees AROM in degrees AROM in degrees     Right Left Right Left Right Left   Dorsiflexion, gastroc (10 ) 0 - severely limited             Dorsiflexion, soleus (20 )               Plantar Flexion (50 )               Inversion (45-60 ) Limited with P               Palpation: TTP at distal medial malleolus  LE Tissue Extensibility/Flexibility: decreased of HF, hamstrings, gluteals, gastrocs    Treatment Today       Manual Therapy:  PROM of R ankle in supine - DF>INV>EV>PF  With slight ankle distraction    Exercises:  Exercise #1: manual plantar fascia stretching - hold 2 min  Comment #1: standing gastroc and soleus stretch - hold 10 sec x 3  Exercise #2: seated gastroc stretch - hold 10 sec x 3  Comment #2: theraband ankle 4 way - yellow band x 10        TREATMENT MINUTES COMMENTS   Evaluation     Self-care/ Home management     Manual therapy 15 PROM of R ankle in supine - DF>INV>EV>PF  With slight ankle distraction     Neuromuscular Re-education     Therapeutic Activity     Therapeutic Exercises 8 See above flowsheet   Gait training     Modality__________________                Total 23 Patient arrived 6 min late, thought it was at a different location   Blank areas are intentional and mean the treatment did not include these items.       Kristin Muller, PT  6/17/2021

## 2021-06-26 NOTE — PROGRESS NOTES
Progress Notes by Ino Arana DO at 6/12/2018  8:30 AM     Author: Ino Arana DO Service: -- Author Type: Physician    Filed: 6/12/2018  9:01 AM Encounter Date: 6/12/2018 Status: Signed    : Ino Arana DO (Physician)           Click to link to [facility] Care     [facility] HEART CARE CONSULTATION NOTE    Assessment/Recommendations   Assessment:    1.  Paroxysmal symptomatic atrial fibrillation. CHADVASc:0. Cardiac monitor on May 3, 2017 demonstrated symptomatic atrial fib with RVR (rate 130 bpm). TSH normal.  Intermittent palpitations particularly in afternoon after work with mild chest pressure symptoms.  2. CLL on Imbruvica.       Plan:  1.  Increase metoprolol XL 50 mg daily.  2.  If ongoing palpitations would recommend 24-hour Holter monitor and stress testing (Echo Exercise)  3.  Remains on Imbruvica.   4.  Call instructions provided     History of Present Illness/Subjective    Mr. Ramu Espinoza is a 58 y.o. male with CLL who presents in cardiology clinic in follow-up for symptomatic atrial fibrillation.      Last evaluation patient is noticed nearly complete resolution of palpitations with metoprolol XL 25 mg daily.  Was documented that he had symptomatic atrial fibrillation and cardiac monitor in 2017.  More recently he has noted after work he feels palpitations which were forceful beats lasting for a few minutes afterwards of an hour at times.  Denies any anginal chest pain.  Denies any dyspnea on exertion.  Denies any lightheadedness or syncope.      He states that his palpitations may have become worse in the past few months.  Associated with the palpitations and sensation of pressure in his chest which is only occurring with palpitations.  Is not having any chest pain with exertion       Physical Examination Review of Systems   Vitals:    06/12/18 0839   BP: 110/68   Pulse: 76   Resp: 16     Body mass index is 34.83 kg/(m^2).  Wt Readings from Last 3  Encounters:   04/12/18 (!) 262 lb 4.8 oz (119 kg)   01/17/18 (!) 256 lb (116.1 kg)   01/11/18 (!) 255 lb 1 oz (115.7 kg)     [unfilled]  General Appearance:   no distress, obese body habitus   ENT/Mouth: membranes moist or bleeding gums.      EYES:  no scleral icterus, normal conjunctivae   Neck: no carotid bruits   Chest/Lungs:   lungs are clear to auscultation, no rales or wheezing.    Cardiovascular:   Regular. Normal first and second heart sounds with no murmurs, rubs.  Jugular venous pressure normal, no edema bilaterally. (Unchange)    Abdomen:  no tenderness; bowel sounds are present   Extremities: no cyanosis or clubbing   Skin: no xanthelasma, warm.    Neurologic: normal  bilateral, no tremors     Psychiatric: alert and oriented x3, calm     General: WNL  Eyes: WNL  Ears/Nose/Throat: WNL  Lungs: WNL  Heart: Chest Pain  Stomach: WNL  Bladder: WNL  Muscle/Joints: WNL  Skin: WNL  Nervous System: Dizziness  Mental Health: WNL     Blood: WNL         Medical History  Surgical History Family History Social History   Past Medical History:   Diagnosis Date   ? BPH (benign prostatic hyperplasia)    ? CLL (chronic lymphocytic leukemia)    ? Lymphadenopathy     Created by Conversion  Replacement Utility updated for latest IMO load    Past Surgical History:   Procedure Laterality Date   ? NE TREAT INTER/SUBTROCH FX,W/PLATE/SCREW      Description: Open Treatment Of An Intertrochanteric Fracture;  Recorded: 04/01/2009;    Family History   Problem Relation Age of Onset   ? Cancer Father      pacreatic    ? Prostate cancer Father    ? Colon cancer Mother    ? Cancer Mother    ? Cancer Brother    ? Lung cancer Brother    ? Hypertension Brother    ? No Medical Problems Sister    ? No Medical Problems Brother    ? Cancer Brother    ? Anuerysm Brother    ? Hypertension Brother    ? Hypertension Brother    ? No Medical Problems Brother    ? Diabetes Paternal Aunt    ? Diabetes Maternal Aunt    ? Diabetes Maternal Aunt    ?  Cancer Maternal Grandfather     Social History     Social History   ? Marital status:      Spouse name: N/A   ? Number of children: N/A   ? Years of education: N/A     Occupational History   ? Not on file.     Social History Main Topics   ? Smoking status: Former Smoker     Packs/day: 0.50     Years: 25.00     Quit date: 1/3/2005   ? Smokeless tobacco: Never Used   ? Alcohol use 1.8 oz/week     1 Glasses of wine, 2 Cans of beer per week      Comment: only on  the weekends   ? Drug use: No   ? Sexual activity: No     Other Topics Concern   ? 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 Allergies   Allergen Reactions   ? Azithromycin Rash         Lab Results    Chemistry/lipid CBC Cardiac Enzymes/BNP/TSH/INR   Lab Results   Component Value Date    CHOL 229 (H) 10/26/2016    HDL 43 10/26/2016    LDLCALC 157 (H) 10/26/2016    TRIG 144 10/26/2016    CREATININE 0.84 04/12/2018    BUN 20 04/12/2018    K 4.1 04/12/2018     04/12/2018     04/12/2018    CO2 28 04/12/2018    Lab Results   Component Value Date    WBC 10.4 04/12/2018    HGB 14.1 04/12/2018    HCT 42.1 04/12/2018    MCV 88 04/12/2018     04/12/2018    Lab Results   Component Value Date    CKMB 2 01/30/2012    TROPONINI <0.01 01/31/2012    TSH 3.44 04/14/2017

## 2021-06-26 NOTE — PROGRESS NOTES
Progress Notes by Ino Arana DO at 10/30/2018  9:50 AM     Author: Ino Arana DO Service: -- Author Type: Physician    Filed: 10/30/2018 12:57 PM Encounter Date: 10/30/2018 Status: Signed    : Ino Arana DO (Physician)           Click to link to [facility] Heart Care     [facility] HEART CARE CONSULTATION NOTE    Assessment/Recommendations   Assessment:    1.  Paroxysmal symptomatic atrial fibrillation. CHADVASc:0. Cardiac monitor on May 3, 2017 demonstrated symptomatic atrial fib with RVR (rate 130 bpm). TSH normal.  Imbruvica can increase risk of atrial fib/flutter.    2. CLL on Imbruvica.       Plan:  1.  Continue metoprolol XL 50 mg daily.  2.  Palpitations appear to be more likely related to forceful beats or sensation of palpitations.  Would recommend repeating 24-hour Holter monitor to determine why he is developing afternoon palpitation symptoms daily.    3.  Continue Imbruvica from a cardiac standpoint at this time.   4.  Patient remains very hesitant to do any procedures or testing.  I discussed considering echo in the past which he has been quite hesitant to do for reasons that are not clear.     History of Present Illness/Subjective    Mr. Ramu Espinoza is a 58 y.o. male with CLL who presents in cardiology clinic in follow-up for symptomatic atrial fibrillation and palpitations.     Since last evaluation the patient did increase metoprolol to 50 mg daily which did improve his palpitation symptoms but has gradually worsened again.  He states that his palpitations occur a few hours after taking his chemotherapy agent.  He states that his heart rates are not fast but he feels that they are very forceful.  He has not taken his blood pressure at the time of these forceful beats.  I am somewhat concerned that it may be increasing his blood pressure which is causing a sensation of palpitations and not truly helping symptomatic atrial fibrillation.      He is  agreeable to undergoing 24-hour monitoring.  He remains hesitant to do stress testing or echocardiogram at this time.  His chest pain is not present at this time.  He has taken a new job as of [employer] and is very active at work and denies any exertional dyspnea or chest pain symptoms while at work.  He said no syncopal episodes or near syncope.     Physical Examination Review of Systems   Vitals:    10/30/18 1001   BP: 132/70   Pulse: 66   Resp: 14     Body mass index is 33.78 kg/(m^2).  Wt Readings from Last 3 Encounters:   10/30/18 (!) 256 lb (116.1 kg)   10/25/18 (!) 254 lb (115.2 kg)   10/01/18 (!) 262 lb 8 oz (119.1 kg)     [unfilled]  General Appearance:   no distress, obese body habitus   ENT/Mouth: membranes moist or bleeding gums.      EYES:  no scleral icterus, normal conjunctivae   Neck: no carotid bruits   Chest/Lungs:   lungs are clear to auscultation, no rales or wheezing.    Cardiovascular:   Regular. Normal first and second heart sounds with no murmurs, rubs.  Jugular venous pressure normal, no edema bilaterally.    Abdomen:  no tenderness; bowel sounds are present   Extremities: no cyanosis or clubbing   Skin: no xanthelasma, warm.    Neurologic: normal  bilateral, no tremors     Psychiatric: alert and oriented x3, calm     General: WNL  Eyes: WNL  Ears/Nose/Throat: WNL  Lungs: WNL  Heart: Chest Pain, Irregular Heartbeat  Stomach: Diarrhea  Bladder: WNL  Muscle/Joints: WNL  Skin: WNL  Nervous System: Dizziness  Mental Health: Anxiety     Blood: WNL         Medical History  Surgical History Family History Social History   Past Medical History:   Diagnosis Date   ? BPH (benign prostatic hyperplasia)    ? CLL (chronic lymphocytic leukemia) (H)    ? Lymphadenopathy     Created by Conversion  Replacement Utility updated for latest IMO load    Past Surgical History:   Procedure Laterality Date   ? MD TREAT INTER/SUBTROCH FX,W/PLATE/SCREW      Description: Open Treatment Of An Intertrochanteric  Fracture;  Recorded: 04/01/2009;    Family History   Problem Relation Age of Onset   ? Cancer Father      pacreatic    ? Prostate cancer Father    ? Colon cancer Mother    ? Cancer Mother    ? Cancer Brother    ? Lung cancer Brother    ? Hypertension Brother    ? No Medical Problems Sister    ? No Medical Problems Brother    ? Cancer Brother    ? Anuerysm Brother    ? Hypertension Brother    ? Hypertension Brother    ? No Medical Problems Brother    ? Diabetes Paternal Aunt    ? Diabetes Maternal Aunt    ? Diabetes Maternal Aunt    ? Cancer Maternal Grandfather     Social History     Social History   ? Marital status:      Spouse name: N/A   ? Number of children: N/A   ? Years of education: N/A     Occupational History   ? Not on file.     Social History Main Topics   ? Smoking status: Former Smoker     Packs/day: 0.50     Years: 25.00     Quit date: 1/3/2005   ? Smokeless tobacco: Never Used   ? Alcohol use 1.8 oz/week     1 Glasses of wine, 2 Cans of beer per week      Comment: only on  the weekends   ? Drug use: No   ? Sexual activity: No     Other Topics Concern   ? 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 Allergies   Allergen Reactions   ? Azithromycin Rash         Lab Results    Chemistry/lipid CBC Cardiac Enzymes/BNP/TSH/INR   Lab Results   Component Value Date    CHOL 266 (H) 08/13/2018    HDL 42 08/13/2018    LDLCALC 184 (H) 08/13/2018    TRIG 201 (H) 08/13/2018    CREATININE 0.84 10/01/2018    BUN 17 10/01/2018    K 4.7 10/01/2018     10/01/2018     10/01/2018    CO2 26 10/01/2018    Lab Results   Component Value Date    WBC 9.5 10/01/2018    HGB 14.5 10/01/2018    HCT 43.1 10/01/2018    MCV 89 10/01/2018     10/01/2018    Lab Results   Component Value Date    CKMB 2 01/30/2012    TROPONINI <0.01 01/31/2012    TSH 2.23 08/13/2018

## 2021-06-27 NOTE — PROGRESS NOTES
Progress Notes by Ino Arana DO at 4/9/2019  9:50 AM     Author: Ino Arana DO Service: -- Author Type: Physician    Filed: 4/9/2019  2:17 PM Encounter Date: 4/9/2019 Status: Signed    : Ino Arana DO (Physician)           Click to link to [facility]     [facility] HEART CARE NOTE    Assessment/Recommendations   Assessment:    1. Acute pericarditis with moderate pericardial effusion requiring pericardiocentesis. CRP:7  2.  Paroxysmal symptomatic atrial fibrillation. CHADVASc:0. Cardiac monitoring has confirmed paroxsymal atria fib as cause of palpitations. TSH normal.  Ibrutinib is associated with atrial fib/flutter and rarely ventricular arrhythmia.    3. CLL on Imbruvica (Ibrutinib).        Plan:  1.  Continue colchicine for 3 months duration  2.  Continue with prolonged ibuprofen duration and taper.  Patient had recurrent chest pain with stopping Advil recently.   3.  Continue metoprolol  mg daily.  4. CRP today.       History of Present Illness/Subjective    Mr. Ramu Espinoza is a 58 y.o. male with CLL who presents in cardiology clinic in follow-up for symptomatic atrial fibrillation and palpitations.     Please read my previous notes regarding her long conversation about his atrial fibrillation ablation and anticoagulation.  Again today had a long conversation about atrial fibrillation and anticoagulation.  He has a chads vas score of 0.  Patient states that numerous people during his hospitalization told him that he need to be on anticoagulation but given his chads vas score of 0 I do not recommend at this time.  Also given he has an active pericardial inflammation it is not recommended to be on full dose anticoagulation given concern for transition to hemopericardium.      Patient a lot of questions about chest pain related to his pericarditis.  He was initially on ibuprofen which resulted in improvement of the symptoms.  He is not placed on a  very long patient taper which resulted in recurrence of his chest pain.  I did contact him after reviewing his case and we are currently on a long taper of ibuprofen along with colchicine therapy.    Did discuss with him his lab work from his hospitalization.  Pericardial effusion did not demonstrate any sign of malignancy cells.    Review of the discharge summary patient was discharged on relatively short dose of ibuprofen.  I reviewed the pericardiocentesis notes.  I reviewed his most recent echocardiograms       Physical Examination Review of Systems   Vitals:    04/09/19 1013   BP: 104/66   Pulse: 76   Resp: 16     Body mass index is 32.48 kg/m .  Wt Readings from Last 3 Encounters:   04/09/19 (!) 253 lb (114.8 kg)   04/04/19 (!) 248 lb 14.4 oz (112.9 kg)   03/22/19 (!) 255 lb (115.7 kg)       General Appearance:   no distress, obese body habitus   ENT/Mouth: membranes moist or bleeding gums.      EYES:  no scleral icterus, normal conjunctivae   Neck: no carotid bruits   Chest/Lungs:   lungs are clear to auscultation, no rales or wheezing.    Cardiovascular:   Regular. Normal first and second heart sounds with no murmurs . No rubs.  Jugular venous pressure normal, no edema bilaterally.    Abdomen:  no tenderness; bowel sounds are present   Extremities: no cyanosis or clubbing   Skin: no xanthelasma, warm.    Neurologic: normal  bilateral, no tremors     Psychiatric: alert and oriented x3, calm     General: WNL  Eyes: WNL  Ears/Nose/Throat: WNL  Lungs: Cough, Shortness of Breath  Heart: WNL  Stomach: Diarrhea  Bladder: WNL  Muscle/Joints: WNL  Skin: WNL  Nervous System: Dizziness  Mental Health: Anxiety     Blood: WNL         Medical History  Surgical History Family History Social History   Past Medical History:   Diagnosis Date   ? BPH (benign prostatic hyperplasia)    ? CLL (chronic lymphocytic leukemia) (H)    ? Lymphadenopathy     Created by Conversion  Replacement Utility updated for latest IMO load   ?  Paroxysmal atrial fibrillation with rapid ventricular response (H)     Past Surgical History:   Procedure Laterality Date   ? LA TREAT INTER/SUBTROCH FX,W/PLATE/SCREW      Description: Open Treatment Of An Intertrochanteric Fracture;  Recorded: 2009;    Family History   Problem Relation Age of Onset   ? Cancer Father         pacreatic    ? Prostate cancer Father    ? Colon cancer Mother    ? Cancer Mother    ? Cancer Brother    ? Lung cancer Brother    ? Hypertension Brother    ? No Medical Problems Sister    ? No Medical Problems Brother    ? Cancer Brother    ? Anuerysm Brother    ? Hypertension Brother    ? Hypertension Brother    ? No Medical Problems Brother    ? Diabetes Paternal Aunt    ? Diabetes Maternal Aunt    ? Diabetes Maternal Aunt    ? Cancer Maternal Grandfather     Social History     Socioeconomic History   ? Marital status:      Spouse name: Not on file   ? Number of children: Not on file   ? Years of education: Not on file   ? Highest education level: Not on file   Occupational History   ? Not on file   Social Needs   ? Financial resource strain: Not on file   ? Food insecurity:     Worry: Not on file     Inability: Not on file   ? Transportation needs:     Medical: Not on file     Non-medical: Not on file   Tobacco Use   ? Smoking status: Former Smoker     Packs/day: 0.50     Years: 25.00     Pack years: 12.50     Types: Cigarettes     Last attempt to quit: 1/3/2005     Years since quittin.2   ? Smokeless tobacco: Never Used   Substance and Sexual Activity   ? Alcohol use: Yes     Alcohol/week: 1.8 oz     Types: 1 Glasses of wine, 2 Cans of beer per week     Comment: only on  the weekends   ? Drug use: No   ? Sexual activity: Never     Partners: Female   Lifestyle   ? Physical activity:     Days per week: Not on file     Minutes per session: Not on file   ? Stress: Not on file   Relationships   ? Social connections:     Talks on phone: Not on file     Gets together: Not on file      Attends Faith service: Not on file     Active member of club or organization: Not on file     Attends meetings of clubs or organizations: Not on file     Relationship status: Not on file   ? Intimate partner violence:     Fear of current or ex partner: Not on file     Emotionally abused: Not on file     Physically abused: Not on file     Forced sexual activity: Not on file   Other Topics Concern   ? 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 acyclovir (ZOVIRAX) 400 MG tablet TAKE 1 TABLET BY MOUTH TWICE A  tablet 2   ? acyclovir (ZOVIRAX) 400 MG tablet TAKE 1 TABLET BY MOUTH TWICE A  tablet 2   ? cholecalciferol, vitamin D3, 1,000 unit tablet Take 2,000 Units by mouth daily.            ? colchicine 0.6 mg tablet Take 1 tablet (0.6 mg total) by mouth daily. 60 tablet 2   ? hydrOXYzine HCl (ATARAX) 25 MG tablet Take 12.5 mg by mouth at bedtime.     ? ibuprofen (MOTRIN IB) 200 MG tablet Take 2 tablets (400 mg total) by mouth 3 (three) times a day for 14 days, THEN 2 tablets (400 mg total) 2 (two) times a day for 14 days, THEN 1 tablet (200 mg total) 2 (two) times a day for 14 days, THEN 1 tablet (200 mg total) daily for 14 days.  0   ? LORazepam (ATIVAN) 1 MG tablet TAKE 1 TABLET BY MOUTH DAILY AS NEEDED. 30 tablet 0   ? metoprolol succinate (TOPROL-XL) 50 MG 24 hr tablet Take 2 tablets (100 mg total) by mouth daily. 180 tablet 2   ? omeprazole (PRILOSEC OTC) 20 MG tablet Take 20 mg by mouth daily before breakfast. While on Ibuprofen           ? sertraline (ZOLOFT) 100 MG tablet Take 1 tablet (100 mg total) by mouth daily. 90 tablet 1   ? sertraline (ZOLOFT) 100 MG tablet Take 1 tablet (100 mg total) by mouth daily. 90 tablet 3   ? tamsulosin (FLOMAX) 0.4 mg cap Take 0.4 mg by mouth daily after supper.     ? traZODone (DESYREL) 50 MG tablet Take 1 tablet (50 mg  total) by mouth at bedtime. 90 tablet 2     No current facility-administered medications for this visit.       Allergies   Allergen Reactions   ? Azithromycin Rash         Lab Results    Chemistry/lipid CBC Cardiac Enzymes/BNP/TSH/INR   Lab Results   Component Value Date    CHOL 266 (H) 08/13/2018    HDL 42 08/13/2018    LDLCALC 184 (H) 08/13/2018    TRIG 201 (H) 08/13/2018    CREATININE 0.74 04/04/2019    BUN 17 04/04/2019    K 4.5 04/04/2019     04/04/2019     04/04/2019    CO2 27 04/04/2019    Lab Results   Component Value Date    WBC 11.2 (H) 04/04/2019    HGB 14.0 04/04/2019    HCT 42.5 04/04/2019    MCV 88 04/04/2019     04/04/2019    Lab Results   Component Value Date    CKMB 2 01/30/2012    TROPONINI <0.01 03/17/2019    BNP 89 (H) 03/16/2019    TSH 3.11 03/22/2019    INR 1.09 03/16/2019

## 2021-06-27 NOTE — PROGRESS NOTES
Progress Notes by Ino Arana DO at 7/30/2019  1:30 PM     Author: Ino Arana DO Service: -- Author Type: Physician    Filed: 7/30/2019  1:58 PM Encounter Date: 7/30/2019 Status: Signed    : Ino Arana DO (Physician)           Click to link to [facility]     [facility] HEART CARE NOTE    Assessment/Recommendations   Assessment:    1.  Acute pericarditis with moderate pericardial effusion requiring pericardiocentesis. CRP:7 (current;0.7)  2.  Paroxysmal symptomatic atrial fibrillation. CHADVASc:0.   3. CLL on Imbruvica (Ibrutinib).        Plan:  1.  Completed colchicine for 3 months duration.  CRP normal.   2.  Continue metoprolol  mg daily.  3.  No indication at this time for anticoagulation (patient is on ibrutinib).     Follow-up in 12 months.  Call instruction discussed.       History of Present Illness/Subjective    Mr. Ramu Espinoza is a 59 y.o. male with CLL who presents in cardiology clinic in follow-up for recent pericarditis, paroxysmal symptom atrial fibrillation and acute pericarditis.    Since her last evaluation the patient has had resolution of his acute pericarditis with the use of colchicine and ibuprofen.  He stopped his colchicine 3 days ago.  Most recent CRP level was 0.7.  Peak CRP was 7.  Currently denies any chest pain symptoms.  Denies any fever chills or sweats.  He has intermittent palpitations in the evening which is been chronic related to paroxysmal atrial fibrillation.  He is not on anticoagulation given his risk of embolic stroke is low along with creased bleeding risk with the use of ibrutinib.    Overall remains active at work.  Chest pain symptoms.  Denies any syncope or near syncope.  Denies any significant lower extremity edema         Physical Examination Review of Systems   Vitals:    07/30/19 1338   BP: 98/70   Pulse: 68   Resp: 16     There is no height or weight on file to calculate BMI.  Wt Readings from Last 3  Encounters:   07/12/19 (!) 256 lb (116.1 kg)   06/27/19 (!) 251 lb 12.8 oz (114.2 kg)   06/17/19 (!) 252 lb (114.3 kg)       General Appearance:   no distress, obese body habitus   ENT/Mouth: membranes moist or bleeding gums.      EYES:  no scleral icterus, normal conjunctivae   Neck: no carotid bruits   Chest/Lungs:   lungs are clear to auscultation, no rales or wheezing.    Cardiovascular:   Regular. Normal first and second heart sounds with no murmurs . No rubs.  Jugular venous pressure normal, trace edema bilaterally.    Abdomen:  no tenderness; bowel sounds are present   Extremities: no cyanosis or clubbing   Skin: no xanthelasma, warm.    Neurologic: normal  bilateral, no tremors     Psychiatric: alert and oriented x3, calm     General: WNL  Eyes: WNL  Ears/Nose/Throat: WNL  Lungs: WNL  Heart: WNL  Stomach: WNL  Bladder: WNL  Muscle/Joints: WNL  Skin: WNL  Nervous System: WNL  Mental Health: WNL     Blood: WNL         Medical History  Surgical History Family History Social History   Past Medical History:   Diagnosis Date   ? BPH (benign prostatic hyperplasia)    ? CLL (chronic lymphocytic leukemia) (H)    ? Lymphadenopathy     Created by Conversion  Replacement Utility updated for latest IMO load   ? Paroxysmal atrial fibrillation with rapid ventricular response (H)    ? Sleep apnea     Past Surgical History:   Procedure Laterality Date   ? MI TREAT INTER/SUBTROCH FX,W/PLATE/SCREW      Description: Open Treatment Of An Intertrochanteric Fracture;  Recorded: 04/01/2009;    Family History   Problem Relation Age of Onset   ? Cancer Father         pacreatic    ? Prostate cancer Father    ? Colon cancer Mother    ? Cancer Mother    ? Cancer Brother    ? Lung cancer Brother    ? Hypertension Brother    ? No Medical Problems Sister    ? No Medical Problems Brother    ? Cancer Brother    ? Anuerysm Brother    ? Hypertension Brother    ? Hypertension Brother    ? No Medical Problems Brother    ? Diabetes Paternal  Aunt    ? Diabetes Maternal Aunt    ? Diabetes Maternal Aunt    ? Cancer Maternal Grandfather     Social History     Socioeconomic History   ? Marital status:      Spouse name: Not on file   ? Number of children: Not on file   ? Years of education: Not on file   ? Highest education level: Not on file   Occupational History   ? Not on file   Social Needs   ? Financial resource strain: Not on file   ? Food insecurity:     Worry: Not on file     Inability: Not on file   ? Transportation needs:     Medical: Not on file     Non-medical: Not on file   Tobacco Use   ? Smoking status: Former Smoker     Packs/day: 0.50     Years: 25.00     Pack years: 12.50     Types: Cigarettes     Last attempt to quit: 1/3/2005     Years since quittin.5   ? Smokeless tobacco: Never Used   Substance and Sexual Activity   ? Alcohol use: Yes     Alcohol/week: 1.2 oz     Types: 1 Glasses of wine, 1 Cans of beer per week     Comment: on  a bottle of wine   ? Drug use: No   ? Sexual activity: Never     Partners: Female   Lifestyle   ? Physical activity:     Days per week: Not on file     Minutes per session: Not on file   ? Stress: Not on file   Relationships   ? Social connections:     Talks on phone: Not on file     Gets together: Not on file     Attends Caodaism service: Not on file     Active member of club or organization: Not on file     Attends meetings of clubs or organizations: Not on file     Relationship status: Not on file   ? Intimate partner violence:     Fear of current or ex partner: Not on file     Emotionally abused: Not on file     Physically abused: Not on file     Forced sexual activity: Not on file   Other Topics Concern   ? 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 acyclovir (ZOVIRAX) 400 MG tablet TAKE 1 TABLET BY MOUTH TWICE A  tablet 2   ? cholecalciferol,  vitamin D3, 1,000 unit tablet Take 2,000 Units by mouth daily.            ? hydrOXYzine HCl (ATARAX) 25 MG tablet TAKE 1/2 TABLET(12.5 MG) BY MOUTH AT BEDTIME AS NEEDED FOR ITCHING 45 tablet 3   ? ibuprofen (ADVIL,MOTRIN) 200 MG tablet Take 200 mg by mouth every 6 (six) hours as needed for pain.     ? LORazepam (ATIVAN) 1 MG tablet TAKE 1 TABLET BY MOUTH DAILY AS NEEDED. 30 tablet 0   ? metoprolol succinate (TOPROL-XL) 50 MG 24 hr tablet Take 2 tablets (100 mg total) by mouth daily. 180 tablet 2   ? sertraline (ZOLOFT) 100 MG tablet Take 1 tablet (100 mg total) by mouth daily. 90 tablet 3   ? tamsulosin (FLOMAX) 0.4 mg cap Take 0.4 mg by mouth daily after supper.     ? traZODone (DESYREL) 50 MG tablet Take 1 tablet (50 mg total) by mouth at bedtime. 90 tablet 2   ? colchicine 0.6 mg tablet Take 1 tablet (0.6 mg total) by mouth daily. 60 tablet 2   ? hydrOXYzine HCl (ATARAX) 25 MG tablet Take 12.5 mg by mouth at bedtime.       No current facility-administered medications for this visit.       Allergies   Allergen Reactions   ? Azithromycin Rash         Lab Results    Chemistry/lipid CBC Cardiac Enzymes/BNP/TSH/INR   Lab Results   Component Value Date    CHOL 266 (H) 08/13/2018    HDL 42 08/13/2018    LDLCALC 184 (H) 08/13/2018    TRIG 201 (H) 08/13/2018    CREATININE 0.77 06/27/2019    BUN 17 06/27/2019    K 4.6 06/27/2019     06/27/2019     06/27/2019    CO2 28 06/27/2019    Lab Results   Component Value Date    WBC 8.1 06/27/2019    HGB 13.5 (L) 06/27/2019    HCT 41.1 06/27/2019    MCV 87 06/27/2019     06/27/2019    Lab Results   Component Value Date    CKMB 2 01/30/2012    TROPONINI <0.01 03/17/2019    BNP 89 (H) 03/16/2019    TSH 3.11 03/22/2019    INR 1.09 03/16/2019

## 2021-06-27 NOTE — PROGRESS NOTES
Progress Notes by Ino Arana DO at 2/6/2019  9:10 AM     Author: Ino Arana DO Service: -- Author Type: Physician    Filed: 2/6/2019 10:10 AM Encounter Date: 2/6/2019 Status: Signed    : Ino Arana DO (Physician)           Click to link to [facility]     [facility] HEART CARE NOTE    Assessment/Recommendations   Assessment:    1.  Paroxysmal symptomatic atrial fibrillation. CHADVASc:0. Cardiac monitoring has confirmed paroxsymal atria fib as cause of palpitations. TSH normal.  Ibrutinib is associated with atrial fib/flutter and rarely ventricular arrhythmia.    2. CLL on Imbruvica (Ibrutinib)      Plan:  1.  Continue metoprolol  mg daily.  2.  I discussed alternative therapies to metoprolol if he continues to have worsening palpitations.  The only agent I would recommend would be sotalol therapy.  Discussed this with the patient that we need careful monitoring of his QTc interval.  I would be somewhat hesitant to start this drug as an outpatient with ibrutnib use as an outpatient given risk of ventricular arrhythmias.  3.  Continue Imbruvica from a cardiac standpoint at this time. If worsening symptoms would further discuss with oncology.   4.  Will obtain echocardiogram today.  Patient refuses stress testing.      History of Present Illness/Subjective    Mr. Ramu Espinoza is a 58 y.o. male with CLL who presents in cardiology clinic in follow-up for symptomatic atrial fibrillation and palpitations.     Again long discussion with the patient that his short episodes of palpitations are related to paroxysmal atrial fibrillation with elevated ventricular rates.  He has episodes that last anywhere from a few minutes to approximately 15 minutes.  He states this is always in the afternoon particularly when he takes his ibrutinib.  He has not had any syncopal episodes or near syncope.  He remains very active as a  and walking well over 20,000 steps  per day.  He has no chest pain symptoms with exertion.  Recent cardiac monitoring was reviewed from November 2018.    I had a long discussion with him that his uncle had told and that metoprolol is detrimental to his health.  At this stage feel that metoprolol has the lowest side effect profile with the ability is probably stabilize his condition.  Alternative would be sotalol therapy but this therapy would need to be closely monitored as it can prolong his QTc interval.  I would be hesitant to start him as an outpatient given ibrutinib known risk of ventricular arrhythmia.      Also recommended that we monitor his heart function at least as a baseline.  He is willing to undergo ultrasound testing.  He is not willing to undergo stress testing.  He does state with rapid A. fib he does experience some chest pain symptoms at times.  I discussed the indications for anticoagulation in his case.  Given his chads vas score he would be at relatively low risk for strokes related to his atrial fibrillation assuming he has a structurally normal heart (where obtaining an echo to assess this today).  Despite that he states he would not want to be on anticoagulant if his risks were elevated.        Physical Examination Review of Systems   Vitals:    02/06/19 0916   BP: 122/72   Pulse: 72   Resp: 16     Body mass index is 33.78 kg/m .  Wt Readings from Last 3 Encounters:   02/06/19 (!) 256 lb (116.1 kg)   01/04/19 (!) 262 lb (118.8 kg)   10/30/18 (!) 256 lb (116.1 kg)     [unfilled]  General Appearance:   no distress, obese body habitus   ENT/Mouth: membranes moist or bleeding gums.      EYES:  no scleral icterus, normal conjunctivae   Neck: no carotid bruits   Chest/Lungs:   lungs are clear to auscultation, no rales or wheezing.    Cardiovascular:   Regular. Normal first and second heart sounds with no murmurs, rubs.  Jugular venous pressure normal, no edema bilaterally.    Abdomen:  no tenderness; bowel sounds are present    Extremities: no cyanosis or clubbing   Skin: no xanthelasma, warm.    Neurologic: normal  bilateral, no tremors     Psychiatric: alert and oriented x3, calm     General: WNL  Eyes: WNL  Ears/Nose/Throat: WNL  Lungs: WNL  Heart: Chest Pain, Irregular Heartbeat  Stomach: Diarrhea  Bladder: WNL  Muscle/Joints: Muscle Pain  Skin: WNL  Nervous System: Dizziness  Mental Health: WNL     Blood: WNL         Medical History  Surgical History Family History Social History   Past Medical History:   Diagnosis Date   ? BPH (benign prostatic hyperplasia)    ? CLL (chronic lymphocytic leukemia) (H)    ? Lymphadenopathy     Created by Conversion  Replacement Utility updated for latest IMO load    Past Surgical History:   Procedure Laterality Date   ? AL TREAT INTER/SUBTROCH FX,W/PLATE/SCREW      Description: Open Treatment Of An Intertrochanteric Fracture;  Recorded: 04/01/2009;    Family History   Problem Relation Age of Onset   ? Cancer Father         pacreatic    ? Prostate cancer Father    ? Colon cancer Mother    ? Cancer Mother    ? Cancer Brother    ? Lung cancer Brother    ? Hypertension Brother    ? No Medical Problems Sister    ? No Medical Problems Brother    ? Cancer Brother    ? Anuerysm Brother    ? Hypertension Brother    ? Hypertension Brother    ? No Medical Problems Brother    ? Diabetes Paternal Aunt    ? Diabetes Maternal Aunt    ? Diabetes Maternal Aunt    ? Cancer Maternal Grandfather     Social History     Socioeconomic History   ? Marital status:      Spouse name: Not on file   ? Number of children: Not on file   ? Years of education: Not on file   ? Highest education level: Not on file   Social Needs   ? Financial resource strain: Not on file   ? Food insecurity - worry: Not on file   ? Food insecurity - inability: Not on file   ? Transportation needs - medical: Not on file   ? Transportation needs - non-medical: Not on file   Occupational History   ? Not on file   Tobacco Use   ? Smoking  status: Former Smoker     Packs/day: 0.50     Years: 25.00     Pack years: 12.50     Last attempt to quit: 1/3/2005     Years since quittin.1   ? Smokeless tobacco: Never Used   Substance and Sexual Activity   ? Alcohol use: Yes     Alcohol/week: 1.8 oz     Types: 1 Glasses of wine, 2 Cans of beer per week     Comment: only on  the weekends   ? Drug use: No   ? Sexual activity: No     Partners: Female   Other Topics Concern   ? 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 acyclovir (ZOVIRAX) 400 MG tablet TAKE 1 TABLET BY MOUTH TWICE A  tablet 2   ? cholecalciferol, vitamin D3, 1,000 unit tablet Take 2,000 Units by mouth daily. 1-2 tablets     ? hydrOXYzine HCl (ATARAX) 25 MG tablet TAKE 1/2 TABLET(12.5 MG) BY MOUTH AT BEDTIME AS NEEDED FOR ITCHING 45 tablet 3   ? ibuprofen (ADVIL,MOTRIN) 200 MG tablet Take 800 mg by mouth as needed for pain.     ? LORazepam (ATIVAN) 1 MG tablet TAKE 1 TABLET BY MOUTH DAILY AS NEEDED. 30 tablet 0   ? melatonin 5 mg TbDL disintegrating tablet Take 5 mg by mouth bedtime as needed.     ? metoprolol succinate (TOPROL-XL) 50 MG 24 hr tablet Take 2 tablets (100 mg total) by mouth daily. 180 tablet 2   ? sertraline (ZOLOFT) 100 MG tablet Take 1 tablet (100 mg total) by mouth daily. 90 tablet 1   ? Study Drug Ibrutinib 140 mg 140 mg cap capsule Take 420 mg by mouth daily.     ? tamsulosin (FLOMAX) 0.4 mg Cp24 TK 1 C PO ONCE D PC  11   ? traZODone (DESYREL) 50 MG tablet Take 1 tablet (50 mg total) by mouth at bedtime. 90 tablet 2     No current facility-administered medications for this visit.       Allergies   Allergen Reactions   ? Azithromycin Rash         Lab Results    Chemistry/lipid CBC Cardiac Enzymes/BNP/TSH/INR   Lab Results   Component Value Date    CHOL 266 (H) 2018    HDL 42 2018    LDLCALC 184 (H) 2018    TRIG 201  (H) 08/13/2018    CREATININE 0.78 01/04/2019    BUN 17 01/04/2019    K 4.2 01/04/2019     01/04/2019     01/04/2019    CO2 29 01/04/2019    Lab Results   Component Value Date    WBC 8.5 01/04/2019    HGB 14.4 01/04/2019    HCT 43.9 01/04/2019    MCV 89 01/04/2019     01/04/2019    Lab Results   Component Value Date    CKMB 2 01/30/2012    TROPONINI <0.01 01/31/2012    TSH 2.23 08/13/2018

## 2021-06-28 NOTE — PROGRESS NOTES
Progress Notes by Sondra Bush CNP at 10/16/2019  9:05 AM     Author: Sondra Bush CNP Service: -- Author Type: Nurse Practitioner    Filed: 10/16/2019  1:34 PM Encounter Date: 10/16/2019 Status: Signed    : Sondra Bush CNP (Nurse Practitioner)       Internal Medicine Office Visit  [facility]  Patient Name: Ramu Espinoza  Patient Age: 59 y.o.  YOB: 1960  MRN: [id number]    Date of Visit: 10/16/2019  Reason for Office Visit:   Chief Complaint   Patient presents with   ? Knee Pain     Right knee pain/swelling x 2 days. Noticed yesterday. Was painful while walking. Got a massage yesterday. Hx of torn Meniscus 2 years ago. Had surgery.            Assessment / Plan / Medical Decision Makin. Baker's cyst of knee, right  - US Venous Leg Right; Future  - US Venous Leg Right      Patient Education     Treatment for Bakers Cyst (Popliteal Cyst)  A Bakers cyst (popliteal cyst) is a fluid-filled sac that forms behind the knee.  Types of treatment  You likely wont need any treatment if you dont have any symptoms from your Bakers cyst. Some Bakers cysts go away without any treatment. If your cyst starts causing symptoms, you might need treatment at that time.  If you do have symptoms, you may be treated depending on the cause of your cyst. For example, you may need medicine for rheumatoid arthritis.  Other treatments for a Bakers cyst can include:    Over-the-counter pain medicines    Arthrocentesis, where a needle is used to remove extra fluid from the joint space    Steroid injection into the joint to reduce cyst size    Surgery to remove the cyst  Possible complications of a Bakers cyst  In rare cases, a Bakers cyst may cause complications. The cyst may get larger, which may cause redness and swelling. The cyst may also rupture, causing warmth, redness, and pain in your calf.  The symptoms may be the same as a blood clot in  the veins of the legs. Your healthcare provider may need imaging tests of your leg to make sure you dont have a clot. Rupture can also lead to its own complications, such as:    Trapping of a tibial nerve. This causes calf pain and numbness behind the leg. It can be treated with arthrocentesis and steroid injections.    Blockage of the popliteal artery. This causes pain and lack of blood flow to the leg. It can be treated with arthrocentesis and steroid injections or surgery.    Compartment syndrome. This causes intense pain and problems moving the foot or toes. It is the result of pressure building in the muscles causing blood flow to decrease. Compartment syndrome is a medical emergency that requires immediate surgery. It can lead to permanent muscle damage if not treated right away.  When to call the healthcare provider  If your cyst starts causing mild symptoms, plan to see your healthcare provider soon. See him or her right away if you have symptoms such as redness and swelling of your leg, or numbness and discoloration of the foot. These symptoms may mean your Bakers cyst has ruptured or causing other problems.  Date Last Reviewed: 5/1/2018 2000-2019 [employer]. [address] [place] [address]. All rights reserved. This information is not intended as a substitute for professional medical care. Always follow your healthcare professional's instructions.           Patient Education     Understanding Bakers Cyst (Popliteal Cyst)  A Bakers cyst (popliteal cyst) is a fluid-filled sac that forms behind the knee.  Parts of the knee  The knee is a complex joint that has many parts. The lower end of the thighbone (femur) rotates on the upper end of the shinbone (tibia). There are several small bursae around the knee joint. These are small sacs filled with a special fluid (synovial fluid) that cushions the rest of the joint. Between the bones is a space that also contains this fluid.  What  causes a Bakers cyst?  It is caused when extra fluid from the knee joint flows into the small bursa that sits behind the knee. When this sac fills with too much fluid, its called a Bakers cyst. This might happen when an injury or disease irritates the knee joint.   In adults, other problems with the knee joint often cause the Bakers cyst. Injury or a knee disorder can change the normal structure of the knee joint. This can cause a cyst to form.  The synovial fluid inside the joint space may build up as a result of injury or disease. As the pressure builds up, the fluid may bulge into the back of the knee. This can cause the cyst.  Symptoms of a Bakers cyst  A Bakers cyst often doesnt cause symptoms. A cyst will more often be seen on an imaging test, like MRI, done for other reasons. If you do have symptoms, they may include:    Pain in the back of the knee    Knee stiffness    Sense of swelling or fullness behind the knee, especially when you straighten your leg    A swelling behind the knee that goes away when you bend your knee  These symptoms tend to get worse when standing for a long time, or being active.  Diagnosing a Bakers cyst  Your healthcare provider will ask you about your medical history and your symptoms. He or she will give you a physical exam, which will include a careful exam of your knee. Its important to make sure your symptoms are caused by a Bakers cyst and not a tumor or a blood clot.  If the cause of your symptoms is not clear, you may have imaging tests, such as:    Ultrasound, to look at the cyst in more detail    X-ray, to get more information about the bones of the joint    MRI, if the diagnosis is still unclear after ultrasound, or if your health care provider is considering surgery  Date Last Reviewed: 4/1/2017 2000-2019 [employer]. [address] [place] [address], [place] [address]. All rights reserved. This information is not intended as a substitute for professional medical  care. Always follow your healthcare professional's instructions.           Orders Placed This Encounter   Procedures   ? US Venous Leg Right   Followup: Return in about 1 week (around 10/23/2019). earlier if needed.    Health Maintenance Review  Health Maintenance   Topic Date Due   ? HIV SCREENING  04/20/1973   ? PNEUMOCOCCAL IMMUNIZATION 19-64 HIGHEST RISK (1 of 3 - PCV13) 04/20/1977   ? ZOSTER VACCINES (1 of 2) 04/20/2008   ? PREVENTIVE CARE VISIT  01/17/2019   ? DEPRESSION FOLLOW UP  01/31/2019   ? INFLUENZA VACCINE RULE BASED (1) 08/01/2019   ? ADVANCE CARE PLANNING  05/05/2021   ? TDAP ADULT ONE TIME DOSE  07/27/2021   ? COLONOSCOPY  03/24/2026   ? HEPATITIS C SCREENING  Completed   ? TD 18+ HE  Discontinued         I am having Clive Espinoza maintain his cholecalciferol (vitamin D3), LORazepam, tamsulosin, sertraline, acyclovir, hydrOXYzine HCl, ibuprofen, metoprolol succinate, and traZODone.      HPI:  Ramu Espinoza is a 59 y.o. year old who presents to the office today Chronic conditions include hyperlipidemia, hemorrhoids, paroxysmal atrial fibrillation, pericardial effusion, thyroid nodule, DAVID, tinnitus, general anxiety disorder, Dupuytren's contracture, insomnia, chronic lymphocytic leukemia, depression, obesity, BPH.    According to nurse triage patient has had right knee surgery a few years ago.  Approximately 3 to 4 months ago the same right knee pain returned.  He was seen by [facility] had a cortisone injection did have some improvement in the pain.  Though on 10/14/2019 he noted swelling of the right leg in the back of the right knee without any specific injury.  He denies any shortness of breath no history of DVT.  He reports increased pain with ambulation and weightbearing decreasing with elevation.  He reports he is able to ambulate. He states that he had a massage yesterday and noted the discomfort did improve though did not resolve.      Review of Systems- pertinent positive in  bold:  Constitutional: Fever, chills, night sweats, fainting, weight change, fatigue, dizziness, sleeping difficulties, loud snoring/pauses in breathing  Eyes: change in vision, blurred or double vision, redness/eye pain  Ears, nose, mouth, throat: change in hearing, ear pain, hoarseness, difficulty swallowing, sores in the mouth or throat  Respiratory: shortness of breath, cough, bloody sputum, wheezing  Cardiovascular: chest pain, palpitations   Gastrointestinal: abdominal pain, heartburn/indigestion, nausea/vomiting, change in appetite, change in bowel habits, constipation or diarrhea, rectal bleeding/dark stools, difficulty swallowing  Urinary: painful urination, frequent urination, urinary urgency/incontinence, blood in urine/dark urine, nocturia (new or increasing), muscle cramps, swelling of hands, feet, ankles, leg pain/redness  Skin: change in moles/freckles, rash, nodules  Hematologic/lymphatic: swollen lymph glands, abnormal bruising/bleeding  Endocrine: excessive thirst/urination, cold or heat intolerance  Neurologic/emotional: worrisome memory change, numbness/tingling, anxiety, mood swings      Current Scheduled Meds:  Outpatient Encounter Medications as of 10/16/2019   Medication Sig Dispense Refill   ? acyclovir (ZOVIRAX) 400 MG tablet TAKE 1 TABLET BY MOUTH TWICE A  tablet 2   ? cholecalciferol, vitamin D3, 1,000 unit tablet Take 2,000 Units by mouth daily.            ? hydrOXYzine HCl (ATARAX) 25 MG tablet TAKE 1/2 TABLET(12.5 MG) BY MOUTH AT BEDTIME AS NEEDED FOR ITCHING 45 tablet 3   ? ibuprofen (ADVIL,MOTRIN) 200 MG tablet Take 200 mg by mouth every 6 (six) hours as needed for pain.     ? LORazepam (ATIVAN) 1 MG tablet TAKE 1 TABLET BY MOUTH DAILY AS NEEDED. 30 tablet 0   ? metoprolol succinate (TOPROL-XL) 50 MG 24 hr tablet Take 2 tablets (100 mg total) by mouth daily. 180 tablet 2   ? sertraline (ZOLOFT) 100 MG tablet Take 1 tablet (100 mg total) by mouth daily. 90 tablet 3   ?  "tamsulosin (FLOMAX) 0.4 mg cap Take 0.4 mg by mouth daily after supper.     ? traZODone (DESYREL) 50 MG tablet TAKE 1 TABLET(50 MG) BY MOUTH AT BEDTIME 90 tablet 2     No facility-administered encounter medications on file as of 10/16/2019.      Past Medical History:   Diagnosis Date   ? BPH (benign prostatic hyperplasia)    ? CLL (chronic lymphocytic leukemia) (H)    ? Lymphadenopathy     Created by Conversion  Replacement Utility updated for latest IMO load   ? Paroxysmal atrial fibrillation with rapid ventricular response (H)    ? Sleep apnea      Past Surgical History:   Procedure Laterality Date   ? NH TREAT INTER/SUBTROCH FX,W/PLATE/SCREW      Description: Open Treatment Of An Intertrochanteric Fracture;  Recorded: 2009;     Social History     Tobacco Use   ? Smoking status: Former Smoker     Packs/day: 0.50     Years: 25.00     Pack years: 12.50     Types: Cigarettes     Last attempt to quit: 1/3/2005     Years since quittin.7   ? Smokeless tobacco: Never Used   Substance Use Topics   ? Alcohol use: Yes     Alcohol/week: 2.0 standard drinks     Types: 1 Glasses of wine, 1 Cans of beer per week     Comment: on  a bottle of wine   ? Drug use: No     Family History   Problem Relation Age of Onset   ? Cancer Father         pacreatic    ? Prostate cancer Father    ? Colon cancer Mother    ? Cancer Mother    ? Cancer Brother    ? Lung cancer Brother    ? Hypertension Brother    ? No Medical Problems Sister    ? No Medical Problems Brother    ? Cancer Brother    ? Anuerysm Brother    ? Hypertension Brother    ? Hypertension Brother    ? No Medical Problems Brother    ? Diabetes Paternal Aunt    ? Diabetes Maternal Aunt    ? Diabetes Maternal Aunt    ? Cancer Maternal Grandfather      Objective / Physical Examination:  Vitals:    10/16/19 0910   BP: 116/80   Pulse: 61   Resp: 20   Temp: 97.6  F (36.4  C)   SpO2: 97%   Weight: (!) 260 lb (117.9 kg)   Height: 6' 2\" (1.88 m)     Wt Readings from Last 3 " Encounters:   10/16/19 (!) 260 lb (117.9 kg)   10/09/19 (!) 257 lb 6.4 oz (116.8 kg)   09/25/19 (!) 256 lb 11.2 oz (116.4 kg)     Body mass index is 33.38 kg/m .     General Appearance: Alert and oriented, cooperative, affect appropriate, speech clear, in no apparent distress  Head: Normocephalic, atraumatic  Eyes:  Conjunctivae clear and sclerae non-icteric  Throat: Lips and mucosa moist.   Neck: Supple, trachea midline. No cervical adenopathy  Lungs: Clear to auscultation bilaterally. No adventitious lung sounds noted. Normal inspiratory and expiratory effort  Cardiovascular: Regular rate, normal S1, S2. No murmurs, rubs, or gallops  Extremities: Pulses 2+ and equal throughout. Strength equal throughout.right calf measures 39 cm left calf measures 37cm. He reports discomfort upon palpation of the right calf, posterior knee and posterior thigh.  No erythema or ecchymosis noted.  Integumentary: Warm and dry. Without suspicious looking lesions  Neuro: Alert and oriented, follows commands appropriately.     EXAM DATE:         10/16/2019     EXAM: US LOWER EXTREMITY VEINS LTD RIGHT  LOCATION: [facility]  DATE/TIME: 10/16/2019 12:15 PM     INDICATION: right leg pain and swelling  COMPARISON: None.  TECHNIQUE: Venous Duplex ultrasound of the right lower extremity with and  without compression, augmentation and duplex. Color flow and spectral Doppler  with waveform analysis performed.     FINDINGS: Exam includes the common femoral, femoral, popliteal, and  contralateral common femoral veins as well as segmentally visualized deep calf  veins and greater saphenous vein.     RIGHT: No deep vein thrombosis. No superficial thrombophlebitis. Fluid  containing structure in the right popliteal fossa measuring 4.4 x 1.1 x 3.4 cm.  Associated 7 mm calcification.     IMPRESSION:  1.  No deep venous thrombosis in the right lower extremity.  2.  Right popliteal fossa fluid containing structure measuring 4.4 cm,  likely a  Schaffer's cyst.       Sondra Bush, CNP

## 2021-06-29 NOTE — PROGRESS NOTES
"Progress Notes by Demetra Blanco RN at 6/24/2020  8:30 AM     Author: Demetra Blanco RN Service: -- Author Type: Registered Nurse    Filed: 6/24/2020 10:43 AM Encounter Date: 6/24/2020 Status: Signed    : Demetra Blanco RN (Registered Nurse)       Ramu Espinoza is a 60 y.o. male who is being evaluated via a billable telephone visit.      The patient has been notified of following:     \"This telephone visit will be conducted via a call between you and your physician/provider. We have found that certain health care needs can be provided without the need for a physical exam.  This service lets us provide the care you need with a short phone conversation.  If a prescription is necessary we can send it directly to your pharmacy.  If lab work is needed we can place an order for that and you can then stop by our lab to have the test done at a later time.    Telephone visits are billed at different rates depending on your insurance coverage. During this emergency period, for some insurers they may be billed the same as an in-person visit.  Please reach out to your insurance provider with any questions.    If during the course of the call the physician/provider feels a telephone visit is not appropriate, you will not be charged for this service.\"    Patient has given verbal consent to a Telephone visit? Yes    What phone number would you like to be contacted at? [phone]    Patient would like to receive their AVS by AVS Preference: Mail a copy.    Additional nursing notes: 1 wk f/u right hallux nail avulsion, wife sending pictures. Will be done with Cephalexin on Friday. Denies pain. No drainage noted.      Phone call duration: 10 minutes    Demetra Blanco RN       (Newest Message First)        6/24/20 8:11 AM   Note                           "

## 2021-06-29 NOTE — PROGRESS NOTES
Progress Notes by Milan Turner PA-C at 6/3/2020  9:00 AM     Author: Milan Turner PA-C Service: -- Author Type: Physician Assistant    Filed: 6/3/2020 11:09 AM Encounter Date: 6/3/2020 Status: Signed    : Milan Turner PA-C (Physician Assistant)       Subjective:      Patient ID: Ramu Espinoza is a 60 y.o. male.    Chief Complaint:    HPI  Ramu Espinoza is a 60 y.o. male who presents today complaining of ingrown toenail on the right great toe.  Patient states he has had a previous ingrown toenail that had the nail excised on the medial border.  Patient states that he had an injury to the nail again and had inadvertently hit the end of the great toe and cracked the nail.  Additionally has increased growth and pain on the medial border with some granulation tissue.  Patient is here for evaluation and treatment.  He has no constitutional symptoms include fever chills night sweats fatigue or any lymphangitic streaking.    Past Medical History:   Diagnosis Date   ? BPH (benign prostatic hyperplasia)    ? CLL (chronic lymphocytic leukemia) (H)    ? Lymphadenopathy     Created by Conversion  Replacement Utility updated for latest IMO load   ? Paroxysmal atrial fibrillation with rapid ventricular response (H)    ? Sleep apnea        Past Surgical History:   Procedure Laterality Date   ? NH TREAT INTER/SUBTROCH FX,W/PLATE/SCREW      Description: Open Treatment Of An Intertrochanteric Fracture;  Recorded: 04/01/2009;       Family History   Problem Relation Age of Onset   ? Cancer Father         pacreatic    ? Prostate cancer Father    ? Colon cancer Mother    ? Cancer Mother    ? Cancer Brother    ? Lung cancer Brother    ? Hypertension Brother    ? No Medical Problems Sister    ? Clotting disorder Brother    ? COPD Brother    ? Cancer Brother    ? Anuerysm Brother    ? Hypertension Brother    ? Hypertension Brother    ? No Medical Problems Brother    ? Diabetes Paternal Aunt    ? Diabetes Maternal Aunt    ?  Diabetes Maternal Aunt    ? Cancer Maternal Grandfather        Social History     Tobacco Use   ? Smoking status: Former Smoker     Packs/day: 0.50     Years: 25.00     Pack years: 12.50     Types: Cigarettes     Last attempt to quit: 1/3/2005     Years since quitting: 15.4   ? Smokeless tobacco: Never Used   Substance Use Topics   ? Alcohol use: Yes     Alcohol/week: 14.0 standard drinks     Types: 7 Cans of beer, 7 Glasses of wine per week     Comment: on Fridays a bottle of wine   ? Drug use: No       Review of Systems  As above in HPI, otherwise balance of Review of Systems are negative.    Objective:     /77   Pulse 66   Temp 98  F (36.7  C) (Oral)   Resp 16   Wt (!) 265 lb (120.2 kg)   SpO2 96%   BMI 34.02 kg/m      Physical Exam  General: Patient is resting comfortably no acute distress is afebrile  HEENT: Head is normocephalic atraumatic   eyes are PERRL EOMI sclera anicteric   Skin: Without rash non-diaphoretic  Musculoskeletal: Inspection of the right foot shows that the great toe has medial border granulation tissue and pain to palpation.  Does appear consistent with an ingrown toenail.  The medial border of the nail is avulsed and lifted up partially on the first 5 mm of the nail.  This is sharply excised with a nail splitter scissors.  Patient tolerated procedure well.  Dry dressing for bulky dressing was applied to the toe.      Assessment:     Procedures    The encounter diagnosis was Ingrown right greater toenail.    Plan:     1. Ingrown right greater toenail  cephalexin (KEFLEX) 500 MG capsule    Ambulatory referral to Podiatry        I had a conversation with the patient stating that he does need to have an excision of the granulation tissue and the medial border of the nail.  He is sent to podiatry for evaluation and treatment.  In the interim he is placed on an antibiotic to help with infection.  Questions were answered to patient satisfaction for discharge.    Patient Instructions  "    Elevate the foot continuously above the level of the heart \"toes above the nose\" continuously over the next 24 to 48 hours  Use an open toed shoe to avoid pressure on the toe.  Take antibiotic as written.  Follow-up with podiatry for definitive evaluation and treatment of your ingrown toenail.   Follow the suggestions in the handout below until seen by podiatry    Patient Education     Ingrown Toenail, Infected (Antibiotics, No Excision)  An ingrown toenail occurs when the nail grows sideways into the skin alongside the nail. This can cause pain. It can also lead to an infection with redness, swelling, and sometimes drainage.  The most common cause of an ingrown toenail is trimming your nails wrong. Most people trim the nails too close to the skin and try to round the nail too tightly around the shape of the toe. When you do this, the nail can grow into the skin of your toe. It is safer to trim the nail ending in a straight line rather than a curve.  Other causes include injury or wearing shoes that are too short or tight. This can cause the same problem that happens when trimming your nails. Your genetics can also make this more likely to happen.  The following are the most common symptoms of an ingrown toenail:     Pain    Redness    Swelling    Drainage  If the infection is mild, you may be able to take care of it at home with the following measures:    Frequent warm water soaks    Keeping it clean    Wearing loose, comfortable shoes or sandals  Another method involves using a small piece of cotton or waxed dental floss to gently lift up the corner of the problem nail. Change the cotton or floss frequently, especially if it gets dirty.  If your infection is mild, and the above methods arent working, or if the infection gets worse, see your healthcare provider. Signs of worsening infection include:    Swelling    Redness    Pus drainage  In some cases, you may need antibiotics along with warm soaks. If after 2 " to 3 days of antibiotics the toenail doesn't get better or gets worse, part of the nail may need to be removed to drain the infection. With treatment, it can take 1 to 2 weeks to clear up completely.  Home care  Wound care  For the next 3 days, soak and clean your toe in warm water a few times a day.    Twice a day for the first 3 days, clean and soak the toe as follows:  1. Soak your foot in a tub of warm water for 5 minutes. Or, hold your toe under a faucet of warm running water for 5 minute  2. Clean any remaining crust away with soap and water using a cotton swab.  3. Put a small amount of antibiotic ointment on the infected area.    Change the dressing or bandage every time you soak or clean it, or whenever it becomes wet or dirty.    If you were prescribed antibiotics, take them as directed until they are all gone.    Wear comfortable shoes with a lot of toe room, or open-toe sandals, while your toe is healing.  Medicines    You can take over-the-counter medicine for pain, unless you were given a different pain medicine to use. Note: Talk with your provider before using these medicines if you have chronic liver or kidney disease, ever had a stomach ulcer or GI (gastrointestinal) bleeding, or are taking blood thinner medicines.    If you were given antibiotics, take them until they are used up or your provider tells you to stop, even if the wound looks better. This ensures that the infection clears up.  Prevention  To prevent ingrown toenails:    Wear shoes that fit well. Avoid shoes that pinch the toes together.    When you trim your toenails, do not cut them too short. Cut straight across at the top and dont round the edges.    Dont use a sharp object to clean under your nail since this might cause an infection.    If the toenail starts to grow into the skin again, put a small piece of waxed dental floss or cotton under that side of the nail to help it grow out straight.  Follow-up care  Follow up with your  healthcare provider, or as advised.  When to seek medical advice  Call your healthcare provider right away if any of the following occur:    Increasing redness, pain, or swelling of the toe    Red streaks in the skin leading away from the wound    Pus or fluid drainage    Fever of 100.4 F (38 C) or higher, or as directed by your provider  Date Last Reviewed: 12/1/2016 2000-2017 [employer]. [address] [place] [address], [place] [address]. All rights reserved. This information is not intended as a substitute for professional medical care. Always follow your healthcare professional's instructions.

## 2021-06-30 ENCOUNTER — AMBULATORY - HEALTHEAST (OUTPATIENT)
Dept: INFUSION THERAPY | Facility: HOSPITAL | Age: 61
End: 2021-06-30

## 2021-06-30 DIAGNOSIS — C91.10 CHRONIC LYMPHOCYTIC LEUKEMIA (H): ICD-10-CM

## 2021-06-30 LAB
ALBUMIN SERPL-MCNC: 3.7 G/DL (ref 3.5–5)
ALP SERPL-CCNC: 74 U/L (ref 45–120)
ALT SERPL W P-5'-P-CCNC: 27 U/L (ref 0–45)
ANION GAP SERPL CALCULATED.3IONS-SCNC: 9 MMOL/L (ref 5–18)
AST SERPL W P-5'-P-CCNC: 21 U/L (ref 0–40)
BASOPHILS # BLD AUTO: 0.1 THOU/UL (ref 0–0.2)
BASOPHILS NFR BLD AUTO: 1 % (ref 0–2)
BILIRUB SERPL-MCNC: 0.6 MG/DL (ref 0–1)
BUN SERPL-MCNC: 21 MG/DL (ref 8–22)
CALCIUM SERPL-MCNC: 8.9 MG/DL (ref 8.5–10.5)
CHLORIDE BLD-SCNC: 107 MMOL/L (ref 98–107)
CO2 SERPL-SCNC: 24 MMOL/L (ref 22–31)
CREAT SERPL-MCNC: 0.79 MG/DL (ref 0.7–1.3)
EOSINOPHIL # BLD AUTO: 0.4 THOU/UL (ref 0–0.4)
EOSINOPHIL NFR BLD AUTO: 5 % (ref 0–6)
ERYTHROCYTE [DISTWIDTH] IN BLOOD BY AUTOMATED COUNT: 12.1 % (ref 11–14.5)
GFR SERPL CREATININE-BSD FRML MDRD: >60 ML/MIN/1.73M2
GLUCOSE BLD-MCNC: 85 MG/DL (ref 70–125)
HCT VFR BLD AUTO: 46.8 % (ref 40–54)
HGB BLD-MCNC: 15.2 G/DL (ref 14–18)
IMM GRANULOCYTES # BLD: 0.1 THOU/UL
IMM GRANULOCYTES NFR BLD: 1 %
LYMPHOCYTES # BLD AUTO: 1.5 THOU/UL (ref 0.8–4.4)
LYMPHOCYTES NFR BLD AUTO: 17 % (ref 20–40)
MCH RBC QN AUTO: 29.7 PG (ref 27–34)
MCHC RBC AUTO-ENTMCNC: 32.5 G/DL (ref 32–36)
MCV RBC AUTO: 92 FL (ref 80–100)
MONOCYTES # BLD AUTO: 0.8 THOU/UL (ref 0–0.9)
MONOCYTES NFR BLD AUTO: 9 % (ref 2–10)
NEUTROPHILS # BLD AUTO: 5.9 THOU/UL (ref 2–7.7)
NEUTROPHILS NFR BLD AUTO: 66 % (ref 50–70)
PLATELET # BLD AUTO: 269 THOU/UL (ref 140–440)
PMV BLD AUTO: 10.1 FL (ref 8.5–12.5)
POTASSIUM BLD-SCNC: 4.4 MMOL/L (ref 3.5–5)
PROT SERPL-MCNC: 6.5 G/DL (ref 6–8)
RBC # BLD AUTO: 5.11 MILL/UL (ref 4.4–6.2)
SODIUM SERPL-SCNC: 140 MMOL/L (ref 136–145)
WBC: 8.9 THOU/UL (ref 4–11)

## 2021-07-03 NOTE — ADDENDUM NOTE
Addendum Note by Adrian Ji PharmINES at 12/23/2019  9:45 AM     Author: Adrian Ji PharmD Service: -- Author Type: Pharmacist    Filed: 12/23/2019 11:27 AM Encounter Date: 12/23/2019 Status: Signed    : Adrian iJ PharmD (Pharmacist)    Addended by: ADRIAN JI on: 12/23/2019 11:27 AM        Modules accepted: Orders

## 2021-07-03 NOTE — ADDENDUM NOTE
Addendum Note by Dejuan Brandt RN at 6/12/2020  8:30 AM     Author: Dejuan Brandt RN Service: -- Author Type: Registered Nurse    Filed: 6/15/2020 10:36 AM Encounter Date: 6/12/2020 Status: Signed    : Dejuan Brandt RN (Registered Nurse)    Addended by: DEJUAN BRANDT on: 6/15/2020 10:36 AM        Modules accepted: Orders

## 2021-07-03 NOTE — ADDENDUM NOTE
Addendum Note by Dennis Cabrales MD at 12/23/2019  9:45 AM     Author: Dennis Cabrales MD Service: -- Author Type: Physician    Filed: 12/23/2019 11:02 AM Encounter Date: 12/23/2019 Status: Signed    : Dennis Cabrales MD (Physician)    Addended by: DENNIS CABRALES on: 12/23/2019 11:02 AM        Modules accepted: Orders
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
## 2021-07-12 DIAGNOSIS — C91.10 CHRONIC LYMPHOCYTIC LEUKEMIA (H): Primary | ICD-10-CM

## 2021-07-13 RX ORDER — ACYCLOVIR 400 MG/1
400 TABLET ORAL 2 TIMES DAILY
Qty: 180 TABLET | Refills: 3 | Status: SHIPPED | OUTPATIENT
Start: 2021-07-13 | End: 2022-09-13

## 2021-07-22 ENCOUNTER — HOSPITAL ENCOUNTER (OUTPATIENT)
Dept: PHYSICAL THERAPY | Facility: CLINIC | Age: 61
End: 2021-07-22
Payer: COMMERCIAL

## 2021-07-22 DIAGNOSIS — M19.071 ARTHRITIS OF RIGHT ANKLE: ICD-10-CM

## 2021-07-22 DIAGNOSIS — M25.571 CHRONIC PAIN OF RIGHT ANKLE: Primary | ICD-10-CM

## 2021-07-22 DIAGNOSIS — G89.29 CHRONIC PAIN OF RIGHT ANKLE: Primary | ICD-10-CM

## 2021-07-22 PROCEDURE — 97140 MANUAL THERAPY 1/> REGIONS: CPT | Mod: GP | Performed by: PHYSICAL THERAPIST

## 2021-07-22 PROCEDURE — 97110 THERAPEUTIC EXERCISES: CPT | Mod: GP | Performed by: PHYSICAL THERAPIST

## 2021-08-13 NOTE — PROGRESS NOTES
Impression: Anatomical narrow angle, bilateral: H40.033. Plan: GONIO EXAM REVEALS ONLY PIGMENTED SCHWALBE'S LINE. THEREFORE, HAVE CHOSEN TO NOT DILATE. EXPLD TO DAUGHTER THAT PRIOR STROKE MAY LIMIT ACUITY, AND THAT PERIPH VIS LOSS OS WILL NOT GET BETTER. CAT SURGERY INDICATED TO MONITOR PP, AS PT HAS DM. 
EXPL'D VISION MAY NOT IMPROVE AFTER SURGERY Ramu Espinoza is a 60 y.o. male who is being evaluated via a billable telephone visit.      What phone number would you like to be contacted at? [phone]  How would you like to obtain your AVS? AVS Preference: Mail a copy.       Patient Instructions       tsh    Lipid     TSH is High      This is the Brain sensing the Thyroid Levels are low.    When thyroid levels are low, the brain produces more TSH to tell the THyroid to make more hormone.      Your level suggests that you are HYPOthyroid or your body is LOW in thyroid hormone.    This means either you need a supplement or an adjustment in the thyroid medication supplement to bring this level into balance.      When the TSH is high, it suggests that you may need to start a supplement like Levothyroxine or make an adjustment in your medication.     Some supplements and nutrients support thyroid function.  They are:    Selenium 200 mcg daily  Zinc 30 mg Daily/ Copper 1 mg   Vitamin D3 / K2  2000 IU /  mcg Daily  Iodine 150 mcg Daily  Vitamin A 5000 IU Daily    TPO Antibodies this could be related to Hashimoto's thyroiditis    I would like you to do a coronary calcium score to identify if you have any significant coronary damage that is been repaired    Your calculated risk is 24% over the next 10 years for a cardiac event  I think it is reasonable to begin a medication such as rosuvastatin  I would like you to start at 10 mg at bedtime for 4 weeks then if tolerated increase to 20 mg at bedtime    At the 8-week kristian I would like you to come in for a repeat set of labs  Cholesterol profile  Kidney liver test    I would also like you to repeat your TSH (thyroid test)  And thyroid peroxidase antibodies    Likely you may need thyroid supplementation with levothyroxine  This will also improve your cholesterol profile    To support your use of rosuvastatin or a statin medication you could consider taking co-Q10 100 mg daily    Housekeeping issues  You should  "probably have  Your pneumonia vaccine  Consider doing the shingles shot  You will be due for colorectal cancer screening I always prefer colonoscopy over  other screens      DanL        Assessment & Plan     Ramu was seen today for medication check.    Diagnoses and all orders for this visit:    Hyperlipidemia, unspecified hyperlipidemia type  -     CT Cardiac Calcium Score; Future  -     rosuvastatin (CRESTOR) 20 MG tablet; Take 0.5 tablets (10 mg total) by mouth at bedtime.  -     Comprehensive Metabolic Panel; Future  -     Lipid [place] FASTING; Future    Class 1 obesity due to excess calories with serious comorbidity and body mass index (BMI) of 34.0 to 34.9 in adult  -     CT Cardiac Calcium Score; Future    At risk for coronary artery disease  -     CT Cardiac Calcium Score; Future    Elevated TSH  -     Thyroid Stimulating Hormone (TSH); Future  -     Thyroid Peroxidase Antibody; Future          BMI:   Estimated body mass index is 34.28 kg/m  as calculated from the following:    Height as of 1/22/21: 6' 2\" (1.88 m).    Weight as of 1/22/21: 267 lb (121.1 kg).       No follow-ups on file.    Ramu Jiménez MD  [facility]    Subjective   Ramu Espinoza is 60 y.o. and presents today for the following health issues   HPI       Elevated TSH  Elevated cholesterol  Calculated Rawlings risk score 24% the next 10 years  Total cholesterol 269  Triglycerides 185  HDL 41    Medications reviewed no culprits for increased THC H identified  No symptoms of chest pain chest discomfort  Less active during the pandemic  Had been active in the past without symptoms  Has seen cardiology for atrial fibrillation Dr. Arana      Review of Systems        Objective       Vitals:  No vitals were obtained today due to virtual visit.    Physical Exam            Phone call duration 28   minutes    "

## 2021-08-18 NOTE — ADDENDUM NOTE
Encounter addended by: Kristin Muller, PT on: 8/18/2021 8:00 AM   Actions taken: Episode resolved, Clinical Note Signed

## 2021-08-18 NOTE — PROGRESS NOTES
Outpatient Physical Therapy Discharge Note     Patient: Ramu Espinoza  : 1960    Beginning/End Dates of Reporting Period:  21 to 21    Referring Provider: Dr. Ramu Jiménez    Therapy Diagnosis: Ankle pain     Client Self Report: See progress note    Goals:  Goal Identifier 1   Goal Description Patient will be able climb stairs without increased pain in R ankle for working   Target Date 21   Date Met      Progress (detail required for progress note):     Goal Identifier 2   Goal Description Patient will be able to report 0/10 pain or difficulty in R ankle by end of work day   Target Date 21   Date Met      Progress (detail required for progress note):     Goal Identifier 3   Goal Description Patient will demonstrate more than 0 deg of dorsiflexion without increased pain   Target Date 21   Date Met      Progress (detail required for progress note):       Plan:  Discharge from therapy.    Discharge:    Reason for Discharge: Patient has met all goals.  Patient chooses to discontinue therapy.      Discharge Plan: Patient to continue home program.

## 2021-09-17 ENCOUNTER — TRANSFERRED RECORDS (OUTPATIENT)
Dept: HEALTH INFORMATION MANAGEMENT | Facility: CLINIC | Age: 61
End: 2021-09-17

## 2021-09-26 ENCOUNTER — HEALTH MAINTENANCE LETTER (OUTPATIENT)
Age: 61
End: 2021-09-26

## 2021-09-30 ENCOUNTER — RESEARCH ENCOUNTER (OUTPATIENT)
Dept: ONCOLOGY | Facility: HOSPITAL | Age: 61
End: 2021-09-30

## 2021-09-30 ENCOUNTER — LAB (OUTPATIENT)
Dept: INFUSION THERAPY | Facility: HOSPITAL | Age: 61
End: 2021-09-30
Attending: INTERNAL MEDICINE
Payer: COMMERCIAL

## 2021-09-30 ENCOUNTER — ONCOLOGY VISIT (OUTPATIENT)
Dept: ONCOLOGY | Facility: HOSPITAL | Age: 61
End: 2021-09-30
Attending: INTERNAL MEDICINE
Payer: COMMERCIAL

## 2021-09-30 VITALS
SYSTOLIC BLOOD PRESSURE: 124 MMHG | RESPIRATION RATE: 16 BRPM | OXYGEN SATURATION: 96 % | WEIGHT: 261 LBS | BODY MASS INDEX: 33.51 KG/M2 | HEART RATE: 58 BPM | TEMPERATURE: 98.4 F | DIASTOLIC BLOOD PRESSURE: 62 MMHG

## 2021-09-30 DIAGNOSIS — C91.10 CHRONIC LYMPHOCYTIC LEUKEMIA (H): Primary | ICD-10-CM

## 2021-09-30 DIAGNOSIS — C91.10 CHRONIC LYMPHOCYTIC LEUKEMIA (H): ICD-10-CM

## 2021-09-30 LAB
ALBUMIN SERPL-MCNC: 3.6 G/DL (ref 3.5–5)
ALP SERPL-CCNC: 69 U/L (ref 45–120)
ALT SERPL W P-5'-P-CCNC: 20 U/L (ref 0–45)
ANION GAP SERPL CALCULATED.3IONS-SCNC: 5 MMOL/L (ref 5–18)
AST SERPL W P-5'-P-CCNC: 18 U/L (ref 0–40)
BASOPHILS # BLD AUTO: 0.1 10E3/UL (ref 0–0.2)
BASOPHILS NFR BLD AUTO: 1 %
BILIRUB SERPL-MCNC: 0.8 MG/DL (ref 0–1)
BUN SERPL-MCNC: 22 MG/DL (ref 8–22)
CALCIUM SERPL-MCNC: 9.3 MG/DL (ref 8.5–10.5)
CHLORIDE BLD-SCNC: 109 MMOL/L (ref 98–107)
CO2 SERPL-SCNC: 27 MMOL/L (ref 22–31)
CREAT SERPL-MCNC: 0.78 MG/DL (ref 0.7–1.3)
EOSINOPHIL # BLD AUTO: 0.3 10E3/UL (ref 0–0.7)
EOSINOPHIL NFR BLD AUTO: 4 %
ERYTHROCYTE [DISTWIDTH] IN BLOOD BY AUTOMATED COUNT: 12.7 % (ref 10–15)
GFR SERPL CREATININE-BSD FRML MDRD: >90 ML/MIN/1.73M2
GLUCOSE BLD-MCNC: 78 MG/DL (ref 70–125)
HCT VFR BLD AUTO: 45.2 % (ref 40–53)
HGB BLD-MCNC: 14.7 G/DL (ref 13.3–17.7)
HOLD SPECIMEN: NORMAL
IMM GRANULOCYTES # BLD: 0.1 10E3/UL
IMM GRANULOCYTES NFR BLD: 1 %
LYMPHOCYTES # BLD AUTO: 1.4 10E3/UL (ref 0.8–5.3)
LYMPHOCYTES NFR BLD AUTO: 18 %
MCH RBC QN AUTO: 30.2 PG (ref 26.5–33)
MCHC RBC AUTO-ENTMCNC: 32.5 G/DL (ref 31.5–36.5)
MCV RBC AUTO: 93 FL (ref 78–100)
MONOCYTES # BLD AUTO: 0.7 10E3/UL (ref 0–1.3)
MONOCYTES NFR BLD AUTO: 9 %
NEUTROPHILS # BLD AUTO: 5.6 10E3/UL (ref 1.6–8.3)
NEUTROPHILS NFR BLD AUTO: 67 %
NRBC # BLD AUTO: 0 10E3/UL
NRBC BLD AUTO-RTO: 0 /100
PLATELET # BLD AUTO: 282 10E3/UL (ref 150–450)
POTASSIUM BLD-SCNC: 4.6 MMOL/L (ref 3.5–5)
PROT SERPL-MCNC: 6.2 G/DL (ref 6–8)
RBC # BLD AUTO: 4.87 10E6/UL (ref 4.4–5.9)
SODIUM SERPL-SCNC: 141 MMOL/L (ref 136–145)
WBC # BLD AUTO: 8 10E3/UL (ref 4–11)

## 2021-09-30 PROCEDURE — 99214 OFFICE O/P EST MOD 30 MIN: CPT | Performed by: INTERNAL MEDICINE

## 2021-09-30 PROCEDURE — 82040 ASSAY OF SERUM ALBUMIN: CPT | Performed by: INTERNAL MEDICINE

## 2021-09-30 PROCEDURE — 85025 COMPLETE CBC W/AUTO DIFF WBC: CPT | Performed by: INTERNAL MEDICINE

## 2021-09-30 PROCEDURE — 36415 COLL VENOUS BLD VENIPUNCTURE: CPT | Performed by: INTERNAL MEDICINE

## 2021-09-30 PROCEDURE — G0463 HOSPITAL OUTPT CLINIC VISIT: HCPCS

## 2021-09-30 RX ORDER — MULTIVITAMIN WITH IRON
TABLET ORAL
COMMUNITY
End: 2022-03-08

## 2021-09-30 ASSESSMENT — PAIN SCALES - GENERAL: PAINLEVEL: MILD PAIN (3)

## 2021-09-30 NOTE — LETTER
"    2021         RE: Ramu Espinoza  [address] [place] [address]        Dear Colleague,    Thank you for referring your patient, Ramu Espinoza, to the [facility] [place]. Please see a copy of my visit note below.    Oncology Rooming Note    2021 10:07 AM   Ramu Espinoza is a 61 year old male who presents for:    No chief complaint on file.    Initial Vitals: /62   Pulse 58   Temp 98.4  F (36.9  C)   Resp 16   Wt 118.4 kg (261 lb)   SpO2 96%   BMI 33.51 kg/m   Estimated body mass index is 33.51 kg/m  as calculated from the following:    Height as of 21: 1.88 m (6' 2\").    Weight as of this encounter: 118.4 kg (261 lb). Body surface area is 2.49 meters squared.  Mild Pain (3) Comment: Data Unavailable   No LMP for male patient.  Allergies reviewed: Yes  Medications reviewed: Yes    Medications: Medication refills not needed today.  Pharmacy name entered into Fug[employer]:    [id number] - [place], [place] - [address] [facility]  [facility] - [place], [place] - [address] [place] [address]        Jeanette Omer RN                [facility] Progress Note  Patient: Ramu Espinoza   MRN:  [id number]   Date of Service : Sep 30, 2021        Reason for visit      1. Chronic lymphocytic leukemia (H)  Cy inducer Ibrutinib        Assessment     1.  A 61 year old  gentleman with CLL stage 4 now, diagnosed in 2012.  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  Currently quite stable on Imbruvica.  He is on a clinical trial.  2.  Very good clinical response to Imbruvica. His lymphnodes have normalized.  His CBC is normal.   3.  In May 2017 diagnosis of paroxysmal Atrial fibrillation. This could be caused by Imbruvica. He also drinks a lot of coffee.  He has not had any more recurrences since then.  4.  Pericardial effusion in 2019. Held imbruvica for 3 weeks.  Now resumed it. Doing OK. We have " continued it.  He has not had any recurrence.  5.  History of pleurisy right side. No recurrence.  6.  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  7.  Covid vaccination done.    Plan     1.   Continue Imbruvica 140 mg 3 pills daily.  He will be on Imbruvica maintenance.  No change in plan.  2.  Have him come back in 3 months time .  3.   Continue Acyclovir.  He does not need Bactrim.    4. Continue with good diet and exercise.  5. Follow-up with cardiology as indicated.  6. Advised to cut down on coffee and half the amount of alcohol.    Clinical stage      Stage IV    History     Ramu Espinoza is a very pleasant 61 year old  male with a history of CLL diagnosed in 02/2012 presenting with elevated white count in upper 10s/lower 20s with a peripheral blood smear showing atypical lymphocytosis suspicious for CLL.  He was completely asymptomatic, normal hemoglobin and platelet count.  His peripheral blood flow cytometry revealed CD5 co-expressing kappa light chains restricted B cells confirming the diagnosis of CLL.  He has been on observation.  His white count has been slowly going up.    In June 2014 his white count was over 100,000.  So he had a CAT scan and that showed increasing lymphadenopathy and splenomegaly.  Platelet count has been going down.    I offered participation in clinical study in which patients are randomized between fludarabine and cyclophosphamide Rituxan or Imbruvica and Rituxan.  He has been randomized to Imbruvica and rituximab arm. He started on October 2014.  In November 2014 he got Rituxan for the first time.  Tolerated well. Finished that. Now on Imbruvica alone. Tolerating it well.     In May 2017 he was found to have paroxysmal afib when he complained of some fluttering sensation. He had holter monitor which led to the diagnosis. Most days he feels well.     In March 2019 he was found to have pericardial effusion when he started have some chest pain on deep inspiration.  Had it tapped. Held Imbruvica for 3 weeks. Then resumed it.  Tolerated it fine. So continues it.     Comes in today for scheduled follow-up. Overall feels good. He feels his heart is doing well.    He is also received his Covid vaccination.  Condition drink coffee and wine or beer every day.    Past Medical History     Past Medical History:   Diagnosis Date     BPH (benign prostatic hyperplasia)      CLL (chronic lymphocytic leukemia) (H)      Lymphadenopathy     Created by Conversion  Replacement Utility updated for latest IMO load     Paroxysmal atrial fibrillation with rapid ventricular response (H)      Sleep apnea     hypertension, being slightly overweight, anemic, having reflux, anxiety, epididymitis, hyperlipidemia and tinnitus      Review of Systems   Constitutional  Constitutional (WDL): Exceptions to WDL  Fatigue: Fatigue not relieved by rest - Limiting instrumental ADL  Weight Loss: to <10% from baseline, intervention not indicated(down 5 lbs)  Neurosensory  Neurosensory (WDL): Exceptions to WDL  Cardiovascular  Cardiovascular (WDL): All cardiovascular elements are within defined limits  Pulmonary  Respiratory (WDL): Within Defined Limits  Gastrointestinal  Gastrointestinal (WDL): All gastrointestinal elements are within defined limits  Genitourinary  Genitourinary (WDL): All genitourinary elements are within defined limits  Integumentary  Integumentary (WDL): All integumentary elements are within defined limits  Patient Coping  Patient Coping: Accepting  Accompanied by  Accompanied by: Alone    ECOG performance status and Distress Assessment      ECOG Performance:    ECOG Performance Status: 1    Distress Assessment  Distress Assessment Score: No distress:     Pain Status  Currently in Pain: Yes      Vital Signs     /62   Pulse 58   Temp 98.4  F (36.9  C)   Resp 16   Wt 118.4 kg (261 lb)   SpO2 96%   BMI 33.51 kg/m       Physical Exam     GENERAL: No acute distress. Cooperative in  conversation.   HEENT: Pupils are equal, round and reactive. Oral mucosa is clean and intact. No ulcerations or mucositis noted. No bleeding noted.  RESP:Chest symmetric lungs are clear bilaterally per auscultation. Regular respiratory rate. No wheezes or rhonchi.  CV: Normal S1 S2 Regular, rate and rhythm. No murmurs.  ABD: Nondistended, soft, nontender. Positive bowel sounds. No organomegaly.   EXTREMITIES: No lower extremity edema.   NEURO: Non- focal. Alert and oriented x3.  Cranial nerves appear intact.  PSYCH: Within normal limits. No depression or anxiety.  SKIN: Warm dry intact.  Slight rash on his shoulder and legs.  LYMPH NODES: Bilateral cervical, supraclavicular, axillary lymph node examination was done.  Negative for any palpable adenopathy.      Lab Results     Recent Results (from the past 240 hour(s))   Comprehensive metabolic panel    Collection Time: 09/30/21  9:48 AM   Result Value Ref Range    Sodium 141 136 - 145 mmol/L    Potassium 4.6 3.5 - 5.0 mmol/L    Chloride 109 (H) 98 - 107 mmol/L    Carbon Dioxide (CO2) 27 22 - 31 mmol/L    Anion Gap 5 5 - 18 mmol/L    Urea Nitrogen 22 8 - 22 mg/dL    Creatinine 0.78 0.70 - 1.30 mg/dL    Calcium 9.3 8.5 - 10.5 mg/dL    Glucose 78 70 - 125 mg/dL    Alkaline Phosphatase 69 45 - 120 U/L    AST 18 0 - 40 U/L    ALT 20 0 - 45 U/L    Protein Total 6.2 6.0 - 8.0 g/dL    Albumin 3.6 3.5 - 5.0 g/dL    Bilirubin Total 0.8 0.0 - 1.0 mg/dL    GFR Estimate >90 >60 mL/min/1.73m2   CBC with platelets and differential    Collection Time: 09/30/21  9:48 AM   Result Value Ref Range    WBC Count 8.0 4.0 - 11.0 10e3/uL    RBC Count 4.87 4.40 - 5.90 10e6/uL    Hemoglobin 14.7 13.3 - 17.7 g/dL    Hematocrit 45.2 40.0 - 53.0 %    MCV 93 78 - 100 fL    MCH 30.2 26.5 - 33.0 pg    MCHC 32.5 31.5 - 36.5 g/dL    RDW 12.7 10.0 - 15.0 %    Platelet Count 282 150 - 450 10e3/uL    % Neutrophils 67 %    % Lymphocytes 18 %    % Monocytes 9 %    % Eosinophils 4 %    % Basophils 1 %    %  Immature Granulocytes 1 %    NRBCs per 100 WBC 0 <1 /100    Absolute Neutrophils 5.6 1.6 - 8.3 10e3/uL    Absolute Lymphocytes 1.4 0.8 - 5.3 10e3/uL    Absolute Monocytes 0.7 0.0 - 1.3 10e3/uL    Absolute Eosinophils 0.3 0.0 - 0.7 10e3/uL    Absolute Basophils 0.1 0.0 - 0.2 10e3/uL    Absolute Immature Granulocytes 0.1 (H) <=0.0 10e3/uL    Absolute NRBCs 0.0 10e3/uL        Imaging Results     No results found.       Dennis Garza MD           Again, thank you for allowing me to participate in the care of your patient.        Sincerely,        Dennis Garza MD, MD

## 2021-09-30 NOTE — PROGRESS NOTES
Met with patient and provider in clinic today for cycles 82-84.  Patient returned drug and diaries.  Labs reviewed and stable- no dose mods per protocol.  Will RTC in 3 months.    Ly CORBETT, Research

## 2021-09-30 NOTE — PROGRESS NOTES
[facility] cancer Care Progress Note  Patient: Ramu Espinoza   MRN:  [id number]   Date of Service : Sep 30, 2021        Reason for visit      1. Chronic lymphocytic leukemia (H)  Cy inducer Ibrutinib        Assessment     1.  A 61 year old  gentleman with CLL stage 4 now, diagnosed in 2012.  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  Currently quite stable on Imbruvica.  He is on a clinical trial.  2.  Very good clinical response to Imbruvica. His lymphnodes have normalized.  His CBC is normal.   3.  In May 2017 diagnosis of paroxysmal Atrial fibrillation. This could be caused by Imbruvica. He also drinks a lot of coffee.  He has not had any more recurrences since then.  4.  Pericardial effusion in 2019. Held imbruvica for 3 weeks.  Now resumed it. Doing OK. We have continued it.  He has not had any recurrence.  5.  History of pleurisy right side. No recurrence.  6.  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  7.  Covid vaccination done.    Plan     1.   Continue Imbruvica 140 mg 3 pills daily.  He will be on Imbruvica maintenance.  No change in plan.  2.  Have him come back in 3 months time .  3.   Continue Acyclovir.  He does not need Bactrim.    4. Continue with good diet and exercise.  5. Follow-up with cardiology as indicated.  6. Advised to cut down on coffee and half the amount of alcohol.    Clinical stage      Stage IV    History     Ramu Espinoza is a very pleasant 61 year old  male with a history of CLL diagnosed in 2012 presenting with elevated white count in upper 10s/lower 20s with a peripheral blood smear showing atypical lymphocytosis suspicious for CLL.  He was completely asymptomatic, normal hemoglobin and platelet count.  His peripheral blood flow cytometry revealed CD5 co-expressing kappa light chains restricted B cells confirming the diagnosis of CLL.  He has been on observation.  His white count has been slowly going up.    In 2014 his  white count was over 100,000.  So he had a CAT scan and that showed increasing lymphadenopathy and splenomegaly.  Platelet count has been going down.    I offered participation in clinical study in which patients are randomized between fludarabine and cyclophosphamide Rituxan or Imbruvica and Rituxan.  He has been randomized to Imbruvica and rituximab arm. He started on October 2014.  In November 2014 he got Rituxan for the first time.  Tolerated well. Finished that. Now on Imbruvica alone. Tolerating it well.     In May 2017 he was found to have paroxysmal afib when he complained of some fluttering sensation. He had holter monitor which led to the diagnosis. Most days he feels well.     In March 2019 he was found to have pericardial effusion when he started have some chest pain on deep inspiration. Had it tapped. Held Imbruvica for 3 weeks. Then resumed it.  Tolerated it fine. So continues it.     Comes in today for scheduled follow-up. Overall feels good. He feels his heart is doing well.    He is also received his Covid vaccination.  Condition drink coffee and wine or beer every day.    Past Medical History     Past Medical History:   Diagnosis Date     BPH (benign prostatic hyperplasia)      CLL (chronic lymphocytic leukemia) (H)      Lymphadenopathy     Created by Conversion  Replacement Utility updated for latest IMO load     Paroxysmal atrial fibrillation with rapid ventricular response (H)      Sleep apnea     hypertension, being slightly overweight, anemic, having reflux, anxiety, epididymitis, hyperlipidemia and tinnitus      Review of Systems   Constitutional  Constitutional (WDL): Exceptions to WDL  Fatigue: Fatigue not relieved by rest - Limiting instrumental ADL  Weight Loss: to <10% from baseline, intervention not indicated(down 5 lbs)  Neurosensory  Neurosensory (WDL): Exceptions to WDL  Cardiovascular  Cardiovascular (WDL): All cardiovascular elements are within defined  limits  Pulmonary  Respiratory (WDL): Within Defined Limits  Gastrointestinal  Gastrointestinal (WDL): All gastrointestinal elements are within defined limits  Genitourinary  Genitourinary (WDL): All genitourinary elements are within defined limits  Integumentary  Integumentary (WDL): All integumentary elements are within defined limits  Patient Coping  Patient Coping: Accepting  Accompanied by  Accompanied by: Alone    ECOG performance status and Distress Assessment      ECOG Performance:    ECOG Performance Status: 1    Distress Assessment  Distress Assessment Score: No distress:     Pain Status  Currently in Pain: Yes      Vital Signs     /62   Pulse 58   Temp 98.4  F (36.9  C)   Resp 16   Wt 118.4 kg (261 lb)   SpO2 96%   BMI 33.51 kg/m       Physical Exam     GENERAL: No acute distress. Cooperative in conversation.   HEENT: Pupils are equal, round and reactive. Oral mucosa is clean and intact. No ulcerations or mucositis noted. No bleeding noted.  RESP:Chest symmetric lungs are clear bilaterally per auscultation. Regular respiratory rate. No wheezes or rhonchi.  CV: Normal S1 S2 Regular, rate and rhythm. No murmurs.  ABD: Nondistended, soft, nontender. Positive bowel sounds. No organomegaly.   EXTREMITIES: No lower extremity edema.   NEURO: Non- focal. Alert and oriented x3.  Cranial nerves appear intact.  PSYCH: Within normal limits. No depression or anxiety.  SKIN: Warm dry intact.  Slight rash on his shoulder and legs.  LYMPH NODES: Bilateral cervical, supraclavicular, axillary lymph node examination was done.  Negative for any palpable adenopathy.      Lab Results     Recent Results (from the past 240 hour(s))   Comprehensive metabolic panel    Collection Time: 09/30/21  9:48 AM   Result Value Ref Range    Sodium 141 136 - 145 mmol/L    Potassium 4.6 3.5 - 5.0 mmol/L    Chloride 109 (H) 98 - 107 mmol/L    Carbon Dioxide (CO2) 27 22 - 31 mmol/L    Anion Gap 5 5 - 18 mmol/L    Urea Nitrogen 22 8 -  22 mg/dL    Creatinine 0.78 0.70 - 1.30 mg/dL    Calcium 9.3 8.5 - 10.5 mg/dL    Glucose 78 70 - 125 mg/dL    Alkaline Phosphatase 69 45 - 120 U/L    AST 18 0 - 40 U/L    ALT 20 0 - 45 U/L    Protein Total 6.2 6.0 - 8.0 g/dL    Albumin 3.6 3.5 - 5.0 g/dL    Bilirubin Total 0.8 0.0 - 1.0 mg/dL    GFR Estimate >90 >60 mL/min/1.73m2   CBC with platelets and differential    Collection Time: 09/30/21  9:48 AM   Result Value Ref Range    WBC Count 8.0 4.0 - 11.0 10e3/uL    RBC Count 4.87 4.40 - 5.90 10e6/uL    Hemoglobin 14.7 13.3 - 17.7 g/dL    Hematocrit 45.2 40.0 - 53.0 %    MCV 93 78 - 100 fL    MCH 30.2 26.5 - 33.0 pg    MCHC 32.5 31.5 - 36.5 g/dL    RDW 12.7 10.0 - 15.0 %    Platelet Count 282 150 - 450 10e3/uL    % Neutrophils 67 %    % Lymphocytes 18 %    % Monocytes 9 %    % Eosinophils 4 %    % Basophils 1 %    % Immature Granulocytes 1 %    NRBCs per 100 WBC 0 <1 /100    Absolute Neutrophils 5.6 1.6 - 8.3 10e3/uL    Absolute Lymphocytes 1.4 0.8 - 5.3 10e3/uL    Absolute Monocytes 0.7 0.0 - 1.3 10e3/uL    Absolute Eosinophils 0.3 0.0 - 0.7 10e3/uL    Absolute Basophils 0.1 0.0 - 0.2 10e3/uL    Absolute Immature Granulocytes 0.1 (H) <=0.0 10e3/uL    Absolute NRBCs 0.0 10e3/uL        Imaging Results     No results found.       Dennis Garza MD

## 2021-12-30 ENCOUNTER — LAB (OUTPATIENT)
Dept: INFUSION THERAPY | Facility: HOSPITAL | Age: 61
End: 2021-12-30
Attending: INTERNAL MEDICINE
Payer: COMMERCIAL

## 2021-12-30 ENCOUNTER — ONCOLOGY VISIT (OUTPATIENT)
Dept: ONCOLOGY | Facility: HOSPITAL | Age: 61
End: 2021-12-30
Attending: INTERNAL MEDICINE
Payer: COMMERCIAL

## 2021-12-30 VITALS
RESPIRATION RATE: 12 BRPM | TEMPERATURE: 97.7 F | DIASTOLIC BLOOD PRESSURE: 69 MMHG | SYSTOLIC BLOOD PRESSURE: 123 MMHG | HEART RATE: 70 BPM | BODY MASS INDEX: 32.35 KG/M2 | WEIGHT: 252 LBS | OXYGEN SATURATION: 97 %

## 2021-12-30 DIAGNOSIS — C91.10 CHRONIC LYMPHOCYTIC LEUKEMIA (H): Primary | ICD-10-CM

## 2021-12-30 DIAGNOSIS — C91.10 CHRONIC LYMPHOCYTIC LEUKEMIA (H): ICD-10-CM

## 2021-12-30 LAB
ALBUMIN SERPL-MCNC: 3.5 G/DL (ref 3.5–5)
ALP SERPL-CCNC: 59 U/L (ref 45–120)
ALT SERPL W P-5'-P-CCNC: 18 U/L (ref 0–45)
ANION GAP SERPL CALCULATED.3IONS-SCNC: 9 MMOL/L (ref 5–18)
AST SERPL W P-5'-P-CCNC: 16 U/L (ref 0–40)
BILIRUB SERPL-MCNC: 0.7 MG/DL (ref 0–1)
BUN SERPL-MCNC: 17 MG/DL (ref 8–22)
CALCIUM SERPL-MCNC: 9.2 MG/DL (ref 8.5–10.5)
CHLORIDE BLD-SCNC: 104 MMOL/L (ref 98–107)
CO2 SERPL-SCNC: 27 MMOL/L (ref 22–31)
CREAT SERPL-MCNC: 0.76 MG/DL (ref 0.7–1.3)
ERYTHROCYTE [DISTWIDTH] IN BLOOD BY AUTOMATED COUNT: 12.2 % (ref 10–15)
GFR SERPL CREATININE-BSD FRML MDRD: >90 ML/MIN/1.73M2
GLUCOSE BLD-MCNC: 90 MG/DL (ref 70–125)
HCT VFR BLD AUTO: 47.3 % (ref 40–53)
HGB BLD-MCNC: 15.3 G/DL (ref 13.3–17.7)
MCH RBC QN AUTO: 29.9 PG (ref 26.5–33)
MCHC RBC AUTO-ENTMCNC: 32.3 G/DL (ref 31.5–36.5)
MCV RBC AUTO: 92 FL (ref 78–100)
PLATELET # BLD AUTO: 279 10E3/UL (ref 150–450)
POTASSIUM BLD-SCNC: 4.6 MMOL/L (ref 3.5–5)
PROT SERPL-MCNC: 6.2 G/DL (ref 6–8)
RBC # BLD AUTO: 5.12 10E6/UL (ref 4.4–5.9)
SODIUM SERPL-SCNC: 140 MMOL/L (ref 136–145)
WBC # BLD AUTO: 7.5 10E3/UL (ref 4–11)

## 2021-12-30 PROCEDURE — G0463 HOSPITAL OUTPT CLINIC VISIT: HCPCS

## 2021-12-30 PROCEDURE — 36415 COLL VENOUS BLD VENIPUNCTURE: CPT

## 2021-12-30 PROCEDURE — 85027 COMPLETE CBC AUTOMATED: CPT

## 2021-12-30 PROCEDURE — 99214 OFFICE O/P EST MOD 30 MIN: CPT | Performed by: INTERNAL MEDICINE

## 2021-12-30 PROCEDURE — 80053 COMPREHEN METABOLIC PANEL: CPT

## 2021-12-30 RX ORDER — TAMSULOSIN HYDROCHLORIDE 0.4 MG/1
0.4 CAPSULE ORAL DAILY
COMMUNITY
Start: 2021-11-23 | End: 2022-08-12

## 2021-12-30 RX ORDER — METOPROLOL SUCCINATE 50 MG/1
TABLET, EXTENDED RELEASE ORAL
COMMUNITY
Start: 2021-11-23 | End: 2022-02-24

## 2021-12-30 RX ORDER — IBUPROFEN 200 MG
200 TABLET ORAL PRN
COMMUNITY
End: 2022-12-08

## 2021-12-30 ASSESSMENT — PAIN SCALES - GENERAL: PAINLEVEL: MILD PAIN (3)

## 2021-12-30 NOTE — PROGRESS NOTES
[facility] cancer Care Progress Note  Patient: Ramu Espinoza   MRN:  [id number]   Date of Service : Dec 30, 2021        Reason for visit      1. Chronic lymphocytic leukemia (H)  Cy inducer Ibrutinib        Assessment     1.  A 61 year old  gentleman with CLL stage 4 now, diagnosed in 2012.  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  Currently quite stable on Imbruvica.  He is on a clinical trial.  2.  Very good clinical response to Imbruvica. His lymphnodes have normalized.  His CBC is normal.   3.  In May 2017 diagnosis of paroxysmal Atrial fibrillation. This could be caused by Imbruvica. He also drinks a lot of coffee.  He has not had any more recurrences since then.  4.  Pericardial effusion in 2019. Held imbruvica for 3 weeks.  Now resumed it. Doing OK. We have continued it.  He has not had any recurrence.  5.  History of pleurisy right side. No recurrence.  6.  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  7.  Fully Covid vaccinated    Plan     1.   Continue Imbruvica 140 mg 3 pills daily.  He will be on Imbruvica maintenance.  No change in plan.  Drug released from the care plan.  2.  Follow-up in 3 months.  3.   Advised to continue on acyclovir.      4. Continue follow-up with his regular doctor for other medical issues.  5. Follow-up with cardiology as indicated.  6. Advised to cut down on the amount of alcohol he is drinking.    Clinical stage      Stage IV    History     Ramu Espinoza is a very pleasant 61 year old  male with a history of CLL diagnosed in 2012 presenting with elevated white count in upper 10s/lower 20s with a peripheral blood smear showing atypical lymphocytosis suspicious for CLL.  He was completely asymptomatic, normal hemoglobin and platelet count.  His peripheral blood flow cytometry revealed CD5 co-expressing kappa light chains restricted B cells confirming the diagnosis of CLL.  He has been on observation.  His white count has been  slowly going up.    In June 2014 his white count was over 100,000.  So he had a CAT scan and that showed increasing lymphadenopathy and splenomegaly.  Platelet count has been going down.    I offered participation in clinical study in which patients are randomized between fludarabine and cyclophosphamide Rituxan or Imbruvica and Rituxan.  He has been randomized to Imbruvica and rituximab arm. He started on October 2014.  In November 2014 he got Rituxan for the first time.  Tolerated well. Finished that. Now on Imbruvica alone. Tolerating it well.     In May 2017 he was found to have paroxysmal afib when he complained of some fluttering sensation. He had holter monitor which led to the diagnosis. Most days he feels well.     In March 2019 he was found to have pericardial effusion when he started have some chest pain on deep inspiration. Had it tapped. Held Imbruvica for 3 weeks. Then resumed it.  Tolerated it fine. So continues it.    He has been fully Covid vaccinated.  No medical events since last visit here.    Comes in today for scheduled follow-up.  Overall feels good.  No new cardiac issues.  He has not missed any work.  No changes in his family medical history.  We did talk about his drinking wine little bit excessively at times.    Past Medical History     Past Medical History:   Diagnosis Date     BPH (benign prostatic hyperplasia)      CLL (chronic lymphocytic leukemia) (H)      Lymphadenopathy     Created by Conversion  Replacement Utility updated for latest IMO load     Paroxysmal atrial fibrillation with rapid ventricular response (H)      Sleep apnea     hypertension, being slightly overweight, anemic, having reflux, anxiety, epididymitis, hyperlipidemia and tinnitus      Review of Systems   Constitutional  Constitutional (WDL): Exceptions to WDL  Fatigue: Fatigue not relieved by rest - Limiting instrumental ADL  Weight Loss: to <10% from baseline, intervention not indicated(down 5  lbs)  Neurosensory  Neurosensory (WDL): Exceptions to WDL  Cardiovascular  Cardiovascular (WDL): All cardiovascular elements are within defined limits  Pulmonary  Respiratory (WDL): Within Defined Limits  Gastrointestinal  Gastrointestinal (WDL): All gastrointestinal elements are within defined limits  Genitourinary  Genitourinary (WDL): All genitourinary elements are within defined limits  Integumentary  Integumentary (WDL): All integumentary elements are within defined limits  Patient Coping  Patient Coping: Accepting  Accompanied by  Accompanied by: Alone    ECOG performance status and Distress Assessment      ECOG Performance:    ECOG Performance Status: 1    Distress Assessment  Distress Assessment Score: No distress:     Pain Status  Currently in Pain: Yes      Vital Signs     /69 (BP Location: Left arm, Patient Position: Sitting, Cuff Size: Adult Regular)   Pulse 70   Temp 97.7  F (36.5  C) (Oral)   Resp 12   Wt 114.3 kg (252 lb)   SpO2 97%   BMI 32.35 kg/m       Physical Exam     GENERAL: No acute distress. Cooperative in conversation.   HEENT: Pupils are equal, round and reactive. Oral mucosa is clean and intact. No ulcerations or mucositis noted. No bleeding noted.  RESP:Chest symmetric lungs are clear bilaterally per auscultation. Regular respiratory rate. No wheezes or rhonchi.  He does have occasional rhonchi on his left lower lobe.  CV: Normal S1 S2 Regular, rate and rhythm. No murmurs.  ABD: Nondistended, soft, nontender. Positive bowel sounds. No organomegaly.   EXTREMITIES: No lower extremity edema.   NEURO: Non- focal. Alert and oriented x3.  Cranial nerves appear intact.  PSYCH: Within normal limits. No depression or anxiety.  SKIN: Warm dry intact.  Slight rash on his shoulder and legs.  LYMPH NODES: Bilateral cervical, supraclavicular, axillary lymph node examination was done.  Negative for any palpable adenopathy.    All the above elements of the physical exam were personally  performed today and verified.      Lab Results     Recent Results (from the past 240 hour(s))   CBC with platelets    Collection Time: 12/30/21  9:04 AM   Result Value Ref Range    WBC Count 7.5 4.0 - 11.0 10e3/uL    RBC Count 5.12 4.40 - 5.90 10e6/uL    Hemoglobin 15.3 13.3 - 17.7 g/dL    Hematocrit 47.3 40.0 - 53.0 %    MCV 92 78 - 100 fL    MCH 29.9 26.5 - 33.0 pg    MCHC 32.3 31.5 - 36.5 g/dL    RDW 12.2 10.0 - 15.0 %    Platelet Count 279 150 - 450 10e3/uL        Imaging Results     No results found.       Dennis Garza MD

## 2021-12-30 NOTE — PROGRESS NOTES
"Oncology Rooming Note    December 30, 2021 9:21 AM   Ramu Espinoza is a 61 year old male who presents for:    Chief Complaint   Patient presents with     Oncology Clinic Visit     Chronic lymphocytic leukemia      Initial Vitals: /69 (BP Location: Left arm, Patient Position: Sitting, Cuff Size: Adult Regular)   Pulse 70   Temp 97.7  F (36.5  C) (Oral)   Resp 12   Wt 114.3 kg (252 lb)   SpO2 97%   BMI 32.35 kg/m   Estimated body mass index is 32.35 kg/m  as calculated from the following:    Height as of 1/22/21: 1.88 m (6' 2\").    Weight as of this encounter: 114.3 kg (252 lb). Body surface area is 2.44 meters squared.  Mild Pain (3) Comment: Data Unavailable   No LMP for male patient.  Allergies reviewed: Yes  Medications reviewed: Yes    Medications: Medication refills not needed today.  Pharmacy name entered into [facility]:    [id number] - [place], [place] - [address] AT [facility] - [place], [place] - [address]    Clinical concerns:  Chronic lymphocytic leukemia    Callie Stone CMA            "

## 2021-12-30 NOTE — LETTER
2021         RE: Ramu Espinoza  [address]  [place] [address]        Dear Colleague,    Thank you for referring your patient, Ramu Espinoza, to the [facility] [place]. Please see a copy of my visit note below.    [facility] cancer Care Progress Note  Patient: Ramu Espinoza   MRN:  [id number]   Date of Service : Dec 30, 2021        Reason for visit      1. Chronic lymphocytic leukemia (H)  Cy inducer Ibrutinib        Assessment     1.  A 61 year old  gentleman with CLL stage 4 now, diagnosed in 2012.  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  Currently quite stable on Imbruvica.  He is on a clinical trial.  2.  Very good clinical response to Imbruvica. His lymphnodes have normalized.  His CBC is normal.   3.  In May 2017 diagnosis of paroxysmal Atrial fibrillation. This could be caused by Imbruvica. He also drinks a lot of coffee.  He has not had any more recurrences since then.  4.  Pericardial effusion in 2019. Held imbruvica for 3 weeks.  Now resumed it. Doing OK. We have continued it.  He has not had any recurrence.  5.  History of pleurisy right side. No recurrence.  6.  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  7.  Fully Covid vaccinated    Plan     1.   Continue Imbruvica 140 mg 3 pills daily.  He will be on Imbruvica maintenance.  No change in plan.  Drug released from the care plan.  2.  Follow-up in 3 months.  3.   Advised to continue on acyclovir.      4. Continue follow-up with his regular doctor for other medical issues.  5. Follow-up with cardiology as indicated.  6. Advised to cut down on the amount of alcohol he is drinking.    Clinical stage      Stage IV    History     Ramu Espinoza is a very pleasant 61 year old  male with a history of CLL diagnosed in 2012 presenting with elevated white count in upper 10s/lower 20s with a peripheral blood smear showing atypical lymphocytosis suspicious for CLL.  He  was completely asymptomatic, normal hemoglobin and platelet count.  His peripheral blood flow cytometry revealed CD5 co-expressing kappa light chains restricted B cells confirming the diagnosis of CLL.  He has been on observation.  His white count has been slowly going up.    In June 2014 his white count was over 100,000.  So he had a CAT scan and that showed increasing lymphadenopathy and splenomegaly.  Platelet count has been going down.    I offered participation in clinical study in which patients are randomized between fludarabine and cyclophosphamide Rituxan or Imbruvica and Rituxan.  He has been randomized to Imbruvica and rituximab arm. He started on October 2014.  In November 2014 he got Rituxan for the first time.  Tolerated well. Finished that. Now on Imbruvica alone. Tolerating it well.     In May 2017 he was found to have paroxysmal afib when he complained of some fluttering sensation. He had holter monitor which led to the diagnosis. Most days he feels well.     In March 2019 he was found to have pericardial effusion when he started have some chest pain on deep inspiration. Had it tapped. Held Imbruvica for 3 weeks. Then resumed it.  Tolerated it fine. So continues it.    He has been fully Covid vaccinated.  No medical events since last visit here.    Comes in today for scheduled follow-up.  Overall feels good.  No new cardiac issues.  He has not missed any work.  No changes in his family medical history.  We did talk about his drinking wine little bit excessively at times.    Past Medical History     Past Medical History:   Diagnosis Date     BPH (benign prostatic hyperplasia)      CLL (chronic lymphocytic leukemia) (H)      Lymphadenopathy     Created by Conversion  Replacement Utility updated for latest IMO load     Paroxysmal atrial fibrillation with rapid ventricular response (H)      Sleep apnea     hypertension, being slightly overweight, anemic, having reflux, anxiety, epididymitis,  hyperlipidemia and tinnitus      Review of Systems   Constitutional  Constitutional (WDL): Exceptions to WDL  Fatigue: Fatigue not relieved by rest - Limiting instrumental ADL  Weight Loss: to <10% from baseline, intervention not indicated(down 5 lbs)  Neurosensory  Neurosensory (WDL): Exceptions to WDL  Cardiovascular  Cardiovascular (WDL): All cardiovascular elements are within defined limits  Pulmonary  Respiratory (WDL): Within Defined Limits  Gastrointestinal  Gastrointestinal (WDL): All gastrointestinal elements are within defined limits  Genitourinary  Genitourinary (WDL): All genitourinary elements are within defined limits  Integumentary  Integumentary (WDL): All integumentary elements are within defined limits  Patient Coping  Patient Coping: Accepting  Accompanied by  Accompanied by: Alone    ECOG performance status and Distress Assessment      ECOG Performance:    ECOG Performance Status: 1    Distress Assessment  Distress Assessment Score: No distress:     Pain Status  Currently in Pain: Yes      Vital Signs     /69 (BP Location: Left arm, Patient Position: Sitting, Cuff Size: Adult Regular)   Pulse 70   Temp 97.7  F (36.5  C) (Oral)   Resp 12   Wt 114.3 kg (252 lb)   SpO2 97%   BMI 32.35 kg/m       Physical Exam     GENERAL: No acute distress. Cooperative in conversation.   HEENT: Pupils are equal, round and reactive. Oral mucosa is clean and intact. No ulcerations or mucositis noted. No bleeding noted.  RESP:Chest symmetric lungs are clear bilaterally per auscultation. Regular respiratory rate. No wheezes or rhonchi.  He does have occasional rhonchi on his left lower lobe.  CV: Normal S1 S2 Regular, rate and rhythm. No murmurs.  ABD: Nondistended, soft, nontender. Positive bowel sounds. No organomegaly.   EXTREMITIES: No lower extremity edema.   NEURO: Non- focal. Alert and oriented x3.  Cranial nerves appear intact.  PSYCH: Within normal limits. No depression or anxiety.  SKIN: Warm dry  "intact.  Slight rash on his shoulder and legs.  LYMPH NODES: Bilateral cervical, supraclavicular, axillary lymph node examination was done.  Negative for any palpable adenopathy.    All the above elements of the physical exam were personally performed today and verified.      Lab Results     Recent Results (from the past 240 hour(s))   CBC with platelets    Collection Time: 12/30/21  9:04 AM   Result Value Ref Range    WBC Count 7.5 4.0 - 11.0 10e3/uL    RBC Count 5.12 4.40 - 5.90 10e6/uL    Hemoglobin 15.3 13.3 - 17.7 g/dL    Hematocrit 47.3 40.0 - 53.0 %    MCV 92 78 - 100 fL    MCH 29.9 26.5 - 33.0 pg    MCHC 32.3 31.5 - 36.5 g/dL    RDW 12.2 10.0 - 15.0 %    Platelet Count 279 150 - 450 10e3/uL        Imaging Results     No results found.       Dennis Garza MD         Oncology Rooming Note    December 30, 2021 9:21 AM   Ramu Espinoza is a 61 year old male who presents for:    Chief Complaint   Patient presents with     Oncology Clinic Visit     Chronic lymphocytic leukemia      Initial Vitals: /69 (BP Location: Left arm, Patient Position: Sitting, Cuff Size: Adult Regular)   Pulse 70   Temp 97.7  F (36.5  C) (Oral)   Resp 12   Wt 114.3 kg (252 lb)   SpO2 97%   BMI 32.35 kg/m   Estimated body mass index is 32.35 kg/m  as calculated from the following:    Height as of 1/22/21: 1.88 m (6' 2\").    Weight as of this encounter: 114.3 kg (252 lb). Body surface area is 2.44 meters squared.  Mild Pain (3) Comment: Data Unavailable   No LMP for male patient.  Allergies reviewed: Yes  Medications reviewed: Yes    Medications: Medication refills not needed today.  Pharmacy name entered into [facility]:    [id number] - [place], [place] - [address] [facility] [address]  [facility] - [place] - [address]    Clinical concerns:  Chronic lymphocytic leukemia    Callie Stone, [facility]                Again, thank you for allowing me to participate in the care of " your patient.        Sincerely,        Dennis Garza MD, MD

## 2022-01-02 NOTE — TELEPHONE ENCOUNTER
Controlled Substance Refill Request  Medication Name:   Requested Prescriptions     Pending Prescriptions Disp Refills     LORazepam (ATIVAN) 1 MG tablet [Pharmacy Med Name: LORAZEPAM 1 MG TABLET 1 TAB] 30 tablet 0     Sig: TAKE 1 TABLET (1 MG TOTAL) BY MOUTH DAILY AS NEEDED.     Date Last Fill: 4/30/20  Requested [facility]acy: [email or web address]  Submit electronically to pharmacy  Controlled Substance Agreement on file:   Encounter-Level CSA Scan Date:    There are no encounter-level csa scan date.        Last office visit:  3/2/20       Private car

## 2022-02-02 ENCOUNTER — TELEPHONE (OUTPATIENT)
Dept: FAMILY MEDICINE | Facility: CLINIC | Age: 62
End: 2022-02-02

## 2022-02-03 ENCOUNTER — TELEPHONE (OUTPATIENT)
Dept: ONCOLOGY | Facility: HOSPITAL | Age: 62
End: 2022-02-03
Payer: COMMERCIAL

## 2022-02-03 NOTE — TELEPHONE ENCOUNTER
New addendum #9 for study  has updated side effects.  Called patient and discussed new information, patient had no questions and verbalized understanding.  He would like to continue participation in the study.  He has requested that Dr. Garza is aware and has no concerns.  Will send Dr. Garza a message regarding new rare but serious side effect of heart failure.    Ly CORBETT, Research

## 2022-02-22 DIAGNOSIS — F41.9 ANXIETY: ICD-10-CM

## 2022-02-22 RX ORDER — LORAZEPAM 1 MG/1
1 TABLET ORAL 2 TIMES DAILY PRN
Qty: 30 TABLET | Refills: 0 | Status: SHIPPED | OUTPATIENT
Start: 2022-02-22 | End: 2022-03-09

## 2022-02-24 DIAGNOSIS — R00.2 HEART PALPITATIONS: Primary | ICD-10-CM

## 2022-02-25 RX ORDER — METOPROLOL SUCCINATE 50 MG/1
50 TABLET, EXTENDED RELEASE ORAL 2 TIMES DAILY
Qty: 180 TABLET | Refills: 3 | Status: SHIPPED | OUTPATIENT
Start: 2022-02-25 | End: 2023-02-09

## 2022-02-25 NOTE — TELEPHONE ENCOUNTER
"Outpatient Medication Detail     Disp Refills Start End JLUIS   metoprolol succinate (TOPROL-XL) 50 MG 24 hr tablet 180 tablet 3 2/24/2021  No   Sig - Route: TAKE 2 TABLETS (100 MG TOTAL) BY MOUTH DAILY. - Oral   Sent to pharmacy as: metoprolol succinate ER 50 mg tablet,extended release 24 hr (TOPROL-XL)   E-Prescribing Status: Receipt confirmed by pharmacy (2/24/2021  3:20 PM CST)       metoprolol succinate (TOPROL-XL) 50 MG 24 hr tablet [[id number]]    Electronically signed by: Davis Uribe RN on 02/24/21 1520 Status: Active   Ordering user: Davis Uribe RN 02/24/21 1520 Ordering provider: Ramu Jiménez MD   Authorized by: Ramu Jiménez MD   Frequency: DAILY 02/24/21 - Until Discontinued Released by: Davis Uribe RN 02/24/21 1520   Diagnoses  Heart palpitations [R00.2]     Routing refill request to provider for review/approval because:  Patient needs to be seen because it has been more than 1 year since last office visit.    Last office visit provider:  2/22/21     Requested Prescriptions   Pending Prescriptions Disp Refills     metoprolol succinate ER (TOPROL-XL) 50 MG 24 hr tablet 180 tablet 3     Sig: Take 1 tablet (50 mg) by mouth 2 times daily       Beta-Blockers Protocol Passed - 2/25/2022  3:28 PM        Passed - Blood pressure under 140/90 in past 12 months     BP Readings from Last 3 Encounters:   12/30/21 123/69   09/30/21 124/62   06/30/21 117/66                 Passed - Patient is age 6 or older        Passed - Recent (12 mo) or future (30 days) visit within the authorizing provider's specialty     Patient has had an office visit with the authorizing provider or a provider within the authorizing providers department within the previous 12 mos or has a future within next 30 days. See \"Patient Info\" tab in inbasket, or \"Choose Columns\" in Meds & Orders section of the refill encounter.              Passed - Medication is active on med list             Davis Uribe RN 02/25/22 3:28 PM  "

## 2022-03-08 ENCOUNTER — ANCILLARY PROCEDURE (OUTPATIENT)
Dept: GENERAL RADIOLOGY | Facility: CLINIC | Age: 62
End: 2022-03-08
Attending: FAMILY MEDICINE
Payer: COMMERCIAL

## 2022-03-08 ENCOUNTER — OFFICE VISIT (OUTPATIENT)
Dept: FAMILY MEDICINE | Facility: CLINIC | Age: 62
End: 2022-03-08
Payer: COMMERCIAL

## 2022-03-08 VITALS
WEIGHT: 244 LBS | OXYGEN SATURATION: 97 % | HEART RATE: 62 BPM | HEIGHT: 74 IN | BODY MASS INDEX: 31.32 KG/M2 | SYSTOLIC BLOOD PRESSURE: 138 MMHG | DIASTOLIC BLOOD PRESSURE: 70 MMHG

## 2022-03-08 DIAGNOSIS — Z13.220 SCREENING FOR CHOLESTEROL LEVEL: ICD-10-CM

## 2022-03-08 DIAGNOSIS — G89.29 CHRONIC PAIN OF LEFT KNEE: ICD-10-CM

## 2022-03-08 DIAGNOSIS — C91.10 CHRONIC LYMPHOCYTIC LEUKEMIA (H): ICD-10-CM

## 2022-03-08 DIAGNOSIS — Z80.0 FAMILY HISTORY OF COLON CANCER: ICD-10-CM

## 2022-03-08 DIAGNOSIS — M25.562 CHRONIC PAIN OF LEFT KNEE: ICD-10-CM

## 2022-03-08 DIAGNOSIS — R20.2 NOTALGIA PARESTHETICA: ICD-10-CM

## 2022-03-08 DIAGNOSIS — I48.0 PAROXYSMAL ATRIAL FIBRILLATION WITH RAPID VENTRICULAR RESPONSE (H): ICD-10-CM

## 2022-03-08 DIAGNOSIS — K76.0 FATTY LIVER: ICD-10-CM

## 2022-03-08 DIAGNOSIS — M54.6 CHRONIC LEFT-SIDED THORACIC BACK PAIN: ICD-10-CM

## 2022-03-08 DIAGNOSIS — L57.0 ACTINIC KERATOSIS: ICD-10-CM

## 2022-03-08 DIAGNOSIS — Z23 ENCOUNTER FOR IMMUNIZATION: ICD-10-CM

## 2022-03-08 DIAGNOSIS — F41.9 ANXIETY: ICD-10-CM

## 2022-03-08 DIAGNOSIS — Z12.11 SPECIAL SCREENING FOR MALIGNANT NEOPLASMS, COLON: ICD-10-CM

## 2022-03-08 DIAGNOSIS — R79.89 ELEVATED TSH: ICD-10-CM

## 2022-03-08 DIAGNOSIS — Z12.5 SCREENING FOR PROSTATE CANCER: ICD-10-CM

## 2022-03-08 DIAGNOSIS — G89.29 CHRONIC LEFT-SIDED THORACIC BACK PAIN: ICD-10-CM

## 2022-03-08 DIAGNOSIS — E66.811 CLASS 1 OBESITY DUE TO EXCESS CALORIES WITH SERIOUS COMORBIDITY AND BODY MASS INDEX (BMI) OF 31.0 TO 31.9 IN ADULT: Primary | ICD-10-CM

## 2022-03-08 DIAGNOSIS — E66.09 CLASS 1 OBESITY DUE TO EXCESS CALORIES WITH SERIOUS COMORBIDITY AND BODY MASS INDEX (BMI) OF 31.0 TO 31.9 IN ADULT: Primary | ICD-10-CM

## 2022-03-08 DIAGNOSIS — R06.89 ABNORMAL BREATH SOUNDS: ICD-10-CM

## 2022-03-08 DIAGNOSIS — G47.33 OSA (OBSTRUCTIVE SLEEP APNEA): ICD-10-CM

## 2022-03-08 PROBLEM — M79.662 PAIN OF LEFT LOWER LEG: Status: RESOLVED | Noted: 2021-01-22 | Resolved: 2022-03-08

## 2022-03-08 LAB
ALBUMIN SERPL-MCNC: 3.7 G/DL (ref 3.5–5)
ALBUMIN UR-MCNC: NEGATIVE MG/DL
ALP SERPL-CCNC: 73 U/L (ref 45–120)
ALT SERPL W P-5'-P-CCNC: 18 U/L (ref 0–45)
ANION GAP SERPL CALCULATED.3IONS-SCNC: 13 MMOL/L (ref 5–18)
APPEARANCE UR: CLEAR
AST SERPL W P-5'-P-CCNC: 19 U/L (ref 0–40)
BACTERIA #/AREA URNS HPF: ABNORMAL /HPF
BILIRUB SERPL-MCNC: 1.4 MG/DL (ref 0–1)
BILIRUB UR QL STRIP: NEGATIVE
BUN SERPL-MCNC: 17 MG/DL (ref 8–22)
CALCIUM SERPL-MCNC: 9.2 MG/DL (ref 8.5–10.5)
CHLORIDE BLD-SCNC: 104 MMOL/L (ref 98–107)
CHOLEST SERPL-MCNC: 261 MG/DL
CO2 SERPL-SCNC: 25 MMOL/L (ref 22–31)
COLOR UR AUTO: YELLOW
CREAT SERPL-MCNC: 0.7 MG/DL (ref 0.7–1.3)
ERYTHROCYTE [DISTWIDTH] IN BLOOD BY AUTOMATED COUNT: 12.3 % (ref 10–15)
FASTING STATUS PATIENT QL REPORTED: YES
GFR SERPL CREATININE-BSD FRML MDRD: >90 ML/MIN/1.73M2
GGT SERPL-CCNC: 24 U/L (ref 0–50)
GLUCOSE BLD-MCNC: 84 MG/DL (ref 70–125)
GLUCOSE UR STRIP-MCNC: NEGATIVE MG/DL
HCT VFR BLD AUTO: 45.4 % (ref 40–53)
HDLC SERPL-MCNC: 52 MG/DL
HGB BLD-MCNC: 14.4 G/DL (ref 13.3–17.7)
HGB UR QL STRIP: ABNORMAL
KETONES UR STRIP-MCNC: NEGATIVE MG/DL
LDLC SERPL CALC-MCNC: 185 MG/DL
LEUKOCYTE ESTERASE UR QL STRIP: NEGATIVE
MCH RBC QN AUTO: 28.7 PG (ref 26.5–33)
MCHC RBC AUTO-ENTMCNC: 31.7 G/DL (ref 31.5–36.5)
MCV RBC AUTO: 90 FL (ref 78–100)
NITRATE UR QL: NEGATIVE
PH UR STRIP: 6 [PH] (ref 5–8)
PLATELET # BLD AUTO: 268 10E3/UL (ref 150–450)
POTASSIUM BLD-SCNC: 4.8 MMOL/L (ref 3.5–5)
PROT SERPL-MCNC: 6.2 G/DL (ref 6–8)
PSA SERPL-MCNC: 1.76 UG/L (ref 0–4.5)
RBC # BLD AUTO: 5.02 10E6/UL (ref 4.4–5.9)
RBC #/AREA URNS AUTO: ABNORMAL /HPF
SODIUM SERPL-SCNC: 142 MMOL/L (ref 136–145)
SP GR UR STRIP: >=1.03 (ref 1–1.03)
SQUAMOUS #/AREA URNS AUTO: ABNORMAL /LPF
THYROPEROXIDASE AB SERPL-ACNC: 6 IU/ML (ref 0–6)
TRIGL SERPL-MCNC: 122 MG/DL
TSH SERPL DL<=0.005 MIU/L-ACNC: 3.68 UIU/ML (ref 0.3–5)
UROBILINOGEN UR STRIP-ACNC: 0.2 E.U./DL
WBC # BLD AUTO: 8.7 10E3/UL (ref 4–11)
WBC #/AREA URNS AUTO: ABNORMAL /HPF

## 2022-03-08 PROCEDURE — 85027 COMPLETE CBC AUTOMATED: CPT | Performed by: FAMILY MEDICINE

## 2022-03-08 PROCEDURE — 73560 X-RAY EXAM OF KNEE 1 OR 2: CPT | Mod: TC | Performed by: RADIOLOGY

## 2022-03-08 PROCEDURE — G0103 PSA SCREENING: HCPCS | Performed by: FAMILY MEDICINE

## 2022-03-08 PROCEDURE — 80053 COMPREHEN METABOLIC PANEL: CPT | Performed by: FAMILY MEDICINE

## 2022-03-08 PROCEDURE — 36415 COLL VENOUS BLD VENIPUNCTURE: CPT | Performed by: FAMILY MEDICINE

## 2022-03-08 PROCEDURE — 86376 MICROSOMAL ANTIBODY EACH: CPT | Performed by: FAMILY MEDICINE

## 2022-03-08 PROCEDURE — 81001 URINALYSIS AUTO W/SCOPE: CPT | Performed by: FAMILY MEDICINE

## 2022-03-08 PROCEDURE — 99396 PREV VISIT EST AGE 40-64: CPT | Mod: 25 | Performed by: FAMILY MEDICINE

## 2022-03-08 PROCEDURE — 90471 IMMUNIZATION ADMIN: CPT | Performed by: FAMILY MEDICINE

## 2022-03-08 PROCEDURE — 90750 HZV VACC RECOMBINANT IM: CPT | Performed by: FAMILY MEDICINE

## 2022-03-08 PROCEDURE — 84443 ASSAY THYROID STIM HORMONE: CPT | Performed by: FAMILY MEDICINE

## 2022-03-08 PROCEDURE — 0064A COVID-19,PF,MODERNA (18+ YRS BOOSTER .25ML): CPT | Performed by: FAMILY MEDICINE

## 2022-03-08 PROCEDURE — 80061 LIPID PANEL: CPT | Performed by: FAMILY MEDICINE

## 2022-03-08 PROCEDURE — 91306 COVID-19,PF,MODERNA (18+ YRS BOOSTER .25ML): CPT | Performed by: FAMILY MEDICINE

## 2022-03-08 RX ORDER — HYDROXYZINE HYDROCHLORIDE 25 MG/1
12.5 TABLET, FILM COATED ORAL AT BEDTIME
COMMUNITY
Start: 2022-02-24 | End: 2022-05-18

## 2022-03-08 NOTE — PATIENT INSTRUCTIONS
Thanks for coming in today Gali  See dermatology  Let them know about the lesions in your face and back    See Dr. Francisca Del Toro orthopedics about your knee    Please accomplish your CT scan your chest and you know why you have abnormal breath sounds    We are doing your blood work today      Gali

## 2022-03-08 NOTE — PROGRESS NOTES
SUBJECTIVE:   CC: Ramu Espinoza is an 61 year old male who presents for preventative health visit.       Patient has been advised of split billing requirements and indicates understanding: Yes  HPI  Ability to successfully perform activities of daily living: Yes, no assistance needed  Home safety:  none identified   Hearing impairment: Yes,            Today's PHQ-2 Score:   PHQ-2 (  [employer]) 3/8/2022   Q1: Little interest or pleasure in doing things 0   Q2: Feeling down, depressed or hopeless 1   PHQ-2 Score 1   Q1: Little interest or pleasure in doing things Not at all   Q2: Feeling down, depressed or hopeless Several days   PHQ-2 Score 1       Abuse: Current or Past(Physical, Sexual or Emotional)- No  Do you feel safe in your environment? yes    Have you ever done Advance Care Planning? (For example, a Health Directive, POLST, or a discussion with a medical provider or your loved ones about your wishes): Yes, patient states has an Advance Care Planning document and will bring a copy to the clinic.    Social History     Tobacco Use     Smoking status: Former Smoker     Packs/day: 0.50     Years: 25.00     Pack years: 12.50     Types: Cigarettes, Cigarettes     Quit date: 1/3/2005     Years since quittin.1     Smokeless tobacco: Never Used     Tobacco comment: quit 10 years ago   Substance Use Topics     Alcohol use: Yes     Alcohol/week: 14.0 standard drinks     Comment: ocassionally on weekends     If you drink alcohol do you typically have >3 drinks per day or >7 drinks per week? Yes  {    Alcohol Use 3/8/2022   Prescreen: >3 drinks/day or >7 drinks/week? Yes   AUDIT SCORE  13     AUDIT - Alcohol Use Disorders Identification Test - Reproduced from the World Health Organization Audit 2001 (Second Edition) 3/8/2022   1.  How often do you have a drink containing alcohol? 4 or more times a week   2.  How many drinks containing alcohol do you have on a typical day when you are drinking? 3 or 4   3.  How  often do you have five or more drinks on one occasion? Weekly   4.  How often during the last year have you found that you were not able to stop drinking once you had started? Never   5.  How often during the last year have you failed to do what was normally expected of you because of drinking? Never   6.  How often during the last year have you needed a first drink in the morning to get yourself going after a heavy drinking session? Never   7.  How often during the last year have you had a feeling of guilt or remorse after drinking? Less than monthly   8.  How often during the last year have you been unable to remember what happened the night before because of your drinking? Never   9.  Have you or someone else been injured because of your drinking? No   10. Has a relative, friend, doctor or other health care worker been concerned about your drinking or suggested you cut down? Yes, during the last year   TOTAL SCORE 13       Last PSA:   Prostate Specific Antigen Screen   Date Value Ref Range Status   01/27/2021 0.3 0.0 - 4.5 ng/mL Final       Reviewed orders with patient. Reviewed health maintenance and updated orders accordingly -     Reviewed and updated as needed this visit by clinical staff    Allergies               Reviewed and updated as needed this visit by Provider                     Review of Systems      OBJECTIVE:   There were no vitals taken for this visit.    Physical Exam      Physical:  General Appearance: Healthy-appearingy.   Head:  No deformity  Eyes: Sclerae white, pupils equal and reactive, extra ocular movements intact   Ears: Well-positioned, well-formed pinnae; TM pearly white, translucent, no bulging   Nose: Clear, normal mucosa no drainage or crusting  Throat: Lips, tongue, and mucosa are moist, pink and intact; tongue no thrush oral pharynx no injection or lesions  Ground teet   Neck: Supple, symmetric ROM no nodes   No carotid Buits  Chest: Lungs LEFT RALES , no retractions  Heart:  Regular rate & rhythm, S1 S2, no murmur  Abdomen: Soft, non-tender, no masses; umbilical area normal   Pulses: Equal femoral pulses  : No hernia palpable declined a prostate   Extremities: Well-perfused, warm and dry, No Edema  Palpable Pulses Bilateral  Neuro: Easily aroused good tone NO tremor   Skin  No Rash  Actinic Keratosis Notalgia 5 cm left thoracic     Recent Results (from the past 240 hour(s))   Comprehensive metabolic panel (BMP + Alb, Alk Phos, ALT, AST, Total. Bili, TP)    Collection Time: 03/08/22 12:27 PM   Result Value Ref Range    Sodium 142 136 - 145 mmol/L    Potassium 4.8 3.5 - 5.0 mmol/L    Chloride 104 98 - 107 mmol/L    Carbon Dioxide (CO2) 25 22 - 31 mmol/L    Anion Gap 13 5 - 18 mmol/L    Urea Nitrogen 17 8 - 22 mg/dL    Creatinine 0.70 0.70 - 1.30 mg/dL    Calcium 9.2 8.5 - 10.5 mg/dL    Glucose 84 70 - 125 mg/dL    Alkaline Phosphatase 73 45 - 120 U/L    AST 19 0 - 40 U/L    ALT 18 0 - 45 U/L    Protein Total 6.2 6.0 - 8.0 g/dL    Albumin 3.7 3.5 - 5.0 g/dL    Bilirubin Total 1.4 (H) 0.0 - 1.0 mg/dL    GFR Estimate >90 >60 mL/min/1.73m2   CBC with platelets    Collection Time: 03/08/22 12:27 PM   Result Value Ref Range    WBC Count 8.7 4.0 - 11.0 10e3/uL    RBC Count 5.02 4.40 - 5.90 10e6/uL    Hemoglobin 14.4 13.3 - 17.7 g/dL    Hematocrit 45.4 40.0 - 53.0 %    MCV 90 78 - 100 fL    MCH 28.7 26.5 - 33.0 pg    MCHC 31.7 31.5 - 36.5 g/dL    RDW 12.3 10.0 - 15.0 %    Platelet Count 268 150 - 450 10e3/uL   PSA, screen    Collection Time: 03/08/22 12:27 PM   Result Value Ref Range    Prostate Specific Antigen Screen 1.76 0.00 - 4.50 ug/L   UA with Microscopic reflex to Culture - lab collect    Collection Time: 03/08/22 12:27 PM    Specimen: Urine, Clean Catch   Result Value Ref Range    Color Urine Yellow Colorless, Straw, Light Yellow, Yellow    Appearance Urine Clear Clear    Glucose Urine Negative Negative mg/dL    Bilirubin Urine Negative Negative    Ketones Urine Negative Negative mg/dL     Specific Gravity Urine >=1.030 1.005 - 1.030    Blood Urine Trace (A) Negative    pH Urine 6.0 5.0 - 8.0    Protein Albumin Urine Negative Negative mg/dL    Urobilinogen Urine 0.2 0.2, 1.0 E.U./dL    Nitrite Urine Negative Negative    Leukocyte Esterase Urine Negative Negative   TSH with free T4 reflex    Collection Time: 03/08/22 12:27 PM   Result Value Ref Range    TSH 3.68 0.30 - 5.00 uIU/mL   Thyroid peroxidase antibody    Collection Time: 03/08/22 12:27 PM   Result Value Ref Range    Thyroid Peroxidase Antibody 6 0 - 6 IU/mL   GGT    Collection Time: 03/08/22 12:27 PM   Result Value Ref Range    GGT 24 0 - 50 U/L   Lipid Profile (Chol, Trig, HDL, LDL calc)    Collection Time: 03/08/22 12:27 PM   Result Value Ref Range    Cholesterol 261 (H) <=199 mg/dL    Triglycerides 122 <=149 mg/dL    Direct Measure HDL 52 >=40 mg/dL    LDL Cholesterol Calculated 185 (H) <=129 mg/dL    Patient Fasting > 8hrs? Yes    Urine Microscopic    Collection Time: 03/08/22 12:27 PM   Result Value Ref Range    Bacteria Urine None Seen None Seen /HPF    RBC Urine 0-2 0-2 /HPF /HPF    WBC Urine 0-5 0-5 /HPF /HPF    Squamous Epithelials Urine Few (A) None Seen /LPF             ASSESSMENT/PLAN:   Ramu was seen today for physical.    Diagnoses and all orders for this visit:    Class 1 obesity due to excess calories with serious comorbidity and body mass index (BMI) of 31.0 to 31.9 in adult  -     UA with Microscopic reflex to Culture - lab collect; Future    Chronic lymphocytic leukemia (H)  -     Comprehensive metabolic panel (BMP + Alb, Alk Phos, ALT, AST, Total. Bili, TP); Future  -     CBC with platelets; Future    Paroxysmal atrial fibrillation with rapid ventricular response (H)    DAVID (obstructive sleep apnea)    Elevated TSH  -     TSH with free T4 reflex; Future  -     Thyroid peroxidase antibody; Future    Screening for prostate cancer  -     PSA, screen; Future    Encounter for immunization  -     COVID-19,PF,MODERNA (18+ YRS  "BOOSTER .25ML)    Chronic pain of left knee  -     XR Knee Standing Left 2 Views; Future    Actinic keratosis  -     Adult Dermatology Referral; Future    Chronic left-sided thoracic back pain    Notalgia paresthetica  -     Adult Dermatology Referral; Future    Abnormal breath sounds  -     CT Chest Abdomen w/o & w Contrast; Future    Fatty liver  -     GGT; Future  -     US Elastography Only; Future    Other orders  -     ZOSTER VACCINE RECOMBINANT ADJUVANTED IM NJX            COUNSELING:   Reviewed preventive health counseling, as reflected in patient instructions       Regular exercise       Healthy diet/nutrition    Estimated body mass index is 32.35 kg/m  as calculated from the following:    Height as of 1/22/21: 1.88 m (6' 2\").    Weight as of 12/30/21: 114.3 kg (252 lb).     Weight management plan: Discussed healthy diet and exercise guidelines    He reports that he quit smoking about 17 years ago. His smoking use included cigarettes and cigarettes. He has a 12.50 pack-year smoking history. He has never used smokeless tobacco.      Counseling Resources:  ATP IV Guidelines  Pooled Cohorts Equation Calculator  FRAX Risk Assessment  ICSI Preventive Guidelines  Dietary Guidelines for Americans, 2010  USDA's MyPlate  ASA Prophylaxis  Lung CA Screening    Ramu Jiménez MD  [facility]  "

## 2022-03-08 NOTE — TELEPHONE ENCOUNTER
Reason for Call:  Medication or medication refill:    Do you use a [facility]?  Name of the pharmacy and phone number for the current request:  [email or web address]    Name of the medication requested:   LORazepam (ATIVAN) 1 MG tablet 30 tablet 0 2/22/2022  No   Sig - Route: Take 1 tablet (1 mg) by mouth 2 times daily as needed for anxiety Take 30 minutes prior to departure.  Do not operate a vehicle after taking this medication - Oral         Other request: pharm faxed and no response    Can we leave a detailed message on this number? YES      Call taken on 3/8/2022 at 4:32 PM by Pam J. Behr

## 2022-03-09 NOTE — TELEPHONE ENCOUNTER
Medication:  Lorazepam 1 mg      CSA in last year: NO    Random Utox in last year: NO    On opioids in addition to benzodiazepines? NO    Patient's last office visit with Dr. Jiménez was 3/8/22.          PROVIDER TO PULL THE FOLLOWING FROM  :    1. Last date filled?  2.   Only PCP Prescribing?  3.   Taken as prescribed from physician notes?

## 2022-03-10 RX ORDER — LORAZEPAM 1 MG/1
1 TABLET ORAL 2 TIMES DAILY PRN
Qty: 30 TABLET | Refills: 0 | Status: SHIPPED | OUTPATIENT
Start: 2022-03-10 | End: 2024-03-20

## 2022-03-11 ENCOUNTER — ANCILLARY PROCEDURE (OUTPATIENT)
Dept: ULTRASOUND IMAGING | Facility: CLINIC | Age: 62
End: 2022-03-11
Attending: FAMILY MEDICINE
Payer: COMMERCIAL

## 2022-03-11 DIAGNOSIS — K76.0 FATTY LIVER: ICD-10-CM

## 2022-03-11 PROCEDURE — 91200 LIVER ELASTOGRAPHY: CPT | Performed by: RADIOLOGY

## 2022-03-12 ENCOUNTER — HOSPITAL ENCOUNTER (OUTPATIENT)
Dept: CT IMAGING | Facility: HOSPITAL | Age: 62
Discharge: HOME | End: 2022-03-12
Attending: FAMILY MEDICINE | Admitting: FAMILY MEDICINE
Payer: COMMERCIAL

## 2022-03-12 DIAGNOSIS — R06.89 ABNORMAL BREATH SOUNDS: ICD-10-CM

## 2022-03-12 PROCEDURE — 74177 CT ABD & PELVIS W/CONTRAST: CPT

## 2022-03-12 PROCEDURE — 250N000011 HC RX IP 250 OP 636: Performed by: FAMILY MEDICINE

## 2022-03-12 RX ORDER — IOPAMIDOL 755 MG/ML
100 INJECTION, SOLUTION INTRAVASCULAR ONCE
Status: COMPLETED | OUTPATIENT
Start: 2022-03-12 | End: 2022-03-12

## 2022-03-12 RX ADMIN — IOPAMIDOL 100 ML: 755 INJECTION, SOLUTION INTRAVENOUS at 16:10

## 2022-03-22 DIAGNOSIS — F41.9 ANXIETY: ICD-10-CM

## 2022-03-22 RX ORDER — LORAZEPAM 1 MG/1
1 TABLET ORAL 2 TIMES DAILY PRN
Qty: 30 TABLET | Refills: 0 | OUTPATIENT
Start: 2022-03-22

## 2022-03-22 NOTE — TELEPHONE ENCOUNTER
I DO NOT FEEL COMFORTABLE REFILLING WITHOUT A PHONe VISIT - DECLINED - please contact him to schedule a phone visit with preferably Dr. Jiménez      Not yet established as chronic medications    First prescribed recently based on telephone visit.   PB    11:09 AM  Behr, Pam J. routed this conversation to [facility] Care Team Pool        Behr, Pam J.     PB    11:09 AM  Note     Pt is feeling some anxiety lately and hoping he can get refills.     Pt is out of medication, he only takes as needed and did suzy the pharm for refill but no response.     Per [facility] Epic:     LORazepam (ATIVAN) 1 MG tablet  Take 1 tablet (1 mg total) by mouth daily as needed. 3/2/2021               February 6, 2022    Clive Espinoza  to Ramu Jiménez MD          3:01 PM  Dr. Jiménez - I need a refill for my Lorazipan.  I would like the refill for [facility]  [address] [place] [phone].     Thank you  Clive Espinoza      LORazepam (ATIVAN) 1 MG tablet 30 tablet 0 3/10/2022  No   Sig - Route: Take 1 tablet (1 mg) by mouth 2 times daily as needed for anxiety Take 30 minutes prior to departure.  Do not operate a vehicle after taking this medication - Oral   Sent to pharmacy as: LORazepam 1 MG Oral Tablet (ATIVAN)   Class: E-Prescribe   Order: [id number]   E-Prescribing Status: Receipt confirmed by pharmacy (3/10/2022  3:03 PM CST)         02/22/2022  1   02/22/2022  Lorazepam 1 MG Tablet    30.00  15 Da Lar   [id number]   Llo (0760)   0/0  2.00 L[place]   02/07/2022  1   02/07/2022  Lorazepam 1 MG Tablet    30.00  15 Da Lar   [id number]   Llo (9760)   0/0  2.00 [place]

## 2022-03-23 NOTE — TELEPHONE ENCOUNTER
03/23 lm to call back to schedule a phone visit with ria preferramarileey or partner to discuss refill

## 2022-03-30 ENCOUNTER — PATIENT OUTREACH (OUTPATIENT)
Dept: ONCOLOGY | Facility: HOSPITAL | Age: 62
End: 2022-03-30

## 2022-03-30 ENCOUNTER — RESEARCH ENCOUNTER (OUTPATIENT)
Dept: ONCOLOGY | Facility: HOSPITAL | Age: 62
End: 2022-03-30

## 2022-03-30 ENCOUNTER — ONCOLOGY VISIT (OUTPATIENT)
Dept: ONCOLOGY | Facility: HOSPITAL | Age: 62
End: 2022-03-30
Attending: INTERNAL MEDICINE
Payer: COMMERCIAL

## 2022-03-30 ENCOUNTER — LAB (OUTPATIENT)
Dept: INFUSION THERAPY | Facility: HOSPITAL | Age: 62
End: 2022-03-30
Attending: INTERNAL MEDICINE
Payer: COMMERCIAL

## 2022-03-30 VITALS
DIASTOLIC BLOOD PRESSURE: 71 MMHG | BODY MASS INDEX: 32.05 KG/M2 | HEART RATE: 54 BPM | TEMPERATURE: 98.2 F | WEIGHT: 249.6 LBS | OXYGEN SATURATION: 98 % | SYSTOLIC BLOOD PRESSURE: 117 MMHG | RESPIRATION RATE: 18 BRPM

## 2022-03-30 DIAGNOSIS — C91.10 CHRONIC LYMPHOCYTIC LEUKEMIA (H): Primary | ICD-10-CM

## 2022-03-30 DIAGNOSIS — C91.10 CHRONIC LYMPHOCYTIC LEUKEMIA (H): ICD-10-CM

## 2022-03-30 LAB
ALBUMIN SERPL-MCNC: 3.8 G/DL (ref 3.5–5)
ALP SERPL-CCNC: 66 U/L (ref 45–120)
ALT SERPL W P-5'-P-CCNC: 22 U/L (ref 0–45)
ANION GAP SERPL CALCULATED.3IONS-SCNC: 4 MMOL/L (ref 5–18)
AST SERPL W P-5'-P-CCNC: 20 U/L (ref 0–40)
BASOPHILS # BLD AUTO: 0.1 10E3/UL (ref 0–0.2)
BASOPHILS NFR BLD AUTO: 1 %
BILIRUB SERPL-MCNC: 0.6 MG/DL (ref 0–1)
BUN SERPL-MCNC: 21 MG/DL (ref 8–22)
CALCIUM SERPL-MCNC: 9.3 MG/DL (ref 8.5–10.5)
CHLORIDE BLD-SCNC: 104 MMOL/L (ref 98–107)
CO2 SERPL-SCNC: 31 MMOL/L (ref 22–31)
CREAT SERPL-MCNC: 0.81 MG/DL (ref 0.7–1.3)
EOSINOPHIL # BLD AUTO: 0.3 10E3/UL (ref 0–0.7)
EOSINOPHIL NFR BLD AUTO: 4 %
ERYTHROCYTE [DISTWIDTH] IN BLOOD BY AUTOMATED COUNT: 12.5 % (ref 10–15)
GFR SERPL CREATININE-BSD FRML MDRD: >90 ML/MIN/1.73M2
GLUCOSE BLD-MCNC: 93 MG/DL (ref 70–125)
HCT VFR BLD AUTO: 46.1 % (ref 40–53)
HGB BLD-MCNC: 15 G/DL (ref 13.3–17.7)
IMM GRANULOCYTES # BLD: 0.1 10E3/UL
IMM GRANULOCYTES NFR BLD: 1 %
LYMPHOCYTES # BLD AUTO: 1.6 10E3/UL (ref 0.8–5.3)
LYMPHOCYTES NFR BLD AUTO: 19 %
MCH RBC QN AUTO: 30.1 PG (ref 26.5–33)
MCHC RBC AUTO-ENTMCNC: 32.5 G/DL (ref 31.5–36.5)
MCV RBC AUTO: 92 FL (ref 78–100)
MONOCYTES # BLD AUTO: 0.9 10E3/UL (ref 0–1.3)
MONOCYTES NFR BLD AUTO: 11 %
NEUTROPHILS # BLD AUTO: 5.4 10E3/UL (ref 1.6–8.3)
NEUTROPHILS NFR BLD AUTO: 64 %
NRBC # BLD AUTO: 0 10E3/UL
NRBC BLD AUTO-RTO: 0 /100
PLATELET # BLD AUTO: 255 10E3/UL (ref 150–450)
POTASSIUM BLD-SCNC: 4.7 MMOL/L (ref 3.5–5)
PROT SERPL-MCNC: 6.7 G/DL (ref 6–8)
RBC # BLD AUTO: 4.99 10E6/UL (ref 4.4–5.9)
SODIUM SERPL-SCNC: 139 MMOL/L (ref 136–145)
WBC # BLD AUTO: 8.2 10E3/UL (ref 4–11)

## 2022-03-30 PROCEDURE — G0463 HOSPITAL OUTPT CLINIC VISIT: HCPCS

## 2022-03-30 PROCEDURE — 99214 OFFICE O/P EST MOD 30 MIN: CPT | Performed by: INTERNAL MEDICINE

## 2022-03-30 PROCEDURE — 85025 COMPLETE CBC W/AUTO DIFF WBC: CPT | Performed by: INTERNAL MEDICINE

## 2022-03-30 PROCEDURE — 36415 COLL VENOUS BLD VENIPUNCTURE: CPT | Performed by: INTERNAL MEDICINE

## 2022-03-30 PROCEDURE — 80053 COMPREHEN METABOLIC PANEL: CPT | Performed by: INTERNAL MEDICINE

## 2022-03-30 ASSESSMENT — PAIN SCALES - GENERAL: PAINLEVEL: MILD PAIN (2)

## 2022-03-30 NOTE — PROGRESS NOTES
[facility] cancer Care Progress Note  Patient: Ramu Espinoza   MRN:  [id number]   Date of Service : Mar 30, 2022        Reason for visit      Problem List Items Addressed This Visit        Hematologic    Chronic lymphocytic leukemia (H)  Cy inducer Ibrutinib - Primary    Relevant Medications    study drug Ibrutinib () capsule 420 mg (Start on 3/30/2022 11:00 AM)           Assessment     1.  A very pleasant 61 old gentleman with stage IV CLL diagnosed in 2012.  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  Quite stable on Imbruvica.  2.  Some upper respiratory symptoms for which she had a CT scan which looks good.  3.  History of paroxysmal atrial fibrillation.  He has has not had any recurrence.  4.  History of pericardial effusion in 2019.  Again no recurrence.  5.  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  6.  Fully COVID vaccinated.      Plan     1.  Continue Imbruvica 420 mg p.o. daily.  2.  Follow-up in 3 months timeframe.  3.  Continue acyclovir.  4.  Follow-up with Dr. Jiménez for other medical issues.  5.  Continue good diet and exercise.  6.  Advised to moderate his alcohol and caffeine intake.    Clinical stage      Stage IV    History     Ramu Espinoza is a very pleasant 61 year old  male with a history of CLL diagnosed in 2012 presenting with elevated white count in upper 10s/lower 20s with a peripheral blood smear showing atypical lymphocytosis suspicious for CLL.  He was completely asymptomatic, normal hemoglobin and platelet count.  His peripheral blood flow cytometry revealed CD5 co-expressing kappa light chains restricted B cells confirming the diagnosis of CLL.  He has been on observation.  His white count has been slowly going up.    In 2014 his white count was over 100,000.  So he had a CAT scan and that showed increasing lymphadenopathy and splenomegaly.  Platelet count has been going down.    I offered participation in clinical study in which  patients are randomized between fludarabine and cyclophosphamide Rituxan or Imbruvica and Rituxan.  He has been randomized to Imbruvica and rituximab arm. He started on October 2014.  In November 2014 he got Rituxan for the first time.  Tolerated well. Finished that. Now on Imbruvica alone. Tolerating it well.     In May 2017 he was found to have paroxysmal afib when he complained of some fluttering sensation. He had holter monitor which led to the diagnosis. Most days he feels well.     In March 2019 he was found to have pericardial effusion when he started have some chest pain on deep inspiration. Had it tapped. Held Imbruvica for 3 weeks. Then resumed it.  Tolerated it fine. So continues it.    He has been fully Covid vaccinated.  No medical events since last visit here.    He had some chest symptoms couple of weeks ago.  He was seen by Dr. Jiménez.  Dr. Jiménez ordered CT scan of the chest.  That was okay.  Comes in today for scheduled follow-up.  Overall doing okay.  He was also concerned about some fatty infiltration of the liver as his wife was diagnosed with that.  His current scans are not showing that.    Past Medical History     Past Medical History:   Diagnosis Date     BPH (benign prostatic hyperplasia)      CLL (chronic lymphocytic leukemia) (H)      Lymphadenopathy     Created by Conversion  Replacement Utility updated for latest IMO load     Paroxysmal atrial fibrillation with rapid ventricular response (H)      Sleep apnea     hypertension, being slightly overweight, anemic, having reflux, anxiety, epididymitis, hyperlipidemia and tinnitus      Review of Systems   A 14 point review of systems was obtained.  Positive findings noted in the history.  Rest of the review of system is otherwise negative.     ECOG performance status and Distress Assessment      ECOG Performance:    ECOG Performance Status: 1    Distress Assessment  Distress Assessment Score: No distress:     Pain Status  Currently in Pain:  Yes      Vital Signs     There were no vitals taken for this visit.     Physical Exam     GENERAL: No acute distress. Cooperative in conversation.   HEENT:  Pupils are equal, round and reactive. Oral mucosa is clean and intact. No ulcerations or mucositis noted. No bleeding noted.  RESP:Chest symmetric lungs are clear bilaterally per auscultation. Regular respiratory rate. No wheezes or rhonchi.  CV: Normal S1 S2 Regular, rate and rhythm. No murmurs.    ABD: Nondistended, soft, nontender. Positive bowel sounds. No organomegaly.   EXTREMITIES: No lower extremity edema.   NEURO: Non- focal. Alert and oriented x3.  Cranial nerves appear intact.  PSYCH: Within normal limits. No depression or anxiety.  SKIN: Warm dry intact.       Lab Results     Recent Results (from the past 240 hour(s))   Comprehensive metabolic panel    Collection Time: 03/30/22  9:42 AM   Result Value Ref Range    Sodium 139 136 - 145 mmol/L    Potassium 4.7 3.5 - 5.0 mmol/L    Chloride 104 98 - 107 mmol/L    Carbon Dioxide (CO2) 31 22 - 31 mmol/L    Anion Gap 4 (L) 5 - 18 mmol/L    Urea Nitrogen 21 8 - 22 mg/dL    Creatinine 0.81 0.70 - 1.30 mg/dL    Calcium 9.3 8.5 - 10.5 mg/dL    Glucose 93 70 - 125 mg/dL    Alkaline Phosphatase 66 45 - 120 U/L    AST 20 0 - 40 U/L    ALT 22 0 - 45 U/L    Protein Total 6.7 6.0 - 8.0 g/dL    Albumin 3.8 3.5 - 5.0 g/dL    Bilirubin Total 0.6 0.0 - 1.0 mg/dL    GFR Estimate >90 >60 mL/min/1.73m2   CBC with platelets and differential    Collection Time: 03/30/22  9:42 AM   Result Value Ref Range    WBC Count 8.2 4.0 - 11.0 10e3/uL    RBC Count 4.99 4.40 - 5.90 10e6/uL    Hemoglobin 15.0 13.3 - 17.7 g/dL    Hematocrit 46.1 40.0 - 53.0 %    MCV 92 78 - 100 fL    MCH 30.1 26.5 - 33.0 pg    MCHC 32.5 31.5 - 36.5 g/dL    RDW 12.5 10.0 - 15.0 %    Platelet Count 255 150 - 450 10e3/uL    % Neutrophils 64 %    % Lymphocytes 19 %    % Monocytes 11 %    % Eosinophils 4 %    % Basophils 1 %    % Immature Granulocytes 1 %     NRBCs per 100 WBC 0 <1 /100    Absolute Neutrophils 5.4 1.6 - 8.3 10e3/uL    Absolute Lymphocytes 1.6 0.8 - 5.3 10e3/uL    Absolute Monocytes 0.9 0.0 - 1.3 10e3/uL    Absolute Eosinophils 0.3 0.0 - 0.7 10e3/uL    Absolute Basophils 0.1 0.0 - 0.2 10e3/uL    Absolute Immature Granulocytes 0.1 <=0.4 10e3/uL    Absolute NRBCs 0.0 10e3/uL        Imaging Results     CT Chest/Abdomen/Pelvis w Contrast    Result Date: 3/12/2022  EXAM: CT CHEST/ABDOMEN/PELVIS W CONTRAST LOCATION: [facility] DATE/TIME: 3/12/2022 3:47 PM INDICATION: H O CLL and Left Lower Rales COMPARISON: CT chest 03/16/2019 TECHNIQUE: CT scan of the chest, abdomen, and pelvis was performed following injection of IV contrast. Multiplanar reformats were obtained. Dose reduction techniques were used. CONTRAST: Isovue 370    100ml FINDINGS: LUNGS AND PLEURA: Minimal pleural thickening in the lung bases with the previous pleural effusions resolved. Some developing mild rounded atelectasis in the left lower lobe laterally likely explains the left lower lobe Rales heard on exam. No acute appearing findings. MEDIASTINUM/AXILLAE: No lymphadenopathy. Resolution of the pericardial effusion. CORONARY ARTERY CALCIFICATION: None. HEPATOBILIARY: No significant findings. Hepatic cysts. No follow-up needed. PANCREAS: Normal. SPLEEN: Normal. ADRENAL GLANDS: Normal. KIDNEYS/BLADDER: Normal. BOWEL: Mild diverticulosis. Otherwise normal. LYMPH NODES: No lymphadenopathy. VASCULATURE: No aneurysms. PELVIC ORGANS: Normal. MUSCULOSKELETAL: No concerning bone lesion.     IMPRESSION: 1.  No lymphadenopathy of the chest, abdomen, and pelvis. 2.  Some mild pleural thickening with some developing rounded atelectasis left lower lobe likely explains the findings on exam. Otherwise of little significance. 3.  No other significant findings.    US Elastography Only    Result Date: 3/11/2022  EXAMINATION: Liver Ultrasound Elastography, 3/11/2022 7:15 AM COMPARISON:  None HISTORY: Fatty liver FINDINGS: Ultrasound elastography of the liver was performed using a Greenfield ultrasound machine using 10 separate measurements of the liver parenchyma with patient in supine position. Measurements were obtained approximately 2 cm beneath Roopa's capsule, perpendicular to the liver capsule. Images are of satisfactory quality. The median liver stiffness is: 1.57 m/sec The mean liver stiffness is: 1.54 m/sec The interquartile range is: 0.18 IQR/median: 0.11. (IQR/median value of greater than 0.15 indicates that a dataset is unreliable)     IMPRESSION: The median liver stiffness is 1.57 m/sec and the IQR/median value is 0.11. This is a low elastography value which rules out advanced chronic liver disease in asymptomatic patients. Consensus criteria for interpreting liver stiffness values in patients with viral hepatitis or NAFLD according to SRU consensus guidelines (Elizabeth et. al. Radiology: 2020. epub) High probability of being normal:  <1.3 m/sec Rules out advanced chronic liver disease in asymptomatic patients: <1.7 m/sec Suggestive of advanced chronic liver disease: 1.7-2.1 m/sec Indicates advanced chronic liver disease: >2.1 m/sec Suggestive of clinically significant portal hypertension: >2.4 m/sec I have personally reviewed the examination and initial interpretation and I agree with the findings. SERENE VORA DO   SYSTEM ID:  [id number]    XR Knee Standing Left 2 Views    Result Date: 3/8/2022  EXAM: XR KNEE STANDING LT 2 VIEWS LOCATION: [facility] DATE/TIME: 3/8/2022 12:29 PM INDICATION:  Chronic pain of left knee, Chronic pain of left knee COMPARISON: None.     IMPRESSION: Chronic enthesitis along the superior pole of the patella the quadriceps attachment. No evidence for fracture. Degenerative change patellofemoral compartment. Minimal medial compartment narrowing. No significant effusion.     CT scan personally reviewed the patient.    Dennis Garza MD

## 2022-03-30 NOTE — PROGRESS NOTES
HENRIK on  for patient to call regarding the results of his CT scan.  Per Dr. Garza's request, a radiologist was called to review his last CT scan done on 3/12/22 to see if there was evidence of a fatty liver.  The radiologist stated that the liver appeared uniform and that he saw no evidence of a fatty liver on this scan.  Dr. Garza aware.

## 2022-03-30 NOTE — PROGRESS NOTES
"Oncology Rooming Note    March 30, 2022 12:46 PM   Ramu Espinoza is a 61 year old male who presents for:    Chief Complaint   Patient presents with     Oncology Clinic Visit     Initial Vitals: /71   Pulse 54   Temp 98.2  F (36.8  C)   Resp 18   Wt 113.2 kg (249 lb 9.6 oz)   SpO2 98%   BMI 32.05 kg/m   Estimated body mass index is 32.05 kg/m  as calculated from the following:    Height as of 3/8/22: 1.88 m (6' 2\").    Weight as of this encounter: 113.2 kg (249 lb 9.6 oz). Body surface area is 2.43 meters squared.  Mild Pain (2) Comment: Data Unavailable   No LMP for male patient.  Allergies reviewed: Yes  Medications reviewed: Yes    Medications: Medication refills not needed today.  Pharmacy name entered into [employer]: [facility] - [place], [place] - [address] [place] [address]    Clinical concerns: None       JUNIOR Bardales LPN            "

## 2022-03-30 NOTE — LETTER
"    3/30/2022         RE: Ramu Espinoza  [address] [place] [address]        Dear Colleague,    Thank you for referring your patient, Ramu Espinoza, to the [facility]. Please see a copy of my visit note below.    Oncology Rooming Note    2022 12:46 PM   Ramu Espinoza is a 61 year old male who presents for:    Chief Complaint   Patient presents with     Oncology Clinic Visit     Initial Vitals: /71   Pulse 54   Temp 98.2  F (36.8  C)   Resp 18   Wt 113.2 kg (249 lb 9.6 oz)   SpO2 98%   BMI 32.05 kg/m   Estimated body mass index is 32.05 kg/m  as calculated from the following:    Height as of 3/8/22: 1.88 m (6' 2\").    Weight as of this encounter: 113.2 kg (249 lb 9.6 oz). Body surface area is 2.43 meters squared.  Mild Pain (2) Comment: Data Unavailable   No LMP for male patient.  Allergies reviewed: Yes  Medications reviewed: Yes    Medications: Medication refills not needed today.  Pharmacy name entered into [employer]: [facility] - [place], [place] - [address]    Clinical concerns: None       JUNIOR Bardales LPN              [facility] cancer Care Progress Note  Patient: Ramu Espinoza   MRN:  [id number]   Date of Service : Mar 30, 2022        Reason for visit      Problem List Items Addressed This Visit        Hematologic    Chronic lymphocytic leukemia (H)  Cy inducer Ibrutinib - Primary    Relevant Medications    study drug Ibrutinib () capsule 420 mg (Start on 3/30/2022 11:00 AM)           Assessment     1.  A very pleasant 61 old gentleman with stage IV CLL diagnosed in 2012.  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  Quite stable on Imbruvica.  2.  Some upper respiratory symptoms for which she had a CT scan which looks good.  3.  History of paroxysmal atrial fibrillation.  He has has not had any recurrence.  4.  History of pericardial effusion in 2019.  Again no " recurrence.  5.  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  6.  Fully COVID vaccinated.      Plan     1.  Continue Imbruvica 420 mg p.o. daily.  2.  Follow-up in 3 months timeframe.  3.  Continue acyclovir.  4.  Follow-up with Dr. Jiménez for other medical issues.  5.  Continue good diet and exercise.  6.  Advised to moderate his alcohol and caffeine intake.    Clinical stage      Stage IV    History     Ramu Espinoza is a very pleasant 61 year old  male with a history of CLL diagnosed in 02/2012 presenting with elevated white count in upper 10s/lower 20s with a peripheral blood smear showing atypical lymphocytosis suspicious for CLL.  He was completely asymptomatic, normal hemoglobin and platelet count.  His peripheral blood flow cytometry revealed CD5 co-expressing kappa light chains restricted B cells confirming the diagnosis of CLL.  He has been on observation.  His white count has been slowly going up.    In June 2014 his white count was over 100,000.  So he had a CAT scan and that showed increasing lymphadenopathy and splenomegaly.  Platelet count has been going down.    I offered participation in clinical study in which patients are randomized between fludarabine and cyclophosphamide Rituxan or Imbruvica and Rituxan.  He has been randomized to Imbruvica and rituximab arm. He started on October 2014.  In November 2014 he got Rituxan for the first time.  Tolerated well. Finished that. Now on Imbruvica alone. Tolerating it well.     In May 2017 he was found to have paroxysmal afib when he complained of some fluttering sensation. He had holter monitor which led to the diagnosis. Most days he feels well.     In March 2019 he was found to have pericardial effusion when he started have some chest pain on deep inspiration. Had it tapped. Held Imbruvica for 3 weeks. Then resumed it.  Tolerated it fine. So continues it.    He has been fully Covid vaccinated.  No medical events since last visit  here.    He had some chest symptoms couple of weeks ago.  He was seen by Dr. Jiménez.  Dr. Jiménez ordered CT scan of the chest.  That was okay.  Comes in today for scheduled follow-up.  Overall doing okay.  He was also concerned about some fatty infiltration of the liver as his wife was diagnosed with that.  His current scans are not showing that.    Past Medical History     Past Medical History:   Diagnosis Date     BPH (benign prostatic hyperplasia)      CLL (chronic lymphocytic leukemia) (H)      Lymphadenopathy     Created by Conversion  Replacement Utility updated for latest IMO load     Paroxysmal atrial fibrillation with rapid ventricular response (H)      Sleep apnea     hypertension, being slightly overweight, anemic, having reflux, anxiety, epididymitis, hyperlipidemia and tinnitus      Review of Systems   A 14 point review of systems was obtained.  Positive findings noted in the history.  Rest of the review of system is otherwise negative.     ECOG performance status and Distress Assessment      ECOG Performance:    ECOG Performance Status: 1    Distress Assessment  Distress Assessment Score: No distress:     Pain Status  Currently in Pain: Yes      Vital Signs     There were no vitals taken for this visit.     Physical Exam     GENERAL: No acute distress. Cooperative in conversation.   HEENT:  Pupils are equal, round and reactive. Oral mucosa is clean and intact. No ulcerations or mucositis noted. No bleeding noted.  RESP:Chest symmetric lungs are clear bilaterally per auscultation. Regular respiratory rate. No wheezes or rhonchi.  CV: Normal S1 S2 Regular, rate and rhythm. No murmurs.    ABD: Nondistended, soft, nontender. Positive bowel sounds. No organomegaly.   EXTREMITIES: No lower extremity edema.   NEURO: Non- focal. Alert and oriented x3.  Cranial nerves appear intact.  PSYCH: Within normal limits. No depression or anxiety.  SKIN: Warm dry intact.       Lab Results     Recent Results (from the  past 240 hour(s))   Comprehensive metabolic panel    Collection Time: 03/30/22  9:42 AM   Result Value Ref Range    Sodium 139 136 - 145 mmol/L    Potassium 4.7 3.5 - 5.0 mmol/L    Chloride 104 98 - 107 mmol/L    Carbon Dioxide (CO2) 31 22 - 31 mmol/L    Anion Gap 4 (L) 5 - 18 mmol/L    Urea Nitrogen 21 8 - 22 mg/dL    Creatinine 0.81 0.70 - 1.30 mg/dL    Calcium 9.3 8.5 - 10.5 mg/dL    Glucose 93 70 - 125 mg/dL    Alkaline Phosphatase 66 45 - 120 U/L    AST 20 0 - 40 U/L    ALT 22 0 - 45 U/L    Protein Total 6.7 6.0 - 8.0 g/dL    Albumin 3.8 3.5 - 5.0 g/dL    Bilirubin Total 0.6 0.0 - 1.0 mg/dL    GFR Estimate >90 >60 mL/min/1.73m2   CBC with platelets and differential    Collection Time: 03/30/22  9:42 AM   Result Value Ref Range    WBC Count 8.2 4.0 - 11.0 10e3/uL    RBC Count 4.99 4.40 - 5.90 10e6/uL    Hemoglobin 15.0 13.3 - 17.7 g/dL    Hematocrit 46.1 40.0 - 53.0 %    MCV 92 78 - 100 fL    MCH 30.1 26.5 - 33.0 pg    MCHC 32.5 31.5 - 36.5 g/dL    RDW 12.5 10.0 - 15.0 %    Platelet Count 255 150 - 450 10e3/uL    % Neutrophils 64 %    % Lymphocytes 19 %    % Monocytes 11 %    % Eosinophils 4 %    % Basophils 1 %    % Immature Granulocytes 1 %    NRBCs per 100 WBC 0 <1 /100    Absolute Neutrophils 5.4 1.6 - 8.3 10e3/uL    Absolute Lymphocytes 1.6 0.8 - 5.3 10e3/uL    Absolute Monocytes 0.9 0.0 - 1.3 10e3/uL    Absolute Eosinophils 0.3 0.0 - 0.7 10e3/uL    Absolute Basophils 0.1 0.0 - 0.2 10e3/uL    Absolute Immature Granulocytes 0.1 <=0.4 10e3/uL    Absolute NRBCs 0.0 10e3/uL        Imaging Results     CT Chest/Abdomen/Pelvis w Contrast    Result Date: 3/12/2022  EXAM: CT CHEST/ABDOMEN/PELVIS W CONTRAST LOCATION: [facility] DATE/TIME: 3/12/2022 3:47 PM INDICATION: H O CLL and Left Lower Rales COMPARISON: CT chest 03/16/2019 TECHNIQUE: CT scan of the chest, abdomen, and pelvis was performed following injection of IV contrast. Multiplanar reformats were obtained. Dose reduction techniques  were used. CONTRAST: Isovue 370    100ml FINDINGS: LUNGS AND PLEURA: Minimal pleural thickening in the lung bases with the previous pleural effusions resolved. Some developing mild rounded atelectasis in the left lower lobe laterally likely explains the left lower lobe Rales heard on exam. No acute appearing findings. MEDIASTINUM/AXILLAE: No lymphadenopathy. Resolution of the pericardial effusion. CORONARY ARTERY CALCIFICATION: None. HEPATOBILIARY: No significant findings. Hepatic cysts. No follow-up needed. PANCREAS: Normal. SPLEEN: Normal. ADRENAL GLANDS: Normal. KIDNEYS/BLADDER: Normal. BOWEL: Mild diverticulosis. Otherwise normal. LYMPH NODES: No lymphadenopathy. VASCULATURE: No aneurysms. PELVIC ORGANS: Normal. MUSCULOSKELETAL: No concerning bone lesion.     IMPRESSION: 1.  No lymphadenopathy of the chest, abdomen, and pelvis. 2.  Some mild pleural thickening with some developing rounded atelectasis left lower lobe likely explains the findings on exam. Otherwise of little significance. 3.  No other significant findings.    US Elastography Only    Result Date: 3/11/2022  EXAMINATION: Liver Ultrasound Elastography, 3/11/2022 7:15 AM COMPARISON: None HISTORY: Fatty liver FINDINGS: Ultrasound elastography of the liver was performed using a Greenfield ultrasound machine using 10 separate measurements of the liver parenchyma with patient in supine position. Measurements were obtained approximately 2 cm beneath Roopa's capsule, perpendicular to the liver capsule. Images are of satisfactory quality. The median liver stiffness is: 1.57 m/sec The mean liver stiffness is: 1.54 m/sec The interquartile range is: 0.18 IQR/median: 0.11. (IQR/median value of greater than 0.15 indicates that a dataset is unreliable)     IMPRESSION: The median liver stiffness is 1.57 m/sec and the IQR/median value is 0.11. This is a low elastography value which rules out advanced chronic liver disease in asymptomatic patients. Consensus criteria  for interpreting liver stiffness values in patients with viral hepatitis or NAFLD according to SRU consensus guidelines (Clara et. al. Radiology: 2020. epub) High probability of being normal:  <1.3 m/sec Rules out advanced chronic liver disease in asymptomatic patients: <1.7 m/sec Suggestive of advanced chronic liver disease: 1.7-2.1 m/sec Indicates advanced chronic liver disease: >2.1 m/sec Suggestive of clinically significant portal hypertension: >2.4 m/sec I have personally reviewed the examination and initial interpretation and I agree with the findings. SERENE VORA DO   SYSTEM ID:  [id number]    XR Knee Standing Left 2 Views    Result Date: 3/8/2022  EXAM: XR KNEE STANDING LT 2 VIEWS LOCATION: [facility] DATE/TIME: 3/8/2022 12:29 PM INDICATION:  Chronic pain of left knee, Chronic pain of left knee COMPARISON: None.     IMPRESSION: Chronic enthesitis along the superior pole of the patella the quadriceps attachment. No evidence for fracture. Degenerative change patellofemoral compartment. Minimal medial compartment narrowing. No significant effusion.     CT scan personally reviewed the patient.    Dennis Garza MD           Again, thank you for allowing me to participate in the care of your patient.        Sincerely,        Dennis Garza MD, MD

## 2022-03-30 NOTE — PROGRESS NOTES
Met with patient and provider today in clinic for Cycle 91.  Labs reviewed and ok to continue with no dose modifications.  Patient forgot drug diaries at home and has offered to drop them off at the clinic this week.  New medication dispensed and diaries.  Will RTC in 3 months     Ly CORBETT, Research

## 2022-03-31 NOTE — PROGRESS NOTES
Patient calls back in today.  I was able to let him know that Dr Garza had a radiologist review his most recent CT scan.  Per the radiologist, patient does not have fatty liver.  Patient verbalized understanding and was very appreciative.    Penny Mcfadden RN

## 2022-04-18 DIAGNOSIS — Z11.59 ENCOUNTER FOR SCREENING FOR OTHER VIRAL DISEASES: Primary | ICD-10-CM

## 2022-05-04 ENCOUNTER — OFFICE VISIT (OUTPATIENT)
Dept: CARDIOLOGY | Facility: CLINIC | Age: 62
End: 2022-05-04
Payer: COMMERCIAL

## 2022-05-04 VITALS
WEIGHT: 254 LBS | BODY MASS INDEX: 32.6 KG/M2 | DIASTOLIC BLOOD PRESSURE: 74 MMHG | RESPIRATION RATE: 16 BRPM | SYSTOLIC BLOOD PRESSURE: 120 MMHG | HEIGHT: 74 IN | HEART RATE: 68 BPM

## 2022-05-04 DIAGNOSIS — F41.1 GENERALIZED ANXIETY DISORDER: ICD-10-CM

## 2022-05-04 DIAGNOSIS — I30.0 IDIOPATHIC PERICARDITIS, UNSPECIFIED CHRONICITY: ICD-10-CM

## 2022-05-04 DIAGNOSIS — C91.10 CHRONIC LYMPHOCYTIC LEUKEMIA (H): ICD-10-CM

## 2022-05-04 DIAGNOSIS — I48.0 PAROXYSMAL ATRIAL FIBRILLATION (H): Primary | ICD-10-CM

## 2022-05-04 DIAGNOSIS — R93.1 AGATSTON CORONARY ARTERY CALCIUM SCORE LESS THAN 100: ICD-10-CM

## 2022-05-04 PROCEDURE — 99214 OFFICE O/P EST MOD 30 MIN: CPT | Performed by: INTERNAL MEDICINE

## 2022-05-04 NOTE — LETTER
5/4/2022    Ramu Jiménez MD  [address] [place] [address]    RE: Ramu HOLMAN Olga       Dear Colleague,     I had the pleasure of seeing Ramu Espinoza in the [facility].    HEART CARE ENCOUNTER CONSULTATON NOTE      [facility]  [phone]      Assessment/Recommendations   Assessment:   1.  History of pericarditis with moderate pericardial effusion requiring pericardiocentesis (resolved).  2.  Paroxysmal symptomatic atrial fibrillation. CHADVASc:0. No recurrence symptomatically over the past year.  3. CLL on Imbruvica (Ibrutinib).   Pertinent has been associated with A. fib.  Also has association with significant bleeding.  Followed by Dr. Garza.   4. Coronary calcium score 0, 2021  5.  Anxiety.  Stable  6.  Elevated cholesterol levels with normal calcium score    Plan:  1.  Continue metoprolol XL 50 mg BID.  2.  No indication at this time for anticoagulation (patient is on ibrutinib).   3.  Support him exercising with a recumbent bike  4.  No evidence of congestive heart failure    Today's clinic visit entailed:  Review of external notes as documented elsewhere in note  Review of the result(s) of each unique test - ECG  The following tests were independently interpreted by me as noted in my documentation: Echo, Calcium score  Prescription drug management  The level of medical decision making during this visit was of moderate complexity.         History of Present Illness/Subjective    HPI: Ramu Espinoza is a 62 year old male with CLL on chronic ibrutinib, history of paroxysmal symptomatic A. fib, history of pericarditis who presents to cardiology clinic in follow-up.    Since last clinical evaluation he did undergo a coronary calcium score which was 0.  Displacement very low risk for future cardiovascular vascular events in the next 10 years.  This was outlined to the patient in detail.  He remains very active at work walking well over 4 to 5 miles per  "day.  He denies any dyspnea exertion chest pain on exertion or lightheadedness symptoms.  He gets occasional twinges in his chest at rest which she feels is related to his excessive coffee intake.  He denies any rapid palpitations.  He had no recurrence of symptomatic A. fib that he feels over the past 2 years.    Reviewed most recent note by Dr. Vazquez.  Reviewed most recent note by Dr. Jiménez.    ECG: March 6, 2019.  A. fib with ventricular response.  Personally viewed    Coronary Calcium Score: 2021, reviewed report    The total Agatston calcium score is 0. A calcium score of zero places the individual in the lowest quartile when compared to an age and gender matched control group and implies a low risk of cardiac events in the next 10 years. (less than 1% per   year).    The ascending aorta appears potentially enlarged on limited imaging. Consider full imaging study of the thoracic aorta to further define.    Please see separate report for radiology for additional findings.    Echocardiogram Results:2019    Limited echo performed to assess residual pericardial effusion. There is trace pericardial effusion along the apex and distal right ventricle. Pericardium appears mildly thickened. No evidence of tamponade. LV function appears normal without evidence   of a wall motion abnormality    When compared to the previous study dated 3/19/2019, no change in the pericardial effusion     Physical Examination  Review of Systems   Vitals: /74 (BP Location: Right arm, Patient Position: Sitting, Cuff Size: Adult Large)   Pulse 68   Resp 16   Ht 1.88 m (6' 2\")   Wt 115.2 kg (254 lb)   BMI 32.61 kg/m    BMI= Body mass index is 32.61 kg/m .  Wt Readings from Last 3 Encounters:   05/04/22 115.2 kg (254 lb)   03/30/22 113.2 kg (249 lb 9.6 oz)   03/08/22 110.7 kg (244 lb)           ENT/Mouth: membranes moist, no oral lesions or bleeding gums.      EYES:  no scleral icterus, normal conjunctivae       Chest/Lungs:   lungs " are clear to auscultation, no rales or wheezing, no sternal scar, equal chest wall expansion    Cardiovascular:   Regular. Normal first and second heart sounds with no murmurs, rubs, or gallops; the carotid, radial and posterior tibial pulses are intact, Jugular venous pressure noraml, no edema bilaterally    Abdomen:  no tenderness; bowel sounds are present   Extremities: no cyanosis or clubbing   Skin: no xanthelasma, warm.    Neurologic: normal  bilateral, no tremors     Psychiatric: alert and oriented x3, calm        Please refer above for cardiac ROS details.        Medical History  Surgical History Family History Social History   Past Medical History:   Diagnosis Date     Anxiety      BPH (benign prostatic hyperplasia)      Cancer (H)     CLL     CLL (chronic lymphocytic leukemia) (H)      Enlarged lymph nodes     Created by Conversion  Replacement Utility updated for latest IMO load     Paroxysmal atrial fibrillation with rapid ventricular response (H)      Sleep apnea      Tinnitus      Past Surgical History:   Procedure Laterality Date     ZZC OPEN FIX INTER/SUBTROCH FX,PLATE      Description: Open Treatment Of An Intertrochanteric Fracture;  Recorded: 04/01/2009;     Family History   Problem Relation Age of Onset     Cancer Father         pancreatic      Prostate Cancer Father      Colon Cancer Mother      Cancer Mother      Neuropathy Mother      Cancer Brother         Lung      Lung Cancer Brother      Hypertension Brother      No Known Problems Sister      Clotting Disorder Brother      Chronic Obstructive Pulmonary Disease Brother      Neuropathy Brother      Cancer Brother         digestive      Anuerysm Brother      Hypertension Brother      Hypertension Brother      No Known Problems Brother      Diabetes Paternal Aunt      Diabetes Maternal Aunt      Diabetes Maternal Aunt      Cancer Maternal Grandfather         Social History     Socioeconomic History     Marital status:      Spouse  name: Not on file     Number of children: Not on file     Years of education: Not on file     Highest education level: Not on file   Occupational History     Not on file   Tobacco Use     Smoking status: Former Smoker     Packs/day: 0.50     Years: 25.00     Pack years: 12.50     Types: Cigarettes     Quit date: 1/3/2005     Years since quittin.3     Smokeless tobacco: Never Used     Tobacco comment: quit 10 years ago   Substance and Sexual Activity     Alcohol use: Yes     Alcohol/week: 14.0 standard drinks     Comment: ocassionally on weekends     Drug use: No     Sexual activity: Never     Partners: Female     Birth control/protection: None   Other Topics Concern     Not on file   Social History Narrative     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     Social Determinants of Health     Financial Resource Strain: Not on file   Food Insecurity: Not on file   Transportation Needs: Not on file   Physical Activity: Not on file   Stress: Not on file   Social Connections: Not on file   Intimate Partner Violence: Not on file   Housing Stability: Not on file           Medications  Allergies   Current Outpatient Medications   Medication Sig Dispense Refill     acyclovir (ZOVIRAX) 400 MG tablet Take 1 tablet (400 mg) by mouth 2 times daily 180 tablet 3     cholecalciferol 25 MCG (1000 UT) TABS Take 1,000 Units by mouth daily       hydrOXYzine (ATARAX) 25 MG tablet Take 12.5 mg by mouth At Bedtime       ibrutinib (IMBRUVICA) 420 MG tablet Take 1,260 mg by mouth daily       ibuprofen (ADVIL/MOTRIN) 200 MG tablet Take 200 mg by mouth as needed       LORazepam (ATIVAN) 1 MG tablet Take 1 tablet (1 mg) by mouth 2 times daily as needed for anxiety Take 30 minutes prior to departure.  Do not operate a vehicle after taking this medication 30 tablet 0     metoprolol succinate ER (TOPROL-XL) 50 MG 24 hr tablet Take 1 tablet (50 mg) by mouth 2 times daily 180 tablet 3     sertraline (ZOLOFT) 25 MG tablet Take 25 mg by mouth daily         tamsulosin (FLOMAX) 0.4 MG capsule Take 0.4 mg by mouth daily       TRAZODONE HCL 50 MG OR TABS ONE TO TWO TABLETS AT BEDTIME AS NEEDED FOR SLEEP 30 0     TURMERIC PO Take 2 tablets by mouth daily         Allergies   Allergen Reactions     Zithromax [Azithromycin Dihydrate]           Lab Results    Chemistry/lipid CBC Cardiac Enzymes/BNP/TSH/INR   Recent Labs   Lab Test 03/08/22  1227   CHOL 261*   HDL 52   *   TRIG 122     Recent Labs   Lab Test 03/08/22  1227 01/27/21  0822 08/13/18  0810   * 191* 184*     Recent Labs   Lab Test 03/30/22  0942      POTASSIUM 4.7   CHLORIDE 104   CO2 31   GLC 93   BUN 21   CR 0.81   GFRESTIMATED >90   ZOHAIB 9.3     Recent Labs   Lab Test 03/30/22  0942 03/08/22  1227 12/30/21  0942   CR 0.81 0.70 0.76     No results for input(s): A1C in the last 99184 hours.       Recent Labs   Lab Test 03/30/22  0942   WBC 8.2   HGB 15.0   HCT 46.1   MCV 92        Recent Labs   Lab Test 03/30/22  0942 03/08/22  1227 12/30/21  0904   HGB 15.0 14.4 15.3    Recent Labs   Lab Test 03/17/19  0540 03/17/19  0007 03/16/19  1842   TROPONINI <0.01 <0.01 <0.01     Recent Labs   Lab Test 06/17/19  0812 03/16/19  1849   BNP  --  89*   NTBNP 62  --      Recent Labs   Lab Test 03/08/22  1227   TSH 3.68     Recent Labs   Lab Test 03/16/19  1842   INR 1.09        Ino Arana DO    Thank you for allowing me to participate in the care of your patient.      Sincerely,     Ino Arana DO   [facility]  cc:   Referred Self

## 2022-05-04 NOTE — PROGRESS NOTES
HEART CARE ENCOUNTER CONSULTATON NOTE      [facility]  [phone]      Assessment/Recommendations   Assessment:   1.  History of pericarditis with moderate pericardial effusion requiring pericardiocentesis (resolved).  2.  Paroxysmal symptomatic atrial fibrillation. CHADVASc:0. No recurrence symptomatically over the past year.  3. CLL on Imbruvica (Ibrutinib).   Pertinent has been associated with A. fib.  Also has association with significant bleeding.  Followed by Dr. Garza.   4. Coronary calcium score 0, 2021  5.  Anxiety.  Stable  6.  Elevated cholesterol levels with normal calcium score    Plan:  1.  Continue metoprolol XL 50 mg BID.  2.  No indication at this time for anticoagulation (patient is on ibrutinib).   3.  Support him exercising with a recumbent bike  4.  No evidence of congestive heart failure    Today's clinic visit entailed:  Review of external notes as documented elsewhere in note  Review of the result(s) of each unique test - ECG  The following tests were independently interpreted by me as noted in my documentation: Echo, Calcium score  Prescription drug management  The level of medical decision making during this visit was of moderate complexity.         History of Present Illness/Subjective    HPI: Ramu Espinoza is a 62 year old male with CLL on chronic ibrutinib, history of paroxysmal symptomatic A. fib, history of pericarditis who presents to cardiology clinic in follow-up.    Since last clinical evaluation he did undergo a coronary calcium score which was 0.  Displacement very low risk for future cardiovascular vascular events in the next 10 years.  This was outlined to the patient in detail.  He remains very active at work walking well over 4 to 5 miles per day.  He denies any dyspnea exertion chest pain on exertion or lightheadedness symptoms.  He gets occasional twinges in his chest at rest which she feels is related to his excessive coffee intake.  He denies  "any rapid palpitations.  He had no recurrence of symptomatic A. fib that he feels over the past 2 years.    Reviewed most recent note by Dr. Vazquez.  Reviewed most recent note by Dr. Jiménez.    ECG: March 6, 2019.  A. fib with ventricular response.  Personally viewed    Coronary Calcium Score: 2021, reviewed report    The total Agatston calcium score is 0. A calcium score of zero places the individual in the lowest quartile when compared to an age and gender matched control group and implies a low risk of cardiac events in the next 10 years. (less than 1% per   year).    The ascending aorta appears potentially enlarged on limited imaging. Consider full imaging study of the thoracic aorta to further define.    Please see separate report for radiology for additional findings.    Echocardiogram Results:2019    Limited echo performed to assess residual pericardial effusion. There is trace pericardial effusion along the apex and distal right ventricle. Pericardium appears mildly thickened. No evidence of tamponade. LV function appears normal without evidence   of a wall motion abnormality    When compared to the previous study dated 3/19/2019, no change in the pericardial effusion     Physical Examination  Review of Systems   Vitals: /74 (BP Location: Right arm, Patient Position: Sitting, Cuff Size: Adult Large)   Pulse 68   Resp 16   Ht 1.88 m (6' 2\")   Wt 115.2 kg (254 lb)   BMI 32.61 kg/m    BMI= Body mass index is 32.61 kg/m .  Wt Readings from Last 3 Encounters:   05/04/22 115.2 kg (254 lb)   03/30/22 113.2 kg (249 lb 9.6 oz)   03/08/22 110.7 kg (244 lb)           ENT/Mouth: membranes moist, no oral lesions or bleeding gums.      EYES:  no scleral icterus, normal conjunctivae       Chest/Lungs:   lungs are clear to auscultation, no rales or wheezing, no sternal scar, equal chest wall expansion    Cardiovascular:   Regular. Normal first and second heart sounds with no murmurs, rubs, or gallops; the carotid, " radial and posterior tibial pulses are intact, Jugular venous pressure noraml, no edema bilaterally    Abdomen:  no tenderness; bowel sounds are present   Extremities: no cyanosis or clubbing   Skin: no xanthelasma, warm.    Neurologic: normal  bilateral, no tremors     Psychiatric: alert and oriented x3, calm        Please refer above for cardiac ROS details.        Medical History  Surgical History Family History Social History   Past Medical History:   Diagnosis Date     Anxiety      BPH (benign prostatic hyperplasia)      Cancer (H)     CLL     CLL (chronic lymphocytic leukemia) (H)      Enlarged lymph nodes     Created by Conversion  Replacement Utility updated for latest IMO load     Paroxysmal atrial fibrillation with rapid ventricular response (H)      Sleep apnea      Tinnitus      Past Surgical History:   Procedure Laterality Date     ZZC OPEN FIX INTER/SUBTROCH FX,PLATE      Description: Open Treatment Of An Intertrochanteric Fracture;  Recorded: 04/01/2009;     Family History   Problem Relation Age of Onset     Cancer Father         pancreatic      Prostate Cancer Father      Colon Cancer Mother      Cancer Mother      Neuropathy Mother      Cancer Brother         Lung      Lung Cancer Brother      Hypertension Brother      No Known Problems Sister      Clotting Disorder Brother      Chronic Obstructive Pulmonary Disease Brother      Neuropathy Brother      Cancer Brother         digestive      Anuerysm Brother      Hypertension Brother      Hypertension Brother      No Known Problems Brother      Diabetes Paternal Aunt      Diabetes Maternal Aunt      Diabetes Maternal Aunt      Cancer Maternal Grandfather         Social History     Socioeconomic History     Marital status:      Spouse name: Not on file     Number of children: Not on file     Years of education: Not on file     Highest education level: Not on file   Occupational History     Not on file   Tobacco Use     Smoking status: Former  Smoker     Packs/day: 0.50     Years: 25.00     Pack years: 12.50     Types: Cigarettes     Quit date: 1/3/2005     Years since quittin.3     Smokeless tobacco: Never Used     Tobacco comment: quit 10 years ago   Substance and Sexual Activity     Alcohol use: Yes     Alcohol/week: 14.0 standard drinks     Comment: ocassionally on weekends     Drug use: No     Sexual activity: Never     Partners: Female     Birth control/protection: None   Other Topics Concern     Not on file   Social History Narrative     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     Social Determinants of Health     Financial Resource Strain: Not on file   Food Insecurity: Not on file   Transportation Needs: Not on file   Physical Activity: Not on file   Stress: Not on file   Social Connections: Not on file   Intimate Partner Violence: Not on file   Housing Stability: Not on file           Medications  Allergies   Current Outpatient Medications   Medication Sig Dispense Refill     acyclovir (ZOVIRAX) 400 MG tablet Take 1 tablet (400 mg) by mouth 2 times daily 180 tablet 3     cholecalciferol 25 MCG (1000 UT) TABS Take 1,000 Units by mouth daily       hydrOXYzine (ATARAX) 25 MG tablet Take 12.5 mg by mouth At Bedtime       ibrutinib (IMBRUVICA) 420 MG tablet Take 1,260 mg by mouth daily       ibuprofen (ADVIL/MOTRIN) 200 MG tablet Take 200 mg by mouth as needed       LORazepam (ATIVAN) 1 MG tablet Take 1 tablet (1 mg) by mouth 2 times daily as needed for anxiety Take 30 minutes prior to departure.  Do not operate a vehicle after taking this medication 30 tablet 0     metoprolol succinate ER (TOPROL-XL) 50 MG 24 hr tablet Take 1 tablet (50 mg) by mouth 2 times daily 180 tablet 3     sertraline (ZOLOFT) 25 MG tablet Take 25 mg by mouth daily        tamsulosin (FLOMAX) 0.4 MG capsule Take 0.4 mg by mouth daily       TRAZODONE HCL 50 MG OR TABS ONE TO TWO TABLETS AT BEDTIME AS NEEDED FOR SLEEP 30 0     TURMERIC PO Take 2 tablets by mouth daily          Allergies   Allergen Reactions     Zithromax [Azithromycin Dihydrate]           Lab Results    Chemistry/lipid CBC Cardiac Enzymes/BNP/TSH/INR   Recent Labs   Lab Test 03/08/22  1227   CHOL 261*   HDL 52   *   TRIG 122     Recent Labs   Lab Test 03/08/22  1227 01/27/21  0822 08/13/18  0810   * 191* 184*     Recent Labs   Lab Test 03/30/22  0942      POTASSIUM 4.7   CHLORIDE 104   CO2 31   GLC 93   BUN 21   CR 0.81   GFRESTIMATED >90   ZOHAIB 9.3     Recent Labs   Lab Test 03/30/22  0942 03/08/22  1227 12/30/21  0942   CR 0.81 0.70 0.76     No results for input(s): A1C in the last 20046 hours.       Recent Labs   Lab Test 03/30/22  0942   WBC 8.2   HGB 15.0   HCT 46.1   MCV 92        Recent Labs   Lab Test 03/30/22  0942 03/08/22  1227 12/30/21  0904   HGB 15.0 14.4 15.3    Recent Labs   Lab Test 03/17/19  0540 03/17/19  0007 03/16/19  1842   TROPONINI <0.01 <0.01 <0.01     Recent Labs   Lab Test 06/17/19  0812 03/16/19  1849   BNP  --  89*   NTBNP 62  --      Recent Labs   Lab Test 03/08/22  1227   TSH 3.68     Recent Labs   Lab Test 03/16/19  1842   INR 1.09        Ino Arana DO

## 2022-05-12 ENCOUNTER — LAB (OUTPATIENT)
Dept: LAB | Facility: CLINIC | Age: 62
End: 2022-05-12
Attending: SURGERY
Payer: COMMERCIAL

## 2022-05-12 DIAGNOSIS — Z11.59 ENCOUNTER FOR SCREENING FOR OTHER VIRAL DISEASES: ICD-10-CM

## 2022-05-12 PROCEDURE — U0003 INFECTIOUS AGENT DETECTION BY NUCLEIC ACID (DNA OR RNA); SEVERE ACUTE RESPIRATORY SYNDROME CORONAVIRUS 2 (SARS-COV-2) (CORONAVIRUS DISEASE [COVID-19]), AMPLIFIED PROBE TECHNIQUE, MAKING USE OF HIGH THROUGHPUT TECHNOLOGIES AS DESCRIBED BY CMS-2020-01-R: HCPCS

## 2022-05-12 PROCEDURE — U0005 INFEC AGEN DETEC AMPLI PROBE: HCPCS

## 2022-05-13 ENCOUNTER — ANESTHESIA EVENT (OUTPATIENT)
Dept: SURGERY | Facility: AMBULATORY SURGERY CENTER | Age: 62
End: 2022-05-13
Payer: COMMERCIAL

## 2022-05-13 LAB — SARS-COV-2 RNA RESP QL NAA+PROBE: NEGATIVE

## 2022-05-16 ENCOUNTER — ANESTHESIA (OUTPATIENT)
Dept: SURGERY | Facility: AMBULATORY SURGERY CENTER | Age: 62
End: 2022-05-16
Payer: COMMERCIAL

## 2022-05-16 ENCOUNTER — HOSPITAL ENCOUNTER (OUTPATIENT)
Facility: AMBULATORY SURGERY CENTER | Age: 62
Discharge: HOME | End: 2022-05-16
Attending: SURGERY
Payer: COMMERCIAL

## 2022-05-16 VITALS
WEIGHT: 254 LBS | OXYGEN SATURATION: 98 % | SYSTOLIC BLOOD PRESSURE: 117 MMHG | HEART RATE: 62 BPM | RESPIRATION RATE: 16 BRPM | HEIGHT: 74 IN | BODY MASS INDEX: 32.6 KG/M2 | DIASTOLIC BLOOD PRESSURE: 60 MMHG | TEMPERATURE: 97.6 F

## 2022-05-16 DIAGNOSIS — Z80.0 FAMILY HISTORY OF COLON CANCER: ICD-10-CM

## 2022-05-16 DIAGNOSIS — Z12.11 COLON CANCER SCREENING: ICD-10-CM

## 2022-05-16 PROCEDURE — 45380 COLONOSCOPY AND BIOPSY: CPT | Mod: PT | Performed by: SURGERY

## 2022-05-16 RX ORDER — OXYCODONE HYDROCHLORIDE 5 MG/1
5 TABLET ORAL EVERY 4 HOURS PRN
Status: DISCONTINUED | OUTPATIENT
Start: 2022-05-16 | End: 2022-05-17 | Stop reason: HOSPADM

## 2022-05-16 RX ORDER — MEPERIDINE HYDROCHLORIDE 25 MG/ML
12.5 INJECTION INTRAMUSCULAR; INTRAVENOUS; SUBCUTANEOUS
Status: DISCONTINUED | OUTPATIENT
Start: 2022-05-16 | End: 2022-05-17 | Stop reason: HOSPADM

## 2022-05-16 RX ORDER — HYDROMORPHONE HCL IN WATER/PF 6 MG/30 ML
0.2 PATIENT CONTROLLED ANALGESIA SYRINGE INTRAVENOUS EVERY 5 MIN PRN
Status: DISCONTINUED | OUTPATIENT
Start: 2022-05-16 | End: 2022-05-17 | Stop reason: HOSPADM

## 2022-05-16 RX ORDER — FENTANYL CITRATE 0.05 MG/ML
25 INJECTION, SOLUTION INTRAMUSCULAR; INTRAVENOUS EVERY 5 MIN PRN
Status: DISCONTINUED | OUTPATIENT
Start: 2022-05-16 | End: 2022-05-17 | Stop reason: HOSPADM

## 2022-05-16 RX ORDER — LIDOCAINE HYDROCHLORIDE 20 MG/ML
INJECTION, SOLUTION INFILTRATION; PERINEURAL PRN
Status: DISCONTINUED | OUTPATIENT
Start: 2022-05-16 | End: 2022-05-16

## 2022-05-16 RX ORDER — ONDANSETRON 2 MG/ML
4 INJECTION INTRAMUSCULAR; INTRAVENOUS EVERY 30 MIN PRN
Status: DISCONTINUED | OUTPATIENT
Start: 2022-05-16 | End: 2022-05-17 | Stop reason: HOSPADM

## 2022-05-16 RX ORDER — PROPOFOL 10 MG/ML
INJECTION, EMULSION INTRAVENOUS CONTINUOUS PRN
Status: DISCONTINUED | OUTPATIENT
Start: 2022-05-16 | End: 2022-05-16

## 2022-05-16 RX ORDER — ONDANSETRON 4 MG/1
4 TABLET, ORALLY DISINTEGRATING ORAL EVERY 30 MIN PRN
Status: DISCONTINUED | OUTPATIENT
Start: 2022-05-16 | End: 2022-05-17 | Stop reason: HOSPADM

## 2022-05-16 RX ORDER — LIDOCAINE 40 MG/G
CREAM TOPICAL
Status: DISCONTINUED | OUTPATIENT
Start: 2022-05-16 | End: 2022-05-17 | Stop reason: HOSPADM

## 2022-05-16 RX ORDER — PROPOFOL 10 MG/ML
INJECTION, EMULSION INTRAVENOUS PRN
Status: DISCONTINUED | OUTPATIENT
Start: 2022-05-16 | End: 2022-05-16

## 2022-05-16 RX ORDER — SODIUM CHLORIDE, SODIUM LACTATE, POTASSIUM CHLORIDE, CALCIUM CHLORIDE 600; 310; 30; 20 MG/100ML; MG/100ML; MG/100ML; MG/100ML
INJECTION, SOLUTION INTRAVENOUS CONTINUOUS
Status: DISCONTINUED | OUTPATIENT
Start: 2022-05-16 | End: 2022-05-17 | Stop reason: HOSPADM

## 2022-05-16 RX ORDER — FENTANYL CITRATE 0.05 MG/ML
25 INJECTION, SOLUTION INTRAMUSCULAR; INTRAVENOUS
Status: DISCONTINUED | OUTPATIENT
Start: 2022-05-16 | End: 2022-05-17 | Stop reason: HOSPADM

## 2022-05-16 RX ADMIN — PROPOFOL 50 MG: 10 INJECTION, EMULSION INTRAVENOUS at 13:31

## 2022-05-16 RX ADMIN — SODIUM CHLORIDE, SODIUM LACTATE, POTASSIUM CHLORIDE, CALCIUM CHLORIDE: 600; 310; 30; 20 INJECTION, SOLUTION INTRAVENOUS at 13:25

## 2022-05-16 RX ADMIN — PROPOFOL 200 MCG/KG/MIN: 10 INJECTION, EMULSION INTRAVENOUS at 13:29

## 2022-05-16 RX ADMIN — PROPOFOL 50 MG: 10 INJECTION, EMULSION INTRAVENOUS at 13:29

## 2022-05-16 RX ADMIN — LIDOCAINE HYDROCHLORIDE 40 MG: 20 INJECTION, SOLUTION INFILTRATION; PERINEURAL at 13:29

## 2022-05-16 NOTE — H&P
"PRE PROCEDURE NOTE - H & P      Chief Complaint/Reason for Procedure:              Screening colonoscopy      History and Physical Reviewed:   Reviewed no changes               Pre-sedation assessment:            BP (!) 143/81   Pulse 71   Resp 20   Ht 1.88 m (6' 2\")   Wt 115.2 kg (254 lb)   SpO2 95%   BMI 32.61 kg/m    General appearance: alert, appears stated age and cooperative  Lungs: clear to auscultation bilaterally  Heart: regular rate and rhythm      Previous reaction to anesthesia/sedation:        Sedation Plan based on assessment: Moderate      Comments: None      ASA Classification: 2      Impression:  Patient deemed adequate candidate for conscious sedation         Plan:   colonoscopy      Eugene Green MD  5/16/2022 1:22 PM  [facility]  [phone]                                "

## 2022-05-16 NOTE — ANESTHESIA POSTPROCEDURE EVALUATION
Patient: Ramu Espinoza    Procedure: Procedure(s):  COLONOSCOPY,POLYPECTOMY       Anesthesia Type:  MAC    Note:  Disposition: Outpatient   Postop Pain Control: Uneventful            Sign Out: Well controlled pain   PONV: No   Neuro/Psych: Uneventful            Sign Out: Acceptable/Baseline neuro status   Airway/Respiratory: Uneventful            Sign Out: Acceptable/Baseline resp. status   CV/Hemodynamics: Uneventful            Sign Out: Acceptable CV status; No obvious hypovolemia; No obvious fluid overload   Other NRE: NONE   DID A NON-ROUTINE EVENT OCCUR? No           Last vitals:  Vitals Value Taken Time   /62 05/16/22 1405   Temp 97.6  F (36.4  C) 05/16/22 1405   Pulse 61 05/16/22 1412   Resp 16 05/16/22 1405   SpO2 97 % 05/16/22 1412   Vitals shown include unvalidated device data.    Electronically Signed By: Mariana Quinones MD  May 16, 2022  2:15 PM

## 2022-05-16 NOTE — PROGRESS NOTES
Pt and family verbalize good understanding of discharge teach and follow up with MD.   MOISES,passing gas. D/C criteria met. Pt verbalizes readiness to go home. Home with wife.    @ME2@ 5/16/2022 2:35 PM

## 2022-05-16 NOTE — ANESTHESIA CARE TRANSFER NOTE
Patient: Ramu Espinoza    Procedure: Procedure(s):  COLONOSCOPY,POLYPECTOMY       Diagnosis: Family history of colon cancer [Z80.0]  Colon cancer screening [Z12.11]  Diagnosis Additional Information: No value filed.    Anesthesia Type:   MAC     Note:    Oropharynx: oropharynx clear of all foreign objects and spontaneously breathing  Level of Consciousness: drowsy  Oxygen Supplementation: face mask  Level of Supplemental Oxygen (L/min / FiO2): 6  Independent Airway: airway patency satisfactory and stable  Dentition: dentition unchanged  Vital Signs Stable: post-procedure vital signs reviewed and stable  Report to RN Given: handoff report given  Patient transferred to: Phase II    Handoff Report: Identifed the Patient, Identified the Reponsible Provider, Reviewed the pertinent medical history, Discussed the surgical course, Reviewed Intra-OP anesthesia mangement and issues during anesthesia, Set expectations for post-procedure period and Allowed opportunity for questions and acknowledgement of understanding      Vitals:  Vitals Value Taken Time   /62 05/16/22 1405   Temp 97.6  F (36.4  C) 05/16/22 1405   Pulse 61 05/16/22 1407   Resp 16 05/16/22 1405   SpO2 97 % 05/16/22 1407   Vitals shown include unvalidated device data.    Electronically Signed By: JUS RAMIREZ CRNA  May 16, 2022  2:09 PM

## 2022-05-16 NOTE — ANESTHESIA PREPROCEDURE EVALUATION
Anesthesia Pre-Procedure Evaluation    Patient: Ramu Espinoza   MRN: [id number] : 1960        Procedure : Procedure(s):  COLONOSCOPY          Past Medical History:   Diagnosis Date     Anxiety      BPH (benign prostatic hyperplasia)      Cancer (H)     CLL     CLL (chronic lymphocytic leukemia) (H)      Enlarged lymph nodes     Created by Conversion  Replacement Utility updated for latest IMO load     Irregular heart beat      Paroxysmal atrial fibrillation with rapid ventricular response (H)      Sleep apnea      Tinnitus       Past Surgical History:   Procedure Laterality Date     ZZC OPEN FIX INTER/SUBTROCH FX,PLATE      Description: Open Treatment Of An Intertrochanteric Fracture;  Recorded: 2009;      Allergies   Allergen Reactions     Zithromax [Azithromycin Dihydrate]       Social History     Tobacco Use     Smoking status: Former Smoker     Packs/day: 0.50     Years: 25.00     Pack years: 12.50     Types: Cigarettes     Quit date: 1/3/2005     Years since quittin.3     Smokeless tobacco: Never Used     Tobacco comment: quit 10 years ago   Substance Use Topics     Alcohol use: Yes     Alcohol/week: 14.0 standard drinks     Comment: ocassionally on weekends      Wt Readings from Last 1 Encounters:   22 115.2 kg (254 lb)        Anesthesia Evaluation   Pt has had prior anesthetic.     No history of anesthetic complications       ROS/MED HX  ENT/Pulmonary:     (+) sleep apnea,     Neurologic:       Cardiovascular:     (+) -----Irregular Heartbeat/Palpitations,     METS/Exercise Tolerance:     Hematologic:       Musculoskeletal:       GI/Hepatic:       Renal/Genitourinary:       Endo:     (+) thyroid problem, Obesity,     Psychiatric/Substance Use:       Infectious Disease:       Malignancy:       Other:            Physical Exam    Airway        Mallampati: II    Neck ROM: full     Respiratory Devices and Support         Dental  no notable dental history         Cardiovascular    cardiovascular exam normal          Pulmonary   pulmonary exam normal                OUTSIDE LABS:  CBC:   Lab Results   Component Value Date    WBC 8.2 03/30/2022    WBC 8.7 03/08/2022    HGB 15.0 03/30/2022    HGB 14.4 03/08/2022    HCT 46.1 03/30/2022    HCT 45.4 03/08/2022     03/30/2022     03/08/2022     BMP:   Lab Results   Component Value Date     03/30/2022     03/08/2022    POTASSIUM 4.7 03/30/2022    POTASSIUM 4.8 03/08/2022    CHLORIDE 104 03/30/2022    CHLORIDE 104 03/08/2022    CO2 31 03/30/2022    CO2 25 03/08/2022    BUN 21 03/30/2022    BUN 17 03/08/2022    CR 0.81 03/30/2022    CR 0.70 03/08/2022    GLC 93 03/30/2022    GLC 84 03/08/2022     COAGS:   Lab Results   Component Value Date    PTT 24 03/16/2019    INR 1.09 03/16/2019     POC: No results found for: BGM, HCG, HCGS  HEPATIC:   Lab Results   Component Value Date    ALBUMIN 3.8 03/30/2022    PROTTOTAL 6.7 03/30/2022    ALT 22 03/30/2022    AST 20 03/30/2022    GGT 24 03/08/2022    ALKPHOS 66 03/30/2022    BILITOTAL 0.6 03/30/2022     OTHER:   Lab Results   Component Value Date    ZOHAIB 9.3 03/30/2022    TSH 3.68 03/08/2022    CRP 1.4 03/02/2020    SED 4 03/02/2020       Anesthesia Plan    ASA Status:  2      Anesthesia Type: MAC.              Consents    Anesthesia Plan(s) and associated risks, benefits, and realistic alternatives discussed. Questions answered and patient/representative(s) expressed understanding.     - Discussed: Risks, Benefits and Alternatives for BOTH SEDATION and the PROCEDURE were discussed     - Discussed with:  Patient      - Extended Intubation/Ventilatory Support Discussed: No.    Use of blood products discussed: No .     Postoperative Care    Pain management: Multi-modal analgesia.        Comments:                Mariana Quinones MD

## 2022-05-16 NOTE — OP NOTE
COLONOSCOPY REPORT      Pre-op Dx:              Screening colonoscopy      Procedure:             Colonoscopy      Indications:            Colon Cancer Screening      Findings:                Normal colonoscopy    Procedure:              The patient was brought to the endoscopy suite placed, in a left lateral position, moderate sedation anesthesia was initiated.  After a procedural timeout, we began by performing a digital rectal exam which was unremarkable.  The colonoscope was advanced atraumatically into the anus taken all way up and around to the cecum utilizing water insufflation.  The ileocecal valve and the appendiceal orifice are clearly identified.  On withdrawal, the findings for the segments of colon are as follows:   Cecum: Good quality prep, no polyps noted.   Ascending colon: Good quality prep, no polyps noted  Hepatic flexure: Good quality prep, no polyps noted  Transverse colon: Good quality prep, no polyps noted  Splenic flexure: Good quality prep, no polyps noted  Descending colon: Good quality prep, one 1-2 mm polyp removed with a cold snare  Sigmoid colon: Good quality prep, no polyps noted  Rectum: Good quality prep, no polyps noted      Withdrawal Time:  13 minutes    EBL:                        None      Medications:         MAC; see anesthesia note for details          Complications:      None      Post-op Dx:            Normal Colonoscopy      Recommendation:   Next Colonoscopy in 5-7 years      Eugene Green MD  5/16/2022 2:03 PM  [facility] General Surgery  [phone]

## 2022-05-18 DIAGNOSIS — G47.00 INSOMNIA: ICD-10-CM

## 2022-05-20 RX ORDER — HYDROXYZINE HYDROCHLORIDE 25 MG/1
12.5 TABLET, FILM COATED ORAL AT BEDTIME
Qty: 30 TABLET | Refills: 1 | Status: SHIPPED | OUTPATIENT
Start: 2022-05-20 | End: 2022-12-23

## 2022-05-20 RX ORDER — TRAZODONE HYDROCHLORIDE 50 MG/1
TABLET, FILM COATED ORAL
Qty: 30 TABLET | Refills: 4 | Status: SHIPPED | OUTPATIENT
Start: 2022-05-20 | End: 2022-09-02

## 2022-06-17 NOTE — TELEPHONE ENCOUNTER
"Routing refill request to provider for review/approval because:  Medication is reported/historical    Last Written Prescription Date:  3/8/22  Last Fill Quantity: ,  # refills:    Last office visit provider:  3/8/22     Requested Prescriptions   Pending Prescriptions Disp Refills     traZODone (DESYREL) 50 MG tablet 30 tablet 0     Sig: Take 1-2 tablets ( mg) by mouth for sleep       Serotonin Modulators Passed - 5/18/2022  1:46 PM        Passed - Recent (12 mo) or future (30 days) visit within the authorizing provider's specialty     Patient has had an office visit with the authorizing provider or a provider within the authorizing providers department within the previous 12 mos or has a future within next 30 days. See \"Patient Info\" tab in inbasket, or \"Choose Columns\" in Meds & Orders section of the refill encounter.              Passed - Medication is active on med list        Passed - Patient is age 18 or older           hydrOXYzine (ATARAX) 25 MG tablet       Sig: Take 0.5 tablets (12.5 mg) by mouth At Bedtime       Antihistamines Protocol Passed - 5/18/2022  1:46 PM        Passed - Recent (12 mo) or future (30 days) visit within the authorizing provider's specialty     Patient has had an office visit with the authorizing provider or a provider within the authorizing providers department within the previous 12 mos or has a future within next 30 days. See \"Patient Info\" tab in inbasket, or \"Choose Columns\" in Meds & Orders section of the refill encounter.              Passed - Patient is age 3 or older     Apply age and/or weight-based dosing for peds patients age 3 and older.    Forward request to provider for patients under the age of 3.          Passed - Medication is active on med list             Sandra Dalal RN 05/19/22 7:29 PM  " She continues to report chronic low back pain.  She had an x-ray done in 2019 which showed mild to moderate facet arthropathy and degenerative changes especially in the lower lumbar spine.  She continues to report low back pain on a regular basis.  She typically takes ibuprofen 600 mg about once a day.  She was given a prescription for meloxicam but she has not tried that yet.  She is also taking gabapentin 400 mg 2-3 times a day.  She denies numbness, weakness, or tingling in her legs.  It has been more difficult for her to do daily activities lately due to the worsening pain

## 2022-06-29 ENCOUNTER — RESEARCH ENCOUNTER (OUTPATIENT)
Dept: ONCOLOGY | Facility: HOSPITAL | Age: 62
End: 2022-06-29

## 2022-06-29 ENCOUNTER — ONCOLOGY VISIT (OUTPATIENT)
Dept: ONCOLOGY | Facility: HOSPITAL | Age: 62
End: 2022-06-29
Attending: INTERNAL MEDICINE
Payer: COMMERCIAL

## 2022-06-29 ENCOUNTER — LAB (OUTPATIENT)
Dept: INFUSION THERAPY | Facility: HOSPITAL | Age: 62
End: 2022-06-29
Attending: INTERNAL MEDICINE
Payer: COMMERCIAL

## 2022-06-29 VITALS
SYSTOLIC BLOOD PRESSURE: 132 MMHG | RESPIRATION RATE: 18 BRPM | DIASTOLIC BLOOD PRESSURE: 67 MMHG | OXYGEN SATURATION: 96 % | TEMPERATURE: 98.1 F | HEART RATE: 62 BPM | BODY MASS INDEX: 33.05 KG/M2 | WEIGHT: 257.4 LBS

## 2022-06-29 DIAGNOSIS — C91.10 CHRONIC LYMPHOCYTIC LEUKEMIA (H): ICD-10-CM

## 2022-06-29 DIAGNOSIS — C91.10 CHRONIC LYMPHOCYTIC LEUKEMIA (H): Primary | ICD-10-CM

## 2022-06-29 LAB
ALBUMIN SERPL-MCNC: 3.6 G/DL (ref 3.5–5)
ALP SERPL-CCNC: 62 U/L (ref 45–120)
ALT SERPL W P-5'-P-CCNC: 17 U/L (ref 0–45)
ANION GAP SERPL CALCULATED.3IONS-SCNC: 5 MMOL/L (ref 5–18)
AST SERPL W P-5'-P-CCNC: 20 U/L (ref 0–40)
BASOPHILS # BLD AUTO: 0.1 10E3/UL (ref 0–0.2)
BASOPHILS NFR BLD AUTO: 1 %
BILIRUB SERPL-MCNC: 0.8 MG/DL (ref 0–1)
BUN SERPL-MCNC: 23 MG/DL (ref 8–22)
CALCIUM SERPL-MCNC: 9.1 MG/DL (ref 8.5–10.5)
CHLORIDE BLD-SCNC: 107 MMOL/L (ref 98–107)
CO2 SERPL-SCNC: 28 MMOL/L (ref 22–31)
CREAT SERPL-MCNC: 0.81 MG/DL (ref 0.7–1.3)
EOSINOPHIL # BLD AUTO: 0.2 10E3/UL (ref 0–0.7)
EOSINOPHIL NFR BLD AUTO: 3 %
ERYTHROCYTE [DISTWIDTH] IN BLOOD BY AUTOMATED COUNT: 12.4 % (ref 10–15)
GFR SERPL CREATININE-BSD FRML MDRD: >90 ML/MIN/1.73M2
GLUCOSE BLD-MCNC: 82 MG/DL (ref 70–125)
HCT VFR BLD AUTO: 44.5 % (ref 40–53)
HGB BLD-MCNC: 14.4 G/DL (ref 13.3–17.7)
IMM GRANULOCYTES # BLD: 0.1 10E3/UL
IMM GRANULOCYTES NFR BLD: 1 %
LYMPHOCYTES # BLD AUTO: 1.6 10E3/UL (ref 0.8–5.3)
LYMPHOCYTES NFR BLD AUTO: 20 %
MCH RBC QN AUTO: 29.8 PG (ref 26.5–33)
MCHC RBC AUTO-ENTMCNC: 32.4 G/DL (ref 31.5–36.5)
MCV RBC AUTO: 92 FL (ref 78–100)
MONOCYTES # BLD AUTO: 0.8 10E3/UL (ref 0–1.3)
MONOCYTES NFR BLD AUTO: 10 %
NEUTROPHILS # BLD AUTO: 5.3 10E3/UL (ref 1.6–8.3)
NEUTROPHILS NFR BLD AUTO: 65 %
NRBC # BLD AUTO: 0 10E3/UL
NRBC BLD AUTO-RTO: 0 /100
PLATELET # BLD AUTO: 252 10E3/UL (ref 150–450)
POTASSIUM BLD-SCNC: 4.7 MMOL/L (ref 3.5–5)
PROT SERPL-MCNC: 6.3 G/DL (ref 6–8)
RBC # BLD AUTO: 4.84 10E6/UL (ref 4.4–5.9)
SODIUM SERPL-SCNC: 140 MMOL/L (ref 136–145)
WBC # BLD AUTO: 8 10E3/UL (ref 4–11)

## 2022-06-29 PROCEDURE — 99214 OFFICE O/P EST MOD 30 MIN: CPT | Performed by: INTERNAL MEDICINE

## 2022-06-29 PROCEDURE — G0463 HOSPITAL OUTPT CLINIC VISIT: HCPCS

## 2022-06-29 PROCEDURE — 85025 COMPLETE CBC W/AUTO DIFF WBC: CPT | Performed by: INTERNAL MEDICINE

## 2022-06-29 PROCEDURE — 80053 COMPREHEN METABOLIC PANEL: CPT | Performed by: INTERNAL MEDICINE

## 2022-06-29 PROCEDURE — 36415 COLL VENOUS BLD VENIPUNCTURE: CPT | Performed by: INTERNAL MEDICINE

## 2022-06-29 ASSESSMENT — PAIN SCALES - GENERAL: PAINLEVEL: NO PAIN (0)

## 2022-06-29 NOTE — LETTER
2022         RE: Ramu Espinoza  [address]  [place] [address]        Dear Colleague,    Thank you for referring your patient, Ramu Espinoza, to the [facility] [place]. Please see a copy of my visit note below.    [facility] cancer Care Progress Note  Patient: Ramu Espinoza   MRN:  [id number]   Date of Service : 2022        Reason for visit      Problem List Items Addressed This Visit        Hematologic    Chronic lymphocytic leukemia (H)  Cy inducer Ibrutinib - Primary           Assessment     1.  A very pleasant 62 year old  gentleman with stage IV CLL diagnosed in 2012.  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  Quite stable on Imbruvica.  2.  No significant side effects noted from Imbruvica.  3.  History of paroxysmal atrial fibrillation.  He has has not had any recurrence.  4.  History of pericardial effusion in .  Again no recurrence.  5.  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  6.  Fully COVID vaccinated.      Plan     1.  Continue to take Imbruvica 420 mg by mouth daily.  3.  Follow-up in 3 months timeframe.  3.  Continue with acyclovir.  4.  Follow-up with Dr. Jiménez for other medical issues.  5.  Continue with good diet and exercise.  6.  Advised to moderate his alcohol and caffeine intake.    Clinical stage      Stage IV    History     Ramu Espinoza is a very pleasant 62 year old  male with a history of CLL diagnosed in 2012 presenting with elevated white count in upper 10s/lower 20s with a peripheral blood smear showing atypical lymphocytosis suspicious for CLL.  He was completely asymptomatic, normal hemoglobin and platelet count.  His peripheral blood flow cytometry revealed CD5 co-expressing kappa light chains restricted B cells confirming the diagnosis of CLL.  He has been on observation.  His white count has been slowly going up.    In 2014 his white count was over 100,000.  So he had a CAT scan  and that showed increasing lymphadenopathy and splenomegaly.  Platelet count has been going down.    I offered participation in clinical study in which patients are randomized between fludarabine and cyclophosphamide Rituxan or Imbruvica and Rituxan.  He has been randomized to Imbruvica and rituximab arm. He started on October 2014.  In November 2014 he got Rituxan for the first time.  Tolerated well. Finished that. Now on Imbruvica alone. Tolerating it well.     In May 2017 he was found to have paroxysmal afib when he complained of some fluttering sensation. He had holter monitor which led to the diagnosis. Most days he feels well.     In March 2019 he was found to have pericardial effusion when he started have some chest pain on deep inspiration. Had it tapped. Held Imbruvica for 3 weeks. Then resumed it.  Tolerated it fine. So continues it.    He has been fully Covid vaccinated.  No medical events since last visit here.    Comes in today for scheduled follow-up.  No new medical events since last time he was here.  Continues to take his medication meticulously.    Past Medical History     Past Medical History:   Diagnosis Date     BPH (benign prostatic hyperplasia)      CLL (chronic lymphocytic leukemia) (H)      Lymphadenopathy     Created by Conversion  Replacement Utility updated for latest IMO load     Paroxysmal atrial fibrillation with rapid ventricular response (H)      Sleep apnea     hypertension, being slightly overweight, anemic, having reflux, anxiety, epididymitis, hyperlipidemia and tinnitus      Review of Systems   A 14 point review of systems was obtained.  Positive findings noted in the history.  Rest of the review of system is otherwise negative.     ECOG performance status and Distress Assessment      ECOG Performance:    ECOG Performance Status: 1    Distress Assessment  Distress Assessment Score: No distress:     Pain Status  Currently in Pain: Yes      Vital Signs     /67   Pulse 62    "Temp 98.1  F (36.7  C)   Resp 18   Wt 116.8 kg (257 lb 6.4 oz)   SpO2 96%   BMI 33.05 kg/m       Physical Exam     GENERAL: No acute distress. Cooperative in conversation.   HEENT:  Pupils are equal, round and reactive. Oral mucosa is clean and intact. No ulcerations or mucositis noted. No bleeding noted.  RESP:Chest symmetric lungs are clear bilaterally per auscultation. Regular respiratory rate. No wheezes or rhonchi.  CV: Normal S1 S2 Regular, rate and rhythm. No murmurs.    ABD: Nondistended, soft, nontender. Positive bowel sounds. No organomegaly.   EXTREMITIES: No lower extremity edema.   NEURO: Non- focal. Alert and oriented x3.  Cranial nerves appear intact.  PSYCH: Within normal limits. No depression or anxiety.  SKIN: Warm dry intact.     Lab Results     No results found for this or any previous visit (from the past 240 hour(s)).     Imaging Results     No results found.       Dennis Garza MD         Oncology Rooming Note    2022 9:29 AM   Ramu Espinoza is a 62 year old male who presents for:    Chief Complaint   Patient presents with     Oncology Clinic Visit     Chronic lymphocytic leukemia (H)  Cy inducer Ibrutinib     Initial Vitals: /67   Pulse 62   Temp 98.1  F (36.7  C)   Resp 18   Wt 116.8 kg (257 lb 6.4 oz)   SpO2 96%   BMI 33.05 kg/m   Estimated body mass index is 33.05 kg/m  as calculated from the following:    Height as of 22: 1.88 m (6' 2\").    Weight as of this encounter: 116.8 kg (257 lb 6.4 oz). Body surface area is 2.47 meters squared.  No Pain (0) Comment: Data Unavailable   No LMP for male patient.  Allergies reviewed: Yes  Medications reviewed: Yes    Medications: Medication refills not needed today.  Pharmacy name entered into [facility]: [facility] - [place], [place] - [address] [place] [address]    Clinical concerns: None       Maren Hernandez LPN                Again, thank you for allowing me to participate in the care of your patient.  "       Sincerely,        Dennis Garza MD, MD

## 2022-06-29 NOTE — PROGRESS NOTES
Met with patient and provider for cycle 94 appt in clinic.  Labs reviewed and stable- ok to continue on protocol.  Patient returned drug diaries and remaining pills.  Dispensed new pills and diaries.  Patient will return in 3 months.    Ly CORBETT, Research

## 2022-06-29 NOTE — PROGRESS NOTES
"Oncology Rooming Note    2022 9:29 AM   Ramu Espinoza is a 62 year old male who presents for:    Chief Complaint   Patient presents with     Oncology Clinic Visit     Chronic lymphocytic leukemia (H)  Cy inducer Ibrutinib     Initial Vitals: /67   Pulse 62   Temp 98.1  F (36.7  C)   Resp 18   Wt 116.8 kg (257 lb 6.4 oz)   SpO2 96%   BMI 33.05 kg/m   Estimated body mass index is 33.05 kg/m  as calculated from the following:    Height as of 22: 1.88 m (6' 2\").    Weight as of this encounter: 116.8 kg (257 lb 6.4 oz). Body surface area is 2.47 meters squared.  No Pain (0) Comment: Data Unavailable   No LMP for male patient.  Allergies reviewed: Yes  Medications reviewed: Yes    Medications: Medication refills not needed today.  Pharmacy name entered into [facility]: [facility] - [place], [place] - [address] [place] [address]    Clinical concerns: None       Maren Hernandez LPN            "

## 2022-06-29 NOTE — PROGRESS NOTES
[facility] cancer Care Progress Note  Patient: Ramu Espinoza   MRN:  [id number]   Date of Service : 2022        Reason for visit      Problem List Items Addressed This Visit        Hematologic    Chronic lymphocytic leukemia (H)  Cy inducer Ibrutinib - Primary           Assessment     1.  A very pleasant 62 year old  gentleman with stage IV CLL diagnosed in 2012.  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  Quite stable on Imbruvica.  2.  No significant side effects noted from Imbruvica.  3.  History of paroxysmal atrial fibrillation.  He has has not had any recurrence.  4.  History of pericardial effusion in 2019.  Again no recurrence.  5.  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  6.  Fully COVID vaccinated.      Plan     1.  Continue to take Imbruvica 420 mg by mouth daily.  3.  Follow-up in 3 months timeframe.  3.  Continue with acyclovir.  4.  Follow-up with Dr. Jiménez for other medical issues.  5.  Continue with good diet and exercise.  6.  Advised to moderate his alcohol and caffeine intake.    Clinical stage      Stage IV    History     Ramu Espinoza is a very pleasant 62 year old  male with a history of CLL diagnosed in 2012 presenting with elevated white count in upper 10s/lower 20s with a peripheral blood smear showing atypical lymphocytosis suspicious for CLL.  He was completely asymptomatic, normal hemoglobin and platelet count.  His peripheral blood flow cytometry revealed CD5 co-expressing kappa light chains restricted B cells confirming the diagnosis of CLL.  He has been on observation.  His white count has been slowly going up.    In 2014 his white count was over 100,000.  So he had a CAT scan and that showed increasing lymphadenopathy and splenomegaly.  Platelet count has been going down.    I offered participation in clinical study in which patients are randomized between fludarabine and cyclophosphamide Rituxan or Imbruvica and Rituxan.  He  has been randomized to Imbruvica and rituximab arm. He started on October 2014.  In November 2014 he got Rituxan for the first time.  Tolerated well. Finished that. Now on Imbruvica alone. Tolerating it well.     In May 2017 he was found to have paroxysmal afib when he complained of some fluttering sensation. He had holter monitor which led to the diagnosis. Most days he feels well.     In March 2019 he was found to have pericardial effusion when he started have some chest pain on deep inspiration. Had it tapped. Held Imbruvica for 3 weeks. Then resumed it.  Tolerated it fine. So continues it.    He has been fully Covid vaccinated.  No medical events since last visit here.    Comes in today for scheduled follow-up.  No new medical events since last time he was here.  Continues to take his medication meticulously.    Past Medical History     Past Medical History:   Diagnosis Date     BPH (benign prostatic hyperplasia)      CLL (chronic lymphocytic leukemia) (H)      Lymphadenopathy     Created by Conversion  Replacement Utility updated for latest IMO load     Paroxysmal atrial fibrillation with rapid ventricular response (H)      Sleep apnea     hypertension, being slightly overweight, anemic, having reflux, anxiety, epididymitis, hyperlipidemia and tinnitus      Review of Systems   A 14 point review of systems was obtained.  Positive findings noted in the history.  Rest of the review of system is otherwise negative.     ECOG performance status and Distress Assessment      ECOG Performance:    ECOG Performance Status: 1    Distress Assessment  Distress Assessment Score: No distress:     Pain Status  Currently in Pain: Yes      Vital Signs     /67   Pulse 62   Temp 98.1  F (36.7  C)   Resp 18   Wt 116.8 kg (257 lb 6.4 oz)   SpO2 96%   BMI 33.05 kg/m       Physical Exam     GENERAL: No acute distress. Cooperative in conversation.   HEENT:  Pupils are equal, round and reactive. Oral mucosa is clean and  intact. No ulcerations or mucositis noted. No bleeding noted.  RESP:Chest symmetric lungs are clear bilaterally per auscultation. Regular respiratory rate. No wheezes or rhonchi.  CV: Normal S1 S2 Regular, rate and rhythm. No murmurs.    ABD: Nondistended, soft, nontender. Positive bowel sounds. No organomegaly.   EXTREMITIES: No lower extremity edema.   NEURO: Non- focal. Alert and oriented x3.  Cranial nerves appear intact.  PSYCH: Within normal limits. No depression or anxiety.  SKIN: Warm dry intact.     Lab Results     No results found for this or any previous visit (from the past 240 hour(s)).     Imaging Results     No results found.       Dennis Garza MD

## 2022-07-03 ENCOUNTER — HEALTH MAINTENANCE LETTER (OUTPATIENT)
Age: 62
End: 2022-07-03

## 2022-08-12 DIAGNOSIS — N40.0 BENIGN PROSTATIC HYPERPLASIA, UNSPECIFIED WHETHER LOWER URINARY TRACT SYMPTOMS PRESENT: Primary | ICD-10-CM

## 2022-08-14 NOTE — TELEPHONE ENCOUNTER
"Routing refill request to provider for review/approval because:  Medication is reported/historical    Last Written Prescription Date:    Last Fill Quantity: ,  # refills:    Last office visit provider:  3/8/22     Requested Prescriptions   Pending Prescriptions Disp Refills     tamsulosin (FLOMAX) 0.4 MG capsule 90 capsule      Sig: Take 1 capsule (0.4 mg) by mouth daily       Alpha Blockers Passed - 8/12/2022  2:50 PM        Passed - Blood pressure under 140/90 in past 12 months     BP Readings from Last 3 Encounters:   06/29/22 132/67   05/16/22 117/60   05/04/22 120/74                 Passed - Recent (12 mo) or future (30 days) visit within the authorizing provider's specialty     Patient has had an office visit with the authorizing provider or a provider within the authorizing providers department within the previous 12 mos or has a future within next 30 days. See \"Patient Info\" tab in inbasket, or \"Choose Columns\" in Meds & Orders section of the refill encounter.              Passed - Patient does not have Tadalafil, Vardenafil, or Sildenafil on their medication list        Passed - Medication is active on med list        Passed - Patient is 18 years of age or older             Sandra Dalal RN 08/13/22 8:49 PM  "

## 2022-08-15 NOTE — TELEPHONE ENCOUNTER
Attempted to call pt, no answer. Left message requesting pt to call back clinic.     When pt calls back clinic please ask him who last prescribed the tamsulosin for him and whether or not he is still taking this medication.       Please also inform him that he needs to establish care with another PCP who is accepting new patients as Dr. Jiménez has left [place].

## 2022-08-16 RX ORDER — TAMSULOSIN HYDROCHLORIDE 0.4 MG/1
0.4 CAPSULE ORAL DAILY
Qty: 90 CAPSULE | Refills: 0 | Status: SHIPPED | OUTPATIENT
Start: 2022-08-16 | End: 2022-11-11

## 2022-08-16 NOTE — TELEPHONE ENCOUNTER
Please notify patient he needs to establish care with new provider.  Please instruct him on clinics that have available providers.  I will renew his medicine for 90 days.

## 2022-08-25 DIAGNOSIS — G47.00 INSOMNIA: ICD-10-CM

## 2022-08-26 RX ORDER — HYDROXYZINE HYDROCHLORIDE 25 MG/1
12.5 TABLET, FILM COATED ORAL AT BEDTIME
Qty: 30 TABLET | Refills: 2 | OUTPATIENT
Start: 2022-08-26

## 2022-08-26 NOTE — TELEPHONE ENCOUNTER
Yovany Rios MD  to [facility] Pedroza Care Team Pool     BT    3:29 PM  Note  Please notify patient he needs to establish care with new provider.  Please instruct him on clinics that have available providers.  I will renew his medicine for 90 days.

## 2022-09-01 DIAGNOSIS — G47.00 INSOMNIA: ICD-10-CM

## 2022-09-01 NOTE — TELEPHONE ENCOUNTER
Medication Question or Refill        What medication are you calling about (include dose and sig)?:   Trazodone      Controlled Substance Agreement on file:   CSA -- Patient Level:    CSA: None found at the patient level.       Who prescribed the medication?: n/a    Do you need a refill? Yes:     When did you use the medication last? n/a    Patient offered an appointment? Yes:   Pt does have an appt to Establish care with Dr Gilmore    Do you have any questions or concerns?  No    Preferred Pharmacy:   [facility] - [place], [place] - [address] [place] [address]  Phone: [phone] Fax: [phone]      Could we [facility] you in [facility] or would you prefer to receive a phone call?:   Patient would prefer a phone call   Okay to leave a detailed message?: Yes at Home number on file [phone] (home)

## 2022-09-02 RX ORDER — TRAZODONE HYDROCHLORIDE 50 MG/1
TABLET, FILM COATED ORAL
Qty: 30 TABLET | Refills: 4 | Status: SHIPPED | OUTPATIENT
Start: 2022-09-02 | End: 2022-12-08

## 2022-09-13 DIAGNOSIS — C91.10 CHRONIC LYMPHOCYTIC LEUKEMIA (H): ICD-10-CM

## 2022-09-13 RX ORDER — ACYCLOVIR 400 MG/1
400 TABLET ORAL 2 TIMES DAILY
Qty: 180 TABLET | Refills: 3 | Status: SHIPPED | OUTPATIENT
Start: 2022-09-13 | End: 2023-08-28

## 2022-09-26 ENCOUNTER — LAB (OUTPATIENT)
Dept: INFUSION THERAPY | Facility: HOSPITAL | Age: 62
End: 2022-09-26
Attending: INTERNAL MEDICINE
Payer: COMMERCIAL

## 2022-09-26 ENCOUNTER — ONCOLOGY VISIT (OUTPATIENT)
Dept: ONCOLOGY | Facility: HOSPITAL | Age: 62
End: 2022-09-26
Attending: INTERNAL MEDICINE
Payer: COMMERCIAL

## 2022-09-26 ENCOUNTER — RESEARCH ENCOUNTER (OUTPATIENT)
Dept: ONCOLOGY | Facility: HOSPITAL | Age: 62
End: 2022-09-26

## 2022-09-26 VITALS
RESPIRATION RATE: 14 BRPM | OXYGEN SATURATION: 95 % | HEART RATE: 55 BPM | TEMPERATURE: 98.7 F | DIASTOLIC BLOOD PRESSURE: 72 MMHG | SYSTOLIC BLOOD PRESSURE: 121 MMHG | WEIGHT: 267.9 LBS | HEIGHT: 74 IN | BODY MASS INDEX: 34.38 KG/M2

## 2022-09-26 DIAGNOSIS — C91.10 CHRONIC LYMPHOCYTIC LEUKEMIA (H): Primary | ICD-10-CM

## 2022-09-26 LAB
ALBUMIN SERPL BCG-MCNC: 4 G/DL (ref 3.5–5.2)
ALP SERPL-CCNC: 58 U/L (ref 40–129)
ALT SERPL W P-5'-P-CCNC: 19 U/L (ref 10–50)
ANION GAP SERPL CALCULATED.3IONS-SCNC: 8 MMOL/L (ref 7–15)
AST SERPL W P-5'-P-CCNC: 20 U/L (ref 10–50)
BASOPHILS # BLD AUTO: 0.1 10E3/UL (ref 0–0.2)
BASOPHILS NFR BLD AUTO: 1 %
BILIRUB SERPL-MCNC: 0.4 MG/DL
BUN SERPL-MCNC: 14.9 MG/DL (ref 8–23)
CALCIUM SERPL-MCNC: 9 MG/DL (ref 8.8–10.2)
CHLORIDE SERPL-SCNC: 105 MMOL/L (ref 98–107)
CREAT SERPL-MCNC: 0.8 MG/DL (ref 0.67–1.17)
DEPRECATED HCO3 PLAS-SCNC: 27 MMOL/L (ref 22–29)
EOSINOPHIL # BLD AUTO: 0.3 10E3/UL (ref 0–0.7)
EOSINOPHIL NFR BLD AUTO: 3 %
ERYTHROCYTE [DISTWIDTH] IN BLOOD BY AUTOMATED COUNT: 12.7 % (ref 10–15)
GFR SERPL CREATININE-BSD FRML MDRD: >90 ML/MIN/1.73M2
GLUCOSE SERPL-MCNC: 77 MG/DL (ref 70–99)
HCT VFR BLD AUTO: 46 % (ref 40–53)
HGB BLD-MCNC: 14.9 G/DL (ref 13.3–17.7)
IMM GRANULOCYTES # BLD: 0.1 10E3/UL
IMM GRANULOCYTES NFR BLD: 1 %
LYMPHOCYTES # BLD AUTO: 1.5 10E3/UL (ref 0.8–5.3)
LYMPHOCYTES NFR BLD AUTO: 19 %
MCH RBC QN AUTO: 30 PG (ref 26.5–33)
MCHC RBC AUTO-ENTMCNC: 32.4 G/DL (ref 31.5–36.5)
MCV RBC AUTO: 93 FL (ref 78–100)
MONOCYTES # BLD AUTO: 0.8 10E3/UL (ref 0–1.3)
MONOCYTES NFR BLD AUTO: 11 %
NEUTROPHILS # BLD AUTO: 5.3 10E3/UL (ref 1.6–8.3)
NEUTROPHILS NFR BLD AUTO: 65 %
NRBC # BLD AUTO: 0 10E3/UL
NRBC BLD AUTO-RTO: 0 /100
PLATELET # BLD AUTO: 243 10E3/UL (ref 150–450)
POTASSIUM SERPL-SCNC: 3.9 MMOL/L (ref 3.4–5.3)
PROT SERPL-MCNC: 6.1 G/DL (ref 6.4–8.3)
RBC # BLD AUTO: 4.96 10E6/UL (ref 4.4–5.9)
SODIUM SERPL-SCNC: 140 MMOL/L (ref 136–145)
WBC # BLD AUTO: 8 10E3/UL (ref 4–11)

## 2022-09-26 PROCEDURE — 80053 COMPREHEN METABOLIC PANEL: CPT | Performed by: INTERNAL MEDICINE

## 2022-09-26 PROCEDURE — G0463 HOSPITAL OUTPT CLINIC VISIT: HCPCS

## 2022-09-26 PROCEDURE — 85025 COMPLETE CBC W/AUTO DIFF WBC: CPT | Performed by: INTERNAL MEDICINE

## 2022-09-26 PROCEDURE — 36415 COLL VENOUS BLD VENIPUNCTURE: CPT | Performed by: INTERNAL MEDICINE

## 2022-09-26 PROCEDURE — 99213 OFFICE O/P EST LOW 20 MIN: CPT | Performed by: NURSE PRACTITIONER

## 2022-09-26 RX ORDER — ROSUVASTATIN CALCIUM 10 MG/1
10 TABLET, COATED ORAL DAILY
COMMUNITY
Start: 2022-08-13 | End: 2022-10-28

## 2022-09-26 ASSESSMENT — PAIN SCALES - GENERAL: PAINLEVEL: MODERATE PAIN (5)

## 2022-09-26 NOTE — PROGRESS NOTES
"Oncology Rooming Note    September 26, 2022 8:53 AM   Ramu Espinoza is a 62 year old male who presents for:    Chief Complaint   Patient presents with     Oncology Clinic Visit     3 month return Chronic lymphocytic leukemia, study patient on imbruvca     Initial Vitals: /72 (BP Location: Right arm, Patient Position: Sitting, Cuff Size: Adult Regular)   Pulse 55   Temp 98.7  F (37.1  C) (Oral)   Resp 14   Ht 1.88 m (6' 2.02\")   Wt 121.5 kg (267 lb 14.4 oz)   SpO2 95%   BMI 34.38 kg/m   Estimated body mass index is 34.38 kg/m  as calculated from the following:    Height as of this encounter: 1.88 m (6' 2.02\").    Weight as of this encounter: 121.5 kg (267 lb 14.4 oz). Body surface area is 2.52 meters squared.  Moderate Pain (5) Comment: Data Unavailable   No LMP for male patient.  Allergies reviewed: Yes  Medications reviewed: Yes    Medications: Medication refills not needed today.  Pharmacy name entered into [facility]: [facility] - [place], [place] - [address] [place] [address]    Clinical concerns: 3 month return Chronic lymphocytic leukemia, study patient on imbruvca        KIKI DEAN CMA            "

## 2022-09-26 NOTE — PROGRESS NOTES
Met with patient and provider in clinic today.  Labs reviewed and ok to continue on per protocol.  Patient forgot to bring his pill bottles and diaries- he will drop them off this week to the clinic.  He was sent home with pills and new diaries.    Ly CORBETT [employer]

## 2022-09-26 NOTE — PROGRESS NOTES
[facility] Hematology and Oncology Progress Note    Patient: Ramu Espinoza  MRN: [id number]  Date of Service: Sep 26, 2022          Reason for Visit    Chief Complaint   Patient presents with     Oncology Clinic Visit     3 month return Chronic lymphocytic leukemia, study patient on imbruvca       Assessment and Plan    Cancer Staging  No matching staging information was found for the patient.     1.  CLL stage 4 now, diagnosed in 2012. 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 Great clinical response to Imbruvica. His lymph nodes have normalized. His CBC is normal today. Continue Imbruvica 140 mg- 3 pills daily. Return in 3 months with labs and to see Dr. Garza. Continues on clinical trial.      2. Paroxysmal A. Fib: could be from Imbruvica (<9% risk) on metoprolol. Continue to follow with Dr. Arana.      3.  Chronic fatigue and achiness: This is stable.  Encourage patient to be active.    ECOG Performance    0 - Independent    Distress Screening (within last 30 days)    No data recorded     Pain  Pain Score: Moderate Pain (5)  Pain Loc: Other - see comment (general all over joint pain)    Problem List    Patient Active Problem List   Diagnosis     Tinnitus     Generalized Anxiety Disorder     Dupuytren's Contracture     Insomnia     Chronic lymphocytic leukemia (H)  Cy inducer Ibrutinib     Internal Hemorrhoids With Bleeding     Depression     Pericardial effusion     Thyroid nodule     DAVID (obstructive sleep apnea)     REM sleep behavior disorder     Class 1 obesity due to excess calories with serious comorbidity in adult        ______________________________________________________________________________    History of Present Illness    Ramu Espinoza is a very pleasant 62 y.o.male with a history of CLL diagnosed in 2012 presenting with elevated white count around 20 with a peripheral blood smear showing atypical lymphocytosis suspicious for CLL.  He was completely  asymptomatic, normal hemoglobin and platelet count.  His peripheral blood flow cytometry revealed CD5 co-expressing kappa light chains restricted B cells confirming the diagnosis of CLL.  He has been on observation.  His white count has been slowly going up.      In June 2014 his white count was over 100,000. So he had a CAT scan and that showed increasing lymphadenopathy and splenectomy. Platelet count has been going down.  We offered participation in clinical study in which patients are randomized between fludarabine and cyclophosphamide Rituxan or Imbruvica and Rituxan. He has been randomized to Imbruvica and rituximab arm. He started on October 2014. In November 2014 he got Rituxan for the first time. Tolerated well. Finished that. Now on Imbruvica alone. Tolerating it well.      In September 2019 he had pleuritic chest pain and was found to have a pericardial effusion with acute pericarditis.  He had a pericardiocentesis and they removed 350ml of serosanguinous fluid. He was discharged on ibuprofen as well as colchicine.  He had a pericardial effusion drained and held the Imbruvica for about 2 weeks.  The resumed and has been doing fine since.     He is feeling good.    Continues to have some chronic fatigue and chronic generalized achiness in his body.  Other than that he is feeling okay.  Continues to work.  He is hoping that he can retire in the next year or 2.  Has some questions about if he changes insurance what would happen with the Imbruvica.  At this point he still on clinical trial which is providing the medication.     Review of Systems    Pertinent items are noted in HPI.    Past History    Past Medical History:   Diagnosis Date     Anxiety      BPH (benign prostatic hyperplasia)      Cancer (H)     CLL     CLL (chronic lymphocytic leukemia) (H)      Enlarged lymph nodes     Created by Conversion  Replacement Utility updated for latest IMO load     Irregular heart beat      Paroxysmal atrial  "fibrillation with rapid ventricular response (H)      Sleep apnea      Tinnitus        PHYSICAL EXAM:  /72 (BP Location: Right arm, Patient Position: Sitting, Cuff Size: Adult Regular)   Pulse 55   Temp 98.7  F (37.1  C) (Oral)   Resp 14   Ht 1.88 m (6' 2.02\")   Wt 121.5 kg (267 lb 14.4 oz)   SpO2 95%   BMI 34.38 kg/m    GENERAL: no acute distress. Cooperative in conversation. Here alone.  Mask on  RESP: lungs are clear bilaterally per auscultation. Regular respiratory rate. No wheezes or rhonchi.  CV: Regular, rate and rhythm. No murmurs.  ABD: soft, nontender.   MUSCULOSKELETAL: No lower extremity swelling.   NEURO: non focal. Alert and oriented x3.   PSYCH: within normal limits. No depression or anxiety.  SKIN: warm dry intact   LYMPH: no cervical, supraclavicular lymphadenopathy        Lab Results    Recent Results (from the past 168 hour(s))   Comprehensive metabolic panel   Result Value Ref Range    Sodium 140 136 - 145 mmol/L    Potassium 3.9 3.4 - 5.3 mmol/L    Chloride 105 98 - 107 mmol/L    Carbon Dioxide (CO2) 27 22 - 29 mmol/L    Anion Gap 8 7 - 15 mmol/L    Urea Nitrogen 14.9 8.0 - 23.0 mg/dL    Creatinine 0.80 0.67 - 1.17 mg/dL    Calcium 9.0 8.8 - 10.2 mg/dL    Glucose 77 70 - 99 mg/dL    Alkaline Phosphatase 58 40 - 129 U/L    AST 20 10 - 50 U/L    ALT 19 10 - 50 U/L    Protein Total 6.1 (L) 6.4 - 8.3 g/dL    Albumin 4.0 3.5 - 5.2 g/dL    Bilirubin Total 0.4 <=1.2 mg/dL    GFR Estimate >90 >60 mL/min/1.73m2   CBC with platelets and differential   Result Value Ref Range    WBC Count 8.0 4.0 - 11.0 10e3/uL    RBC Count 4.96 4.40 - 5.90 10e6/uL    Hemoglobin 14.9 13.3 - 17.7 g/dL    Hematocrit 46.0 40.0 - 53.0 %    MCV 93 78 - 100 fL    MCH 30.0 26.5 - 33.0 pg    MCHC 32.4 31.5 - 36.5 g/dL    RDW 12.7 10.0 - 15.0 %    Platelet Count 243 150 - 450 10e3/uL    % Neutrophils 65 %    % Lymphocytes 19 %    % Monocytes 11 %    % Eosinophils 3 %    % Basophils 1 %    % Immature Granulocytes 1 %    " NRBCs per 100 WBC 0 <1 /100    Absolute Neutrophils 5.3 1.6 - 8.3 10e3/uL    Absolute Lymphocytes 1.5 0.8 - 5.3 10e3/uL    Absolute Monocytes 0.8 0.0 - 1.3 10e3/uL    Absolute Eosinophils 0.3 0.0 - 0.7 10e3/uL    Absolute Basophils 0.1 0.0 - 0.2 10e3/uL    Absolute Immature Granulocytes 0.1 <=0.4 10e3/uL    Absolute NRBCs 0.0 10e3/uL       Imaging    No results found.      Signed by: JUS Lester CNP

## 2022-09-26 NOTE — LETTER
2022         RE: Ramu Espinoza  [address]  [place] [address]        Dear Colleague,    Thank you for referring your patient, Ramu Espinoza, to the [facility] [place]. Please see a copy of my visit note below.      [facility] Hematology and Oncology Progress Note    Patient: Ramu Espinoza  MRN: [id number]  Date of Service: Sep 26, 2022          Reason for Visit    Chief Complaint   Patient presents with     Oncology Clinic Visit     3 month return Chronic lymphocytic leukemia, study patient on imbruvca       Assessment and Plan    Cancer Staging  No matching staging information was found for the patient.     1.  CLL stage 4 now, diagnosed in 2012. 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 Great clinical response to Imbruvica. His lymph nodes have normalized. His CBC is normal today. Continue Imbruvica 140 mg- 3 pills daily. Return in 3 months with labs and to see Dr. Garza. Continues on clinical trial.      2. Paroxysmal A. Fib: could be from Imbruvica (<9% risk) on metoprolol. Continue to follow with Dr. Arana.      3.  Chronic fatigue and achiness: This is stable.  Encourage patient to be active.    ECOG Performance    0 - Independent    Distress Screening (within last 30 days)    No data recorded     Pain  Pain Score: Moderate Pain (5)  Pain Loc: Other - see comment (general all over joint pain)    Problem List    Patient Active Problem List   Diagnosis     Tinnitus     Generalized Anxiety Disorder     Dupuytren's Contracture     Insomnia     Chronic lymphocytic leukemia (H)  Cy inducer Ibrutinib     Internal Hemorrhoids With Bleeding     Depression     Pericardial effusion     Thyroid nodule     DAVID (obstructive sleep apnea)     REM sleep behavior disorder     Class 1 obesity due to excess calories with serious comorbidity in adult        ______________________________________________________________________________    History of Present  Illness    Ramu Espinoza is a very pleasant 62 y.o.male with a history of CLL diagnosed in 02/2012 presenting with elevated white count around 20 with a peripheral blood smear showing atypical lymphocytosis suspicious for CLL.  He was completely asymptomatic, normal hemoglobin and platelet count.  His peripheral blood flow cytometry revealed CD5 co-expressing kappa light chains restricted B cells confirming the diagnosis of CLL.  He has been on observation.  His white count has been slowly going up.      In June 2014 his white count was over 100,000. So he had a CAT scan and that showed increasing lymphadenopathy and splenectomy. Platelet count has been going down.  We offered participation in clinical study in which patients are randomized between fludarabine and cyclophosphamide Rituxan or Imbruvica and Rituxan. He has been randomized to Imbruvica and rituximab arm. He started on October 2014. In November 2014 he got Rituxan for the first time. Tolerated well. Finished that. Now on Imbruvica alone. Tolerating it well.      In September 2019 he had pleuritic chest pain and was found to have a pericardial effusion with acute pericarditis.  He had a pericardiocentesis and they removed 350ml of serosanguinous fluid. He was discharged on ibuprofen as well as colchicine.  He had a pericardial effusion drained and held the Imbruvica for about 2 weeks.  The resumed and has been doing fine since.     He is feeling good.    Continues to have some chronic fatigue and chronic generalized achiness in his body.  Other than that he is feeling okay.  Continues to work.  He is hoping that he can retire in the next year or 2.  Has some questions about if he changes insurance what would happen with the Imbruvica.  At this point he still on clinical trial which is providing the medication.     Review of Systems    Pertinent items are noted in HPI.    Past History    Past Medical History:   Diagnosis Date     Anxiety      BPH  "(benign prostatic hyperplasia)      Cancer (H)     CLL     CLL (chronic lymphocytic leukemia) (H)      Enlarged lymph nodes     Created by Conversion  Replacement Utility updated for latest IMO load     Irregular heart beat      Paroxysmal atrial fibrillation with rapid ventricular response (H)      Sleep apnea      Tinnitus        PHYSICAL EXAM:  /72 (BP Location: Right arm, Patient Position: Sitting, Cuff Size: Adult Regular)   Pulse 55   Temp 98.7  F (37.1  C) (Oral)   Resp 14   Ht 1.88 m (6' 2.02\")   Wt 121.5 kg (267 lb 14.4 oz)   SpO2 95%   BMI 34.38 kg/m    GENERAL: no acute distress. Cooperative in conversation. Here alone.  Mask on  RESP: lungs are clear bilaterally per auscultation. Regular respiratory rate. No wheezes or rhonchi.  CV: Regular, rate and rhythm. No murmurs.  ABD: soft, nontender.   MUSCULOSKELETAL: No lower extremity swelling.   NEURO: non focal. Alert and oriented x3.   PSYCH: within normal limits. No depression or anxiety.  SKIN: warm dry intact   LYMPH: no cervical, supraclavicular lymphadenopathy        Lab Results    Recent Results (from the past 168 hour(s))   Comprehensive metabolic panel   Result Value Ref Range    Sodium 140 136 - 145 mmol/L    Potassium 3.9 3.4 - 5.3 mmol/L    Chloride 105 98 - 107 mmol/L    Carbon Dioxide (CO2) 27 22 - 29 mmol/L    Anion Gap 8 7 - 15 mmol/L    Urea Nitrogen 14.9 8.0 - 23.0 mg/dL    Creatinine 0.80 0.67 - 1.17 mg/dL    Calcium 9.0 8.8 - 10.2 mg/dL    Glucose 77 70 - 99 mg/dL    Alkaline Phosphatase 58 40 - 129 U/L    AST 20 10 - 50 U/L    ALT 19 10 - 50 U/L    Protein Total 6.1 (L) 6.4 - 8.3 g/dL    Albumin 4.0 3.5 - 5.2 g/dL    Bilirubin Total 0.4 <=1.2 mg/dL    GFR Estimate >90 >60 mL/min/1.73m2   CBC with platelets and differential   Result Value Ref Range    WBC Count 8.0 4.0 - 11.0 10e3/uL    RBC Count 4.96 4.40 - 5.90 10e6/uL    Hemoglobin 14.9 13.3 - 17.7 g/dL    Hematocrit 46.0 40.0 - 53.0 %    MCV 93 78 - 100 fL    MCH 30.0 " "26.5 - 33.0 pg    MCHC 32.4 31.5 - 36.5 g/dL    RDW 12.7 10.0 - 15.0 %    Platelet Count 243 150 - 450 10e3/uL    % Neutrophils 65 %    % Lymphocytes 19 %    % Monocytes 11 %    % Eosinophils 3 %    % Basophils 1 %    % Immature Granulocytes 1 %    NRBCs per 100 WBC 0 <1 /100    Absolute Neutrophils 5.3 1.6 - 8.3 10e3/uL    Absolute Lymphocytes 1.5 0.8 - 5.3 10e3/uL    Absolute Monocytes 0.8 0.0 - 1.3 10e3/uL    Absolute Eosinophils 0.3 0.0 - 0.7 10e3/uL    Absolute Basophils 0.1 0.0 - 0.2 10e3/uL    Absolute Immature Granulocytes 0.1 <=0.4 10e3/uL    Absolute NRBCs 0.0 10e3/uL       Imaging    No results found.      Signed by: JUS Lester CNP  Oncology Rooming Note    September 26, 2022 8:53 AM   Ramu Espinoza is a 62 year old male who presents for:    Chief Complaint   Patient presents with     Oncology Clinic Visit     3 month return Chronic lymphocytic leukemia, study patient on imbruvca     Initial Vitals: /72 (BP Location: Right arm, Patient Position: Sitting, Cuff Size: Adult Regular)   Pulse 55   Temp 98.7  F (37.1  C) (Oral)   Resp 14   Ht 1.88 m (6' 2.02\")   Wt 121.5 kg (267 lb 14.4 oz)   SpO2 95%   BMI 34.38 kg/m   Estimated body mass index is 34.38 kg/m  as calculated from the following:    Height as of this encounter: 1.88 m (6' 2.02\").    Weight as of this encounter: 121.5 kg (267 lb 14.4 oz). Body surface area is 2.52 meters squared.  Moderate Pain (5) Comment: Data Unavailable   No LMP for male patient.  Allergies reviewed: Yes  Medications reviewed: Yes    Medications: Medication refills not needed today.  Pharmacy name entered into [facility]: [facility] - [place], [place] - [address] [place] [address]    Clinical concerns: 3 month return Chronic lymphocytic leukemia, study patient on imbruvca        KIKI DEAN CMA                Again, thank you for allowing me to participate in the care of your patient.        Sincerely,        Radha Alarcon, APRN CNP    "

## 2022-10-28 ENCOUNTER — OFFICE VISIT (OUTPATIENT)
Dept: CARDIOLOGY | Facility: CLINIC | Age: 62
End: 2022-10-28
Payer: COMMERCIAL

## 2022-10-28 VITALS
RESPIRATION RATE: 18 BRPM | HEART RATE: 70 BPM | OXYGEN SATURATION: 96 % | DIASTOLIC BLOOD PRESSURE: 68 MMHG | SYSTOLIC BLOOD PRESSURE: 112 MMHG | WEIGHT: 264 LBS | BODY MASS INDEX: 33.88 KG/M2

## 2022-10-28 DIAGNOSIS — I48.0 PAROXYSMAL ATRIAL FIBRILLATION (H): ICD-10-CM

## 2022-10-28 DIAGNOSIS — R07.89 ATYPICAL CHEST PAIN: Primary | ICD-10-CM

## 2022-10-28 DIAGNOSIS — C91.10 CHRONIC LYMPHOCYTIC LEUKEMIA (H): ICD-10-CM

## 2022-10-28 PROCEDURE — 99215 OFFICE O/P EST HI 40 MIN: CPT | Performed by: INTERNAL MEDICINE

## 2022-10-28 NOTE — PATIENT INSTRUCTIONS
Try Pepcid Complete for 2 weeks  Avoid Ibuprofen   Can take Tylenol up to 2000 mg a day.     Resume exercise

## 2022-10-28 NOTE — PROGRESS NOTES
HEART CARE ENCOUNTER CONSULTATON NOTE      [facility]  [phone]      Assessment/Recommendations   Assessment:   1.  History of pericarditis with moderate pericardial effusion requiring pericardiocentesis.   2.  Paroxysmal symptomatic atrial fibrillation. CHADVASc:0. No recurrence symptomatically over the past year.  3. CLL on Imbruvica (Ibrutinib).   Pertinent has been associated with A. fib.  Also has association with significant bleeding.  Followed by Dr. Garza.   4. Coronary calcium score 0, 2021  5.  Anxiety.  6.  Elevated cholesterol levels with normal calcium score  7. Atypical chest pain, new onset, likely esophageal.  Patient states after ingestion of coffee he feels a burning sensation in his chest.  1 episode was quite severe after 2 cups of coffee.  Could be esophageal spasm.  He had intense cough or recent upper respiratory infection.  He was taking ibuprofen frequently.    Plan:  1.  Continue metoprolol XL 50 mg BID.  2.  Patient is not on anticoagulation due to ibrutinib   3.  Resume exercising with a recumbent bike  4.  Start Pepcid Complete for 14 days.  5.  Stop ibuprofen use, can use Tylenol  6.  If ongoing symptoms would consider treadmill ECG stress test         History of Present Illness/Subjective    HPI: Ramu Espinoza is a 62 year old male with CLL on chronic ibrutinib, history of paroxysmal symptomatic A. fib, history of pericarditis who presents to cardiology clinic in follow-up.    Patient was recent evaluated emergency department for episode of severe epigastric pain after ingesting 2 cups of coffee.  Patient states he ingested 2 cups of coffee and then noted severe onset of tight pain in his epigastrium.  Pain was not triggered by activity.  Given the severity of pain he presented to the emergency department.  On arrival to emergency department his pain and gradually been improving.  He underwent evaluation by EKG which did not demonstrate any evidence of  acute ischemia.  D-dimer was positive which prompted a scan for pulm embolism which was negative.  He had no evidence of aortopathy.  Troponin level was negative.    By discharge his symptoms had completely resolved.  He noted another recurrence of symptoms after ingestion coffee the following day.  This morning he had coffee on an empty stomach and has mild symptoms.     Past 2 weeks has been taking intermittent ibuprofen due to cold symptoms.  Advised him to stop taking ibuprofen.  Start Pepcid Complete.  He will monitor symptoms carefully.  He has not had any anginal chest pain.  No dyspnea exertion.  He is able to work without issues.     ECG: March 6, 2019.  A. fib with ventricular response.  Personally viewed    Coronary Calcium Score: 2021, reviewed report    The total Agatston calcium score is 0. A calcium score of zero places the individual in the lowest quartile when compared to an age and gender matched control group and implies a low risk of cardiac events in the next 10 years. (less than 1% per   year).    The ascending aorta appears potentially enlarged on limited imaging. Consider full imaging study of the thoracic aorta to further define.    Please see separate report for radiology for additional findings.    Echocardiogram Results:2019    Limited echo performed to assess residual pericardial effusion. There is trace pericardial effusion along the apex and distal right ventricle. Pericardium appears mildly thickened. No evidence of tamponade. LV function appears normal without evidence   of a wall motion abnormality    When compared to the previous study dated 3/19/2019, no change in the pericardial effusion     Physical Examination  Review of Systems   Vitals: /68 (BP Location: Left arm, Patient Position: Sitting, Cuff Size: Adult Large)   Pulse 70   Resp 18   Wt 119.7 kg (264 lb)   SpO2 96%   BMI 33.88 kg/m    BMI= Body mass index is 33.88 kg/m .  Wt Readings from Last 3 Encounters:    10/28/22 119.7 kg (264 lb)   09/26/22 121.5 kg (267 lb 14.4 oz)   06/29/22 116.8 kg (257 lb 6.4 oz)           ENT/Mouth: membranes moist, no oral lesions or bleeding gums.      EYES:  no scleral icterus, normal conjunctivae       Chest/Lungs:   lungs are clear to auscultation, no rales or wheezing, no sternal scar, equal chest wall expansion    Cardiovascular:   Regular. Normal first and second heart sounds with no murmurs, rubs, or gallops; the carotid, radial and posterior tibial pulses are intact, Jugular venous pressure noraml, no edema bilaterally    Abdomen:  no tenderness; bowel sounds are present   Extremities: no cyanosis or clubbing   Skin: no xanthelasma, warm.    Neurologic: normal  bilateral, no tremors     Psychiatric: alert and oriented x3, calm        Please refer above for cardiac ROS details.        Medical History  Surgical History Family History Social History   Past Medical History:   Diagnosis Date     Anxiety      BPH (benign prostatic hyperplasia)      Cancer (H)     CLL     CLL (chronic lymphocytic leukemia) (H)      Enlarged lymph nodes     Created by Conversion  Replacement Utility updated for latest IMO load     Irregular heart beat      Paroxysmal atrial fibrillation with rapid ventricular response (H)      Sleep apnea      Tinnitus      Past Surgical History:   Procedure Laterality Date     COLONOSCOPY N/A 5/16/2022    Procedure: COLONOSCOPY,POLYPECTOMY;  Surgeon: Eugene Green MD;  [facility]: [facility] OPEN FIX INTER/SUBTROCH FX,PLATE      Description: Open Treatment Of An Intertrochanteric Fracture;  Recorded: 04/01/2009;     Family History   Problem Relation Age of Onset     Cancer Father         pancreatic      Prostate Cancer Father      Colon Cancer Mother      Cancer Mother      Neuropathy Mother      Cancer Brother         Lung      Lung Cancer Brother      Hypertension Brother      No Known Problems Sister      Clotting Disorder Brother      Chronic  Obstructive Pulmonary Disease Brother      Neuropathy Brother      Cancer Brother         digestive      Anuerysm Brother      Hypertension Brother      Hypertension Brother      No Known Problems Brother      Diabetes Paternal Aunt      Diabetes Maternal Aunt      Diabetes Maternal Aunt      Cancer Maternal Grandfather         Social History     Socioeconomic History     Marital status:      Spouse name: Not on file     Number of children: Not on file     Years of education: Not on file     Highest education level: Not on file   Occupational History     Not on file   Tobacco Use     Smoking status: Former     Packs/day: 0.50     Years: 25.00     Pack years: 12.50     Types: Cigarettes     Quit date: 1/3/2005     Years since quittin.8     Smokeless tobacco: Never     Tobacco comments:     quit 10 years ago   Substance and Sexual Activity     Alcohol use: Yes     Alcohol/week: 14.0 standard drinks     Comment: ocassionally on weekends     Drug use: No     Sexual activity: Never     Partners: Female     Birth control/protection: None   Other Topics Concern     Not on file   Social History Narrative     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     Social Determinants of Health     Financial Resource Strain: Not on file   Food Insecurity: Not on file   Transportation Needs: Not on file   Physical Activity: Not on file   Stress: Not on file   Social Connections: Not on file   Intimate Partner Violence: Not on file   Housing Stability: Not on file           Medications  Allergies   Current Outpatient Medications   Medication Sig Dispense Refill     acyclovir (ZOVIRAX) 400 MG tablet TAKE 1 TABLET (400 MG) BY MOUTH 2 TIMES DAILY 180 tablet 3     APPLE CIDER VINEGAR PO 2 tablespoons daily       cholecalciferol 25 MCG (1000 UT) TABS Take 1,000 Units by mouth daily (Patient not taking: Reported on 2022)       hydrOXYzine (ATARAX) 25 MG tablet Take 0.5 tablets (12.5 mg) by mouth At Bedtime 30 tablet 1     ibrutinib  (IMBRUVICA) 140 MG tablet Take 3 tablets (420 mg) by mouth daily 270 tablet 3     ibuprofen (ADVIL/MOTRIN) 200 MG tablet Take 200 mg by mouth as needed       LORazepam (ATIVAN) 1 MG tablet Take 1 tablet (1 mg) by mouth 2 times daily as needed for anxiety Take 30 minutes prior to departure.  Do not operate a vehicle after taking this medication 30 tablet 0     metoprolol succinate ER (TOPROL-XL) 50 MG 24 hr tablet Take 1 tablet (50 mg) by mouth 2 times daily 180 tablet 3     rosuvastatin (CRESTOR) 10 MG tablet Take 10 mg by mouth daily (Patient not taking: Reported on 9/26/2022)       sertraline (ZOLOFT) 100 MG tablet Take 100 mg by mouth daily (Patient not taking: Reported on 9/26/2022)       tamsulosin (FLOMAX) 0.4 MG capsule Take 1 capsule (0.4 mg) by mouth daily 90 capsule 0     traZODone (DESYREL) 50 MG tablet ONE TO TWO TABLETS AT BEDTIME AS NEEDED FOR SLEEP (Patient taking differently: Take 50 mg by mouth At Bedtime) 30 tablet 4     TURMERIC PO Take 2 tablets by mouth daily         Allergies   Allergen Reactions     Zithromax [Azithromycin Dihydrate]           Lab Results    Chemistry/lipid CBC Cardiac Enzymes/BNP/TSH/INR   Recent Labs   Lab Test 03/08/22  1227   CHOL 261*   HDL 52   *   TRIG 122     Recent Labs   Lab Test 03/08/22  1227 01/27/21  0822 08/13/18  0810   * 191* 184*     Recent Labs   Lab Test 03/30/22  0942      POTASSIUM 4.7   CHLORIDE 104   CO2 31   GLC 93   BUN 21   CR 0.81   GFRESTIMATED >90   ZOHAIB 9.3     Recent Labs   Lab Test 03/30/22  0942 03/08/22  1227 12/30/21  0942   CR 0.81 0.70 0.76     No results for input(s): A1C in the last 45172 hours.       Recent Labs   Lab Test 03/30/22  0942   WBC 8.2   HGB 15.0   HCT 46.1   MCV 92        Recent Labs   Lab Test 03/30/22  0942 03/08/22  1227 12/30/21  0904   HGB 15.0 14.4 15.3    Recent Labs   Lab Test 03/17/19  0540 03/17/19  0007 03/16/19  1842   TROPONINI <0.01 <0.01 <0.01     Recent Labs   Lab Test  06/17/19  0812 03/16/19  1849   BNP  --  89*   NTBNP 62  --      Recent Labs   Lab Test 03/08/22  1227   TSH 3.68     Recent Labs   Lab Test 03/16/19  1842   INR 1.09        Ino Arana DO    Today's clinic visit entailed:  49 minutes spent on the date of the encounter doing chart review, history and exam, documentation and further activities per the note

## 2022-10-28 NOTE — LETTER
10/28/2022    Ramu Jiménez MD  [address]  [place] [address]    RE: Ramu J Olga       Dear Colleague,     I had the pleasure of seeing Ramu Espinoza in the [facility].    HEART CARE ENCOUNTER CONSULTATON NOTE      [facility]  [phone]      Assessment/Recommendations   Assessment:   1.  History of pericarditis with moderate pericardial effusion requiring pericardiocentesis.   2.  Paroxysmal symptomatic atrial fibrillation. CHADVASc:0. No recurrence symptomatically over the past year.  3. CLL on Imbruvica (Ibrutinib).   Pertinent has been associated with A. fib.  Also has association with significant bleeding.  Followed by Dr. Garza.   4. Coronary calcium score 0, 2021  5.  Anxiety.  6.  Elevated cholesterol levels with normal calcium score  7. Atypical chest pain, new onset, likely esophageal.  Patient states after ingestion of coffee he feels a burning sensation in his chest.  1 episode was quite severe after 2 cups of coffee.  Could be esophageal spasm.  He had intense cough or recent upper respiratory infection.  He was taking ibuprofen frequently.    Plan:  1.  Continue metoprolol XL 50 mg BID.  2.  Patient is not on anticoagulation due to ibrutinib   3.  Resume exercising with a recumbent bike  4.  Start Pepcid Complete for 14 days.  5.  Stop ibuprofen use, can use Tylenol  6.  If ongoing symptoms would consider treadmill ECG stress test         History of Present Illness/Subjective    HPI: Ramu Espinoza is a 62 year old male with CLL on chronic ibrutinib, history of paroxysmal symptomatic A. fib, history of pericarditis who presents to cardiology clinic in follow-up.    Patient was recent evaluated emergency department for episode of severe epigastric pain after ingesting 2 cups of coffee.  Patient states he ingested 2 cups of coffee and then noted severe onset of tight pain in his epigastrium.  Pain was not triggered by activity.  Given the  severity of pain he presented to the emergency department.  On arrival to emergency department his pain and gradually been improving.  He underwent evaluation by EKG which did not demonstrate any evidence of acute ischemia.  D-dimer was positive which prompted a scan for pulm embolism which was negative.  He had no evidence of aortopathy.  Troponin level was negative.    By discharge his symptoms had completely resolved.  He noted another recurrence of symptoms after ingestion coffee the following day.  This morning he had coffee on an empty stomach and has mild symptoms.     Past 2 weeks has been taking intermittent ibuprofen due to cold symptoms.  Advised him to stop taking ibuprofen.  Start Pepcid Complete.  He will monitor symptoms carefully.  He has not had any anginal chest pain.  No dyspnea exertion.  He is able to work without issues.     ECG: March 6, 2019.  A. fib with ventricular response.  Personally viewed    Coronary Calcium Score: 2021, reviewed report    The total Agatston calcium score is 0. A calcium score of zero places the individual in the lowest quartile when compared to an age and gender matched control group and implies a low risk of cardiac events in the next 10 years. (less than 1% per   year).    The ascending aorta appears potentially enlarged on limited imaging. Consider full imaging study of the thoracic aorta to further define.    Please see separate report for radiology for additional findings.    Echocardiogram Results:2019    Limited echo performed to assess residual pericardial effusion. There is trace pericardial effusion along the apex and distal right ventricle. Pericardium appears mildly thickened. No evidence of tamponade. LV function appears normal without evidence   of a wall motion abnormality    When compared to the previous study dated 3/19/2019, no change in the pericardial effusion     Physical Examination  Review of Systems   Vitals: /68 (BP Location: Left arm,  Patient Position: Sitting, Cuff Size: Adult Large)   Pulse 70   Resp 18   Wt 119.7 kg (264 lb)   SpO2 96%   BMI 33.88 kg/m    BMI= Body mass index is 33.88 kg/m .  Wt Readings from Last 3 Encounters:   10/28/22 119.7 kg (264 lb)   09/26/22 121.5 kg (267 lb 14.4 oz)   06/29/22 116.8 kg (257 lb 6.4 oz)           ENT/Mouth: membranes moist, no oral lesions or bleeding gums.      EYES:  no scleral icterus, normal conjunctivae       Chest/Lungs:   lungs are clear to auscultation, no rales or wheezing, no sternal scar, equal chest wall expansion    Cardiovascular:   Regular. Normal first and second heart sounds with no murmurs, rubs, or gallops; the carotid, radial and posterior tibial pulses are intact, Jugular venous pressure noraml, no edema bilaterally    Abdomen:  no tenderness; bowel sounds are present   Extremities: no cyanosis or clubbing   Skin: no xanthelasma, warm.    Neurologic: normal  bilateral, no tremors     Psychiatric: alert and oriented x3, calm        Please refer above for cardiac ROS details.        Medical History  Surgical History Family History Social History   Past Medical History:   Diagnosis Date     Anxiety      BPH (benign prostatic hyperplasia)      Cancer (H)     CLL     CLL (chronic lymphocytic leukemia) (H)      Enlarged lymph nodes     Created by Conversion  Replacement Utility updated for latest IMO load     Irregular heart beat      Paroxysmal atrial fibrillation with rapid ventricular response (H)      Sleep apnea      Tinnitus      Past Surgical History:   Procedure Laterality Date     COLONOSCOPY N/A 5/16/2022    Procedure: COLONOSCOPY,POLYPECTOMY;  Surgeon: Eugene Green MD;  [facility]: [facility] OPEN FIX INTER/SUBTROCH FX,PLATE      Description: Open Treatment Of An Intertrochanteric Fracture;  Recorded: 04/01/2009;     Family History   Problem Relation Age of Onset     Cancer Father         pancreatic      Prostate Cancer Father      Colon Cancer Mother       Cancer Mother      Neuropathy Mother      Cancer Brother         Lung      Lung Cancer Brother      Hypertension Brother      No Known Problems Sister      Clotting Disorder Brother      Chronic Obstructive Pulmonary Disease Brother      Neuropathy Brother      Cancer Brother         digestive      Anuerysm Brother      Hypertension Brother      Hypertension Brother      No Known Problems Brother      Diabetes Paternal Aunt      Diabetes Maternal Aunt      Diabetes Maternal Aunt      Cancer Maternal Grandfather         Social History     Socioeconomic History     Marital status:      Spouse name: Not on file     Number of children: Not on file     Years of education: Not on file     Highest education level: Not on file   Occupational History     Not on file   Tobacco Use     Smoking status: Former     Packs/day: 0.50     Years: 25.00     Pack years: 12.50     Types: Cigarettes     Quit date: 1/3/2005     Years since quittin.8     Smokeless tobacco: Never     Tobacco comments:     quit 10 years ago   Substance and Sexual Activity     Alcohol use: Yes     Alcohol/week: 14.0 standard drinks     Comment: ocassionally on weekends     Drug use: No     Sexual activity: Never     Partners: Female     Birth control/protection: None   Other Topics Concern     Not on file   Social History Narrative     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     Social Determinants of Health     Financial Resource Strain: Not on file   Food Insecurity: Not on file   Transportation Needs: Not on file   Physical Activity: Not on file   Stress: Not on file   Social Connections: Not on file   Intimate Partner Violence: Not on file   Housing Stability: Not on file           Medications  Allergies   Current Outpatient Medications   Medication Sig Dispense Refill     acyclovir (ZOVIRAX) 400 MG tablet TAKE 1 TABLET (400 MG) BY MOUTH 2 TIMES DAILY 180 tablet 3     APPLE CIDER VINEGAR PO 2 tablespoons daily       cholecalciferol 25 MCG  (1000 UT) TABS Take 1,000 Units by mouth daily (Patient not taking: Reported on 9/26/2022)       hydrOXYzine (ATARAX) 25 MG tablet Take 0.5 tablets (12.5 mg) by mouth At Bedtime 30 tablet 1     ibrutinib (IMBRUVICA) 140 MG tablet Take 3 tablets (420 mg) by mouth daily 270 tablet 3     ibuprofen (ADVIL/MOTRIN) 200 MG tablet Take 200 mg by mouth as needed       LORazepam (ATIVAN) 1 MG tablet Take 1 tablet (1 mg) by mouth 2 times daily as needed for anxiety Take 30 minutes prior to departure.  Do not operate a vehicle after taking this medication 30 tablet 0     metoprolol succinate ER (TOPROL-XL) 50 MG 24 hr tablet Take 1 tablet (50 mg) by mouth 2 times daily 180 tablet 3     rosuvastatin (CRESTOR) 10 MG tablet Take 10 mg by mouth daily (Patient not taking: Reported on 9/26/2022)       sertraline (ZOLOFT) 100 MG tablet Take 100 mg by mouth daily (Patient not taking: Reported on 9/26/2022)       tamsulosin (FLOMAX) 0.4 MG capsule Take 1 capsule (0.4 mg) by mouth daily 90 capsule 0     traZODone (DESYREL) 50 MG tablet ONE TO TWO TABLETS AT BEDTIME AS NEEDED FOR SLEEP (Patient taking differently: Take 50 mg by mouth At Bedtime) 30 tablet 4     TURMERIC PO Take 2 tablets by mouth daily         Allergies   Allergen Reactions     Zithromax [Azithromycin Dihydrate]           Lab Results    Chemistry/lipid CBC Cardiac Enzymes/BNP/TSH/INR   Recent Labs   Lab Test 03/08/22  1227   CHOL 261*   HDL 52   *   TRIG 122     Recent Labs   Lab Test 03/08/22  1227 01/27/21  0822 08/13/18  0810   * 191* 184*     Recent Labs   Lab Test 03/30/22  0942      POTASSIUM 4.7   CHLORIDE 104   CO2 31   GLC 93   BUN 21   CR 0.81   GFRESTIMATED >90   ZOHAIB 9.3     Recent Labs   Lab Test 03/30/22  0942 03/08/22  1227 12/30/21  0942   CR 0.81 0.70 0.76     No results for input(s): A1C in the last 61899 hours.       Recent Labs   Lab Test 03/30/22  0942   WBC 8.2   HGB 15.0   HCT 46.1   MCV 92        Recent Labs   Lab Test  03/30/22  0942 03/08/22  1227 12/30/21  0904   HGB 15.0 14.4 15.3    Recent Labs   Lab Test 03/17/19  0540 03/17/19  0007 03/16/19  1842   TROPONINI <0.01 <0.01 <0.01     Recent Labs   Lab Test 06/17/19  0812 03/16/19  1849   BNP  --  89*   NTBNP 62  --      Recent Labs   Lab Test 03/08/22  1227   TSH 3.68     Recent Labs   Lab Test 03/16/19  1842   INR 1.09        Ino Arana DO    Today's clinic visit entailed:  49 minutes spent on the date of the encounter doing chart review, history and exam, documentation and further activities per the note    Thank you for allowing me to participate in the care of your patient.    Sincerely,   Ino Arana DO   [facility]

## 2022-11-08 DIAGNOSIS — N40.0 BENIGN PROSTATIC HYPERPLASIA, UNSPECIFIED WHETHER LOWER URINARY TRACT SYMPTOMS PRESENT: ICD-10-CM

## 2022-11-10 NOTE — TELEPHONE ENCOUNTER
"Routing refill request to provider for review/approval because:  Former patient of Dr. Ramu Jiménez & has not established care with another provider.  Please assign refill request to covering provider per clinic standard process.      Last Written Prescription Date:  8/16/2022  Last Fill Quantity: 90,  # refills: 0   Last office visit provider:  3/8/2022     Requested Prescriptions   Pending Prescriptions Disp Refills     tamsulosin (FLOMAX) 0.4 MG capsule [Pharmacy Med Name: TAMSULOSIN HCL 0.4 MG CAPS 0.4 Capsule] 90 capsule 0     Sig: TAKE 1 CAPSULE (0.4 MG) BY MOUTH DAILY       Alpha Blockers Passed - 11/10/2022 12:21 PM        Passed - Blood pressure under 140/90 in past 12 months     BP Readings from Last 3 Encounters:   10/28/22 112/68   09/26/22 121/72   06/29/22 132/67                 Passed - Recent (12 mo) or future (30 days) visit within the authorizing provider's specialty     Patient has had an office visit with the authorizing provider or a provider within the authorizing providers department within the previous 12 mos or has a future within next 30 days. See \"Patient Info\" tab in inbasket, or \"Choose Columns\" in Meds & Orders section of the refill encounter.              Passed - Patient does not have Tadalafil, Vardenafil, or Sildenafil on their medication list        Passed - Medication is active on med list        Passed - Patient is 18 years of age or older             Bonnie Thomas RN 11/10/22 12:22 PM  "

## 2022-11-11 RX ORDER — TAMSULOSIN HYDROCHLORIDE 0.4 MG/1
0.4 CAPSULE ORAL DAILY
Qty: 90 CAPSULE | Refills: 0 | Status: SHIPPED | OUTPATIENT
Start: 2022-11-11

## 2022-11-14 DIAGNOSIS — G47.00 INSOMNIA: ICD-10-CM

## 2022-11-15 RX ORDER — TRAZODONE HYDROCHLORIDE 50 MG/1
TABLET, FILM COATED ORAL
Qty: 30 TABLET | Refills: 4 | OUTPATIENT
Start: 2022-11-15

## 2022-11-16 DIAGNOSIS — G47.00 INSOMNIA: ICD-10-CM

## 2022-11-16 RX ORDER — TRAZODONE HYDROCHLORIDE 50 MG/1
TABLET, FILM COATED ORAL
Qty: 30 TABLET | Refills: 4 | OUTPATIENT
Start: 2022-11-16

## 2022-12-08 ENCOUNTER — OFFICE VISIT (OUTPATIENT)
Dept: INTERNAL MEDICINE | Facility: CLINIC | Age: 62
End: 2022-12-08
Payer: COMMERCIAL

## 2022-12-08 VITALS
HEIGHT: 74 IN | TEMPERATURE: 97.6 F | RESPIRATION RATE: 21 BRPM | DIASTOLIC BLOOD PRESSURE: 59 MMHG | SYSTOLIC BLOOD PRESSURE: 119 MMHG | WEIGHT: 267 LBS | OXYGEN SATURATION: 99 % | BODY MASS INDEX: 34.27 KG/M2 | HEART RATE: 73 BPM

## 2022-12-08 DIAGNOSIS — G47.00 INSOMNIA, UNSPECIFIED TYPE: ICD-10-CM

## 2022-12-08 DIAGNOSIS — L60.9 FINGERNAIL ABNORMALITIES: ICD-10-CM

## 2022-12-08 DIAGNOSIS — Z86.79 HISTORY OF ATRIAL FIBRILLATION: ICD-10-CM

## 2022-12-08 DIAGNOSIS — N40.1 BENIGN PROSTATIC HYPERPLASIA WITH LOWER URINARY TRACT SYMPTOMS, SYMPTOM DETAILS UNSPECIFIED: Primary | ICD-10-CM

## 2022-12-08 DIAGNOSIS — C91.10 CHRONIC LYMPHOCYTIC LEUKEMIA (H): ICD-10-CM

## 2022-12-08 PROCEDURE — 90471 IMMUNIZATION ADMIN: CPT | Performed by: INTERNAL MEDICINE

## 2022-12-08 PROCEDURE — 90750 HZV VACC RECOMBINANT IM: CPT | Performed by: INTERNAL MEDICINE

## 2022-12-08 PROCEDURE — 99214 OFFICE O/P EST MOD 30 MIN: CPT | Mod: 25 | Performed by: INTERNAL MEDICINE

## 2022-12-08 PROCEDURE — 90472 IMMUNIZATION ADMIN EACH ADD: CPT | Performed by: INTERNAL MEDICINE

## 2022-12-08 PROCEDURE — 90682 RIV4 VACC RECOMBINANT DNA IM: CPT | Performed by: INTERNAL MEDICINE

## 2022-12-08 RX ORDER — TRAZODONE HYDROCHLORIDE 50 MG/1
50 TABLET, FILM COATED ORAL AT BEDTIME
Qty: 90 TABLET | Refills: 3 | Status: SHIPPED | OUTPATIENT
Start: 2022-12-08 | End: 2023-12-27

## 2022-12-08 NOTE — PROGRESS NOTES
"  Assessment & Plan     (N40.1) Benign prostatic hyperplasia with lower urinary tract symptoms, symptom details unspecified  (primary encounter diagnosis)  Comment: hx of  Plan: flomax. Will plan to continue on previous medication without changes   Reasonable control    (G47.00) Insomnia, unspecified type  Comment: chronic, on trazodone  Plan: traZODone (DESYREL) 50 MG tablet        Will plan to continue on previous medication without changes     (C91.10) Chronic lymphocytic leukemia (H)  Cy inducer Ibrutinib  Comment: hx of  Plan: followed by hematology. Stable sx. On Imbruvica.    (Z86.79) History of atrial fibrillation  Comment: hx of, PAF  Plan: sees cards. CHADVASc of 0. No ongoing or recurrent sx.     (L60.9) Fingernail abnormalities  Comment: would like to rule out mass/growth  Plan: Orthopedic  Referral        Hand ortho referral placed.               BMI:   Estimated body mass index is 34.28 kg/m  as calculated from the following:    Height as of this encounter: 1.88 m (6' 2\").    Weight as of this encounter: 121.1 kg (267 lb).           Return in about 6 months (around 2023) for Routine preventive, with me.    Jurgen Gilmore MD  [facility]    Elizabeth Duffy is a 62 year old accompanied by his self, presenting for the following health issues:  Office Visit and Recheck Medication (Establish care, med check)    Former Dr Jiménez patient  Chronic conditions reviewed  Left middle finger deformity noted for the last year or so.    History of Present Illness       Reason for visit:  Meet new doctor    He eats 2-3 servings of fruits and vegetables daily.He consumes 0 sweetened beverage(s) daily.He exercises with enough effort to increase his heart rate 60 or more minutes per day.  He exercises with enough effort to increase his heart rate 5 days per week. He is missing 1 dose(s) of medications per week.                 Review of Systems         Objective    BP " "119/59 (BP Location: Left arm, Patient Position: Sitting, Cuff Size: Adult Large)   Pulse 73   Temp 97.6  F (36.4  C) (Tympanic)   Resp 21   Ht 1.88 m (6' 2\")   Wt 121.1 kg (267 lb)   SpO2 99%   BMI 34.28 kg/m    Body mass index is 34.28 kg/m .  Physical Exam   Gen:nad  Left middle finger---growth suspected at base of cuticle with subsequent nail deformity                    "

## 2022-12-21 NOTE — TELEPHONE ENCOUNTER
DIAGNOSIS: Fingernail abnormalities//hp/no imgs/ortho cons   APPOINTMENT DATE: 12.26.22   NOTES STATUS DETAILS   OFFICE NOTE from referring provider Internal 12.8.22 Jurgen Gilmore MD   OFFICE NOTE from other specialist Internal 3.2.20 Ramu Jiménez MD     MEDICATION LIST Internal

## 2022-12-22 ENCOUNTER — TELEPHONE (OUTPATIENT)
Dept: ORTHOPEDICS | Facility: CLINIC | Age: 62
End: 2022-12-22

## 2022-12-23 DIAGNOSIS — G47.00 INSOMNIA: ICD-10-CM

## 2022-12-23 RX ORDER — HYDROXYZINE HYDROCHLORIDE 25 MG/1
12.5 TABLET, FILM COATED ORAL AT BEDTIME
Qty: 30 TABLET | Refills: 1 | Status: SHIPPED | OUTPATIENT
Start: 2022-12-23 | End: 2023-04-09

## 2022-12-26 ENCOUNTER — PRE VISIT (OUTPATIENT)
Dept: ORTHOPEDICS | Facility: CLINIC | Age: 62
End: 2022-12-26

## 2022-12-26 DIAGNOSIS — C91.10 CHRONIC LYMPHOCYTIC LEUKEMIA (H): Primary | ICD-10-CM

## 2022-12-26 NOTE — TELEPHONE ENCOUNTER
DIAGNOSIS: Fingernail abnormalities   APPOINTMENT DATE: 01/10/2023   NOTES STATUS DETAILS   OFFICE NOTE from referring provider Internal 12/08/2022 - Jurgen Gilmore MD - [facility] Internal Med   MEDICATION LIST Internal    LABS     CBC/DIFF Internal 12/26/2022

## 2022-12-28 ENCOUNTER — ONCOLOGY VISIT (OUTPATIENT)
Dept: ONCOLOGY | Facility: HOSPITAL | Age: 62
End: 2022-12-28
Attending: INTERNAL MEDICINE
Payer: COMMERCIAL

## 2022-12-28 ENCOUNTER — RESEARCH ENCOUNTER (OUTPATIENT)
Dept: ONCOLOGY | Facility: HOSPITAL | Age: 62
End: 2022-12-28

## 2022-12-28 ENCOUNTER — LAB (OUTPATIENT)
Dept: INFUSION THERAPY | Facility: HOSPITAL | Age: 62
End: 2022-12-28
Attending: INTERNAL MEDICINE
Payer: COMMERCIAL

## 2022-12-28 VITALS
SYSTOLIC BLOOD PRESSURE: 137 MMHG | WEIGHT: 267 LBS | HEIGHT: 74 IN | HEART RATE: 62 BPM | DIASTOLIC BLOOD PRESSURE: 75 MMHG | TEMPERATURE: 97.9 F | OXYGEN SATURATION: 96 % | BODY MASS INDEX: 34.27 KG/M2 | RESPIRATION RATE: 14 BRPM

## 2022-12-28 DIAGNOSIS — C91.10 CHRONIC LYMPHOCYTIC LEUKEMIA (H): ICD-10-CM

## 2022-12-28 DIAGNOSIS — C91.10 CHRONIC LYMPHOCYTIC LEUKEMIA (H): Primary | ICD-10-CM

## 2022-12-28 LAB
ALBUMIN SERPL BCG-MCNC: 3.9 G/DL (ref 3.5–5.2)
ALP SERPL-CCNC: 60 U/L (ref 40–129)
ALT SERPL W P-5'-P-CCNC: 18 U/L (ref 10–50)
ANION GAP SERPL CALCULATED.3IONS-SCNC: 6 MMOL/L (ref 7–15)
AST SERPL W P-5'-P-CCNC: 21 U/L (ref 10–50)
BASOPHILS # BLD AUTO: 0 10E3/UL (ref 0–0.2)
BASOPHILS NFR BLD AUTO: 1 %
BILIRUB SERPL-MCNC: 0.6 MG/DL
BUN SERPL-MCNC: 17 MG/DL (ref 8–23)
CALCIUM SERPL-MCNC: 9.3 MG/DL (ref 8.8–10.2)
CHLORIDE SERPL-SCNC: 100 MMOL/L (ref 98–107)
CREAT SERPL-MCNC: 0.85 MG/DL (ref 0.67–1.17)
DEPRECATED HCO3 PLAS-SCNC: 28 MMOL/L (ref 22–29)
EOSINOPHIL # BLD AUTO: 0.2 10E3/UL (ref 0–0.7)
EOSINOPHIL NFR BLD AUTO: 2 %
ERYTHROCYTE [DISTWIDTH] IN BLOOD BY AUTOMATED COUNT: 12.4 % (ref 10–15)
GFR SERPL CREATININE-BSD FRML MDRD: >90 ML/MIN/1.73M2
GLUCOSE SERPL-MCNC: 95 MG/DL (ref 70–99)
HCT VFR BLD AUTO: 44.9 % (ref 40–53)
HGB BLD-MCNC: 14.7 G/DL (ref 13.3–17.7)
IMM GRANULOCYTES # BLD: 0.1 10E3/UL
IMM GRANULOCYTES NFR BLD: 1 %
LYMPHOCYTES # BLD AUTO: 1.5 10E3/UL (ref 0.8–5.3)
LYMPHOCYTES NFR BLD AUTO: 22 %
MCH RBC QN AUTO: 29.6 PG (ref 26.5–33)
MCHC RBC AUTO-ENTMCNC: 32.7 G/DL (ref 31.5–36.5)
MCV RBC AUTO: 90 FL (ref 78–100)
MONOCYTES # BLD AUTO: 0.7 10E3/UL (ref 0–1.3)
MONOCYTES NFR BLD AUTO: 10 %
NEUTROPHILS # BLD AUTO: 4.5 10E3/UL (ref 1.6–8.3)
NEUTROPHILS NFR BLD AUTO: 64 %
PLATELET # BLD AUTO: 268 10E3/UL (ref 150–450)
POTASSIUM SERPL-SCNC: 4.5 MMOL/L (ref 3.4–5.3)
PROT SERPL-MCNC: 6.1 G/DL (ref 6.4–8.3)
RBC # BLD AUTO: 4.97 10E6/UL (ref 4.4–5.9)
SODIUM SERPL-SCNC: 134 MMOL/L (ref 136–145)
WBC # BLD AUTO: 6.9 10E3/UL (ref 4–11)
WBC # BLD AUTO: NORMAL 10*3/UL

## 2022-12-28 PROCEDURE — G0463 HOSPITAL OUTPT CLINIC VISIT: HCPCS

## 2022-12-28 PROCEDURE — 99213 OFFICE O/P EST LOW 20 MIN: CPT | Performed by: NURSE PRACTITIONER

## 2022-12-28 PROCEDURE — 85027 COMPLETE CBC AUTOMATED: CPT

## 2022-12-28 PROCEDURE — 80053 COMPREHEN METABOLIC PANEL: CPT

## 2022-12-28 PROCEDURE — G0463 HOSPITAL OUTPT CLINIC VISIT: HCPCS | Performed by: NURSE PRACTITIONER

## 2022-12-28 PROCEDURE — 36415 COLL VENOUS BLD VENIPUNCTURE: CPT

## 2022-12-28 RX ORDER — ROSUVASTATIN CALCIUM 10 MG/1
10 TABLET, COATED ORAL DAILY
COMMUNITY
End: 2023-06-28

## 2022-12-28 RX ORDER — SERTRALINE HYDROCHLORIDE 100 MG/1
1 TABLET, FILM COATED ORAL DAILY
COMMUNITY

## 2022-12-28 ASSESSMENT — PAIN SCALES - GENERAL: PAINLEVEL: MILD PAIN (2)

## 2022-12-28 NOTE — LETTER
2022         RE: Ramu Espinoza  [address]  [place] [address]        Dear Colleague,    Thank you for referring your patient, Ramu Espinoza, to the [facility] [place]. Please see a copy of my visit note below.      [facility] Hematology and Oncology Progress Note    Patient: Ramu Espinoza  MRN: [id number]  Date of Service: Dec 28, 2022          Reason for Visit    Chief Complaint   Patient presents with     Oncology Clinic Visit     3 month follow up with labs related to Chronic lymphocytic leukemia       Assessment and Plan     Cancer Staging   No matching staging information was found for the patient.     1.  CLL stage 4 now, diagnosed in 2012. 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 Great clinical response to Imbruvica. His lymph nodes have normalized. His CBC is normal today. Continue Imbruvica 140 mg- 3 pills daily. Return in 3 months with labs and to see Dr. Garza. Continues on clinical trial.      2. Paroxysmal A. Fib: could be from Imbruvica (<9% risk). on metoprolol. Continue to follow with Dr. Arana.      3.  Chronic fatigue and achiness: This is stable.  Encourage patient to be active.    ECOG Performance    0 - Independent    Distress Screening (within last 30 days)    No data recorded     Pain  Pain Score: Mild Pain (2)  Pain Loc: Other - see comment (general joint aches and pain)    Problem List    Patient Active Problem List   Diagnosis     Tinnitus     Generalized Anxiety Disorder     Dupuytren's Contracture     Insomnia     Chronic lymphocytic leukemia (H)  Cy inducer Ibrutinib     Internal Hemorrhoids With Bleeding     Pericardial effusion     Thyroid nodule     DAVID (obstructive sleep apnea)     REM sleep behavior disorder     Class 1 obesity due to excess calories with serious comorbidity in adult     Benign prostatic hyperplasia with lower urinary tract symptoms, symptom details unspecified     History of atrial  fibrillation        ______________________________________________________________________________    History of Present Illness    Ramu Espinoza is a very pleasant 62 y.o.male with a history of CLL diagnosed in 02/2012 presenting with elevated white count around 20 with a peripheral blood smear showing atypical lymphocytosis suspicious for CLL.  He was completely asymptomatic, normal hemoglobin and platelet count.  His peripheral blood flow cytometry revealed CD5 co-expressing kappa light chains restricted B cells confirming the diagnosis of CLL.  He has been on observation.  His white count has been slowly going up.      In June 2014 his white count was over 100,000. So he had a CAT scan and that showed increasing lymphadenopathy and splenectomy. Platelet count has been going down.  We offered participation in clinical study in which patients are randomized between fludarabine and cyclophosphamide Rituxan or Imbruvica and Rituxan. He has been randomized to Imbruvica and rituximab arm. He started on October 2014. In November 2014 he got Rituxan for the first time. Tolerated well. Finished that. Now on Imbruvica alone. Tolerating it well.      In September 2019 he had pleuritic chest pain and was found to have a pericardial effusion with acute pericarditis.  He had a pericardiocentesis and they removed 350ml of serosanguinous fluid. He was discharged on ibuprofen as well as colchicine.  He had a pericardial effusion drained and held the Imbruvica for about 2 weeks.  The resumed and has been doing fine since.     He is feeling good.    Continues to have some chronic fatigue and chronic generalized achiness in his body.  Other than that he is feeling okay.  Continues to work.  No infectious complaints. No chest pain.     Review of Systems    Pertinent items are noted in HPI.    Past History    Past Medical History:   Diagnosis Date     Anxiety      BPH (benign prostatic hyperplasia)      Cancer (H)     CLL     CLL  "(chronic lymphocytic leukemia) (H)      Enlarged lymph nodes     Created by Conversion  Replacement Utility updated for latest IMO load     Irregular heart beat      Paroxysmal atrial fibrillation with rapid ventricular response (H)      Sleep apnea      Tinnitus        PHYSICAL EXAM:  /75 (BP Location: Right arm, Patient Position: Sitting, Cuff Size: Adult Regular)   Pulse 62   Temp 97.9  F (36.6  C) (Tympanic)   Resp 14   Ht 1.88 m (6' 2\")   Wt 121.1 kg (267 lb)   SpO2 96%   BMI 34.28 kg/m    GENERAL: no acute distress. Cooperative in conversation. Here alone.  Mask on  RESP: lungs are clear bilaterally per auscultation. Regular respiratory rate. No wheezes or rhonchi.  CV: Regular, rate and rhythm. No murmurs.  ABD: soft, nontender. No organomegaly  MUSCULOSKELETAL: No lower extremity swelling.   NEURO: non focal. Alert and oriented x3.   PSYCH: within normal limits. No depression or anxiety.  SKIN: warm dry intact   LYMPH: no cervical, supraclavicular, axillary lymphadenopathy        Lab Results    Recent Results (from the past 168 hour(s))   CBC with platelets   Result Value Ref Range    WBC Count 6.9 4.0 - 11.0 10e3/uL    RBC Count 4.97 4.40 - 5.90 10e6/uL    Hemoglobin 14.7 13.3 - 17.7 g/dL    Hematocrit 44.9 40.0 - 53.0 %    MCV 90 78 - 100 fL    MCH 29.6 26.5 - 33.0 pg    MCHC 32.7 31.5 - 36.5 g/dL    RDW 12.4 10.0 - 15.0 %    Platelet Count 268 150 - 450 10e3/uL   Comprehensive metabolic panel (BMP + Alb, Alk Phos, ALT, AST, Total. Bili, TP)   Result Value Ref Range    Sodium 134 (L) 136 - 145 mmol/L    Potassium 4.5 3.4 - 5.3 mmol/L    Chloride 100 98 - 107 mmol/L    Carbon Dioxide (CO2) 28 22 - 29 mmol/L    Anion Gap 6 (L) 7 - 15 mmol/L    Urea Nitrogen 17.0 8.0 - 23.0 mg/dL    Creatinine 0.85 0.67 - 1.17 mg/dL    Calcium 9.3 8.8 - 10.2 mg/dL    Glucose 95 70 - 99 mg/dL    Alkaline Phosphatase 60 40 - 129 U/L    AST 21 10 - 50 U/L    ALT 18 10 - 50 U/L    Protein Total 6.1 (L) 6.4 - 8.3 g/dL " "   Albumin 3.9 3.5 - 5.2 g/dL    Bilirubin Total 0.6 <=1.2 mg/dL    GFR Estimate >90 >60 mL/min/1.73m2       Imaging    No results found.      Signed by: JUS Lester CNP    Oncology Rooming Note    December 28, 2022 9:23 AM   Ramu Espinoza is a 62 year old male who presents for:    Chief Complaint   Patient presents with     Oncology Clinic Visit     3 month follow up with labs related to Chronic lymphocytic leukemia     Initial Vitals: /75 (BP Location: Right arm, Patient Position: Sitting, Cuff Size: Adult Regular)   Pulse 62   Temp 97.9  F (36.6  C) (Tympanic)   Resp 14   Ht 1.88 m (6' 2\")   Wt 121.1 kg (267 lb)   SpO2 96%   BMI 34.28 kg/m   Estimated body mass index is 34.28 kg/m  as calculated from the following:    Height as of this encounter: 1.88 m (6' 2\").    Weight as of this encounter: 121.1 kg (267 lb). Body surface area is 2.51 meters squared.  Mild Pain (2) Comment: Data Unavailable   No LMP for male patient.  Allergies reviewed: Yes  Medications reviewed: Yes    Medications: Medication refills not needed today.  Pharmacy name entered into [facility]: [facility] - [place], [place] - [address]    Clinical concerns: 3 month follow up with labs related to Chronic lymphocytic leukemia      KIKI DEAN CMA                Again, thank you for allowing me to participate in the care of your patient.        Sincerely,        JUS Lester CNP    "

## 2022-12-28 NOTE — PROGRESS NOTES
"Oncology Rooming Note    December 28, 2022 9:23 AM   Ramu Espinoza is a 62 year old male who presents for:    Chief Complaint   Patient presents with     Oncology Clinic Visit     3 month follow up with labs related to Chronic lymphocytic leukemia     Initial Vitals: /75 (BP Location: Right arm, Patient Position: Sitting, Cuff Size: Adult Regular)   Pulse 62   Temp 97.9  F (36.6  C) (Tympanic)   Resp 14   Ht 1.88 m (6' 2\")   Wt 121.1 kg (267 lb)   SpO2 96%   BMI 34.28 kg/m   Estimated body mass index is 34.28 kg/m  as calculated from the following:    Height as of this encounter: 1.88 m (6' 2\").    Weight as of this encounter: 121.1 kg (267 lb). Body surface area is 2.51 meters squared.  Mild Pain (2) Comment: Data Unavailable   No LMP for male patient.  Allergies reviewed: Yes  Medications reviewed: Yes    Medications: Medication refills not needed today.  Pharmacy name entered into [employer] [facility]: [facility] - [place], [place] - [address] [place] [address]    Clinical concerns: 3 month follow up with labs related to Chronic lymphocytic leukemia      KIKI DEAN CMA            "

## 2022-12-28 NOTE — PROGRESS NOTES
[facility] Hematology and Oncology Progress Note    Patient: Ramu Espinoza  MRN: [id number]  Date of Service: Dec 28, 2022          Reason for Visit    Chief Complaint   Patient presents with     Oncology Clinic Visit     3 month follow up with labs related to Chronic lymphocytic leukemia       Assessment and Plan     Cancer Staging   No matching staging information was found for the patient.     1.  CLL stage 4 now, diagnosed in 2012. 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 Great clinical response to Imbruvica. His lymph nodes have normalized. His CBC is normal today. Continue Imbruvica 140 mg- 3 pills daily. Return in 3 months with labs and to see Dr. Garza. Continues on clinical trial.      2. Paroxysmal A. Fib: could be from Imbruvica (<9% risk). on metoprolol. Continue to follow with Dr. Arana.      3.  Chronic fatigue and achiness: This is stable.  Encourage patient to be active.    ECOG Performance    0 - Independent    Distress Screening (within last 30 days)    No data recorded     Pain  Pain Score: Mild Pain (2)  Pain Loc: Other - see comment (general joint aches and pain)    Problem List    Patient Active Problem List   Diagnosis     Tinnitus     Generalized Anxiety Disorder     Dupuytren's Contracture     Insomnia     Chronic lymphocytic leukemia (H)  Cy inducer Ibrutinib     Internal Hemorrhoids With Bleeding     Pericardial effusion     Thyroid nodule     DAVID (obstructive sleep apnea)     REM sleep behavior disorder     Class 1 obesity due to excess calories with serious comorbidity in adult     Benign prostatic hyperplasia with lower urinary tract symptoms, symptom details unspecified     History of atrial fibrillation        ______________________________________________________________________________    History of Present Illness    Ramu Espinoza is a very pleasant 62 y.o.male with a history of CLL diagnosed in 2012 presenting with elevated white count around  20 with a peripheral blood smear showing atypical lymphocytosis suspicious for CLL.  He was completely asymptomatic, normal hemoglobin and platelet count.  His peripheral blood flow cytometry revealed CD5 co-expressing kappa light chains restricted B cells confirming the diagnosis of CLL.  He has been on observation.  His white count has been slowly going up.      In June 2014 his white count was over 100,000. So he had a CAT scan and that showed increasing lymphadenopathy and splenectomy. Platelet count has been going down.  We offered participation in clinical study in which patients are randomized between fludarabine and cyclophosphamide Rituxan or Imbruvica and Rituxan. He has been randomized to Imbruvica and rituximab arm. He started on October 2014. In November 2014 he got Rituxan for the first time. Tolerated well. Finished that. Now on Imbruvica alone. Tolerating it well.      In September 2019 he had pleuritic chest pain and was found to have a pericardial effusion with acute pericarditis.  He had a pericardiocentesis and they removed 350ml of serosanguinous fluid. He was discharged on ibuprofen as well as colchicine.  He had a pericardial effusion drained and held the Imbruvica for about 2 weeks.  The resumed and has been doing fine since.     He is feeling good.    Continues to have some chronic fatigue and chronic generalized achiness in his body.  Other than that he is feeling okay.  Continues to work.  No infectious complaints. No chest pain.     Review of Systems    Pertinent items are noted in HPI.    Past History    Past Medical History:   Diagnosis Date     Anxiety      BPH (benign prostatic hyperplasia)      Cancer (H)     CLL     CLL (chronic lymphocytic leukemia) (H)      Enlarged lymph nodes     Created by Conversion  Replacement Utility updated for latest IMO load     Irregular heart beat      Paroxysmal atrial fibrillation with rapid ventricular response (H)      Sleep apnea      Tinnitus   "      PHYSICAL EXAM:  /75 (BP Location: Right arm, Patient Position: Sitting, Cuff Size: Adult Regular)   Pulse 62   Temp 97.9  F (36.6  C) (Tympanic)   Resp 14   Ht 1.88 m (6' 2\")   Wt 121.1 kg (267 lb)   SpO2 96%   BMI 34.28 kg/m    GENERAL: no acute distress. Cooperative in conversation. Here alone.  Mask on  RESP: lungs are clear bilaterally per auscultation. Regular respiratory rate. No wheezes or rhonchi.  CV: Regular, rate and rhythm. No murmurs.  ABD: soft, nontender. No organomegaly  MUSCULOSKELETAL: No lower extremity swelling.   NEURO: non focal. Alert and oriented x3.   PSYCH: within normal limits. No depression or anxiety.  SKIN: warm dry intact   LYMPH: no cervical, supraclavicular, axillary lymphadenopathy        Lab Results    Recent Results (from the past 168 hour(s))   CBC with platelets   Result Value Ref Range    WBC Count 6.9 4.0 - 11.0 10e3/uL    RBC Count 4.97 4.40 - 5.90 10e6/uL    Hemoglobin 14.7 13.3 - 17.7 g/dL    Hematocrit 44.9 40.0 - 53.0 %    MCV 90 78 - 100 fL    MCH 29.6 26.5 - 33.0 pg    MCHC 32.7 31.5 - 36.5 g/dL    RDW 12.4 10.0 - 15.0 %    Platelet Count 268 150 - 450 10e3/uL   Comprehensive metabolic panel (BMP + Alb, Alk Phos, ALT, AST, Total. Bili, TP)   Result Value Ref Range    Sodium 134 (L) 136 - 145 mmol/L    Potassium 4.5 3.4 - 5.3 mmol/L    Chloride 100 98 - 107 mmol/L    Carbon Dioxide (CO2) 28 22 - 29 mmol/L    Anion Gap 6 (L) 7 - 15 mmol/L    Urea Nitrogen 17.0 8.0 - 23.0 mg/dL    Creatinine 0.85 0.67 - 1.17 mg/dL    Calcium 9.3 8.8 - 10.2 mg/dL    Glucose 95 70 - 99 mg/dL    Alkaline Phosphatase 60 40 - 129 U/L    AST 21 10 - 50 U/L    ALT 18 10 - 50 U/L    Protein Total 6.1 (L) 6.4 - 8.3 g/dL    Albumin 3.9 3.5 - 5.2 g/dL    Bilirubin Total 0.6 <=1.2 mg/dL    GFR Estimate >90 >60 mL/min/1.73m2       Imaging    No results found.      Signed by: JUS Lester CNP  "

## 2023-01-03 NOTE — PROGRESS NOTES
Met with patient and provider for office visit.  Patient returned pills and drug diary.  Ok to continue on protocol without any modifications.    [employer]

## 2023-01-10 ENCOUNTER — ANCILLARY PROCEDURE (OUTPATIENT)
Dept: GENERAL RADIOLOGY | Facility: CLINIC | Age: 63
End: 2023-01-10
Attending: STUDENT IN AN ORGANIZED HEALTH CARE EDUCATION/TRAINING PROGRAM
Payer: COMMERCIAL

## 2023-01-10 ENCOUNTER — OFFICE VISIT (OUTPATIENT)
Dept: ORTHOPEDICS | Facility: CLINIC | Age: 63
End: 2023-01-10
Attending: INTERNAL MEDICINE
Payer: COMMERCIAL

## 2023-01-10 ENCOUNTER — PRE VISIT (OUTPATIENT)
Dept: ORTHOPEDICS | Facility: CLINIC | Age: 63
End: 2023-01-10

## 2023-01-10 DIAGNOSIS — L60.9 FINGERNAIL ABNORMALITIES: ICD-10-CM

## 2023-01-10 DIAGNOSIS — M79.645 PAIN IN FINGER OF BOTH HANDS: Primary | ICD-10-CM

## 2023-01-10 DIAGNOSIS — M79.644 PAIN IN FINGER OF BOTH HANDS: Primary | ICD-10-CM

## 2023-01-10 PROCEDURE — 99204 OFFICE O/P NEW MOD 45 MIN: CPT | Performed by: STUDENT IN AN ORGANIZED HEALTH CARE EDUCATION/TRAINING PROGRAM

## 2023-01-10 PROCEDURE — 73140 X-RAY EXAM OF FINGER(S): CPT | Mod: LT | Performed by: RADIOLOGY

## 2023-01-10 NOTE — NURSING NOTE
Reason For Visit:   Chief Complaint   Patient presents with     Consult     Left Middle finger nail abnormality.   Noticed about 1 year ago.       Primary MD: Jurgen Gilmore  Ref. MD: Dr. Gilmore    Age: 62 year old    ?  No      There were no vitals taken for this visit.      Pain Assessment  Patient Currently in Pain: Yes  0-10 Pain Scale: 3  Primary Pain Location: Finger (Comment which one) (Left middle)  Pain Descriptors: Nagging    Hand Dominance Evaluation  Hand Dominance: Left          QuickDASH Assessment  No flowsheet data found.       Current Outpatient Medications   Medication Sig Dispense Refill     acyclovir (ZOVIRAX) 400 MG tablet TAKE 1 TABLET (400 MG) BY MOUTH 2 TIMES DAILY 180 tablet 3     APPLE CIDER VINEGAR PO 2 tablespoons daily       hydrOXYzine (ATARAX) 25 MG tablet Take 0.5 tablets (12.5 mg) by mouth At Bedtime 30 tablet 1     ibrutinib (IMBRUVICA) 140 MG tablet Take 3 tablets (420 mg) by mouth daily 270 tablet 3     LORazepam (ATIVAN) 1 MG tablet Take 1 tablet (1 mg) by mouth 2 times daily as needed for anxiety Take 30 minutes prior to departure.  Do not operate a vehicle after taking this medication 30 tablet 0     metoprolol succinate ER (TOPROL-XL) 50 MG 24 hr tablet Take 1 tablet (50 mg) by mouth 2 times daily 180 tablet 3     rosuvastatin (CRESTOR) 10 MG tablet Take 10 mg by mouth daily (Patient not taking: Reported on 12/28/2022)       sertraline (ZOLOFT) 100 MG tablet Take 1 tablet by mouth daily       tamsulosin (FLOMAX) 0.4 MG capsule TAKE 1 CAPSULE (0.4 MG) BY MOUTH DAILY 90 capsule 0     traZODone (DESYREL) 50 MG tablet Take 1 tablet (50 mg) by mouth At Bedtime 90 tablet 3     TURMERIC PO Take 2 tablets by mouth daily         Allergies   Allergen Reactions     Zithromax [Azithromycin Dihydrate]        VOLODYMYR GAMEZ, ATC

## 2023-01-10 NOTE — LETTER
"    1/10/2023         RE: Ramu Espinoza  [address]  [place] [address]        Dear Colleague,    Thank you for referring your patient, Ramu Espinoza, to the [facility] [place]. Please see a copy of my visit note below.    Ortho Hand    HPI: 62M LHD NS p/w L MF nailplate deformity. Patient noticed this develop over the last year. He has had some fluid leak from a \"pustule\" months ago, but it has resolved. No prior infection. Would like a diagnosis.     ROS: Negative, see HPI  PMH: CLL, HLD, HTN, BPH  PSH: No prior surgeries to the hands or wrists  Medications: Acyclovir, Ibrutinib, Metoprolol, Statin, Sertraline, Flomax  Allergies: Azithromycin  SH: Nonsmoker, denies any tobacco or nicotine use  FH: No bleeding or clotting issues, or problems with anesthesia    Examination:  Nonlabored breathing  Not distressed  L MF with nailplate groove  No mass at the eponychial fold of the L MF    XR: L MF DIP OA    A/P: 62M LHD NS p/w L MF decompressed mucous cyst c associated nailplate deformity    -Explained that the nailplate deformity is a result of a mucous cyst compressing the germinal matrix, which leads to abnormal nailplate growth. Treatment is surgery. However, my recommendation is to return when the mucous cyst regrows as this makes it easier to ensure that the entire cyst is removed along with DIP osteophytes. Patient is agreeable to return once mucous cyst recurs for possible surgical scheduling. Explained that nailplate regrowth takes 3-4 months. If surgery were done to remove the mucous cyst, then a new nail without deformity may take 9-12 months to regrow.  -A total of 45 minutes was devoted to review of chart, direct face-to-face patient counseling and documentation during this encounter, exclusive of any procedure performed.    Ralf Hansen MD, PhD    "

## 2023-01-11 NOTE — PROGRESS NOTES
"Ortho Hand    HPI: 62M LHD NS p/w L MF nailplate deformity. Patient noticed this develop over the last year. He has had some fluid leak from a \"pustule\" months ago, but it has resolved. No prior infection. Would like a diagnosis.     ROS: Negative, see HPI  PMH: CLL, HLD, HTN, BPH  PSH: No prior surgeries to the hands or wrists  Medications: Acyclovir, Ibrutinib, Metoprolol, Statin, Sertraline, Flomax  Allergies: Azithromycin  SH: Nonsmoker, denies any tobacco or nicotine use  FH: No bleeding or clotting issues, or problems with anesthesia    Examination:  Nonlabored breathing  Not distressed  L MF with nailplate groove  No mass at the eponychial fold of the L MF    XR: L MF DIP OA    A/P: 62M LHD NS p/w L MF decompressed mucous cyst c associated nailplate deformity    -Explained that the nailplate deformity is a result of a mucous cyst compressing the germinal matrix, which leads to abnormal nailplate growth. Treatment is surgery. However, my recommendation is to return when the mucous cyst regrows as this makes it easier to ensure that the entire cyst is removed along with DIP osteophytes. Patient is agreeable to return once mucous cyst recurs for possible surgical scheduling. Explained that nailplate regrowth takes 3-4 months. If surgery were done to remove the mucous cyst, then a new nail without deformity may take 9-12 months to regrow.  -A total of 45 minutes was devoted to review of chart, direct face-to-face patient counseling and documentation during this encounter, exclusive of any procedure performed.    Ralf Hansen MD, PhD    "

## 2023-01-30 ENCOUNTER — TELEPHONE (OUTPATIENT)
Dept: CARDIOLOGY | Facility: CLINIC | Age: 63
End: 2023-01-30
Payer: COMMERCIAL

## 2023-01-30 DIAGNOSIS — R07.89 ATYPICAL CHEST PAIN: ICD-10-CM

## 2023-01-30 DIAGNOSIS — I31.9 PERICARDITIS: Primary | ICD-10-CM

## 2023-01-30 NOTE — TELEPHONE ENCOUNTER
Health Call Center    Phone Message    May a detailed message be left on voicemail: yes     Reason for Call: Symptoms or Concerns     If patient has red-flag symptoms, warm transfer to triage line    Current symptom or concern: Pt states over the last few weeks he has been having frequent chest pain and palpitations that is really becoming concerning to him and he would like to be seen by Dr. Yasmeen roberson. Pt is declining speaking to a nurse at this time and requested to just send a message to Dr. Arana.     Symptoms have been present for:  A few week(s)    Has patient previously been seen for this? No    Are there any new or worsening symptoms? Yes: Palpitations are more frequent.       Action Taken: Other: Cardiology    Travel Screening: Not Applicable     Thank you!  [facility]

## 2023-01-31 NOTE — TELEPHONE ENCOUNTER
Dr. Arana please review. Arrange patient in [facility] for in person evaluation? Given symptoms and ongoing study/trial for CLL? Please advise. CMM,Rn

## 2023-02-02 NOTE — TELEPHONE ENCOUNTER
===View-only below this line===  ----- Message -----  From: Ino Arana DO  Sent: 2/1/2023   8:23 PM CST  To: Lino Parr RN  Subject: RE:                                              Spoke with Patient on the phone tonight around 8:30 PM. He completed his day of working as a  and noted no symptoms at working. After working, he notices mild pain in his chest. He did increase the use of his metoprolol, which he says improved symptoms as well. He'll take 400 mg of ibuprofen tonight and tomorrow morning to see if this helps with symptoms. Would recommend we obtain a STAT complete echocardiogram, given history of pericarditis, chest pain.  Can we call the patient tomorrow to see if his chest pain symptoms have improved.

## 2023-02-03 ENCOUNTER — HOSPITAL ENCOUNTER (OUTPATIENT)
Dept: CARDIOLOGY | Facility: CLINIC | Age: 63
Discharge: HOME | End: 2023-02-03
Attending: INTERNAL MEDICINE | Admitting: INTERNAL MEDICINE
Payer: COMMERCIAL

## 2023-02-03 DIAGNOSIS — R07.89 ATYPICAL CHEST PAIN: ICD-10-CM

## 2023-02-03 PROCEDURE — 93306 TTE W/DOPPLER COMPLETE: CPT | Mod: 26 | Performed by: INTERNAL MEDICINE

## 2023-02-03 PROCEDURE — 255N000002 HC RX 255 OP 636: Performed by: INTERNAL MEDICINE

## 2023-02-03 PROCEDURE — 999N000208 ECHOCARDIOGRAM COMPLETE

## 2023-02-03 RX ADMIN — PERFLUTREN 1.5 ML: 6.52 INJECTION, SUSPENSION INTRAVENOUS at 11:40

## 2023-02-03 NOTE — TELEPHONE ENCOUNTER
"PC with patient. Reports that Ibuprofen is improving now his chest discomfort is a 1-2/10. \"last week it was way worse\". Pt agrees that it does seem to be helping. Patient hasn't taken any today. Encouraged to take same dose as previously recommended. Will ask MAT for further directions. Pt is taking Metoprolol 50 mg BID, and wondering if this should be increased as he is still having the \"fluttering, clement palpitations\". He doesn't check his BP or HR at home. Encouraged patient at this time to take his Metoprolol as prescribed. Will ask MAT for further advisement. Warm transfer to [facility] to schedule stat echo, patient agreeable. Jayesh HEATH Dr. please review.  - Recommendations for scheduled Ibuprofen?   -Any labs would like checked such as CRP?   -Metoprolol continue as prescribed or adjust dose?    Thank you Cmm,Rn   "

## 2023-02-06 RX ORDER — IBUPROFEN 400 MG/1
400 TABLET, FILM COATED ORAL 2 TIMES DAILY
Qty: 28 TABLET | Refills: 0
Start: 2023-02-06 | End: 2023-02-20

## 2023-02-06 RX ORDER — COLCHICINE 0.6 MG/1
0.6 TABLET ORAL DAILY
Qty: 30 TABLET | Refills: 0 | Status: SHIPPED | OUTPATIENT
Start: 2023-02-06 | End: 2023-06-28

## 2023-02-06 NOTE — TELEPHONE ENCOUNTER
===View-only below this line===  ----- Message -----  From: Ino Arana DO  Sent: 2/3/2023   4:03 PM CST  To: Lino Parr RN    Indication: Pericarditis.  Ino Arana, DO  You 3 days ago     MT  Take ibuprofen 400 mg BID for 2 weeks.  Start colchicine 0.6 mg daily for 1 month.  Can we call patient in two weeks to determine if symptoms are resolved.       Thanks     Ronaldo          Cyclophosphamide Counseling:  I discussed with the patient the risks of cyclophosphamide including but not limited to hair loss, hormonal abnormalities, decreased fertility, abdominal pain, diarrhea, nausea and vomiting, bone marrow suppression and infection. The patient understands that monitoring is required while taking this medication.

## 2023-02-06 NOTE — TELEPHONE ENCOUNTER
PC to patient and advised of recommendations and echo report. Pt agreeable to treatment plan. Encouraged to take both Ibuprofen and colchicine with food. Call if questions or concerns [phone]. Writer will call in about 2 weeks to follow-up on symptoms. Rx sent to verified pharmacy location. KUMAR,Jayesh   Staff message sent to remind writer for follow-up. CMM,Rn

## 2023-02-08 DIAGNOSIS — R00.2 HEART PALPITATIONS: ICD-10-CM

## 2023-02-09 RX ORDER — METOPROLOL SUCCINATE 50 MG/1
50 TABLET, EXTENDED RELEASE ORAL 2 TIMES DAILY
Qty: 180 TABLET | Refills: 3 | Status: SHIPPED | OUTPATIENT
Start: 2023-02-09

## 2023-02-09 NOTE — TELEPHONE ENCOUNTER
"Last Written Prescription Date:  2/25/22  Last Fill Quantity: 180,  # refills: 3   Last office visit provider:  12/8/22     Requested Prescriptions   Pending Prescriptions Disp Refills     metoprolol succinate ER (TOPROL XL) 50 MG 24 hr tablet 180 tablet 3     Sig: Take 1 tablet (50 mg) by mouth 2 times daily       Beta-Blockers Protocol Passed - 2/9/2023  7:57 AM        Passed - Blood pressure under 140/90 in past 12 months     BP Readings from Last 3 Encounters:   12/28/22 137/75   12/08/22 119/59   10/28/22 112/68                 Passed - Patient is age 6 or older        Passed - Recent (12 mo) or future (30 days) visit within the authorizing provider's specialty     Patient has had an office visit with the authorizing provider or a provider within the authorizing providers department within the previous 12 mos or has a future within next 30 days. See \"Patient Info\" tab in inbasket, or \"Choose Columns\" in Meds & Orders section of the refill encounter.              Passed - Medication is active on med list             Davis Uribe RN 02/09/23 7:58 AM  "

## 2023-02-16 ENCOUNTER — TELEPHONE (OUTPATIENT)
Dept: PHYSICAL THERAPY | Facility: REHABILITATION | Age: 63
End: 2023-02-16
Payer: COMMERCIAL

## 2023-02-16 ENCOUNTER — OFFICE VISIT (OUTPATIENT)
Dept: URGENT CARE | Facility: URGENT CARE | Age: 63
End: 2023-02-16
Payer: COMMERCIAL

## 2023-02-16 VITALS
WEIGHT: 267 LBS | OXYGEN SATURATION: 99 % | BODY MASS INDEX: 34.28 KG/M2 | TEMPERATURE: 98.1 F | HEART RATE: 63 BPM | SYSTOLIC BLOOD PRESSURE: 128 MMHG | DIASTOLIC BLOOD PRESSURE: 81 MMHG | RESPIRATION RATE: 18 BRPM

## 2023-02-16 DIAGNOSIS — M62.830 BACK MUSCLE SPASM: ICD-10-CM

## 2023-02-16 DIAGNOSIS — M54.50 ACUTE LEFT-SIDED LOW BACK PAIN WITHOUT SCIATICA: Primary | ICD-10-CM

## 2023-02-16 PROCEDURE — 99213 OFFICE O/P EST LOW 20 MIN: CPT | Performed by: STUDENT IN AN ORGANIZED HEALTH CARE EDUCATION/TRAINING PROGRAM

## 2023-02-16 RX ORDER — PREDNISONE 20 MG/1
40 TABLET ORAL DAILY
Qty: 10 TABLET | Refills: 0 | Status: SHIPPED | OUTPATIENT
Start: 2023-02-16 | End: 2023-02-21

## 2023-02-16 RX ORDER — BACLOFEN 10 MG/1
10 TABLET ORAL 3 TIMES DAILY PRN
Qty: 15 TABLET | Refills: 0 | Status: SHIPPED | OUTPATIENT
Start: 2023-02-16 | End: 2023-02-23

## 2023-02-16 NOTE — TELEPHONE ENCOUNTER
"Clive called this my phone trying to get in contact with a PT who previously worked here. When I told him that she no longer worked here, he wanted to know if I could help him.    He told me that not long ago, he \"tweaked\" his back a little while doing something, but he didn't think anything of it. The next day he woke up, and he reports having difficulty moving at all due to pain.    PT recommended that if he felt his symptoms were emergent to go to the ED. Otherwise, he should reach out to his primary care, and they would be able to write a referral for PT if they felt it was appropriate. He said he didn't want to wait that long, and PT again recommended a walk-in clinic or the ED depending on the severity of his symptoms.    He asked if he could get in to see PT right away, and I told him he could call the  to ask about availability, but he said it just went to an answering machine.    He also said that his insurance has changed from what is currently on file in the system, and I told him that some insurances require a referral to start PT and that he should call them first if he wants to pursue PT as a first step.    PT again recommended getting in contact with his primary care provider or going to the ED or walk-in clinic to pursue next steps prior to ending the phone call.    Patient verbalized understanding.  "

## 2023-02-16 NOTE — PROGRESS NOTES
ASSESSMENT & PLAN:   Diagnoses and all orders for this visit:  Acute left-sided low back pain without sciatica  -     predniSONE (DELTASONE) 20 MG tablet; Take 2 tablets (40 mg) by mouth daily for 5 days  -     Physical Therapy Referral; Future  Back muscle spasm  -     baclofen (LIORESAL) 10 MG tablet; Take 1 tablet (10 mg) by mouth 3 times daily as needed for muscle spasms    Low back pain and muscle spasm start prednisone burst and baclofen as needed. Referral to physical therapy placed -this has helped him in the past. Discussed red flag symptoms. Follow-up with PCP if having continued pain.     No follow-ups on file.    There are no Patient Instructions on file for this visit.    At the end of the encounter, I discussed results, diagnosis, medications. Discussed red flags for immediate return to clinic/ER, as well as indications for follow up if no improvement. Patient and/or caregiver understood and agreed to plan. Patient was stable for discharge.    ------------------------------------------------------------------------  SUBJECTIVE  Patient presents with:  Back Pain: Starting last Saturday pt started experiencing sharp shooting pain in lower left back when standing up, goes away quickly    HPI  Ramu Espinoza is a(n) 62 year old male presenting to clinic today for left low back pain x 6 days. Thinks pain began after lifting heavy trash bag at work. The next day he tried to stand up and had a shooting pain in left lower back. Pain is mostly localized in left lower back but is starting to radiate to the right. Pain mostly occurs only when going from sitting to standing. Pain worsened today. Pain was aggravated by bumps on the road today. Pain improved with stretching and ibuprofen. No leg weakness or numbness. No incontinence, difficulty using bathroom, saddle anesthesia.     Review of Systems    Current Outpatient Medications   Medication Sig Dispense Refill     acyclovir (ZOVIRAX) 400 MG tablet TAKE 1  TABLET (400 MG) BY MOUTH 2 TIMES DAILY 180 tablet 3     APPLE CIDER VINEGAR PO 2 tablespoons daily       baclofen (LIORESAL) 10 MG tablet Take 1 tablet (10 mg) by mouth 3 times daily as needed for muscle spasms 15 tablet 0     colchicine (COLCYRS) 0.6 MG tablet Take 1 tablet (0.6 mg) by mouth daily 30 tablet 0     hydrOXYzine (ATARAX) 25 MG tablet Take 0.5 tablets (12.5 mg) by mouth At Bedtime 30 tablet 1     ibrutinib (IMBRUVICA) 140 MG tablet Take 3 tablets (420 mg) by mouth daily 270 tablet 3     ibuprofen (ADVIL/MOTRIN) 400 MG tablet Take 1 tablet (400 mg) by mouth 2 times daily for 14 days With food 28 tablet 0     LORazepam (ATIVAN) 1 MG tablet Take 1 tablet (1 mg) by mouth 2 times daily as needed for anxiety Take 30 minutes prior to departure.  Do not operate a vehicle after taking this medication 30 tablet 0     metoprolol succinate ER (TOPROL XL) 50 MG 24 hr tablet Take 1 tablet (50 mg) by mouth 2 times daily 180 tablet 3     predniSONE (DELTASONE) 20 MG tablet Take 2 tablets (40 mg) by mouth daily for 5 days 10 tablet 0     sertraline (ZOLOFT) 100 MG tablet Take 1 tablet by mouth daily       tamsulosin (FLOMAX) 0.4 MG capsule TAKE 1 CAPSULE (0.4 MG) BY MOUTH DAILY 90 capsule 0     traZODone (DESYREL) 50 MG tablet Take 1 tablet (50 mg) by mouth At Bedtime 90 tablet 3     TURMERIC PO Take 2 tablets by mouth daily       rosuvastatin (CRESTOR) 10 MG tablet Take 10 mg by mouth daily (Patient not taking: Reported on 12/28/2022)       Problem List:  2022-12: Benign prostatic hyperplasia with lower urinary tract   symptoms, symptom details unspecified  2022-12: History of atrial fibrillation  2021-01: Pain of left lower leg  2019-06: REM sleep behavior disorder  2019-06: Class 1 obesity due to excess calories with serious   comorbidity in adult  2019-05: DAVID (obstructive sleep apnea)  2019-03: Thyroid nodule  2019-03: Pericardial effusion  2017-06: Paroxysmal atrial fibrillation with rapid ventricular   response  (H)  2016-: Depression  Tinnitus  Generalized Anxiety Disorder  Hyperlipidemia CT Cardiac score 0 in 3/5/2021   Dupuytren's Contracture  Insomnia  Chronic lymphocytic leukemia (H)  Cy inducer Ibrutinib  Internal Hemorrhoids With Bleeding    Allergies   Allergen Reactions     Zithromax [Azithromycin Dihydrate]          OBJECTIVE  Vitals:    23 1503   BP: 128/81   BP Location: Left arm   Patient Position: Sitting   Cuff Size: Adult Large   Pulse: 63   Resp: 18   Temp: 98.1  F (36.7  C)   TempSrc: Temporal   SpO2: 99%   Weight: 121.1 kg (267 lb)     Physical Exam   GENERAL: healthy, alert, no acute distress.   MSK: no spinous process tenderness. Localized tenderness in left lumbar paraspinal muscles and tightness. 5/5 knee flexion and extension. Sensation of lower extremities intact and symmetric. Normal gait.    No results found for any visits on 23.

## 2023-02-17 DIAGNOSIS — M62.830 BACK MUSCLE SPASM: ICD-10-CM

## 2023-02-17 RX ORDER — BACLOFEN 10 MG/1
10 TABLET ORAL 3 TIMES DAILY PRN
Qty: 15 TABLET | Refills: 0 | OUTPATIENT
Start: 2023-02-17

## 2023-02-20 ENCOUNTER — HOSPITAL ENCOUNTER (OUTPATIENT)
Dept: PHYSICAL THERAPY | Facility: REHABILITATION | Age: 63
Discharge: HOME | End: 2023-02-20
Attending: STUDENT IN AN ORGANIZED HEALTH CARE EDUCATION/TRAINING PROGRAM
Payer: COMMERCIAL

## 2023-02-20 DIAGNOSIS — M54.42 ACUTE LEFT-SIDED LOW BACK PAIN WITH LEFT-SIDED SCIATICA: Primary | ICD-10-CM

## 2023-02-20 DIAGNOSIS — M54.50 ACUTE LEFT-SIDED LOW BACK PAIN WITHOUT SCIATICA: ICD-10-CM

## 2023-02-20 DIAGNOSIS — M62.830 BACK MUSCLE SPASM: ICD-10-CM

## 2023-02-20 PROCEDURE — 97110 THERAPEUTIC EXERCISES: CPT | Mod: GP | Performed by: PHYSICAL THERAPIST

## 2023-02-20 PROCEDURE — 97161 PT EVAL LOW COMPLEX 20 MIN: CPT | Mod: GP | Performed by: PHYSICAL THERAPIST
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
## 2023-02-21 ENCOUNTER — TELEPHONE (OUTPATIENT)
Dept: CARDIOLOGY | Facility: CLINIC | Age: 63
End: 2023-02-21
Payer: COMMERCIAL

## 2023-02-21 NOTE — TELEPHONE ENCOUNTER
"PC with patient for update. Patient reports that he started taking the colchicine as prescribed. Pt reports that once he started the colchicine he stopped the Ibuprofen, stating that he must of misunderstood the instructions. He stopped the Ibuprofen at that time. He has been taking the colchicine faithfully daily. Pt reports more recently that he has hurt his back, and been on Baclofen with a course of Prednisone. He finished the prednisone. He reports that, \"my heart hasn't been bugging me\". He denies any concerns with his stomach. Pt has no complaints regarding his heart at time of call. Pt is having improvement in his back. Informed patient will route to provider for review. Of note, patient and writer again reviewed normal echo results. Pt reports that the \"person doing the ultrasound said I was in Afib during the test\". Review of echo report makes no mention of Afib. Informed patient will provide for MAT to review. Continue on medications as prescribed. Will send update and return call with any further recommendations.    Dr. Arana, please review. Any recommendations? CMM,Rn   "

## 2023-02-22 NOTE — TELEPHONE ENCOUNTER
PC with patient and review of recommendations and discussion from provider. Pt with no new concerns. Encouraged to finish colchicine as prescribed. Monitor after completion of the 30 days, if chest pains/concerns return, call clinic to report. Pt verbalized understanding. Cmm,Rn

## 2023-02-22 NOTE — TELEPHONE ENCOUNTER
===View-only below this line===  ----- Message -----  From: Ino Arana DO  Sent: 2/22/2023  12:21 PM CST  To: Lino Parr RN    Continue with colchicine.  He can be off ibuprofen if chest pain improved.  Reviewed echo in detail.  He is in sinus rhythm with a heart rate of 55 bpm during his echo.

## 2023-02-23 RX ORDER — BACLOFEN 10 MG/1
10 TABLET ORAL 3 TIMES DAILY PRN
Qty: 15 TABLET | Refills: 0 | Status: SHIPPED | OUTPATIENT
Start: 2023-02-23 | End: 2023-03-07

## 2023-02-23 NOTE — TELEPHONE ENCOUNTER
Routing refill request to provider for review/approval because:  Drug not on the [facility] refill protocol   Patient may need to be evaluated    Last Written Prescription Date:  2/16/23  Last Fill Quantity: 15,  # refills: 0   Last office visit provider:   12/8/22    Requested Prescriptions   Pending Prescriptions Disp Refills     baclofen (LIORESAL) 10 MG tablet [Pharmacy Med Name: BACLOFEN 10 MG TABLET 10 Tablet] 15 tablet 0     Sig: TAKE 1 TABLET (10 MG) BY MOUTH 3 TIMES DAILY AS NEEDED FOR MUSCLE SPASMS       There is no refill protocol information for this order          MITZI GONZALEZ RN 02/23/23 12:36 PM

## 2023-02-23 NOTE — TELEPHONE ENCOUNTER
--Last visit:  3/8/22 [facility] for physical.    --Future Visit:   Next 5 appointments (look out 90 days)    Mar 01, 2023  5:30 PM  (Arrive by 5:10 PM)  Provider Visit with Jurgen Gilmore MD  [facility] ([facility]  ) [address] [place] [address] [phone]   Mar 29, 2023  9:30 AM  (Arrive by 9:15 AM)  Return Visit with Dennis Garza MD, MD  [facility] ([facility] ) [address] [place] [id number] [phone]

## 2023-02-27 ENCOUNTER — HOSPITAL ENCOUNTER (OUTPATIENT)
Dept: PHYSICAL THERAPY | Facility: REHABILITATION | Age: 63
Discharge: HOME | End: 2023-02-27
Payer: COMMERCIAL

## 2023-02-27 PROCEDURE — 97110 THERAPEUTIC EXERCISES: CPT | Mod: GP

## 2023-02-28 ENCOUNTER — TELEPHONE (OUTPATIENT)
Dept: INTERNAL MEDICINE | Facility: CLINIC | Age: 63
End: 2023-02-28
Payer: COMMERCIAL

## 2023-02-28 NOTE — TELEPHONE ENCOUNTER
Forms/Letter Request    Type of form/letter: Work Patient is requesting return/release back to work.    Please call patient, as he has a couple of questions as well.    Have you been seen for this request: n/a    Do we have the form/letter: No    When is form/letter needed by: ASAP    How would you like the form/letter returned: Place orders within Epic    Patient Notified form requests are processed in 3-5 business days:Yes     would you prefer to receive a phone call?:   Patient would prefer a phone call      Okay to leave a detailed message?: Yes at Cell number on file:    Telephone Information:   Mobile [phone]

## 2023-02-28 NOTE — TELEPHONE ENCOUNTER
Will need details  I have not seen him in nearly three months  Thanks     Jurgen Gilmore MD on 2/28/2023 at 4:37 PM

## 2023-03-01 ENCOUNTER — VIRTUAL VISIT (OUTPATIENT)
Dept: INTERNAL MEDICINE | Facility: CLINIC | Age: 63
End: 2023-03-01
Payer: COMMERCIAL

## 2023-03-01 DIAGNOSIS — C91.10 CHRONIC LYMPHOCYTIC LEUKEMIA (H): ICD-10-CM

## 2023-03-01 DIAGNOSIS — M62.830 BACK MUSCLE SPASM: Primary | ICD-10-CM

## 2023-03-01 DIAGNOSIS — I48.0 PAROXYSMAL ATRIAL FIBRILLATION (H): ICD-10-CM

## 2023-03-01 PROCEDURE — 99213 OFFICE O/P EST LOW 20 MIN: CPT | Mod: 93 | Performed by: INTERNAL MEDICINE

## 2023-03-01 NOTE — LETTER
[facility]  [address] [place] [address]  Phone: [phone]  Fax: [phone]    March 1, 2023        Ramu Espinoza  [address] [place] [address]          To whom it may concern:    RE: Ramu Espinoza has missed work starting on Feb 16, 2023 through today due to a non-work related injury. In order for optimal recovery, he will be need to remain away from work until March 6, 2023. He may return without restrictions on March 6, 2023.        Please contact me for questions or concerns.      Sincerely,        Jurgen Gilmore MD

## 2023-03-01 NOTE — PROGRESS NOTES
"Clive is a 62 year old who is being evaluated via a billable telephone visit.      What phone number would you like to be contacted at? [phone]  How would you like to obtain your AVS? Yajaira    Distant Location (provider location):  On-site    Assessment & Plan     (M62.830) Back muscle spasm  (primary encounter diagnosis)  Comment: recent, severe, slow improvement  Plan: has a somewhat physical job so will stay off of work until March 6th.  Letter written. FMLA likely will be needed. Will keep an eye out for it.    (I48.0) Paroxysmal atrial fibrillation (H)  Comment: hx of  Plan: sees CHADVasc of 0. Sees cards yearly    (C91.10) Chronic lymphocytic leukemia (H)  Comment: hx of, stable on Imbruvica  Plan: hematology later this spring             BMI:   Estimated body mass index is 34.28 kg/m  as calculated from the following:    Height as of 12/28/22: 1.88 m (6' 2\").    Weight as of 2/16/23: 121.1 kg (267 lb).           No follow-ups on file.    uJrgen Gilmore MD  [facility]    Subjective   Clive is a 62 year old accompanied by his ALONE, presenting for the following health issues:  Recheck Medication and Musculoskeletal Problem (PT REPORTS UPON STANDING FROM SITTING BACK PAIN)      Musculoskeletal Problem    History of Present Illness       Back Pain:  He presents for follow up of back pain. Patient's back pain is a new problem.    Original cause of back pain: lifting  First noticed back pain: 1-4 weeks ago  Patient feels back pain: comes and goesLocation of back pain:  Left lower back, left middle of back, right upper back, left upper back and left shoulder  Description of back pain: dull ache, gnawing, sharp, shooting and stabbing  Back pain spreads: right shoulder and left shoulder    Since patient first noticed back pain, pain is: gradually improving  Does back pain interfere with his job:  Yes  On a scale of 1-10 (10 being the worst), patient describes pain as:  7  What " makes back pain worse: certain positions, sitting and standing  Acupuncture: not tried  Acetaminophen: not tried  Activity or exercise: helpful  Chiropractor:  Not tried  Cold: helpful  Massage: helpful  Muscle relaxants: helpful  Opioids: not tried  Physical Therapy: helpful  Rest: helpful  Steroid Injection: not tried  Stretching: helpful  Surgery: not tried  TENS unit: not tried  Other healthcare providers patient is seeing for back pain: Physical Therapist    He eats 4 or more servings of fruits and vegetables daily.He consumes 0 sweetened beverage(s) daily.He exercises with enough effort to increase his heart rate 60 or more minutes per day.  He exercises with enough effort to increase his heart rate 5 days per week. He is missing 1 dose(s) of medications per week.  He is not taking prescribed medications regularly due to remembering to take.             Review of Systems         Objective           Vitals:  No vitals were obtained today due to virtual visit.    Physical Exam   healthy, alert and no distress  PSYCH: Alert and oriented times 3; coherent speech, normal   rate and volume, able to articulate logical thoughts, able   to abstract reason, no tangential thoughts, no hallucinations   or delusions  His affect is normal  RESP: No cough, no audible wheezing, able to talk in full sentences  Remainder of exam unable to be completed due to telephone visits                Phone call duration: 18 minutes

## 2023-03-06 DIAGNOSIS — M62.830 BACK MUSCLE SPASM: ICD-10-CM

## 2023-03-07 ENCOUNTER — TELEPHONE (OUTPATIENT)
Dept: CARDIOLOGY | Facility: CLINIC | Age: 63
End: 2023-03-07
Payer: COMMERCIAL

## 2023-03-07 RX ORDER — BACLOFEN 10 MG/1
10 TABLET ORAL 3 TIMES DAILY PRN
Qty: 15 TABLET | Refills: 0 | Status: SHIPPED | OUTPATIENT
Start: 2023-03-07 | End: 2023-06-28

## 2023-03-07 NOTE — TELEPHONE ENCOUNTER
[facility] Call Center    Phone Message    May a detailed message be left on voicemail: yes     Reason for Call: Medication Question or concern regarding medication   Prescription Clarification  Name of Medication: colchicine (COLCYRS) 0.6 MG tablet   Prescribing Provider: Dr. Arana   Pharmacy: [facility] [place] [place] [facility] [address] [facility] [facility]   What on the order needs clarification? Rhea aleman pharmacist called in stating they received a response in regards to the medication but the response was not clear and needs more clarification. The pharmacy would like to know if it is ok for 30days or was it denied. Please reach out to [phone] to discuss.          Action Taken: Other: Cardiology    Travel Screening: Not Applicable     Thank you!  [facility]

## 2023-03-07 NOTE — TELEPHONE ENCOUNTER
Discussed with pharm - pt was to take for 30 days, reassess, and if still having issues to let provider know.  Pharm said pt wasn't asking - med was on auto-refill.  -valente

## 2023-03-15 ENCOUNTER — HOSPITAL ENCOUNTER (OUTPATIENT)
Dept: PHYSICAL THERAPY | Facility: REHABILITATION | Age: 63
Discharge: HOME | End: 2023-03-15
Payer: COMMERCIAL

## 2023-03-15 DIAGNOSIS — M19.071 ARTHRITIS OF RIGHT ANKLE: ICD-10-CM

## 2023-03-15 DIAGNOSIS — M54.50 ACUTE LEFT-SIDED LOW BACK PAIN WITHOUT SCIATICA: Primary | ICD-10-CM

## 2023-03-15 DIAGNOSIS — G89.29 CHRONIC PAIN OF RIGHT ANKLE: ICD-10-CM

## 2023-03-15 DIAGNOSIS — M25.571 CHRONIC PAIN OF RIGHT ANKLE: ICD-10-CM

## 2023-03-15 DIAGNOSIS — M54.42 ACUTE LEFT-SIDED LOW BACK PAIN WITH LEFT-SIDED SCIATICA: ICD-10-CM

## 2023-03-15 PROCEDURE — 97110 THERAPEUTIC EXERCISES: CPT | Mod: GP | Performed by: PHYSICAL THERAPIST

## 2023-03-20 ENCOUNTER — HOSPITAL ENCOUNTER (OUTPATIENT)
Dept: PHYSICAL THERAPY | Facility: REHABILITATION | Age: 63
Discharge: HOME | End: 2023-03-20
Payer: COMMERCIAL

## 2023-03-20 PROCEDURE — 97140 MANUAL THERAPY 1/> REGIONS: CPT | Mod: GP

## 2023-03-20 PROCEDURE — 97110 THERAPEUTIC EXERCISES: CPT | Mod: GP

## 2023-03-27 ENCOUNTER — LAB (OUTPATIENT)
Dept: INFUSION THERAPY | Facility: HOSPITAL | Age: 63
End: 2023-03-27
Attending: INTERNAL MEDICINE
Payer: COMMERCIAL

## 2023-03-27 ENCOUNTER — DOCUMENTATION ONLY (OUTPATIENT)
Dept: ONCOLOGY | Facility: HOSPITAL | Age: 63
End: 2023-03-27

## 2023-03-27 ENCOUNTER — ONCOLOGY VISIT (OUTPATIENT)
Dept: ONCOLOGY | Facility: HOSPITAL | Age: 63
End: 2023-03-27
Attending: INTERNAL MEDICINE
Payer: COMMERCIAL

## 2023-03-27 VITALS
RESPIRATION RATE: 28 BRPM | SYSTOLIC BLOOD PRESSURE: 120 MMHG | BODY MASS INDEX: 31.7 KG/M2 | HEART RATE: 62 BPM | OXYGEN SATURATION: 95 % | DIASTOLIC BLOOD PRESSURE: 66 MMHG | TEMPERATURE: 97.9 F | WEIGHT: 247 LBS | HEIGHT: 74 IN

## 2023-03-27 DIAGNOSIS — C91.10 CHRONIC LYMPHOCYTIC LEUKEMIA (H): Primary | ICD-10-CM

## 2023-03-27 DIAGNOSIS — C91.10 CHRONIC LYMPHOCYTIC LEUKEMIA (H): ICD-10-CM

## 2023-03-27 LAB
ALBUMIN SERPL BCG-MCNC: 4 G/DL (ref 3.5–5.2)
ALP SERPL-CCNC: 59 U/L (ref 40–129)
ALT SERPL W P-5'-P-CCNC: 18 U/L (ref 10–50)
ANION GAP SERPL CALCULATED.3IONS-SCNC: 9 MMOL/L (ref 7–15)
AST SERPL W P-5'-P-CCNC: 19 U/L (ref 10–50)
BASOPHILS # BLD AUTO: 0.1 10E3/UL (ref 0–0.2)
BASOPHILS NFR BLD AUTO: 1 %
BILIRUB SERPL-MCNC: 0.8 MG/DL
BUN SERPL-MCNC: 17.4 MG/DL (ref 8–23)
CALCIUM SERPL-MCNC: 9.2 MG/DL (ref 8.8–10.2)
CHLORIDE SERPL-SCNC: 104 MMOL/L (ref 98–107)
CREAT SERPL-MCNC: 0.86 MG/DL (ref 0.67–1.17)
DEPRECATED HCO3 PLAS-SCNC: 28 MMOL/L (ref 22–29)
EOSINOPHIL # BLD AUTO: 0.1 10E3/UL (ref 0–0.7)
EOSINOPHIL NFR BLD AUTO: 1 %
ERYTHROCYTE [DISTWIDTH] IN BLOOD BY AUTOMATED COUNT: 12.7 % (ref 10–15)
GFR SERPL CREATININE-BSD FRML MDRD: >90 ML/MIN/1.73M2
GLUCOSE SERPL-MCNC: 89 MG/DL (ref 70–99)
HCT VFR BLD AUTO: 46.8 % (ref 40–53)
HGB BLD-MCNC: 15.6 G/DL (ref 13.3–17.7)
IMM GRANULOCYTES # BLD: 0.1 10E3/UL
IMM GRANULOCYTES NFR BLD: 1 %
LYMPHOCYTES # BLD AUTO: 1.7 10E3/UL (ref 0.8–5.3)
LYMPHOCYTES NFR BLD AUTO: 19 %
MCH RBC QN AUTO: 30.3 PG (ref 26.5–33)
MCHC RBC AUTO-ENTMCNC: 33.3 G/DL (ref 31.5–36.5)
MCV RBC AUTO: 91 FL (ref 78–100)
MONOCYTES # BLD AUTO: 0.6 10E3/UL (ref 0–1.3)
MONOCYTES NFR BLD AUTO: 7 %
NEUTROPHILS # BLD AUTO: 6.4 10E3/UL (ref 1.6–8.3)
NEUTROPHILS NFR BLD AUTO: 71 %
NRBC # BLD AUTO: 0 10E3/UL
NRBC BLD AUTO-RTO: 0 /100
PLATELET # BLD AUTO: 279 10E3/UL (ref 150–450)
POTASSIUM SERPL-SCNC: 3.9 MMOL/L (ref 3.4–5.3)
PROT SERPL-MCNC: 6.3 G/DL (ref 6.4–8.3)
RBC # BLD AUTO: 5.15 10E6/UL (ref 4.4–5.9)
SODIUM SERPL-SCNC: 141 MMOL/L (ref 136–145)
WBC # BLD AUTO: 8.8 10E3/UL (ref 4–11)

## 2023-03-27 PROCEDURE — 80053 COMPREHEN METABOLIC PANEL: CPT | Performed by: INTERNAL MEDICINE

## 2023-03-27 PROCEDURE — 85004 AUTOMATED DIFF WBC COUNT: CPT | Performed by: INTERNAL MEDICINE

## 2023-03-27 PROCEDURE — 99214 OFFICE O/P EST MOD 30 MIN: CPT | Performed by: INTERNAL MEDICINE

## 2023-03-27 PROCEDURE — G0463 HOSPITAL OUTPT CLINIC VISIT: HCPCS | Performed by: INTERNAL MEDICINE

## 2023-03-27 PROCEDURE — 36415 COLL VENOUS BLD VENIPUNCTURE: CPT | Performed by: INTERNAL MEDICINE

## 2023-03-27 RX ORDER — UBIDECARENONE 30 MG
1 CAPSULE ORAL 2 TIMES DAILY
COMMUNITY
End: 2024-07-17

## 2023-03-27 RX ORDER — KRILL/OM-3/DHA/EPA/PHOSPHO/AST 500MG-86MG
1 CAPSULE ORAL DAILY
COMMUNITY
End: 2024-07-17

## 2023-03-27 RX ORDER — ZINC GLUCONATE 50 MG
50 TABLET ORAL DAILY
COMMUNITY
End: 2024-03-20

## 2023-03-27 RX ORDER — VITAMIN B COMPLEX
1 TABLET ORAL DAILY
COMMUNITY
End: 2024-03-20

## 2023-03-27 RX ORDER — MULTIVITAMIN WITH IRON
2 TABLET ORAL AT BEDTIME
COMMUNITY
End: 2024-07-17

## 2023-03-27 ASSESSMENT — PAIN SCALES - GENERAL: PAINLEVEL: MILD PAIN (3)

## 2023-03-27 NOTE — PROGRESS NOTES
[facility] cancer Care Progress Note  Patient: Ramu Espinoza   MRN:  [id number]   Date of Service : Mar 27, 2023        Reason for visit      Problem List Items Addressed This Visit        Hematologic    Chronic lymphocytic leukemia (H)  Cy inducer Ibrutinib - Primary        Assessment     1.  A very pleasant 62 year old  gentleman with stage IV CLL diagnosed in 2012.  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  Quite stable on Imbruvica.  2.  No significant side effects noted from Imbruvica.  3.  History of paroxysmal atrial fibrillation.  He has has not had any recurrence.  He did complain of some chest pain.  Had an echocardiogram done on 3 February 2023 which actually look fine.  4.  History of pericardial effusion in 2019.  Again no recurrence.  5.  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  6.  Fully COVID vaccinated.      Plan     1.  Imbruvica 420 mg p.o. daily.  Since he has some insurance issues if he wants to come here he can have his labs done at his insurance's preferred clinic.  And then we can just do the exam for him.  Or we can set him up to see somebody at [facility].  2.  He will need to come back in 3 months for the lab and for refill on his medication.  3.  Continue with acyclovir.  4.  Follow-up with Dr. Jiménez or different primary care doctor for his medical care.  5.  Continue to moderate his diet as far as alcohol and caffeine is concerned.  He is already doing an excellent job of cutting away sugar from his diet.      Clinical stage      Stage IV    History     Ramu Espinoza is a very pleasant 62 year old  male with a history of CLL diagnosed in 2012 presenting with elevated white count in upper 10s/lower 20s with a peripheral blood smear showing atypical lymphocytosis suspicious for CLL.  He was completely asymptomatic, normal hemoglobin and platelet count.  His peripheral blood flow cytometry revealed CD5 co-expressing kappa light chains  restricted B cells confirming the diagnosis of CLL.  He has been on observation.  His white count has been slowly going up.    In June 2014 his white count was over 100,000.  So he had a CAT scan and that showed increasing lymphadenopathy and splenomegaly.  Platelet count has been going down.    I offered participation in clinical study in which patients are randomized between fludarabine and cyclophosphamide Rituxan or Imbruvica and Rituxan.  He has been randomized to Imbruvica and rituximab arm. He started on October 2014.  In November 2014 he got Rituxan for the first time.  Tolerated well. Finished that. Now on Imbruvica alone. Tolerating it well.     In May 2017 he was found to have paroxysmal afib when he complained of some fluttering sensation. He had holter monitor which led to the diagnosis. Most days he feels well.     In March 2019 he was found to have pericardial effusion when he started have some chest pain on deep inspiration. Had it tapped. Held Imbruvica for 3 weeks. Then resumed it.  Tolerated it fine. So continues it.    He has been fully Covid vaccinated.  No medical events since last visit here.    Comes in today for scheduled follow-up.  Since the last time he was here he did have some chest pain slightly on the left side.  Went and saw cardiologist.  They ordered an echocardiogram on 3 February 2023.  The echocardiogram was fine.  He was told to take some none steroidal anti-inflammatory drugs.  Other than that he is doing fine.  He is cut back on sugar and junk food from his diet.  He is lost about 15 pounds.    Past Medical History     Past Medical History:   Diagnosis Date     BPH (benign prostatic hyperplasia)      CLL (chronic lymphocytic leukemia) (H)      Lymphadenopathy     Created by Conversion  Replacement Utility updated for latest IMO load     Paroxysmal atrial fibrillation with rapid ventricular response (H)      Sleep apnea     hypertension, being slightly overweight, anemic,  "having reflux, anxiety, epididymitis, hyperlipidemia and tinnitus      Review of Systems   A 14 point review of systems was obtained.  Positive findings noted in the history.  Rest of the review of system is otherwise negative.     ECOG performance status and Distress Assessment      ECOG Performance:    ECOG Performance Status: 1    Distress Assessment  Distress Assessment Score: No distress:     Pain Status  Currently in Pain: Yes      Vital Signs     /66 (BP Location: Left arm, Patient Position: Sitting, Cuff Size: Adult Large)   Pulse 62   Temp 97.9  F (36.6  C) (Oral)   Resp 28   Ht 1.873 m (6' 1.75\")   Wt 112 kg (247 lb)   SpO2 95%   BMI 31.93 kg/m       Physical Exam     GENERAL: No acute distress. Cooperative in conversation.   HEENT:  Pupils are equal, round and reactive. Oral mucosa is clean and intact. No ulcerations or mucositis noted. No bleeding noted.  RESP:Chest symmetric lungs are clear bilaterally per auscultation. Regular respiratory rate. No wheezes or rhonchi.  CV: Normal S1 S2 Regular, rate and rhythm. No murmurs.    ABD: Nondistended, soft, nontender. Positive bowel sounds. No organomegaly.   EXTREMITIES: No lower extremity edema.   NEURO: Non- focal. Alert and oriented x3.  Cranial nerves appear intact.  PSYCH: Within normal limits. No depression or anxiety.  SKIN: Warm dry intact.       Lab Results     Recent Results (from the past 240 hour(s))   CBC with platelets and differential    Collection Time: 03/27/23  1:00 PM   Result Value Ref Range    WBC Count 8.8 4.0 - 11.0 10e3/uL    RBC Count 5.15 4.40 - 5.90 10e6/uL    Hemoglobin 15.6 13.3 - 17.7 g/dL    Hematocrit 46.8 40.0 - 53.0 %    MCV 91 78 - 100 fL    MCH 30.3 26.5 - 33.0 pg    MCHC 33.3 31.5 - 36.5 g/dL    RDW 12.7 10.0 - 15.0 %    Platelet Count 279 150 - 450 10e3/uL    % Neutrophils 71 %    % Lymphocytes 19 %    % Monocytes 7 %    % Eosinophils 1 %    % Basophils 1 %    % Immature Granulocytes 1 %    NRBCs per 100 WBC " 0 <1 /100    Absolute Neutrophils 6.4 1.6 - 8.3 10e3/uL    Absolute Lymphocytes 1.7 0.8 - 5.3 10e3/uL    Absolute Monocytes 0.6 0.0 - 1.3 10e3/uL    Absolute Eosinophils 0.1 0.0 - 0.7 10e3/uL    Absolute Basophils 0.1 0.0 - 0.2 10e3/uL    Absolute Immature Granulocytes 0.1 <=0.4 10e3/uL    Absolute NRBCs 0.0 10e3/uL        Imaging Results     No results found.       Dennis Garza MD

## 2023-03-27 NOTE — PROGRESS NOTES
"Oncology Rooming Note    2023 1:20 PM   Ramu Espinoza is a 62 year old male who presents for:    Chief Complaint   Patient presents with     Oncology Clinic Visit     Return Chronic lymphocytic leukemia, Cy inducer Ibrutinib, review labs & see Ly     Initial Vitals: /66 (BP Location: Left arm, Patient Position: Sitting, Cuff Size: Adult Large)   Pulse 62   Temp 97.9  F (36.6  C) (Oral)   Resp 28   Ht 1.873 m (6' 1.75\")   Wt 112 kg (247 lb)   SpO2 95%   BMI 31.93 kg/m   Estimated body mass index is 31.93 kg/m  as calculated from the following:    Height as of this encounter: 1.873 m (6' 1.75\").    Weight as of this encounter: 112 kg (247 lb). Body surface area is 2.41 meters squared.  Mild Pain (3) Comment: Data Unavailable   No LMP for male patient.  Allergies reviewed: Yes  Medications reviewed: Yes    Medications: Medication refills not needed today.  Pharmacy name entered into [employer]: [facility] - [place], [place] - [address] [place] [address]    Clinical concerns:Return Chronic lymphocytic leukemia, Cy inducer Ibrutinib, review labs & see Ly Degroot, ISABELL            "

## 2023-03-27 NOTE — PROGRESS NOTES
Met with patient and provider in clinic today.  Per patient his insurance changed for this year and [facility] are no longer in his network.  Patient would like to stay on the study if possible.  He currently has [employer] coverage.  He would like to switch to [facility] cancer care.  Labs reviewed and ok to continue on study with no dose modifications.  He returned remaining pills and bottles, with signed dairies.    Dispensed new pills and diaries.  Will start the process to get patient transferred to [facility] and f/u with him over the phone.  Patient agreeable to plan.    CHELLE [employer]

## 2023-03-27 NOTE — LETTER
"    3/27/2023         RE: Ramu Espinoza  [address] [place] [address]        Dear Colleague,    Thank you for referring your patient, Ramu Espinoza, to the [facility] [place]. Please see a copy of my visit note below.    Oncology Rooming Note    2023 1:20 PM   Ramu Espinoza is a 62 year old male who presents for:    Chief Complaint   Patient presents with     Oncology Clinic Visit     Return Chronic lymphocytic leukemia, Cy inducer Ibrutinib, review labs & see Ly     Initial Vitals: /66 (BP Location: Left arm, Patient Position: Sitting, Cuff Size: Adult Large)   Pulse 62   Temp 97.9  F (36.6  C) (Oral)   Resp 28   Ht 1.873 m (6' 1.75\")   Wt 112 kg (247 lb)   SpO2 95%   BMI 31.93 kg/m   Estimated body mass index is 31.93 kg/m  as calculated from the following:    Height as of this encounter: 1.873 m (6' 1.75\").    Weight as of this encounter: 112 kg (247 lb). Body surface area is 2.41 meters squared.  Mild Pain (3) Comment: Data Unavailable   No LMP for male patient.  Allergies reviewed: Yes  Medications reviewed: Yes    Medications: {REFILLS NEEDED:340301}  Pharmacy name entered into [employer]: [facility] - [place], [place] - [address] [place] [address]    Clinical concerns:Return Chronic lymphocytic leukemia, Cy inducer Ibrutinib, review labs & see Ly Degroot, [facility] cancer Care Progress Note  Patient: Ramu Espinoza   MRN:  [id number]   Date of Service : Mar 27, 2023        Reason for visit      Problem List Items Addressed This Visit        Hematologic    Chronic lymphocytic leukemia (H)  Cy inducer Ibrutinib - Primary        Assessment     1.  A very pleasant 62 year old  gentleman with stage IV CLL diagnosed in 2012.  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  Quite stable on Imbruvica.  2.  No significant side effects noted from Imbruvica.  3.  History of paroxysmal atrial fibrillation.  He " has has not had any recurrence.  He did complain of some chest pain.  Had an echocardiogram done on 3 February 2023 which actually look fine.  4.  History of pericardial effusion in 2019.  Again no recurrence.  5.  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  6.  Fully COVID vaccinated.      Plan     1.  Imbruvica 420 mg p.o. daily.  Since he has some insurance issues if he wants to come here he can have his labs done at his insurance's preferred clinic.  And then we can just do the exam for him.  Or we can set him up to see somebody at [facility].  2.  He will need to come back in 3 months for the lab and for refill on his medication.  3.  Continue with acyclovir.  4.  Follow-up with Dr. Jiménez or different primary care doctor for his medical care.  5.  Continue to moderate his diet as far as alcohol and caffeine is concerned.  He is already doing an excellent job of cutting away sugar from his diet.      Clinical stage      Stage IV    History     Ramu Espinoza is a very pleasant 62 year old  male with a history of CLL diagnosed in 02/2012 presenting with elevated white count in upper 10s/lower 20s with a peripheral blood smear showing atypical lymphocytosis suspicious for CLL.  He was completely asymptomatic, normal hemoglobin and platelet count.  His peripheral blood flow cytometry revealed CD5 co-expressing kappa light chains restricted B cells confirming the diagnosis of CLL.  He has been on observation.  His white count has been slowly going up.    In June 2014 his white count was over 100,000.  So he had a CAT scan and that showed increasing lymphadenopathy and splenomegaly.  Platelet count has been going down.    I offered participation in clinical study in which patients are randomized between fludarabine and cyclophosphamide Rituxan or Imbruvica and Rituxan.  He has been randomized to Imbruvica and rituximab arm. He started on October 2014.  In November 2014 he got Rituxan for the  first time.  Tolerated well. Finished that. Now on Imbruvica alone. Tolerating it well.     In May 2017 he was found to have paroxysmal afib when he complained of some fluttering sensation. He had holter monitor which led to the diagnosis. Most days he feels well.     In March 2019 he was found to have pericardial effusion when he started have some chest pain on deep inspiration. Had it tapped. Held Imbruvica for 3 weeks. Then resumed it.  Tolerated it fine. So continues it.    He has been fully Covid vaccinated.  No medical events since last visit here.    Comes in today for scheduled follow-up.  Since the last time he was here he did have some chest pain slightly on the left side.  Went and saw cardiologist.  They ordered an echocardiogram on 3 February 2023.  The echocardiogram was fine.  He was told to take some none steroidal anti-inflammatory drugs.  Other than that he is doing fine.  He is cut back on sugar and junk food from his diet.  He is lost about 15 pounds.    Past Medical History     Past Medical History:   Diagnosis Date     BPH (benign prostatic hyperplasia)      CLL (chronic lymphocytic leukemia) (H)      Lymphadenopathy     Created by Conversion  Replacement Utility updated for latest IMO load     Paroxysmal atrial fibrillation with rapid ventricular response (H)      Sleep apnea     hypertension, being slightly overweight, anemic, having reflux, anxiety, epididymitis, hyperlipidemia and tinnitus      Review of Systems   A 14 point review of systems was obtained.  Positive findings noted in the history.  Rest of the review of system is otherwise negative.     ECOG performance status and Distress Assessment      ECOG Performance:    ECOG Performance Status: 1    Distress Assessment  Distress Assessment Score: No distress:     Pain Status  Currently in Pain: Yes      Vital Signs     /66 (BP Location: Left arm, Patient Position: Sitting, Cuff Size: Adult Large)   Pulse 62   Temp 97.9  F (36.6  " C) (Oral)   Resp 28   Ht 1.873 m (6' 1.75\")   Wt 112 kg (247 lb)   SpO2 95%   BMI 31.93 kg/m       Physical Exam     GENERAL: No acute distress. Cooperative in conversation.   HEENT:  Pupils are equal, round and reactive. Oral mucosa is clean and intact. No ulcerations or mucositis noted. No bleeding noted.  RESP:Chest symmetric lungs are clear bilaterally per auscultation. Regular respiratory rate. No wheezes or rhonchi.  CV: Normal S1 S2 Regular, rate and rhythm. No murmurs.    ABD: Nondistended, soft, nontender. Positive bowel sounds. No organomegaly.   EXTREMITIES: No lower extremity edema.   NEURO: Non- focal. Alert and oriented x3.  Cranial nerves appear intact.  PSYCH: Within normal limits. No depression or anxiety.  SKIN: Warm dry intact.       Lab Results     Recent Results (from the past 240 hour(s))   CBC with platelets and differential    Collection Time: 03/27/23  1:00 PM   Result Value Ref Range    WBC Count 8.8 4.0 - 11.0 10e3/uL    RBC Count 5.15 4.40 - 5.90 10e6/uL    Hemoglobin 15.6 13.3 - 17.7 g/dL    Hematocrit 46.8 40.0 - 53.0 %    MCV 91 78 - 100 fL    MCH 30.3 26.5 - 33.0 pg    MCHC 33.3 31.5 - 36.5 g/dL    RDW 12.7 10.0 - 15.0 %    Platelet Count 279 150 - 450 10e3/uL    % Neutrophils 71 %    % Lymphocytes 19 %    % Monocytes 7 %    % Eosinophils 1 %    % Basophils 1 %    % Immature Granulocytes 1 %    NRBCs per 100 WBC 0 <1 /100    Absolute Neutrophils 6.4 1.6 - 8.3 10e3/uL    Absolute Lymphocytes 1.7 0.8 - 5.3 10e3/uL    Absolute Monocytes 0.6 0.0 - 1.3 10e3/uL    Absolute Eosinophils 0.1 0.0 - 0.7 10e3/uL    Absolute Basophils 0.1 0.0 - 0.2 10e3/uL    Absolute Immature Granulocytes 0.1 <=0.4 10e3/uL    Absolute NRBCs 0.0 10e3/uL        Imaging Results     No results found.       Dennis Garza MD           Again, thank you for allowing me to participate in the care of your patient.        Sincerely,        Dennis Garza MD, MD    "

## 2023-03-30 ENCOUNTER — TELEPHONE (OUTPATIENT)
Dept: ONCOLOGY | Facility: HOSPITAL | Age: 63
End: 2023-03-30
Payer: COMMERCIAL

## 2023-03-30 NOTE — TELEPHONE ENCOUNTER
Oncology Distress Screen    Called Clive in regards to his positive oncology nutrition distress screen:    9. If you want to be contacted by one of our professionals, I can send a message to them right now. : ONCOLOGY DIETITIAN    Clive had several questions regarding what to eat/ how to eat to support overall health. Addressed his questions and emailed him some helpful web resources. Encouraged him to reach out if he has any further questions.      Ly Barker, MS, RD, LD

## 2023-03-31 NOTE — PROGRESS NOTES
03/20/23 0800   Signing Clinician's Name / Credentials   Signing clinician's name / credentials Mireille Negrete, PT, DPT   Session Number   Session Number 4/up to 12   Progress Note/Recertification   Progress Note Due Date 05/21/23   Ortho Goal 1   Goal Identifier HEP   Goal Description Patient will be independent with home exercise program and self management of symptoms in 8 weeks   Ortho Goal 2   Goal Identifier transfers   Goal Description Patient will be able to rise from sitting without increased pain in 8 weeks.   Ortho Goal 3   Goal Identifier lifting   Goal Description Patient will be able to lift for work without limitations in 10 weeks.   Subjective Report   Subjective Report Is feeling looser, reports is having more pain today due to lifting some heavier stuff. Reports today is around a 3/10 pain level. Initial L sided pain Has been consistent with HEP and is performing full regular work schedule.   Therapeutic Procedure/exercise   Therapeutic Procedures: strength, endurance, ROM, flexibillity minutes (31004) 30   Skilled Intervention HEP, Patient education, Verbal and tactile cues utilized to facilitate correct exercise technique   Patient Response Tolerated fair, slight pain   Treatment Detail For improved WB activity tolerance and warm up - treadmill 5 minutes while subjective measures taken.Reviewed HEP - IZABELA x 2 minutes, press ups x 5, sustained prone on elbows on pillow x 1 minute.  For improved lumbar strength and mobility - Cat cow x 8, cues for breathing technique. Hannah press with blue TB x 12 each direction, no pain. Trunk rotations x 12 B. Lamont questions regarding strengthening and exercises.   Manual Therapy   Manual Therapy: Mobilization, MFR, MLD, friction massage minutes (69439) 12   Skilled Intervention STM, joint mobilizations   Patient Response Tolerated well, improved muscle tone and pain with walking.   Treatment Detail For improved joint mobility and pain relief - prone PA's grade  2-3 thoracic and lumbar. STM to erector spinae, QL, lumbar paraspinals.   Assessments Completed   Assessments Completed Continues to have muscle imbalance at pelvis and low back causing increased strain will requrie further skilled PT for improved muscle balance and support.   Education   Learner Patient   Readiness Eager   Method Booklet/handout;Explanation;Demonstration   Response Demonstrates Understanding   Communication with other professionals   Communication with other professionals ref provider: Humera Teresa PA-C   Plan   Homework PTRx (phone)   Home program IZABELA, abd sets, supine LTR, supine HS stretch, bridge with band, EIS, pirformis stretch   Updates to plan of care No changes to HEP   Plan for next session Progress core and posterior chain mm. Extension based exercises - standing core exercises, RDLs, Press ups with overpressue. MT as seen appropriate   Comments   Comments Pt returns for follow up of LBP L>R. Has been consitent with HEP and continues to demonstrate decreased posterior chain strength with pain likely influencied by work and daily tasks ( and physical labor). Today tolerated core strengthening in standing and tolerated manual therapy well. Would benefit from continued PT to address strengthening and lifting education. (prior appointment)   Total Session Time   Timed Code Treatment Minutes 42   Total Treatment Time (sum of timed and untimed services) 42   Medicare Claim Information   Medical Diagnosis Acute left-sided low back pain without sciatica   PT Diagnosis (L) LBP w/(L) sciatica,   Start of Care Date 23   Onset date of current episode/exacerbation 23                                                                                  [facility]    Outpatient Physical Therapy Discharge Note  Patient: Ramu Espinoza  : 1960    Beginning/End Dates of Reporting Period:  23 to 3/20/23    Referring Provider: Humera Teresa  BURKE    Therapy Diagnosis: (L) LBP w/(L) sciatica,     Client Self Report: Is feeling looser, reports is having more pain today due to lifting some heavier stuff. Reports today is around a 3/10 pain level. Initial L sided pain Has been consistent with HEP and is performing full regular work schedule.    Objective Measurements:           Goals:  Goal Identifier HEP   Goal Description Patient will be independent with home exercise program and self management of symptoms in 8 weeks   Target Date     Date Met      Progress (detail required for progress note):       Goal Identifier transfers   Goal Description Patient will be able to rise from sitting without increased pain in 8 weeks.   Target Date     Date Met      Progress (detail required for progress note):       Goal Identifier lifting   Goal Description Patient will be able to lift for work without limitations in 10 weeks.   Target Date     Date Met      Progress (detail required for progress note):       Plan:  Discharge from therapy.    Discharge:    Reason for Discharge: Patient chooses to discontinue therapy.    Equipment Issued:     Discharge Plan: Patient to continue home program.

## 2023-03-31 NOTE — ADDENDUM NOTE
Encounter addended by: Dominique Jaramillo, PT on: 3/31/2023 9:47 AM   Actions taken: Episode resolved, Clinical Note Signed, Flowsheet accepted

## 2023-04-04 ENCOUNTER — TELEPHONE (OUTPATIENT)
Dept: CARDIOLOGY | Facility: CLINIC | Age: 63
End: 2023-04-04
Payer: COMMERCIAL

## 2023-04-04 NOTE — TELEPHONE ENCOUNTER
Incoming call from patient and request for return call as he has questions. Returned call and LVM for pt to return call. KUMAR,Rn

## 2023-04-07 NOTE — TELEPHONE ENCOUNTER
PC with patient. Still having episodes, not as concerning as previous.   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 Now [facility] is out of network. Our cardiology clinics are now out of network and patient is paying full price on everything. He has a new PCP, he is establishing care with through his [facility]. Encouraged the new PCP to make recommendation for a cardiologist. Pt will follow-up with that cardiologist, and then when he can re-enroll this fall, and new insurance next year, and [facility]. No further questions or concerns. Reports he has enough medications. Encouraged to call if needs anything further. Akanksha,Rn

## 2023-04-08 DIAGNOSIS — G47.00 INSOMNIA: ICD-10-CM

## 2023-04-09 RX ORDER — HYDROXYZINE HYDROCHLORIDE 25 MG/1
12.5 TABLET, FILM COATED ORAL AT BEDTIME
Qty: 90 TABLET | Refills: 1 | Status: SHIPPED | OUTPATIENT
Start: 2023-04-09 | End: 2024-03-01

## 2023-04-09 NOTE — TELEPHONE ENCOUNTER
"Last Written Prescription Date:  12/23/2022  Last Fill Quantity: 30,  # refills: 1   Last office visit provider:  3/1/2023     Requested Prescriptions   Pending Prescriptions Disp Refills     hydrOXYzine (ATARAX) 25 MG tablet [Pharmacy Med Name: HYDROXYZINE HCL 25 MG TABS 25 Tablet] 30 tablet 1     Sig: TAKE 0.5 TABLETS (12.5 MG) BY MOUTH AT BEDTIME       Antihistamines Protocol Passed - 4/9/2023 11:45 AM        Passed - Recent (12 mo) or future (30 days) visit within the authorizing provider's specialty     Patient has had an office visit with the authorizing provider or a provider within the authorizing providers department within the previous 12 mos or has a future within next 30 days. See \"Patient Info\" tab in inbasket, or \"Choose Columns\" in Meds & Orders section of the refill encounter.              Passed - Patient is age 3 or older     Apply age and/or weight-based dosing for peds patients age 3 and older.    Forward request to provider for patients under the age of 3.          Passed - Medication is active on med list             Nayla Fitch RN 04/09/23 11:46 AM  "

## 2023-05-02 ENCOUNTER — TELEPHONE (OUTPATIENT)
Dept: ONCOLOGY | Facility: HOSPITAL | Age: 63
End: 2023-05-02
Payer: COMMERCIAL

## 2023-05-02 NOTE — TELEPHONE ENCOUNTER
Spoke with patient regarding his insurance issue.  He did confirm he is not covered under [employer] and was billed 100% of the cost of his last visit here in March.  He would prefer to stay here instead of switching to [facility] and then back here in January.  I explained to him that the study will allow 2 virtual visits in between being seen in clinic.  He is willing to pay out of pocket to do that, but would want labs done at [facility], as they are covered there.  Patient would still need to come to the clinic to  study drug.      Per protocol visits are every 90 days +/- 15 days.  He would be due in July and October this year.  Message sent to JUNE Garcia and Dr. Garza regarding this. I will call him back once everything is worked out.        [employer]

## 2023-05-04 NOTE — TELEPHONE ENCOUNTER
Dr. Garza is ok with the following plan. L/M for patient to call back to discuss scheduling.    CHELLE [employer]

## 2023-05-05 NOTE — TELEPHONE ENCOUNTER
Patient returned call.  He will have labs done 6/26/23 at [facility] and video visit with either JUNE Garcia or Dr. Garza.  Will fax lab orders to [facility] and will call patient closer to date to arrange drug pickup.    [employer]HELLE [employer]

## 2023-05-08 DIAGNOSIS — C91.10 CHRONIC LYMPHOCYTIC LEUKEMIA (H): Primary | ICD-10-CM

## 2023-05-08 DIAGNOSIS — Z00.6 RESEARCH STUDY PATIENT: ICD-10-CM

## 2023-06-02 ENCOUNTER — HEALTH MAINTENANCE LETTER (OUTPATIENT)
Age: 63
End: 2023-06-02

## 2023-06-14 ENCOUNTER — DOCUMENTATION ONLY (OUTPATIENT)
Dept: ONCOLOGY | Facility: HOSPITAL | Age: 63
End: 2023-06-14
Payer: COMMERCIAL

## 2023-06-14 NOTE — PROGRESS NOTES
Patient has up coming appointment in a couple of weeks- he is having labs drawn at [facility] 6/26/23.  Lab orders for : CBC w/diff, CMP and LDH were faxed to lab [phone].  He will have virtual visit 6/28/23.    CHELLE [employer]

## 2023-06-15 NOTE — PROGRESS NOTES
I met with Clive in Cancer Care today.  States he is doing well.  He had been quite fatigued when I last saw him, but much better he stated.  No needs or new concerns addressed today.  He still has my contact sarina.   Ear Wedge Repair Text: A wedge excision was completed by carrying down an excision through the full thickness of the ear and cartilage with an inward facing Burow's triangle. The wound was then closed in a layered fashion.

## 2023-06-27 ENCOUNTER — MYC MEDICAL ADVICE (OUTPATIENT)
Dept: ONCOLOGY | Facility: HOSPITAL | Age: 63
End: 2023-06-27
Payer: COMMERCIAL

## 2023-06-28 ENCOUNTER — TELEPHONE (OUTPATIENT)
Dept: ONCOLOGY | Facility: HOSPITAL | Age: 63
End: 2023-06-28

## 2023-06-28 ENCOUNTER — VIRTUAL VISIT (OUTPATIENT)
Dept: ONCOLOGY | Facility: HOSPITAL | Age: 63
End: 2023-06-28
Attending: INTERNAL MEDICINE
Payer: COMMERCIAL

## 2023-06-28 DIAGNOSIS — C91.10 CHRONIC LYMPHOCYTIC LEUKEMIA (H): Primary | ICD-10-CM

## 2023-06-28 PROCEDURE — 99213 OFFICE O/P EST LOW 20 MIN: CPT | Mod: VID | Performed by: NURSE PRACTITIONER

## 2023-06-28 NOTE — PROGRESS NOTES
Virtual Visit Details    Type of service:  Video Visit     Originating Location (pt. Location): Home    Distant Location (provider location):  On-site  Platform used for Video Visit: Madhu       Unable to call patient prior to visit with provider. Medications and allergies were not reviewed.

## 2023-06-28 NOTE — PROGRESS NOTES
[facility] Hematology and Oncology Progress Note    Patient: Ramu Espinoza  MRN: [id number]  Date of Service: 2023          Reason for Visit    Chief Complaint   Patient presents with     Video Visit     Virtual return visit related to Chronic lymphocytic leukemia; Cy inducer Ibrutinib       Assessment and Plan     Cancer Staging   No matching staging information was found for the patient.     1.  CLL stage 4, diagnosed in 2012. 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 Great clinical response to Imbruvica. His lymph nodes have normalized. His CBC is normal today. Continue Imbruvica 140 mg- 3 pills daily. Return in 3 months with labs and to see Dr. Garza. Continues on clinical trial.      2. Paroxysmal A. Fib: could be from Imbruvica (<9% risk). on metoprolol. Continue to follow with Dr. Arana.      3.  Chronic fatigue: This is stable.  Likely from medication and aging. Encourage patient to be active.    ECOG Performance    0 - Independent    Distress Screening (within last 30 days)    No data recorded     Pain       Problem List    Patient Active Problem List   Diagnosis     Tinnitus     Generalized Anxiety Disorder     Dupuytren's Contracture     Insomnia     Chronic lymphocytic leukemia (H)  Cy inducer Ibrutinib     Internal Hemorrhoids With Bleeding     Pericardial effusion     Thyroid nodule     DAVID (obstructive sleep apnea)     REM sleep behavior disorder     Class 1 obesity due to excess calories with serious comorbidity in adult     Benign prostatic hyperplasia with lower urinary tract symptoms, symptom details unspecified     History of atrial fibrillation     Paroxysmal atrial fibrillation (H)        ______________________________________________________________________________    History of Present Illness    Ramu Espinoza is a very pleasant 62 y.o.male with a history of CLL diagnosed in 2012 presenting with elevated white count around 20 with a peripheral  blood smear showing atypical lymphocytosis suspicious for CLL.  He was completely asymptomatic, normal hemoglobin and platelet count.  His peripheral blood flow cytometry revealed CD5 co-expressing kappa light chains restricted B cells confirming the diagnosis of CLL.  He has been on observation.  His white count has been slowly going up.      In June 2014 his white count was over 100,000. So he had a CAT scan and that showed increasing lymphadenopathy and splenectomy. Platelet count has been going down.  We offered participation in clinical study in which patients are randomized between fludarabine and cyclophosphamide Rituxan or Imbruvica and Rituxan. He has been randomized to Imbruvica and rituximab arm. He started on October 2014. In November 2014 he got Rituxan for the first time. Tolerated well. Finished that. Now on Imbruvica alone. Tolerating it well.      In September 2019 he had pleuritic chest pain and was found to have a pericardial effusion with acute pericarditis.  He had a pericardiocentesis and they removed 350ml of serosanguinous fluid. He was discharged on ibuprofen as well as colchicine.  He had a pericardial effusion drained and held the Imbruvica for about 2 weeks.  The resumed and has been doing fine since.     He is feeling good.    Continues to have some chronic fatigue. Other than that he is feeling okay.  Continues to work.  No infectious complaints. No chest pain. No constitutional symptoms.     Review of Systems    Pertinent items are noted in HPI.    Past History    Past Medical History:   Diagnosis Date     Anxiety      BPH (benign prostatic hyperplasia)      Cancer (H)     CLL     CLL (chronic lymphocytic leukemia) (H)      Enlarged lymph nodes     Created by Conversion  Replacement Utility updated for latest IMO load     Irregular heart beat      Paroxysmal atrial fibrillation with rapid ventricular response (H)      Sleep apnea      Tinnitus        PHYSICAL EXAM  There were no vitals  taken for this visit.    GENERAL: no acute distress. Cooperative in conversation. Alone on video  RESP: Regular respiratory rate. No expiratory wheezes   NEURO: non focal. Alert and oriented x3.   PSYCH: within normal limits. No depression or anxiety.  SKIN: exposed skin is dry intact.       Lab Results    No results found for this or any previous visit (from the past 168 hour(s)).  Reviewed labs that were done at [facility].  His CMP is within normal limits.  CBC is also normal with a white blood cell count of 7.6, hemoglobin 14.4, platelets 244.  LDH is normal at 164.    Imaging    No results found.    Virtual Visit Details    Type of service:  Video Visit   Start time: 10:02am  End time: 10:15am    Originating Location (pt. Location): Home  Distant Location (provider location):  On-site  Platform used for Video Visit: [employer] [address] [employer]     Total time spent with patient in face to face time, chart review and documentation was 20 minutes.        Signed by: JUS Lester CNP

## 2023-07-28 NOTE — PROGRESS NOTES
[facility] Rehabilitation Daily Progress     Patient carrie: Ramu Espinoza  Date: 7/22/2021  Visit #: 6/12  Referral Diagnosis: R ankle pain  Referring provider: Ramu Jiménez MD  Visit Diagnosis:     ICD-10-CM    1. Chronic pain of right ankle  M25.571     G89.29    2. Arthritis of right ankle  M19.071        Assessment:     Today patient hasn't been seen in 2 weeks, returns for 5th follow up and maybe feeling a little better, but has been doing less work and has noticed a difference with his pain. Will be doing more  PT and patient discussed progress and that he might not be getting better as he continues to work 2 jobs.     Patient tolerated manual therapy today and left feeling better.     From Eval:  Patient is a 61 y.o. male that presents with signs and symptoms consistent with R medial>lateral ankle pain secondary to arthritic changes and ankle instability. Patient demonstrates impairments including decreased R>L ankle range of motion, joint mobility and flexibility, with TTP at medial ankle joint leading to impaired functional mobility. Patient's functional limitations include walking uphill or climbing stairs and working for longer periods of time.     Plan / Patient Education:     Plan for next visit: review HEP, standing balance and ankle strengthening, ankle isometrics, give old low back HEP, plantar fascia strengthening    Subjective:     Patient feeling maybe a little   Patient feeling that his ankle isn't any better, was hoping for more change like his low back had gotten better.   Patient is working 2 jobs, it really is impacting his ankle.     Functional limitations are described as occurring with:   ascending stairs or curbs  bending  performing routine daily activities  walking uphill, for longer periods of time    Objective:     Foot/Ankle ROM:                   Date: 6/4/2021       Ankle ROM( ) AROM in degrees AROM in degrees AROM in degrees     Right Left Right Left Right Left    Dorsiflexion, gastroc (10 ) 0 - severely limited             Dorsiflexion, soleus (20 )               Plantar Flexion (50 )               Inversion (45-60 ) Limited with P               Palpation: TTP at distal medial malleolus  LE Tissue Extensibility/Flexibility: decreased of HF, hamstrings, gluteals, gastrocs    Treatment Today       Manual Therapy:  PROM of R ankle in supine - DF>INV>EV>PF  With slight ankle distraction    Exercises:  Exercise #1: manual plantar fascia stretching - hold 2 min  Comment #1: standing gastroc and soleus stretch - hold 10 sec x 3  Exercise #2: seated gastroc stretch - hold 10 sec x 3  Comment #2: theraband ankle 4 way - yellow band x 10  Exercise #3: eccentric heel lift/lower - 10 reps  Comment #3: heel toe raises - 10-20        TREATMENT MINUTES COMMENTS   Evaluation     Self-care/ Home management     Manual therapy 13 PROM of R ankle in supine - DF>INV>EV>PF  With slight ankle distraction     Neuromuscular Re-education     Therapeutic Activity     Therapeutic Exercises 15 See above flowsheet  Old LBP HEP  Nustep 6 minutes   EDU on foot position     Reformer education of leg press, heel/toe raises and prancing   Gait training     Modality__________________                Total 28    Blank areas are intentional and mean the treatment did not include these items.       Kristin Muller, PT     [de-identified] : Faisal is a 57 year old male here for a consultation for Umbilical Hernia\par \par Today patient reports he had h/o hernia repair and prostate surgery in 2012 at [place].  In the last couple of years the bulge came back and gets rare discomfort.  The bulge is a quarter size and is located in his umbilicus.   It does not make any gurgling sound and pt is able to reduce the size.  BMs regular once daily.   No voiding issues.  \par \par

## 2023-08-23 DIAGNOSIS — C91.10 CHRONIC LYMPHOCYTIC LEUKEMIA (H): ICD-10-CM

## 2023-08-28 RX ORDER — ACYCLOVIR 400 MG/1
400 TABLET ORAL 2 TIMES DAILY
Qty: 180 TABLET | Refills: 3 | Status: SHIPPED | OUTPATIENT
Start: 2023-08-28 | End: 2024-03-20

## 2023-10-13 ENCOUNTER — TELEPHONE (OUTPATIENT)
Dept: ONCOLOGY | Facility: HOSPITAL | Age: 63
End: 2023-10-13
Payer: MEDICARE

## 2023-10-13 NOTE — TELEPHONE ENCOUNTER
"Clive called and left a message that he is having problems with afib and \"wants to get off this program\". When I called him back he identified himself using name and . He said he was seen by cardiology and had worn a cardiac monitor and it identified is rhythm as afib. He then saw a cardiac surgeon yesterday and the surgeon recommended that he stop the Ibrutinib as it is responsible for causing the afib. He stopped the drug the day before yesterday and he is anxious about what to do because his insurance changed and he has been getting his counts done at [facility] as that is where his coverage is and he has been having video visits with Radha. He can get on his wife's insurance on the  of the year and does not want to leave here but the costs are very high, all out of pocket, \"like a mortgage\". I asked him if he wanted to speak to our SW as she may be able to get him some Cub cards or gas cards and he responded, \"no, it's not that bad'. Finally, I told him I would send this message to Radha, \"she sees me more that Dr. Garza\" and  I will get back when I get a response. He thanked me for my help. AGUILA Lacey RN, OCN, CBCN  "

## 2023-10-16 ENCOUNTER — TELEPHONE (OUTPATIENT)
Dept: ONCOLOGY | Facility: HOSPITAL | Age: 63
End: 2023-10-16
Payer: MEDICARE

## 2023-10-16 NOTE — TELEPHONE ENCOUNTER
Patient returned call.  He would like to withdraw consent from study.  His last dose of Ibrutinib was Wednesday the 11th.  Patient reports that his A-fib has been much worse lately and happening daily.  He reports it being very uncomfortable.  At his consult with Cardiologist he was advised to stop taking the ibrutinib.      Per JUNE Garcia patient needs to have labs done and ok to follow up in clinic after the first of the year once his insurance issues are resolved.      Patient is agreeable to plan.  He will have labs done at 's [place] clinic. He will stop by clinic on 10/23 to meet with research and return remaining pills and diary.      [employer]HELLE [employer]

## 2023-10-16 NOTE — TELEPHONE ENCOUNTER
Patient left a message on Friday regarding follow up needed in clinic and wanted to discuss what is currently going on with him.    Called patients home number- no longer in service.    Called mobile number- no answer or VM setup.    Will try to reach again today.    CHELLE [employer]

## 2023-10-17 DIAGNOSIS — C91.10 CHRONIC LYMPHOCYTIC LEUKEMIA (H): Primary | ICD-10-CM

## 2023-10-25 ENCOUNTER — PATIENT OUTREACH (OUTPATIENT)
Dept: ONCOLOGY | Facility: HOSPITAL | Age: 63
End: 2023-10-25
Payer: MEDICARE

## 2023-10-25 NOTE — TELEPHONE ENCOUNTER
"[facility]: Cancer Care                                                                                      RN Cancer Care Coordinator was asked to see patient who arrived in waiting room with no appointment.  Received background information from Penny Mcfadden RNCC, that she and Dr. Garza have discussed this patient's care. He has lost insurance that covers MHF[facility], and is working to get back on with us in January. They will be reviewing his labs that were done yesterday. (Patient states he has seen the results and does not see anything out of normal.) Writer let him know that the plan is that he will be contacted again in January when insurance may be sorted out.     Pt states that he had series of experiences of irregular heart beating and racing. (See notes from Ly Martinez RN) Went to [facility], Edwin Yeh MD. ([phone], fax [phone]) This provider told him that the ibrutinib he has been taking for several years is implicated in the cardiac afib, and he needs to stop taking that. He did so on 10/11. He stopped taking acyclovir as well. He states that the afib immediately reduced significantly when he stopped taking the ibrutinib.    This last week (apprx. two weeks after stopping the ibrutinib) he started experiencing an extreme fatigue response following moderate activity. After raking leaves, or walking around the mall with his wife, etc., he suddenly feels extremely fatigued, tingly, foggy and lightheaded.During these episodes, his HR, O2, and B/P are all normal. He is able to eat, has no nausea, is slightly constipated but not other GI symptoms. Even sitting in the waiting room, he has this feeling of tingling arms and body, and worn out \"like I just ran a marathon\".    Today he relays the reason he has come today, the question he has for Dr. Garza and the team (Ly Garcia, and others) -- if this is what he can expect as his body adjusts to not being on " ibrutinib?     Writer let him know that I will pass this question on to the team, reminding him that Dr. Garza is not back in clinic until next week. Writer also encourages him to follow up again with the Dr. Yeh, the cardiologist. Writer explained I, personally, would be surprised if this was happening because of stopping a medication a full two weeks ago. He states that it had not occurred to him at all to check in with the heart doctor. He acknowledges that he is an anxious lita, and this may contribute to the feeling. Writer endorsed that his body is going through a lot of changes at this time, and encouraged him to reach out to Dr. Yeh. We also discussed in some detail an exercise for deep breathing, letting him know that this can effect his brain chemistry and body physiology.     He then received a call from another Doctor. Writer assured him I would pass along his concerns.     Signature:  Dominique Chahal RN

## 2023-11-01 ENCOUNTER — PATIENT OUTREACH (OUTPATIENT)
Dept: ONCOLOGY | Facility: HOSPITAL | Age: 63
End: 2023-11-01
Payer: MEDICARE

## 2023-11-01 NOTE — PROGRESS NOTES
[facility]: Cancer Care                                                                                          In following up on the progress note put in by Dominique Chahal on 10/25/2023, call placed to patient to give them an update.  Per Radha Alarcon CNP and Dr Garza, we agree that his symptoms are not withdrawal from the Imbruvica but may be something else.  We encouraged him to touch base with his cardiologist but also that his anxiety might be playing a role in this.  Patient verbalized understanding and states that he does have an increasing anxiety recently and has been started on sertraline.  He has been on it for about a week.  He has also been given lorazepam to use as needed but is very hesitant to use this as he knows it might be addictive.  I did let him know that it could take up to 6 weeks for the sertraline to take full effect so using a dose here and there of the lorazepam if he is feeling really overly anxious would likely be helpful.  I did let him know that it can make him drowsy and he should not drive after it.  He verbalized understanding and was very appreciative of the call.    I did introduce myself to him letting him know that the previous RN care coordinator has retired and I am taking her position.  I told him I plan to call him after the first of the year since I know his insurance is changing.  He welcomed the call and would be appreciative to get an appointment scheduled after the first of the year.  I let him know I have him on my radar and we will touch base in a couple of months.  He again verbalized understanding.    Signature:  Penny Mcfadden RN

## 2023-11-10 ENCOUNTER — TELEPHONE (OUTPATIENT)
Dept: ONCOLOGY | Facility: HOSPITAL | Age: 63
End: 2023-11-10
Payer: MEDICARE

## 2023-11-17 ENCOUNTER — PATIENT OUTREACH (OUTPATIENT)
Dept: ONCOLOGY | Facility: HOSPITAL | Age: 63
End: 2023-11-17
Payer: MEDICARE

## 2023-11-17 NOTE — PROGRESS NOTES
[facility]: Cancer Care                                                                                      RN Cancer Care Coordinator received phone call from patient. He explains that he will be getting his labs drawn at the [facility] to which he has transferred his primary care. He states he has a virtual visit with Dr. Garza on 12/20. He plans on having labs drawn on 12/18. He is requesting that the lab orders be faxed to [facility] to [phone].     He had a significant amount of stuttering and a halting manner of speech. Sounded anxious, trying to find the correct dates on his calendar, find this phone number. Additionally when writer gave this information to his RNCC, Penny Mcfadden, she already had this information - that he had left on triage line;.     Signature:  Dominique Chahal RN

## 2023-11-17 NOTE — PROGRESS NOTES
[facility]: Cancer Care                                                                                          Patient had called in today and spoke with a couple of people regarding wanting his lab orders faxed over to the [facility], Dr Blanco.  Patient is seeing Dr Greg hagen on 12/20 and will have his labs drawn at [facility] on 12/18.  These orders have been faxed over to them at [phone].  I will make sure that we can see these results prior to the appointment on 12/20.    I called and left the patient a detailed message letting him know that these orders have been faxed over there to call if for some reason they are not able to retrieve them.    Signature:  Penny Mcfadden RN

## 2023-12-14 ENCOUNTER — TELEPHONE (OUTPATIENT)
Dept: ONCOLOGY | Facility: HOSPITAL | Age: 63
End: 2023-12-14
Payer: MEDICARE

## 2023-12-14 ENCOUNTER — PATIENT OUTREACH (OUTPATIENT)
Dept: ONCOLOGY | Facility: HOSPITAL | Age: 63
End: 2023-12-14
Payer: MEDICARE

## 2023-12-14 NOTE — PROGRESS NOTES
[facility]: Cancer Care                                                                                          Patient called in and stated that [facility] not [facility] the lab orders that were sent last month.  I refaxed them today and let him know. He states that if the orders are not there he will need to cancel the appointment with Dr Garza.  His medical insurance plan got messed up and he does not have coverage for Dr Garza.  He wants to stay with Dr Garza but to try to get costs lower, he wants the labs done at the clinic he is covered at.  He will let us know.    Signature:  Penny Mcfadden RN

## 2023-12-14 NOTE — TELEPHONE ENCOUNTER
Patient called with questions regarding paperwork for the research study I had mailed to him.  All questions were answered.    He had a question about labs needed prior to his virtual appointment with Dr. Garza on 12/20.  He went to 's [place] lab today and they said they have no orders for a blood draw.  I told him I would send a message over to Penny reynolds: labs needed and orders.  He is planning on going to [facility] on Monday for labs- please fax orders over again.      CHELLE [employer]

## 2023-12-20 ENCOUNTER — VIRTUAL VISIT (OUTPATIENT)
Dept: ONCOLOGY | Facility: HOSPITAL | Age: 63
End: 2023-12-20
Attending: NURSE PRACTITIONER
Payer: COMMERCIAL

## 2023-12-20 VITALS — BODY MASS INDEX: 29.52 KG/M2 | WEIGHT: 230 LBS | HEIGHT: 74 IN

## 2023-12-20 DIAGNOSIS — C91.10 CHRONIC LYMPHOCYTIC LEUKEMIA (H): Primary | ICD-10-CM

## 2023-12-20 PROCEDURE — 99214 OFFICE O/P EST MOD 30 MIN: CPT | Mod: VID | Performed by: INTERNAL MEDICINE

## 2023-12-20 RX ORDER — MIRTAZAPINE 15 MG/1
15 TABLET, FILM COATED ORAL
COMMUNITY
Start: 2023-11-09 | End: 2024-11-08

## 2023-12-20 ASSESSMENT — PAIN SCALES - GENERAL: PAINLEVEL: NO PAIN (0)

## 2023-12-20 NOTE — PROGRESS NOTES
[facility] cancer Care Progress Note  Patient: Ramu Espinoza   MRN:  [id number]   Date of Service : Dec 20, 2023        Reason for visit      Problem List Items Addressed This Visit          Hematologic    Chronic lymphocytic leukemia (H)  Cy inducer Ibrutinib - Primary        Assessment     1.  A very pleasant 63 year old  gentleman with stage IV CLL diagnosed in 2012.  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  He has been treated with Imbruvica for over 10 years.  Seems to be tolerating it well.  Now off of it since 2023 secondary to atrial fibrillation..  2.  Recent episode of an anxiety attack..  3.  History of paroxysmal atrial fibrillation.  The episodes were getting quite frequent therefore discontinued Imbruvica.  The episodes have decreased in frequency tremendously.  4.  History of pericardial effusion in 2019.  Again no recurrence.  5.  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  6.  Fully COVID vaccinated.      Plan     Complicated moderately decision-making process.  The patient presenting with side effects of treatment.  Had to discontinue Imbruvica.  Reviewed Labs and interpreted the results independently, Reviewed imaging results and Reviewed Notes from other providers. Ordered tests and medications as indicated.      1.  At this time continue to stay off of Imbruvica.  We will monitor his CBC.  2.  Advised the patient to continue with good diet and exercise.  3.  Advised the patient to use his CPAP regularly every night.  4.  Continue with his primary care physician for other medication issues.  5.  Continue to moderate his alcohol and caffeine intake which she has already done he tells me.    Clinical stage      Stage IV    History     Ramu Espinoza is a very pleasant 63 year old  male with a history of CLL diagnosed in 2012 presenting with elevated white count in upper 10s/lower 20s with a peripheral blood smear showing atypical lymphocytosis  suspicious for CLL.  He was completely asymptomatic, normal hemoglobin and platelet count.  His peripheral blood flow cytometry revealed CD5 co-expressing kappa light chains restricted B cells confirming the diagnosis of CLL.  He has been on observation.  His white count has been slowly going up.    In June 2014 his white count was over 100,000.  So he had a CAT scan and that showed increasing lymphadenopathy and splenomegaly.  Platelet count has been going down.    I offered participation in clinical study in which patients are randomized between fludarabine and cyclophosphamide Rituxan or Imbruvica and Rituxan.  He has been randomized to Imbruvica and rituximab arm. He started on October 2014.  In November 2014 he got Rituxan for the first time.  Tolerated well. Finished that. Now on Imbruvica alone. Tolerating it well.     In May 2017 he was found to have paroxysmal afib when he complained of some fluttering sensation. He had holter monitor which led to the diagnosis. Most days he feels well.     In March 2019 he was found to have pericardial effusion when he started have some chest pain on deep inspiration. Had it tapped. Held Imbruvica for 3 weeks. Then resumed it.  Tolerated it fine. So continues it.    He continued his medication until October 2023.  On October 11, 2023 he discontinued it.  This was done because he was having increasing frequency of A-fib episodes.  He was seen by couple of cardiologist in the [facility] system.  They were recommend that he should consider discontinuing ibrutinib.    After discontinuing ibrutinib his A-fib episodes have significantly decreased.  He did have an episode of anxiety.  He is working with a therapist.    Past Medical History     Past Medical History:   Diagnosis Date    BPH (benign prostatic hyperplasia)     CLL (chronic lymphocytic leukemia) (H)     Lymphadenopathy     Created by Conversion  Replacement Utility updated for latest IMO load    Paroxysmal atrial  "fibrillation with rapid ventricular response (H)     Sleep apnea     hypertension, being slightly overweight, anemic, having reflux, anxiety, epididymitis, hyperlipidemia and tinnitus      Review of system      Details noted in the history of present illness.  A detailed review of systems is otherwise negative.      Physical exam        Ht 1.88 m (6' 2\")   Wt 104.3 kg (230 lb)   BMI 29.53 kg/m      GENERAL: no acute distress. Cooperative in conversation.   HEENT: Facial symmetry preserved.  Oral mucosa is moist and intact.  NECK: No visible thyromegaly.  No deformity.  RESP: Regular respiratory rate. No stridor.  No coughing.  EXTREMITIES: No visible upper extremity edema.   NEURO: non focal. Alert and oriented x3.  Cranial nerves II through XI appear intact clinically.  PSYCH: within normal limits.   SKIN: Facial skin appears warm dry intact         Lab results Reviewed      No results found for this or any previous visit (from the past 168 hour(s)).    Imaging results Reviewed        No results found.    Total time spent was over 31 minutes.  This includes chart prep time, review of clinical data including notes from outside physicians, labs, review of medical imaging, discussion about  plan of care, documentation and .  Video visit time was 21 minutes.    Joined the call at 12/20/2023, 9:01:04 am.  Left the call at 12/20/2023, 9:21:29 am.  You were on the call for 20 minutes 25 seconds .    Signed by: Dennis Garza MD      This note has been dictated using voice recognition software. Any grammatical or context distortions are unintentional and inherent to the software    "

## 2023-12-20 NOTE — NURSING NOTE
Is the patient currently in the state of [place]? YES    Visit mode:VIDEO    If the visit is dropped, the patient can be reconnected by: VIDEO VISIT: Send to e-mail at: [email or web address]    Will anyone else be joining the visit? NO  (If patient encounters technical issues they should call [phone]    How would you like to obtain your AVS? MyChart    Are changes needed to the allergy or medication list? No    Reason for visit: JAVIER ALANIZ

## 2023-12-20 NOTE — PROGRESS NOTES
Virtual Visit Details    Type of service:  Video Visit     Originating Location (pt. Location): Home    Distant Location (provider location):  On-site  Platform used for Video Visit: Madhu

## 2023-12-24 DIAGNOSIS — G47.00 INSOMNIA, UNSPECIFIED TYPE: ICD-10-CM

## 2023-12-27 RX ORDER — TRAZODONE HYDROCHLORIDE 50 MG/1
50 TABLET, FILM COATED ORAL AT BEDTIME
Qty: 90 TABLET | Refills: 3 | Status: SHIPPED | OUTPATIENT
Start: 2023-12-27

## 2024-01-30 ENCOUNTER — TELEPHONE (OUTPATIENT)
Dept: ONCOLOGY | Facility: HOSPITAL | Age: 64
End: 2024-01-30
Payer: MEDICARE

## 2024-03-01 DIAGNOSIS — G47.00 INSOMNIA: ICD-10-CM

## 2024-03-01 RX ORDER — HYDROXYZINE HYDROCHLORIDE 25 MG/1
TABLET, FILM COATED ORAL
Qty: 45 TABLET | Refills: 0 | Status: SHIPPED | OUTPATIENT
Start: 2024-03-01

## 2024-03-19 ENCOUNTER — PATIENT OUTREACH (OUTPATIENT)
Dept: ONCOLOGY | Facility: HOSPITAL | Age: 64
End: 2024-03-19
Payer: MEDICARE

## 2024-03-19 ENCOUNTER — TELEPHONE (OUTPATIENT)
Dept: ONCOLOGY | Facility: HOSPITAL | Age: 64
End: 2024-03-19
Payer: MEDICARE

## 2024-03-19 NOTE — PROGRESS NOTES
[facility]: Cancer Care                                                                                          Patient is scheduled for his labs at the [facility] on 3/20 at 8:00 AM.  He called back and scheduled his video visit with Radha Alarcon CNP tomorrow at 11:00 AM.  I will make sure that we have lab results prior to the visit or push the visit back a little bit in the day.    Signature:  Penny Mcfadden RN

## 2024-03-19 NOTE — PROGRESS NOTES
[facility]: Cancer Care                                                                                          Patient had called in yesterday and spoke with our  staff regarding lab orders and canceling his appointment for tomorrow with Radha Alarcon CNP since he has not had his labs done.  I had faxed lab orders directly to the [facility] lab at [phone] back in November 2023 as well as December 2023.  When I called that clinic today, they see no orders in the system.  I refaxed the orders to the main clinic line at [phone] and asked them to reach out to the patient to get this scheduled.  I then received a phone call from CHELLE Anderson with that clinic stating she just spoke with the patient.  I did let her know that I had originally faxed these lab orders to the lab at  and nobody has reached out to the patient to schedule.  She is going to watch to make sure that this gets scheduled and let me know by the end of the day.    Patient has called our clinic very concerned that these lab orders have not been faxed.  We explained what we were doing and what we have done and to give us a little bit of time to work on this and he should be hearing from someone soon.  We then need to reschedule his NP appointment.    Signature:  Penny Mcfadden RN

## 2024-03-20 ENCOUNTER — VIRTUAL VISIT (OUTPATIENT)
Dept: ONCOLOGY | Facility: HOSPITAL | Age: 64
End: 2024-03-20
Attending: NURSE PRACTITIONER
Payer: COMMERCIAL

## 2024-03-20 VITALS — WEIGHT: 230 LBS | HEIGHT: 74 IN | BODY MASS INDEX: 29.52 KG/M2

## 2024-03-20 DIAGNOSIS — C91.10 CHRONIC LYMPHOCYTIC LEUKEMIA (H): Primary | ICD-10-CM

## 2024-03-20 PROCEDURE — G2211 COMPLEX E/M VISIT ADD ON: HCPCS | Mod: 95 | Performed by: NURSE PRACTITIONER

## 2024-03-20 PROCEDURE — 99213 OFFICE O/P EST LOW 20 MIN: CPT | Mod: 95 | Performed by: NURSE PRACTITIONER

## 2024-03-20 RX ORDER — LACTOBACILLUS ACIDOPHILUS/PECT 30 MG-20MG
TABLET ORAL
COMMUNITY
End: 2024-07-17

## 2024-03-20 RX ORDER — UBIDECARENONE 75 MG
100 CAPSULE ORAL DAILY
COMMUNITY
End: 2024-07-17

## 2024-03-20 ASSESSMENT — PAIN SCALES - GENERAL: PAINLEVEL: NO PAIN (0)

## 2024-03-20 NOTE — PROGRESS NOTES
[facility] Hematology and Oncology Progress Note    Patient: Ramu Espinoza  MRN: [id number]  Date of Service: Mar 20, 2024          Reason for Visit    Chief Complaint   Patient presents with    RECHECK       Assessment and Plan     Cancer Staging   No matching staging information was found for the patient.       1.  CLL stage 4, diagnosed in 2012. 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  She had had treatment on clinical study with Rituxan and Imbruvica.  Great clinical response to Imbruvica. His lymph nodes have normalized. His CBC normalized.  Patient was starting to have worsening A-fib issues so in 2023 he elected to go off the study and off the Imbruvica.  He is now off that since that time and doing well.  His CBC is completely normal today.  Patient will return in 3 to 4 months with repeat labs.  Encourage him to call with any constitutional symptoms or worsening issues.     2. Paroxysmal A. Fib: could be from Imbruvica (<9% risk). on metoprolol.  Patient has been seeing a cardiologist through [facility].  It was recommended to stop the Imbruvica due to the worsening A-fib.  He will continue to follow with them.  I believe he is seeing Dr. Pato Gonzalez     ECOG Performance    0 - Independent    Distress Screening (within last 30 days)    No data recorded     Pain  Pain Score: No Pain (0)    Problem List    Patient Active Problem List   Diagnosis    Tinnitus    Generalized Anxiety Disorder    Dupuytren's Contracture    Insomnia    Chronic lymphocytic leukemia (H)  Cy inducer Ibrutinib    Internal Hemorrhoids With Bleeding    Pericardial effusion    Thyroid nodule    DAVID (obstructive sleep apnea)    REM sleep behavior disorder    Class 1 obesity due to excess calories with serious comorbidity in adult    Benign prostatic hyperplasia with lower urinary tract symptoms, symptom details unspecified    History of atrial fibrillation    Paroxysmal atrial fibrillation (H)         ______________________________________________________________________________    History of Present Illness    Oncologist: Dr. Garza    Diagnosis: CLL.  This was diagnosed in February 2012 when he presented with elevated white blood cell count around 20.  He had a blood smear that showed atypical lymphocytosis suspicious for CLL.  Patient had a normal hemoglobin and platelet count at that time.  His peripheral blood flow cytometry revealed CD5 coexpressing kappa light chain restricted B cells confirming the diagnosis of CLL.    Treatment: Patient initially was on observation   -June 2014 his white blood cell count was over 100,000, and starting to have thrombocytopenia.  He had a CT scan that showed increasing lymphadenopathy  -October 2014: Patient was offered participation in a clinical study.  He decided to do that and started on Rituxan and Imbruvica.  Was randomized against fludarabine, Cytoxan and Rituxan.  He completed his Rituxan and then was on Imbruvica as a single agent until October 2023.  Patient stopped his Imbruvica at that time due to worsening A-fib.    Interim history: Patient is scheduled today for 3-month follow-up visit and to do labs.  He is now been off of the Imbruvica for about 5 months.  He says initially when he went off he states it was a rough transition.  He had significant abdominal pain cramping and diarrhea but happened to be diagnosed with diverticulitis at the same time.  He had lost initially about 20 pounds from that but has gained about 10 pounds back and is feeling much better after he had treatment for that.  He feels better being off the Imbruvica.  He is a little anxious about his CLL progressing but overall he is feeling pretty good.  Thinking about retiring in the next couple of months.  He is due to be setting up for Medicare soon so he is hoping to be able to come back to the clinic more regularly.  Patient denies any constitutional symptoms.        Review of  "Systems    Pertinent items are noted in HPI.    Past History    Past Medical History:   Diagnosis Date    Anxiety     BPH (benign prostatic hyperplasia)     Cancer (H)     CLL    CLL (chronic lymphocytic leukemia) (H)     Enlarged lymph nodes     Created by Conversion  Replacement Utility updated for latest IMO load    Irregular heart beat     Paroxysmal atrial fibrillation with rapid ventricular response (H)     Sleep apnea     Tinnitus        PHYSICAL EXAM  Ht 1.88 m (6' 2\")   Wt 104.3 kg (230 lb)   BMI 29.53 kg/m      GENERAL: no acute distress. Cooperative in conversation. Alone on video  RESP: Regular respiratory rate. No expiratory wheezes   NEURO: non focal. Alert and oriented x3.   PSYCH: within normal limits. No depression or anxiety.  SKIN: exposed skin is dry intact.       Lab Results    No results found for this or any previous visit (from the past 168 hour(s)).  Reviewed labs done at [facility] this morning at 815.  White blood cell count normal at 5.9, hemoglobin normal at 14.3, platelets normal at 214.  CMP and LDH still pending.    Imaging    No results found.    Virtual Visit Details    Type of service:  Video Visit   Start time: 11:04am  End time: 11:12am    Originating Location (pt. Location): Home  Distant Location (provider location):  On-site  Platform used for Video Visit: Madhu       The longitudinal plan of care for the diagnosis(es)/condition(s) as documented were addressed during this visit. Due to the added complexity in care, I will continue to support Clive in the subsequent management and with ongoing continuity of care.      Signed by: JUS Lester CNP  "

## 2024-03-20 NOTE — PROGRESS NOTES
"Virtual Visit Details    Type of service:  Video Visit     Originating Location (pt. Location): {video visit patient location:003512::\"Home\"}  {PROVIDER LOCATION On-site should be selected for visits conducted from your clinic location or adjoining [facility] hospital, academic office, or other nearby [facility] building. Off-site should be selected for all other provider locations, including home:115560}  Distant Location (provider location):  {virtual location provider:468564}  Platform used for Video Visit: {Virtual Visit Platforms:542561::\"Sparktrend\"}  "

## 2024-03-20 NOTE — NURSING NOTE
Is the patient currently in the state of [place]? YES    Visit mode:VIDEO    If the visit is dropped, the patient can be reconnected by: VIDEO VISIT: Send to e-mail at: [email or web address]    Will anyone else be joining the visit? NO  (If patient encounters technical issues they should call [phone])    How would you like to obtain your AVS? MyChart    Are changes needed to the allergy or medication list? No, Pt stated no changes to allergies, and Pt stated no med changes    Reason for visit: JAVIER COURTNEYF

## 2024-03-21 NOTE — PROGRESS NOTES
[facility]: Cancer Care                                                                                          Will make sure that patient has his labs drawn prior to Dr Garza visit on 7/17.  Lab orders have been faxed to the main clinic fax number at [phone] as well as the lab directly at [phone].  I have asked that they call the patient to schedule a lab appointment for 7/15/2024 in preparation for his 7/17 follow-up with Dr Garza.    Signature:  Penny Mcfadden RN

## 2024-06-04 DIAGNOSIS — G47.00 INSOMNIA: ICD-10-CM

## 2024-06-04 RX ORDER — HYDROXYZINE HYDROCHLORIDE 25 MG/1
TABLET, FILM COATED ORAL
Qty: 45 TABLET | Refills: 0 | OUTPATIENT
Start: 2024-06-04

## 2024-06-30 ENCOUNTER — HEALTH MAINTENANCE LETTER (OUTPATIENT)
Age: 64
End: 2024-06-30

## 2024-07-16 ENCOUNTER — PATIENT OUTREACH (OUTPATIENT)
Dept: ONCOLOGY | Facility: HOSPITAL | Age: 64
End: 2024-07-16
Payer: MEDICARE

## 2024-07-16 NOTE — PROGRESS NOTES
[facility]: Cancer Care                                                                                            Situation: Patient chart reviewed by care coordinator.    Background: Patient with a diagnosis of the CLL, stage IV, diagnosed in February 2012.  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    Patient with an additional diagnosis of Paroxysmal atrial fibrillation.    Assessment: Patient is due in the clinic tomorrow to see Radha Alarcon CNP in 4-month follow-up.  There are notes in his chart that indicate that he will have labs done at his primary care facility on 7/15.  I do not see that this has happened.  Call placed to patient today to further discuss.  He tells me that he is now on Medicare and will have the labs drawn here in our clinic at the time of his appointment.    Plan/Recommendations: Lab appointment added prior to his appointment with Radha.  He has been made aware to arrive at 1045 for an 11:00 lab.    Signature:  Penny Mcfadden RN

## 2024-07-17 ENCOUNTER — LAB (OUTPATIENT)
Dept: INFUSION THERAPY | Facility: HOSPITAL | Age: 64
End: 2024-07-17
Attending: INTERNAL MEDICINE
Payer: COMMERCIAL

## 2024-07-17 ENCOUNTER — ONCOLOGY VISIT (OUTPATIENT)
Dept: ONCOLOGY | Facility: HOSPITAL | Age: 64
End: 2024-07-17
Attending: INTERNAL MEDICINE
Payer: COMMERCIAL

## 2024-07-17 VITALS
WEIGHT: 249.8 LBS | BODY MASS INDEX: 32.06 KG/M2 | TEMPERATURE: 98 F | RESPIRATION RATE: 16 BRPM | HEIGHT: 74 IN | SYSTOLIC BLOOD PRESSURE: 117 MMHG | OXYGEN SATURATION: 94 % | HEART RATE: 60 BPM | DIASTOLIC BLOOD PRESSURE: 63 MMHG

## 2024-07-17 DIAGNOSIS — C91.10 CHRONIC LYMPHOCYTIC LEUKEMIA (H): ICD-10-CM

## 2024-07-17 DIAGNOSIS — C91.10 CHRONIC LYMPHOCYTIC LEUKEMIA (H): Primary | ICD-10-CM

## 2024-07-17 LAB
ALBUMIN SERPL BCG-MCNC: 4.2 G/DL (ref 3.5–5.2)
ALP SERPL-CCNC: 88 U/L (ref 40–150)
ALT SERPL W P-5'-P-CCNC: 18 U/L (ref 0–70)
ANION GAP SERPL CALCULATED.3IONS-SCNC: 10 MMOL/L (ref 7–15)
AST SERPL W P-5'-P-CCNC: 19 U/L (ref 0–45)
BASOPHILS # BLD AUTO: 0.1 10E3/UL (ref 0–0.2)
BASOPHILS NFR BLD AUTO: 0 %
BILIRUB SERPL-MCNC: 0.5 MG/DL
BUN SERPL-MCNC: 22.3 MG/DL (ref 8–23)
CALCIUM SERPL-MCNC: 8.9 MG/DL (ref 8.8–10.4)
CHLORIDE SERPL-SCNC: 106 MMOL/L (ref 98–107)
CREAT SERPL-MCNC: 0.86 MG/DL (ref 0.67–1.17)
EGFRCR SERPLBLD CKD-EPI 2021: >90 ML/MIN/1.73M2
EOSINOPHIL # BLD AUTO: 0.1 10E3/UL (ref 0–0.7)
EOSINOPHIL NFR BLD AUTO: 0 %
ERYTHROCYTE [DISTWIDTH] IN BLOOD BY AUTOMATED COUNT: 14.1 % (ref 10–15)
GLUCOSE SERPL-MCNC: 100 MG/DL (ref 70–99)
HCO3 SERPL-SCNC: 25 MMOL/L (ref 22–29)
HCT VFR BLD AUTO: 37.3 % (ref 40–53)
HGB BLD-MCNC: 12.3 G/DL (ref 13.3–17.7)
IMM GRANULOCYTES # BLD: 0.3 10E3/UL
IMM GRANULOCYTES NFR BLD: 1 %
LDH SERPL L TO P-CCNC: 181 U/L (ref 0–250)
LYMPHOCYTES # BLD AUTO: 20.2 10E3/UL (ref 0.8–5.3)
LYMPHOCYTES NFR BLD AUTO: 86 %
MCH RBC QN AUTO: 30.8 PG (ref 26.5–33)
MCHC RBC AUTO-ENTMCNC: 33 G/DL (ref 31.5–36.5)
MCV RBC AUTO: 93 FL (ref 78–100)
MONOCYTES # BLD AUTO: 0.3 10E3/UL (ref 0–1.3)
MONOCYTES NFR BLD AUTO: 1 %
NEUTROPHILS # BLD AUTO: 2.7 10E3/UL (ref 1.6–8.3)
NEUTROPHILS NFR BLD AUTO: 11 %
NRBC # BLD AUTO: 0.1 10E3/UL
NRBC BLD AUTO-RTO: 0 /100
PLAT MORPH BLD: NORMAL
PLATELET # BLD AUTO: 148 10E3/UL (ref 150–450)
POTASSIUM SERPL-SCNC: 4.5 MMOL/L (ref 3.4–5.3)
PROT SERPL-MCNC: 6.3 G/DL (ref 6.4–8.3)
RBC # BLD AUTO: 4 10E6/UL (ref 4.4–5.9)
RBC MORPH BLD: NORMAL
SODIUM SERPL-SCNC: 141 MMOL/L (ref 135–145)
WBC # BLD AUTO: 23.6 10E3/UL (ref 4–11)

## 2024-07-17 PROCEDURE — 36415 COLL VENOUS BLD VENIPUNCTURE: CPT

## 2024-07-17 PROCEDURE — 99214 OFFICE O/P EST MOD 30 MIN: CPT | Performed by: NURSE PRACTITIONER

## 2024-07-17 PROCEDURE — G2211 COMPLEX E/M VISIT ADD ON: HCPCS | Performed by: NURSE PRACTITIONER

## 2024-07-17 PROCEDURE — 83615 LACTATE (LD) (LDH) ENZYME: CPT

## 2024-07-17 PROCEDURE — 85025 COMPLETE CBC W/AUTO DIFF WBC: CPT

## 2024-07-17 PROCEDURE — 80053 COMPREHEN METABOLIC PANEL: CPT

## 2024-07-17 ASSESSMENT — PAIN SCALES - GENERAL: PAINLEVEL: NO PAIN (0)

## 2024-07-17 NOTE — PROGRESS NOTES
"Oncology Rooming Note    July 17, 2024 11:18 AM   Ramu Espinoza is a 64 year old male who presents for:    Chief Complaint   Patient presents with    Oncology Clinic Visit     Return visit 4 months with lab. Chronic lymphocytic leukemia.     Initial Vitals: /63 (BP Location: Left arm, Patient Position: Sitting, Cuff Size: Adult Large)   Pulse 60   Temp 98  F (36.7  C) (Oral)   Resp 16   Ht 1.88 m (6' 2\")   Wt 113.3 kg (249 lb 12.8 oz)   SpO2 94%   BMI 32.07 kg/m   Estimated body mass index is 32.07 kg/m  as calculated from the following:    Height as of this encounter: 1.88 m (6' 2\").    Weight as of this encounter: 113.3 kg (249 lb 12.8 oz). Body surface area is 2.43 meters squared.  No Pain (0) Comment: Data Unavailable   No LMP for male patient.  Allergies reviewed: Yes  Medications reviewed: Yes    Medications: Medication refills not needed today.  Pharmacy name entered into [employer]: [facility] - [place], [place] - [address] [place] [address]    Frailty Screening:   Is the patient here for a new oncology consult visit in cancer care? 2. No      Clinical concerns: none       Marylu Warner CMA            "

## 2024-07-17 NOTE — PROGRESS NOTES
[facility] Hematology and Oncology Progress Note    Patient: Ramu Espinoza  MRN: [id number]  Date of Service: Jul 17, 2024          Reason for Visit    Chief Complaint   Patient presents with    Oncology Clinic Visit     Return visit 4 months with lab. Chronic lymphocytic leukemia.       Assessment and Plan     Cancer Staging   No matching staging information was found for the patient.       1.  CLL stage 4, diagnosed in February 2012. 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  he had had treatment on clinical study with Rituxan and Imbruvica.  Great clinical response to Imbruvica. His lymph nodes have normalized. His CBC normalized.  Patient was starting to have worsening A-fib issues so in October 2023 he elected to go off the study and off the Imbruvica.  Overall patient has been doing well since then but now we are noticing some changes in his blood work.  His white blood cell count is starting to go up.  Hemoglobin is starting to go down and platelets are starting to go down.  I talked at length today to patient about the indications for treatment.  I did tell him that if he starts having constitutional symptoms, anemia worse than 10, platelets under 100 we will think about doing treatment.  Likely will not go back to Imbruvica due to a side effect so we will likely use either for an oral agent like Zanubrutinib or Obinutuzumab and venetoclax.     2. Paroxysmal A. Fib: could be from Imbruvica (<9% risk). on metoprolol.  Patient has been seeing a cardiologist through [facility].  It was recommended to stop the Imbruvica due to the worsening A-fib.  He will continue to follow with them.  I believe he is seeing Dr. Pato Gonzalez     ECOG Performance    0 - Independent    Distress Screening (within last 30 days)    No data recorded     Pain  Pain Score: No Pain (0)    Problem List    Patient Active Problem List   Diagnosis    Tinnitus    Generalized Anxiety Disorder    Dupuytren's Contracture     Insomnia    Chronic lymphocytic leukemia (H)  Cy inducer Ibrutinib    Internal Hemorrhoids With Bleeding    Pericardial effusion    Thyroid nodule    DAVID (obstructive sleep apnea)    REM sleep behavior disorder    Class 1 obesity due to excess calories with serious comorbidity in adult    Benign prostatic hyperplasia with lower urinary tract symptoms, symptom details unspecified    History of atrial fibrillation    Paroxysmal atrial fibrillation (H)        ______________________________________________________________________________    History of Present Illness    Oncologist: Dr. Garza    Diagnosis: CLL.  This was diagnosed in 2012 when he presented with elevated white blood cell count around 20.  He had a blood smear that showed atypical lymphocytosis suspicious for CLL.  Patient had a normal hemoglobin and platelet count at that time.  His peripheral blood flow cytometry revealed CD5 coexpressing kappa light chain restricted B cells confirming the diagnosis of CLL.    Treatment: Patient initially was on observation   -2014 his white blood cell count was over 100,000, and starting to have thrombocytopenia.  He had a CT scan that showed increasing lymphadenopathy  -2014: Patient was offered participation in a clinical study.  He decided to do that and started on Rituxan and Imbruvica.  Was randomized against fludarabine, Cytoxan and Rituxan.  He completed his Rituxan and then was on Imbruvica as a single agent until 2023.  Patient stopped his Imbruvica at that time due to worsening A-fib.    Interim history: Patient is scheduled today for 3-month follow-up visit and to do labs.  Overall he states that he is feeling okay.  The only new thing he is noticing is a little bit worsening of his chronic fatigue.  He is really trying to work out much more.  He retired about a month ago and is trying to do more walking and elliptical.  He states when he does do the workout he just feels  "more wiped out and tired.  Mild dyspnea on exertion.  Not severe.  He denies any new bone or back pain.  Denies any weight loss.  Denies any fevers or chills.  Denies any night sweats.  Has not noticed any lymphadenopathy.        Review of Systems    Pertinent items are noted in HPI.    Past History    Past Medical History:   Diagnosis Date    Anxiety     BPH (benign prostatic hyperplasia)     Cancer (H)     CLL    CLL (chronic lymphocytic leukemia) (H)     Enlarged lymph nodes     Created by Conversion  Replacement Utility updated for latest IMO load    Irregular heart beat     Paroxysmal atrial fibrillation with rapid ventricular response (H)     Sleep apnea     Tinnitus        PHYSICAL EXAM:  /63 (BP Location: Left arm, Patient Position: Sitting, Cuff Size: Adult Large)   Pulse 60   Temp 98  F (36.7  C) (Oral)   Resp 16   Ht 1.88 m (6' 2\")   Wt 113.3 kg (249 lb 12.8 oz)   SpO2 94%   BMI 32.07 kg/m    GENERAL: no acute distress. Cooperative in conversation. Here alone  HEENT: pupils are equal, round and reactive. Oromucosa is clean and intact. No ulcerations or mucositis noted. No bleeding noted.  RESP: lungs are clear bilaterally per auscultation. Regular respiratory rate. No wheezes or rhonchi.  CV: Regular, rate and rhythm. No murmurs.  ABD: soft, nontender.   MUSCULOSKELETAL: No lower extremity swelling.   NEURO: non focal. Alert and oriented x3.   PSYCH: within normal limits. No depression or anxiety.  SKIN: warm dry intact   LYMPH: no cervical, supraclavicular or axillary lymphadenopathy          Lab Results    Recent Results (from the past 168 hour(s))   Comprehensive metabolic panel   Result Value Ref Range    Sodium 141 135 - 145 mmol/L    Potassium 4.5 3.4 - 5.3 mmol/L    Carbon Dioxide (CO2) 25 22 - 29 mmol/L    Anion Gap 10 7 - 15 mmol/L    Urea Nitrogen 22.3 8.0 - 23.0 mg/dL    Creatinine 0.86 0.67 - 1.17 mg/dL    GFR Estimate >90 >60 mL/min/1.73m2    Calcium 8.9 8.8 - 10.4 mg/dL    " Chloride 106 98 - 107 mmol/L    Glucose 100 (H) 70 - 99 mg/dL    Alkaline Phosphatase 88 40 - 150 U/L    AST 19 0 - 45 U/L    ALT 18 0 - 70 U/L    Protein Total 6.3 (L) 6.4 - 8.3 g/dL    Albumin 4.2 3.5 - 5.2 g/dL    Bilirubin Total 0.5 <=1.2 mg/dL   Lactate Dehydrogenase   Result Value Ref Range    Lactate Dehydrogenase 181 0 - 250 U/L   CBC with platelets and differential   Result Value Ref Range    WBC Count 23.6 (H) 4.0 - 11.0 10e3/uL    RBC Count 4.00 (L) 4.40 - 5.90 10e6/uL    Hemoglobin 12.3 (L) 13.3 - 17.7 g/dL    Hematocrit 37.3 (L) 40.0 - 53.0 %    MCV 93 78 - 100 fL    MCH 30.8 26.5 - 33.0 pg    MCHC 33.0 31.5 - 36.5 g/dL    RDW 14.1 10.0 - 15.0 %    Platelet Count 148 (L) 150 - 450 10e3/uL    % Neutrophils 11 %    % Lymphocytes 86 %    % Monocytes 1 %    % Eosinophils 0 %    % Basophils 0 %    % Immature Granulocytes 1 %    NRBCs per 100 WBC 0 <1 /100    Absolute Neutrophils 2.7 1.6 - 8.3 10e3/uL    Absolute Lymphocytes 20.2 (H) 0.8 - 5.3 10e3/uL    Absolute Monocytes 0.3 0.0 - 1.3 10e3/uL    Absolute Eosinophils 0.1 0.0 - 0.7 10e3/uL    Absolute Basophils 0.1 0.0 - 0.2 10e3/uL    Absolute Immature Granulocytes 0.3 <=0.4 10e3/uL    Absolute NRBCs 0.1 10e3/uL   RBC and Platelet Morphology   Result Value Ref Range    RBC Morphology Confirmed RBC Indices     Platelet Assessment  Automated Count Confirmed. Platelet morphology is normal.     Automated Count Confirmed. Platelet morphology is normal.     Reviewed labs done at [facility] in March.  His white blood cell count was normal at 5.9, hemoglobin normal at 14.3 and platelets normal at 214.    Imaging    No results found.          The longitudinal plan of care for the diagnosis(es)/condition(s) as documented were addressed during this visit. Due to the added complexity in care, I will continue to support Clive in the subsequent management and with ongoing continuity of care.      Signed by: JUS Lester CNP

## 2024-07-17 NOTE — PATIENT INSTRUCTIONS
Unexplained weight loss, fevers, night sweats.     Painful swollen lymph nodes.     Lab on 07/17/2024   Component Date Value Ref Range Status    Sodium 07/17/2024 141  135 - 145 mmol/L Final    Potassium 07/17/2024 4.5  3.4 - 5.3 mmol/L Final    Carbon Dioxide (CO2) 07/17/2024 25  22 - 29 mmol/L Final    Anion Gap 07/17/2024 10  7 - 15 mmol/L Final    Urea Nitrogen 07/17/2024 22.3  8.0 - 23.0 mg/dL Final    Creatinine 07/17/2024 0.86  0.67 - 1.17 mg/dL Final    GFR Estimate 07/17/2024 >90  >60 mL/min/1.73m2 Final    eGFR calculated using 2021 CKD-EPI equation.    Calcium 07/17/2024 8.9  8.8 - 10.4 mg/dL Final    Reference intervals for this test were updated on 7/16/2024 to reflect our healthy population more accurately. There may be differences in the flagging of prior results with similar values performed with this method. Those prior results can be interpreted in the context of the updated reference intervals.    Chloride 07/17/2024 106  98 - 107 mmol/L Final    Glucose 07/17/2024 100 (H)  70 - 99 mg/dL Final    Alkaline Phosphatase 07/17/2024 88  40 - 150 U/L Final    AST 07/17/2024 19  0 - 45 U/L Final    ALT 07/17/2024 18  0 - 70 U/L Final    Protein Total 07/17/2024 6.3 (L)  6.4 - 8.3 g/dL Final    Albumin 07/17/2024 4.2  3.5 - 5.2 g/dL Final    Bilirubin Total 07/17/2024 0.5  <=1.2 mg/dL Final    Lactate Dehydrogenase 07/17/2024 181  0 - 250 U/L Final    WBC Count 07/17/2024 23.6 (H)  4.0 - 11.0 10e3/uL Preliminary    Preliminary ANC is 2.70    RBC Count 07/17/2024 4.00 (L)  4.40 - 5.90 10e6/uL Preliminary    Hemoglobin 07/17/2024 12.3 (L)  13.3 - 17.7 g/dL Preliminary    Hematocrit 07/17/2024 37.3 (L)  40.0 - 53.0 % Preliminary    MCV 07/17/2024 93  78 - 100 fL Preliminary    MCH 07/17/2024 30.8  26.5 - 33.0 pg Preliminary    MCHC 07/17/2024 33.0  31.5 - 36.5 g/dL Preliminary    RDW 07/17/2024 14.1  10.0 - 15.0 % Preliminary    Platelet Count 07/17/2024 148 (L)  150 - 450 10e3/uL Preliminary    NRBCs per  100 WBC 07/17/2024 0  <1 /100 Preliminary    Absolute NRBCs 07/17/2024 0.1  10e3/uL Preliminary   ]

## 2024-07-17 NOTE — LETTER
"7/17/2024      Ramu Espinoza  [address] [place] [address]      Dear Colleague,    Thank you for referring your patient, Ramu Espinoza, to the [facility] CANCER [facility]. Please see a copy of my visit note below.    Oncology Rooming Note    July 17, 2024 11:18 AM   Ramu Espinoza is a 64 year old male who presents for:    Chief Complaint   Patient presents with     Oncology Clinic Visit     Return visit 4 months with lab. Chronic lymphocytic leukemia.     Initial Vitals: /63 (BP Location: Left arm, Patient Position: Sitting, Cuff Size: Adult Large)   Pulse 60   Temp 98  F (36.7  C) (Oral)   Resp 16   Ht 1.88 m (6' 2\")   Wt 113.3 kg (249 lb 12.8 oz)   SpO2 94%   BMI 32.07 kg/m   Estimated body mass index is 32.07 kg/m  as calculated from the following:    Height as of this encounter: 1.88 m (6' 2\").    Weight as of this encounter: 113.3 kg (249 lb 12.8 oz). Body surface area is 2.43 meters squared.  No Pain (0) Comment: Data Unavailable   No LMP for male patient.  Allergies reviewed: Yes  Medications reviewed: Yes    Medications: Medication refills not needed today.  Pharmacy name entered into [facility]: [facility] - [place], [place] - [address]    Frailty Screening:   Is the patient here for a new oncology consult visit in cancer care? 2. No      Clinical concerns: none       Marylu Warner, [facility] Hematology and Oncology Progress Note    Patient: Ramu Espinoza  MRN: [id number]  Date of Service: Jul 17, 2024          Reason for Visit    Chief Complaint   Patient presents with     Oncology Clinic Visit     Return visit 4 months with lab. Chronic lymphocytic leukemia.       Assessment and Plan     Cancer Staging   No matching staging information was found for the patient.       1.  CLL stage 4, diagnosed in February 2012. 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  he had had treatment on clinical study with Rituxan and " Imbruvica.  Great clinical response to Imbruvica. His lymph nodes have normalized. His CBC normalized.  Patient was starting to have worsening A-fib issues so in 2023 he elected to go off the study and off the Imbruvica.  Overall patient has been doing well since then but now we are noticing some changes in his blood work.  His white blood cell count is starting to go up.  Hemoglobin is starting to go down and platelets are starting to go down.  I talked at length today to patient about the indications for treatment.  I did tell him that if he starts having constitutional symptoms, anemia worse than 10, platelets under 100 we will think about doing treatment.  Likely will not go back to Imbruvica due to a side effect so we will likely use either for an oral agent like Zanubrutinib or Obinutuzumab and venetoclax.     2. Paroxysmal A. Fib: could be from Imbruvica (<9% risk). on metoprolol.  Patient has been seeing a cardiologist through [facility].  It was recommended to stop the Imbruvica due to the worsening A-fib.  He will continue to follow with them.  I believe he is seeing Dr. Pato Gonzalez     ECOG Performance    0 - Independent    Distress Screening (within last 30 days)    No data recorded     Pain  Pain Score: No Pain (0)    Problem List    Patient Active Problem List   Diagnosis     Tinnitus     Generalized Anxiety Disorder     Dupuytren's Contracture     Insomnia     Chronic lymphocytic leukemia (H)  Cy inducer Ibrutinib     Internal Hemorrhoids With Bleeding     Pericardial effusion     Thyroid nodule     DAVID (obstructive sleep apnea)     REM sleep behavior disorder     Class 1 obesity due to excess calories with serious comorbidity in adult     Benign prostatic hyperplasia with lower urinary tract symptoms, symptom details unspecified     History of atrial fibrillation     Paroxysmal atrial fibrillation (H)         ______________________________________________________________________________    History of Present Illness    Oncologist: Dr. Garza    Diagnosis: CLL.  This was diagnosed in February 2012 when he presented with elevated white blood cell count around 20.  He had a blood smear that showed atypical lymphocytosis suspicious for CLL.  Patient had a normal hemoglobin and platelet count at that time.  His peripheral blood flow cytometry revealed CD5 coexpressing kappa light chain restricted B cells confirming the diagnosis of CLL.    Treatment: Patient initially was on observation   -June 2014 his white blood cell count was over 100,000, and starting to have thrombocytopenia.  He had a CT scan that showed increasing lymphadenopathy  -October 2014: Patient was offered participation in a clinical study.  He decided to do that and started on Rituxan and Imbruvica.  Was randomized against fludarabine, Cytoxan and Rituxan.  He completed his Rituxan and then was on Imbruvica as a single agent until October 2023.  Patient stopped his Imbruvica at that time due to worsening A-fib.    Interim history: Patient is scheduled today for 3-month follow-up visit and to do labs.  Overall he states that he is feeling okay.  The only new thing he is noticing is a little bit worsening of his chronic fatigue.  He is really trying to work out much more.  He retired about a month ago and is trying to do more walking and elliptical.  He states when he does do the workout he just feels more wiped out and tired.  Mild dyspnea on exertion.  Not severe.  He denies any new bone or back pain.  Denies any weight loss.  Denies any fevers or chills.  Denies any night sweats.  Has not noticed any lymphadenopathy.        Review of Systems    Pertinent items are noted in HPI.    Past History    Past Medical History:   Diagnosis Date     Anxiety      BPH (benign prostatic hyperplasia)      Cancer (H)     CLL     CLL (chronic lymphocytic leukemia) (H)   "    Enlarged lymph nodes     Created by Conversion  Replacement Utility updated for latest IMO load     Irregular heart beat      Paroxysmal atrial fibrillation with rapid ventricular response (H)      Sleep apnea      Tinnitus        PHYSICAL EXAM:  /63 (BP Location: Left arm, Patient Position: Sitting, Cuff Size: Adult Large)   Pulse 60   Temp 98  F (36.7  C) (Oral)   Resp 16   Ht 1.88 m (6' 2\")   Wt 113.3 kg (249 lb 12.8 oz)   SpO2 94%   BMI 32.07 kg/m    GENERAL: no acute distress. Cooperative in conversation. Here alone  HEENT: pupils are equal, round and reactive. Oromucosa is clean and intact. No ulcerations or mucositis noted. No bleeding noted.  RESP: lungs are clear bilaterally per auscultation. Regular respiratory rate. No wheezes or rhonchi.  CV: Regular, rate and rhythm. No murmurs.  ABD: soft, nontender.   MUSCULOSKELETAL: No lower extremity swelling.   NEURO: non focal. Alert and oriented x3.   PSYCH: within normal limits. No depression or anxiety.  SKIN: warm dry intact   LYMPH: no cervical, supraclavicular or axillary lymphadenopathy          Lab Results    Recent Results (from the past 168 hour(s))   Comprehensive metabolic panel   Result Value Ref Range    Sodium 141 135 - 145 mmol/L    Potassium 4.5 3.4 - 5.3 mmol/L    Carbon Dioxide (CO2) 25 22 - 29 mmol/L    Anion Gap 10 7 - 15 mmol/L    Urea Nitrogen 22.3 8.0 - 23.0 mg/dL    Creatinine 0.86 0.67 - 1.17 mg/dL    GFR Estimate >90 >60 mL/min/1.73m2    Calcium 8.9 8.8 - 10.4 mg/dL    Chloride 106 98 - 107 mmol/L    Glucose 100 (H) 70 - 99 mg/dL    Alkaline Phosphatase 88 40 - 150 U/L    AST 19 0 - 45 U/L    ALT 18 0 - 70 U/L    Protein Total 6.3 (L) 6.4 - 8.3 g/dL    Albumin 4.2 3.5 - 5.2 g/dL    Bilirubin Total 0.5 <=1.2 mg/dL   Lactate Dehydrogenase   Result Value Ref Range    Lactate Dehydrogenase 181 0 - 250 U/L   CBC with platelets and differential   Result Value Ref Range    WBC Count 23.6 (H) 4.0 - 11.0 10e3/uL    RBC Count " 4.00 (L) 4.40 - 5.90 10e6/uL    Hemoglobin 12.3 (L) 13.3 - 17.7 g/dL    Hematocrit 37.3 (L) 40.0 - 53.0 %    MCV 93 78 - 100 fL    MCH 30.8 26.5 - 33.0 pg    MCHC 33.0 31.5 - 36.5 g/dL    RDW 14.1 10.0 - 15.0 %    Platelet Count 148 (L) 150 - 450 10e3/uL    % Neutrophils 11 %    % Lymphocytes 86 %    % Monocytes 1 %    % Eosinophils 0 %    % Basophils 0 %    % Immature Granulocytes 1 %    NRBCs per 100 WBC 0 <1 /100    Absolute Neutrophils 2.7 1.6 - 8.3 10e3/uL    Absolute Lymphocytes 20.2 (H) 0.8 - 5.3 10e3/uL    Absolute Monocytes 0.3 0.0 - 1.3 10e3/uL    Absolute Eosinophils 0.1 0.0 - 0.7 10e3/uL    Absolute Basophils 0.1 0.0 - 0.2 10e3/uL    Absolute Immature Granulocytes 0.3 <=0.4 10e3/uL    Absolute NRBCs 0.1 10e3/uL   RBC and Platelet Morphology   Result Value Ref Range    RBC Morphology Confirmed RBC Indices     Platelet Assessment  Automated Count Confirmed. Platelet morphology is normal.     Automated Count Confirmed. Platelet morphology is normal.     Reviewed labs done at [facility] in March.  His white blood cell count was normal at 5.9, hemoglobin normal at 14.3 and platelets normal at 214.    Imaging    No results found.          The longitudinal plan of care for the diagnosis(es)/condition(s) as documented were addressed during this visit. Due to the added complexity in care, I will continue to support Clive in the subsequent management and with ongoing continuity of care.      Signed by: JUS Lester CNP      Again, thank you for allowing me to participate in the care of your patient.        Sincerely,        JUS Lester CNP

## 2024-09-30 ENCOUNTER — LAB REQUISITION (OUTPATIENT)
Dept: LAB | Facility: CLINIC | Age: 64
End: 2024-09-30
Payer: MEDICARE

## 2024-09-30 DIAGNOSIS — D48.5 NEOPLASM OF UNCERTAIN BEHAVIOR OF SKIN: ICD-10-CM

## 2024-09-30 PROCEDURE — 88341 IMHCHEM/IMCYTCHM EA ADD ANTB: CPT | Mod: TC,ORL | Performed by: FAMILY MEDICINE

## 2024-10-09 LAB
PATH REPORT.COMMENTS IMP SPEC: NORMAL
PATH REPORT.FINAL DX SPEC: NORMAL
PATH REPORT.GROSS SPEC: NORMAL
PATH REPORT.MICROSCOPIC SPEC OTHER STN: NORMAL
PATH REPORT.RELEVANT HX SPEC: NORMAL
PHOTO IMAGE: NORMAL

## 2024-10-09 PROCEDURE — 88341 IMHCHEM/IMCYTCHM EA ADD ANTB: CPT | Mod: 26 | Performed by: PATHOLOGY

## 2024-10-09 PROCEDURE — 88342 IMHCHEM/IMCYTCHM 1ST ANTB: CPT | Mod: 26 | Performed by: PATHOLOGY

## 2024-10-09 PROCEDURE — 88305 TISSUE EXAM BY PATHOLOGIST: CPT | Mod: 26 | Performed by: PATHOLOGY

## 2024-10-24 ENCOUNTER — LAB (OUTPATIENT)
Dept: INFUSION THERAPY | Facility: HOSPITAL | Age: 64
End: 2024-10-24
Attending: INTERNAL MEDICINE
Payer: MEDICARE

## 2024-10-24 ENCOUNTER — ONCOLOGY VISIT (OUTPATIENT)
Dept: ONCOLOGY | Facility: HOSPITAL | Age: 64
End: 2024-10-24
Attending: INTERNAL MEDICINE
Payer: MEDICARE

## 2024-10-24 VITALS
DIASTOLIC BLOOD PRESSURE: 71 MMHG | OXYGEN SATURATION: 96 % | BODY MASS INDEX: 32.39 KG/M2 | SYSTOLIC BLOOD PRESSURE: 132 MMHG | TEMPERATURE: 97.7 F | RESPIRATION RATE: 16 BRPM | WEIGHT: 252.3 LBS | HEART RATE: 69 BPM

## 2024-10-24 DIAGNOSIS — C91.10 CHRONIC LYMPHOCYTIC LEUKEMIA (H): ICD-10-CM

## 2024-10-24 DIAGNOSIS — C91.10 CHRONIC LYMPHOCYTIC LEUKEMIA (H): Primary | ICD-10-CM

## 2024-10-24 LAB
ALBUMIN SERPL BCG-MCNC: 4.3 G/DL (ref 3.5–5.2)
ALP SERPL-CCNC: 104 U/L (ref 40–150)
ALT SERPL W P-5'-P-CCNC: 27 U/L (ref 0–70)
ANION GAP SERPL CALCULATED.3IONS-SCNC: 12 MMOL/L (ref 7–15)
AST SERPL W P-5'-P-CCNC: 22 U/L (ref 0–45)
BILIRUB SERPL-MCNC: 0.4 MG/DL
BUN SERPL-MCNC: 19 MG/DL (ref 8–23)
CALCIUM SERPL-MCNC: 9.2 MG/DL (ref 8.8–10.4)
CHLORIDE SERPL-SCNC: 103 MMOL/L (ref 98–107)
CREAT SERPL-MCNC: 0.97 MG/DL (ref 0.67–1.17)
EGFRCR SERPLBLD CKD-EPI 2021: 86 ML/MIN/1.73M2
GLUCOSE SERPL-MCNC: 119 MG/DL (ref 70–99)
HCO3 SERPL-SCNC: 24 MMOL/L (ref 22–29)
LDH SERPL L TO P-CCNC: 202 U/L (ref 0–250)
POTASSIUM SERPL-SCNC: 4.4 MMOL/L (ref 3.4–5.3)
PROT SERPL-MCNC: 6.5 G/DL (ref 6.4–8.3)
SODIUM SERPL-SCNC: 139 MMOL/L (ref 135–145)

## 2024-10-24 PROCEDURE — 80053 COMPREHEN METABOLIC PANEL: CPT

## 2024-10-24 PROCEDURE — 99215 OFFICE O/P EST HI 40 MIN: CPT | Performed by: INTERNAL MEDICINE

## 2024-10-24 PROCEDURE — 85027 COMPLETE CBC AUTOMATED: CPT

## 2024-10-24 PROCEDURE — G2211 COMPLEX E/M VISIT ADD ON: HCPCS | Performed by: INTERNAL MEDICINE

## 2024-10-24 PROCEDURE — 85007 BL SMEAR W/DIFF WBC COUNT: CPT

## 2024-10-24 PROCEDURE — 36415 COLL VENOUS BLD VENIPUNCTURE: CPT

## 2024-10-24 PROCEDURE — G0463 HOSPITAL OUTPT CLINIC VISIT: HCPCS | Performed by: INTERNAL MEDICINE

## 2024-10-24 PROCEDURE — 83615 LACTATE (LD) (LDH) ENZYME: CPT

## 2024-10-24 ASSESSMENT — PAIN SCALES - GENERAL: PAINLEVEL_OUTOF10: NO PAIN (0)

## 2024-10-24 NOTE — PROGRESS NOTES
[facility] cancer Care Progress Note  Patient: Ramu Espinoza   MRN:  [id number]   Date of Service : Oct 24, 2024        Reason for visit      Problem List Items Addressed This Visit          Hematologic    Chronic lymphocytic leukemia (H)  Cy inducer Ibrutinib - Primary        Assessment     1.  A very pleasant 64 year old  gentleman with stage IV CLL diagnosed in 2012.  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  He has been treated with Imbruvica for over 10 years.   Now off of it since 2023 secondary to atrial fibrillation.  Labs indicate rising white blood cell count which is not unexpected in his situation.  Typically after Imbruvica stopped most patients have relapse of CLL.  2.  Significant anxiety after discontinuing medication currently on  Small doses of trazodone etc.  3.  History of paroxysmal atrial fibrillation.  The episodes were getting quite frequent therefore discontinued Imbruvica.  The episodes have decreased in frequency tremendously.  4.  History of pericardial effusion in 2019.  Again no recurrence.  5.  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  6.  Fully COVID vaccinated.      Plan     Rising WBC.Reviewed notes from each unique source.  Reviewed each unique test.    Ordered tests.    Independently interpreted lab tests and radiological exams performed by other physicians.  Personally reviewed the images.      1.  At this time recommend that we need to start some treatment.  We did discuss 2 options.  1 option is a BTK inhibitor like acalabrutinib.  Since he had a very good response to ibrutinib it is conceivable that he will have a good response to acalabrutinib as well.  The disadvantages of these drugs are taken indefinitely.  The other option is to consider a different class of agents using rituximab plus venetoclax.  That treatment is a 2-year duration treatment and has a finite end date for treatment.  My preference would be since he has already  been treated with the PTK inhibitor to use a different class of agent which also has very little cardiac side effect.  2.  Advised the patient to continue with good diet and exercise.  3.  Advised the patient to use his CPAP regularly every night.  4.  Continue with his primary care physician for other medication issues.  5.  Clive is going to talk to his wife and let us know what he wants to do.  Once he decides we will start getting the preapproval for the treatment and figure out a way to get him the medications.  6.The longitudinal plan of care for the diagnosis(es)/condition(s) as documented were addressed during this visit. Due to the added complexity in care, I will continue to support Clive in the subsequent management and with ongoing continuity of care.     Clinical stage      Stage IV    History     Ramu Espinoza is a very pleasant 64 year old  male with a history of CLL diagnosed in 02/2012 presenting with elevated white count in upper 10s/lower 20s with a peripheral blood smear showing atypical lymphocytosis suspicious for CLL.  He was completely asymptomatic, normal hemoglobin and platelet count.  His peripheral blood flow cytometry revealed CD5 co-expressing kappa light chains restricted B cells confirming the diagnosis of CLL.  He has been on observation.  His white count has been slowly going up.    In June 2014 his white count was over 100,000.  So he had a CAT scan and that showed increasing lymphadenopathy and splenomegaly.  Platelet count has been going down.    I offered participation in clinical study in which patients are randomized between fludarabine and cyclophosphamide Rituxan or Imbruvica and Rituxan.  He has been randomized to Imbruvica and rituximab arm. He started on October 2014.  In November 2014 he got Rituxan for the first time.  Tolerated well. Finished that. Now on Imbruvica alone. Tolerating it well.     In May 2017 he was found to have paroxysmal afib when he complained of some  fluttering sensation. He had holter monitor which led to the diagnosis. Most days he feels well.     In March 2019 he was found to have pericardial effusion when he started have some chest pain on deep inspiration. Had it tapped. Held Imbruvica for 3 weeks. Then resumed it.  Tolerated it fine. So continues it.    He continued his medication until October 2023.  On October 11, 2023 he discontinued it.  This was done because he was having increasing frequency of A-fib episodes.  He was seen by couple of cardiologist in the [facility] system.  They were recommend that he should consider discontinuing ibrutinib.    After discontinuing ibrutinib his A-fib episodes have significantly decreased.  He did have an episode of anxiety.  He has been on some medication to help with the anxiety.    We have been following him for his CLL.  In July 2024 when he came his white count was starting to go up.  Comes in today.  Having no B symptoms.  Overall feeling well.  Trying to manage his anxiety and trying to go off of the medications.    Past Medical History     Past Medical History:   Diagnosis Date    BPH (benign prostatic hyperplasia)     CLL (chronic lymphocytic leukemia) (H)     Lymphadenopathy     Created by Conversion  Replacement Utility updated for latest IMO load    Paroxysmal atrial fibrillation with rapid ventricular response (H)     Sleep apnea     hypertension, being slightly overweight, anemic, having reflux, anxiety, epididymitis, hyperlipidemia and tinnitus      Review of system      Details noted in the history of present illness.  A detailed review of systems is otherwise negative.      Physical exam        /71 (BP Location: Right arm, Patient Position: Sitting, Cuff Size: Adult Large)   Pulse 69   Temp 97.7  F (36.5  C) (Oral)   Resp 16   Wt 114.4 kg (252 lb 4.8 oz)   SpO2 96%   BMI 32.39 kg/m      GENERAL: No acute distress. Cooperative in conversation.   HEENT:  Pupils are equal, round and  reactive. Oral mucosa is clean and intact. No ulcerations or mucositis noted. No bleeding noted.  RESP:Chest symmetric lungs are clear bilaterally per auscultation. Regular respiratory rate. No wheezes or rhonchi.  CV: Normal S1 S2 Regular, rate and rhythm.     ABD: Nondistended, soft, nontender. Positive bowel sounds. No organomegaly.   EXTREMITIES: No lower extremity edema.   NEURO: Non- focal. Alert and oriented x3.  Cranial nerves appear intact.  PSYCH: Within normal limits. No depression or anxiety.  SKIN: Warm dry intact.      Lab results Reviewed      Recent Results (from the past week)   Comprehensive metabolic panel   Result Value Ref Range    Sodium 139 135 - 145 mmol/L    Potassium 4.4 3.4 - 5.3 mmol/L    Carbon Dioxide (CO2) 24 22 - 29 mmol/L    Anion Gap 12 7 - 15 mmol/L    Urea Nitrogen 19.0 8.0 - 23.0 mg/dL    Creatinine 0.97 0.67 - 1.17 mg/dL    GFR Estimate 86 >60 mL/min/1.73m2    Calcium 9.2 8.8 - 10.4 mg/dL    Chloride 103 98 - 107 mmol/L    Glucose 119 (H) 70 - 99 mg/dL    Alkaline Phosphatase 104 40 - 150 U/L    AST 22 0 - 45 U/L    ALT 27 0 - 70 U/L    Protein Total 6.5 6.4 - 8.3 g/dL    Albumin 4.3 3.5 - 5.2 g/dL    Bilirubin Total 0.4 <=1.2 mg/dL   Lactate Dehydrogenase   Result Value Ref Range    Lactate Dehydrogenase 202 0 - 250 U/L   CBC with platelets and differential   Result Value Ref Range    WBC Count 130.0 (HH) 4.0 - 11.0 10e3/uL    RBC Count 3.84 (L) 4.40 - 5.90 10e6/uL    Hemoglobin 11.4 (L) 13.3 - 17.7 g/dL    Hematocrit 38.7 (L) 40.0 - 53.0 %     (H) 78 - 100 fL    MCH 29.7 26.5 - 33.0 pg    MCHC 29.5 (L) 31.5 - 36.5 g/dL    RDW 15.8 (H) 10.0 - 15.0 %    Platelet Count 118 (L) 150 - 450 10e3/uL   Manual Differential   Result Value Ref Range    % Neutrophils 6 %    % Lymphocytes 91 %    % Monocytes 4 %    % Eosinophils 0 %    % Basophils 0 %    % Myelocytes 1 %    Absolute Neutrophils 7.2 1.6 - 8.3 10e3/uL    Absolute Lymphocytes 117.7 (H) 0.8 - 5.3 10e3/uL    Absolute  Monocytes 4.6 (H) 0.0 - 1.3 10e3/uL    Absolute Eosinophils 0.0 0.0 - 0.7 10e3/uL    Absolute Basophils 0.0 0.0 - 0.2 10e3/uL    Absolute Myelocytes 0.7 (H) <=0.0 10e3/uL    RBC Morphology Confirmed RBC Indices     Platelet Assessment (A) Automated Count Confirmed. Platelet morphology is normal.     Automated Count Confirmed. Giant platelets are present.    Smudge Cells Present (A) None Seen    Giant Platelets Slight (A) None Seen    Pathologist Review Comments (Blood) Agree. Quinton Michaels M.D. 10/25/24 10:09        Imaging results Reviewed        No results found.    Total time spent was over 42 minutes.  This includes chart prep time, review of clinical data including notes from outside physicians, labs, review of medical imaging, discussion about  plan of care, documentation and .     Signed by: Dennis Garza MD      This note has been dictated using voice recognition software. Any grammatical or context distortions are unintentional and inherent to the software

## 2024-10-24 NOTE — PROGRESS NOTES
"Oncology Rooming Note    October 24, 2024 2:33 PM   Ramu Espinoza is a 64 year old male who presents for:    Chief Complaint   Patient presents with    Oncology Clinic Visit     Return visit 3 months with lab. Chronic lymphocytic leukemia.     Initial Vitals: /71 (BP Location: Right arm, Patient Position: Sitting, Cuff Size: Adult Large)   Pulse 69   Temp 97.7  F (36.5  C) (Oral)   Resp 16   Wt 114.4 kg (252 lb 4.8 oz)   SpO2 96%   BMI 32.39 kg/m   Estimated body mass index is 32.39 kg/m  as calculated from the following:    Height as of 7/17/24: 1.88 m (6' 2\").    Weight as of this encounter: 114.4 kg (252 lb 4.8 oz). Body surface area is 2.44 meters squared.  No Pain (0) Comment: Data Unavailable   No LMP for male patient.  Allergies reviewed: Yes  Medications reviewed: Yes    Medications: Medication refills not needed today.  Pharmacy name entered into [facility]: [id number] [place], [place] - [address]    Frailty Screening:   Is the patient here for a new oncology consult visit in cancer care? 2. No      Clinical concerns: none       Marylu Warner CMA            "

## 2024-10-24 NOTE — PROGRESS NOTES
DATE/TIME OF CALL RECEIVED FROM LAB:  10/24/24 at 2:06 PM   LAB TEST:  CBC/diff  LAB VALUE:    PROVIDER NOTIFIED?: Yes  PROVIDER NAME: Dr. Garza and clinic RNCCs  DATE/TIME LAB VALUE REPORTED TO PROVIDER: 10/24/24 at 2:06 PM   MECHANISM OF PROVIDER NOTIFICATION: Face-To-Face and teams (clinic RNCC)  PROVIDER RESPONSE: Dr. Garza will see the patient in the clinic as scheduled to review results and discuss a plan of care.  Sima Oliver RN on 10/24/2024 at 2:10 PM

## 2024-10-24 NOTE — LETTER
"10/24/2024      Ramu Espinoza  [address]  [place] [address]      Dear Colleague,    Thank you for referring your patient, Ramu Espinoza, to the [facility] CANCER [facility]. Please see a copy of my visit note below.    Oncology Rooming Note    2024 2:33 PM   Ramu Espinoza is a 64 year old male who presents for:    Chief Complaint   Patient presents with     Oncology Clinic Visit     Return visit 3 months with lab. Chronic lymphocytic leukemia.     Initial Vitals: /71 (BP Location: Right arm, Patient Position: Sitting, Cuff Size: Adult Large)   Pulse 69   Temp 97.7  F (36.5  C) (Oral)   Resp 16   Wt 114.4 kg (252 lb 4.8 oz)   SpO2 96%   BMI 32.39 kg/m   Estimated body mass index is 32.39 kg/m  as calculated from the following:    Height as of 24: 1.88 m (6' 2\").    Weight as of this encounter: 114.4 kg (252 lb 4.8 oz). Body surface area is 2.44 meters squared.  No Pain (0) Comment: Data Unavailable   No LMP for male patient.  Allergies reviewed: Yes  Medications reviewed: Yes    Medications: Medication refills not needed today.  Pharmacy name entered into [email or web address]: [id number] [place], [place] - [address]    Frailty Screening:   Is the patient here for a new oncology consult visit in cancer care? 2. No      Clinical concerns: none       Marylu Warner, [facility] cancer Care Progress Note  Patient: Ramu Espinoza   MRN:  [id number]   Date of Service : Oct 24, 2024        Reason for visit      Problem List Items Addressed This Visit          Hematologic    Chronic lymphocytic leukemia (H)  Cy inducer Ibrutinib - Primary        Assessment     1.  A very pleasant 64 year old  gentleman with stage IV CLL diagnosed in 2012.  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  He has been treated with Imbruvica for over 10 years.   Now off of it since 2023 secondary to atrial fibrillation.  Labs indicate rising " white blood cell count which is not unexpected in his situation.  Typically after Imbruvica stopped most patients have relapse of CLL.  2.  Significant anxiety after discontinuing medication currently on  Small doses of trazodone etc.  3.  History of paroxysmal atrial fibrillation.  The episodes were getting quite frequent therefore discontinued Imbruvica.  The episodes have decreased in frequency tremendously.  4.  History of pericardial effusion in 2019.  Again no recurrence.  5.  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  6.  Fully COVID vaccinated.      Plan     Rising WBC.Reviewed notes from each unique source.  Reviewed each unique test.    Ordered tests.    Independently interpreted lab tests and radiological exams performed by other physicians.  Personally reviewed the images.      1.  At this time recommend that we need to start some treatment.  We did discuss 2 options.  1 option is a BTK inhibitor like acalabrutinib.  Since he had a very good response to ibrutinib it is conceivable that he will have a good response to acalabrutinib as well.  The disadvantages of these drugs are taken indefinitely.  The other option is to consider a different class of agents using rituximab plus venetoclax.  That treatment is a 2-year duration treatment and has a finite end date for treatment.  My preference would be since he has already been treated with the PTK inhibitor to use a different class of agent which also has very little cardiac side effect.  2.  Advised the patient to continue with good diet and exercise.  3.  Advised the patient to use his CPAP regularly every night.  4.  Continue with his primary care physician for other medication issues.  5.  Clive is going to talk to his wife and let us know what he wants to do.  Once he decides we will start getting the preapproval for the treatment and figure out a way to get him the medications.  6.The longitudinal plan of care for the  diagnosis(es)/condition(s) as documented were addressed during this visit. Due to the added complexity in care, I will continue to support Clive in the subsequent management and with ongoing continuity of care.     Clinical stage      Stage IV    History     Ramu Espinoza is a very pleasant 64 year old  male with a history of CLL diagnosed in 02/2012 presenting with elevated white count in upper 10s/lower 20s with a peripheral blood smear showing atypical lymphocytosis suspicious for CLL.  He was completely asymptomatic, normal hemoglobin and platelet count.  His peripheral blood flow cytometry revealed CD5 co-expressing kappa light chains restricted B cells confirming the diagnosis of CLL.  He has been on observation.  His white count has been slowly going up.    In June 2014 his white count was over 100,000.  So he had a CAT scan and that showed increasing lymphadenopathy and splenomegaly.  Platelet count has been going down.    I offered participation in clinical study in which patients are randomized between fludarabine and cyclophosphamide Rituxan or Imbruvica and Rituxan.  He has been randomized to Imbruvica and rituximab arm. He started on October 2014.  In November 2014 he got Rituxan for the first time.  Tolerated well. Finished that. Now on Imbruvica alone. Tolerating it well.     In May 2017 he was found to have paroxysmal afib when he complained of some fluttering sensation. He had holter monitor which led to the diagnosis. Most days he feels well.     In March 2019 he was found to have pericardial effusion when he started have some chest pain on deep inspiration. Had it tapped. Held Imbruvica for 3 weeks. Then resumed it.  Tolerated it fine. So continues it.    He continued his medication until October 2023.  On October 11, 2023 he discontinued it.  This was done because he was having increasing frequency of A-fib episodes.  He was seen by couple of cardiologist in the [facility] system.  They were  recommend that he should consider discontinuing ibrutinib.    After discontinuing ibrutinib his A-fib episodes have significantly decreased.  He did have an episode of anxiety.  He has been on some medication to help with the anxiety.    We have been following him for his CLL.  In July 2024 when he came his white count was starting to go up.  Comes in today.  Having no B symptoms.  Overall feeling well.  Trying to manage his anxiety and trying to go off of the medications.    Past Medical History     Past Medical History:   Diagnosis Date     BPH (benign prostatic hyperplasia)      CLL (chronic lymphocytic leukemia) (H)      Lymphadenopathy     Created by Conversion  Replacement Utility updated for latest IMO load     Paroxysmal atrial fibrillation with rapid ventricular response (H)      Sleep apnea     hypertension, being slightly overweight, anemic, having reflux, anxiety, epididymitis, hyperlipidemia and tinnitus      Review of system      Details noted in the history of present illness.  A detailed review of systems is otherwise negative.      Physical exam        /71 (BP Location: Right arm, Patient Position: Sitting, Cuff Size: Adult Large)   Pulse 69   Temp 97.7  F (36.5  C) (Oral)   Resp 16   Wt 114.4 kg (252 lb 4.8 oz)   SpO2 96%   BMI 32.39 kg/m      GENERAL: No acute distress. Cooperative in conversation.   HEENT:  Pupils are equal, round and reactive. Oral mucosa is clean and intact. No ulcerations or mucositis noted. No bleeding noted.  RESP:Chest symmetric lungs are clear bilaterally per auscultation. Regular respiratory rate. No wheezes or rhonchi.  CV: Normal S1 S2 Regular, rate and rhythm.     ABD: Nondistended, soft, nontender. Positive bowel sounds. No organomegaly.   EXTREMITIES: No lower extremity edema.   NEURO: Non- focal. Alert and oriented x3.  Cranial nerves appear intact.  PSYCH: Within normal limits. No depression or anxiety.  SKIN: Warm dry intact.      Lab results Reviewed       Recent Results (from the past week)   Comprehensive metabolic panel   Result Value Ref Range    Sodium 139 135 - 145 mmol/L    Potassium 4.4 3.4 - 5.3 mmol/L    Carbon Dioxide (CO2) 24 22 - 29 mmol/L    Anion Gap 12 7 - 15 mmol/L    Urea Nitrogen 19.0 8.0 - 23.0 mg/dL    Creatinine 0.97 0.67 - 1.17 mg/dL    GFR Estimate 86 >60 mL/min/1.73m2    Calcium 9.2 8.8 - 10.4 mg/dL    Chloride 103 98 - 107 mmol/L    Glucose 119 (H) 70 - 99 mg/dL    Alkaline Phosphatase 104 40 - 150 U/L    AST 22 0 - 45 U/L    ALT 27 0 - 70 U/L    Protein Total 6.5 6.4 - 8.3 g/dL    Albumin 4.3 3.5 - 5.2 g/dL    Bilirubin Total 0.4 <=1.2 mg/dL   Lactate Dehydrogenase   Result Value Ref Range    Lactate Dehydrogenase 202 0 - 250 U/L   CBC with platelets and differential   Result Value Ref Range    WBC Count 130.0 (HH) 4.0 - 11.0 10e3/uL    RBC Count 3.84 (L) 4.40 - 5.90 10e6/uL    Hemoglobin 11.4 (L) 13.3 - 17.7 g/dL    Hematocrit 38.7 (L) 40.0 - 53.0 %     (H) 78 - 100 fL    MCH 29.7 26.5 - 33.0 pg    MCHC 29.5 (L) 31.5 - 36.5 g/dL    RDW 15.8 (H) 10.0 - 15.0 %    Platelet Count 118 (L) 150 - 450 10e3/uL   Manual Differential   Result Value Ref Range    % Neutrophils 6 %    % Lymphocytes 91 %    % Monocytes 4 %    % Eosinophils 0 %    % Basophils 0 %    % Myelocytes 1 %    Absolute Neutrophils 7.2 1.6 - 8.3 10e3/uL    Absolute Lymphocytes 117.7 (H) 0.8 - 5.3 10e3/uL    Absolute Monocytes 4.6 (H) 0.0 - 1.3 10e3/uL    Absolute Eosinophils 0.0 0.0 - 0.7 10e3/uL    Absolute Basophils 0.0 0.0 - 0.2 10e3/uL    Absolute Myelocytes 0.7 (H) <=0.0 10e3/uL    RBC Morphology Confirmed RBC Indices     Platelet Assessment (A) Automated Count Confirmed. Platelet morphology is normal.     Automated Count Confirmed. Giant platelets are present.    Smudge Cells Present (A) None Seen    Giant Platelets Slight (A) None Seen    Pathologist Review Comments (Blood) Agree. Quinton Michaels M.D. 10/25/24 10:09        Imaging results Reviewed        No  results found.    Total time spent was over 42 minutes.  This includes chart prep time, review of clinical data including notes from outside physicians, labs, review of medical imaging, discussion about  plan of care, documentation and .     Signed by: Dennis Garza MD      This note has been dictated using voice recognition software. Any grammatical or context distortions are unintentional and inherent to the software      Again, thank you for allowing me to participate in the care of your patient.        Sincerely,        Dennis Garza MD

## 2024-10-25 ENCOUNTER — TELEPHONE (OUTPATIENT)
Dept: ONCOLOGY | Facility: HOSPITAL | Age: 64
End: 2024-10-25
Payer: MEDICARE

## 2024-10-25 ENCOUNTER — PATIENT OUTREACH (OUTPATIENT)
Dept: ONCOLOGY | Facility: HOSPITAL | Age: 64
End: 2024-10-25
Payer: MEDICARE

## 2024-10-25 LAB
BASOPHILS # BLD MANUAL: 0 10E3/UL (ref 0–0.2)
BASOPHILS NFR BLD MANUAL: 0 %
EOSINOPHIL # BLD MANUAL: 0 10E3/UL (ref 0–0.7)
EOSINOPHIL NFR BLD MANUAL: 0 %
ERYTHROCYTE [DISTWIDTH] IN BLOOD BY AUTOMATED COUNT: 15.8 % (ref 10–15)
GIANT PLATELETS BLD QL SMEAR: SLIGHT
HCT VFR BLD AUTO: 38.7 % (ref 40–53)
HGB BLD-MCNC: 11.4 G/DL (ref 13.3–17.7)
LYMPHOCYTES # BLD MANUAL: 117.7 10E3/UL (ref 0.8–5.3)
LYMPHOCYTES NFR BLD MANUAL: 91 %
MCH RBC QN AUTO: 29.7 PG (ref 26.5–33)
MCHC RBC AUTO-ENTMCNC: 29.5 G/DL (ref 31.5–36.5)
MCV RBC AUTO: 101 FL (ref 78–100)
MONOCYTES # BLD MANUAL: 4.6 10E3/UL (ref 0–1.3)
MONOCYTES NFR BLD MANUAL: 4 %
MYELOCYTES # BLD MANUAL: 0.7 10E3/UL
MYELOCYTES NFR BLD MANUAL: 1 %
NEUTROPHILS # BLD MANUAL: 7.2 10E3/UL (ref 1.6–8.3)
NEUTROPHILS NFR BLD MANUAL: 6 %
PATH REV: ABNORMAL
PLAT MORPH BLD: ABNORMAL
PLATELET # BLD AUTO: 118 10E3/UL (ref 150–450)
RBC # BLD AUTO: 3.84 10E6/UL (ref 4.4–5.9)
RBC MORPH BLD: ABNORMAL
SMUDGE CELLS BLD QL SMEAR: PRESENT
WBC # BLD AUTO: 130 10E3/UL (ref 4–11)

## 2024-10-25 RX ORDER — METHYLPREDNISOLONE SODIUM SUCCINATE 40 MG/ML
40 INJECTION INTRAMUSCULAR; INTRAVENOUS
Start: 2024-11-12

## 2024-10-25 RX ORDER — DIPHENHYDRAMINE HYDROCHLORIDE 50 MG/ML
25 INJECTION INTRAMUSCULAR; INTRAVENOUS
Start: 2024-11-25

## 2024-10-25 RX ORDER — EPINEPHRINE 1 MG/ML
0.3 INJECTION, SOLUTION INTRAMUSCULAR; SUBCUTANEOUS EVERY 5 MIN PRN
OUTPATIENT
Start: 2024-11-18

## 2024-10-25 RX ORDER — LORAZEPAM 2 MG/ML
0.5 INJECTION INTRAMUSCULAR EVERY 4 HOURS PRN
OUTPATIENT
Start: 2024-11-18

## 2024-10-25 RX ORDER — EPINEPHRINE 1 MG/ML
0.3 INJECTION, SOLUTION INTRAMUSCULAR; SUBCUTANEOUS EVERY 5 MIN PRN
OUTPATIENT
Start: 2024-11-25

## 2024-10-25 RX ORDER — MEPERIDINE HYDROCHLORIDE 25 MG/ML
25 INJECTION INTRAMUSCULAR; INTRAVENOUS; SUBCUTANEOUS
OUTPATIENT
Start: 2024-12-09

## 2024-10-25 RX ORDER — DIPHENHYDRAMINE HYDROCHLORIDE 50 MG/ML
25 INJECTION INTRAMUSCULAR; INTRAVENOUS
Start: 2024-11-18

## 2024-10-25 RX ORDER — HEPARIN SODIUM,PORCINE 10 UNIT/ML
5-20 VIAL (ML) INTRAVENOUS DAILY PRN
OUTPATIENT
Start: 2024-11-25

## 2024-10-25 RX ORDER — DIPHENHYDRAMINE HYDROCHLORIDE 50 MG/ML
25 INJECTION INTRAMUSCULAR; INTRAVENOUS
Start: 2024-11-12

## 2024-10-25 RX ORDER — ALBUTEROL SULFATE 0.83 MG/ML
2.5 SOLUTION RESPIRATORY (INHALATION)
OUTPATIENT
Start: 2024-11-11

## 2024-10-25 RX ORDER — ACETAMINOPHEN 325 MG/1
650 TABLET ORAL ONCE
Start: 2024-11-18

## 2024-10-25 RX ORDER — METHYLPREDNISOLONE SODIUM SUCCINATE 40 MG/ML
40 INJECTION INTRAMUSCULAR; INTRAVENOUS
Start: 2024-11-11

## 2024-10-25 RX ORDER — METHYLPREDNISOLONE SODIUM SUCCINATE 40 MG/ML
40 INJECTION INTRAMUSCULAR; INTRAVENOUS
Start: 2024-12-09

## 2024-10-25 RX ORDER — METHYLPREDNISOLONE SODIUM SUCCINATE 40 MG/ML
40 INJECTION INTRAMUSCULAR; INTRAVENOUS
Start: 2024-11-18

## 2024-10-25 RX ORDER — MEPERIDINE HYDROCHLORIDE 25 MG/ML
25 INJECTION INTRAMUSCULAR; INTRAVENOUS; SUBCUTANEOUS
OUTPATIENT
Start: 2024-11-25

## 2024-10-25 RX ORDER — MEPERIDINE HYDROCHLORIDE 25 MG/ML
25 INJECTION INTRAMUSCULAR; INTRAVENOUS; SUBCUTANEOUS
OUTPATIENT
Start: 2024-11-12

## 2024-10-25 RX ORDER — ACETAMINOPHEN 325 MG/1
650 TABLET ORAL ONCE
Start: 2024-11-25

## 2024-10-25 RX ORDER — ALBUTEROL SULFATE 0.83 MG/ML
2.5 SOLUTION RESPIRATORY (INHALATION)
OUTPATIENT
Start: 2024-11-12

## 2024-10-25 RX ORDER — ACETAMINOPHEN 325 MG/1
650 TABLET ORAL ONCE
Start: 2024-11-12

## 2024-10-25 RX ORDER — HEPARIN SODIUM,PORCINE 10 UNIT/ML
5-20 VIAL (ML) INTRAVENOUS DAILY PRN
OUTPATIENT
Start: 2024-11-12

## 2024-10-25 RX ORDER — HEPARIN SODIUM (PORCINE) LOCK FLUSH IV SOLN 100 UNIT/ML 100 UNIT/ML
5 SOLUTION INTRAVENOUS
OUTPATIENT
Start: 2024-12-09

## 2024-10-25 RX ORDER — ACETAMINOPHEN 325 MG/1
650 TABLET ORAL ONCE
Start: 2024-11-11

## 2024-10-25 RX ORDER — ALBUTEROL SULFATE 90 UG/1
1-2 INHALANT RESPIRATORY (INHALATION)
Start: 2024-11-18

## 2024-10-25 RX ORDER — MEPERIDINE HYDROCHLORIDE 25 MG/ML
25 INJECTION INTRAMUSCULAR; INTRAVENOUS; SUBCUTANEOUS
OUTPATIENT
Start: 2024-11-11

## 2024-10-25 RX ORDER — HEPARIN SODIUM (PORCINE) LOCK FLUSH IV SOLN 100 UNIT/ML 100 UNIT/ML
5 SOLUTION INTRAVENOUS
OUTPATIENT
Start: 2024-11-18

## 2024-10-25 RX ORDER — ALBUTEROL SULFATE 90 UG/1
1-2 INHALANT RESPIRATORY (INHALATION)
Start: 2024-12-09

## 2024-10-25 RX ORDER — LORAZEPAM 2 MG/ML
0.5 INJECTION INTRAMUSCULAR EVERY 4 HOURS PRN
OUTPATIENT
Start: 2024-11-12

## 2024-10-25 RX ORDER — ALBUTEROL SULFATE 90 UG/1
1-2 INHALANT RESPIRATORY (INHALATION)
Start: 2024-11-11

## 2024-10-25 RX ORDER — HEPARIN SODIUM (PORCINE) LOCK FLUSH IV SOLN 100 UNIT/ML 100 UNIT/ML
5 SOLUTION INTRAVENOUS
OUTPATIENT
Start: 2024-11-11

## 2024-10-25 RX ORDER — MEPERIDINE HYDROCHLORIDE 25 MG/ML
25 INJECTION INTRAMUSCULAR; INTRAVENOUS; SUBCUTANEOUS
OUTPATIENT
Start: 2024-11-18

## 2024-10-25 RX ORDER — ACETAMINOPHEN 325 MG/1
650 TABLET ORAL ONCE
Start: 2024-12-09

## 2024-10-25 RX ORDER — ALBUTEROL SULFATE 0.83 MG/ML
2.5 SOLUTION RESPIRATORY (INHALATION)
OUTPATIENT
Start: 2024-11-25

## 2024-10-25 RX ORDER — HEPARIN SODIUM,PORCINE 10 UNIT/ML
5-20 VIAL (ML) INTRAVENOUS DAILY PRN
OUTPATIENT
Start: 2024-11-11

## 2024-10-25 RX ORDER — DIPHENHYDRAMINE HYDROCHLORIDE 50 MG/ML
50 INJECTION INTRAMUSCULAR; INTRAVENOUS
Start: 2024-11-18

## 2024-10-25 RX ORDER — HEPARIN SODIUM,PORCINE 10 UNIT/ML
5-20 VIAL (ML) INTRAVENOUS DAILY PRN
OUTPATIENT
Start: 2024-12-09

## 2024-10-25 RX ORDER — DIPHENHYDRAMINE HYDROCHLORIDE 50 MG/ML
50 INJECTION INTRAMUSCULAR; INTRAVENOUS
Start: 2024-11-12

## 2024-10-25 RX ORDER — DIPHENHYDRAMINE HYDROCHLORIDE 50 MG/ML
50 INJECTION INTRAMUSCULAR; INTRAVENOUS
Start: 2024-11-25

## 2024-10-25 RX ORDER — METHYLPREDNISOLONE SODIUM SUCCINATE 40 MG/ML
40 INJECTION INTRAMUSCULAR; INTRAVENOUS
Start: 2024-11-25

## 2024-10-25 RX ORDER — HEPARIN SODIUM,PORCINE 10 UNIT/ML
5-20 VIAL (ML) INTRAVENOUS DAILY PRN
OUTPATIENT
Start: 2024-11-18

## 2024-10-25 RX ORDER — DIPHENHYDRAMINE HYDROCHLORIDE 50 MG/ML
25 INJECTION INTRAMUSCULAR; INTRAVENOUS
Start: 2024-12-09

## 2024-10-25 RX ORDER — EPINEPHRINE 1 MG/ML
0.3 INJECTION, SOLUTION INTRAMUSCULAR; SUBCUTANEOUS EVERY 5 MIN PRN
OUTPATIENT
Start: 2024-11-11

## 2024-10-25 RX ORDER — LORAZEPAM 2 MG/ML
0.5 INJECTION INTRAMUSCULAR EVERY 4 HOURS PRN
OUTPATIENT
Start: 2024-11-11

## 2024-10-25 RX ORDER — DIPHENHYDRAMINE HYDROCHLORIDE 50 MG/ML
50 INJECTION INTRAMUSCULAR; INTRAVENOUS
Start: 2024-11-11

## 2024-10-25 RX ORDER — ALBUTEROL SULFATE 0.83 MG/ML
2.5 SOLUTION RESPIRATORY (INHALATION)
OUTPATIENT
Start: 2024-12-09

## 2024-10-25 RX ORDER — DIPHENHYDRAMINE HYDROCHLORIDE 50 MG/ML
25 INJECTION INTRAMUSCULAR; INTRAVENOUS
Start: 2024-11-11

## 2024-10-25 RX ORDER — HEPARIN SODIUM (PORCINE) LOCK FLUSH IV SOLN 100 UNIT/ML 100 UNIT/ML
5 SOLUTION INTRAVENOUS
OUTPATIENT
Start: 2024-11-25

## 2024-10-25 RX ORDER — DIPHENHYDRAMINE HYDROCHLORIDE 50 MG/ML
50 INJECTION INTRAMUSCULAR; INTRAVENOUS
Start: 2024-12-09

## 2024-10-25 RX ORDER — ALBUTEROL SULFATE 90 UG/1
1-2 INHALANT RESPIRATORY (INHALATION)
Start: 2024-11-12

## 2024-10-25 RX ORDER — HEPARIN SODIUM (PORCINE) LOCK FLUSH IV SOLN 100 UNIT/ML 100 UNIT/ML
5 SOLUTION INTRAVENOUS
OUTPATIENT
Start: 2024-11-12

## 2024-10-25 RX ORDER — LORAZEPAM 2 MG/ML
0.5 INJECTION INTRAMUSCULAR EVERY 4 HOURS PRN
OUTPATIENT
Start: 2024-12-09

## 2024-10-25 RX ORDER — EPINEPHRINE 1 MG/ML
0.3 INJECTION, SOLUTION INTRAMUSCULAR; SUBCUTANEOUS EVERY 5 MIN PRN
OUTPATIENT
Start: 2024-12-09

## 2024-10-25 RX ORDER — ALBUTEROL SULFATE 0.83 MG/ML
2.5 SOLUTION RESPIRATORY (INHALATION)
OUTPATIENT
Start: 2024-11-18

## 2024-10-25 RX ORDER — EPINEPHRINE 1 MG/ML
0.3 INJECTION, SOLUTION INTRAMUSCULAR; SUBCUTANEOUS EVERY 5 MIN PRN
OUTPATIENT
Start: 2024-11-12

## 2024-10-25 RX ORDER — ALBUTEROL SULFATE 90 UG/1
1-2 INHALANT RESPIRATORY (INHALATION)
Start: 2024-11-25

## 2024-10-25 RX ORDER — LORAZEPAM 2 MG/ML
0.5 INJECTION INTRAMUSCULAR EVERY 4 HOURS PRN
OUTPATIENT
Start: 2024-11-25

## 2024-10-25 NOTE — TELEPHONE ENCOUNTER
I received a phone call today from Clive.  He is calling because he saw Dr. Garza in the clinic yesterday and understands that he needs to restart treatment.  He was sent home with information about the treatment options and he did talk with his wife about what Dr. Garza told him during the visit. At this time, he is leaning towards doing a treatment that will end after 2 years as opposed to starting something he will need to be on indefinitely.  Clive is calling today to request some more information about rituximab and venetoclax.  Specifically, he is looking for answers for the following questions: What does the treatment schedule look like, how long will he be on each of the medications, what can he expect in terms of side effects (most common side effects), will he need to get a port placed?  Clive prefers that this information get sent to him via [place] so he has it in writing and can review it with his wife.     Sima Oliver RN on 10/25/2024 at 11:00 AM

## 2024-10-25 NOTE — PROGRESS NOTES
[facility]: Cancer Care Plan of Care Education Note                                    Discussion with Patient:                                                      Patient had called in and spoke with our triage nurse today.  He states that he is leaving towards the treatment that he needs to be on for 2 years opposed to the treatment that he had would need to be on indefinitely.  He would like a [employer] message with expected side effects, if he will need a port placed.  He prefers this via [employer] so that it is in writing and he can review it with his wife.     Goals          General    Maintain ability to perform ADLs without difficulty           Assessment:                                                      Assessment completed with:: Patient    Plan of Care Education   Yearly learning assessment completed?: Yes (see Education tab)  Diagnosis:: CLL  Tx plan/regimen:: Obintuzumab and Venetoclax  Preparing for treatment:: Reviewed treatment preparation information with patient (vascular access, day of chemo, visitor policy, what to bring, etc.)  Vascular access education provided for:: Peripheral IV  Plan of Care:: MULUGETA follow-up appointment;Lab appointment;MD follow-up appointment;Treatment schedule    Evaluation of Learning  Patient Education Provided: Yes  Readiness:: Eager;Acceptance  Method:: Literature    Intervention/Education provided during outreach:                                                       [employer] message sent to patient with medication information from up-to-date on venetoclax and Obinutuzumab.  I explained to him in the message how the first 22 days of treatment will look.  I also explained that each week the dosing of the venetoclax will go up to a max dose of 400 mg daily.  Once his labs have stabilized, we can do less frequent lab checks.    Per Dr Garza, patient should see Radha Alarcon CNP in clinic as opposed to SUGAR Ramirez.  If patient is ready to get started on  this treatment, we can add infusion time to his provider follow-up appointment.    Patient to follow up as scheduled at next appt. Patient to call/MyChart message with updates. Confirmed patient has clinic and triage numbers.    Signature:  Penny Mcfadden RN

## 2024-10-29 ENCOUNTER — PATIENT OUTREACH (OUTPATIENT)
Dept: ONCOLOGY | Facility: HOSPITAL | Age: 64
End: 2024-10-29
Payer: MEDICARE

## 2024-10-30 NOTE — PROGRESS NOTES
[facility]: Cancer Care                                                                                            Situation: Patient chart reviewed by care coordinator.    Background: Patient with a diagnosis of the CLL, stage IV, diagnosed in February 2012.  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     Patient with an additional diagnosis of Paroxysmal atrial fibrillation.    Assessment: Patient most recently seen in the clinic by Dr Garza on 10/24/2024.  At this time, Dr Garza has recommending that we start some form of treatment.  He discussed with him 2 different options.      The first option would be a medication called acalabrutinib/Calquence.  The disadvantage of this medication is that it is taken indefinitely.    The other option to consider is a different class of agents using Obinutuzumab plus venetoclax.  This treatment is a 2-year duration.  Dr Garza's preference would be this option as it has very little cardiac side effects.    Plan/Recommendations: Patient had sent in a [employer] message late last week, earlier this week asking some questions.  I did respond back to him but he had further questions.  I ended up sending him a very detailed explanation of what the Obinutuzumab plus venetoclax treatment plan looks like.    Obinutuzumab (IV) and Venetoclax (PILL) Treatment Plan  (1 cycle = 28 days)     CYCLE 1  Day 1 - Obinutuzumab IV      Day 8 - Obinutuzumab IV      Day 15 - Obinutuzumab IV      Day 22 - Venetoclax PILL - 20mg daily for first week (2 - 10mg tablets)       1. You will have baseline labs first thing in the morning       2.  You will then take your Venetoclax when told to do so.       3.  You will then come back 6 hours later to have another lab check.  You will wait in the lobby to get results to make sure you do not need to get IV fluids.     Day 23 - You will come back first thing the next morning to have another lab check. You will wait in the lobby to be told if labs  are good enough to take the next dose of Venetoclax.  **Of note, if your lab results are good, Venetoclax will be increased in dosage every week, up to a max dosage of 400 mg daily     CYCLE 2  Day 1 - lab check, take increased dose of Venetoclax (50mg daily x1 week) if told to do so, Obinutuzumab IV, recheck labs prior to leaving clinic.     Day 2 - You will come back first thing the next morning to have another lab check. You will wait in the lobby for results and told whether you can take the next dose of Venetoclax.     Date 8 - lab check, take increased dose of Venetoclax (100mg daily x1 week) if told to do so.     Day 15 - lab check, take increased dose of Venetoclax (2 - 100mg tablets daily x1 week) if told to do so.     Day 22 - lab check, take increased dose of Venetoclax (4 - 100mg tablets daily) if told to do so. You will take this dosage daily for a total of 2 years of therapy.      CYCLE 3  Day 1 - lab check, Obinutuzumab IV     Days 2-28 - You will take Venetoclax 400mg daily     CYCLE 4  Day 1 - lab check, Obinutuzumab IV     Days 2-28 - You will take Venetoclax 400mg daily        CYCLE 5  Day 1 - lab check, Obinutuzumab IV     Days 2-28 - You will take Venetoclax 400mg daily        CYCLE 6  Day 1 - lab check, Obinutuzumab IV     Days 2-28 - You will take Venetoclax 400mg daily     After finishing the infusions, you would come in for monthly lab checks to make sure all is looking good while you are on the maintenance dose of Venetoclax for a total of 2 years of therapy.    I did asked that he reach back out if he has any further questions or concerns or if he decides on how he would like to proceed.  Of note, Dr Garza does have the plan in place for the Obinutuzumab plus venetoclax.    Signature:  Penny Mcfadden RN

## 2024-11-06 ENCOUNTER — PATIENT OUTREACH (OUTPATIENT)
Dept: ONCOLOGY | Facility: HOSPITAL | Age: 64
End: 2024-11-06
Payer: MEDICARE

## 2024-11-06 NOTE — PROGRESS NOTES
[facility]: Cancer Care                                                                                            Situation: Patient chart reviewed by care coordinator.    Background: Patient with a diagnosis of the CLL, stage IV, diagnosed in February 2012.  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     Patient with an additional diagnosis of Paroxysmal atrial fibrillation.    Assessment: Patient was last in the clinic on 10/24/2024 to meet with Dr Garza in follow-up.  It was recommended that the patient get started on treatment in the near future.    The first option would be a medication called acalabrutinib/Calquence.  The disadvantage of this medication is that it is taken indefinitely.     The other option to consider is a different class of agents using Obinutuzumab plus venetoclax.  This treatment is a 2-year duration.  Dr Garza's preference would be this option as it has very little cardiac side effects.    Plan/Recommendations: [address] message sent to patient today checking in to see if he has made a decision on how he would like to proceed with his treatment.  He is currently scheduled on 11/12 to meet with Radha Alarcon CNP in follow-up.  There are no infusion appointments and no labs attached to this appointment.  I let him know that I just want to make sure that we are making the most of his appointments and if he has made a decision he can let us know.  If he still has follow-up questions and wants to further discuss on 11/12, I let him know that this is just fine as well.    If I do not hear back from the patient, we will follow-up when he is in clinic next week.    Signature:  Penny Mcfadden RN

## 2024-11-12 ENCOUNTER — DOCUMENTATION ONLY (OUTPATIENT)
Dept: ONCOLOGY | Facility: HOSPITAL | Age: 64
End: 2024-11-12

## 2024-11-12 ENCOUNTER — ONCOLOGY VISIT (OUTPATIENT)
Dept: ONCOLOGY | Facility: HOSPITAL | Age: 64
End: 2024-11-12
Attending: INTERNAL MEDICINE
Payer: MEDICARE

## 2024-11-12 ENCOUNTER — LAB (OUTPATIENT)
Dept: INFUSION THERAPY | Facility: HOSPITAL | Age: 64
End: 2024-11-12
Attending: INTERNAL MEDICINE
Payer: MEDICARE

## 2024-11-12 VITALS
DIASTOLIC BLOOD PRESSURE: 66 MMHG | RESPIRATION RATE: 16 BRPM | WEIGHT: 250.9 LBS | HEART RATE: 78 BPM | TEMPERATURE: 97.3 F | HEIGHT: 74 IN | OXYGEN SATURATION: 95 % | SYSTOLIC BLOOD PRESSURE: 126 MMHG | BODY MASS INDEX: 32.2 KG/M2

## 2024-11-12 DIAGNOSIS — C91.10 CHRONIC LYMPHOCYTIC LEUKEMIA (H): Primary | ICD-10-CM

## 2024-11-12 LAB
ALBUMIN SERPL BCG-MCNC: 4.3 G/DL (ref 3.5–5.2)
ALP SERPL-CCNC: 108 U/L (ref 40–150)
ALT SERPL W P-5'-P-CCNC: 23 U/L (ref 0–70)
ANION GAP SERPL CALCULATED.3IONS-SCNC: 10 MMOL/L (ref 7–15)
AST SERPL W P-5'-P-CCNC: 19 U/L (ref 0–45)
BASOPHILS # BLD AUTO: 0 10E3/UL (ref 0–0.2)
BASOPHILS NFR BLD AUTO: 0 %
BILIRUB SERPL-MCNC: 0.5 MG/DL
BUN SERPL-MCNC: 17.8 MG/DL (ref 8–23)
CALCIUM SERPL-MCNC: 9.3 MG/DL (ref 8.8–10.4)
CHLORIDE SERPL-SCNC: 103 MMOL/L (ref 98–107)
CREAT SERPL-MCNC: 0.95 MG/DL (ref 0.67–1.17)
EGFRCR SERPLBLD CKD-EPI 2021: 88 ML/MIN/1.73M2
EOSINOPHIL # BLD AUTO: 0.1 10E3/UL (ref 0–0.7)
EOSINOPHIL NFR BLD AUTO: 0 %
ERYTHROCYTE [DISTWIDTH] IN BLOOD BY AUTOMATED COUNT: 16.5 % (ref 10–15)
GLUCOSE SERPL-MCNC: 104 MG/DL (ref 70–99)
HBV CORE AB SERPL QL IA: NONREACTIVE
HBV SURFACE AB SERPL IA-ACNC: <3.5 M[IU]/ML
HBV SURFACE AB SERPL IA-ACNC: NONREACTIVE M[IU]/ML
HBV SURFACE AG SERPL QL IA: NONREACTIVE
HCO3 SERPL-SCNC: 26 MMOL/L (ref 22–29)
HCT VFR BLD AUTO: 38.5 % (ref 40–53)
HGB BLD-MCNC: 11.1 G/DL (ref 13.3–17.7)
IMM GRANULOCYTES # BLD: 0.8 10E3/UL
IMM GRANULOCYTES NFR BLD: 1 %
LYMPHOCYTES # BLD AUTO: 135 10E3/UL (ref 0.8–5.3)
LYMPHOCYTES NFR BLD AUTO: 96 %
MCH RBC QN AUTO: 29.5 PG (ref 26.5–33)
MCHC RBC AUTO-ENTMCNC: 28.8 G/DL (ref 31.5–36.5)
MCV RBC AUTO: 102 FL (ref 78–100)
MONOCYTES # BLD AUTO: 1.1 10E3/UL (ref 0–1.3)
MONOCYTES NFR BLD AUTO: 1 %
NEUTROPHILS # BLD AUTO: 3.8 10E3/UL (ref 1.6–8.3)
NEUTROPHILS NFR BLD AUTO: 3 %
NRBC # BLD AUTO: 0.3 10E3/UL
NRBC BLD AUTO-RTO: 0 /100
PLAT MORPH BLD: ABNORMAL
PLATELET # BLD AUTO: 108 10E3/UL (ref 150–450)
POTASSIUM SERPL-SCNC: 4.5 MMOL/L (ref 3.4–5.3)
PROT SERPL-MCNC: 6.6 G/DL (ref 6.4–8.3)
RBC # BLD AUTO: 3.76 10E6/UL (ref 4.4–5.9)
RBC MORPH BLD: ABNORMAL
SMUDGE CELLS BLD QL SMEAR: PRESENT
SODIUM SERPL-SCNC: 139 MMOL/L (ref 135–145)
WBC # BLD AUTO: 140.9 10E3/UL (ref 4–11)

## 2024-11-12 PROCEDURE — G2211 COMPLEX E/M VISIT ADD ON: HCPCS | Performed by: NURSE PRACTITIONER

## 2024-11-12 PROCEDURE — 86704 HEP B CORE ANTIBODY TOTAL: CPT | Performed by: INTERNAL MEDICINE

## 2024-11-12 PROCEDURE — 87340 HEPATITIS B SURFACE AG IA: CPT | Performed by: INTERNAL MEDICINE

## 2024-11-12 PROCEDURE — 99215 OFFICE O/P EST HI 40 MIN: CPT | Performed by: NURSE PRACTITIONER

## 2024-11-12 PROCEDURE — 80053 COMPREHEN METABOLIC PANEL: CPT | Performed by: INTERNAL MEDICINE

## 2024-11-12 PROCEDURE — 85004 AUTOMATED DIFF WBC COUNT: CPT | Performed by: INTERNAL MEDICINE

## 2024-11-12 PROCEDURE — G0463 HOSPITAL OUTPT CLINIC VISIT: HCPCS | Performed by: NURSE PRACTITIONER

## 2024-11-12 PROCEDURE — 36415 COLL VENOUS BLD VENIPUNCTURE: CPT | Performed by: INTERNAL MEDICINE

## 2024-11-12 PROCEDURE — 86706 HEP B SURFACE ANTIBODY: CPT | Performed by: INTERNAL MEDICINE

## 2024-11-12 PROCEDURE — 99213 OFFICE O/P EST LOW 20 MIN: CPT | Performed by: NURSE PRACTITIONER

## 2024-11-12 RX ORDER — ZINC GLUCONATE 50 MG
50 TABLET ORAL DAILY
COMMUNITY

## 2024-11-12 RX ORDER — ALLOPURINOL 300 MG/1
300 TABLET ORAL DAILY
Qty: 30 TABLET | Refills: 0 | Status: SHIPPED | OUTPATIENT
Start: 2024-11-12

## 2024-11-12 RX ORDER — LORAZEPAM 1 MG/1
TABLET ORAL
COMMUNITY
Start: 2024-08-19

## 2024-11-12 RX ORDER — MULTIVITAMIN WITH IRON
1 TABLET ORAL DAILY
COMMUNITY

## 2024-11-12 RX ORDER — PROCHLORPERAZINE MALEATE 10 MG
10 TABLET ORAL EVERY 6 HOURS PRN
Qty: 30 TABLET | Refills: 2 | Status: SHIPPED | OUTPATIENT
Start: 2024-11-12

## 2024-11-12 ASSESSMENT — PAIN SCALES - GENERAL: PAINLEVEL_OUTOF10: NO PAIN (0)

## 2024-11-12 NOTE — PROGRESS NOTES
[facility] Hematology and Oncology Progress Note    Patient: Ramu Espinoza  MRN: [id number]  Date of Service: Nov 12, 2024          Reason for Visit    Chief Complaint   Patient presents with    Oncology Clinic Visit     2 week return visit related to Chronic lymphocytic leukemia.       Assessment and Plan     Cancer Staging   No matching staging information was found for the patient.       1.  CLL stage 4, diagnosed in February 2012. 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  he had had treatment on clinical study with Rituxan and Imbruvica.  Great clinical response to Imbruvica. His lymph nodes have normalized. His CBC normalized.  Patient was starting to have worsening A-fib issues so in October 2023 he elected to go off the study and off the Imbruvica.  He is now showing signs of progression.  Patient has worsening white blood cell count as well as decreasing hemoglobin and platelet count.  He is at the point now where he probably needs to think about going on treatment.  We gave him the option of going back to an oral medication like L acalabrutinib or Zanubrutinib indefinitely or going on to Obinutuzumab and venetoclax which is a 1 to 2-year treatment.  Patient elected to do the Obinutuzumab and venetoclax.  I talked at length today about how that is given.  We will give the Obinutuzumab on day 1, 2, 8, 15 for the first cycle and then once every 4 weeks to complete 6 months.  We will introduce venetoclax with cycle 1, day 22 and we will do a ramp-up of 5 weeks with frequent labs.  Patient and wife both understand that.  He does have poor venous axis so we will go ahead and order a Port-A-Cath to be placed.  Once that is placed he will come back to start treatment.  He will be seen on day 8 or day 15 of his first cycle.  Gave verbal consent to starting today and feels educated.  We spent a lot of time talking about the expected side effects and the overall plan.  Patient is quite anxious to be  on treatment but understands why we are recommending it.  He understands that this potentially will give him remission and time off treatment but overall CLL is technically incurable.  He denies constitutional symptoms at this time.  We did talk about the risk for allergic reaction to the Obinutuzumab and why we split the dose up for the first week as well as give premedications.     2. Paroxysmal A. Fib: could be from Imbruvica (<9% risk). on metoprolol.  Patient has been seeing a cardiologist through [facility].  It was recommended to stop the Imbruvica due to the worsening A-fib.  He will continue to follow with them.  I believe he is seeing Dr. Pato Gonzalez.      ECOG Performance    0 - Independent    Distress Screening (within last 30 days)    No data recorded     Pain  Pain Score: No Pain (0)    Problem List    Patient Active Problem List   Diagnosis    Tinnitus    Generalized Anxiety Disorder    Dupuytren's Contracture    Insomnia    Chronic lymphocytic leukemia (H)  Cy inducer Ibrutinib    Internal Hemorrhoids With Bleeding    Pericardial effusion    Thyroid nodule    DAVID (obstructive sleep apnea)    REM sleep behavior disorder    Class 1 obesity due to excess calories with serious comorbidity in adult    Benign prostatic hyperplasia with lower urinary tract symptoms, symptom details unspecified    History of atrial fibrillation    Paroxysmal atrial fibrillation (H)        ______________________________________________________________________________    History of Present Illness    Oncologist: Dr. Garza    Diagnosis: CLL.  This was diagnosed in 2012 when he presented with elevated white blood cell count around 20.  He had a blood smear that showed atypical lymphocytosis suspicious for CLL.  Patient had a normal hemoglobin and platelet count at that time.  His peripheral blood flow cytometry revealed CD5 coexpressing kappa light chain restricted B cells confirming the diagnosis of  "CLL.    Treatment: Patient initially was on observation   -June 2014 his white blood cell count was over 100,000, and starting to have thrombocytopenia.  He had a CT scan that showed increasing lymphadenopathy  -October 2014: Patient was offered participation in a clinical study.  He decided to do that and started on Rituxan and Imbruvica.  Was randomized against fludarabine, Cytoxan and Rituxan.  He completed his Rituxan and then was on Imbruvica as a single agent until October 2023.  Patient stopped his Imbruvica at that time due to worsening A-fib.    Interim history: Patient is scheduled today for follow-up visit and to talk about getting back on treatment.  He states that he is quite anxious about being back on treatment and states it has been so long since he was on the IV medication he does not really remember how he felt.  He is nervous about the side effects.  He anxious about the potential damage that the medications could do to his other organs.  He states that he is just anxious about being on treatment.  He says he does have poor veins and is worried about getting IVs frequently in the clinic.  He denies any constitutional symptoms.        Review of Systems    Pertinent items are noted in HPI.    Past History    Past Medical History:   Diagnosis Date    Anxiety     BPH (benign prostatic hyperplasia)     Cancer (H)     CLL    CLL (chronic lymphocytic leukemia) (H)     Enlarged lymph nodes     Created by Conversion  Replacement Utility updated for latest IMO load    Irregular heart beat     Paroxysmal atrial fibrillation with rapid ventricular response (H)     Sleep apnea     Tinnitus        PHYSICAL EXAM:  /66 (BP Location: Left arm, Patient Position: Sitting, Cuff Size: Adult Regular)   Pulse 78   Temp 97.3  F (36.3  C) (Tympanic)   Resp 16   Ht 1.88 m (6' 2\")   Wt 113.8 kg (250 lb 14.4 oz)   SpO2 95%   BMI 32.21 kg/m    GENERAL: no acute distress. Cooperative in conversation. Here with " wife  HEENT: pupils are equal, round and reactive. Oromucosa is clean and intact. No ulcerations or mucositis noted. No bleeding noted.  RESP: lungs are clear bilaterally per auscultation. Regular respiratory rate. No wheezes or rhonchi.  CV: Regular, rate and rhythm. No murmurs.  ABD: soft, nontender.   MUSCULOSKELETAL: No lower extremity swelling.   NEURO: non focal. Alert and oriented x3.   PSYCH: within normal limits. No depression or anxiety.  SKIN: warm dry intact   LYMPH: no cervical, supraclavicular or axillary lymphadenopathy          Lab Results    Recent Results (from the past week)   Comprehensive metabolic panel   Result Value Ref Range    Sodium 139 135 - 145 mmol/L    Potassium 4.5 3.4 - 5.3 mmol/L    Carbon Dioxide (CO2) 26 22 - 29 mmol/L    Anion Gap 10 7 - 15 mmol/L    Urea Nitrogen 17.8 8.0 - 23.0 mg/dL    Creatinine 0.95 0.67 - 1.17 mg/dL    GFR Estimate 88 >60 mL/min/1.73m2    Calcium 9.3 8.8 - 10.4 mg/dL    Chloride 103 98 - 107 mmol/L    Glucose 104 (H) 70 - 99 mg/dL    Alkaline Phosphatase 108 40 - 150 U/L    AST 19 0 - 45 U/L    ALT 23 0 - 70 U/L    Protein Total 6.6 6.4 - 8.3 g/dL    Albumin 4.3 3.5 - 5.2 g/dL    Bilirubin Total 0.5 <=1.2 mg/dL   CBC with platelets and differential   Result Value Ref Range    WBC Count 140.9 (HH) 4.0 - 11.0 10e3/uL    RBC Count 3.76 (L) 4.40 - 5.90 10e6/uL    Hemoglobin 11.1 (L) 13.3 - 17.7 g/dL    Hematocrit 38.5 (L) 40.0 - 53.0 %     (H) 78 - 100 fL    MCH 29.5 26.5 - 33.0 pg    MCHC 28.8 (L) 31.5 - 36.5 g/dL    RDW 16.5 (H) 10.0 - 15.0 %    Platelet Count 108 (L) 150 - 450 10e3/uL    % Neutrophils 3 %    % Lymphocytes 96 %    % Monocytes 1 %    % Eosinophils 0 %    % Basophils 0 %    % Immature Granulocytes 1 %    NRBCs per 100 WBC 0 <1 /100    Absolute Neutrophils 3.8 1.6 - 8.3 10e3/uL    Absolute Lymphocytes 135.0 (H) 0.8 - 5.3 10e3/uL    Absolute Monocytes 1.1 0.0 - 1.3 10e3/uL    Absolute Eosinophils 0.1 0.0 - 0.7 10e3/uL    Absolute Basophils  0.0 0.0 - 0.2 10e3/uL    Absolute Immature Granulocytes 0.8 (H) <=0.4 10e3/uL    Absolute NRBCs 0.3 10e3/uL   RBC and Platelet Morphology   Result Value Ref Range    RBC Morphology Confirmed RBC Indices     Platelet Assessment  Automated Count Confirmed. Platelet morphology is normal.     Automated Count Confirmed. Platelet morphology is normal.    Smudge Cells Present (A) None Seen       His labs in March at  were normal.  His white blood cell count was normal at 5.9, hemoglobin normal at 14.3 and platelets normal at 214.    Imaging    No results found.  Total time spent with patient in face to face time, chart review and documentation was 40 minutes.          The longitudinal plan of care for the diagnosis(es)/condition(s) as documented were addressed during this visit. Due to the added complexity in care, I will continue to support Clive in the subsequent management and with ongoing continuity of care.      Signed by: JUS Lester CNP

## 2024-11-12 NOTE — PROGRESS NOTES
DATE/TIME OF CALL RECEIVED FROM LAB:  11/12/24 at 10:44 AM   LAB TEST:  CBC/diff  LAB VALUE:  .9  PROVIDER NOTIFIED?: Yes  PROVIDER NAME: Radha Alarcon CNP and clinic RNCCs  DATE/TIME LAB VALUE REPORTED TO PROVIDER: 11/12/24 at 10:45 AM  MECHANISM OF PROVIDER NOTIFICATION:  teams  PROVIDER RESPONSE: INES Garcia will see the patient in the clinic today as scheduled to review results and discuss a plan of care.   Sima Oliver RN on 11/12/2024 at 10:47 AM

## 2024-11-12 NOTE — PROGRESS NOTES
"Oncology Rooming Note    November 12, 2024 8:51 AM   Ramu Espinoza is a 64 year old male who presents for:    Chief Complaint   Patient presents with    Oncology Clinic Visit     2 week return visit related to Chronic lymphocytic leukemia.     Initial Vitals: /66 (BP Location: Left arm, Patient Position: Sitting, Cuff Size: Adult Regular)   Pulse 78   Temp 97.3  F (36.3  C) (Tympanic)   Resp 16   Ht 1.88 m (6' 2\")   Wt 113.8 kg (250 lb 14.4 oz)   SpO2 95%   BMI 32.21 kg/m   Estimated body mass index is 32.21 kg/m  as calculated from the following:    Height as of this encounter: 1.88 m (6' 2\").    Weight as of this encounter: 113.8 kg (250 lb 14.4 oz). Body surface area is 2.44 meters squared.  No Pain (0) Comment: Data Unavailable   No LMP for male patient.  Allergies reviewed: Yes  Medications reviewed: Yes    Medications: Medication refills not needed today.  Pharmacy name entered into [facility]: [id number] [place], [place] - [facility]    Frailty Screening:   Is the patient here for a new oncology consult visit in cancer care? 2. No      Clinical concerns: Review labs 10/24.   Radha Alarcon was notified.      Thuy Benitez CMA              "

## 2024-11-12 NOTE — LETTER
11/12/2024      Ramu Espinoza  [address]  [place] [address]      Dear Colleague,    Thank you for referring your patient, Ramu Espinoza, to the [facility] [place]. Please see a copy of my visit note below.      [facility] Hematology and Oncology Progress Note    Patient: Ramu Espinoza  MRN: [id number]  Date of Service: Nov 12, 2024          Reason for Visit    Chief Complaint   Patient presents with     Oncology Clinic Visit     2 week return visit related to Chronic lymphocytic leukemia.       Assessment and Plan     Cancer Staging   No matching staging information was found for the patient.       1.  CLL stage 4, diagnosed in February 2012. 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  he had had treatment on clinical study with Rituxan and Imbruvica.  Great clinical response to Imbruvica. His lymph nodes have normalized. His CBC normalized.  Patient was starting to have worsening A-fib issues so in October 2023 he elected to go off the study and off the Imbruvica.  He is now showing signs of progression.  Patient has worsening white blood cell count as well as decreasing hemoglobin and platelet count.  He is at the point now where he probably needs to think about going on treatment.  We gave him the option of going back to an oral medication like L acalabrutinib or Zanubrutinib indefinitely or going on to Obinutuzumab and venetoclax which is a 1 to 2-year treatment.  Patient elected to do the Obinutuzumab and venetoclax.  I talked at length today about how that is given.  We will give the Obinutuzumab on day 1, 2, 8, 15 for the first cycle and then once every 4 weeks to complete 6 months.  We will introduce venetoclax with cycle 1, day 22 and we will do a ramp-up of 5 weeks with frequent labs.  Patient and wife both understand that.  He does have poor venous axis so we will go ahead and order a Port-A-Cath to be placed.  Once that is placed he will come back to start  treatment.  He will be seen on day 8 or day 15 of his first cycle.  Gave verbal consent to starting today and feels educated.  We spent a lot of time talking about the expected side effects and the overall plan.  Patient is quite anxious to be on treatment but understands why we are recommending it.  He understands that this potentially will give him remission and time off treatment but overall CLL is technically incurable.  He denies constitutional symptoms at this time.  We did talk about the risk for allergic reaction to the Obinutuzumab and why we split the dose up for the first week as well as give premedications.     2. Paroxysmal A. Fib: could be from Imbruvica (<9% risk). on metoprolol.  Patient has [facility].  It was recommended to stop the Imbruvica due to the worsening A-fib.  He will continue to follow with them.  I believe he is seeing Dr. Pato Gonzalez.      ECOG Performance    0 - Independent    Distress Screening (within last 30 days)    No data recorded     Pain  Pain Score: No Pain (0)    Problem List    Patient Active Problem List   Diagnosis     Tinnitus     Generalized Anxiety Disorder     Dupuytren's Contracture     Insomnia     Chronic lymphocytic leukemia (H)  Cy inducer Ibrutinib     Internal Hemorrhoids With Bleeding     Pericardial effusion     Thyroid nodule     DAVID (obstructive sleep apnea)     REM sleep behavior disorder     Class 1 obesity due to excess calories with serious comorbidity in adult     Benign prostatic hyperplasia with lower urinary tract symptoms, symptom details unspecified     History of atrial fibrillation     Paroxysmal atrial fibrillation (H)        ______________________________________________________________________________    History of Present Illness    Oncologist: Dr. Garza    Diagnosis: CLL.  This was diagnosed in 2012 when he presented with elevated white blood cell count around 20.  He had a blood  smear that showed atypical lymphocytosis suspicious for CLL.  Patient had a normal hemoglobin and platelet count at that time.  His peripheral blood flow cytometry revealed CD5 coexpressing kappa light chain restricted B cells confirming the diagnosis of CLL.    Treatment: Patient initially was on observation   -June 2014 his white blood cell count was over 100,000, and starting to have thrombocytopenia.  He had a CT scan that showed increasing lymphadenopathy  -October 2014: Patient was offered participation in a clinical study.  He decided to do that and started on Rituxan and Imbruvica.  Was randomized against fludarabine, Cytoxan and Rituxan.  He completed his Rituxan and then was on Imbruvica as a single agent until October 2023.  Patient stopped his Imbruvica at that time due to worsening A-fib.    Interim history: Patient is scheduled today for follow-up visit and to talk about getting back on treatment.  He states that he is quite anxious about being back on treatment and states it has been so long since he was on the IV medication he does not really remember how he felt.  He is nervous about the side effects.  He anxious about the potential damage that the medications could do to his other organs.  He states that he is just anxious about being on treatment.  He says he does have poor veins and is worried about getting IVs frequently in the clinic.  He denies any constitutional symptoms.        Review of Systems    Pertinent items are noted in HPI.    Past History    Past Medical History:   Diagnosis Date     Anxiety      BPH (benign prostatic hyperplasia)      Cancer (H)     CLL     CLL (chronic lymphocytic leukemia) (H)      Enlarged lymph nodes     Created by Conversion  Replacement Utility updated for latest IMO load     Irregular heart beat      Paroxysmal atrial fibrillation with rapid ventricular response (H)      Sleep apnea      Tinnitus        PHYSICAL EXAM:  /66 (BP Location: Left arm,  "Patient Position: Sitting, Cuff Size: Adult Regular)   Pulse 78   Temp 97.3  F (36.3  C) (Tympanic)   Resp 16   Ht 1.88 m (6' 2\")   Wt 113.8 kg (250 lb 14.4 oz)   SpO2 95%   BMI 32.21 kg/m    GENERAL: no acute distress. Cooperative in conversation. Here with wife  HEENT: pupils are equal, round and reactive. Oromucosa is clean and intact. No ulcerations or mucositis noted. No bleeding noted.  RESP: lungs are clear bilaterally per auscultation. Regular respiratory rate. No wheezes or rhonchi.  CV: Regular, rate and rhythm. No murmurs.  ABD: soft, nontender.   MUSCULOSKELETAL: No lower extremity swelling.   NEURO: non focal. Alert and oriented x3.   PSYCH: within normal limits. No depression or anxiety.  SKIN: warm dry intact   LYMPH: no cervical, supraclavicular or axillary lymphadenopathy          Lab Results    Recent Results (from the past week)   Comprehensive metabolic panel   Result Value Ref Range    Sodium 139 135 - 145 mmol/L    Potassium 4.5 3.4 - 5.3 mmol/L    Carbon Dioxide (CO2) 26 22 - 29 mmol/L    Anion Gap 10 7 - 15 mmol/L    Urea Nitrogen 17.8 8.0 - 23.0 mg/dL    Creatinine 0.95 0.67 - 1.17 mg/dL    GFR Estimate 88 >60 mL/min/1.73m2    Calcium 9.3 8.8 - 10.4 mg/dL    Chloride 103 98 - 107 mmol/L    Glucose 104 (H) 70 - 99 mg/dL    Alkaline Phosphatase 108 40 - 150 U/L    AST 19 0 - 45 U/L    ALT 23 0 - 70 U/L    Protein Total 6.6 6.4 - 8.3 g/dL    Albumin 4.3 3.5 - 5.2 g/dL    Bilirubin Total 0.5 <=1.2 mg/dL   CBC with platelets and differential   Result Value Ref Range    WBC Count 140.9 (HH) 4.0 - 11.0 10e3/uL    RBC Count 3.76 (L) 4.40 - 5.90 10e6/uL    Hemoglobin 11.1 (L) 13.3 - 17.7 g/dL    Hematocrit 38.5 (L) 40.0 - 53.0 %     (H) 78 - 100 fL    MCH 29.5 26.5 - 33.0 pg    MCHC 28.8 (L) 31.5 - 36.5 g/dL    RDW 16.5 (H) 10.0 - 15.0 %    Platelet Count 108 (L) 150 - 450 10e3/uL    % Neutrophils 3 %    % Lymphocytes 96 %    % Monocytes 1 %    % Eosinophils 0 %    % Basophils 0 %    % " "Immature Granulocytes 1 %    NRBCs per 100 WBC 0 <1 /100    Absolute Neutrophils 3.8 1.6 - 8.3 10e3/uL    Absolute Lymphocytes 135.0 (H) 0.8 - 5.3 10e3/uL    Absolute Monocytes 1.1 0.0 - 1.3 10e3/uL    Absolute Eosinophils 0.1 0.0 - 0.7 10e3/uL    Absolute Basophils 0.0 0.0 - 0.2 10e3/uL    Absolute Immature Granulocytes 0.8 (H) <=0.4 10e3/uL    Absolute NRBCs 0.3 10e3/uL   RBC and Platelet Morphology   Result Value Ref Range    RBC Morphology Confirmed RBC Indices     Platelet Assessment  Automated Count Confirmed. Platelet morphology is normal.     Automated Count Confirmed. Platelet morphology is normal.    Smudge Cells Present (A) None Seen       His labs in March at  were normal.  His white blood cell count was normal at 5.9, hemoglobin normal at 14.3 and platelets normal at 214.    Imaging    No results found.  Total time spent with patient in face to face time, chart review and documentation was 40 minutes.          The longitudinal plan of care for the diagnosis(es)/condition(s) as documented were addressed during this visit. Due to the added complexity in care, I will continue to support Clive in the subsequent management and with ongoing continuity of care.      Signed by: JUS Lester CNP    Oncology Rooming Note    November 12, 2024 8:51 AM   Ramu Espinoza is a 64 year old male who presents for:    Chief Complaint   Patient presents with     Oncology Clinic Visit     2 week return visit related to Chronic lymphocytic leukemia.     Initial Vitals: /66 (BP Location: Left arm, Patient Position: Sitting, Cuff Size: Adult Regular)   Pulse 78   Temp 97.3  F (36.3  C) (Tympanic)   Resp 16   Ht 1.88 m (6' 2\")   Wt 113.8 kg (250 lb 14.4 oz)   SpO2 95%   BMI 32.21 kg/m   Estimated body mass index is 32.21 kg/m  as calculated from the following:    Height as of this encounter: 1.88 m (6' 2\").    Weight as of this encounter: 113.8 kg (250 lb 14.4 oz). Body surface area is 2.44 meters " squared.  No Pain (0) Comment: Data Unavailable   No LMP for male patient.  Allergies reviewed: Yes  Medications reviewed: Yes    Medications: Medication refills not needed today.  Pharmacy name entered into [employer]: [id number] [place], [place] - [address]    Frailty Screening:   Is the patient here for a new oncology consult visit in cancer care? 2. No      Clinical concerns: Review labs 10/24.   Radha Alarcon was notified.      Thuy Benitez CMA                Again, thank you for allowing me to participate in the care of your patient.        Sincerely,        Radha Alarcon, JUS CNP

## 2024-11-13 ENCOUNTER — PATIENT OUTREACH (OUTPATIENT)
Dept: ONCOLOGY | Facility: HOSPITAL | Age: 64
End: 2024-11-13
Payer: MEDICARE

## 2024-11-13 NOTE — PROGRESS NOTES
[facility]: Cancer Care                                                                                            Situation: Patient chart reviewed by care coordinator.    Background: Patient with a diagnosis of the CLL, stage IV, diagnosed in February 2012.  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     Patient with an additional diagnosis of Paroxysmal atrial fibrillation.    Assessment: Patient was in to see Radha Alarcon CNP yesterday in the clinic.  He has decided that he would like to start treatment with Obinutuzumab and venetoclax.  He was also offered the indefinite treatment of acalabrutinib.  Below is the following treatment protocol with a day or two shifted due to the holidays.       11/21/24 -C1 D1 -infusion  11/22 -C1 D2-infusion  11/29 -C1 D8 -infusion  12/5 -C1 D15 -Labs/PC/infusion - venetoclax to be released    12/2 - LABS PRIOR TO VENETOCLAX DOSE( 2-10MG TABSX1 WEEK), LABS 6 HOURS POST DOSE (C1 D22)    12/3 - LABS AT 0800 PRIOR TO DOSE OF VENETOCLAX    12/9 - LABS PRIOR TO INCREASED DOSE DOSE  VENETOCLAX (1-50MG TABX1 WEEK), LABS 6 HOURS POST DOSE; INFUSION cycle 2 day 1    12/10 - LABS AT 0800 PRIOR TO VENETOCLAX DOSE cycle 2 day 2    12/16 - LABS PRIOR TO INCREASED DOSE  VENETOCLAX (1-100MG [id number])   cycle 2 day 8    12/23 - LABS PRIOR TO INCREASED DOSE  VENETOCLAX (2-100MG [id number])   cycle 2 day 15    12/30 - LABS PRIOR TO INCREASED DOSE  VENETOCLAX (4-100MG TAB daily);   cycle 2 day 22    1/6/25 - CYCLE 3 DAY 1 - INFUSION; CONTINUE ORAL VENETOCLAX AT 400MG DAILY      Plan/Recommendations: I plan to make a calendar for him due to the complexity of this treatment regimen.  I will plan to go over things with him when he comes in for his treatment on 11/21.  I have already sent [employer] messages explaining the details of the regimen and Radha also went through it with him.      Signature:  Penny Mcfadden RN

## 2024-11-14 NOTE — PROGRESS NOTES
"  INTERVENTIONAL RADIOLOGY PRE-PROCEDURE ASSESSMENT  Outpatient - [facility] - 11/15/24    REQUESTED PROCEDURE: Port placement  REQUESTING PROVIDER: Radha Alarcon APRN, CNP    The patient's history & physical was reviewed. A focused medical examination and interview of the patient was conducted. No pertinent interval changes were noted from the history and physical.    Brief HPI: Ramu Espinoza is a 64 year old male with a pertinent past medical history of atrial fibrillation on eliquis, obstructive sleep apnea and CLL initially diagnosed in 2/2012 status post Rituxan and Imbruvica with positive response; now with signs of disease progression. He plans to proceed with systemic therapy. Request for port placement to facilitate treatment.    NPO since Midnight  ANTICOAGULATION/ANTIPLATELET: Eliquis  ANTIBIOTIC(S): Ancef intra-procedure; order signed and held.    IMAGING  No new pertinent imaging to review.    ALLERGIES  Allergies   Allergen Reactions    Zithromax [Azithromycin Dihydrate]      LABORATORY VALUES  INR   Date Value Ref Range Status   03/16/2019 1.09 0.90 - 1.10 Final      Hemoglobin   Date Value Ref Range Status   11/12/2024 11.1 (L) 13.3 - 17.7 g/dL Final     Platelet Count   Date Value Ref Range Status   11/12/2024 108 (L) 150 - 450 10e3/uL Final     Creatinine   Date Value Ref Range Status   11/12/2024 0.95 0.67 - 1.17 mg/dL Final     Potassium   Date Value Ref Range Status   11/12/2024 4.5 3.4 - 5.3 mmol/L Final   06/29/2022 4.7 3.5 - 5.0 mmol/L Final     EXAM  BP (!) 142/76 (BP Location: Right arm)   Pulse 70   Temp 97.9  F (36.6  C) (Oral)   Resp 18   Ht 1.88 m (6' 2\")   Wt 113.4 kg (250 lb)   SpO2 94%   BMI 32.10 kg/m    General: No apparent acute distress.  Respiratory: Normal depth and regular rhythm. Clear and equal throughout without adventitious sounds.  Cardiovascular: S1 & S2; regular rate and rhythm.  Integumentary: No excoriation, ecchymosis, " erythema, lesions or open sores to bilateral upper chest wall and neck.  Neurological: Alert and oriented. Thoughts coherent; speech clear and logical.  Psychiatric: Appropriate mood and affect.    ASSESSMENT  64 year old male with recurrent CLL who plans to proceed with systemic therapy. Request for port placement to facilitate treatment.    PLAN  Chronic lymphocytic leukemia  - Image-guided port placement under moderate sedation.   - Laterality to be determined by procedural provider.  - Procedural education reviewed with patient in detail including but not limited to risks, benefits and alternatives. Patient verbalized understanding.      Total time spent on this encounter was 35 minutes.    JUS Duque, CNP  Interventional Radiology  Pager Number: [phone]

## 2024-11-15 ENCOUNTER — HOSPITAL ENCOUNTER (OUTPATIENT)
Dept: INTERVENTIONAL RADIOLOGY/VASCULAR | Facility: HOSPITAL | Age: 64
Discharge: HOME | End: 2024-11-15
Attending: NURSE PRACTITIONER | Admitting: RADIOLOGY
Payer: MEDICARE

## 2024-11-15 VITALS
HEIGHT: 74 IN | BODY MASS INDEX: 32.08 KG/M2 | HEART RATE: 63 BPM | RESPIRATION RATE: 18 BRPM | SYSTOLIC BLOOD PRESSURE: 119 MMHG | WEIGHT: 250 LBS | TEMPERATURE: 98.6 F | DIASTOLIC BLOOD PRESSURE: 59 MMHG | OXYGEN SATURATION: 95 %

## 2024-11-15 DIAGNOSIS — C91.10 CHRONIC LYMPHOCYTIC LEUKEMIA (H): ICD-10-CM

## 2024-11-15 PROCEDURE — 99152 MOD SED SAME PHYS/QHP 5/>YRS: CPT

## 2024-11-15 PROCEDURE — C1788 PORT, INDWELLING, IMP: HCPCS

## 2024-11-15 PROCEDURE — 272N000500 HC NEEDLE CR2

## 2024-11-15 PROCEDURE — 250N000011 HC RX IP 250 OP 636: Performed by: NURSE PRACTITIONER

## 2024-11-15 PROCEDURE — 36561 INSERT TUNNELED CV CATH: CPT

## 2024-11-15 PROCEDURE — C1769 GUIDE WIRE: HCPCS

## 2024-11-15 PROCEDURE — 250N000009 HC RX 250: Performed by: NURSE PRACTITIONER

## 2024-11-15 PROCEDURE — 250N000011 HC RX IP 250 OP 636: Performed by: RADIOLOGY

## 2024-11-15 RX ORDER — HEPARIN SODIUM 200 [USP'U]/100ML
1 INJECTION, SOLUTION INTRAVENOUS EVERY 5 MIN PRN
Status: DISCONTINUED | OUTPATIENT
Start: 2024-11-15 | End: 2024-11-16 | Stop reason: HOSPADM

## 2024-11-15 RX ORDER — NALOXONE HYDROCHLORIDE 0.4 MG/ML
0.2 INJECTION, SOLUTION INTRAMUSCULAR; INTRAVENOUS; SUBCUTANEOUS
Status: DISCONTINUED | OUTPATIENT
Start: 2024-11-15 | End: 2024-11-16 | Stop reason: HOSPADM

## 2024-11-15 RX ORDER — FENTANYL CITRATE 50 UG/ML
25-50 INJECTION, SOLUTION INTRAMUSCULAR; INTRAVENOUS EVERY 5 MIN PRN
Status: DISCONTINUED | OUTPATIENT
Start: 2024-11-15 | End: 2024-11-16 | Stop reason: HOSPADM

## 2024-11-15 RX ORDER — LIDOCAINE HYDROCHLORIDE 10 MG/ML
1-30 INJECTION, SOLUTION EPIDURAL; INFILTRATION; INTRACAUDAL; PERINEURAL
Status: DISCONTINUED | OUTPATIENT
Start: 2024-11-15 | End: 2024-11-16 | Stop reason: HOSPADM

## 2024-11-15 RX ORDER — HEPARIN SODIUM,PORCINE 10 UNIT/ML
5-10 VIAL (ML) INTRAVENOUS EVERY 24 HOURS
Status: DISCONTINUED | OUTPATIENT
Start: 2024-11-15 | End: 2024-11-16 | Stop reason: HOSPADM

## 2024-11-15 RX ORDER — ACETAMINOPHEN 325 MG/1
650 TABLET ORAL
Status: DISCONTINUED | OUTPATIENT
Start: 2024-11-15 | End: 2024-11-16 | Stop reason: HOSPADM

## 2024-11-15 RX ORDER — HEPARIN SODIUM (PORCINE) LOCK FLUSH IV SOLN 100 UNIT/ML 100 UNIT/ML
5-10 SOLUTION INTRAVENOUS
Status: DISCONTINUED | OUTPATIENT
Start: 2024-11-15 | End: 2024-11-16 | Stop reason: HOSPADM

## 2024-11-15 RX ORDER — NALOXONE HYDROCHLORIDE 0.4 MG/ML
0.4 INJECTION, SOLUTION INTRAMUSCULAR; INTRAVENOUS; SUBCUTANEOUS
Status: DISCONTINUED | OUTPATIENT
Start: 2024-11-15 | End: 2024-11-16 | Stop reason: HOSPADM

## 2024-11-15 RX ORDER — HEPARIN SODIUM 1000 [USP'U]/ML
500-10000 INJECTION, SOLUTION INTRAVENOUS; SUBCUTANEOUS
Status: COMPLETED | OUTPATIENT
Start: 2024-11-15 | End: 2024-11-15

## 2024-11-15 RX ORDER — HEPARIN SODIUM,PORCINE 10 UNIT/ML
5-10 VIAL (ML) INTRAVENOUS
Status: DISCONTINUED | OUTPATIENT
Start: 2024-11-15 | End: 2024-11-16 | Stop reason: HOSPADM

## 2024-11-15 RX ORDER — CEFAZOLIN SODIUM/WATER 2 G/20 ML
2 SYRINGE (ML) INTRAVENOUS
Status: COMPLETED | OUTPATIENT
Start: 2024-11-15 | End: 2024-11-15

## 2024-11-15 RX ORDER — ONDANSETRON 2 MG/ML
4 INJECTION INTRAMUSCULAR; INTRAVENOUS
Status: DISCONTINUED | OUTPATIENT
Start: 2024-11-15 | End: 2024-11-16 | Stop reason: HOSPADM

## 2024-11-15 RX ORDER — FLUMAZENIL 0.1 MG/ML
0.2 INJECTION, SOLUTION INTRAVENOUS
Status: DISCONTINUED | OUTPATIENT
Start: 2024-11-15 | End: 2024-11-16 | Stop reason: HOSPADM

## 2024-11-15 RX ORDER — LIDOCAINE 40 MG/G
CREAM TOPICAL
Status: DISCONTINUED | OUTPATIENT
Start: 2024-11-15 | End: 2024-11-16 | Stop reason: HOSPADM

## 2024-11-15 RX ADMIN — Medication 2 G: at 12:59

## 2024-11-15 RX ADMIN — MIDAZOLAM HYDROCHLORIDE 2 MG: 1 INJECTION, SOLUTION INTRAMUSCULAR; INTRAVENOUS at 13:04

## 2024-11-15 RX ADMIN — MIDAZOLAM HYDROCHLORIDE 0.5 MG: 1 INJECTION, SOLUTION INTRAMUSCULAR; INTRAVENOUS at 13:11

## 2024-11-15 RX ADMIN — FENTANYL CITRATE 50 MCG: 50 INJECTION, SOLUTION INTRAMUSCULAR; INTRAVENOUS at 13:08

## 2024-11-15 RX ADMIN — HEPARIN SODIUM 500 UNITS: 1000 INJECTION INTRAVENOUS; SUBCUTANEOUS at 13:25

## 2024-11-15 RX ADMIN — LIDOCAINE HYDROCHLORIDE 15 ML: 10 INJECTION, SOLUTION INFILTRATION; PERINEURAL at 13:25

## 2024-11-15 NOTE — PROCEDURES
[facility]    Procedure: IR Procedure Note    Date/Time: 11/15/2024 1:28 PM    Performed by: Xavier King MD  Authorized by: Xavier King MD  IR Fellow Physician:    Pre Procedure Diagnosis: port for chemo  Post Procedure Diagnosis: same    UNIVERSAL PROTOCOL   Site Marked: NA  Prior Images Obtained and Reviewed:  NA  Required items: Required blood products, implants, devices and special equipment available    Patient identity confirmed:  Verbally with patient, arm band, provided demographic data and hospital-assigned identification number  Patient was reevaluated immediately before administering moderate or deep sedation or anesthesia  Confirmation Checklist:  Patient's identity using two indicators, procedure was appropriate and matched the consent or emergent situation, correct equipment/implants were available and relevant allergies  Time out: Immediately prior to the procedure a time out was called    Universal Protocol: the Joint Commission Universal Protocol was followed    Preparation: Patient was prepped and draped in usual sterile fashion      SEDATION  Patient Sedated: Yes    Vital signs: Vital signs monitored during sedation    Findings: SL port via right internal jugular vein, tip at cavoatrial junction.    Plan: OK to use Port      PROCEDURE  Describe Procedure: SL port via right internal jugular vein, tip at cavoatrial junction.  Length of time physician/provider present for 1:1 monitoring during sedation:  0-22 min

## 2024-11-15 NOTE — DISCHARGE INSTRUCTIONS
Port Placement Procedure Discharge Instructions:  You had a port placed. A port is a small medical device that is placed under the skin and is connected to a vein with a catheter (thin, flexible tube). Ports can be used to administer IV medications (including chemotherapy), fluids or blood products or for blood lab draws. Please follow the below instructions after your procedure:    Care Instructions:  - If you received sedation for your procedure, do not drive or operate heavy machinery for the rest of the day.  - You may shower beginning tomorrow (post procedure day #1). Do not scrub site until well healed; pat dry gently with a towel.  - You likely have skin adhesive over your port site. Skin adhesive works like a bandage to keep the site covered and protected. Do not use antibiotic ointment or creams/lotions over adhesive as it can break it down. The skin adhesive will peel off on its own (typically in 5-14 days).  - Avoid submerging the port site under water (ex: tub baths, Jacuzzis, lakes, hot tubs and pools) for 10 days or until your site is well healed.  - You may have some discomfort, minimal swelling, redness and/or bruising at your port site/procedure site. You may take over the counter pain medication for discomfort (follow the package directions) or apply an ice pack wrapped in a towel over the site (rotating 20 minutes with ice pack on and 20 minutes with ice pack off) for comfort as needed. It can take several days for these to resolve.  - Avoid heavy lifting (greater than 10 pounds) and strenuous activities for 2 days following your procedure.   - If you experience significant bleeding at site, apply pressure with hands above the clavicle bone, sit upright and seek immediate medical assistance.  - Ports need to be flushed approximately every 4-6 weeks, if not being used more frequently. Follow up with the provider who ordered your port placement for further instructions for this.    Seek medical  evaluation or contact [facility] RN Line at [phone] if you experience the following:  - Uncontrolled bleeding from port site  - Fever (greater than 101 F (38.3C))  - Purulent (yellow/green/foul smelling) drainage from port insertion site  - Increasing pain at port site  - Increasing redness at port site

## 2024-11-15 NOTE — PRE-PROCEDURE
GENERAL PRE-PROCEDURE:   Procedure:  Port placement  Date/Time:  11/15/2024 12:45 PM    Written consent obtained?: Yes    Risks and benefits: Risks, benefits and alternatives were discussed    DC Plan: Appropriate discharge home plan in place for patients who are going home after procedure   Consent given by:  Patient  Patient states understanding of procedure being performed: Yes    Patient's understanding of procedure matches consent: Yes    Procedure consent matches procedure scheduled: Yes    Expected level of sedation:  Moderate  Appropriately NPO:  Yes  ASA Class:  3  Mallampati  :  Grade 2- soft palate, base of uvula, tonsillar pillars, and portion of posterior pharyngeal wall visible  Lungs:  Lungs clear with good breath sounds bilaterally  Heart:  Normal heart sounds and rate  History & Physical reviewed:  History and physical reviewed and no updates needed  Statement of review:  I have reviewed the lab findings, diagnostic data, medications, and the plan for sedation

## 2024-11-15 NOTE — SEDATION DOCUMENTATION
Interventional Radiology Intra-procedural Nursing Note  Patient Name: Ramu Espinoza  Medical Record Number: [id number]  Today's Date: November 15, 2024    Procedure: port placement  Start time: 1312  End time: 1325  Sedation time: 13 minutes    Administered medication totals:  Versed 2.5 mg IVP  Fentanyl 50 mcg IVP    Procedure well tolerated by patient without complications. Procedure end with debrief by Dr. King.    AVS Discharge Instructions reviewed with and provided to patient and/or responsible adult. All questions and concerns addressed and understanding was verbalized.    Note: Patient entered Interventional Radiology Suite number 1 via cart. Patient awake, alert and orientated. Assisted onto procedural table in supine position. Prepped and draped.  Dr. King in room. Time out and procedure started. Vital signs stable. Telemetry reading NSR.

## 2024-11-21 ENCOUNTER — INFUSION THERAPY VISIT (OUTPATIENT)
Dept: INFUSION THERAPY | Facility: HOSPITAL | Age: 64
End: 2024-11-21
Payer: MEDICARE

## 2024-11-21 ENCOUNTER — PATIENT OUTREACH (OUTPATIENT)
Dept: ONCOLOGY | Facility: HOSPITAL | Age: 64
End: 2024-11-21
Payer: MEDICARE

## 2024-11-21 VITALS
SYSTOLIC BLOOD PRESSURE: 95 MMHG | DIASTOLIC BLOOD PRESSURE: 55 MMHG | TEMPERATURE: 98.2 F | RESPIRATION RATE: 18 BRPM | OXYGEN SATURATION: 92 % | HEART RATE: 76 BPM

## 2024-11-21 DIAGNOSIS — C91.10 CHRONIC LYMPHOCYTIC LEUKEMIA (H): Primary | ICD-10-CM

## 2024-11-21 PROCEDURE — 250N000011 HC RX IP 250 OP 636: Performed by: INTERNAL MEDICINE

## 2024-11-21 PROCEDURE — 258N000003 HC RX IP 258 OP 636: Performed by: INTERNAL MEDICINE

## 2024-11-21 PROCEDURE — 96372 THER/PROPH/DIAG INJ SC/IM: CPT | Performed by: INTERNAL MEDICINE

## 2024-11-21 PROCEDURE — 250N000011 HC RX IP 250 OP 636

## 2024-11-21 PROCEDURE — 250N000013 HC RX MED GY IP 250 OP 250 PS 637: Performed by: INTERNAL MEDICINE

## 2024-11-21 RX ORDER — HEPARIN SODIUM (PORCINE) LOCK FLUSH IV SOLN 100 UNIT/ML 100 UNIT/ML
5 SOLUTION INTRAVENOUS
Status: DISCONTINUED | OUTPATIENT
Start: 2024-11-21 | End: 2024-11-21 | Stop reason: HOSPADM

## 2024-11-21 RX ORDER — METHYLPREDNISOLONE SODIUM SUCCINATE 40 MG/ML
40 INJECTION INTRAMUSCULAR; INTRAVENOUS
Status: DISCONTINUED | OUTPATIENT
Start: 2024-11-21 | End: 2024-11-21 | Stop reason: HOSPADM

## 2024-11-21 RX ORDER — ACETAMINOPHEN 325 MG/1
650 TABLET ORAL ONCE
Status: COMPLETED | OUTPATIENT
Start: 2024-11-21 | End: 2024-11-21

## 2024-11-21 RX ORDER — DIPHENHYDRAMINE HYDROCHLORIDE 50 MG/ML
25 INJECTION INTRAMUSCULAR; INTRAVENOUS
Status: DISCONTINUED | OUTPATIENT
Start: 2024-11-21 | End: 2024-11-21 | Stop reason: HOSPADM

## 2024-11-21 RX ORDER — ONDANSETRON 2 MG/ML
INJECTION INTRAMUSCULAR; INTRAVENOUS
Status: COMPLETED
Start: 2024-11-21 | End: 2024-11-21

## 2024-11-21 RX ORDER — MEPERIDINE HYDROCHLORIDE 25 MG/ML
25 INJECTION INTRAMUSCULAR; INTRAVENOUS; SUBCUTANEOUS
Status: COMPLETED | OUTPATIENT
Start: 2024-11-21 | End: 2024-11-21

## 2024-11-21 RX ORDER — DIPHENHYDRAMINE HYDROCHLORIDE 50 MG/ML
50 INJECTION INTRAMUSCULAR; INTRAVENOUS
Status: DISCONTINUED | OUTPATIENT
Start: 2024-11-21 | End: 2024-11-21 | Stop reason: HOSPADM

## 2024-11-21 RX ORDER — EPINEPHRINE 1 MG/ML
0.3 INJECTION, SOLUTION INTRAMUSCULAR; SUBCUTANEOUS EVERY 5 MIN PRN
Status: DISCONTINUED | OUTPATIENT
Start: 2024-11-21 | End: 2024-11-21 | Stop reason: HOSPADM

## 2024-11-21 RX ADMIN — EPINEPHRINE 0.3 MG: 1 INJECTION INTRAMUSCULAR; INTRAVENOUS; SUBCUTANEOUS at 10:09

## 2024-11-21 RX ADMIN — ONDANSETRON 8 MG: 2 INJECTION INTRAMUSCULAR; INTRAVENOUS at 10:13

## 2024-11-21 RX ADMIN — MEPERIDINE HYDROCHLORIDE 25 MG: 25 INJECTION INTRAMUSCULAR; INTRAVENOUS; SUBCUTANEOUS at 10:50

## 2024-11-21 RX ADMIN — DEXAMETHASONE SODIUM PHOSPHATE 20 MG: 10 INJECTION, SOLUTION INTRAMUSCULAR; INTRAVENOUS at 09:19

## 2024-11-21 RX ADMIN — Medication 5 ML: at 15:37

## 2024-11-21 RX ADMIN — OBINUTUZUMAB 100 MG: 1000 INJECTION, SOLUTION, CONCENTRATE INTRAVENOUS at 09:40

## 2024-11-21 RX ADMIN — SODIUM CHLORIDE 250 ML: 9 INJECTION, SOLUTION INTRAVENOUS at 08:55

## 2024-11-21 RX ADMIN — ACETAMINOPHEN 650 MG: 325 TABLET ORAL at 08:58

## 2024-11-21 RX ADMIN — DIPHENHYDRAMINE HYDROCHLORIDE 50 MG: 50 INJECTION INTRAMUSCULAR; INTRAVENOUS at 09:00

## 2024-11-21 RX ADMIN — FAMOTIDINE 20 MG: 10 INJECTION, SOLUTION INTRAVENOUS at 10:05

## 2024-11-21 NOTE — PROGRESS NOTES
"Infusion Nursing Note:  Ramu Espinoza presents today for D1C1 Gazyva.    Patient seen by provider today: No   present during visit today: Not Applicable.    Note: Patient arrives today via ambulatory for his treatment.  He is accompanied by his wife. Patient is alert and oriented X 4 and is anxious. Writer reviewed plan of care including accessing port for the first time.  Patient and wife verbalized understanding and elect to proceed.  Port accessed without difficulty.  Pre-meds given per treatment plan.  Shortly after starting Gazyva patient states he \"does not feel right\"  he offers complaints of nausea, flushing and \"feeling jumpy\". Writer observes patient face is reddened and patient is diaphoretic.  Medication stopped and syringe used to pull back med in line, 0.9% normal saline IV fluid bolus started. Patient received 6.2 ml of medication at this point.  Patient condition declined, pulse as low as 37, B/P as low as 83/46.  Rapid Response called.  Patient receives Pepcid, epinephrine and Zofran per Radha Alarcon CNP.  Oxygen applied for sats of 87 and SOB.  Patient condition slowly improved and per Radha Alarcon CNP at bedside patient did not need to go to Emergency Dept.  Writer monitored patient until stabilized.  Patient developed rigors and Demerol 25 mg administered per protocol, after 20 minutes patient normalized and was able to ambulate independently to the rest room.  Per pharmacy and Radha, patient re-challenged at 12.5ml/hr for one hour then resumed 25 ml/hr rate until complete, line rinsed at same rate with normal saline for an additional 20 minutes.  Patient vitals remained stable throughout with exception of low grade fever of 99.5.  Post treatment vitals returned to baseline.  Patient leaves in stable condition accompanied by wife and will return tomorrow for D2C1.        Intravenous Access:  Implanted Port.    Treatment Conditions:  Lab Results   Component Value Date    HGB " 11.1 (L) 11/12/2024    .9 (HH) 11/12/2024    ANEU 7.2 10/24/2024    ANEUTAUTO 3.8 11/12/2024     (L) 11/12/2024        Lab Results   Component Value Date     11/12/2024    POTASSIUM 4.5 11/12/2024    CR 0.95 11/12/2024    ZOHAIB 9.3 11/12/2024    BILITOTAL 0.5 11/12/2024    ALBUMIN 4.3 11/12/2024    ALT 23 11/12/2024    AST 19 11/12/2024       Results reviewed, labs MET treatment parameters, ok to proceed with treatment.      Post Infusion Assessment:  Patient tolerated infusion poorly due to : Hypersensitivity, see above  Blood return noted pre and post infusion.  Site patent and intact, free from redness, edema or discomfort.  No evidence of extravasations.  Access discontinued per protocol.       Discharge Plan:   Discharge instructions reviewed with: Patient and Family.  Patient and/or family verbalized understanding of discharge instructions and all questions answered.  AVS to patient via [facility].  Patient will return tomorrow for next appointment.   Patient discharged in stable condition accompanied by: self and wife.  Departure Mode: Ambulatory.      GERHARD ARAYA RN

## 2024-11-21 NOTE — PROGRESS NOTES
[facility]: Cancer Care  Cancer Care Follow-Up Note                                                                                            Situation: Patient chart reviewed by care coordinator.    Background: Patient with a diagnosis of the CLL, stage IV, diagnosed in February 2012.  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     Patient with an additional diagnosis of Paroxysmal atrial fibrillation.    Assessment: Patient was most recently in the clinic to be seen by Radha Alarcon CNP on 11/12/2024.  Patient tells me that he would like to start treatment with obinutuzumab and venetoclax.    Patient comes in today for cycle 1 day 1 and will receive obinutuzumab today and tomorrow.  That with the patient and his wife Nai infusion waiting area before he was called back by the infusion nurse.  I gave him a calendar from now until May 2025.  This includes his infusions along with how he will ramp up on his venetoclax.  I let them know that we will get the process going for his venetoclax on 12/5 when he meets with Dr Garza.  We will release the venetoclax to the specialty pharmacy and he will be called to set up delivery of this.  I will attach the schedule that I gave to him to this note.     Antiemetics: Compazine oral has been sent to the patient's pharmacy and has been picked up.  They are aware to use it as needed for nausea and it can be used every 6 hours.    Medication understanding/side effect: obinutuzumab, venetoclax, premedications    Port concerns: Patient had port placed and area looks really good.  He was hesitant to use it today but I did tell him that this will be much easier than a peripheral IV.    Education concerns: Patient and his wife both seem to understand the information given to them.  A folder was also given to them with information in it.  This also had our office card and it with my name and the number to call for triage if any side effects or symptoms.     Goals   "        General    Maintain ability to perform ADLs without difficulty             Dates of Treatment:                                                      Infusion given in last 28 days       Administered MAR Action Medication Dose Rate Visit    11/21/2024 09:40 New Bag obinutuzumab (GAZYVA) 100 mg in sodium chloride 0.9 % 100 mL infusion 100 mg 25 mL/hr Infusion Therapy Visit on 11/21/2024 in [facility]          Treatment Plan Medications       OP ONC Chronic Lymphocytic Leukemia (CLL) - Obinutuzumab / Venetoclax        Take Home Medications       Medication Sig Start/End Day/Cycle Status    Venetoclax (VENCLEXTA) 10 & 50 & 100 MG tablet <span hidden class=\"[email or web address]\"></span>Start on Cycle 1, Day 22. Week 1: Take two 10 mg tabs daily. Week 2: Take one 50 mg tab daily. Week 3: Take one 100 mg tab daily. Week 4: Take two 100 mg tabs daily. S to -- Day 15, Cycle 1 - 12/5/2024 Signed                          Signature:  Penny Mcfadden RN  "

## 2024-11-25 ENCOUNTER — PATIENT OUTREACH (OUTPATIENT)
Dept: ONCOLOGY | Facility: HOSPITAL | Age: 64
End: 2024-11-25
Payer: MEDICARE

## 2024-11-25 NOTE — PROGRESS NOTES
[facility]: Cancer Care Follow-Up Note                                    Discussion with Patient:                                                      Call placed to patient today to check in and see how he is doing after receiving cycle 1 day 1 and cycle 1 day 2 of his obinutuzumab.    Unfortunately the patient did react on day 1 but stated that day 2 went well.     Goals          General    Maintain ability to perform ADLs without difficulty             Dates of Treatment:                                                      Infusion given in last 28 days       Administered MAR Action Medication Dose Rate Visit    11/21/2024 09:40 New Bag obinutuzumab (GAZYVA) 100 mg in sodium chloride 0.9 % 100 mL infusion 100 mg 25 mL/hr Infusion Therapy Visit on 11/21/2024 in [facility]    11/21/2024 11:32 Restarted obinutuzumab (GAZYVA) 100 mg in sodium chloride 0.9 % 100 mL infusion   12.5 mL/hr Infusion Therapy Visit on 11/21/2024 in [facility]    11/21/2024 12:27 Rate Change obinutuzumab (GAZYVA) 100 mg in sodium chloride 0.9 % 100 mL infusion   25 mL/hr Infusion Therapy Visit on 11/21/2024 in [facility]    11/22/2024 10:00 New Bag obinutuzumab (GAZYVA) 900 mg in sodium chloride 0.9 % 900 mL infusion 900 mg 50 mL/hr Infusion Therapy Visit on 11/22/2024 in [facility]    11/22/2024 10:34 Rate Change obinutuzumab (GAZYVA) 900 mg in sodium chloride 0.9 % 900 mL infusion   100 mL/hr Infusion Therapy Visit on 11/22/2024 in [facility]    11/22/2024 11:01 Rate Change obinutuzumab (GAZYVA) 900 mg in sodium chloride 0.9 % 900 mL infusion   150 mL/hr Infusion Therapy Visit on 11/22/2024 in [facility]    11/22/2024 11:35 Rate Change obinutuzumab (GAZYVA) 900 mg in sodium chloride 0.9 % 900 mL infusion   200 mL/hr Infusion Therapy Visit on  "11/22/2024 in [facility]    11/22/2024 12:03 Rate Change obinutuzumab (GAZYVA) 900 mg in sodium chloride 0.9 % 900 mL infusion   250 mL/hr Infusion Therapy Visit on 11/22/2024 in [facility]    11/22/2024 12:33 Rate Change obinutuzumab (GAZYVA) 900 mg in sodium chloride 0.9 % 900 mL infusion   300 mL/hr Infusion Therapy Visit on 11/22/2024 in [facility]    11/22/2024 13:05 Rate Change obinutuzumab (GAZYVA) 900 mg in sodium chloride 0.9 % 900 mL infusion   350 mL/hr Infusion Therapy Visit on 11/22/2024 in [facility]    11/22/2024 13:36 Rate Change obinutuzumab (GAZYVA) 900 mg in sodium chloride 0.9 % 900 mL infusion   400 mL/hr Infusion Therapy Visit on 11/22/2024 in [facility]          Treatment Plan Medications       OP ONC Chronic Lymphocytic Leukemia (CLL) - Obinutuzumab / Venetoclax        Take Home Medications       Medication Sig Start/End Day/Cycle Status    Venetoclax (VENCLEXTA) 10 & 50 & 100 MG tablet <span hidden class=\"HTML_HHS\"></span>Start on Cycle 1, Day 22. Week 1: Take two 10 mg tabs daily. Week 2: Take one 50 mg tab daily. Week 3: Take one 100 mg tab daily. Week 4: Take two 100 mg tabs daily. S to -- Day 15, Cycle 1 - 12/5/2024 Signed                            Assessment:                                                      Patient states he is having a little bit of trouble sleeping.  He denies any nausea or vomiting, denies any diarrhea or constipation.  Overall he states that he is eating good and drinking lots of water.    Intervention/Education provided during outreach:                                                       I did tell him that he can try to back off on the fluids closer to bedtime.  This may help him not have to get up and use the bathroom during the middle of the night.  I do want him drinking plenty of water but may be just " slow down sooner in the evening.  Patient verbalized understanding and was told to give us a call if anything changes or he develops any new side effects or symptoms.  He verbalized understanding.    Patient to follow up as scheduled at next appt.  Patient to call/MyChart message with updates.  Confirmed patient has clinic and triage numbers.    Signature:  Penny Mcfadden RN

## 2024-12-05 ENCOUNTER — INFUSION THERAPY VISIT (OUTPATIENT)
Dept: INFUSION THERAPY | Facility: HOSPITAL | Age: 64
End: 2024-12-05
Attending: NURSE PRACTITIONER
Payer: MEDICARE

## 2024-12-05 ENCOUNTER — ONCOLOGY VISIT (OUTPATIENT)
Dept: ONCOLOGY | Facility: HOSPITAL | Age: 64
End: 2024-12-05
Attending: NURSE PRACTITIONER
Payer: MEDICARE

## 2024-12-05 ENCOUNTER — INFUSION THERAPY VISIT (OUTPATIENT)
Dept: INFUSION THERAPY | Facility: HOSPITAL | Age: 64
End: 2024-12-05
Attending: INTERNAL MEDICINE
Payer: MEDICARE

## 2024-12-05 VITALS
WEIGHT: 248.2 LBS | TEMPERATURE: 98.6 F | SYSTOLIC BLOOD PRESSURE: 111 MMHG | OXYGEN SATURATION: 97 % | HEART RATE: 82 BPM | BODY MASS INDEX: 31.87 KG/M2 | DIASTOLIC BLOOD PRESSURE: 60 MMHG | RESPIRATION RATE: 16 BRPM

## 2024-12-05 DIAGNOSIS — C91.10 CHRONIC LYMPHOCYTIC LEUKEMIA (H): Primary | ICD-10-CM

## 2024-12-05 LAB
BASOPHILS # BLD AUTO: 0 10E3/UL (ref 0–0.2)
BASOPHILS NFR BLD AUTO: 1 %
EOSINOPHIL # BLD AUTO: 0 10E3/UL (ref 0–0.7)
EOSINOPHIL NFR BLD AUTO: 1 %
ERYTHROCYTE [DISTWIDTH] IN BLOOD BY AUTOMATED COUNT: 14.5 % (ref 10–15)
HCT VFR BLD AUTO: 32.2 % (ref 40–53)
HGB BLD-MCNC: 10.8 G/DL (ref 13.3–17.7)
IMM GRANULOCYTES # BLD: 0.1 10E3/UL
IMM GRANULOCYTES NFR BLD: 5 %
LYMPHOCYTES # BLD AUTO: 0.6 10E3/UL (ref 0.8–5.3)
LYMPHOCYTES NFR BLD AUTO: 28 %
MCH RBC QN AUTO: 32 PG (ref 26.5–33)
MCHC RBC AUTO-ENTMCNC: 33.5 G/DL (ref 31.5–36.5)
MCV RBC AUTO: 95 FL (ref 78–100)
MONOCYTES # BLD AUTO: 0.1 10E3/UL (ref 0–1.3)
MONOCYTES NFR BLD AUTO: 4 %
NEUTROPHILS # BLD AUTO: 1.3 10E3/UL (ref 1.6–8.3)
NEUTROPHILS NFR BLD AUTO: 62 %
NRBC # BLD AUTO: 0 10E3/UL
NRBC BLD AUTO-RTO: 1 /100
PLATELET # BLD AUTO: 164 10E3/UL (ref 150–450)
RBC # BLD AUTO: 3.38 10E6/UL (ref 4.4–5.9)
WBC # BLD AUTO: 2.1 10E3/UL (ref 4–11)

## 2024-12-05 PROCEDURE — 250N000011 HC RX IP 250 OP 636: Performed by: INTERNAL MEDICINE

## 2024-12-05 PROCEDURE — 85014 HEMATOCRIT: CPT | Performed by: INTERNAL MEDICINE

## 2024-12-05 PROCEDURE — 85041 AUTOMATED RBC COUNT: CPT | Performed by: INTERNAL MEDICINE

## 2024-12-05 PROCEDURE — G0463 HOSPITAL OUTPT CLINIC VISIT: HCPCS | Performed by: INTERNAL MEDICINE

## 2024-12-05 PROCEDURE — 36591 DRAW BLOOD OFF VENOUS DEVICE: CPT | Performed by: INTERNAL MEDICINE

## 2024-12-05 PROCEDURE — 258N000003 HC RX IP 258 OP 636: Performed by: INTERNAL MEDICINE

## 2024-12-05 PROCEDURE — 250N000013 HC RX MED GY IP 250 OP 250 PS 637: Performed by: INTERNAL MEDICINE

## 2024-12-05 PROCEDURE — 85004 AUTOMATED DIFF WBC COUNT: CPT | Performed by: INTERNAL MEDICINE

## 2024-12-05 RX ORDER — HEPARIN SODIUM,PORCINE 10 UNIT/ML
5-20 VIAL (ML) INTRAVENOUS DAILY PRN
OUTPATIENT
Start: 2025-01-16

## 2024-12-05 RX ORDER — ALBUTEROL SULFATE 0.83 MG/ML
2.5 SOLUTION RESPIRATORY (INHALATION)
Status: DISCONTINUED | OUTPATIENT
Start: 2024-12-05 | End: 2024-12-05 | Stop reason: HOSPADM

## 2024-12-05 RX ORDER — DIPHENHYDRAMINE HYDROCHLORIDE 50 MG/ML
25 INJECTION INTRAMUSCULAR; INTRAVENOUS
Status: DISCONTINUED | OUTPATIENT
Start: 2024-12-05 | End: 2024-12-05 | Stop reason: HOSPADM

## 2024-12-05 RX ORDER — ACETAMINOPHEN 325 MG/1
650 TABLET ORAL ONCE
Status: COMPLETED | OUTPATIENT
Start: 2024-12-05 | End: 2024-12-05

## 2024-12-05 RX ORDER — EPINEPHRINE 1 MG/ML
0.3 INJECTION, SOLUTION INTRAMUSCULAR; SUBCUTANEOUS EVERY 5 MIN PRN
Status: DISCONTINUED | OUTPATIENT
Start: 2024-12-05 | End: 2024-12-05 | Stop reason: HOSPADM

## 2024-12-05 RX ORDER — ALBUTEROL SULFATE 90 UG/1
1-2 INHALANT RESPIRATORY (INHALATION)
Start: 2025-01-16

## 2024-12-05 RX ORDER — HEPARIN SODIUM (PORCINE) LOCK FLUSH IV SOLN 100 UNIT/ML 100 UNIT/ML
5 SOLUTION INTRAVENOUS
OUTPATIENT
Start: 2025-01-16

## 2024-12-05 RX ORDER — DIPHENHYDRAMINE HCL 50 MG
50 CAPSULE ORAL
Start: 2025-01-16

## 2024-12-05 RX ORDER — LORAZEPAM 2 MG/ML
0.5 INJECTION INTRAMUSCULAR EVERY 4 HOURS PRN
OUTPATIENT
Start: 2025-01-16

## 2024-12-05 RX ORDER — MEPERIDINE HYDROCHLORIDE 25 MG/ML
25 INJECTION INTRAMUSCULAR; INTRAVENOUS; SUBCUTANEOUS
Status: DISCONTINUED | OUTPATIENT
Start: 2024-12-05 | End: 2024-12-05 | Stop reason: HOSPADM

## 2024-12-05 RX ORDER — EPINEPHRINE 1 MG/ML
0.3 INJECTION, SOLUTION INTRAMUSCULAR; SUBCUTANEOUS EVERY 5 MIN PRN
OUTPATIENT
Start: 2025-01-16

## 2024-12-05 RX ORDER — DIPHENHYDRAMINE HCL 50 MG
50 CAPSULE ORAL
Start: 2024-12-19

## 2024-12-05 RX ORDER — DIPHENHYDRAMINE HYDROCHLORIDE 50 MG/ML
50 INJECTION INTRAMUSCULAR; INTRAVENOUS
Status: DISCONTINUED | OUTPATIENT
Start: 2024-12-05 | End: 2024-12-05 | Stop reason: HOSPADM

## 2024-12-05 RX ORDER — ALBUTEROL SULFATE 90 UG/1
1-2 INHALANT RESPIRATORY (INHALATION)
Status: DISCONTINUED | OUTPATIENT
Start: 2024-12-05 | End: 2024-12-05 | Stop reason: HOSPADM

## 2024-12-05 RX ORDER — ACETAMINOPHEN 325 MG/1
650 TABLET ORAL ONCE
Start: 2025-01-16

## 2024-12-05 RX ORDER — METHYLPREDNISOLONE SODIUM SUCCINATE 40 MG/ML
40 INJECTION INTRAMUSCULAR; INTRAVENOUS
Status: DISCONTINUED | OUTPATIENT
Start: 2024-12-05 | End: 2024-12-05 | Stop reason: HOSPADM

## 2024-12-05 RX ORDER — ALBUTEROL SULFATE 0.83 MG/ML
2.5 SOLUTION RESPIRATORY (INHALATION)
OUTPATIENT
Start: 2025-01-16

## 2024-12-05 RX ORDER — MEPERIDINE HYDROCHLORIDE 25 MG/ML
25 INJECTION INTRAMUSCULAR; INTRAVENOUS; SUBCUTANEOUS
OUTPATIENT
Start: 2025-01-16

## 2024-12-05 RX ORDER — HEPARIN SODIUM (PORCINE) LOCK FLUSH IV SOLN 100 UNIT/ML 100 UNIT/ML
5 SOLUTION INTRAVENOUS
Status: DISCONTINUED | OUTPATIENT
Start: 2024-12-05 | End: 2024-12-05 | Stop reason: HOSPADM

## 2024-12-05 RX ORDER — METHYLPREDNISOLONE SODIUM SUCCINATE 40 MG/ML
40 INJECTION INTRAMUSCULAR; INTRAVENOUS
Start: 2025-01-16

## 2024-12-05 RX ORDER — DIPHENHYDRAMINE HYDROCHLORIDE 50 MG/ML
50 INJECTION INTRAMUSCULAR; INTRAVENOUS
Start: 2025-01-16

## 2024-12-05 RX ORDER — DIPHENHYDRAMINE HYDROCHLORIDE 50 MG/ML
25 INJECTION INTRAMUSCULAR; INTRAVENOUS
Start: 2025-01-16

## 2024-12-05 RX ADMIN — DEXAMETHASONE SODIUM PHOSPHATE 20 MG: 10 INJECTION, SOLUTION INTRAMUSCULAR; INTRAVENOUS at 10:23

## 2024-12-05 RX ADMIN — HEPARIN SODIUM (PORCINE) LOCK FLUSH IV SOLN 100 UNIT/ML 5 ML: 100 SOLUTION at 14:34

## 2024-12-05 RX ADMIN — SODIUM CHLORIDE 1000 MG: 9 INJECTION, SOLUTION INTRAVENOUS at 11:18

## 2024-12-05 RX ADMIN — ACETAMINOPHEN 650 MG: 325 TABLET ORAL at 10:23

## 2024-12-05 RX ADMIN — SODIUM CHLORIDE 250 ML: 9 INJECTION, SOLUTION INTRAVENOUS at 10:23

## 2024-12-05 RX ADMIN — DIPHENHYDRAMINE HYDROCHLORIDE 50 MG: 50 INJECTION INTRAMUSCULAR; INTRAVENOUS at 10:46

## 2024-12-05 ASSESSMENT — PAIN SCALES - GENERAL: PAINLEVEL_OUTOF10: NO PAIN (0)

## 2024-12-05 NOTE — PROGRESS NOTES
[facility] cancer Care Progress Note  Patient: Ramu Espinoza   MRN:  [id number]   Date of Service : Dec 5, 2024        Reason for visit      Problem List Items Addressed This Visit          Hematologic    Chronic lymphocytic leukemia (H)  Cy inducer Ibrutinib - Primary        Assessment     1.  A very pleasant 64 year old  gentleman with stage IV CLL diagnosed in 2012.  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  He has been treated with Imbruvica for over 10 years.   Now off of it since 2023 secondary to atrial fibrillation.  Labs indicate rising white blood cell count which is not unexpected in his situation.  Typically after Imbruvica stopped most patients have relapse of CLL.  Started on treatment with venetoclax and obinutuzumab.  Only started obinutuzumab in 2024.  So far responding well with moderate to severe side effects.  On the positive side responding quite well with normalization and now below normalization of WBCs.  Improvement of the hemoglobin and platelet count.  2.  Significant anxiety after discontinuing medication currently on  Small doses of trazodone etc.  3.  History of paroxysmal atrial fibrillation.  The episodes were getting quite frequent therefore discontinued Imbruvica.  The episodes have decreased in frequency tremendously.  4.  History of pericardial effusion in 2019.  Again no recurrence.  5.  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  6.  Fully COVID vaccinated.      Plan     Reviewed notes from each unique source.  Reviewed each unique test.    Ordered tests.    Independently interpreted lab tests and radiological exams performed by other physicians.  Personally reviewed the image of the Port-A-Cath placement  Independent historian account obtained from wife.    At this time continue with obinutuzumab weekly treatment.  Start venetoclax according to the protocol is cycle 1 day 22.  We talked about tumor lysis effect.  However since  then his white count is significantly improved and its actually lower than normal, the chances of having any tumor lysis is extremely low.  However we will follow the protocol as is.  Continue with good diet and exercise.  Advised to drink little bit more fluids.  If the painful sensation after urination continues then follow-up with the urologist.  Monitor the side effect of venetoclax.  Return to clinic for treatment on a weekly basis and then to see me or Blossom in about a month's timeframe.  The longitudinal plan of care for the diagnosis(es)/condition(s) as documented were addressed during this visit. Due to the added complexity in care, I will continue to support Clive in the subsequent management and with ongoing continuity of care.     Clinical stage      Stage IV    History     Ramu Espinoza is a very pleasant 64 year old  male with a history of CLL diagnosed in 02/2012 presenting with elevated white count in upper 10s/lower 20s with a peripheral blood smear showing atypical lymphocytosis suspicious for CLL.  He was completely asymptomatic, normal hemoglobin and platelet count.  His peripheral blood flow cytometry revealed CD5 co-expressing kappa light chains restricted B cells confirming the diagnosis of CLL.  He has been on observation.  His white count has been slowly going up.    In June 2014 his white count was over 100,000.  So he had a CAT scan and that showed increasing lymphadenopathy and splenomegaly.  Platelet count has been going down.    I offered participation in clinical study in which patients are randomized between fludarabine and cyclophosphamide Rituxan or Imbruvica and Rituxan.  He has been randomized to Imbruvica and rituximab arm. He started on October 2014.  In November 2014 he got Rituxan for the first time.  Tolerated well. Finished that. Now on Imbruvica alone. Tolerating it well.     In May 2017 he was found to have paroxysmal afib when he complained of some fluttering sensation.  He had holter monitor which led to the diagnosis. Most days he feels well.     In March 2019 he was found to have pericardial effusion when he started have some chest pain on deep inspiration. Had it tapped. Held Imbruvica for 3 weeks. Then resumed it.  Tolerated it fine. So continues it.    He continued his medication until October 2023.  On October 11, 2023 he discontinued it.  This was done because he was having increasing frequency of A-fib episodes.  He was seen by couple of cardiologist in the [facility] system.  They were recommend that he should consider discontinuing ibrutinib.    After discontinuing ibrutinib his A-fib episodes have significantly decreased.  He did have an episode of anxiety.  He has been on some medication to help with the anxiety.    We have been following him for his CLL.  In July 2024 when he came his white count was starting to go up.  White count was up 220,000 in November 2024.  At that time we had discussions about  Of treatment.  We discussed the option of BTK inhibitor versus starting treatment with venetoclax and obinutuzumab.  Because of limited finite duration of this treatment we proceeded with this 1.  Then has started obinutuzumab infusions.  He did get a Port-A-Cath placed.  Overall tolerating with moderate side effects.  Feels tired and fatigued at times.  His lymph nodes have definitely shrunk.    Past Medical History     Past Medical History:   Diagnosis Date    BPH (benign prostatic hyperplasia)     CLL (chronic lymphocytic leukemia) (H)     Lymphadenopathy     Created by Conversion  Replacement Utility updated for latest IMO load    Paroxysmal atrial fibrillation with rapid ventricular response (H)     Sleep apnea     hypertension, being slightly overweight, anemic, having reflux, anxiety, epididymitis, hyperlipidemia and tinnitus      Review of system      Details noted in the history of present illness.  A detailed review of systems is otherwise  negative.      Physical exam        /60 (BP Location: Right arm, Patient Position: Sitting, Cuff Size: Adult Large)   Pulse 82   Temp 98.6  F (37  C) (Oral)   Resp 16   Wt 112.6 kg (248 lb 3.2 oz)   SpO2 97%   BMI 31.87 kg/m      GENERAL: No acute distress. Cooperative in conversation.   HEENT:  Pupils are equal, round and reactive. Oral mucosa is clean and intact. No ulcerations or mucositis noted. No bleeding noted.  RESP:Chest symmetric lungs are clear bilaterally per auscultation. Regular respiratory rate. No wheezes or rhonchi.  CV: Normal S1 S2 Regular, rate and rhythm.     ABD: Nondistended, soft, nontender. Positive bowel sounds. No organomegaly.   EXTREMITIES: No lower extremity edema.   NEURO: Non- focal. Alert and oriented x3.  Cranial nerves appear intact.  PSYCH: Within normal limits. No depression or anxiety.  SKIN: Warm dry intact.      Lab results Reviewed      Recent Results (from the past week)   CBC with platelets and differential   Result Value Ref Range    WBC Count 2.5 (L) 4.0 - 11.0 10e3/uL    RBC Count 3.20 (L) 4.40 - 5.90 10e6/uL    Hemoglobin 10.2 (L) 13.3 - 17.7 g/dL    Hematocrit 30.8 (L) 40.0 - 53.0 %    MCV 96 78 - 100 fL    MCH 31.9 26.5 - 33.0 pg    MCHC 33.1 31.5 - 36.5 g/dL    RDW 15.0 10.0 - 15.0 %    Platelet Count 128 (L) 150 - 450 10e3/uL    % Neutrophils 52 %    % Lymphocytes 41 %    % Monocytes 2 %    % Eosinophils 1 %    % Basophils 0 %    % Immature Granulocytes 4 %    NRBCs per 100 WBC 1 (H) <1 /100    Absolute Neutrophils 1.3 (L) 1.6 - 8.3 10e3/uL    Absolute Lymphocytes 1.0 0.8 - 5.3 10e3/uL    Absolute Monocytes 0.1 0.0 - 1.3 10e3/uL    Absolute Eosinophils 0.0 0.0 - 0.7 10e3/uL    Absolute Basophils 0.0 0.0 - 0.2 10e3/uL    Absolute Immature Granulocytes 0.1 <=0.4 10e3/uL    Absolute NRBCs 0.0 10e3/uL   CBC with platelets and differential   Result Value Ref Range    WBC Count 2.1 (L) 4.0 - 11.0 10e3/uL    RBC Count 3.38 (L) 4.40 - 5.90 10e6/uL    Hemoglobin  10.8 (L) 13.3 - 17.7 g/dL    Hematocrit 32.2 (L) 40.0 - 53.0 %    MCV 95 78 - 100 fL    MCH 32.0 26.5 - 33.0 pg    MCHC 33.5 31.5 - 36.5 g/dL    RDW 14.5 10.0 - 15.0 %    Platelet Count 164 150 - 450 10e3/uL    % Neutrophils 62 %    % Lymphocytes 28 %    % Monocytes 4 %    % Eosinophils 1 %    % Basophils 1 %    % Immature Granulocytes 5 %    NRBCs per 100 WBC 1 (H) <1 /100    Absolute Neutrophils 1.3 (L) 1.6 - 8.3 10e3/uL    Absolute Lymphocytes 0.6 (L) 0.8 - 5.3 10e3/uL    Absolute Monocytes 0.1 0.0 - 1.3 10e3/uL    Absolute Eosinophils 0.0 0.0 - 0.7 10e3/uL    Absolute Basophils 0.0 0.0 - 0.2 10e3/uL    Absolute Immature Granulocytes 0.1 <=0.4 10e3/uL    Absolute NRBCs 0.0 10e3/uL       Imaging results Reviewed        IR Chest Port Placement > 5 Yrs of Age    Result Date: 11/15/2024  [place] [facility] LOCATION: [facility] DATE: 11/15/2024 PROCEDURE: 1.  CENTRAL VENOUS PORT PLACEMENT 2.  FLUOROSCOPIC GUIDANCE FOR CATHETER PLACEMENT 3.  ULTRASOUND GUIDANCE FOR VASCULAR ACCESS 4.  CONSCIOUS SEDATION INTERVENTIONAL RADIOLOGIST: Xavier King MD. INDICATION: CLL, port for chemotherapy. SEDATION: Versed 2.5 mg. Fentanyl 50 mcg. The procedure was performed with administration intravenous conscious sedation with appropriate preoperative, intraoperative, and postoperative evaluation. During the time-out, immediately prior to administration  of medications, the patient was reassessed for adequacy to receive conscious sedation. 13 minutes of supervised face to face conscious sedation time was provided by a radiology nurse under my direct supervision. ANTIBIOTICS: Ancef 2 gram IV. Air Kerma: 6 mGy FLUOROSCOPIC TIME: 0.2 minutes CONTRAST: None. COMPLICATIONS: No immediate complications. STERILE BARRIER TECHNIQUE: Maximum sterile barrier technique was used. Cutaneous antisepsis was performed at the operative site with application of 2% chlorhexidine and large sterile drape. Prior to the procedure,  the surgeon and assistant performed hand  hygiene and wore hat, mask, sterile gown, and sterile gloves during the entire procedure. PROCEDURE: Ultrasound demonstrated a patent and compressible right internal  jugular vein, and an ultrasound image was obtained and placed in the patient's permanent medical record. The right neck and chest were prepped and draped in usual sterile fashion. Using real-time ultrasound guidance, the right internal jugular vein was accessed. A subcutaneous pocket was created and irrigated with sterile normal saline. The catheter was tunneled in an antegrade fashion. Over the guidewire, a peel-away sheath was advanced with fluoroscopic monitoring. Through the peel-away sheath, the catheter was advanced until the tip was at the cavoatrial junction. The catheter was cut to length and attached firmly to the port. The incisions were closed with layered absorbable  suture and surgical glue. FINDINGS: Ultrasound shows an anechoic and compressible jugular vein. At the completion of the study, the port tip lies at the cavoatrial junction. TYPE OF PORT:  SmartPort     IMPRESSION: Successful power-injectable venous port placement.        Signed by: Dennis Garza MD      This note has been dictated using voice recognition software. Any grammatical or context distortions are unintentional and inherent to the software

## 2024-12-05 NOTE — PROGRESS NOTES
"Oncology Rooming Note    December 5, 2024 9:16 AM   Ramu Espinoza is a 64 year old male who presents for:    Chief Complaint   Patient presents with    Oncology Clinic Visit     Return visit with lab and infusion. Chronic lymphocytic leukemia.     Initial Vitals: /60 (BP Location: Right arm, Patient Position: Sitting, Cuff Size: Adult Large)   Pulse 82   Temp 98.6  F (37  C) (Oral)   Resp 16   Wt 112.6 kg (248 lb 3.2 oz)   SpO2 97%   BMI 31.87 kg/m   Estimated body mass index is 31.87 kg/m  as calculated from the following:    Height as of 11/15/24: 1.88 m (6' 2\").    Weight as of this encounter: 112.6 kg (248 lb 3.2 oz). Body surface area is 2.42 meters squared.  No Pain (0) Comment: Data Unavailable   No LMP for male patient.  Allergies reviewed: Yes  Medications reviewed: Yes    Medications: Medication refills not needed today.  Pharmacy name entered into [employer]:    [id number] - [place], [place] - [address]  [id number] [place], [place] - [address]    Frailty Screening:   Is the patient here for a new oncology consult visit in cancer care? 2. No      Clinical concerns: fatigue  Dr Garza was notified.      Marylu Warner CMA            "

## 2024-12-05 NOTE — PROGRESS NOTES
"Infusion Nursing Note:  Ramu Espinoza presents today for cycle 1 day 15 obinituzumab.    Patient seen by provider today: Yes: Dr. Garza   present during visit today: Not Applicable.    Note: Clive arrived ambulatory and in stable condition. Reports feeling much more fatigued after this last treatment. Port accessed using sterile technique, good blood return noted, and labs collected. He was premedicated and treatment administered per orders. He feels that the IV benadryl makes him feel \"weird\" and he finds the feeling uncomfortable. I discussed this with Dr. Garza, and we will do oral benadryl next time.      Intravenous Access:  Labs drawn without difficulty.  Implanted Port.    Treatment Conditions:  Lab Results   Component Value Date    HGB 10.8 (L) 12/05/2024    WBC 2.1 (L) 12/05/2024    ANEU 7.2 10/24/2024    ANEUTAUTO 1.3 (L) 12/05/2024     12/05/2024        Results reviewed, labs MET treatment parameters, ok to proceed with treatment.      Post Infusion Assessment:  Patient tolerated infusion without incident.  Blood return noted pre and post infusion.  Site patent and intact, free from redness, edema or discomfort.  No evidence of extravasations.  Access discontinued per protocol.       Discharge Plan:   Patient and/or family verbalized understanding of discharge instructions and all questions answered.  AVS to patient via [employer].  Patient will return 12/12 for next appointment.   Patient discharged in stable condition accompanied by: wife.  Departure Mode: Ambulatory.      Judy Mendez RN    "

## 2024-12-05 NOTE — LETTER
"2024      Ramu Espinoza  [address] [place] [address]      Dear Colleague,    Thank you for referring your patient, Ramu Espinoza, to the [facility] [place]. Please see a copy of my visit note below.    Oncology Rooming Note    2024 9:16 AM   Ramu Espinoza is a 64 year old male who presents for:    Chief Complaint   Patient presents with     Oncology Clinic Visit     Return visit with lab and infusion. Chronic lymphocytic leukemia.     Initial Vitals: /60 (BP Location: Right arm, Patient Position: Sitting, Cuff Size: Adult Large)   Pulse 82   Temp 98.6  F (37  C) (Oral)   Resp 16   Wt 112.6 kg (248 lb 3.2 oz)   SpO2 97%   BMI 31.87 kg/m   Estimated body mass index is 31.87 kg/m  as calculated from the following:    Height as of 11/15/24: 1.88 m (6' 2\").    Weight as of this encounter: 112.6 kg (248 lb 3.2 oz). Body surface area is 2.42 meters squared.  No Pain (0) Comment: Data Unavailable   No LMP for male patient.  Allergies reviewed: Yes  Medications reviewed: Yes    Medications: Medication refills not needed today.  Pharmacy name entered into Poetica:    [id number] - [place], [place] - [address]  [id number] - [place], [place] - [address]    Frailty Screening:   Is the patient here for a new oncology consult visit in cancer care? 2. No      Clinical concerns: fatigue  Dr Garza was notified.      Marylu Warner, [facility] cancer Care Progress Note  Patient: Ramu Espinoza   MRN:  [id number]   Date of Service : Dec 5, 2024        Reason for visit      Problem List Items Addressed This Visit          Hematologic    Chronic lymphocytic leukemia (H)  Cy inducer Ibrutinib - Primary        Assessment     1.  A very pleasant 64 year old  gentleman with stage IV CLL diagnosed in 2012.  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  He has been treated with Imbruvica for over 10 " years.   Now off of it since October 2023 secondary to atrial fibrillation.  Labs indicate rising white blood cell count which is not unexpected in his situation.  Typically after Imbruvica stopped most patients have relapse of CLL.  Started on treatment with venetoclax and obinutuzumab.  Only started obinutuzumab in November 2024.  So far responding well with moderate to severe side effects.  On the positive side responding quite well with normalization and now below normalization of WBCs.  Improvement of the hemoglobin and platelet count.  2.  Significant anxiety after discontinuing medication currently on  Small doses of trazodone etc.  3.  History of paroxysmal atrial fibrillation.  The episodes were getting quite frequent therefore discontinued Imbruvica.  The episodes have decreased in frequency tremendously.  4.  History of pericardial effusion in 2019.  Again no recurrence.  5.  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  6.  Fully COVID vaccinated.      Plan     Reviewed notes from each unique source.  Reviewed each unique test.    Ordered tests.    Independently interpreted lab tests and radiological exams performed by other physicians.  Personally reviewed the image of the Port-A-Cath placement  Independent historian account obtained from wife.    At this time continue with obinutuzumab weekly treatment.  Start venetoclax according to the protocol is cycle 1 day 22.  We talked about tumor lysis effect.  However since then his white count is significantly improved and its actually lower than normal, the chances of having any tumor lysis is extremely low.  However we will follow the protocol as is.  Continue with good diet and exercise.  Advised to drink little bit more fluids.  If the painful sensation after urination continues then follow-up with the urologist.  Monitor the side effect of venetoclax.  Return to clinic for treatment on a weekly basis and then to see me or Blossom in about a  month's timeframe.  The longitudinal plan of care for the diagnosis(es)/condition(s) as documented were addressed during this visit. Due to the added complexity in care, I will continue to support Clive in the subsequent management and with ongoing continuity of care.     Clinical stage      Stage IV    History     Ramu Espinoza is a very pleasant 64 year old  male with a history of CLL diagnosed in 02/2012 presenting with elevated white count in upper 10s/lower 20s with a peripheral blood smear showing atypical lymphocytosis suspicious for CLL.  He was completely asymptomatic, normal hemoglobin and platelet count.  His peripheral blood flow cytometry revealed CD5 co-expressing kappa light chains restricted B cells confirming the diagnosis of CLL.  He has been on observation.  His white count has been slowly going up.    In June 2014 his white count was over 100,000.  So he had a CAT scan and that showed increasing lymphadenopathy and splenomegaly.  Platelet count has been going down.    I offered participation in clinical study in which patients are randomized between fludarabine and cyclophosphamide Rituxan or Imbruvica and Rituxan.  He has been randomized to Imbruvica and rituximab arm. He started on October 2014.  In November 2014 he got Rituxan for the first time.  Tolerated well. Finished that. Now on Imbruvica alone. Tolerating it well.     In May 2017 he was found to have paroxysmal afib when he complained of some fluttering sensation. He had holter monitor which led to the diagnosis. Most days he feels well.     In March 2019 he was found to have pericardial effusion when he started have some chest pain on deep inspiration. Had it tapped. Held Imbruvica for 3 weeks. Then resumed it.  Tolerated it fine. So continues it.    He continued his medication until October 2023.  On October 11, 2023 he discontinued it.  This was done because he was having increasing frequency of A-fib episodes.  He was seen by couple  of cardiologist in the [facility].  They were recommend that he should consider discontinuing ibrutinib.    After discontinuing ibrutinib his A-fib episodes have significantly decreased.  He did have an episode of anxiety.  He has been on some medication to help with the anxiety.    We have been following him for his CLL.  In July 2024 when he came his white count was starting to go up.  White count was up 220,000 in November 2024.  At that time we had discussions about  Of treatment.  We discussed the option of BTK inhibitor versus starting treatment with venetoclax and obinutuzumab.  Because of limited finite duration of this treatment we proceeded with this 1.  Then has started obinutuzumab infusions.  He did get a Port-A-Cath placed.  Overall tolerating with moderate side effects.  Feels tired and fatigued at times.  His lymph nodes have definitely shrunk.    Past Medical History     Past Medical History:   Diagnosis Date     BPH (benign prostatic hyperplasia)      CLL (chronic lymphocytic leukemia) (H)      Lymphadenopathy     Created by Conversion  Replacement Utility updated for latest IMO load     Paroxysmal atrial fibrillation with rapid ventricular response (H)      Sleep apnea     hypertension, being slightly overweight, anemic, having reflux, anxiety, epididymitis, hyperlipidemia and tinnitus      Review of system      Details noted in the history of present illness.  A detailed review of systems is otherwise negative.      Physical exam        /60 (BP Location: Right arm, Patient Position: Sitting, Cuff Size: Adult Large)   Pulse 82   Temp 98.6  F (37  C) (Oral)   Resp 16   Wt 112.6 kg (248 lb 3.2 oz)   SpO2 97%   BMI 31.87 kg/m      GENERAL: No acute distress. Cooperative in conversation.   HEENT:  Pupils are equal, round and reactive. Oral mucosa is clean and intact. No ulcerations or mucositis noted. No bleeding noted.  RESP:Chest symmetric lungs are clear bilaterally per  auscultation. Regular respiratory rate. No wheezes or rhonchi.  CV: Normal S1 S2 Regular, rate and rhythm.     ABD: Nondistended, soft, nontender. Positive bowel sounds. No organomegaly.   EXTREMITIES: No lower extremity edema.   NEURO: Non- focal. Alert and oriented x3.  Cranial nerves appear intact.  PSYCH: Within normal limits. No depression or anxiety.  SKIN: Warm dry intact.      Lab results Reviewed      Recent Results (from the past week)   CBC with platelets and differential   Result Value Ref Range    WBC Count 2.5 (L) 4.0 - 11.0 10e3/uL    RBC Count 3.20 (L) 4.40 - 5.90 10e6/uL    Hemoglobin 10.2 (L) 13.3 - 17.7 g/dL    Hematocrit 30.8 (L) 40.0 - 53.0 %    MCV 96 78 - 100 fL    MCH 31.9 26.5 - 33.0 pg    MCHC 33.1 31.5 - 36.5 g/dL    RDW 15.0 10.0 - 15.0 %    Platelet Count 128 (L) 150 - 450 10e3/uL    % Neutrophils 52 %    % Lymphocytes 41 %    % Monocytes 2 %    % Eosinophils 1 %    % Basophils 0 %    % Immature Granulocytes 4 %    NRBCs per 100 WBC 1 (H) <1 /100    Absolute Neutrophils 1.3 (L) 1.6 - 8.3 10e3/uL    Absolute Lymphocytes 1.0 0.8 - 5.3 10e3/uL    Absolute Monocytes 0.1 0.0 - 1.3 10e3/uL    Absolute Eosinophils 0.0 0.0 - 0.7 10e3/uL    Absolute Basophils 0.0 0.0 - 0.2 10e3/uL    Absolute Immature Granulocytes 0.1 <=0.4 10e3/uL    Absolute NRBCs 0.0 10e3/uL   CBC with platelets and differential   Result Value Ref Range    WBC Count 2.1 (L) 4.0 - 11.0 10e3/uL    RBC Count 3.38 (L) 4.40 - 5.90 10e6/uL    Hemoglobin 10.8 (L) 13.3 - 17.7 g/dL    Hematocrit 32.2 (L) 40.0 - 53.0 %    MCV 95 78 - 100 fL    MCH 32.0 26.5 - 33.0 pg    MCHC 33.5 31.5 - 36.5 g/dL    RDW 14.5 10.0 - 15.0 %    Platelet Count 164 150 - 450 10e3/uL    % Neutrophils 62 %    % Lymphocytes 28 %    % Monocytes 4 %    % Eosinophils 1 %    % Basophils 1 %    % Immature Granulocytes 5 %    NRBCs per 100 WBC 1 (H) <1 /100    Absolute Neutrophils 1.3 (L) 1.6 - 8.3 10e3/uL    Absolute Lymphocytes 0.6 (L) 0.8 - 5.3 10e3/uL    Absolute  Monocytes 0.1 0.0 - 1.3 10e3/uL    Absolute Eosinophils 0.0 0.0 - 0.7 10e3/uL    Absolute Basophils 0.0 0.0 - 0.2 10e3/uL    Absolute Immature Granulocytes 0.1 <=0.4 10e3/uL    Absolute NRBCs 0.0 10e3/uL       Imaging results Reviewed        IR Chest Port Placement > 5 Yrs of Age    Result Date: 11/15/2024  [place] [facility] LOCATION: [facility] DATE: 11/15/2024 PROCEDURE: 1.  CENTRAL VENOUS PORT PLACEMENT 2.  FLUOROSCOPIC GUIDANCE FOR CATHETER PLACEMENT 3.  ULTRASOUND GUIDANCE FOR VASCULAR ACCESS 4.  CONSCIOUS SEDATION INTERVENTIONAL RADIOLOGIST: Xavier King MD. INDICATION: CLL, port for chemotherapy. SEDATION: Versed 2.5 mg. Fentanyl 50 mcg. The procedure was performed with administration intravenous conscious sedation with appropriate preoperative, intraoperative, and postoperative evaluation. During the time-out, immediately prior to administration  of medications, the patient was reassessed for adequacy to receive conscious sedation. 13 minutes of supervised face to face conscious sedation time was provided by a radiology nurse under my direct supervision. ANTIBIOTICS: Ancef 2 gram IV. Air Kerma: 6 mGy FLUOROSCOPIC TIME: 0.2 minutes CONTRAST: None. COMPLICATIONS: No immediate complications. STERILE BARRIER TECHNIQUE: Maximum sterile barrier technique was used. Cutaneous antisepsis was performed at the operative site with application of 2% chlorhexidine and large sterile drape. Prior to the procedure, the surgeon and assistant performed hand  hygiene and wore hat, mask, sterile gown, and sterile gloves during the entire procedure. PROCEDURE: Ultrasound demonstrated a patent and compressible right internal  jugular vein, and an ultrasound image was obtained and placed in the patient's permanent medical record. The right neck and chest were prepped and draped in usual sterile fashion. Using real-time ultrasound guidance, the right internal jugular vein was accessed. A subcutaneous  pocket was created and irrigated with sterile normal saline. The catheter was tunneled in an antegrade fashion. Over the guidewire, a peel-away sheath was advanced with fluoroscopic monitoring. Through the peel-away sheath, the catheter was advanced until the tip was at the cavoatrial junction. The catheter was cut to length and attached firmly to the port. The incisions were closed with layered absorbable  suture and surgical glue. FINDINGS: Ultrasound shows an anechoic and compressible jugular vein. At the completion of the study, the port tip lies at the cavoatrial junction. TYPE OF PORT:  SmartPort     IMPRESSION: Successful power-injectable venous port placement.        Signed by: Dennis Garza MD      This note has been dictated using voice recognition software. Any grammatical or context distortions are unintentional and inherent to the software       Again, thank you for allowing me to participate in the care of your patient.        Sincerely,        Dennis Garza MD

## 2024-12-11 ENCOUNTER — NURSE TRIAGE (OUTPATIENT)
Dept: ONCOLOGY | Facility: HOSPITAL | Age: 64
End: 2024-12-11
Payer: MEDICARE

## 2024-12-11 NOTE — TELEPHONE ENCOUNTER
Pt called clinic stating that his Venetoclax medication will not be delivered until late tonight.  Pt is worried that if medication doesn't arrive tonight, he can't take it tomorrow morning as he is scheduled to do.  Instructed pt to call clinic in  the morning if medication was not delivered, and we can make a plan to start it once it does arrive.  Reassured pt that this can happen occasionally, and we will start it upon arrival.  Instructed pt to call clinic with any further questions or concerns.  Pt verbalized understanding and appreciative of information .

## 2024-12-12 ENCOUNTER — LAB (OUTPATIENT)
Dept: INFUSION THERAPY | Facility: HOSPITAL | Age: 64
End: 2024-12-12
Payer: MEDICARE

## 2024-12-12 ENCOUNTER — HOSPITAL ENCOUNTER (EMERGENCY)
Facility: HOSPITAL | Age: 64
Discharge: HOME | End: 2024-12-12
Attending: EMERGENCY MEDICINE
Payer: MEDICARE

## 2024-12-12 ENCOUNTER — OFFICE VISIT (OUTPATIENT)
Dept: URGENT CARE | Facility: URGENT CARE | Age: 64
End: 2024-12-12
Payer: MEDICARE

## 2024-12-12 ENCOUNTER — TELEPHONE (OUTPATIENT)
Dept: ONCOLOGY | Facility: HOSPITAL | Age: 64
End: 2024-12-12

## 2024-12-12 ENCOUNTER — APPOINTMENT (OUTPATIENT)
Dept: CT IMAGING | Facility: HOSPITAL | Age: 64
End: 2024-12-12
Attending: EMERGENCY MEDICINE
Payer: MEDICARE

## 2024-12-12 ENCOUNTER — PATIENT OUTREACH (OUTPATIENT)
Dept: ONCOLOGY | Facility: HOSPITAL | Age: 64
End: 2024-12-12

## 2024-12-12 ENCOUNTER — INFUSION THERAPY VISIT (OUTPATIENT)
Dept: INFUSION THERAPY | Facility: HOSPITAL | Age: 64
End: 2024-12-12
Payer: MEDICARE

## 2024-12-12 VITALS
WEIGHT: 249 LBS | DIASTOLIC BLOOD PRESSURE: 59 MMHG | RESPIRATION RATE: 18 BRPM | HEIGHT: 74 IN | SYSTOLIC BLOOD PRESSURE: 107 MMHG | TEMPERATURE: 97.5 F | OXYGEN SATURATION: 98 % | BODY MASS INDEX: 31.95 KG/M2 | HEART RATE: 66 BPM

## 2024-12-12 VITALS
OXYGEN SATURATION: 96 % | DIASTOLIC BLOOD PRESSURE: 71 MMHG | WEIGHT: 246.9 LBS | BODY MASS INDEX: 31.7 KG/M2 | HEART RATE: 65 BPM | RESPIRATION RATE: 18 BRPM | TEMPERATURE: 98.6 F | SYSTOLIC BLOOD PRESSURE: 112 MMHG

## 2024-12-12 DIAGNOSIS — C91.10 CHRONIC LYMPHOCYTIC LEUKEMIA (H): Primary | ICD-10-CM

## 2024-12-12 DIAGNOSIS — Z79.01 ANTICOAGULATED: ICD-10-CM

## 2024-12-12 DIAGNOSIS — C91.10 CHRONIC LYMPHOCYTIC LEUKEMIA (H): ICD-10-CM

## 2024-12-12 DIAGNOSIS — Z98.890 HISTORY OF VASCULAR ACCESS DEVICE: Primary | ICD-10-CM

## 2024-12-12 DIAGNOSIS — S09.90XA HEAD INJURY, INITIAL ENCOUNTER: Primary | ICD-10-CM

## 2024-12-12 DIAGNOSIS — S09.90XA HEAD INJURY, INITIAL ENCOUNTER: ICD-10-CM

## 2024-12-12 DIAGNOSIS — S40.012A CONTUSION OF LEFT SHOULDER, INITIAL ENCOUNTER: ICD-10-CM

## 2024-12-12 DIAGNOSIS — S01.01XA SCALP LACERATION, INITIAL ENCOUNTER: ICD-10-CM

## 2024-12-12 LAB
ALBUMIN SERPL BCG-MCNC: 3.9 G/DL (ref 3.5–5.2)
ALBUMIN SERPL BCG-MCNC: 4.2 G/DL (ref 3.5–5.2)
ALP SERPL-CCNC: 78 U/L (ref 40–150)
ALP SERPL-CCNC: 81 U/L (ref 40–150)
ALT SERPL W P-5'-P-CCNC: 24 U/L (ref 0–70)
ALT SERPL W P-5'-P-CCNC: 24 U/L (ref 0–70)
ANION GAP SERPL CALCULATED.3IONS-SCNC: 10 MMOL/L (ref 7–15)
ANION GAP SERPL CALCULATED.3IONS-SCNC: 8 MMOL/L (ref 7–15)
AST SERPL W P-5'-P-CCNC: 18 U/L (ref 0–45)
AST SERPL W P-5'-P-CCNC: 20 U/L (ref 0–45)
BASOPHILS # BLD MANUAL: 0 10E3/UL (ref 0–0.2)
BASOPHILS NFR BLD MANUAL: 0 %
BILIRUB SERPL-MCNC: 0.5 MG/DL
BILIRUB SERPL-MCNC: 0.5 MG/DL
BUN SERPL-MCNC: 22.2 MG/DL (ref 8–23)
BUN SERPL-MCNC: 24.1 MG/DL (ref 8–23)
CALCIUM SERPL-MCNC: 8.8 MG/DL (ref 8.8–10.4)
CALCIUM SERPL-MCNC: 9 MG/DL (ref 8.8–10.4)
CHLORIDE SERPL-SCNC: 104 MMOL/L (ref 98–107)
CHLORIDE SERPL-SCNC: 105 MMOL/L (ref 98–107)
CREAT SERPL-MCNC: 0.87 MG/DL (ref 0.67–1.17)
CREAT SERPL-MCNC: 0.93 MG/DL (ref 0.67–1.17)
EGFRCR SERPLBLD CKD-EPI 2021: >90 ML/MIN/1.73M2
EGFRCR SERPLBLD CKD-EPI 2021: >90 ML/MIN/1.73M2
EOSINOPHIL # BLD MANUAL: 0 10E3/UL (ref 0–0.7)
EOSINOPHIL NFR BLD MANUAL: 2 %
ERYTHROCYTE [DISTWIDTH] IN BLOOD BY AUTOMATED COUNT: 14.3 % (ref 10–15)
GLUCOSE SERPL-MCNC: 93 MG/DL (ref 70–99)
GLUCOSE SERPL-MCNC: 98 MG/DL (ref 70–99)
HCO3 SERPL-SCNC: 26 MMOL/L (ref 22–29)
HCO3 SERPL-SCNC: 27 MMOL/L (ref 22–29)
HCT VFR BLD AUTO: 32.3 % (ref 40–53)
HGB BLD-MCNC: 10.8 G/DL (ref 13.3–17.7)
LYMPHOCYTES # BLD MANUAL: 0.7 10E3/UL (ref 0.8–5.3)
LYMPHOCYTES NFR BLD MANUAL: 30 %
MCH RBC QN AUTO: 32.2 PG (ref 26.5–33)
MCHC RBC AUTO-ENTMCNC: 33.4 G/DL (ref 31.5–36.5)
MCV RBC AUTO: 96 FL (ref 78–100)
METAMYELOCYTES # BLD MANUAL: 0.1 10E3/UL
METAMYELOCYTES NFR BLD MANUAL: 4 %
MONOCYTES # BLD MANUAL: 0 10E3/UL (ref 0–1.3)
MONOCYTES NFR BLD MANUAL: 2 %
MYELOCYTES # BLD MANUAL: 0.1 10E3/UL
MYELOCYTES NFR BLD MANUAL: 4 %
NEUTROPHILS # BLD MANUAL: 1.3 10E3/UL (ref 1.6–8.3)
NEUTROPHILS NFR BLD MANUAL: 58 %
PHOSPHATE SERPL-MCNC: 3 MG/DL (ref 2.5–4.5)
PHOSPHATE SERPL-MCNC: 3.6 MG/DL (ref 2.5–4.5)
PLAT MORPH BLD: ABNORMAL
PLATELET # BLD AUTO: 160 10E3/UL (ref 150–450)
POLYCHROMASIA BLD QL SMEAR: SLIGHT
POTASSIUM SERPL-SCNC: 4.2 MMOL/L (ref 3.4–5.3)
POTASSIUM SERPL-SCNC: 4.3 MMOL/L (ref 3.4–5.3)
PROT SERPL-MCNC: 5.9 G/DL (ref 6.4–8.3)
PROT SERPL-MCNC: 6.3 G/DL (ref 6.4–8.3)
RBC # BLD AUTO: 3.35 10E6/UL (ref 4.4–5.9)
RBC MORPH BLD: ABNORMAL
SODIUM SERPL-SCNC: 140 MMOL/L (ref 135–145)
SODIUM SERPL-SCNC: 140 MMOL/L (ref 135–145)
URATE SERPL-MCNC: 4.6 MG/DL (ref 3.4–7)
URATE SERPL-MCNC: 4.9 MG/DL (ref 3.4–7)
WBC # BLD AUTO: 2.3 10E3/UL (ref 4–11)

## 2024-12-12 PROCEDURE — 84520 ASSAY OF UREA NITROGEN: CPT | Performed by: NURSE PRACTITIONER

## 2024-12-12 PROCEDURE — 99284 EMERGENCY DEPT VISIT MOD MDM: CPT | Mod: 25

## 2024-12-12 PROCEDURE — 82247 BILIRUBIN TOTAL: CPT | Performed by: NURSE PRACTITIONER

## 2024-12-12 PROCEDURE — 84460 ALANINE AMINO (ALT) (SGPT): CPT | Performed by: NURSE PRACTITIONER

## 2024-12-12 PROCEDURE — 12032 INTMD RPR S/A/T/EXT 2.6-7.5: CPT

## 2024-12-12 PROCEDURE — 85049 AUTOMATED PLATELET COUNT: CPT | Performed by: INTERNAL MEDICINE

## 2024-12-12 PROCEDURE — 84550 ASSAY OF BLOOD/URIC ACID: CPT | Performed by: INTERNAL MEDICINE

## 2024-12-12 PROCEDURE — 70450 CT HEAD/BRAIN W/O DYE: CPT | Mod: MA

## 2024-12-12 PROCEDURE — 84550 ASSAY OF BLOOD/URIC ACID: CPT | Performed by: NURSE PRACTITIONER

## 2024-12-12 PROCEDURE — 80053 COMPREHEN METABOLIC PANEL: CPT | Performed by: INTERNAL MEDICINE

## 2024-12-12 PROCEDURE — 85018 HEMOGLOBIN: CPT | Performed by: INTERNAL MEDICINE

## 2024-12-12 PROCEDURE — 84100 ASSAY OF PHOSPHORUS: CPT | Performed by: NURSE PRACTITIONER

## 2024-12-12 PROCEDURE — 36591 DRAW BLOOD OFF VENOUS DEVICE: CPT | Performed by: NURSE PRACTITIONER

## 2024-12-12 PROCEDURE — 36591 DRAW BLOOD OFF VENOUS DEVICE: CPT | Performed by: INTERNAL MEDICINE

## 2024-12-12 PROCEDURE — 85007 BL SMEAR W/DIFF WBC COUNT: CPT | Performed by: INTERNAL MEDICINE

## 2024-12-12 PROCEDURE — 84100 ASSAY OF PHOSPHORUS: CPT | Performed by: INTERNAL MEDICINE

## 2024-12-12 RX ORDER — HEPARIN SODIUM,PORCINE 10 UNIT/ML
5-20 VIAL (ML) INTRAVENOUS DAILY PRN
OUTPATIENT
Start: 2024-12-12

## 2024-12-12 RX ORDER — ALLOPURINOL 300 MG/1
300 TABLET ORAL DAILY
Qty: 30 TABLET | Refills: 0 | Status: SHIPPED | OUTPATIENT
Start: 2024-12-12

## 2024-12-12 RX ORDER — HEPARIN SODIUM (PORCINE) LOCK FLUSH IV SOLN 100 UNIT/ML 100 UNIT/ML
5 SOLUTION INTRAVENOUS
OUTPATIENT
Start: 2024-12-12

## 2024-12-12 RX ORDER — HEPARIN SODIUM (PORCINE) LOCK FLUSH IV SOLN 100 UNIT/ML 100 UNIT/ML
5 SOLUTION INTRAVENOUS
Status: DISCONTINUED | OUTPATIENT
Start: 2024-12-12 | End: 2024-12-12 | Stop reason: HOSPADM

## 2024-12-12 ASSESSMENT — ACTIVITIES OF DAILY LIVING (ADL): ADLS_ACUITY_SCORE: 41

## 2024-12-12 ASSESSMENT — ENCOUNTER SYMPTOMS
NECK PAIN: 0
ARTHRALGIAS: 1
FLANK PAIN: 1

## 2024-12-12 ASSESSMENT — COLUMBIA-SUICIDE SEVERITY RATING SCALE - C-SSRS
6. HAVE YOU EVER DONE ANYTHING, STARTED TO DO ANYTHING, OR PREPARED TO DO ANYTHING TO END YOUR LIFE?: NO
1. IN THE PAST MONTH, HAVE YOU WISHED YOU WERE DEAD OR WISHED YOU COULD GO TO SLEEP AND NOT WAKE UP?: NO
2. HAVE YOU ACTUALLY HAD ANY THOUGHTS OF KILLING YOURSELF IN THE PAST MONTH?: NO

## 2024-12-12 ASSESSMENT — PAIN SCALES - GENERAL: PAINLEVEL_OUTOF10: MILD PAIN (2)

## 2024-12-12 NOTE — ORAL ONC MGMT
Oral Chemotherapy Monitoring Program  Lab Follow Up    Patient currently on venetoclax therapy for CLL.    Reviewed lab results from 12/12/24.    No concerning abnormalities.    Assessment & Plan:  Patient started the venetoclax 20 mg daily this AM after given okay by CHELLE Francis. He said he had a bagel and a large glass of water with it.  Patient has no questions or concerns. He will come back for labs this afternoon. I let him know I would call him if there were any abnormalities, otherwise he should just continue taking the 20 mg daily for the next week.      Follow-Up:  We will review labs this afternoon and then again tomorrow AM.    Maren Hansen, [place]  Oral Chemotherapy Pharmacist  [facility] Phone: [phone]  In Basket Pools:   [facility] ORAL CHEMOTHERAPY PHARMACIST   HEATHER ORAL CHEMOTHERAPY PHARMACIST

## 2024-12-12 NOTE — PROGRESS NOTES
SUBJECTIVE:  Chief Complaint   Patient presents with    Laceration     Fell in shower this morning, laceration on top left side of head. Patient reports he did not lose consciousness, bleeding has stopped, mild pain ranked a 2 currently. Denies any dizziness, or blurred vision.      Ramu Espinoza is a 64 year old male presents with a chief complaint of head injury this morning, 3 hours ago.  SLipped in shower, struck left back of head.  Laceration present.   No LOC.  Minimal headache.  Is on blood thinners.       Past Medical History:   Diagnosis Date    Anxiety     BPH (benign prostatic hyperplasia)     Cancer (H)     CLL    CLL (chronic lymphocytic leukemia) (H)     Enlarged lymph nodes     Created by Conversion  Replacement Utility updated for latest IMO load    Irregular heart beat     Paroxysmal atrial fibrillation with rapid ventricular response (H)     Sleep apnea     Tinnitus      Current Outpatient Medications   Medication Sig Dispense Refill    allopurinol (ZYLOPRIM) 300 MG tablet Take 1 tablet (300 mg) by mouth daily. 30 tablet 0    apixaban ANTICOAGULANT (ELIQUIS) 5 MG tablet Take 5 mg by mouth 2 times daily      cholecalciferol (VITAMIN D3) 25 mcg (1000 units) capsule Take 1 capsule by mouth daily.      cyanocobalamin (VITAMIN B-12) 500 MCG SUBL sublingual tablet Place 500 mcg under the tongue daily.      LORazepam (ATIVAN) 1 MG tablet Take 1 tablet by mouth at bedtime as needed.*      magnesium 250 MG tablet Take 1 tablet by mouth daily.      metoprolol succinate ER (TOPROL XL) 50 MG 24 hr tablet Take 1 tablet (50 mg) by mouth 2 times daily 180 tablet 3    pyridOXINE (VITAMIN B6) 100 MG TABS Take 25 mg by mouth daily.      sertraline (ZOLOFT) 100 MG tablet Take 1 tablet by mouth daily      tamsulosin (FLOMAX) 0.4 MG capsule TAKE 1 CAPSULE (0.4 MG) BY MOUTH DAILY 90 capsule 0    traZODone (DESYREL) 50 MG tablet TAKE 1 TABLET (50 MG) BY MOUTH AT BEDTIME 90 tablet 3    Venetoclax (VENCLEXTA) 10 & 50 &  100 MG tablet Start on Cycle 1, Day 22. Week 1: Take two 10 mg tabs daily. Week 2: Take one 50 mg tab daily. Week 3: Take one 100 mg tab daily. Week 4: Take two 100 mg tabs daily. 1 each 0    zinc gluconate 50 MG tablet Take 50 mg by mouth daily.      hydrOXYzine HCl (ATARAX) 25 MG tablet TAKE 1/2 TABLETS (12.5MG) BY MOUTH AT BEDTIME (Patient not taking: Reported on 2024) 45 tablet 0    mirtazapine (REMERON) 15 MG tablet Take 15 mg by mouth      prochlorperazine (COMPAZINE) 10 MG tablet Take 1 tablet (10 mg) by mouth every 6 hours as needed for nausea or vomiting. (Patient not taking: Reported on 2024) 30 tablet 2     Social History     Tobacco Use    Smoking status: Former     Current packs/day: 0.00     Average packs/day: 0.5 packs/day for 25.0 years (12.5 ttl pk-yrs)     Types: Cigarettes     Start date: 10/31/1980     Quit date: 10/31/2005     Years since quittin.1     Passive exposure: Never    Smokeless tobacco: Never   Substance Use Topics    Alcohol use: Yes     Alcohol/week: 14.0 standard drinks of alcohol     Comment: ocassionally on weekends       ROS:  Review of systems negative except as stated above.    EXAM:   /71 (BP Location: Right arm, Patient Position: Sitting, Cuff Size: Adult Regular)   Pulse 65   Temp 98.6  F (37  C) (Oral)   Resp 18   Wt 112 kg (246 lb 14.4 oz)   SpO2 96%   BMI 31.70 kg/m    Gen: healthy,alert,no distress  Scalp: stellate laceration of back scalp with swollen area approimately 3nem1th  NEURO: Normal strength and tone, sensory exam grossly normal, mentation intact and speech normal      ASSESSMENT:   (S09.90XA) Head injury, initial encounter  (primary encounter diagnosis)  (Z79.01) Anticoagulated  Comment: no red flags   Plan: to ED for assessment

## 2024-12-12 NOTE — ED TRIAGE NOTES
Amb to triage.  Pt states about 0700 today slipped in shower and fell hitting head on ceramic shelf.  Sustained lac to L back of head.  Bleeding controlled PTA.  Pt states went to oncology appt this morning and referred to ED for CT head.  Pt reports slight headache.  No other c/o.  Pt is on blood thinner.  GCS-15.  No LOC.  Trauma alert called.      Triage Assessment (Adult)       Row Name 12/12/24 1100          Triage Assessment    Airway WDL WDL        Respiratory WDL    Respiratory WDL WDL        Skin Circulation/Temperature WDL    Skin Circulation/Temperature WDL WDL        Cardiac WDL    Cardiac WDL WDL        Peripheral/Neurovascular WDL    Peripheral Neurovascular WDL WDL        Cognitive/Neuro/Behavioral WDL    Cognitive/Neuro/Behavioral WDL WDL

## 2024-12-12 NOTE — ED PROVIDER NOTES
Emergency Department Encounter      NAME: Ramu Espinoza  AGE: 64 year old male  YOB: 1960  MRN: [id number]  EVALUATION DATE & TIME: No admission date for patient encounter.    PCP: Ramu Jiménez    ED PROVIDER: Yuriy Singh M.D.      Chief Complaint   Patient presents with    Fall         FINAL IMPRESSION:  1. Scalp laceration, initial encounter    2. Head injury, initial encounter    3. Contusion of left shoulder, initial encounter        MEDICAL DECISION MAKIN:32 PM I met with the patient, obtained history, performed an initial exam, and discussed options and plan for diagnostics and treatment here in the ED.      This patient is a 64-year-old male with a history of chronic lymphocytic leukemia who presents after a fall.  He says that he started a new chemotherapy drug today.  After he received the chemotherapy he took a shower at home where he slipped hitting his head on the soap rack.  He sustained a scalp laceration but did not have any loss of consciousness.  A CT scan of the head was done which did not show any intracranial hemorrhage or skull fracture.  I independently reviewed and interpreted the CT. the patient's laceration was deep enough to require staples.  There was no active bleeding.  Anesthesia was obtained with infiltrating the edges with 1% lidocaine with epi.  I closed the laceration with staples and he will return or see his doctor in a week to have the staples removed.  I considered giving a CT scan of the neck but he had no midline tenderness and was moving his neck throughout its full range of motion without any pain.  So the neck CT was deferred.    Pertinent Labs & Imaging studies reviewed. (See chart for details)    The importance of close follow up was discussed. We reviewed warning signs and symptoms, and I instructed Mr. Espinoza to return to the emergency department immediately if he develops any new or worsening symptoms. I provided additional verbal  discharge instructions. Mr. Espinoza expressed understanding and agreement with this plan of care, his questions were answered, and he was discharged in stable condition.     Medical Decision Making     History:  Supplemental history from: Documented in chart, if applicable  External Record(s) reviewed: Documented in chart, if applicable.     Work Up:  Chart documentation includes differential considered and any EKGs or imaging independently interpreted by provider, where specified.  In additional to work up documented, I considered the following work up: Documented in chart, if applicable.     External consultation:  Discussion of management with another provider: Documented in chart, if applicable     Complicating factors:  Care impacted by chronic illness: Anxiety, insomnia, CLL, obstructive sleep apnea  Care affected by social determinants of health: Access to Medical Care     Disposition considerations: Discharge      MEDICATIONS GIVEN IN THE EMERGENCY:  Medications - No data to display    NEW PRESCRIPTIONS STARTED AT TODAY'S ER VISIT:  Discharge Medication List as of 12/12/2024  1:04 PM             =================================================================    HPI    Patient information was obtained from: patient    Use of : N/A         Ramu Espinoza is a 64 year old male with a past medical history of anticoagulation and chronic lymphocytic leukemia, who presents for a fall.     Today, the patient was at the hospital and received a new chemotherapy drug and was told to take it with water. He was also advised to return to the hospital at 2:30 PM. Afterwards, the patient was taking a shower and slipped, hit his head, and landed on his right hip and flank. Now, he has left shoulder and flank pain. He also has a left posterior scalp injury. He has a history of CLL.      REVIEW OF SYSTEMS   Review of Systems   Genitourinary:  Positive for flank pain.   Musculoskeletal:  Positive for arthralgias.  Negative for neck pain.        PAST MEDICAL HISTORY:  Past Medical History:   Diagnosis Date    Anxiety     BPH (benign prostatic hyperplasia)     Cancer (H)     CLL    CLL (chronic lymphocytic leukemia) (H)     Enlarged lymph nodes     Created by Conversion  Replacement Utility updated for latest IMO load    Irregular heart beat     Paroxysmal atrial fibrillation with rapid ventricular response (H)     Sleep apnea     Tinnitus        PAST SURGICAL HISTORY:  Past Surgical History:   Procedure Laterality Date    COLONOSCOPY N/A 5/16/2022    Procedure: COLONOSCOPY,POLYPECTOMY;  Surgeon: Eugene Green MD;  Location: [place] [facility]    IR CHEST PORT PLACEMENT > 5 YRS OF AGE  11/15/2024    [facility] OPEN FIX INTER/SUBTROCH FX,PLATE      Description: Open Treatment Of An Intertrochanteric Fracture;  Recorded: 04/01/2009;       CURRENT MEDICATIONS:    No current facility-administered medications for this encounter.    Current Outpatient Medications:     allopurinol (ZYLOPRIM) 300 MG tablet, Take 1 tablet (300 mg) by mouth daily., Disp: 30 tablet, Rfl: 0    apixaban ANTICOAGULANT (ELIQUIS) 5 MG tablet, Take 5 mg by mouth 2 times daily, Disp: , Rfl:     cholecalciferol (VITAMIN D3) 25 mcg (1000 units) capsule, Take 1 capsule by mouth daily., Disp: , Rfl:     cyanocobalamin (VITAMIN B-12) 500 MCG SUBL sublingual tablet, Place 500 mcg under the tongue daily., Disp: , Rfl:     hydrOXYzine HCl (ATARAX) 25 MG tablet, TAKE 1/2 TABLETS (12.5MG) BY MOUTH AT BEDTIME (Patient not taking: Reported on 12/12/2024), Disp: 45 tablet, Rfl: 0    LORazepam (ATIVAN) 1 MG tablet, Take 1 tablet by mouth at bedtime as needed.*, Disp: , Rfl:     magnesium 250 MG tablet, Take 1 tablet by mouth daily., Disp: , Rfl:     metoprolol succinate ER (TOPROL XL) 50 MG 24 hr tablet, Take 1 tablet (50 mg) by mouth 2 times daily, Disp: 180 tablet, Rfl: 3    mirtazapine (REMERON) 15 MG tablet, Take 15 mg by mouth, Disp: , Rfl:     prochlorperazine (COMPAZINE) 10  MG tablet, Take 1 tablet (10 mg) by mouth every 6 hours as needed for nausea or vomiting. (Patient not taking: Reported on 2024), Disp: 30 tablet, Rfl: 2    pyridOXINE (VITAMIN B6) 100 MG TABS, Take 25 mg by mouth daily., Disp: , Rfl:     sertraline (ZOLOFT) 100 MG tablet, Take 1 tablet by mouth daily, Disp: , Rfl:     tamsulosin (FLOMAX) 0.4 MG capsule, TAKE 1 CAPSULE (0.4 MG) BY MOUTH DAILY, Disp: 90 capsule, Rfl: 0    traZODone (DESYREL) 50 MG tablet, TAKE 1 TABLET (50 MG) BY MOUTH AT BEDTIME, Disp: 90 tablet, Rfl: 3    Venetoclax (VENCLEXTA) 10 & 50 & 100 MG tablet, Start on Cycle 1, Day 22. Week 1: Take two 10 mg tabs daily. Week 2: Take one 50 mg tab daily. Week 3: Take one 100 mg tab daily. Week 4: Take two 100 mg tabs daily., Disp: 1 each, Rfl: 0    zinc gluconate 50 MG tablet, Take 50 mg by mouth daily., Disp: , Rfl:     ALLERGIES:  Allergies   Allergen Reactions    Zithromax [Azithromycin Dihydrate]        FAMILY HISTORY:  Family History   Problem Relation Age of Onset    Cancer Father         pancreatic     Prostate Cancer Father     Colon Cancer Mother     Cancer Mother     Neuropathy Mother     Cancer Brother         Lung     Lung Cancer Brother     Hypertension Brother     No Known Problems Sister     Clotting Disorder Brother     Chronic Obstructive Pulmonary Disease Brother     Neuropathy Brother     Cancer Brother         digestive     Anuerysm Brother     Hypertension Brother     Hypertension Brother     No Known Problems Brother     Diabetes Paternal Aunt     Diabetes Maternal Aunt     Diabetes Maternal Aunt     Cancer Maternal Grandfather        SOCIAL HISTORY:   Social History     Socioeconomic History    Marital status:    Tobacco Use    Smoking status: Former     Current packs/day: 0.00     Average packs/day: 0.5 packs/day for 25.0 years (12.5 ttl pk-yrs)     Types: Cigarettes     Start date: 10/31/1980     Quit date: 10/31/2005     Years since quittin.1     Passive exposure:  "Never    Smokeless tobacco: Never   Substance and Sexual Activity    Alcohol use: Yes     Alcohol/week: 14.0 standard drinks of alcohol     Comment: ocassionally on weekends    Drug use: No    Sexual activity: Never     Partners: Female     Birth control/protection: None   Social History Narrative     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     Social Drivers of Health     Financial Resource Strain: Not At Risk (4/21/2023)    Received from [employer]    Financial Resource Strain     Is it hard for you to pay for the very basics like food, housing, medical care or heating?: No   Food Insecurity: Not At Risk (4/21/2023)    Received from [employer]    Food Insecurity     Does your food run out before you have the money to buy more?: No   Transportation Needs: Not At Risk (4/21/2023)    Received from [employer]    Transportation Needs     Does a lack of transportation keep you from your medical appointments or from getting your medications?: No   Interpersonal Safety: Low Risk  (11/15/2024)    Interpersonal Safety     Do you feel physically and emotionally safe where you currently live?: Yes     Within the past 12 months, have you been hit, slapped, kicked or otherwise physically hurt by someone?: No     Within the past 12 months, have you been humiliated or emotionally abused in other ways by your partner or ex-partner?: No       PHYSICAL EXAM:    Vitals: /59   Pulse 66   Temp 97.5  F (36.4  C) (Oral)   Resp 18   Ht 1.88 m (6' 2\")   Wt 112.9 kg (249 lb)   SpO2 98%   BMI 31.97 kg/m     Gen:  Alert, awake, NAD  HENT:  Head atraumatic, laceration of 3 cm length and 1 mm depth.  PERRL.  EOMI.  No periorbital step-offs, depression, tenderness.  No tenderness along the zygomatic arch bilaterally.  Ears atraumatic with no external bleeding or signs of trauma.  No epistaxis.  Clear oropharynx.  Dentition intact.   Respiratory:  Normal respiratory rate.   " Chest wall :  Trachea midline.  Cardiovascular:  Regular rate and rhythm.  Palpable radial and DP pulses bilaterally.  Abdomen:  Soft, nontender, normoactive bowel sounds.    Musculoskeletal:  No midline neck tenderness. Mild tenderness over the left lateral shoulder but patient is moving shoulder without pain through its full range of motion. No midline spinal step-offs noted.  FROM in all extremities.  5/5 strength in all extremities.  No gross deformities noted.  Pelvis stable.    Neuro:  GCS 15, A & O x 3, sensation intact to light touch       LAB:  All pertinent labs reviewed and interpreted.  Labs Ordered and Resulted from Time of ED Arrival to Time of ED Departure - No data to display    RADIOLOGY:  CT Head w/o Contrast   Final Result   IMPRESSION:   1.  No acute intracranial process.            PROCEDURES:   [facility]    -Laceration Repair    Date/Time: 12/12/2024 12:30 PM    Performed by: Yuriy Singh MD  Authorized by: Yuriy Singh MD    Emergent condition/consent implied      UNIVERSAL PROTOCOL   Site Marked: NA  Prior Images Obtained and Reviewed:  Yes  Required items: Required blood products, implants, devices and special equipment available    Patient identity confirmed:  Verbally with patient  NA - No sedation, light sedation, or local anesthesia  Confirmation Checklist:  Patient's identity using two indicators, procedure was appropriate and matched the consent or emergent situation and correct equipment/implants were available  Time out: Immediately prior to the procedure a time out was called    Universal Protocol: the Joint Commission Universal Protocol was followed    Preparation: Patient was prepped and draped in usual sterile fashion      ANESTHESIA (see MAR for exact dosages):     Anesthesia method:  Local infiltration    Local anesthetic:  Lidocaine 1% WITH epi  LACERATION DETAILS     Location:  Scalp    Length (cm):  3    Depth (mm):  1    REPAIR TYPE:      Repair type:  Intermediate    EXPLORATION:     Hemostasis achieved with:  Epinephrine    Wound exploration: entire depth of wound probed and visualized      Wound extent: no nerve damage, no tendon damage, no underlying fracture and no vascular damage      Contaminated: no      TREATMENT:     Area cleansed with:  Betadine    Amount of cleaning:  Standard    Irrigation solution:  Sterile saline    Visualized foreign bodies/material removed: yes      SKIN REPAIR     Repair method:  Staples    Number of staples:  8    APPROXIMATION     Approximation:  Close    POST-PROCEDURE DETAILS     Dressing:  Non-adherent dressing      PROCEDURE    Patient Tolerance:  Patient tolerated the procedure well with no immediate complications         I, Mable Spain, am serving as a scribe to document services personally performed by Dr. Yuriy Singh based on my observation and the provider's statements to me. I, Yuriy Singh M.D. attest that Mable Spain is acting in a scribe capacity, has observed my performance of the services and has documented them in accordance with my direction.      Yuriy Singh M.D.  Emergency Medicine  [facility] EMERGENCY DEPARTMENT  [address] [place] [id number] [phone]  Dept: [phone]       Yuriy Singh MD  12/13/24 [id number]

## 2024-12-12 NOTE — PROGRESS NOTES
[facility]: Cancer Care Follow-Up Note                                    Discussion with Patient:                                                      Patient was in today for cycle 1 day 22 of his obinutuzumab and venetoclax treatment plan for his diagnosis of CLL.    Patient was in for baseline labs prior to starting venetoclax 20 mg daily x 1 week.  He then came back at 3:00 PM for his 6 hours post labs.  These were reviewed by Radha Alarcon CNP who states that things are looking good.  We are refilling his allopurinol for 1 more month just while we do the ramp-up of his venetoclax.  This was sent over to his pharmacy.     Goals          General    Maintain ability to perform ADLs without difficulty     Patient will adhere to medication regimen     Notes - Note created  12/12/2024  3:59 PM by Penny Mcfadden RN    VENETOCLAX - STARTED 12/12/24              Dates of Treatment:                                                      Infusion given in last 28 days       Administered MAR Action Medication Dose Rate Visit    11/21/2024 09:40 New Bag obinutuzumab (GAZYVA) 100 mg in sodium chloride 0.9 % 100 mL infusion 100 mg 25 mL/hr Infusion Therapy Visit on 11/21/2024 in [facility]    11/21/2024 11:32 Restarted obinutuzumab (GAZYVA) 100 mg in sodium chloride 0.9 % 100 mL infusion   12.5 mL/hr Infusion Therapy Visit on 11/21/2024 in [facility]    11/21/2024 12:27 Rate Change obinutuzumab (GAZYVA) 100 mg in sodium chloride 0.9 % 100 mL infusion   25 mL/hr Infusion Therapy Visit on 11/21/2024 in [facility]    11/22/2024 10:00 New Bag obinutuzumab (GAZYVA) 900 mg in sodium chloride 0.9 % 900 mL infusion 900 mg 50 mL/hr Infusion Therapy Visit on 11/22/2024 in [facility]    11/22/2024 10:34 Rate Change obinutuzumab (GAZYVA) 900 mg in sodium chloride 0.9 % 900 mL infusion   100 mL/hr  Infusion Therapy Visit on 11/22/2024 in [facility]    11/22/2024 11:01 Rate Change obinutuzumab (GAZYVA) 900 mg in sodium chloride 0.9 % 900 mL infusion   150 mL/hr Infusion Therapy Visit on 11/22/2024 in [facility]    11/22/2024 11:35 Rate Change obinutuzumab (GAZYVA) 900 mg in sodium chloride 0.9 % 900 mL infusion   200 mL/hr Infusion Therapy Visit on 11/22/2024 in [facility]    11/22/2024 12:03 Rate Change obinutuzumab (GAZYVA) 900 mg in sodium chloride 0.9 % 900 mL infusion   250 mL/hr Infusion Therapy Visit on 11/22/2024 in [facility]    11/22/2024 12:33 Rate Change obinutuzumab (GAZYVA) 900 mg in sodium chloride 0.9 % 900 mL infusion   300 mL/hr Infusion Therapy Visit on 11/22/2024 in [facility]    11/22/2024 13:05 Rate Change obinutuzumab (GAZYVA) 900 mg in sodium chloride 0.9 % 900 mL infusion   350 mL/hr Infusion Therapy Visit on 11/22/2024 in [facility]    11/22/2024 13:36 Rate Change obinutuzumab (GAZYVA) 900 mg in sodium chloride 0.9 % 900 mL infusion   400 mL/hr Infusion Therapy Visit on 11/22/2024 in [facility]    11/29/2024 10:24 New Bag obinutuzumab (GAZYVA) 1,000 mg in sodium chloride 0.9 % 1,000 mL infusion 1,000 mg   Infusion Therapy Visit on 11/29/2024 in [facility]    12/05/2024 11:18 New Bag obinutuzumab (GAZYVA) 1,000 mg in sodium chloride 0.9 % 1,000 mL infusion 1,000 mg   Infusion Therapy Visit on 12/05/2024 in [facility]            Assessment:                                                      When patient came back this afternoon he stated he was a little nauseated.  After he got his morning labs, he went over to the walk-in clinic as he had fallen in the shower this morning and had an open wound on his head.  The  locking clinic would not see him and sent him to the emergency room.  He was in the ER until 1:00 PM.  He was very nervous that he was unable to drink anything while he was in there as the staff would not let him.    Intervention/Education provided during outreach:                                                       I let him know that he should take a antinausea medication when he gets home.  His labs are not indicating that he is dehydrated so he can now focus on fluids since he could not this morning.  They are aware that they are to come back at 8:00 tomorrow morning without taking the venetoclax.  They are going to bring the venetoclax with them and once we review his labs from tomorrow morning they will wait in the lobby and we will update them on if he can take it.    Patient to follow up as scheduled at next appt.  Patient to call/[employer] message with updates.  Confirmed patient has clinic and triage numbers.    Signature:  Penny Mcfadden RN

## 2024-12-12 NOTE — ED NOTES
Nursing Assessment: Head to Toe assessment for injuries related to fall: Patient sustained a laceration to the left side of his scalp. Wound is about 2 cm long with approximated edges. No other injuries located to his head. He reports striking his head against a soap dispenser in his shower as he fell. No neck pain with palpation. He is noting left shoulder pain. Chest, abdomen and his hips appear free of injuries. He notes pain with ambulation to his buttocks. No pain to both extremities from hips to feet. Back is free of injuries.     Cardiovascular: Noting normal heart tones with easily palpated radial pulses. Respiratory: Lung sounds clear bilaterally.

## 2024-12-19 ENCOUNTER — TELEPHONE (OUTPATIENT)
Dept: ONCOLOGY | Facility: HOSPITAL | Age: 64
End: 2024-12-19

## 2024-12-19 ENCOUNTER — INFUSION THERAPY VISIT (OUTPATIENT)
Dept: INFUSION THERAPY | Facility: HOSPITAL | Age: 64
End: 2024-12-19
Attending: INTERNAL MEDICINE
Payer: MEDICARE

## 2024-12-19 ENCOUNTER — PATIENT OUTREACH (OUTPATIENT)
Dept: ONCOLOGY | Facility: HOSPITAL | Age: 64
End: 2024-12-19

## 2024-12-19 VITALS
HEART RATE: 72 BPM | SYSTOLIC BLOOD PRESSURE: 111 MMHG | OXYGEN SATURATION: 95 % | DIASTOLIC BLOOD PRESSURE: 66 MMHG | TEMPERATURE: 98.4 F | RESPIRATION RATE: 18 BRPM

## 2024-12-19 DIAGNOSIS — Z98.890 HISTORY OF VASCULAR ACCESS DEVICE: ICD-10-CM

## 2024-12-19 DIAGNOSIS — C91.10 CHRONIC LYMPHOCYTIC LEUKEMIA (H): Primary | ICD-10-CM

## 2024-12-19 LAB
ALBUMIN SERPL BCG-MCNC: 3.9 G/DL (ref 3.5–5.2)
ALBUMIN SERPL BCG-MCNC: 4 G/DL (ref 3.5–5.2)
ALP SERPL-CCNC: 77 U/L (ref 40–150)
ALP SERPL-CCNC: 83 U/L (ref 40–150)
ALT SERPL W P-5'-P-CCNC: 22 U/L (ref 0–70)
ALT SERPL W P-5'-P-CCNC: 22 U/L (ref 0–70)
ANION GAP SERPL CALCULATED.3IONS-SCNC: 10 MMOL/L (ref 7–15)
ANION GAP SERPL CALCULATED.3IONS-SCNC: 9 MMOL/L (ref 7–15)
AST SERPL W P-5'-P-CCNC: 15 U/L (ref 0–45)
AST SERPL W P-5'-P-CCNC: 16 U/L (ref 0–45)
BASOPHILS # BLD AUTO: 0 10E3/UL (ref 0–0.2)
BASOPHILS # BLD AUTO: 0 10E3/UL (ref 0–0.2)
BASOPHILS NFR BLD AUTO: 1 %
BASOPHILS NFR BLD AUTO: 1 %
BILIRUB SERPL-MCNC: 0.5 MG/DL
BILIRUB SERPL-MCNC: 0.7 MG/DL
BUN SERPL-MCNC: 17.3 MG/DL (ref 8–23)
BUN SERPL-MCNC: 18.2 MG/DL (ref 8–23)
CALCIUM SERPL-MCNC: 8.8 MG/DL (ref 8.8–10.4)
CALCIUM SERPL-MCNC: 9.6 MG/DL (ref 8.8–10.4)
CHLORIDE SERPL-SCNC: 105 MMOL/L (ref 98–107)
CHLORIDE SERPL-SCNC: 106 MMOL/L (ref 98–107)
CREAT SERPL-MCNC: 0.82 MG/DL (ref 0.67–1.17)
CREAT SERPL-MCNC: 0.89 MG/DL (ref 0.67–1.17)
EGFRCR SERPLBLD CKD-EPI 2021: >90 ML/MIN/1.73M2
EGFRCR SERPLBLD CKD-EPI 2021: >90 ML/MIN/1.73M2
EOSINOPHIL # BLD AUTO: 0 10E3/UL (ref 0–0.7)
EOSINOPHIL # BLD AUTO: 0 10E3/UL (ref 0–0.7)
EOSINOPHIL NFR BLD AUTO: 0 %
EOSINOPHIL NFR BLD AUTO: 1 %
ERYTHROCYTE [DISTWIDTH] IN BLOOD BY AUTOMATED COUNT: 14 % (ref 10–15)
ERYTHROCYTE [DISTWIDTH] IN BLOOD BY AUTOMATED COUNT: 14 % (ref 10–15)
GLUCOSE SERPL-MCNC: 139 MG/DL (ref 70–99)
GLUCOSE SERPL-MCNC: 96 MG/DL (ref 70–99)
HCO3 SERPL-SCNC: 23 MMOL/L (ref 22–29)
HCO3 SERPL-SCNC: 25 MMOL/L (ref 22–29)
HCT VFR BLD AUTO: 31 % (ref 40–53)
HCT VFR BLD AUTO: 34.2 % (ref 40–53)
HGB BLD-MCNC: 10.4 G/DL (ref 13.3–17.7)
HGB BLD-MCNC: 11.1 G/DL (ref 13.3–17.7)
IMM GRANULOCYTES # BLD: 0.1 10E3/UL
IMM GRANULOCYTES # BLD: 0.1 10E3/UL
IMM GRANULOCYTES NFR BLD: 2 %
IMM GRANULOCYTES NFR BLD: 2 %
LYMPHOCYTES # BLD AUTO: 0.5 10E3/UL (ref 0.8–5.3)
LYMPHOCYTES # BLD AUTO: 0.7 10E3/UL (ref 0.8–5.3)
LYMPHOCYTES NFR BLD AUTO: 13 %
LYMPHOCYTES NFR BLD AUTO: 21 %
MCH RBC QN AUTO: 30.7 PG (ref 26.5–33)
MCH RBC QN AUTO: 31.6 PG (ref 26.5–33)
MCHC RBC AUTO-ENTMCNC: 32.5 G/DL (ref 31.5–36.5)
MCHC RBC AUTO-ENTMCNC: 33.5 G/DL (ref 31.5–36.5)
MCV RBC AUTO: 94 FL (ref 78–100)
MCV RBC AUTO: 95 FL (ref 78–100)
MONOCYTES # BLD AUTO: 0.1 10E3/UL (ref 0–1.3)
MONOCYTES # BLD AUTO: 0.2 10E3/UL (ref 0–1.3)
MONOCYTES NFR BLD AUTO: 2 %
MONOCYTES NFR BLD AUTO: 5 %
NEUTROPHILS # BLD AUTO: 2.4 10E3/UL (ref 1.6–8.3)
NEUTROPHILS # BLD AUTO: 3.4 10E3/UL (ref 1.6–8.3)
NEUTROPHILS NFR BLD AUTO: 71 %
NEUTROPHILS NFR BLD AUTO: 82 %
NRBC # BLD AUTO: 0 10E3/UL
NRBC # BLD AUTO: 0 10E3/UL
NRBC BLD AUTO-RTO: 0 /100
NRBC BLD AUTO-RTO: 1 /100
PHOSPHATE SERPL-MCNC: 2.5 MG/DL (ref 2.5–4.5)
PHOSPHATE SERPL-MCNC: 4.1 MG/DL (ref 2.5–4.5)
PLATELET # BLD AUTO: 140 10E3/UL (ref 150–450)
PLATELET # BLD AUTO: 184 10E3/UL (ref 150–450)
POTASSIUM SERPL-SCNC: 4.1 MMOL/L (ref 3.4–5.3)
POTASSIUM SERPL-SCNC: 4.4 MMOL/L (ref 3.4–5.3)
PROT SERPL-MCNC: 5.8 G/DL (ref 6.4–8.3)
PROT SERPL-MCNC: 6 G/DL (ref 6.4–8.3)
RBC # BLD AUTO: 3.29 10E6/UL (ref 4.4–5.9)
RBC # BLD AUTO: 3.62 10E6/UL (ref 4.4–5.9)
SODIUM SERPL-SCNC: 138 MMOL/L (ref 135–145)
SODIUM SERPL-SCNC: 140 MMOL/L (ref 135–145)
URATE SERPL-MCNC: 4.6 MG/DL (ref 3.4–7)
URATE SERPL-MCNC: 5.4 MG/DL (ref 3.4–7)
WBC # BLD AUTO: 3.3 10E3/UL (ref 4–11)
WBC # BLD AUTO: 4.1 10E3/UL (ref 4–11)

## 2024-12-19 PROCEDURE — 85025 COMPLETE CBC W/AUTO DIFF WBC: CPT | Performed by: INTERNAL MEDICINE

## 2024-12-19 PROCEDURE — 36591 DRAW BLOOD OFF VENOUS DEVICE: CPT | Performed by: INTERNAL MEDICINE

## 2024-12-19 PROCEDURE — 36591 DRAW BLOOD OFF VENOUS DEVICE: CPT

## 2024-12-19 PROCEDURE — 82247 BILIRUBIN TOTAL: CPT

## 2024-12-19 PROCEDURE — 84155 ASSAY OF PROTEIN SERUM: CPT

## 2024-12-19 PROCEDURE — 84100 ASSAY OF PHOSPHORUS: CPT

## 2024-12-19 PROCEDURE — 250N000013 HC RX MED GY IP 250 OP 250 PS 637: Performed by: INTERNAL MEDICINE

## 2024-12-19 PROCEDURE — 85025 COMPLETE CBC W/AUTO DIFF WBC: CPT

## 2024-12-19 PROCEDURE — 84132 ASSAY OF SERUM POTASSIUM: CPT | Performed by: INTERNAL MEDICINE

## 2024-12-19 PROCEDURE — 84550 ASSAY OF BLOOD/URIC ACID: CPT | Performed by: INTERNAL MEDICINE

## 2024-12-19 PROCEDURE — 258N000003 HC RX IP 258 OP 636: Performed by: INTERNAL MEDICINE

## 2024-12-19 PROCEDURE — 84450 TRANSFERASE (AST) (SGOT): CPT

## 2024-12-19 PROCEDURE — 84550 ASSAY OF BLOOD/URIC ACID: CPT

## 2024-12-19 PROCEDURE — 250N000011 HC RX IP 250 OP 636: Performed by: INTERNAL MEDICINE

## 2024-12-19 PROCEDURE — 84100 ASSAY OF PHOSPHORUS: CPT | Performed by: INTERNAL MEDICINE

## 2024-12-19 PROCEDURE — 84155 ASSAY OF PROTEIN SERUM: CPT | Performed by: INTERNAL MEDICINE

## 2024-12-19 RX ORDER — ALBUTEROL SULFATE 90 UG/1
1-2 INHALANT RESPIRATORY (INHALATION)
Status: DISCONTINUED | OUTPATIENT
Start: 2024-12-19 | End: 2024-12-19 | Stop reason: HOSPADM

## 2024-12-19 RX ORDER — DIPHENHYDRAMINE HCL 50 MG
50 CAPSULE ORAL
Status: COMPLETED | OUTPATIENT
Start: 2024-12-19 | End: 2024-12-19

## 2024-12-19 RX ORDER — METHYLPREDNISOLONE SODIUM SUCCINATE 40 MG/ML
40 INJECTION INTRAMUSCULAR; INTRAVENOUS
Status: DISCONTINUED | OUTPATIENT
Start: 2024-12-19 | End: 2024-12-19 | Stop reason: HOSPADM

## 2024-12-19 RX ORDER — ACETAMINOPHEN 325 MG/1
650 TABLET ORAL ONCE
Status: COMPLETED | OUTPATIENT
Start: 2024-12-19 | End: 2024-12-19

## 2024-12-19 RX ORDER — MEPERIDINE HYDROCHLORIDE 25 MG/ML
25 INJECTION INTRAMUSCULAR; INTRAVENOUS; SUBCUTANEOUS
Status: DISCONTINUED | OUTPATIENT
Start: 2024-12-19 | End: 2024-12-19 | Stop reason: HOSPADM

## 2024-12-19 RX ORDER — DIPHENHYDRAMINE HYDROCHLORIDE 50 MG/ML
50 INJECTION INTRAMUSCULAR; INTRAVENOUS
Status: DISCONTINUED | OUTPATIENT
Start: 2024-12-19 | End: 2024-12-19 | Stop reason: HOSPADM

## 2024-12-19 RX ORDER — DIPHENHYDRAMINE HYDROCHLORIDE 50 MG/ML
25 INJECTION INTRAMUSCULAR; INTRAVENOUS
Status: DISCONTINUED | OUTPATIENT
Start: 2024-12-19 | End: 2024-12-19 | Stop reason: HOSPADM

## 2024-12-19 RX ORDER — EPINEPHRINE 1 MG/ML
0.3 INJECTION, SOLUTION INTRAMUSCULAR; SUBCUTANEOUS EVERY 5 MIN PRN
Status: DISCONTINUED | OUTPATIENT
Start: 2024-12-19 | End: 2024-12-19 | Stop reason: HOSPADM

## 2024-12-19 RX ORDER — ALBUTEROL SULFATE 0.83 MG/ML
2.5 SOLUTION RESPIRATORY (INHALATION)
Status: DISCONTINUED | OUTPATIENT
Start: 2024-12-19 | End: 2024-12-19 | Stop reason: HOSPADM

## 2024-12-19 RX ORDER — HEPARIN SODIUM (PORCINE) LOCK FLUSH IV SOLN 100 UNIT/ML 100 UNIT/ML
5 SOLUTION INTRAVENOUS
Status: DISCONTINUED | OUTPATIENT
Start: 2024-12-19 | End: 2024-12-19 | Stop reason: HOSPADM

## 2024-12-19 RX ADMIN — DIPHENHYDRAMINE HYDROCHLORIDE 50 MG: 50 CAPSULE ORAL at 08:40

## 2024-12-19 RX ADMIN — DEXAMETHASONE SODIUM PHOSPHATE 20 MG: 10 INJECTION, SOLUTION INTRAMUSCULAR; INTRAVENOUS at 08:44

## 2024-12-19 RX ADMIN — Medication 5 ML: at 13:00

## 2024-12-19 RX ADMIN — SODIUM CHLORIDE 250 ML: 9 INJECTION, SOLUTION INTRAVENOUS at 08:36

## 2024-12-19 RX ADMIN — SODIUM CHLORIDE 1000 MG: 9 INJECTION, SOLUTION INTRAVENOUS at 09:22

## 2024-12-19 RX ADMIN — ACETAMINOPHEN 650 MG: 325 TABLET ORAL at 08:39

## 2024-12-19 NOTE — PROGRESS NOTES
[facility]: Cancer Care Follow-Up Note                                    Discussion with Patient:                                                      Patient comes into the clinic today for labs prior to taking his increased dose of venetoclax of 50 mg daily x 1 week.  He also was scheduled for his obinutuzumab infusion and then a lab check prior to leaving the clinic.     Goals          General    Maintain ability to perform ADLs without difficulty     Patient will adhere to medication regimen     Notes - Note created  12/12/2024  3:59 PM by Penny Mcfadden, RN    VENETOCLAX - STARTED 12/12/24              Dates of Treatment:                                                      Infusion given in last 28 days       Administered MAR Action Medication Dose Rate Visit    11/21/2024 09:40 New Bag obinutuzumab (GAZYVA) 100 mg in sodium chloride 0.9 % 100 mL infusion 100 mg 25 mL/hr Infusion Therapy Visit on 11/21/2024 in [facility]    11/21/2024 11:32 Restarted obinutuzumab (GAZYVA) 100 mg in sodium chloride 0.9 % 100 mL infusion   12.5 mL/hr Infusion Therapy Visit on 11/21/2024 in [facility]    11/21/2024 12:27 Rate Change obinutuzumab (GAZYVA) 100 mg in sodium chloride 0.9 % 100 mL infusion   25 mL/hr Infusion Therapy Visit on 11/21/2024 in [facility]    11/22/2024 10:00 New Bag obinutuzumab (GAZYVA) 900 mg in sodium chloride 0.9 % 900 mL infusion 900 mg 50 mL/hr Infusion Therapy Visit on 11/22/2024 in [facility]    11/22/2024 10:34 Rate Change obinutuzumab (GAZYVA) 900 mg in sodium chloride 0.9 % 900 mL infusion   100 mL/hr Infusion Therapy Visit on 11/22/2024 in [facility]    11/22/2024 11:01 Rate Change obinutuzumab (GAZYVA) 900 mg in sodium chloride 0.9 % 900 mL infusion   150 mL/hr Infusion Therapy Visit on 11/22/2024 in [facility]    11/22/2024 11:35 Rate Change obinutuzumab (GAZYVA) 900 mg in sodium chloride 0.9 % 900 mL infusion   200 mL/hr Infusion Therapy Visit on 11/22/2024 in [facility]    11/22/2024 12:03 Rate Change obinutuzumab (GAZYVA) 900 mg in sodium chloride 0.9 % 900 mL infusion   250 mL/hr Infusion Therapy Visit on 11/22/2024 in [facility]    11/22/2024 12:33 Rate Change obinutuzumab (GAZYVA) 900 mg in sodium chloride 0.9 % 900 mL infusion   300 mL/hr Infusion Therapy Visit on 11/22/2024 in [facility]    11/22/2024 13:05 Rate Change obinutuzumab (GAZYVA) 900 mg in sodium chloride 0.9 % 900 mL infusion   350 mL/hr Infusion Therapy Visit on 11/22/2024 in [facility]    11/22/2024 13:36 Rate Change obinutuzumab (GAZYVA) 900 mg in sodium chloride 0.9 % 900 mL infusion   400 mL/hr Infusion Therapy Visit on 11/22/2024 in [facility]    11/29/2024 10:24 New Bag obinutuzumab (GAZYVA) 1,000 mg in sodium chloride 0.9 % 1,000 mL infusion 1,000 mg   Infusion Therapy Visit on 11/29/2024 in [facility]    12/05/2024 11:18 New Bag obinutuzumab (GAZYVA) 1,000 mg in sodium chloride 0.9 % 1,000 mL infusion 1,000 mg   Infusion Therapy Visit on 12/05/2024 in [facility]    12/19/2024 09:22 New Bag obinutuzumab (GAZYVA) 1,000 mg in sodium chloride 0.9 % 1,000 mL infusion 1,000 mg   Infusion Therapy Visit on 12/19/2024 in [facility]          Treatment Plan Medications       OP ONC Chronic Lymphocytic Leukemia (CLL) - Obinutuzumab / Venetoclax        Take Home Medications       Medication Sig Start/End Day/Cycle Status    venetoclax (VENCLEXTA) 100 MG tablet Take 4 tablets (400 mg) by mouth daily for 7 days Dose ramp up week 5. S to S+7 Day 22, Cycle 2 - 1/9/2025 Signed                             Assessment:                                                      I went over and spoke with Clive and his wife Nai after his morning lab results were reviewed by Radha Alarcon CNP.  He went ahead and took his venetoclax 50 mg dose around 8:45 AM.  Once he had completed his obinutuzumab infusion, labs were drawn again.  These have been reviewed by Radha Alarcon CNP again and look good.    Intervention/Education provided during outreach:                                                       Patient is aware that he needs to be back at 8:00 AM for another lab check tomorrow morning.    Patient to follow up as scheduled at next appt.  Patient to call/[employer] message with updates.  Confirmed patient has clinic and triage numbers.    Signature:  Penny Mcfadden RN

## 2024-12-19 NOTE — PROGRESS NOTES
Patient was in today for labs prior to Venetoclax increase dose to 50mg daily x1 week along with his obinutuzumab infusion and then another set up labs later today.  Morning labs reviewed by Radha Alarcon CNP who states that they are good and Clive can go ahead and take his Venetoclax 50mg.  He will have labs checked again this afternoon and then again tomorrow morning.  This has been discussed with patient and his wife.    Penny Mcfadden RN

## 2024-12-19 NOTE — ORAL ONC MGMT
Oral Chemotherapy Monitoring Program    Subjective/Objective:  Ramu Espinoza is a 64 year old male contacted by phone for a follow-up visit for oral chemotherapy.  Clive is tolerating the venetoclax so far. He started his 50 mg dose today after ok from Penny. He reports he had a little bit of nausea earlier this week but took an anti-nausea pill and it helped. He also complains of intermittent fatigue but is able to continue his daily activities. He denies any diarrhea, skin irritation or other side effects.         12/5/2024    10:00 AM 12/6/2024    10:00 AM 12/12/2024     9:00 AM 12/19/2024     2:00 PM   ORAL CHEMOTHERAPY   Assessment Type Initial Work up New Teach Lab Monitoring Initial Follow up   Diagnosis Code Chronic Lymphocytic Leukemia (CLL) Chronic Lymphocytic Leukemia (CLL) Chronic Lymphocytic Leukemia (CLL) Chronic Lymphocytic Leukemia (CLL)   Providers Dr. Greg Garza   Clinic Name/Location [facility]   Drug Name Venclexta (venetoclax) Venclexta (venetoclax) Venclexta (venetoclax) Venclexta (venetoclax)   Dose   20 mg 50 mg   Current Schedule Daily Daily Daily Daily   Cycle Details Continuous Continuous Continuous Continuous   Start Date of Last Cycle   12/12/2024 12/19/2024   Planned next cycle start date 12/12/2024 12/12/2024     Adverse Effects    Nausea   Nausea    Grade 1   Pharmacist Intervention(nausea)    Yes   Intervention(s)    Patient education   Any new drug interactions? No No No No   Is the dose as ordered appropriate for the patient? Yes Yes Yes Yes       Last PHQ-2 Score on record:       1/10/2023    11:17 AM 3/8/2022    10:57 AM   PHQ-2 ( 1999 [employer])   Q1: Little interest or pleasure in doing things 0 0    Q2: Feeling down, depressed or hopeless 0 1    PHQ-2 Score 0 1   Q1: Little interest or pleasure in doing things  Not at all   Q2: Feeling down, depressed or hopeless  Several days   PHQ-2 Score  1        "Proxy-reported       Vitals:  BP:   BP Readings from Last 1 Encounters:   12/19/24 111/66     Wt Readings from Last 1 Encounters:   12/12/24 112.9 kg (249 lb)     Estimated body surface area is 2.43 meters squared as calculated from the following:    Height as of 12/12/24: 1.88 m (6' 2\").    Weight as of 12/12/24: 112.9 kg (249 lb).    Labs:  _  Result Component Current Result Ref Range   Sodium 138 (12/19/2024) 135 - 145 mmol/L     _  Result Component Current Result Ref Range   Potassium 4.4 (12/19/2024) 3.4 - 5.3 mmol/L     _  Result Component Current Result Ref Range   Calcium 8.8 (12/19/2024) 8.8 - 10.4 mg/dL     No results found for Mag within last 30 days.     _  Result Component Current Result Ref Range   Phosphorus 2.5 (12/19/2024) 2.5 - 4.5 mg/dL     _  Result Component Current Result Ref Range   Albumin 3.9 (12/19/2024) 3.5 - 5.2 g/dL     _  Result Component Current Result Ref Range   Urea Nitrogen 17.3 (12/19/2024) 8.0 - 23.0 mg/dL     _  Result Component Current Result Ref Range   Creatinine 0.82 (12/19/2024) 0.67 - 1.17 mg/dL     _  Result Component Current Result Ref Range   AST 15 (12/19/2024) 0 - 45 U/L     _  Result Component Current Result Ref Range   ALT 22 (12/19/2024) 0 - 70 U/L     _  Result Component Current Result Ref Range   Bilirubin Total 0.5 (12/19/2024) <=1.2 mg/dL     _  Result Component Current Result Ref Range   WBC Count 4.1 (12/19/2024) 4.0 - 11.0 10e3/uL     _  Result Component Current Result Ref Range   Hemoglobin 10.4 (L) (12/19/2024) 13.3 - 17.7 g/dL     _  Result Component Current Result Ref Range   Platelet Count 140 (L) (12/19/2024) 150 - 450 10e3/uL     _  Result Component Current Result Ref Range   Absolute Neutrophils 1.5 (L) (12/13/2024) 1.6 - 8.3 10e3/uL     _  Result Component Current Result Ref Range   Absolute Neutrophils 3.4 (12/19/2024) 1.6 - 8.3 10e3/uL          Assessment/Plan:  Clive is tolerating the venetoclax with some intermittent nausea and fatigue. We " discussed taking an anti-nausea medication as needed and could also take 30 mins prior to his venetoclax dose. Reminded Clive to take it with food which he does. He knows to come in for labs in the AM.    Follow-Up:  Labs tomorrow AM    Refill Due:  1/2/25    Maren Hansen, [place]  Oral Chemotherapy Pharmacist  [facility] - [place], [place] Phone: [phone]  In Basket Pools:   MARLY ORAL CHEMOTHERAPY PHARMACIST   HEATHER ORAL CHEMOTHERAPY PHARMACIST

## 2024-12-19 NOTE — PROGRESS NOTES
Infusion Nursing Note:  Ramu Espinoza presents today for cycle 2 day 1 treatment with Obinutuzumab.    Patient seen by provider today: No   present during visit today: Not Applicable.    Note: VSS.  Pt assessed and is feeling well today.  He received treatment as ordered.  Post Venetoclax labs drawn after treatment (per Radha Alarcon CNP pt did not need to wait the full 6 hours post drug).  Clive was educated on his plan of care and each  medication given was reviewed prior to administration.  Clive received diphenydramine and dexamethasone from prn premedication due to previous reaction.  He also received scheduled acetaminophen.  Obinutuzumab infused as ordered.      Intravenous Access:  Implanted Port.    Treatment Conditions:  Lab Results   Component Value Date    HGB 11.1 (L) 12/19/2024    WBC 3.3 (L) 12/19/2024    ANEU 1.5 (L) 12/13/2024    ANEUTAUTO 2.4 12/19/2024     12/19/2024        Lab Results   Component Value Date     12/19/2024    POTASSIUM 4.1 12/19/2024    CR 0.89 12/19/2024    ZOHAIB 9.6 12/19/2024    BILITOTAL 0.7 12/19/2024    ALBUMIN 4.0 12/19/2024    ALT 22 12/19/2024    AST 16 12/19/2024       Results reviewed, labs MET treatment parameters, ok to proceed with treatment.      Post Infusion Assessment:  Patient tolerated infusion without incident.  Blood return noted pre and post infusion.  Site patent and intact, free from redness, edema or discomfort.  No evidence of extravasations.  Access discontinued per protocol.       Discharge Plan:   Patient discharged in stable condition accompanied by: wife.  Departure Mode: Ambulatory.      Ne Rangel RN

## 2024-12-26 ENCOUNTER — PATIENT OUTREACH (OUTPATIENT)
Dept: ONCOLOGY | Facility: HOSPITAL | Age: 64
End: 2024-12-26

## 2024-12-26 ENCOUNTER — LAB (OUTPATIENT)
Dept: INFUSION THERAPY | Facility: HOSPITAL | Age: 64
End: 2024-12-26
Payer: MEDICARE

## 2024-12-26 DIAGNOSIS — C91.10 CHRONIC LYMPHOCYTIC LEUKEMIA (H): Primary | ICD-10-CM

## 2024-12-26 DIAGNOSIS — K13.79 MOUTH SORE SECONDARY TO CHEMOTHERAPY: ICD-10-CM

## 2024-12-26 DIAGNOSIS — T45.1X5A MOUTH SORE SECONDARY TO CHEMOTHERAPY: ICD-10-CM

## 2024-12-26 DIAGNOSIS — Z51.11 ENCOUNTER FOR ANTINEOPLASTIC CHEMOTHERAPY: ICD-10-CM

## 2024-12-26 LAB
ALBUMIN SERPL BCG-MCNC: 4.1 G/DL (ref 3.5–5.2)
ALP SERPL-CCNC: 83 U/L (ref 40–150)
ALT SERPL W P-5'-P-CCNC: 21 U/L (ref 0–70)
ANION GAP SERPL CALCULATED.3IONS-SCNC: 8 MMOL/L (ref 7–15)
AST SERPL W P-5'-P-CCNC: 17 U/L (ref 0–45)
BASOPHILS # BLD AUTO: 0 10E3/UL (ref 0–0.2)
BASOPHILS NFR BLD AUTO: 0 %
BILIRUB SERPL-MCNC: 0.8 MG/DL
BUN SERPL-MCNC: 16.5 MG/DL (ref 8–23)
CALCIUM SERPL-MCNC: 9.6 MG/DL (ref 8.8–10.4)
CHLORIDE SERPL-SCNC: 102 MMOL/L (ref 98–107)
CREAT SERPL-MCNC: 1 MG/DL (ref 0.67–1.17)
EGFRCR SERPLBLD CKD-EPI 2021: 83 ML/MIN/1.73M2
EOSINOPHIL # BLD AUTO: 0.1 10E3/UL (ref 0–0.7)
EOSINOPHIL NFR BLD AUTO: 2 %
ERYTHROCYTE [DISTWIDTH] IN BLOOD BY AUTOMATED COUNT: 13.9 % (ref 10–15)
GLUCOSE SERPL-MCNC: 108 MG/DL (ref 70–99)
HCO3 SERPL-SCNC: 28 MMOL/L (ref 22–29)
HCT VFR BLD AUTO: 35.5 % (ref 40–53)
HGB BLD-MCNC: 11.5 G/DL (ref 13.3–17.7)
IMM GRANULOCYTES # BLD: 0.1 10E3/UL
IMM GRANULOCYTES NFR BLD: 2 %
LYMPHOCYTES # BLD AUTO: 0.7 10E3/UL (ref 0.8–5.3)
LYMPHOCYTES NFR BLD AUTO: 24 %
MCH RBC QN AUTO: 30.9 PG (ref 26.5–33)
MCHC RBC AUTO-ENTMCNC: 32.4 G/DL (ref 31.5–36.5)
MCV RBC AUTO: 95 FL (ref 78–100)
MONOCYTES # BLD AUTO: 0.2 10E3/UL (ref 0–1.3)
MONOCYTES NFR BLD AUTO: 7 %
NEUTROPHILS # BLD AUTO: 1.9 10E3/UL (ref 1.6–8.3)
NEUTROPHILS NFR BLD AUTO: 65 %
NRBC # BLD AUTO: 0 10E3/UL
NRBC BLD AUTO-RTO: 0 /100
PHOSPHATE SERPL-MCNC: 3.1 MG/DL (ref 2.5–4.5)
PLATELET # BLD AUTO: 214 10E3/UL (ref 150–450)
POTASSIUM SERPL-SCNC: 4.5 MMOL/L (ref 3.4–5.3)
PROT SERPL-MCNC: 6.3 G/DL (ref 6.4–8.3)
RBC # BLD AUTO: 3.72 10E6/UL (ref 4.4–5.9)
SODIUM SERPL-SCNC: 138 MMOL/L (ref 135–145)
URATE SERPL-MCNC: 5.6 MG/DL (ref 3.4–7)
WBC # BLD AUTO: 3 10E3/UL (ref 4–11)

## 2024-12-26 PROCEDURE — 84550 ASSAY OF BLOOD/URIC ACID: CPT | Performed by: INTERNAL MEDICINE

## 2024-12-26 PROCEDURE — 80053 COMPREHEN METABOLIC PANEL: CPT | Performed by: INTERNAL MEDICINE

## 2024-12-26 PROCEDURE — 36591 DRAW BLOOD OFF VENOUS DEVICE: CPT | Performed by: INTERNAL MEDICINE

## 2024-12-26 PROCEDURE — 85025 COMPLETE CBC W/AUTO DIFF WBC: CPT | Performed by: INTERNAL MEDICINE

## 2024-12-26 PROCEDURE — 84100 ASSAY OF PHOSPHORUS: CPT | Performed by: INTERNAL MEDICINE

## 2024-12-26 NOTE — PROGRESS NOTES
Addendum:  Called pt to confirm lab in 1 week.  Informed him of magic mouthwash prescription and instructed that they need to call there. Also will send MyC with phone number for  [facility].      [facility]: Cancer Care Follow-Up Note                                    Discussion with Patient:                                                      RN Cancer Care Coordinator, as charge nurse, went over labs results today with Dr. Friedell, (and then later confirmed by Dr. Finch). They said pt can do ahead and increase dose of venetoclax from 50mg to 100mg daily, per treatment plan.     Writer provided printed labs for patient and his wife.   We went over the schedule of dosage. He asked several questions and reported new side effect of a somewhat sore mouth.  Also updated on the queasy feeling he reported last week.    Discussed his liquid intake at length.     He had several questions about how he will likely feel when the dose goes up to 400mg/day, and if he will feel bad for the long period of time he will be taking the medication. Encouraged patient to focus on this dose, and that his body will continue to adapt.     Antiemetics: Suggested taking antiemetic on schedule.     Goals          General    Maintain ability to perform ADLs without difficulty     Patient will adhere to medication regimen     Notes - Note created  12/12/2024  3:59 PM by Penny Mcfadden RN    VENETOCLAX - STARTED 12/12/24              Dates of Treatment:                                                      Infusion given in last 28 days       Administered MAR Action Medication Dose Rate Visit    11/29/2024 10:24 New Bag obinutuzumab (GAZYVA) 1,000 mg in sodium chloride 0.9 % 1,000 mL infusion 1,000 mg   Infusion Therapy Visit on 11/29/2024 in [facility]    12/05/2024 11:18 New Bag obinutuzumab (GAZYVA) 1,000 mg in sodium chloride 0.9 % 1,000 mL infusion 1,000 mg   Infusion Therapy Visit  on 12/05/2024 in [facility]    12/19/2024 09:22 New Bag obinutuzumab (GAZYVA) 1,000 mg in sodium chloride 0.9 % 1,000 mL infusion 1,000 mg   Infusion Therapy Visit on 12/19/2024 in [facility]          Treatment Plan Medications       OP ONC Chronic Lymphocytic Leukemia (CLL) - Obinutuzumab / Venetoclax        Take Home Medications       Medication Sig Start/End Day/Cycle Status    venetoclax (VENCLEXTA) 100 MG tablet Take 4 tablets (400 mg) by mouth daily for 7 days Dose ramp up week 5. S to S+7 Day 22, Cycle 2 - 1/9/2025 Signed                            Assessment:                                                    Mouth symptoms:  He has some very small sores at the base of his nostrils. His tongue is pink and normal looking. No white, no scale. He states this is very mild.     Update on queasiness:  He states he still has trouble with the definition of nausea, and writer described the continuum of queasy -> nausea -> vomiting. He has been feeling somewhat better. He has tried the antiemetic and it helps - for a while. Writer suggested he try taking it on a schedule for a day or two to see if has a better effect.     Fluids:  He states he drinks lots of water - probably ~64 oz/day. Writer suggested getting some electrolytes and putting a low dose of those in some of the water. C/o of frequent urination. Reports urine is quite clear.    Intervention/Education provided during outreach:                                                     Asked Dr. Finch about potential magic mouthwash prescription for the moth sores and he said we should send this as a precaution and to be prepared if mouth sores get worse.     As noted above. Listened and encouraged pt. Acknowledged him for being careful to report any symptoms. Clarified they have triage number.     Patient to follow up as scheduled at next appt  Patient to call/[employer] message with updates  Confirmed  patient has clinic and triage numbers    Signature:  Dominique Chahal RN

## 2024-12-30 ENCOUNTER — NURSE TRIAGE (OUTPATIENT)
Dept: ONCOLOGY | Facility: HOSPITAL | Age: 64
End: 2024-12-30
Payer: MEDICARE

## 2024-12-30 NOTE — TELEPHONE ENCOUNTER
Nai from [facility] pharmacy called clinic stating that prescription for pt's Magic mouthwash was faxed over without a signature.  Pharmacist wanted to make sure that pt should have this medication.  Per Radha, pt should have prescription, and gave verbal ok to fill.  Instructed pharmacist to call clinic with any further questions or concerns.

## 2025-01-02 ENCOUNTER — LAB (OUTPATIENT)
Dept: INFUSION THERAPY | Facility: HOSPITAL | Age: 65
End: 2025-01-02
Payer: MEDICARE

## 2025-01-02 ENCOUNTER — ONCOLOGY VISIT (OUTPATIENT)
Dept: ONCOLOGY | Facility: HOSPITAL | Age: 65
End: 2025-01-02
Payer: MEDICARE

## 2025-01-02 VITALS
TEMPERATURE: 98.3 F | OXYGEN SATURATION: 96 % | DIASTOLIC BLOOD PRESSURE: 65 MMHG | BODY MASS INDEX: 31.76 KG/M2 | RESPIRATION RATE: 16 BRPM | WEIGHT: 247.4 LBS | SYSTOLIC BLOOD PRESSURE: 119 MMHG | HEART RATE: 78 BPM

## 2025-01-02 DIAGNOSIS — C91.10 CHRONIC LYMPHOCYTIC LEUKEMIA (H): Primary | ICD-10-CM

## 2025-01-02 LAB
ALBUMIN SERPL BCG-MCNC: 4.3 G/DL (ref 3.5–5.2)
ALP SERPL-CCNC: 80 U/L (ref 40–150)
ALT SERPL W P-5'-P-CCNC: 21 U/L (ref 0–70)
ANION GAP SERPL CALCULATED.3IONS-SCNC: 10 MMOL/L (ref 7–15)
AST SERPL W P-5'-P-CCNC: 16 U/L (ref 0–45)
BASOPHILS # BLD AUTO: 0 10E3/UL (ref 0–0.2)
BASOPHILS NFR BLD AUTO: 0 %
BILIRUB SERPL-MCNC: 0.8 MG/DL
BUN SERPL-MCNC: 18.4 MG/DL (ref 8–23)
CALCIUM SERPL-MCNC: 10.4 MG/DL (ref 8.8–10.4)
CHLORIDE SERPL-SCNC: 103 MMOL/L (ref 98–107)
CREAT SERPL-MCNC: 0.92 MG/DL (ref 0.67–1.17)
EGFRCR SERPLBLD CKD-EPI 2021: >90 ML/MIN/1.73M2
EOSINOPHIL # BLD AUTO: 0 10E3/UL (ref 0–0.7)
EOSINOPHIL NFR BLD AUTO: 1 %
ERYTHROCYTE [DISTWIDTH] IN BLOOD BY AUTOMATED COUNT: 13.6 % (ref 10–15)
GLUCOSE SERPL-MCNC: 109 MG/DL (ref 70–99)
HCO3 SERPL-SCNC: 26 MMOL/L (ref 22–29)
HCT VFR BLD AUTO: 36.2 % (ref 40–53)
HGB BLD-MCNC: 12 G/DL (ref 13.3–17.7)
IMM GRANULOCYTES # BLD: 0 10E3/UL
IMM GRANULOCYTES NFR BLD: 1 %
LYMPHOCYTES # BLD AUTO: 0.9 10E3/UL (ref 0.8–5.3)
LYMPHOCYTES NFR BLD AUTO: 20 %
MCH RBC QN AUTO: 31 PG (ref 26.5–33)
MCHC RBC AUTO-ENTMCNC: 33.1 G/DL (ref 31.5–36.5)
MCV RBC AUTO: 94 FL (ref 78–100)
MONOCYTES # BLD AUTO: 0.2 10E3/UL (ref 0–1.3)
MONOCYTES NFR BLD AUTO: 5 %
NEUTROPHILS # BLD AUTO: 3.1 10E3/UL (ref 1.6–8.3)
NEUTROPHILS NFR BLD AUTO: 74 %
NRBC # BLD AUTO: 0 10E3/UL
NRBC BLD AUTO-RTO: 0 /100
PHOSPHATE SERPL-MCNC: 4.5 MG/DL (ref 2.5–4.5)
PLATELET # BLD AUTO: 285 10E3/UL (ref 150–450)
POTASSIUM SERPL-SCNC: 5.1 MMOL/L (ref 3.4–5.3)
PROT SERPL-MCNC: 6.6 G/DL (ref 6.4–8.3)
RBC # BLD AUTO: 3.87 10E6/UL (ref 4.4–5.9)
SODIUM SERPL-SCNC: 139 MMOL/L (ref 135–145)
URATE SERPL-MCNC: 4.4 MG/DL (ref 3.4–7)
WBC # BLD AUTO: 4.3 10E3/UL (ref 4–11)

## 2025-01-02 PROCEDURE — G0463 HOSPITAL OUTPT CLINIC VISIT: HCPCS | Performed by: NURSE PRACTITIONER

## 2025-01-02 PROCEDURE — 85004 AUTOMATED DIFF WBC COUNT: CPT | Performed by: INTERNAL MEDICINE

## 2025-01-02 PROCEDURE — 84100 ASSAY OF PHOSPHORUS: CPT | Performed by: INTERNAL MEDICINE

## 2025-01-02 PROCEDURE — 84550 ASSAY OF BLOOD/URIC ACID: CPT | Performed by: INTERNAL MEDICINE

## 2025-01-02 PROCEDURE — 82565 ASSAY OF CREATININE: CPT | Performed by: INTERNAL MEDICINE

## 2025-01-02 PROCEDURE — 82374 ASSAY BLOOD CARBON DIOXIDE: CPT | Performed by: INTERNAL MEDICINE

## 2025-01-02 PROCEDURE — 82310 ASSAY OF CALCIUM: CPT | Performed by: INTERNAL MEDICINE

## 2025-01-02 PROCEDURE — 36591 DRAW BLOOD OFF VENOUS DEVICE: CPT | Performed by: INTERNAL MEDICINE

## 2025-01-02 RX ORDER — DIPHENHYDRAMINE HCL 50 MG
50 CAPSULE ORAL ONCE
Start: 2025-01-16 | End: 2025-01-16

## 2025-01-02 RX ORDER — HEPARIN SODIUM (PORCINE) LOCK FLUSH IV SOLN 100 UNIT/ML 100 UNIT/ML
5 SOLUTION INTRAVENOUS
OUTPATIENT
Start: 2025-02-13

## 2025-01-02 RX ORDER — DIPHENHYDRAMINE HCL 50 MG
50 CAPSULE ORAL
Status: CANCELLED
Start: 2025-02-13

## 2025-01-02 RX ORDER — ALBUTEROL SULFATE 90 UG/1
1-2 INHALANT RESPIRATORY (INHALATION)
Start: 2025-03-13

## 2025-01-02 RX ORDER — MEPERIDINE HYDROCHLORIDE 25 MG/ML
25 INJECTION INTRAMUSCULAR; INTRAVENOUS; SUBCUTANEOUS
OUTPATIENT
Start: 2025-02-13

## 2025-01-02 RX ORDER — DIPHENHYDRAMINE HYDROCHLORIDE 50 MG/ML
50 INJECTION INTRAMUSCULAR; INTRAVENOUS
Start: 2025-02-13

## 2025-01-02 RX ORDER — HEPARIN SODIUM,PORCINE 10 UNIT/ML
5-20 VIAL (ML) INTRAVENOUS DAILY PRN
OUTPATIENT
Start: 2025-02-13

## 2025-01-02 RX ORDER — METHYLPREDNISOLONE SODIUM SUCCINATE 40 MG/ML
40 INJECTION INTRAMUSCULAR; INTRAVENOUS
Start: 2025-02-13

## 2025-01-02 RX ORDER — DIPHENHYDRAMINE HYDROCHLORIDE 50 MG/ML
25 INJECTION INTRAMUSCULAR; INTRAVENOUS
Start: 2025-03-13

## 2025-01-02 RX ORDER — ACETAMINOPHEN 325 MG/1
650 TABLET ORAL ONCE
Start: 2025-03-13

## 2025-01-02 RX ORDER — MEPERIDINE HYDROCHLORIDE 25 MG/ML
25 INJECTION INTRAMUSCULAR; INTRAVENOUS; SUBCUTANEOUS
OUTPATIENT
Start: 2025-03-13

## 2025-01-02 RX ORDER — LORAZEPAM 2 MG/ML
0.5 INJECTION INTRAMUSCULAR EVERY 4 HOURS PRN
OUTPATIENT
Start: 2025-03-13

## 2025-01-02 RX ORDER — METHYLPREDNISOLONE SODIUM SUCCINATE 40 MG/ML
40 INJECTION INTRAMUSCULAR; INTRAVENOUS
Start: 2025-03-13

## 2025-01-02 RX ORDER — DIPHENHYDRAMINE HYDROCHLORIDE 50 MG/ML
25 INJECTION INTRAMUSCULAR; INTRAVENOUS
Start: 2025-02-13

## 2025-01-02 RX ORDER — ACETAMINOPHEN 325 MG/1
650 TABLET ORAL ONCE
Start: 2025-02-13

## 2025-01-02 RX ORDER — EPINEPHRINE 1 MG/ML
0.3 INJECTION, SOLUTION INTRAMUSCULAR; SUBCUTANEOUS EVERY 5 MIN PRN
OUTPATIENT
Start: 2025-02-13

## 2025-01-02 RX ORDER — HEPARIN SODIUM (PORCINE) LOCK FLUSH IV SOLN 100 UNIT/ML 100 UNIT/ML
5 SOLUTION INTRAVENOUS
OUTPATIENT
Start: 2025-03-13

## 2025-01-02 RX ORDER — DIPHENHYDRAMINE HCL 50 MG
50 CAPSULE ORAL ONCE
Start: 2025-03-13 | End: 2025-03-13

## 2025-01-02 RX ORDER — ALBUTEROL SULFATE 90 UG/1
1-2 INHALANT RESPIRATORY (INHALATION)
Start: 2025-02-13

## 2025-01-02 RX ORDER — LORAZEPAM 2 MG/ML
0.5 INJECTION INTRAMUSCULAR EVERY 4 HOURS PRN
OUTPATIENT
Start: 2025-02-13

## 2025-01-02 RX ORDER — DIPHENHYDRAMINE HYDROCHLORIDE 50 MG/ML
50 INJECTION INTRAMUSCULAR; INTRAVENOUS
Start: 2025-03-13

## 2025-01-02 RX ORDER — ALBUTEROL SULFATE 0.83 MG/ML
2.5 SOLUTION RESPIRATORY (INHALATION)
OUTPATIENT
Start: 2025-02-13

## 2025-01-02 RX ORDER — ALBUTEROL SULFATE 0.83 MG/ML
2.5 SOLUTION RESPIRATORY (INHALATION)
OUTPATIENT
Start: 2025-03-13

## 2025-01-02 RX ORDER — DIPHENHYDRAMINE HCL 50 MG
50 CAPSULE ORAL ONCE
Start: 2025-02-13 | End: 2025-02-13

## 2025-01-02 RX ORDER — EPINEPHRINE 1 MG/ML
0.3 INJECTION, SOLUTION INTRAMUSCULAR; SUBCUTANEOUS EVERY 5 MIN PRN
OUTPATIENT
Start: 2025-03-13

## 2025-01-02 RX ORDER — HEPARIN SODIUM,PORCINE 10 UNIT/ML
5-20 VIAL (ML) INTRAVENOUS DAILY PRN
OUTPATIENT
Start: 2025-03-13

## 2025-01-02 ASSESSMENT — PAIN SCALES - GENERAL: PAINLEVEL_OUTOF10: NO PAIN (0)

## 2025-01-02 NOTE — PROGRESS NOTES
"Oncology Rooming Note    January 2, 2025 1:46 PM   Ramu Espinoza is a 64 year old male who presents for:    Chief Complaint   Patient presents with    Oncology Clinic Visit     Return visit 4 weeks with lab. Chronic lymphocytic leukemia.     Initial Vitals: /65 (BP Location: Left arm, Patient Position: Sitting, Cuff Size: Adult Large)   Pulse 78   Temp 98.3  F (36.8  C) (Oral)   Resp 16   Wt 112.2 kg (247 lb 6.4 oz)   SpO2 96%   BMI 31.76 kg/m   Estimated body mass index is 31.76 kg/m  as calculated from the following:    Height as of 12/12/24: 1.88 m (6' 2\").    Weight as of this encounter: 112.2 kg (247 lb 6.4 oz). Body surface area is 2.42 meters squared.  No Pain (0) Comment: Data Unavailable   No LMP for male patient.  Allergies reviewed: Yes  Medications reviewed: Yes    Medications: Medication refills not needed today.  Pharmacy name entered into [employer]:    [id number] - [place], [place] - [address] [facility] - [place], [place] - [facility] [address]    Frailty Screening:   Is the patient here for a new oncology consult visit in cancer care? 2. No      Clinical concerns: weak, dizziness with some nausea.  Radha Alarcon CNP was notified.      Marylu Warner, [facility]            "

## 2025-01-02 NOTE — LETTER
"1/2/2025      Ramu Espinoza  [address] [place] [address]      Dear Colleague,    Thank you for referring your patient, Ramu Espinoza, to the [facility] [place]. Please see a copy of my visit note below.    Oncology Rooming Note    January 2, 2025 1:46 PM   Ramu Espinoza is a 64 year old male who presents for:    Chief Complaint   Patient presents with     Oncology Clinic Visit     Return visit 4 weeks with lab. Chronic lymphocytic leukemia.     Initial Vitals: /65 (BP Location: Left arm, Patient Position: Sitting, Cuff Size: Adult Large)   Pulse 78   Temp 98.3  F (36.8  C) (Oral)   Resp 16   Wt 112.2 kg (247 lb 6.4 oz)   SpO2 96%   BMI 31.76 kg/m   Estimated body mass index is 31.76 kg/m  as calculated from the following:    Height as of 12/12/24: 1.88 m (6' 2\").    Weight as of this encounter: 112.2 kg (247 lb 6.4 oz). Body surface area is 2.42 meters squared.  No Pain (0) Comment: Data Unavailable   No LMP for male patient.  Allergies reviewed: Yes  Medications reviewed: Yes    Medications: Medication refills not needed today.  Pharmacy name entered into [employer]:    [id number] - [place], [place] - [address] [facility] - [place], [place] - [address]     Frailty Screening:   Is the patient here for a new oncology consult visit in cancer care? 2. No      Clinical concerns: weak, dizziness with some nausea.  Radha Alarcon CNP was notified.      Marylu Warner, [facility] Hematology and Oncology Progress Note    Patient: Ramu Espinoza  MRN: [id number]  Date of Service: Jan 2, 2025          Reason for Visit    Chief Complaint   Patient presents with     Oncology Clinic Visit     Return visit 4 weeks with lab. Chronic lymphocytic leukemia.       Assessment and Plan     Cancer Staging   No matching staging information was found for the patient.       1.  CLL stage 4, diagnosed in February " . 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  he had had treatment on clinical study with Rituxan and Imbruvica.  Great clinical response to Imbruvica. His lymph nodes have normalized. His CBC normalized.  Patient was starting to have worsening A-fib issues so in 2023 he elected to go off the study and off the Imbruvica.  Started having progression in the 2024 so he has now started on Obinutuzumab and venetoclax.  Patient has had 2 months of Obinutuzumab and is on his venetoclax ramp-up.  He is due to start taking 200 mg today.  He will go ahead and start that today.  He will return next week for a lab check and if his labs are good he will then start his final dose of 400 mg daily.  He will return on  for his third cycle of Obinutuzumab.  We will see him for cycle 4 in February.     2. Paroxysmal A. Fib: could be from Imbruvica (<9% risk). on metoprolol.  Patient has been seeing a cardiologist through [facility].  It was recommended to stop the Imbruvica due to the worsening A-fib.  Does continue to follow with cardiology.    3.  Fatigue, anorexia: These are expected side effects of the treatment regimen.  Overall they are mild.  Encouraged patient to stay hydrated, eat well and get a little more active.     ECOG Performance    0 - Independent    Distress Screening (within last 30 days)    No data recorded     Pain  Pain Score: No Pain (0)    Problem List    Patient Active Problem List   Diagnosis     Tinnitus     Generalized Anxiety Disorder     Dupuytren's Contracture     Insomnia     Chronic lymphocytic leukemia (H)  Cy inducer Ibrutinib     Internal Hemorrhoids With Bleeding     Pericardial effusion     Thyroid nodule     DAVID (obstructive sleep apnea)     REM sleep behavior disorder     Class 1 obesity due to excess calories with serious comorbidity in adult     Benign prostatic hyperplasia with lower urinary tract symptoms, symptom details unspecified     History of  atrial fibrillation     Paroxysmal atrial fibrillation (H)     History of vascular access device        ______________________________________________________________________________    History of Present Illness    Oncologist: Dr. Garza    Diagnosis: CLL.  This was diagnosed in February 2012 when he presented with elevated white blood cell count around 20.  He had a blood smear that showed atypical lymphocytosis suspicious for CLL.  Patient had a normal hemoglobin and platelet count at that time.  His peripheral blood flow cytometry revealed CD5 coexpressing kappa light chain restricted B cells confirming the diagnosis of CLL.    Treatment: Patient initially was on observation   -June 2014 his white blood cell count was over 100,000, and starting to have thrombocytopenia.  He had a CT scan that showed increasing lymphadenopathy  -October 2014: Patient was offered participation in a clinical study.  He decided to do that and started on Rituxan and Imbruvica.  Was randomized against fludarabine, Cytoxan and Rituxan.  He completed his Rituxan and then was on Imbruvica as a single agent until October 2023.  Patient stopped his Imbruvica at that time due to worsening A-fib.    Interim history: Patient is scheduled today for follow-up visit and to review labs.  Overall he states he is doing okay.  He is pretty anxious about his new regimen and how he is feeling but overall feels like he is doing well.  He says he has days where he just feels really tired and poor appetite and then other days where he feels pretty good.  He is definitely not doing as much activity as he used to do.  He states that overall he feels like he is tolerating the treatment pretty well.  Denies bleeding or bruising.  Denies any infectious complaints.  Denies any vomiting.  Has poor appetite but no real nausea.    Review of Systems    Pertinent items are noted in HPI.    Past History    Past Medical History:   Diagnosis Date     Anxiety      BPH  (benign prostatic hyperplasia)      Cancer (H)     CLL     CLL (chronic lymphocytic leukemia) (H)      Enlarged lymph nodes     Created by Conversion  Replacement Utility updated for latest IMO load     Irregular heart beat      Paroxysmal atrial fibrillation with rapid ventricular response (H)      Sleep apnea      Tinnitus        PHYSICAL EXAM:  /65 (BP Location: Left arm, Patient Position: Sitting, Cuff Size: Adult Large)   Pulse 78   Temp 98.3  F (36.8  C) (Oral)   Resp 16   Wt 112.2 kg (247 lb 6.4 oz)   SpO2 96%   BMI 31.76 kg/m    GENERAL: no acute distress. Cooperative in conversation. Here with wife  HEENT: pupils are equal, round and reactive. Oromucosa is clean and intact. No ulcerations or mucositis noted. No bleeding noted.  RESP: lungs are clear bilaterally per auscultation. Regular respiratory rate. No wheezes or rhonchi.  CV: Regular, rate and rhythm. No murmurs.  ABD: soft, nontender.   MUSCULOSKELETAL: No lower extremity swelling.   NEURO: non focal. Alert and oriented x3.   PSYCH: within normal limits. No depression or anxiety.  SKIN: warm dry intact   LYMPH: no cervical, supraclavicular or axillary lymphadenopathy          Lab Results    Recent Results (from the past week)   Comprehensive metabolic panel   Result Value Ref Range    Sodium 139 135 - 145 mmol/L    Potassium 5.1 3.4 - 5.3 mmol/L    Carbon Dioxide (CO2) 26 22 - 29 mmol/L    Anion Gap 10 7 - 15 mmol/L    Urea Nitrogen 18.4 8.0 - 23.0 mg/dL    Creatinine 0.92 0.67 - 1.17 mg/dL    GFR Estimate >90 >60 mL/min/1.73m2    Calcium 10.4 8.8 - 10.4 mg/dL    Chloride 103 98 - 107 mmol/L    Glucose 109 (H) 70 - 99 mg/dL    Alkaline Phosphatase 80 40 - 150 U/L    AST 16 0 - 45 U/L    ALT 21 0 - 70 U/L    Protein Total 6.6 6.4 - 8.3 g/dL    Albumin 4.3 3.5 - 5.2 g/dL    Bilirubin Total 0.8 <=1.2 mg/dL   Phosphorus   Result Value Ref Range    Phosphorus 4.5 2.5 - 4.5 mg/dL   Uric acid   Result Value Ref Range    Uric Acid 4.4 3.4 - 7.0  mg/dL   CBC with platelets and differential   Result Value Ref Range    WBC Count 4.3 4.0 - 11.0 10e3/uL    RBC Count 3.87 (L) 4.40 - 5.90 10e6/uL    Hemoglobin 12.0 (L) 13.3 - 17.7 g/dL    Hematocrit 36.2 (L) 40.0 - 53.0 %    MCV 94 78 - 100 fL    MCH 31.0 26.5 - 33.0 pg    MCHC 33.1 31.5 - 36.5 g/dL    RDW 13.6 10.0 - 15.0 %    Platelet Count 285 150 - 450 10e3/uL    % Neutrophils 74 %    % Lymphocytes 20 %    % Monocytes 5 %    % Eosinophils 1 %    % Basophils 0 %    % Immature Granulocytes 1 %    NRBCs per 100 WBC 0 <1 /100    Absolute Neutrophils 3.1 1.6 - 8.3 10e3/uL    Absolute Lymphocytes 0.9 0.8 - 5.3 10e3/uL    Absolute Monocytes 0.2 0.0 - 1.3 10e3/uL    Absolute Eosinophils 0.0 0.0 - 0.7 10e3/uL    Absolute Basophils 0.0 0.0 - 0.2 10e3/uL    Absolute Immature Granulocytes 0.0 <=0.4 10e3/uL    Absolute NRBCs 0.0 10e3/uL         Imaging    CT Head w/o Contrast    Result Date: 12/12/2024  EXAM: CT HEAD W/O CONTRAST LOCATION: [facility] DATE: 12/12/2024 INDICATION: fell and had head injur; is taking eliquis, headache COMPARISON: CT head 1/25/2017 TECHNIQUE: Routine CT Head without IV contrast. Multiplanar reformats. Dose reduction techniques were used. FINDINGS: INTRACRANIAL CONTENTS: No intracranial hemorrhage, extraaxial collection, or mass effect.  No CT evidence of acute infarct. Normal parenchymal attenuation. Unchanged mild generalized volume loss. No hydrocephalus. VISUALIZED ORBITS/SINUSES/MASTOIDS: No intraorbital abnormality. No paranasal sinus mucosal disease. No middle ear or mastoid effusion. BONES/SOFT TISSUES: No acute abnormality.     IMPRESSION: 1.  No acute intracranial process.           The longitudinal plan of care for the diagnosis(es)/condition(s) as documented were addressed during this visit. Due to the added complexity in care, I will continue to support Clive in the subsequent management and with ongoing continuity of care.      Signed by: Radha Alarcon,  JUS CNP      Again, thank you for allowing me to participate in the care of your patient.        Sincerely,        JUS Lester CNP    Electronically signed

## 2025-01-02 NOTE — PROGRESS NOTES
[facility] Hematology and Oncology Progress Note    Patient: Ramu Espinoza  MRN: [id number]  Date of Service: Jan 2, 2025          Reason for Visit    Chief Complaint   Patient presents with    Oncology Clinic Visit     Return visit 4 weeks with lab. Chronic lymphocytic leukemia.       Assessment and Plan     Cancer Staging   No matching staging information was found for the patient.       1.  CLL stage 4, diagnosed in February 2012. 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  he had had treatment on clinical study with Rituxan and Imbruvica.  Great clinical response to Imbruvica. His lymph nodes have normalized. His CBC normalized.  Patient was starting to have worsening A-fib issues so in October 2023 he elected to go off the study and off the Imbruvica.  Started having progression in the fall 2024 so he has now started on Obinutuzumab and venetoclax.  Patient has had 2 months of Obinutuzumab and is on his venetoclax ramp-up.  He is due to start taking 200 mg today.  He will go ahead and start that today.  He will return next week for a lab check and if his labs are good he will then start his final dose of 400 mg daily.  He will return on January 16 for his third cycle of Obinutuzumab.  We will see him for cycle 4 in February.     2. Paroxysmal A. Fib: could be from Imbruvica (<9% risk). on metoprolol.  Patient has been seeing a cardiologist through [facility].  It was recommended to stop the Imbruvica due to the worsening A-fib.  Does continue to follow with cardiology.    3.  Fatigue, anorexia: These are expected side effects of the treatment regimen.  Overall they are mild.  Encouraged patient to stay hydrated, eat well and get a little more active.     ECOG Performance    0 - Independent    Distress Screening (within last 30 days)    No data recorded     Pain  Pain Score: No Pain (0)    Problem List    Patient Active Problem List   Diagnosis    Tinnitus    Generalized Anxiety Disorder     Dupuytren's Contracture    Insomnia    Chronic lymphocytic leukemia (H)  Cy inducer Ibrutinib    Internal Hemorrhoids With Bleeding    Pericardial effusion    Thyroid nodule    DAVID (obstructive sleep apnea)    REM sleep behavior disorder    Class 1 obesity due to excess calories with serious comorbidity in adult    Benign prostatic hyperplasia with lower urinary tract symptoms, symptom details unspecified    History of atrial fibrillation    Paroxysmal atrial fibrillation (H)    History of vascular access device        ______________________________________________________________________________    History of Present Illness    Oncologist: Dr. Garza    Diagnosis: CLL.  This was diagnosed in 2012 when he presented with elevated white blood cell count around 20.  He had a blood smear that showed atypical lymphocytosis suspicious for CLL.  Patient had a normal hemoglobin and platelet count at that time.  His peripheral blood flow cytometry revealed CD5 coexpressing kappa light chain restricted B cells confirming the diagnosis of CLL.    Treatment: Patient initially was on observation   -2014 his white blood cell count was over 100,000, and starting to have thrombocytopenia.  He had a CT scan that showed increasing lymphadenopathy  -2014: Patient was offered participation in a clinical study.  He decided to do that and started on Rituxan and Imbruvica.  Was randomized against fludarabine, Cytoxan and Rituxan.  He completed his Rituxan and then was on Imbruvica as a single agent until 2023.  Patient stopped his Imbruvica at that time due to worsening A-fib.    Interim history: Patient is scheduled today for follow-up visit and to review labs.  Overall he states he is doing okay.  He is pretty anxious about his new regimen and how he is feeling but overall feels like he is doing well.  He says he has days where he just feels really tired and poor appetite and then other days where he  feels pretty good.  He is definitely not doing as much activity as he used to do.  He states that overall he feels like he is tolerating the treatment pretty well.  Denies bleeding or bruising.  Denies any infectious complaints.  Denies any vomiting.  Has poor appetite but no real nausea.    Review of Systems    Pertinent items are noted in HPI.    Past History    Past Medical History:   Diagnosis Date    Anxiety     BPH (benign prostatic hyperplasia)     Cancer (H)     CLL    CLL (chronic lymphocytic leukemia) (H)     Enlarged lymph nodes     Created by Conversion  Replacement Utility updated for latest IMO load    Irregular heart beat     Paroxysmal atrial fibrillation with rapid ventricular response (H)     Sleep apnea     Tinnitus        PHYSICAL EXAM:  /65 (BP Location: Left arm, Patient Position: Sitting, Cuff Size: Adult Large)   Pulse 78   Temp 98.3  F (36.8  C) (Oral)   Resp 16   Wt 112.2 kg (247 lb 6.4 oz)   SpO2 96%   BMI 31.76 kg/m    GENERAL: no acute distress. Cooperative in conversation. Here with wife  HEENT: pupils are equal, round and reactive. Oromucosa is clean and intact. No ulcerations or mucositis noted. No bleeding noted.  RESP: lungs are clear bilaterally per auscultation. Regular respiratory rate. No wheezes or rhonchi.  CV: Regular, rate and rhythm. No murmurs.  ABD: soft, nontender.   MUSCULOSKELETAL: No lower extremity swelling.   NEURO: non focal. Alert and oriented x3.   PSYCH: within normal limits. No depression or anxiety.  SKIN: warm dry intact   LYMPH: no cervical, supraclavicular or axillary lymphadenopathy          Lab Results    Recent Results (from the past week)   Comprehensive metabolic panel   Result Value Ref Range    Sodium 139 135 - 145 mmol/L    Potassium 5.1 3.4 - 5.3 mmol/L    Carbon Dioxide (CO2) 26 22 - 29 mmol/L    Anion Gap 10 7 - 15 mmol/L    Urea Nitrogen 18.4 8.0 - 23.0 mg/dL    Creatinine 0.92 0.67 - 1.17 mg/dL    GFR Estimate >90 >60 mL/min/1.73m2     Calcium 10.4 8.8 - 10.4 mg/dL    Chloride 103 98 - 107 mmol/L    Glucose 109 (H) 70 - 99 mg/dL    Alkaline Phosphatase 80 40 - 150 U/L    AST 16 0 - 45 U/L    ALT 21 0 - 70 U/L    Protein Total 6.6 6.4 - 8.3 g/dL    Albumin 4.3 3.5 - 5.2 g/dL    Bilirubin Total 0.8 <=1.2 mg/dL   Phosphorus   Result Value Ref Range    Phosphorus 4.5 2.5 - 4.5 mg/dL   Uric acid   Result Value Ref Range    Uric Acid 4.4 3.4 - 7.0 mg/dL   CBC with platelets and differential   Result Value Ref Range    WBC Count 4.3 4.0 - 11.0 10e3/uL    RBC Count 3.87 (L) 4.40 - 5.90 10e6/uL    Hemoglobin 12.0 (L) 13.3 - 17.7 g/dL    Hematocrit 36.2 (L) 40.0 - 53.0 %    MCV 94 78 - 100 fL    MCH 31.0 26.5 - 33.0 pg    MCHC 33.1 31.5 - 36.5 g/dL    RDW 13.6 10.0 - 15.0 %    Platelet Count 285 150 - 450 10e3/uL    % Neutrophils 74 %    % Lymphocytes 20 %    % Monocytes 5 %    % Eosinophils 1 %    % Basophils 0 %    % Immature Granulocytes 1 %    NRBCs per 100 WBC 0 <1 /100    Absolute Neutrophils 3.1 1.6 - 8.3 10e3/uL    Absolute Lymphocytes 0.9 0.8 - 5.3 10e3/uL    Absolute Monocytes 0.2 0.0 - 1.3 10e3/uL    Absolute Eosinophils 0.0 0.0 - 0.7 10e3/uL    Absolute Basophils 0.0 0.0 - 0.2 10e3/uL    Absolute Immature Granulocytes 0.0 <=0.4 10e3/uL    Absolute NRBCs 0.0 10e3/uL         Imaging    CT Head w/o Contrast    Result Date: 12/12/2024  EXAM: CT HEAD W/O CONTRAST LOCATION: [facility] DATE: 12/12/2024 INDICATION: fell and had head injur; is taking eliquis, headache COMPARISON: CT head 1/25/2017 TECHNIQUE: Routine CT Head without IV contrast. Multiplanar reformats. Dose reduction techniques were used. FINDINGS: INTRACRANIAL CONTENTS: No intracranial hemorrhage, extraaxial collection, or mass effect.  No CT evidence of acute infarct. Normal parenchymal attenuation. Unchanged mild generalized volume loss. No hydrocephalus. VISUALIZED ORBITS/SINUSES/MASTOIDS: No intraorbital abnormality. No paranasal sinus mucosal disease. No  middle ear or mastoid effusion. BONES/SOFT TISSUES: No acute abnormality.     IMPRESSION: 1.  No acute intracranial process.           The longitudinal plan of care for the diagnosis(es)/condition(s) as documented were addressed during this visit. Due to the added complexity in care, I will continue to support Clive in the subsequent management and with ongoing continuity of care.      Signed by: JUS Lester CNP

## 2025-01-09 ENCOUNTER — PATIENT OUTREACH (OUTPATIENT)
Dept: ONCOLOGY | Facility: HOSPITAL | Age: 65
End: 2025-01-09

## 2025-01-09 ENCOUNTER — LAB (OUTPATIENT)
Dept: INFUSION THERAPY | Facility: HOSPITAL | Age: 65
End: 2025-01-09
Payer: MEDICARE

## 2025-01-09 DIAGNOSIS — C91.10 CHRONIC LYMPHOCYTIC LEUKEMIA (H): Primary | ICD-10-CM

## 2025-01-09 LAB
ALBUMIN SERPL BCG-MCNC: 4.2 G/DL (ref 3.5–5.2)
ALP SERPL-CCNC: 76 U/L (ref 40–150)
ALT SERPL W P-5'-P-CCNC: 19 U/L (ref 0–70)
ANION GAP SERPL CALCULATED.3IONS-SCNC: 9 MMOL/L (ref 7–15)
AST SERPL W P-5'-P-CCNC: 17 U/L (ref 0–45)
BASOPHILS # BLD AUTO: 0 10E3/UL (ref 0–0.2)
BASOPHILS NFR BLD AUTO: 0 %
BILIRUB SERPL-MCNC: 0.7 MG/DL
BUN SERPL-MCNC: 17.4 MG/DL (ref 8–23)
CALCIUM SERPL-MCNC: 9.7 MG/DL (ref 8.8–10.4)
CHLORIDE SERPL-SCNC: 103 MMOL/L (ref 98–107)
CREAT SERPL-MCNC: 0.76 MG/DL (ref 0.67–1.17)
EGFRCR SERPLBLD CKD-EPI 2021: >90 ML/MIN/1.73M2
EOSINOPHIL # BLD AUTO: 0 10E3/UL (ref 0–0.7)
EOSINOPHIL NFR BLD AUTO: 1 %
ERYTHROCYTE [DISTWIDTH] IN BLOOD BY AUTOMATED COUNT: 13.4 % (ref 10–15)
GLUCOSE SERPL-MCNC: 96 MG/DL (ref 70–99)
HCO3 SERPL-SCNC: 28 MMOL/L (ref 22–29)
HCT VFR BLD AUTO: 36.5 % (ref 40–53)
HGB BLD-MCNC: 12 G/DL (ref 13.3–17.7)
IMM GRANULOCYTES # BLD: 0 10E3/UL
IMM GRANULOCYTES NFR BLD: 1 %
LYMPHOCYTES # BLD AUTO: 0.7 10E3/UL (ref 0.8–5.3)
LYMPHOCYTES NFR BLD AUTO: 21 %
MCH RBC QN AUTO: 31.5 PG (ref 26.5–33)
MCHC RBC AUTO-ENTMCNC: 32.9 G/DL (ref 31.5–36.5)
MCV RBC AUTO: 96 FL (ref 78–100)
MONOCYTES # BLD AUTO: 0.3 10E3/UL (ref 0–1.3)
MONOCYTES NFR BLD AUTO: 9 %
NEUTROPHILS # BLD AUTO: 2.4 10E3/UL (ref 1.6–8.3)
NEUTROPHILS NFR BLD AUTO: 68 %
NRBC # BLD AUTO: 0 10E3/UL
NRBC BLD AUTO-RTO: 0 /100
PHOSPHATE SERPL-MCNC: 3.2 MG/DL (ref 2.5–4.5)
PLATELET # BLD AUTO: 306 10E3/UL (ref 150–450)
POTASSIUM SERPL-SCNC: 4.3 MMOL/L (ref 3.4–5.3)
PROT SERPL-MCNC: 6.3 G/DL (ref 6.4–8.3)
RBC # BLD AUTO: 3.81 10E6/UL (ref 4.4–5.9)
SODIUM SERPL-SCNC: 140 MMOL/L (ref 135–145)
URATE SERPL-MCNC: 3.7 MG/DL (ref 3.4–7)
WBC # BLD AUTO: 3.5 10E3/UL (ref 4–11)

## 2025-01-09 PROCEDURE — 84100 ASSAY OF PHOSPHORUS: CPT | Performed by: INTERNAL MEDICINE

## 2025-01-09 PROCEDURE — 84550 ASSAY OF BLOOD/URIC ACID: CPT | Performed by: INTERNAL MEDICINE

## 2025-01-09 PROCEDURE — 85025 COMPLETE CBC W/AUTO DIFF WBC: CPT | Performed by: INTERNAL MEDICINE

## 2025-01-09 PROCEDURE — 36591 DRAW BLOOD OFF VENOUS DEVICE: CPT | Performed by: INTERNAL MEDICINE

## 2025-01-09 PROCEDURE — 80053 COMPREHEN METABOLIC PANEL: CPT | Performed by: INTERNAL MEDICINE

## 2025-01-09 NOTE — ORAL ONC MGMT
"Oral Chemotherapy Monitoring Program  Lab Follow Up    Patient currently on venetoclax therapy for CLL.    Reviewed lab results from today.    No concerning abnormalities.    Assessment & Plan:  CHELLE Francis called patient to let him know to start 400 mg dose already. Plan to call for assessment after being on highest dose for a week.    Follow-Up:  1/16 labs and assessment call    Chen Eubanks PharmD, BCOP  Oral Chemotherapy Pharmacist  [facility] Phone: [phone]  In Basket Pools: \"[facility] ORAL CHEMOTHERAPY PHARMACIST\" or \"[facility] ORAL CHEMOTHERAPY PHARMACIST\"  January 9, 2025      "

## 2025-01-09 NOTE — PROGRESS NOTES
[facility]: Cancer Care Follow-Up Note                                    Discussion with Patient:                                                      Call placed to patient today after he was in for a lab only appointment.  This was a lab check prior to increasing his dose of venetoclax to 400 mg daily.     Goals          General    Maintain ability to perform ADLs without difficulty     Patient will adhere to medication regimen     Notes - Note created  12/12/2024  3:59 PM by Penny Mcfadden RN    VENETOCLAX - STARTED 12/12/24              Dates of Treatment:                                                      Infusion given in last 28 days       Administered MAR Action Medication Dose Rate Visit    12/19/2024 09:22 New Bag obinutuzumab (GAZYVA) 1,000 mg in sodium chloride 0.9 % 1,000 mL infusion 1,000 mg   Infusion Therapy Visit on 12/19/2024 in [facility]          Treatment Plan Medications       OP ONC Chronic Lymphocytic Leukemia (CLL) - Obinutuzumab / Venetoclax        Take Home Medications       Medication Sig Start/End Day/Cycle Status    venetoclax (VENCLEXTA) 100 MG tablet Take 4 tablets (400 mg) by mouth daily for 7 days 1/9/2025 to 1/16/2025 Day 22, Cycle 2 - 1/9/2025 Released                            Assessment:                                                      Lab results open reviewed by Radha Alarcon CNP.  They all look good.  Call placed to patient to let him know he can increase his dose of venetoclax to 400 mg daily.    Intervention/Education provided during outreach:                                                       He verbalized understanding and stated that he will be back next Thursday for lab and infusion.  He is also aware that the 400 mg dose is the highest dose he will go and he will be on this for a total of 2 years.    Patient to follow up as scheduled at next appt.  Patient to call/Cloudwe[employer] message with updates.  Confirmed patient  has clinic and triage numbers.    Signature:  Penny Mcfadden RN

## 2025-01-16 ENCOUNTER — INFUSION THERAPY VISIT (OUTPATIENT)
Dept: INFUSION THERAPY | Facility: HOSPITAL | Age: 65
End: 2025-01-16
Payer: MEDICARE

## 2025-01-16 VITALS
OXYGEN SATURATION: 96 % | HEART RATE: 77 BPM | DIASTOLIC BLOOD PRESSURE: 63 MMHG | TEMPERATURE: 97.8 F | RESPIRATION RATE: 16 BRPM | SYSTOLIC BLOOD PRESSURE: 108 MMHG

## 2025-01-16 DIAGNOSIS — C91.10 CHRONIC LYMPHOCYTIC LEUKEMIA (H): Primary | ICD-10-CM

## 2025-01-16 LAB
ALBUMIN SERPL BCG-MCNC: 4.1 G/DL (ref 3.5–5.2)
ALBUMIN UR-MCNC: NEGATIVE MG/DL
ALP SERPL-CCNC: 75 U/L (ref 40–150)
ALT SERPL W P-5'-P-CCNC: 19 U/L (ref 0–70)
ANION GAP SERPL CALCULATED.3IONS-SCNC: 8 MMOL/L (ref 7–15)
APPEARANCE UR: CLEAR
AST SERPL W P-5'-P-CCNC: 20 U/L (ref 0–45)
BASOPHILS # BLD AUTO: 0 10E3/UL (ref 0–0.2)
BASOPHILS NFR BLD AUTO: 0 %
BILIRUB SERPL-MCNC: 0.7 MG/DL
BILIRUB UR QL STRIP: NEGATIVE
BUN SERPL-MCNC: 15.7 MG/DL (ref 8–23)
CALCIUM SERPL-MCNC: 9.2 MG/DL (ref 8.8–10.4)
CHLORIDE SERPL-SCNC: 106 MMOL/L (ref 98–107)
COLOR UR AUTO: NORMAL
CREAT SERPL-MCNC: 0.78 MG/DL (ref 0.67–1.17)
EGFRCR SERPLBLD CKD-EPI 2021: >90 ML/MIN/1.73M2
EOSINOPHIL # BLD AUTO: 0 10E3/UL (ref 0–0.7)
EOSINOPHIL NFR BLD AUTO: 1 %
ERYTHROCYTE [DISTWIDTH] IN BLOOD BY AUTOMATED COUNT: 13.3 % (ref 10–15)
GLUCOSE SERPL-MCNC: 105 MG/DL (ref 70–99)
GLUCOSE UR STRIP-MCNC: NEGATIVE MG/DL
HCO3 SERPL-SCNC: 26 MMOL/L (ref 22–29)
HCT VFR BLD AUTO: 33.1 % (ref 40–53)
HGB BLD-MCNC: 10.9 G/DL (ref 13.3–17.7)
HGB UR QL STRIP: NEGATIVE
IMM GRANULOCYTES # BLD: 0 10E3/UL
IMM GRANULOCYTES NFR BLD: 1 %
KETONES UR STRIP-MCNC: NEGATIVE MG/DL
LEUKOCYTE ESTERASE UR QL STRIP: NEGATIVE
LYMPHOCYTES # BLD AUTO: 0.7 10E3/UL (ref 0.8–5.3)
LYMPHOCYTES NFR BLD AUTO: 16 %
MCH RBC QN AUTO: 30.7 PG (ref 26.5–33)
MCHC RBC AUTO-ENTMCNC: 32.9 G/DL (ref 31.5–36.5)
MCV RBC AUTO: 93 FL (ref 78–100)
MONOCYTES # BLD AUTO: 0.4 10E3/UL (ref 0–1.3)
MONOCYTES NFR BLD AUTO: 10 %
NEUTROPHILS # BLD AUTO: 2.9 10E3/UL (ref 1.6–8.3)
NEUTROPHILS NFR BLD AUTO: 72 %
NITRATE UR QL: NEGATIVE
NRBC # BLD AUTO: 0 10E3/UL
NRBC BLD AUTO-RTO: 0 /100
PH UR STRIP: 5.5 [PH] (ref 5–7)
PHOSPHATE SERPL-MCNC: 3.4 MG/DL (ref 2.5–4.5)
PLATELET # BLD AUTO: 255 10E3/UL (ref 150–450)
POTASSIUM SERPL-SCNC: 3.9 MMOL/L (ref 3.4–5.3)
PROT SERPL-MCNC: 6 G/DL (ref 6.4–8.3)
RBC # BLD AUTO: 3.55 10E6/UL (ref 4.4–5.9)
SODIUM SERPL-SCNC: 140 MMOL/L (ref 135–145)
SP GR UR STRIP: 1.01 (ref 1–1.03)
URATE SERPL-MCNC: 6.2 MG/DL (ref 3.4–7)
UROBILINOGEN UR STRIP-MCNC: <2 MG/DL
WBC # BLD AUTO: 4 10E3/UL (ref 4–11)

## 2025-01-16 PROCEDURE — 36591 DRAW BLOOD OFF VENOUS DEVICE: CPT | Performed by: INTERNAL MEDICINE

## 2025-01-16 PROCEDURE — 250N000013 HC RX MED GY IP 250 OP 250 PS 637: Performed by: NURSE PRACTITIONER

## 2025-01-16 PROCEDURE — 250N000013 HC RX MED GY IP 250 OP 250 PS 637: Performed by: INTERNAL MEDICINE

## 2025-01-16 PROCEDURE — 84550 ASSAY OF BLOOD/URIC ACID: CPT | Performed by: INTERNAL MEDICINE

## 2025-01-16 PROCEDURE — 85025 COMPLETE CBC W/AUTO DIFF WBC: CPT | Performed by: INTERNAL MEDICINE

## 2025-01-16 PROCEDURE — 84100 ASSAY OF PHOSPHORUS: CPT | Performed by: INTERNAL MEDICINE

## 2025-01-16 PROCEDURE — 80053 COMPREHEN METABOLIC PANEL: CPT | Performed by: INTERNAL MEDICINE

## 2025-01-16 PROCEDURE — 250N000011 HC RX IP 250 OP 636: Performed by: INTERNAL MEDICINE

## 2025-01-16 PROCEDURE — 258N000003 HC RX IP 258 OP 636: Performed by: INTERNAL MEDICINE

## 2025-01-16 PROCEDURE — 81003 URINALYSIS AUTO W/O SCOPE: CPT

## 2025-01-16 RX ORDER — EPINEPHRINE 1 MG/ML
0.3 INJECTION, SOLUTION INTRAMUSCULAR; SUBCUTANEOUS EVERY 5 MIN PRN
Status: DISCONTINUED | OUTPATIENT
Start: 2025-01-16 | End: 2025-01-16 | Stop reason: HOSPADM

## 2025-01-16 RX ORDER — ACETAMINOPHEN 325 MG/1
650 TABLET ORAL ONCE
Status: COMPLETED | OUTPATIENT
Start: 2025-01-16 | End: 2025-01-16

## 2025-01-16 RX ORDER — METHYLPREDNISOLONE SODIUM SUCCINATE 40 MG/ML
40 INJECTION INTRAMUSCULAR; INTRAVENOUS
Status: DISCONTINUED | OUTPATIENT
Start: 2025-01-16 | End: 2025-01-16 | Stop reason: HOSPADM

## 2025-01-16 RX ORDER — ALBUTEROL SULFATE 0.83 MG/ML
2.5 SOLUTION RESPIRATORY (INHALATION)
Status: DISCONTINUED | OUTPATIENT
Start: 2025-01-16 | End: 2025-01-16 | Stop reason: HOSPADM

## 2025-01-16 RX ORDER — DIPHENHYDRAMINE HYDROCHLORIDE 50 MG/ML
50 INJECTION INTRAMUSCULAR; INTRAVENOUS
Status: DISCONTINUED | OUTPATIENT
Start: 2025-01-16 | End: 2025-01-16 | Stop reason: HOSPADM

## 2025-01-16 RX ORDER — MEPERIDINE HYDROCHLORIDE 25 MG/ML
25 INJECTION INTRAMUSCULAR; INTRAVENOUS; SUBCUTANEOUS
Status: DISCONTINUED | OUTPATIENT
Start: 2025-01-16 | End: 2025-01-16 | Stop reason: HOSPADM

## 2025-01-16 RX ORDER — ALBUTEROL SULFATE 90 UG/1
1-2 INHALANT RESPIRATORY (INHALATION)
Status: DISCONTINUED | OUTPATIENT
Start: 2025-01-16 | End: 2025-01-16 | Stop reason: HOSPADM

## 2025-01-16 RX ORDER — DIPHENHYDRAMINE HCL 50 MG
50 CAPSULE ORAL ONCE
Status: COMPLETED | OUTPATIENT
Start: 2025-01-16 | End: 2025-01-16

## 2025-01-16 RX ORDER — HEPARIN SODIUM (PORCINE) LOCK FLUSH IV SOLN 100 UNIT/ML 100 UNIT/ML
5 SOLUTION INTRAVENOUS
Status: DISCONTINUED | OUTPATIENT
Start: 2025-01-16 | End: 2025-01-16 | Stop reason: HOSPADM

## 2025-01-16 RX ORDER — DIPHENHYDRAMINE HYDROCHLORIDE 50 MG/ML
25 INJECTION INTRAMUSCULAR; INTRAVENOUS
Status: DISCONTINUED | OUTPATIENT
Start: 2025-01-16 | End: 2025-01-16 | Stop reason: HOSPADM

## 2025-01-16 RX ADMIN — DEXAMETHASONE SODIUM PHOSPHATE 20 MG: 10 INJECTION, SOLUTION INTRAMUSCULAR; INTRAVENOUS at 09:01

## 2025-01-16 RX ADMIN — HEPARIN 5 ML: 100 SYRINGE at 12:47

## 2025-01-16 RX ADMIN — OBINUTUZUMAB 1000 MG: 1000 INJECTION, SOLUTION, CONCENTRATE INTRAVENOUS at 09:38

## 2025-01-16 RX ADMIN — DIPHENHYDRAMINE HYDROCHLORIDE 50 MG: 50 CAPSULE ORAL at 08:57

## 2025-01-16 RX ADMIN — SODIUM CHLORIDE 250 ML: 9 INJECTION, SOLUTION INTRAVENOUS at 08:57

## 2025-01-16 RX ADMIN — ACETAMINOPHEN 650 MG: 325 TABLET ORAL at 08:57

## 2025-01-16 NOTE — PROGRESS NOTES
Infusion Nursing Note:  Ramu Espinoza presents today for D1C3.    Patient seen by provider today: No   present during visit today: Not Applicable.    Note: Pt pre-medicated as ordered. Also pre-medicated with IV Dex due to history of reaction.  Pt c/o burning with urination. UA sent-no indication of UTI. Pt has future apt with Urology to discuss urinary issues further.       Intravenous Access:  Implanted Port.    Treatment Conditions:  Lab Results   Component Value Date    HGB 10.9 (L) 01/16/2025    WBC 4.0 01/16/2025    ANEU 1.5 (L) 12/13/2024    ANEUTAUTO 2.9 01/16/2025     01/16/2025        Lab Results   Component Value Date     01/16/2025    POTASSIUM 3.9 01/16/2025    CR 0.78 01/16/2025    ZOHAIB 9.2 01/16/2025    BILITOTAL 0.7 01/16/2025    ALBUMIN 4.1 01/16/2025    ALT 19 01/16/2025    AST 20 01/16/2025       Results reviewed, labs MET treatment parameters, ok to proceed with treatment.      Post Infusion Assessment:  Patient tolerated infusion without incident.  Site patent and intact, free from redness, edema or discomfort.  Access discontinued per protocol.       Discharge Plan:   Pt left infusion clinic via ambulatory accompanied by wife.      Jeanette Omer RN

## 2025-02-06 DIAGNOSIS — C91.10 CHRONIC LYMPHOCYTIC LEUKEMIA (H): Primary | ICD-10-CM

## 2025-02-13 ENCOUNTER — ONCOLOGY VISIT (OUTPATIENT)
Dept: ONCOLOGY | Facility: HOSPITAL | Age: 65
End: 2025-02-13
Attending: INTERNAL MEDICINE
Payer: MEDICARE

## 2025-02-13 ENCOUNTER — INFUSION THERAPY VISIT (OUTPATIENT)
Dept: INFUSION THERAPY | Facility: HOSPITAL | Age: 65
End: 2025-02-13
Attending: INTERNAL MEDICINE
Payer: MEDICARE

## 2025-02-13 VITALS
WEIGHT: 248.4 LBS | HEART RATE: 78 BPM | BODY MASS INDEX: 31.88 KG/M2 | RESPIRATION RATE: 18 BRPM | DIASTOLIC BLOOD PRESSURE: 60 MMHG | HEIGHT: 74 IN | OXYGEN SATURATION: 95 % | SYSTOLIC BLOOD PRESSURE: 120 MMHG | TEMPERATURE: 97.9 F

## 2025-02-13 DIAGNOSIS — Z98.890 HISTORY OF VASCULAR ACCESS DEVICE: ICD-10-CM

## 2025-02-13 DIAGNOSIS — C91.10 CHRONIC LYMPHOCYTIC LEUKEMIA (H): Primary | ICD-10-CM

## 2025-02-13 LAB
ALBUMIN SERPL BCG-MCNC: 4.2 G/DL (ref 3.5–5.2)
ALP SERPL-CCNC: 78 U/L (ref 40–150)
ALT SERPL W P-5'-P-CCNC: 18 U/L (ref 0–70)
ANION GAP SERPL CALCULATED.3IONS-SCNC: 9 MMOL/L (ref 7–15)
AST SERPL W P-5'-P-CCNC: 18 U/L (ref 0–45)
BASOPHILS # BLD AUTO: 0 10E3/UL (ref 0–0.2)
BASOPHILS NFR BLD AUTO: 0 %
BILIRUB SERPL-MCNC: 0.4 MG/DL
BUN SERPL-MCNC: 15.2 MG/DL (ref 8–23)
CALCIUM SERPL-MCNC: 9.3 MG/DL (ref 8.8–10.4)
CHLORIDE SERPL-SCNC: 104 MMOL/L (ref 98–107)
CREAT SERPL-MCNC: 0.76 MG/DL (ref 0.67–1.17)
EGFRCR SERPLBLD CKD-EPI 2021: >90 ML/MIN/1.73M2
EOSINOPHIL # BLD AUTO: 0 10E3/UL (ref 0–0.7)
EOSINOPHIL NFR BLD AUTO: 1 %
ERYTHROCYTE [DISTWIDTH] IN BLOOD BY AUTOMATED COUNT: 13.1 % (ref 10–15)
GLUCOSE SERPL-MCNC: 97 MG/DL (ref 70–99)
HCO3 SERPL-SCNC: 26 MMOL/L (ref 22–29)
HCT VFR BLD AUTO: 37.9 % (ref 40–53)
HGB BLD-MCNC: 12.4 G/DL (ref 13.3–17.7)
IMM GRANULOCYTES # BLD: 0 10E3/UL
IMM GRANULOCYTES NFR BLD: 0 %
LYMPHOCYTES # BLD AUTO: 0.6 10E3/UL (ref 0.8–5.3)
LYMPHOCYTES NFR BLD AUTO: 11 %
MCH RBC QN AUTO: 28.8 PG (ref 26.5–33)
MCHC RBC AUTO-ENTMCNC: 32.7 G/DL (ref 31.5–36.5)
MCV RBC AUTO: 88 FL (ref 78–100)
MONOCYTES # BLD AUTO: 0.7 10E3/UL (ref 0–1.3)
MONOCYTES NFR BLD AUTO: 12 %
NEUTROPHILS # BLD AUTO: 4.1 10E3/UL (ref 1.6–8.3)
NEUTROPHILS NFR BLD AUTO: 76 %
NRBC # BLD AUTO: 0 10E3/UL
NRBC BLD AUTO-RTO: 0 /100
PHOSPHATE SERPL-MCNC: 3.4 MG/DL (ref 2.5–4.5)
PLATELET # BLD AUTO: 236 10E3/UL (ref 150–450)
POTASSIUM SERPL-SCNC: 4.3 MMOL/L (ref 3.4–5.3)
PROT SERPL-MCNC: 6.3 G/DL (ref 6.4–8.3)
RBC # BLD AUTO: 4.31 10E6/UL (ref 4.4–5.9)
SODIUM SERPL-SCNC: 139 MMOL/L (ref 135–145)
URATE SERPL-MCNC: 5.9 MG/DL (ref 3.4–7)
WBC # BLD AUTO: 5.5 10E3/UL (ref 4–11)

## 2025-02-13 PROCEDURE — G0463 HOSPITAL OUTPT CLINIC VISIT: HCPCS | Performed by: NURSE PRACTITIONER

## 2025-02-13 PROCEDURE — 250N000011 HC RX IP 250 OP 636: Performed by: NURSE PRACTITIONER

## 2025-02-13 PROCEDURE — 36591 DRAW BLOOD OFF VENOUS DEVICE: CPT | Performed by: NURSE PRACTITIONER

## 2025-02-13 PROCEDURE — 82310 ASSAY OF CALCIUM: CPT | Performed by: NURSE PRACTITIONER

## 2025-02-13 PROCEDURE — 84100 ASSAY OF PHOSPHORUS: CPT | Performed by: NURSE PRACTITIONER

## 2025-02-13 PROCEDURE — 84550 ASSAY OF BLOOD/URIC ACID: CPT | Performed by: NURSE PRACTITIONER

## 2025-02-13 PROCEDURE — 82247 BILIRUBIN TOTAL: CPT | Performed by: NURSE PRACTITIONER

## 2025-02-13 PROCEDURE — 85004 AUTOMATED DIFF WBC COUNT: CPT | Performed by: NURSE PRACTITIONER

## 2025-02-13 RX ORDER — HEPARIN SODIUM (PORCINE) LOCK FLUSH IV SOLN 100 UNIT/ML 100 UNIT/ML
5 SOLUTION INTRAVENOUS
OUTPATIENT
Start: 2025-02-13

## 2025-02-13 RX ORDER — HEPARIN SODIUM,PORCINE 10 UNIT/ML
5-20 VIAL (ML) INTRAVENOUS DAILY PRN
OUTPATIENT
Start: 2025-02-13

## 2025-02-13 RX ORDER — HEPARIN SODIUM (PORCINE) LOCK FLUSH IV SOLN 100 UNIT/ML 100 UNIT/ML
5 SOLUTION INTRAVENOUS
Status: ACTIVE | OUTPATIENT
Start: 2025-02-13

## 2025-02-13 RX ADMIN — HEPARIN 5 ML: 100 SYRINGE at 14:12

## 2025-02-13 ASSESSMENT — PAIN SCALES - GENERAL: PAINLEVEL_OUTOF10: MILD PAIN (2)

## 2025-02-13 NOTE — LETTER
"2025      Ramu Espinoza  [address] [place] [address]      Dear Colleague,    Thank you for referring your patient, Ramu Espinoza, to the [facility]. Please see a copy of my visit note below.    Oncology Rooming Note    2025 12:57 PM   Ramu Espinoza is a 64 year old male who presents for:    Chief Complaint   Patient presents with     Oncology Clinic Visit     Chronic lymphocytic leukemia (H)  Cy inducer Ibrutinib     Initial Vitals: /60   Pulse 78   Temp 97.9  F (36.6  C)   Resp 18   Ht 1.88 m (6' 2\")   Wt 112.7 kg (248 lb 6.4 oz)   SpO2 95%   BMI 31.89 kg/m   Estimated body mass index is 31.89 kg/m  as calculated from the following:    Height as of this encounter: 1.88 m (6' 2\").    Weight as of this encounter: 112.7 kg (248 lb 6.4 oz). Body surface area is 2.43 meters squared.  Mild Pain (2) Comment: Data Unavailable   No LMP for male patient.  Allergies reviewed: Yes  Medications reviewed: Yes    Medications: Medication refills not needed today.  Pharmacy name entered into [employer]:    [facility] - [place], [place] - [address] [id number] [place], [place] - [facility]  [facility] [place], [place] - [address] [facility] [id number]    Frailty Screening:   Is the patient here for a new oncology consult visit in cancer care? 2. No    PHQ9:  Did this patient require a PHQ9?: Yes   If the patient required a PHQ9 assessment, did the results require a follow up with the Provider/Nurse?: No      Clinical concerns: Tired and anxious after meds/treatment.       Maren Hernandez LPN               [facility] Hematology and Oncology Consult Note    Patient: Ramu Espinoza  MRN: [id number]  Date of Service: 2025        Reason for Visit    Chief Complaint   Patient presents with     Oncology Clinic Visit     Chronic lymphocytic leukemia (H)  Cy inducer " Ibrutinib       Assessment/Plan  1.  CLL stage 4, diagnosed in 2012. 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  he had had treatment on clinical study with Rituxan and Imbruvica.  Great clinical response to Imbruvica. His lymph nodes have normalized. His CBC normalized.  Patient was starting to have worsening A-fib issues so in 2023 he elected to go off the study and off the Imbruvica.  Started having progression in the 2024 so he has now started on Obinutuzumab and venetoclax. Patient has had 3 months of Obinutuzumab and is on his venetoclax and has completed ramp up and is on full dose, 400mg daily(started 25).  He will get 4th month of obinutuzumab today. We will see him for cycle 5 in March.      2. Paroxysmal A. Fib: could be from Imbruvica (<9% risk). on metoprolol.  [facility]  It was recommended to stop the Imbruvica due to the worsening A-fib.  Does continue to follow with cardiology.     3.  Fatigue, anorexia: These are expected side effects of the treatment regimen.  Overall they are mild.  Encouraged patient to stay hydrated, eat well and ease into a little more active.    4. Anxiety: Seems To be a little worse with starting the venetoclax.  Unclear if it is from the venetoclax or he is just worried about his treatment side effects.  We did discuss psychotherapy with patient.  He does use Ativan as needed to help him sleep.  No other changes at this time but we will monitor       ECOG Performance    2 - Ambulatory and independent in all ADLs; cannot work; up > 50% of the time    Problem List    Patient Active Problem List   Diagnosis     Tinnitus     Generalized Anxiety Disorder     Dupuytren's Contracture     Insomnia     Chronic lymphocytic leukemia (H)  Cy inducer Ibrutinib     Internal Hemorrhoids With Bleeding     Pericardial effusion     Thyroid nodule     DAVID (obstructive sleep apnea)     REM sleep behavior  disorder     Class 1 obesity due to excess calories with serious comorbidity in adult     Benign prostatic hyperplasia with lower urinary tract symptoms, symptom details unspecified     History of atrial fibrillation     Paroxysmal atrial fibrillation (H)     History of vascular access device     ______________________________________________________________________________    Staging History     Cancer Staging   No matching staging information was found for the patient.      History    Oncologist: Dr. Garza     Diagnosis: CLL.  This was diagnosed in February 2012 when he presented with elevated white blood cell count around 20.  He had a blood smear that showed atypical lymphocytosis suspicious for CLL.  Patient had a normal hemoglobin and platelet count at that time.  His peripheral blood flow cytometry revealed CD5 coexpressing kappa light chain restricted B cells confirming the diagnosis of CLL.  -November 2024: signs of progression with worsening CBC     Treatment: Patient initially was on observation   -June 2014 his white blood cell count was over 100,000, and starting to have thrombocytopenia.  He had a CT scan that showed increasing lymphadenopathy  -October 2014: Patient was offered participation in a clinical study.  He decided to do that and started on Rituxan and Imbruvica.  Was randomized against fludarabine, Cytoxan and Rituxan.  He completed his Rituxan and then was on Imbruvica as a single agent until October 2023.  Patient stopped his Imbruvica at that time due to worsening A-fib.  -November 2024: started treatment with obinutuzumab. Venetoclax added with cycle 2 in December.        Interim history: Patient is scheduled today for follow-up visit, to review labs and to continue treatment. Overall he states he is doing okay.  He is pretty anxious, which he reports he is at baseline, however has increased anxiety that he attributes to his medication regimen. He says his days vary in fatigue,  appetite, and anxiety, with improvement in his appetite. Patient complained of frequent urination and has followed up with urology with no new diagnosis.  Patient reports increasing water intake recently. Patient reports occasional brief feelings of a-fib or heart palpations. Denies bleeding or bruising. Denies any infectious complaints. Denies any vomiting.      Past History    Past Medical History:   Diagnosis Date     Anxiety      BPH (benign prostatic hyperplasia)      Cancer (H)     CLL     CLL (chronic lymphocytic leukemia) (H)      Enlarged lymph nodes     Created by Conversion  Replacement Utility updated for latest IMO load     Irregular heart beat      Paroxysmal atrial fibrillation with rapid ventricular response (H)      Sleep apnea      Tinnitus     Family History   Problem Relation Age of Onset     Cancer Father         pancreatic      Prostate Cancer Father      Colon Cancer Mother      Cancer Mother      Neuropathy Mother      Cancer Brother         Lung      Lung Cancer Brother      Hypertension Brother      No Known Problems Sister      Clotting Disorder Brother      Chronic Obstructive Pulmonary Disease Brother      Neuropathy Brother      Cancer Brother         digestive      Anuerysm Brother      Hypertension Brother      Hypertension Brother      No Known Problems Brother      Diabetes Paternal Aunt      Diabetes Maternal Aunt      Diabetes Maternal Aunt      Cancer Maternal Grandfather       Past Surgical History:   Procedure Laterality Date     COLONOSCOPY N/A 5/16/2022    Procedure: COLONOSCOPY,POLYPECTOMY;  Surgeon: Eugene Green MD;  Location: [facility]     IR CHEST PORT PLACEMENT > 5 YRS OF AGE  11/15/2024     ZZC OPEN FIX INTER/SUBTROCH FX,PLATE      Description: Open Treatment Of An Intertrochanteric Fracture;  Recorded: 04/01/2009;    Social History     Socioeconomic History     Marital status:      Spouse name: Not on file     Number of children: Not on file      Years of education: Not on file     Highest education level: Not on file   Occupational History     Not on file   Tobacco Use     Smoking status: Former     Current packs/day: 0.00     Average packs/day: 0.5 packs/day for 25.0 years (12.5 ttl pk-yrs)     Types: Cigarettes     Start date: 10/31/1980     Quit date: 10/31/2005     Years since quittin.3     Passive exposure: Never     Smokeless tobacco: Never   Substance and Sexual Activity     Alcohol use: Yes     Alcohol/week: 14.0 standard drinks of alcohol     Comment: ocassionally on weekends     Drug use: No     Sexual activity: Never     Partners: Female     Birth control/protection: None   Other Topics Concern     Not on file   Social History Narrative     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     Social Drivers of Health     Financial Resource Strain: Not At Risk (2023)    Received from [facility]    Financial Resource Strain      Is it hard for you to pay for the very basics like food, housing, medical care or heating?: No   Food Insecurity: Not At Risk (2023)    Received from [facility]    Food Insecurity      Does your food run out before you have the money to buy more?: No   Transportation Needs: Not At Risk (2023)    Received from [facility]    Transportation Needs      Does a lack of transportation keep you from your medical appointments or from getting your medications?: No   Physical Activity: Not on file   Stress: Not on file   Social Connections: Not on file   Interpersonal Safety: Low Risk  (11/15/2024)    Interpersonal Safety      Do you feel physically and emotionally safe where you currently live?: Yes      Within the past 12 months, have you been hit, slapped, kicked or otherwise physically hurt by someone?: No      Within the past 12 months, have you been humiliated or emotionally abused in other ways by your partner or ex-partner?: No   Housing Stability: Not on file  "       Allergies    Allergies   Allergen Reactions     Zithromax [Azithromycin Dihydrate]        Review of Systems    Pertinent items are noted in HPI.      Physical Exam        2/13/2025    12:56 PM   Oncology Vitals   Height 188 cm   Weight 112.674 kg   BSA (m2) 2.43 m2   /60   Pulse 78   Temp 97.9  F (36.6  C)   SpO2 95 %   Pain Score 2 (Mild)       /60   Pulse 78   Temp 97.9  F (36.6  C)   Resp 18   Ht 1.88 m (6' 2\")   Wt 112.7 kg (248 lb 6.4 oz)   SpO2 95%   BMI 31.89 kg/m      General Appearance:    Alert, cooperative, no distress, appears stated age  HEENT: Head: Normocephalic, no lesions, without obvious abnormality.  Eye: Normal external eye, conjunctiva, lids cornea, LIS.  Lymphatics: No abnormally enlarged lymph nodes.  Chest: Normal chest wall and respirations. Clear to auscultation.  Abdomen: abdomen is soft without significant tenderness, masses, organomegaly or guarding  Extremities: normal strength, tone, and muscle mass  no deformities  no erythema, induration, or nodules  no evidence of joint effusion  Skin: normal, no rash or abnormalities        Lab Results    Recent Results (from the past week)   Comprehensive metabolic panel   Result Value Ref Range    Sodium 139 135 - 145 mmol/L    Potassium 4.3 3.4 - 5.3 mmol/L    Carbon Dioxide (CO2) 26 22 - 29 mmol/L    Anion Gap 9 7 - 15 mmol/L    Urea Nitrogen 15.2 8.0 - 23.0 mg/dL    Creatinine 0.76 0.67 - 1.17 mg/dL    GFR Estimate >90 >60 mL/min/1.73m2    Calcium 9.3 8.8 - 10.4 mg/dL    Chloride 104 98 - 107 mmol/L    Glucose 97 70 - 99 mg/dL    Alkaline Phosphatase 78 40 - 150 U/L    AST 18 0 - 45 U/L    ALT 18 0 - 70 U/L    Protein Total 6.3 (L) 6.4 - 8.3 g/dL    Albumin 4.2 3.5 - 5.2 g/dL    Bilirubin Total 0.4 <=1.2 mg/dL   Phosphorus   Result Value Ref Range    Phosphorus 3.4 2.5 - 4.5 mg/dL   Uric acid   Result Value Ref Range    Uric Acid 5.9 3.4 - 7.0 mg/dL   CBC with platelets and differential   Result Value Ref Range    " WBC Count 5.5 4.0 - 11.0 10e3/uL    RBC Count 4.31 (L) 4.40 - 5.90 10e6/uL    Hemoglobin 12.4 (L) 13.3 - 17.7 g/dL    Hematocrit 37.9 (L) 40.0 - 53.0 %    MCV 88 78 - 100 fL    MCH 28.8 26.5 - 33.0 pg    MCHC 32.7 31.5 - 36.5 g/dL    RDW 13.1 10.0 - 15.0 %    Platelet Count 236 150 - 450 10e3/uL    % Neutrophils 76 %    % Lymphocytes 11 %    % Monocytes 12 %    % Eosinophils 1 %    % Basophils 0 %    % Immature Granulocytes 0 %    NRBCs per 100 WBC 0 <1 /100    Absolute Neutrophils 4.1 1.6 - 8.3 10e3/uL    Absolute Lymphocytes 0.6 (L) 0.8 - 5.3 10e3/uL    Absolute Monocytes 0.7 0.0 - 1.3 10e3/uL    Absolute Eosinophils 0.0 0.0 - 0.7 10e3/uL    Absolute Basophils 0.0 0.0 - 0.2 10e3/uL    Absolute Immature Granulocytes 0.0 <=0.4 10e3/uL    Absolute NRBCs 0.0 10e3/uL       Imaging Results    No results found.      Signed by: Radha Tomas I, JUS Lester CNP, saw this patient and agree with the findings and plan of care as documented in the note.      Items personally reviewed/procedural attestation: vitals, labs, and I was present for key portions of the visit and agree with Assessment and plan.     The longitudinal plan of care for the diagnosis(es)/condition(s) as documented were addressed during this visit. Due to the added complexity in care, I will continue to support Clive in the subsequent management and with ongoing continuity of care.        Again, thank you for allowing me to participate in the care of your patient.        Sincerely,        JUS Lester CNP    Electronically signed

## 2025-02-13 NOTE — PROGRESS NOTES
"Oncology Rooming Note    2025 12:57 PM   Ramu Espinoza is a 64 year old male who presents for:    Chief Complaint   Patient presents with    Oncology Clinic Visit     Chronic lymphocytic leukemia (H)  Cy inducer Ibrutinib     Initial Vitals: /60   Pulse 78   Temp 97.9  F (36.6  C)   Resp 18   Ht 1.88 m (6' 2\")   Wt 112.7 kg (248 lb 6.4 oz)   SpO2 95%   BMI 31.89 kg/m   Estimated body mass index is 31.89 kg/m  as calculated from the following:    Height as of this encounter: 1.88 m (6' 2\").    Weight as of this encounter: 112.7 kg (248 lb 6.4 oz). Body surface area is 2.43 meters squared.  Mild Pain (2) Comment: Data Unavailable   No LMP for male patient.  Allergies reviewed: Yes  Medications reviewed: Yes    Medications: Medication refills not needed today.  Pharmacy name entered into [employer]:    [facility] - [place], [place] - [address] [facility] [place], [place] - [address]  [place], [place] - [address] [facility] [phone]    Frailty Screening:   Is the patient here for a new oncology consult visit in cancer care? 2. No    PHQ9:  Did this patient require a PHQ9?: Yes   If the patient required a PHQ9 assessment, did the results require a follow up with the Provider/Nurse?: No      Clinical concerns: Tired and anxious after meds/treatment.       Maren Hernandez LPN             "

## 2025-02-13 NOTE — PROGRESS NOTES
Port accessed and labs drawn.     Pt not treated today due to time constraints. Per pharmacy, unable to give infusion any faster than ordered. Rescheduled for tomorrow at 11:30.     Port flushed with heparin and deaccessed.

## 2025-02-13 NOTE — PROGRESS NOTES
[facility] Hematology and Oncology Consult Note    Patient: Ramu Espinoza  MRN: [id number]  Date of Service: 2025        Reason for Visit    Chief Complaint   Patient presents with    Oncology Clinic Visit     Chronic lymphocytic leukemia (H)  Cy inducer Ibrutinib       Assessment/Plan  1.  CLL stage 4, diagnosed in 2012. 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  he had had treatment on clinical study with Rituxan and Imbruvica.  Great clinical response to Imbruvica. His lymph nodes have normalized. His CBC normalized.  Patient was starting to have worsening A-fib issues so in 2023 he elected to go off the study and off the Imbruvica.  Started having progression in the 2024 so he has now started on Obinutuzumab and venetoclax. Patient has had 3 months of Obinutuzumab and is on his venetoclax and has completed ramp up and is on full dose, 400mg daily(started 25).  He will get 4th month of obinutuzumab today. We will see him for cycle 5 in March.      2. Paroxysmal A. Fib: could be from Imbruvica (<9% risk). on metoprolol.  Patient has been seeing a cardiologist through [facility].  It was recommended to stop the Imbruvica due to the worsening A-fib.  Does continue to follow with cardiology.     3.  Fatigue, anorexia: These are expected side effects of the treatment regimen.  Overall they are mild.  Encouraged patient to stay hydrated, eat well and ease into a little more active.    4. Anxiety: Seems To be a little worse with starting the venetoclax.  Unclear if it is from the venetoclax or he is just worried about his treatment side effects.  We did discuss psychotherapy with patient.  He does use Ativan as needed to help him sleep.  No other changes at this time but we will monitor       ECOG Performance    2 - Ambulatory and independent in all ADLs; cannot work; up > 50% of the time    Problem List    Patient Active Problem List   Diagnosis    Tinnitus     Generalized Anxiety Disorder    Dupuytren's Contracture    Insomnia    Chronic lymphocytic leukemia (H)  Cy inducer Ibrutinib    Internal Hemorrhoids With Bleeding    Pericardial effusion    Thyroid nodule    DAVID (obstructive sleep apnea)    REM sleep behavior disorder    Class 1 obesity due to excess calories with serious comorbidity in adult    Benign prostatic hyperplasia with lower urinary tract symptoms, symptom details unspecified    History of atrial fibrillation    Paroxysmal atrial fibrillation (H)    History of vascular access device     ______________________________________________________________________________    Staging History     Cancer Staging   No matching staging information was found for the patient.      History    Oncologist: Dr. Garza     Diagnosis: CLL.  This was diagnosed in 2012 when he presented with elevated white blood cell count around 20.  He had a blood smear that showed atypical lymphocytosis suspicious for CLL.  Patient had a normal hemoglobin and platelet count at that time.  His peripheral blood flow cytometry revealed CD5 coexpressing kappa light chain restricted B cells confirming the diagnosis of CLL.  -2024: signs of progression with worsening CBC     Treatment: Patient initially was on observation   -2014 his white blood cell count was over 100,000, and starting to have thrombocytopenia.  He had a CT scan that showed increasing lymphadenopathy  -2014: Patient was offered participation in a clinical study.  He decided to do that and started on Rituxan and Imbruvica.  Was randomized against fludarabine, Cytoxan and Rituxan.  He completed his Rituxan and then was on Imbruvica as a single agent until 2023.  Patient stopped his Imbruvica at that time due to worsening A-fib.  -2024: started treatment with obinutuzumab. Venetoclax added with cycle 2 in December.        Interim history: Patient is scheduled today for follow-up  visit, to review labs and to continue treatment. Overall he states he is doing okay.  He is pretty anxious, which he reports he is at baseline, however has increased anxiety that he attributes to his medication regimen. He says his days vary in fatigue, appetite, and anxiety, with improvement in his appetite. Patient complained of frequent urination and has followed up with urology with no new diagnosis.  Patient reports increasing water intake recently. Patient reports occasional brief feelings of a-fib or heart palpations. Denies bleeding or bruising. Denies any infectious complaints. Denies any vomiting.      Past History    Past Medical History:   Diagnosis Date    Anxiety     BPH (benign prostatic hyperplasia)     Cancer (H)     CLL    CLL (chronic lymphocytic leukemia) (H)     Enlarged lymph nodes     Created by Conversion  Replacement Utility updated for latest IMO load    Irregular heart beat     Paroxysmal atrial fibrillation with rapid ventricular response (H)     Sleep apnea     Tinnitus     Family History   Problem Relation Age of Onset    Cancer Father         pancreatic     Prostate Cancer Father     Colon Cancer Mother     Cancer Mother     Neuropathy Mother     Cancer Brother         Lung     Lung Cancer Brother     Hypertension Brother     No Known Problems Sister     Clotting Disorder Brother     Chronic Obstructive Pulmonary Disease Brother     Neuropathy Brother     Cancer Brother         digestive     Anuerysm Brother     Hypertension Brother     Hypertension Brother     No Known Problems Brother     Diabetes Paternal Aunt     Diabetes Maternal Aunt     Diabetes Maternal Aunt     Cancer Maternal Grandfather       Past Surgical History:   Procedure Laterality Date    COLONOSCOPY N/A 5/16/2022    Procedure: COLONOSCOPY,POLYPECTOMY;  Surgeon: Eugene Green MD;  Location: [facility]    IR CHEST PORT PLACEMENT > 5 YRS OF AGE  11/15/2024    ZZC OPEN FIX INTER/SUBTROCH FX,PLATE       Description: Open Treatment Of An Intertrochanteric Fracture;  Recorded: 2009;    Social History     Socioeconomic History    Marital status:      Spouse name: Not on file    Number of children: Not on file    Years of education: Not on file    Highest education level: Not on file   Occupational History    Not on file   Tobacco Use    Smoking status: Former     Current packs/day: 0.00     Average packs/day: 0.5 packs/day for 25.0 years (12.5 ttl pk-yrs)     Types: Cigarettes     Start date: 10/31/1980     Quit date: 10/31/2005     Years since quittin.3     Passive exposure: Never    Smokeless tobacco: Never   Substance and Sexual Activity    Alcohol use: Yes     Alcohol/week: 14.0 standard drinks of alcohol     Comment: ocassionally on weekends    Drug use: No    Sexual activity: Never     Partners: Female     Birth control/protection: None   Other Topics Concern    Not on file   Social History Narrative     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     Social Drivers of Health     Financial Resource Strain: Not At Risk (2023)    [facility]    Financial Resource Strain     Is it hard for you to pay for the very basics like food, housing, medical care or heating?: No   Food Insecurity: Not At Risk (2023)    [facility]    Food Insecurity     Does your food run out before you have the money to buy more?: No   Transportation Needs: Not At Risk (2023)    [email or web address]    Transportation Needs     Does a lack of transportation keep you from your medical appointments or from getting your medications?: No   Physical Activity: Not on file   Stress: Not on file   Social Connections: Not on file   Interpersonal Safety: Low Risk  (11/15/2024)    Interpersonal Safety     Do you feel physically and emotionally safe where you currently live?: Yes     Within the past 12 months, have you been hit, slapped,  "kicked or otherwise physically hurt by someone?: No     Within the past 12 months, have you been humiliated or emotionally abused in other ways by your partner or ex-partner?: No   Housing Stability: Not on file        Allergies    Allergies   Allergen Reactions    Zithromax [Azithromycin Dihydrate]        Review of Systems    Pertinent items are noted in HPI.      Physical Exam        2/13/2025    12:56 PM   Oncology Vitals   Height 188 cm   Weight 112.674 kg   BSA (m2) 2.43 m2   /60   Pulse 78   Temp 97.9  F (36.6  C)   SpO2 95 %   Pain Score 2 (Mild)       /60   Pulse 78   Temp 97.9  F (36.6  C)   Resp 18   Ht 1.88 m (6' 2\")   Wt 112.7 kg (248 lb 6.4 oz)   SpO2 95%   BMI 31.89 kg/m      General Appearance:    Alert, cooperative, no distress, appears stated age  HEENT: Head: Normocephalic, no lesions, without obvious abnormality.  Eye: Normal external eye, conjunctiva, lids cornea, LIS.  Lymphatics: No abnormally enlarged lymph nodes.  Chest: Normal chest wall and respirations. Clear to auscultation.  Abdomen: abdomen is soft without significant tenderness, masses, organomegaly or guarding  Extremities: normal strength, tone, and muscle mass  no deformities  no erythema, induration, or nodules  no evidence of joint effusion  Skin: normal, no rash or abnormalities        Lab Results    Recent Results (from the past week)   Comprehensive metabolic panel   Result Value Ref Range    Sodium 139 135 - 145 mmol/L    Potassium 4.3 3.4 - 5.3 mmol/L    Carbon Dioxide (CO2) 26 22 - 29 mmol/L    Anion Gap 9 7 - 15 mmol/L    Urea Nitrogen 15.2 8.0 - 23.0 mg/dL    Creatinine 0.76 0.67 - 1.17 mg/dL    GFR Estimate >90 >60 mL/min/1.73m2    Calcium 9.3 8.8 - 10.4 mg/dL    Chloride 104 98 - 107 mmol/L    Glucose 97 70 - 99 mg/dL    Alkaline Phosphatase 78 40 - 150 U/L    AST 18 0 - 45 U/L    ALT 18 0 - 70 U/L    Protein Total 6.3 (L) 6.4 - 8.3 g/dL    Albumin 4.2 3.5 - 5.2 g/dL    Bilirubin Total 0.4 <=1.2 mg/dL "   Phosphorus   Result Value Ref Range    Phosphorus 3.4 2.5 - 4.5 mg/dL   Uric acid   Result Value Ref Range    Uric Acid 5.9 3.4 - 7.0 mg/dL   CBC with platelets and differential   Result Value Ref Range    WBC Count 5.5 4.0 - 11.0 10e3/uL    RBC Count 4.31 (L) 4.40 - 5.90 10e6/uL    Hemoglobin 12.4 (L) 13.3 - 17.7 g/dL    Hematocrit 37.9 (L) 40.0 - 53.0 %    MCV 88 78 - 100 fL    MCH 28.8 26.5 - 33.0 pg    MCHC 32.7 31.5 - 36.5 g/dL    RDW 13.1 10.0 - 15.0 %    Platelet Count 236 150 - 450 10e3/uL    % Neutrophils 76 %    % Lymphocytes 11 %    % Monocytes 12 %    % Eosinophils 1 %    % Basophils 0 %    % Immature Granulocytes 0 %    NRBCs per 100 WBC 0 <1 /100    Absolute Neutrophils 4.1 1.6 - 8.3 10e3/uL    Absolute Lymphocytes 0.6 (L) 0.8 - 5.3 10e3/uL    Absolute Monocytes 0.7 0.0 - 1.3 10e3/uL    Absolute Eosinophils 0.0 0.0 - 0.7 10e3/uL    Absolute Basophils 0.0 0.0 - 0.2 10e3/uL    Absolute Immature Granulocytes 0.0 <=0.4 10e3/uL    Absolute NRBCs 0.0 10e3/uL       Imaging Results    No results found.      Signed by: Radha Hernandez, JUS CNP, saw this patient and agree with the findings and plan of care as documented in the note.      Items personally reviewed/procedural attestation: vitals, labs, and I was present for key portions of the visit and agree with Assessment and plan.     The longitudinal plan of care for the diagnosis(es)/condition(s) as documented were addressed during this visit. Due to the added complexity in care, I will continue to support Clive in the subsequent management and with ongoing continuity of care.

## 2025-02-15 ENCOUNTER — OFFICE VISIT (OUTPATIENT)
Dept: URGENT CARE | Facility: URGENT CARE | Age: 65
End: 2025-02-15
Payer: MEDICARE

## 2025-02-15 VITALS
HEART RATE: 95 BPM | BODY MASS INDEX: 32.1 KG/M2 | DIASTOLIC BLOOD PRESSURE: 77 MMHG | OXYGEN SATURATION: 97 % | WEIGHT: 250 LBS | TEMPERATURE: 101.6 F | SYSTOLIC BLOOD PRESSURE: 123 MMHG

## 2025-02-15 DIAGNOSIS — J10.1 INFLUENZA B: ICD-10-CM

## 2025-02-15 DIAGNOSIS — N45.1 EPIDIDYMITIS: Primary | ICD-10-CM

## 2025-02-15 DIAGNOSIS — C91.10 CLL (CHRONIC LYMPHOCYTIC LEUKEMIA) (H): ICD-10-CM

## 2025-02-15 DIAGNOSIS — R50.9 FEVER, UNSPECIFIED FEVER CAUSE: ICD-10-CM

## 2025-02-15 LAB
FLUAV AG SPEC QL IA: NEGATIVE
FLUBV AG SPEC QL IA: POSITIVE

## 2025-02-15 PROCEDURE — 87804 INFLUENZA ASSAY W/OPTIC: CPT

## 2025-02-15 PROCEDURE — 99213 OFFICE O/P EST LOW 20 MIN: CPT | Performed by: FAMILY MEDICINE

## 2025-02-15 RX ORDER — OSELTAMIVIR PHOSPHATE 75 MG/1
75 CAPSULE ORAL 2 TIMES DAILY
Qty: 10 CAPSULE | Refills: 0 | Status: SHIPPED | OUTPATIENT
Start: 2025-02-15 | End: 2025-02-20

## 2025-02-15 RX ORDER — SULFAMETHOXAZOLE AND TRIMETHOPRIM 800; 160 MG/1; MG/1
1 TABLET ORAL 2 TIMES DAILY
Qty: 28 TABLET | Refills: 0 | Status: SHIPPED | OUTPATIENT
Start: 2025-02-15 | End: 2025-03-01

## 2025-02-15 NOTE — PROGRESS NOTES
Ramu Espinoza is a 64 year old male who comes in today for testicle pain off and on since November    On meds for leukemia    Comes and goes    Pain kept patient up last night    Occasional pain on urination    Just saw urology    Saw urology 2-12-25, reviewed note    Increased flomax to 0.8 mg daily     No swelling    Not tender on palpation    No trauma    No std concern    Bowels okay    Fever this am    Full physical not done     Mentation and affect are fine    No tremor of speech or extremity    Patient has no point tenderness over testicles    He is tender over left epididymis, not on right    Negative hernia check bilat    No visible swelling of the scrotum/ genital area    No tenderness groin/ upper thigh    Pos for influenza b    ASSESSMENT / PLAN:  (N45.1) Epididymitis  (primary encounter diagnosis)  Comment: prudent to cover with antibiotics.  Follow up in primary clinic if symptoms not resolving.  No std concern.   Plan: sulfamethoxazole-trimethoprim (BACTRIM DS)         800-160 MG tablet             (J10.1) Influenza B  Comment: will treat.   Plan: oseltamivir (TAMIFLU) 75 MG capsule             (R50.9) Fever, unspecified fever cause  Comment: pos, will treat   Plan: Influenza A & B Antigen - Clinic Collect             (C91.10) CLL (chronic lymphocytic leukemia) (H)  Comment: patient on meds per onc  Plan: as above      I reviewed the patient's medications, allergies, medical history, family history, and social history.    Mauricio Menon MD

## 2025-02-15 NOTE — PATIENT INSTRUCTIONS
Take the antibiotics    Stay well hydrated    Follow up in primary clinic if symptoms not resolving; at that point they could order a testicular ultrasound     Ibuprofen/ tylenol as needed     Take the tamiflu for 5 days

## 2025-02-24 ENCOUNTER — TELEPHONE (OUTPATIENT)
Dept: ONCOLOGY | Facility: HOSPITAL | Age: 65
End: 2025-02-24
Payer: MEDICARE

## 2025-02-25 ENCOUNTER — TELEPHONE (OUTPATIENT)
Dept: ONCOLOGY | Facility: HOSPITAL | Age: 65
End: 2025-02-25
Payer: MEDICARE

## 2025-02-25 NOTE — ORAL ONC MGMT
Oral Chemotherapy Monitoring Program    Subjective/Objective:  Ramu Espinoza is a 64 year old male contacted by phone for a follow-up visit for oral chemotherapy.  Clive is tolerating the venetoclax ok, he is having some fatigue and tiredness after taking the pills. He also was wondering if they can cause anxiety. He had a few questions related to his brittany.         12/19/2024     2:00 PM 1/9/2025     3:00 PM 1/17/2025    11:00 AM 1/17/2025    12:00 PM 2/6/2025     1:00 PM 2/24/2025    12:00 PM 2/25/2025     2:00 PM   ORAL CHEMOTHERAPY   Assessment Type Initial Follow up Lab Monitoring;Refill Left Voicemail Monthly Follow up Refill Left Voicemail Monthly Follow up   Diagnosis Code Chronic Lymphocytic Leukemia (CLL) Chronic Lymphocytic Leukemia (CLL) Chronic Lymphocytic Leukemia (CLL) Chronic Lymphocytic Leukemia (CLL) Chronic Lymphocytic Leukemia (CLL) Chronic Lymphocytic Leukemia (CLL) Chronic Lymphocytic Leukemia (CLL)   Providers Dr. Greg Garza   Clinic Name/Location [facility]   Drug Name Venclexta (venetoclax) Venclexta (venetoclax) Venclexta (venetoclax) Venclexta (venetoclax) Venclexta (venetoclax) Venclexta (venetoclax) Venclexta (venetoclax)   Dose 50 mg 400 mg 400 mg 400 mg 400 mg 400 mg 400 mg   Current Schedule Daily Daily Daily Daily Daily Daily Daily   Cycle Details Continuous Continuous Continuous Continuous Continuous Continuous Continuous   Start Date of Last Cycle 12/19/2024 1/9/2025 1/9/2025       Planned next cycle start date   2/6/2025 2/6/2025      Doses missed in last 2 weeks    0   0   Adherence Assessment    Adherent   Adherent   Adverse Effects Nausea   Fatigue   Fatigue   Nausea Grade 1   Grade 1      Pharmacist Intervention(nausea) Yes   Yes      Intervention(s) Patient education   Patient education      Fatigue       Grade 1   Pharmacist  "Intervention(fatigue)       Yes   Intervention(s)       Patient education   Any new drug interactions? No      No   Is the dose as ordered appropriate for the patient? Yes      Yes       Last PHQ-2 Score on record:       2/13/2025    12:58 PM 1/10/2023    11:17 AM   PHQ-2 ( 1999 [employer])   Q1: Little interest or pleasure in doing things 0 0   Q2: Feeling down, depressed or hopeless 0 0   PHQ-2 Score 0 0       Vitals:  BP:   BP Readings from Last 1 Encounters:   02/15/25 123/77     Wt Readings from Last 1 Encounters:   02/15/25 113.4 kg (250 lb)     Estimated body surface area is 2.43 meters squared as calculated from the following:    Height as of 2/13/25: 1.88 m (6' 2\").    Weight as of 2/15/25: 113.4 kg (250 lb).    Labs:  _  Result Component Current Result Ref Range   Sodium 139 (2/13/2025) 135 - 145 mmol/L     _  Result Component Current Result Ref Range   Potassium 4.3 (2/13/2025) 3.4 - 5.3 mmol/L     _  Result Component Current Result Ref Range   Calcium 9.3 (2/13/2025) 8.8 - 10.4 mg/dL     No results found for Mag within last 30 days.     _  Result Component Current Result Ref Range   Phosphorus 3.4 (2/13/2025) 2.5 - 4.5 mg/dL     _  Result Component Current Result Ref Range   Albumin 4.2 (2/13/2025) 3.5 - 5.2 g/dL     _  Result Component Current Result Ref Range   Urea Nitrogen 15.2 (2/13/2025) 8.0 - 23.0 mg/dL     _  Result Component Current Result Ref Range   Creatinine 0.76 (2/13/2025) 0.67 - 1.17 mg/dL     _  Result Component Current Result Ref Range   AST 18 (2/13/2025) 0 - 45 U/L     _  Result Component Current Result Ref Range   ALT 18 (2/13/2025) 0 - 70 U/L     _  Result Component Current Result Ref Range   Bilirubin Total 0.4 (2/13/2025) <=1.2 mg/dL     _  Result Component Current Result Ref Range   WBC Count 5.5 (2/13/2025) 4.0 - 11.0 10e3/uL     _  Result Component Current Result Ref Range   Hemoglobin 12.4 (L) (2/13/2025) 13.3 - 17.7 g/dL     _  Result Component Current Result Ref Range   Platelet " Count 236 (2/13/2025) 150 - 450 10e3/uL     No results found for ANC within last 30 days.     _  Result Component Current Result Ref Range   Absolute Neutrophils 4.1 (2/13/2025) 1.6 - 8.3 10e3/uL        Assessment/Plan:  Clive is tolerating the venetoclax ok. He complains of fatigue and tiredness after taking it but is nervous to try taking the venetoclax in the evening with dinner due to everyone telling him he needs to drink tons of water with it. We did discuss that he should definitely be staying hydrated throughout the day but when he takes his venetoclax he just needs to take it with a glass of water. Also he noticed increase in anxiety, while not directly listed as a side effect we did discuss that he might be feeling anxious due to his disease state and everything that is going on around it. It's a lot of take in so just try taking it one day at a time. I gave him Donna's phone number for his brittany questions.    Follow-Up:  We will review labs and provider note on 3/12    Refill Due:  3/6/25    Maren Hansen, [place]  Oral Chemotherapy Pharmacist  [facility] Phone: [phone]  In Basket Pools:   MARLY ORAL CHEMOTHERAPY PHARMACIST   HEATHER ORAL CHEMOTHERAPY PHARMACIST

## 2025-02-26 ENCOUNTER — TELEPHONE (OUTPATIENT)
Dept: ONCOLOGY | Facility: HOSPITAL | Age: 65
End: 2025-02-26
Payer: MEDICARE

## 2025-02-27 ENCOUNTER — PATIENT OUTREACH (OUTPATIENT)
Dept: ONCOLOGY | Facility: HOSPITAL | Age: 65
End: 2025-02-27
Payer: MEDICARE

## 2025-02-27 NOTE — PROGRESS NOTES
[facility]: Cancer Care Follow-Up Note                                    Discussion with Patient:                                                      Patient called in yesterday wanting to discuss some things about his treatment plan and whether or not he would be able to take a break from his oral venetoclax as he was summoned for jury duty and has to drink a lot of water while on venetoclax.  Patient states that he paruresis which is a shy bladder or inability to urinate with others in the same public bathroom.     Goals          General    Maintain ability to perform ADLs without difficulty     Patient will adhere to medication regimen     Notes - Note created  12/12/2024  3:59 PM by Penny Mcfadden, RN    VENETOCLAX - STARTED 12/12/24              Dates of Treatment:                                                      Infusion given in last 28 days       Administered MAR Action Medication Dose Rate Visit    02/14/2025 12:24 New Bag obinutuzumab (GAZYVA) 1,000 mg in sodium chloride 0.9 % 1,000 mL infusion 1,000 mg   Infusion Therapy Visit on 02/14/2025 in [facility]          Treatment Plan Medications       OP ONC Chronic Lymphocytic Leukemia (CLL) - Obinutuzumab / Venetoclax        Take Home Medications       Medication Sig Start/End Day/Cycle Status    venetoclax (VENCLEXTA) 100 MG tablet Take 4 tablets (400 mg) by mouth daily for 28 days Take with food. S to S+28 Day 1, Cycle 5 - 3/13/2025 Signed                            Assessment:                                                      I called him back today and he is quite anxious.  He states that he has never told anyone about this besides his wife and he feels very embarrassed and stupid.  He does not know what to do.  The jury duty would take place the end of March and into April, over a 2 week period.      His wife is home with him.    Intervention/Education provided during outreach:                                                        I let him know that I would discuss this situation with Dr Garza to see if he is comfortable with Clive taking  a break from Venetoclax, if he feels he should continue and if so, would he be able to write a letter seeing if he can get a defer for this. Patient is also meeting with his PCP on Tuesday next week to discuss his anxiety medication as he has been using a lot more ativan.  He is worried about getting addicted to it.  He states that normally a 30-day supply of would normally last him 6 months.  For the past couple of weeks, since he received the letter, he has been needing to take it on a daily basis.    Patient does have a follow-up with Dr Garza on 3/12/25 but since he is having such anxiety about this, I will discuss with Dr Garza and get back to Clive as quickly as I can.    Signature:  Penny Mcfadden RN

## 2025-03-03 DIAGNOSIS — G47.00 INSOMNIA, UNSPECIFIED TYPE: ICD-10-CM

## 2025-03-03 RX ORDER — TRAZODONE HYDROCHLORIDE 50 MG/1
50 TABLET ORAL AT BEDTIME
Qty: 90 TABLET | Refills: 4 | OUTPATIENT
Start: 2025-03-03

## 2025-03-03 NOTE — PROGRESS NOTES
[facility]: Cancer Care                                                                                          Per Dr Garza, we will write a letter to the  asking for him to be excused from jury duty.  Patient notified that we have the letter here that he can  or he can also print it from my chart.    Letter has been faxed to the  at [phone] with juror #[id number] and group #[id number] along with PIN #[id number].  Original signed letter has been saved for the patient who will pick this up on March 12 at his appointment with Dr Garza.    Signature:  Penny Mcfadden RN

## 2025-03-03 NOTE — TELEPHONE ENCOUNTER
Patient calls and leaves a message on triage voicemail. He is requesting to speak with Penny CABRERA. Patient says that he has questions regarding his treatment plan.     Alyce Brown RN  
Pneny Mcfadden RNCC spoke with patient on 2/27/25. See further documentation in patient outreach encounter.    Alyce Brown RN    
(1) More than 48 hours/None

## 2025-03-03 NOTE — TELEPHONE ENCOUNTER
Called and spoke to pt.  He is seeing Dr. Jiménez at [facility], so he will inform his pharmacy that they are sending us refills by mistake.

## 2025-03-03 NOTE — TELEPHONE ENCOUNTER
I've never met PT. Prior PCP has been out of this office for over a year. He needs to request refills from new PCP

## 2025-03-06 DIAGNOSIS — C91.10 CHRONIC LYMPHOCYTIC LEUKEMIA (H): Primary | ICD-10-CM

## 2025-03-12 ENCOUNTER — ONCOLOGY VISIT (OUTPATIENT)
Dept: ONCOLOGY | Facility: HOSPITAL | Age: 65
End: 2025-03-12
Payer: MEDICARE

## 2025-03-12 ENCOUNTER — INFUSION THERAPY VISIT (OUTPATIENT)
Dept: INFUSION THERAPY | Facility: HOSPITAL | Age: 65
End: 2025-03-12
Payer: MEDICARE

## 2025-03-12 VITALS
DIASTOLIC BLOOD PRESSURE: 76 MMHG | WEIGHT: 240 LBS | HEIGHT: 74 IN | HEART RATE: 74 BPM | BODY MASS INDEX: 30.8 KG/M2 | SYSTOLIC BLOOD PRESSURE: 127 MMHG | RESPIRATION RATE: 18 BRPM | TEMPERATURE: 98.2 F | OXYGEN SATURATION: 97 %

## 2025-03-12 DIAGNOSIS — F41.9 ANXIETY: ICD-10-CM

## 2025-03-12 DIAGNOSIS — C91.10 CHRONIC LYMPHOCYTIC LEUKEMIA (H): Primary | ICD-10-CM

## 2025-03-12 LAB
ALBUMIN SERPL BCG-MCNC: 4.2 G/DL (ref 3.5–5.2)
ALP SERPL-CCNC: 74 U/L (ref 40–150)
ALT SERPL W P-5'-P-CCNC: 16 U/L (ref 0–70)
ANION GAP SERPL CALCULATED.3IONS-SCNC: 6 MMOL/L (ref 7–15)
AST SERPL W P-5'-P-CCNC: 18 U/L (ref 0–45)
BASOPHILS # BLD AUTO: 0 10E3/UL (ref 0–0.2)
BASOPHILS NFR BLD AUTO: 0 %
BILIRUB SERPL-MCNC: 0.3 MG/DL
BUN SERPL-MCNC: 10 MG/DL (ref 8–23)
CALCIUM SERPL-MCNC: 9.4 MG/DL (ref 8.8–10.4)
CHLORIDE SERPL-SCNC: 104 MMOL/L (ref 98–107)
CREAT SERPL-MCNC: 0.76 MG/DL (ref 0.67–1.17)
EGFRCR SERPLBLD CKD-EPI 2021: >90 ML/MIN/1.73M2
EOSINOPHIL # BLD AUTO: 0 10E3/UL (ref 0–0.7)
EOSINOPHIL NFR BLD AUTO: 0 %
ERYTHROCYTE [DISTWIDTH] IN BLOOD BY AUTOMATED COUNT: 13.7 % (ref 10–15)
GLUCOSE SERPL-MCNC: 99 MG/DL (ref 70–99)
HCO3 SERPL-SCNC: 29 MMOL/L (ref 22–29)
HCT VFR BLD AUTO: 41.7 % (ref 40–53)
HGB BLD-MCNC: 13.2 G/DL (ref 13.3–17.7)
IMM GRANULOCYTES # BLD: 0 10E3/UL
IMM GRANULOCYTES NFR BLD: 1 %
LYMPHOCYTES # BLD AUTO: 0.5 10E3/UL (ref 0.8–5.3)
LYMPHOCYTES NFR BLD AUTO: 8 %
MCH RBC QN AUTO: 27 PG (ref 26.5–33)
MCHC RBC AUTO-ENTMCNC: 31.7 G/DL (ref 31.5–36.5)
MCV RBC AUTO: 86 FL (ref 78–100)
MONOCYTES # BLD AUTO: 0.6 10E3/UL (ref 0–1.3)
MONOCYTES NFR BLD AUTO: 10 %
NEUTROPHILS # BLD AUTO: 4.9 10E3/UL (ref 1.6–8.3)
NEUTROPHILS NFR BLD AUTO: 81 %
NRBC # BLD AUTO: 0 10E3/UL
NRBC BLD AUTO-RTO: 0 /100
PHOSPHATE SERPL-MCNC: 2.9 MG/DL (ref 2.5–4.5)
PLATELET # BLD AUTO: 168 10E3/UL (ref 150–450)
POTASSIUM SERPL-SCNC: 4.4 MMOL/L (ref 3.4–5.3)
PROT SERPL-MCNC: 6.3 G/DL (ref 6.4–8.3)
RBC # BLD AUTO: 4.88 10E6/UL (ref 4.4–5.9)
SODIUM SERPL-SCNC: 139 MMOL/L (ref 135–145)
URATE SERPL-MCNC: 6.2 MG/DL (ref 3.4–7)
WBC # BLD AUTO: 6 10E3/UL (ref 4–11)

## 2025-03-12 PROCEDURE — 84100 ASSAY OF PHOSPHORUS: CPT | Performed by: NURSE PRACTITIONER

## 2025-03-12 PROCEDURE — 250N000011 HC RX IP 250 OP 636: Performed by: NURSE PRACTITIONER

## 2025-03-12 PROCEDURE — 250N000013 HC RX MED GY IP 250 OP 250 PS 637: Performed by: NURSE PRACTITIONER

## 2025-03-12 PROCEDURE — G0463 HOSPITAL OUTPT CLINIC VISIT: HCPCS | Performed by: NURSE PRACTITIONER

## 2025-03-12 PROCEDURE — 258N000003 HC RX IP 258 OP 636: Performed by: NURSE PRACTITIONER

## 2025-03-12 PROCEDURE — 84550 ASSAY OF BLOOD/URIC ACID: CPT | Performed by: NURSE PRACTITIONER

## 2025-03-12 PROCEDURE — 85004 AUTOMATED DIFF WBC COUNT: CPT | Performed by: NURSE PRACTITIONER

## 2025-03-12 PROCEDURE — 96413 CHEMO IV INFUSION 1 HR: CPT

## 2025-03-12 PROCEDURE — G2211 COMPLEX E/M VISIT ADD ON: HCPCS | Performed by: NURSE PRACTITIONER

## 2025-03-12 PROCEDURE — 96415 CHEMO IV INFUSION ADDL HR: CPT

## 2025-03-12 PROCEDURE — 99215 OFFICE O/P EST HI 40 MIN: CPT | Performed by: NURSE PRACTITIONER

## 2025-03-12 PROCEDURE — 36591 DRAW BLOOD OFF VENOUS DEVICE: CPT | Performed by: NURSE PRACTITIONER

## 2025-03-12 PROCEDURE — 80053 COMPREHEN METABOLIC PANEL: CPT | Performed by: NURSE PRACTITIONER

## 2025-03-12 PROCEDURE — 85014 HEMATOCRIT: CPT | Performed by: NURSE PRACTITIONER

## 2025-03-12 RX ORDER — EPINEPHRINE 1 MG/ML
0.3 INJECTION, SOLUTION INTRAMUSCULAR; SUBCUTANEOUS EVERY 5 MIN PRN
OUTPATIENT
Start: 2025-04-10

## 2025-03-12 RX ORDER — HEPARIN SODIUM,PORCINE 10 UNIT/ML
5-20 VIAL (ML) INTRAVENOUS DAILY PRN
OUTPATIENT
Start: 2025-04-10

## 2025-03-12 RX ORDER — ALBUTEROL SULFATE 0.83 MG/ML
2.5 SOLUTION RESPIRATORY (INHALATION)
OUTPATIENT
Start: 2025-04-10

## 2025-03-12 RX ORDER — ALBUTEROL SULFATE 90 UG/1
1-2 INHALANT RESPIRATORY (INHALATION)
Start: 2025-04-10

## 2025-03-12 RX ORDER — MEPERIDINE HYDROCHLORIDE 25 MG/ML
25 INJECTION INTRAMUSCULAR; INTRAVENOUS; SUBCUTANEOUS
OUTPATIENT
Start: 2025-04-10

## 2025-03-12 RX ORDER — DIPHENHYDRAMINE HYDROCHLORIDE 50 MG/ML
25 INJECTION, SOLUTION INTRAMUSCULAR; INTRAVENOUS
Start: 2025-04-10

## 2025-03-12 RX ORDER — DIPHENHYDRAMINE HCL 50 MG
50 CAPSULE ORAL ONCE
Start: 2025-04-10 | End: 2025-04-10

## 2025-03-12 RX ORDER — ACETAMINOPHEN 325 MG/1
650 TABLET ORAL ONCE
Start: 2025-04-10

## 2025-03-12 RX ORDER — DIPHENHYDRAMINE HCL 50 MG
50 CAPSULE ORAL ONCE
Status: COMPLETED | OUTPATIENT
Start: 2025-03-12 | End: 2025-03-12

## 2025-03-12 RX ORDER — METHYLPREDNISOLONE SODIUM SUCCINATE 40 MG/ML
40 INJECTION INTRAMUSCULAR; INTRAVENOUS
Start: 2025-04-10

## 2025-03-12 RX ORDER — HEPARIN SODIUM (PORCINE) LOCK FLUSH IV SOLN 100 UNIT/ML 100 UNIT/ML
5 SOLUTION INTRAVENOUS
Status: DISCONTINUED | OUTPATIENT
Start: 2025-03-12 | End: 2025-03-12 | Stop reason: HOSPADM

## 2025-03-12 RX ORDER — DIPHENHYDRAMINE HYDROCHLORIDE 50 MG/ML
50 INJECTION, SOLUTION INTRAMUSCULAR; INTRAVENOUS
Start: 2025-04-10

## 2025-03-12 RX ORDER — ACETAMINOPHEN 325 MG/1
650 TABLET ORAL ONCE
Status: COMPLETED | OUTPATIENT
Start: 2025-03-12 | End: 2025-03-12

## 2025-03-12 RX ORDER — LORAZEPAM 2 MG/ML
0.5 INJECTION INTRAMUSCULAR EVERY 4 HOURS PRN
OUTPATIENT
Start: 2025-04-10

## 2025-03-12 RX ORDER — HEPARIN SODIUM (PORCINE) LOCK FLUSH IV SOLN 100 UNIT/ML 100 UNIT/ML
5 SOLUTION INTRAVENOUS
OUTPATIENT
Start: 2025-04-10

## 2025-03-12 RX ORDER — METOPROLOL SUCCINATE 100 MG/1
1 TABLET, EXTENDED RELEASE ORAL
COMMUNITY
Start: 2025-02-17

## 2025-03-12 RX ADMIN — DIPHENHYDRAMINE HYDROCHLORIDE 50 MG: 50 CAPSULE ORAL at 10:02

## 2025-03-12 RX ADMIN — OBINUTUZUMAB 1000 MG: 1000 INJECTION, SOLUTION, CONCENTRATE INTRAVENOUS at 10:30

## 2025-03-12 RX ADMIN — HEPARIN 5 ML: 100 SYRINGE at 13:56

## 2025-03-12 RX ADMIN — ACETAMINOPHEN 650 MG: 325 TABLET ORAL at 10:01

## 2025-03-12 RX ADMIN — SODIUM CHLORIDE 250 ML: 0.9 INJECTION, SOLUTION INTRAVENOUS at 10:00

## 2025-03-12 ASSESSMENT — PAIN SCALES - GENERAL: PAINLEVEL_OUTOF10: MILD PAIN (1)

## 2025-03-12 NOTE — PROGRESS NOTES
[facility] Hematology and Oncology Progress Note    Patient: Ramu Espinoza  MRN: [id number]  Date of Service: Mar 12, 2025          Reason for Visit    Chief Complaint   Patient presents with    Oncology Clinic Visit     Chronic lymphocytic leukemia        Assessment and Plan     Cancer Staging   No matching staging information was found for the patient.       1.  CLL stage 4, diagnosed in February 2012. 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  he had had treatment on clinical study with Rituxan and Imbruvica.  Great clinical response to Imbruvica. His lymph nodes have normalized. His CBC normalized.  Patient was starting to have worsening A-fib issues so in October 2023 he elected to go off the study and off the Imbruvica.  Started having progression in the fall 2024 so he has now started on Obinutuzumab and venetoclax.  He is now on full dose of venetoclax at 400 mg daily.  He is here for his fifth dose of Obinutuzumab out of a planned 6 doses.  Overall doing well with the treatment.  He will return in 4 weeks for his 6 and final dose of treatment.  All of his labs meet parameters to do the treatment.  He does have risk of myelosuppression.  His CBC is showing improvement with a normal white blood cell count, normal platelet count and his hemoglobin continues to improve and is almost normal at 13.2.     2.  Anxiety: Patient does take trazodone at bedtime and mirtazapine at bedtime.  He states that for his anxiety since he has been having a lot of worse of panic attacks lately he has been taking Ativan 1.5 mg as needed.  He says he is taking that almost once a day at this point and he does really feel like it helps.  We talked at length about this today and the risk of addiction to that medication.  For now were not can to change it but I am in to get him into see our psychologist to see if that will help.  If helps get his anxiety down to a level where he does not need the Ativan every day I  think we can continue that.  If it still is an issue we may have to start sertraline or a different SSRI.    3.  Fatigue, anorexia: These are expected side effects of the treatment regimen.  Overall they are mild.  Encouraged patient to stay hydrated, eat well and get a little more active.     ECOG Performance    0 - Independent    Distress Screening (within last 30 days)    No data recorded     Pain  Pain Score: Mild Pain (1)  Pain Loc: Other - see comment (anxiety)    Problem List    Patient Active Problem List   Diagnosis    Tinnitus    Generalized Anxiety Disorder    Dupuytren's Contracture    Insomnia    Chronic lymphocytic leukemia (H)  Cy inducer Ibrutinib    Internal Hemorrhoids With Bleeding    Pericardial effusion    Thyroid nodule    DAVID (obstructive sleep apnea)    REM sleep behavior disorder    Class 1 obesity due to excess calories with serious comorbidity in adult    Benign prostatic hyperplasia with lower urinary tract symptoms, symptom details unspecified    History of atrial fibrillation    Paroxysmal atrial fibrillation (H)    History of vascular access device        ______________________________________________________________________________    History of Present Illness    Oncologist: Dr. Garza    Diagnosis: CLL.  This was diagnosed in 2012 when he presented with elevated white blood cell count around 20.  He had a blood smear that showed atypical lymphocytosis suspicious for CLL.  Patient had a normal hemoglobin and platelet count at that time.  His peripheral blood flow cytometry revealed CD5 coexpressing kappa light chain restricted B cells confirming the diagnosis of CLL.    Treatment: Patient initially was on observation   -2014 his white blood cell count was over 100,000, and starting to have thrombocytopenia.  He had a CT scan that showed increasing lymphadenopathy  -2014: Patient was offered participation in a clinical study.  He decided to do that and  "started on Rituxan and Imbruvica.  Was randomized against fludarabine, Cytoxan and Rituxan.  He completed his Rituxan and then was on Imbruvica as a single agent until October 2023.  Patient stopped his Imbruvica at that time due to worsening A-fib.    Interim history: Patient is scheduled today for follow-up visit and to review labs.  All patient feels like he is doing okay but he is having a lot worsening of his anxiety issues.  He states that he is not sure what kind has triggered it but having cancer and getting the treatment and feeling some of the side effects has not helped.  He says he is taking his Ativan almost once a day at this point and it does seem to help but still is having anxiety.  He is not sure what he would like to do but feels like he would like to see improvement in that.  He denies any new bone or back pain issues.    Review of Systems    Pertinent items are noted in HPI.    Past History    Past Medical History:   Diagnosis Date    Anxiety     BPH (benign prostatic hyperplasia)     Cancer (H)     CLL    CLL (chronic lymphocytic leukemia) (H)     Enlarged lymph nodes     Created by Conversion  Replacement Utility updated for latest IMO load    Irregular heart beat     Paroxysmal atrial fibrillation with rapid ventricular response (H)     Sleep apnea     Tinnitus        PHYSICAL EXAM  /76 (BP Location: Left arm, Patient Position: Sitting, Cuff Size: Adult Regular)   Pulse 74   Temp 98.2  F (36.8  C) (Oral)   Resp 18   Ht 1.88 m (6' 2.02\")   Wt 108.9 kg (240 lb)   SpO2 97%   BMI 30.80 kg/m      GENERAL: no acute distress. Cooperative in conversation. Here with wife  RESP: Regular respiratory rate. No expiratory wheezes   NEURO: non focal. Alert and oriented x3.   PSYCH: within normal limits. No depression or anxiety.  SKIN: exposed skin is dry intact.             Lab Results    Recent Results (from the past week)   Comprehensive metabolic panel   Result Value Ref Range    Sodium 139 " 135 - 145 mmol/L    Potassium 4.4 3.4 - 5.3 mmol/L    Carbon Dioxide (CO2) 29 22 - 29 mmol/L    Anion Gap 6 (L) 7 - 15 mmol/L    Urea Nitrogen 10.0 8.0 - 23.0 mg/dL    Creatinine 0.76 0.67 - 1.17 mg/dL    GFR Estimate >90 >60 mL/min/1.73m2    Calcium 9.4 8.8 - 10.4 mg/dL    Chloride 104 98 - 107 mmol/L    Glucose 99 70 - 99 mg/dL    Alkaline Phosphatase 74 40 - 150 U/L    AST 18 0 - 45 U/L    ALT 16 0 - 70 U/L    Protein Total 6.3 (L) 6.4 - 8.3 g/dL    Albumin 4.2 3.5 - 5.2 g/dL    Bilirubin Total 0.3 <=1.2 mg/dL   Phosphorus   Result Value Ref Range    Phosphorus 2.9 2.5 - 4.5 mg/dL   Uric acid   Result Value Ref Range    Uric Acid 6.2 3.4 - 7.0 mg/dL   CBC with platelets and differential   Result Value Ref Range    WBC Count 6.0 4.0 - 11.0 10e3/uL    RBC Count 4.88 4.40 - 5.90 10e6/uL    Hemoglobin 13.2 (L) 13.3 - 17.7 g/dL    Hematocrit 41.7 40.0 - 53.0 %    MCV 86 78 - 100 fL    MCH 27.0 26.5 - 33.0 pg    MCHC 31.7 31.5 - 36.5 g/dL    RDW 13.7 10.0 - 15.0 %    Platelet Count 168 150 - 450 10e3/uL    % Neutrophils 81 %    % Lymphocytes 8 %    % Monocytes 10 %    % Eosinophils 0 %    % Basophils 0 %    % Immature Granulocytes 1 %    NRBCs per 100 WBC 0 <1 /100    Absolute Neutrophils 4.9 1.6 - 8.3 10e3/uL    Absolute Lymphocytes 0.5 (L) 0.8 - 5.3 10e3/uL    Absolute Monocytes 0.6 0.0 - 1.3 10e3/uL    Absolute Eosinophils 0.0 0.0 - 0.7 10e3/uL    Absolute Basophils 0.0 0.0 - 0.2 10e3/uL    Absolute Immature Granulocytes 0.0 <=0.4 10e3/uL    Absolute NRBCs 0.0 10e3/uL           Imaging    No results found.          The longitudinal plan of care for the diagnosis(es)/condition(s) as documented were addressed during this visit. Due to the added complexity in care, I will continue to support Clive in the subsequent management and with ongoing continuity of care.    Total time spent with patient in face to face time, chart review and documentation was 40 minutes.        Signed by: JUS Lester CNP

## 2025-03-12 NOTE — PROGRESS NOTES
Infusion Nursing Note:  Ramu Espinoza presents today for obinotuzumab infusion.    Patient seen by provider today: Yes: Radha WATKINS   present during visit today: Not Applicable.    Note: N/A.      Intravenous Access:  Implanted Port.    Treatment Conditions:  Lab Results   Component Value Date    HGB 13.2 (L) 03/12/2025    WBC 6.0 03/12/2025    ANEU 1.5 (L) 12/13/2024    ANEUTAUTO 4.9 03/12/2025     03/12/2025        Results reviewed, labs MET treatment parameters, ok to proceed with treatment.      Post Infusion Assessment:  Patient tolerated infusion without incident.  Blood return noted pre and post infusion.  Site patent and intact, free from redness, edema or discomfort.  No evidence of extravasations.  Access discontinued per protocol.       Discharge Plan:   Discharge instructions reviewed with: Patient and Family.  Patient and/or family verbalized understanding of discharge instructions and all questions answered.  Patient discharged in stable condition accompanied by: wife.  Departure Mode: Ambulatory.      Soledad Borges RN

## 2025-03-12 NOTE — PROGRESS NOTES
"Oncology Rooming Note    March 12, 2025 9:03 AM   Ramu Espinoza is a 64 year old male who presents for:    Chief Complaint   Patient presents with    Oncology Clinic Visit     Chronic lymphocytic leukemia      Initial Vitals: /76 (BP Location: Left arm, Patient Position: Sitting, Cuff Size: Adult Regular)   Pulse 74   Temp 98.2  F (36.8  C) (Oral)   Resp 18   Ht 1.88 m (6' 2.02\")   Wt 108.9 kg (240 lb)   SpO2 97%   BMI 30.80 kg/m   Estimated body mass index is 30.8 kg/m  as calculated from the following:    Height as of this encounter: 1.88 m (6' 2.02\").    Weight as of this encounter: 108.9 kg (240 lb). Body surface area is 2.38 meters squared.  Mild Pain (1) Comment: Data Unavailable   No LMP for male patient.  Allergies reviewed: Yes  Medications reviewed: Yes    Medications: Medication refills not needed today.  Pharmacy name entered into [employer]:    [facility] - [place], [place] - [address] [id number] [place], [place] - [address] [place], [place] - [address] [facility] [phone]  [facility] - [place], [place] - [address] [place] [address]    Frailty Screening:   Is the patient here for a new oncology consult visit in cancer care? 2. No    PHQ9:  Did this patient require a PHQ9?: No      Clinical concerns:  would like to arrange massage therapy/ and has a lot of anxiety but is speaking w someone currently       Chasity Rodriguez MA              "

## 2025-03-12 NOTE — LETTER
3/12/2025      Ramu Espinoza  [address] [place] [address]      Dear Colleague,    Thank you for referring your patient, Ramu Espinoza, to the [facility] [place]. Please see a copy of my visit note below.      [facility] Hematology and Oncology Progress Note    Patient: Ramu Espinoza  MRN: [id number]  Date of Service: Mar 12, 2025          Reason for Visit    Chief Complaint   Patient presents with     Oncology Clinic Visit     Chronic lymphocytic leukemia        Assessment and Plan     Cancer Staging   No matching staging information was found for the patient.       1.  CLL stage 4, diagnosed in February 2012. 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  he had had treatment on clinical study with Rituxan and Imbruvica.  Great clinical response to Imbruvica. His lymph nodes have normalized. His CBC normalized.  Patient was starting to have worsening A-fib issues so in October 2023 he elected to go off the study and off the Imbruvica.  Started having progression in the fall 2024 so he has now started on Obinutuzumab and venetoclax.  He is now on full dose of venetoclax at 400 mg daily.  He is here for his fifth dose of Obinutuzumab out of a planned 6 doses.  Overall doing well with the treatment.  He will return in 4 weeks for his 6 and final dose of treatment.  All of his labs meet parameters to do the treatment.  He does have risk of myelosuppression.  His CBC is showing improvement with a normal white blood cell count, normal platelet count and his hemoglobin continues to improve and is almost normal at 13.2.     2.  Anxiety: Patient does take trazodone at bedtime and mirtazapine at bedtime.  He states that for his anxiety since he has been having a lot of worse of panic attacks lately he has been taking Ativan 1.5 mg as needed.  He says he is taking that almost once a day at this point and he does really feel like it helps.  We talked at length about this  today and the risk of addiction to that medication.  For now were not can to change it but I am in to get him into see our psychologist to see if that will help.  If helps get his anxiety down to a level where he does not need the Ativan every day I think we can continue that.  If it still is an issue we may have to start sertraline or a different SSRI.    3.  Fatigue, anorexia: These are expected side effects of the treatment regimen.  Overall they are mild.  Encouraged patient to stay hydrated, eat well and get a little more active.     ECOG Performance    0 - Independent    Distress Screening (within last 30 days)    No data recorded     Pain  Pain Score: Mild Pain (1)  Pain Loc: Other - see comment (anxiety)    Problem List    Patient Active Problem List   Diagnosis     Tinnitus     Generalized Anxiety Disorder     Dupuytren's Contracture     Insomnia     Chronic lymphocytic leukemia (H)  Cy inducer Ibrutinib     Internal Hemorrhoids With Bleeding     Pericardial effusion     Thyroid nodule     DAVID (obstructive sleep apnea)     REM sleep behavior disorder     Class 1 obesity due to excess calories with serious comorbidity in adult     Benign prostatic hyperplasia with lower urinary tract symptoms, symptom details unspecified     History of atrial fibrillation     Paroxysmal atrial fibrillation (H)     History of vascular access device        ______________________________________________________________________________    History of Present Illness    Oncologist: Dr. Garza    Diagnosis: CLL.  This was diagnosed in 2012 when he presented with elevated white blood cell count around 20.  He had a blood smear that showed atypical lymphocytosis suspicious for CLL.  Patient had a normal hemoglobin and platelet count at that time.  His peripheral blood flow cytometry revealed CD5 coexpressing kappa light chain restricted B cells confirming the diagnosis of CLL.    Treatment: Patient initially was on  "observation   -June 2014 his white blood cell count was over 100,000, and starting to have thrombocytopenia.  He had a CT scan that showed increasing lymphadenopathy  -October 2014: Patient was offered participation in a clinical study.  He decided to do that and started on Rituxan and Imbruvica.  Was randomized against fludarabine, Cytoxan and Rituxan.  He completed his Rituxan and then was on Imbruvica as a single agent until October 2023.  Patient stopped his Imbruvica at that time due to worsening A-fib.    Interim history: Patient is scheduled today for follow-up visit and to review labs.  All patient feels like he is doing okay but he is having a lot worsening of his anxiety issues.  He states that he is not sure what kind has triggered it but having cancer and getting the treatment and feeling some of the side effects has not helped.  He says he is taking his Ativan almost once a day at this point and it does seem to help but still is having anxiety.  He is not sure what he would like to do but feels like he would like to see improvement in that.  He denies any new bone or back pain issues.    Review of Systems    Pertinent items are noted in HPI.    Past History    Past Medical History:   Diagnosis Date     Anxiety      BPH (benign prostatic hyperplasia)      Cancer (H)     CLL     CLL (chronic lymphocytic leukemia) (H)      Enlarged lymph nodes     Created by Conversion  Replacement Utility updated for latest IMO load     Irregular heart beat      Paroxysmal atrial fibrillation with rapid ventricular response (H)      Sleep apnea      Tinnitus        PHYSICAL EXAM  /76 (BP Location: Left arm, Patient Position: Sitting, Cuff Size: Adult Regular)   Pulse 74   Temp 98.2  F (36.8  C) (Oral)   Resp 18   Ht 1.88 m (6' 2.02\")   Wt 108.9 kg (240 lb)   SpO2 97%   BMI 30.80 kg/m      GENERAL: no acute distress. Cooperative in conversation. Here with wife  RESP: Regular respiratory rate. No expiratory " wheezes   NEURO: non focal. Alert and oriented x3.   PSYCH: within normal limits. No depression or anxiety.  SKIN: exposed skin is dry intact.             Lab Results    Recent Results (from the past week)   Comprehensive metabolic panel   Result Value Ref Range    Sodium 139 135 - 145 mmol/L    Potassium 4.4 3.4 - 5.3 mmol/L    Carbon Dioxide (CO2) 29 22 - 29 mmol/L    Anion Gap 6 (L) 7 - 15 mmol/L    Urea Nitrogen 10.0 8.0 - 23.0 mg/dL    Creatinine 0.76 0.67 - 1.17 mg/dL    GFR Estimate >90 >60 mL/min/1.73m2    Calcium 9.4 8.8 - 10.4 mg/dL    Chloride 104 98 - 107 mmol/L    Glucose 99 70 - 99 mg/dL    Alkaline Phosphatase 74 40 - 150 U/L    AST 18 0 - 45 U/L    ALT 16 0 - 70 U/L    Protein Total 6.3 (L) 6.4 - 8.3 g/dL    Albumin 4.2 3.5 - 5.2 g/dL    Bilirubin Total 0.3 <=1.2 mg/dL   Phosphorus   Result Value Ref Range    Phosphorus 2.9 2.5 - 4.5 mg/dL   Uric acid   Result Value Ref Range    Uric Acid 6.2 3.4 - 7.0 mg/dL   CBC with platelets and differential   Result Value Ref Range    WBC Count 6.0 4.0 - 11.0 10e3/uL    RBC Count 4.88 4.40 - 5.90 10e6/uL    Hemoglobin 13.2 (L) 13.3 - 17.7 g/dL    Hematocrit 41.7 40.0 - 53.0 %    MCV 86 78 - 100 fL    MCH 27.0 26.5 - 33.0 pg    MCHC 31.7 31.5 - 36.5 g/dL    RDW 13.7 10.0 - 15.0 %    Platelet Count 168 150 - 450 10e3/uL    % Neutrophils 81 %    % Lymphocytes 8 %    % Monocytes 10 %    % Eosinophils 0 %    % Basophils 0 %    % Immature Granulocytes 1 %    NRBCs per 100 WBC 0 <1 /100    Absolute Neutrophils 4.9 1.6 - 8.3 10e3/uL    Absolute Lymphocytes 0.5 (L) 0.8 - 5.3 10e3/uL    Absolute Monocytes 0.6 0.0 - 1.3 10e3/uL    Absolute Eosinophils 0.0 0.0 - 0.7 10e3/uL    Absolute Basophils 0.0 0.0 - 0.2 10e3/uL    Absolute Immature Granulocytes 0.0 <=0.4 10e3/uL    Absolute NRBCs 0.0 10e3/uL           Imaging    No results found.          The longitudinal plan of care for the diagnosis(es)/condition(s) as documented were addressed during this visit. Due to the added  "complexity in care, I will continue to support Clive in the subsequent management and with ongoing continuity of care.    Total time spent with patient in face to face time, chart review and documentation was 40 minutes.        Signed by: JUS Lester CNP    Oncology Rooming Note    March 12, 2025 9:03 AM   Ramu Espinoza is a 64 year old male who presents for:    Chief Complaint   Patient presents with     Oncology Clinic Visit     Chronic lymphocytic leukemia      Initial Vitals: /76 (BP Location: Left arm, Patient Position: Sitting, Cuff Size: Adult Regular)   Pulse 74   Temp 98.2  F (36.8  C) (Oral)   Resp 18   Ht 1.88 m (6' 2.02\")   Wt 108.9 kg (240 lb)   SpO2 97%   BMI 30.80 kg/m   Estimated body mass index is 30.8 kg/m  as calculated from the following:    Height as of this encounter: 1.88 m (6' 2.02\").    Weight as of this encounter: 108.9 kg (240 lb). Body surface area is 2.38 meters squared.  Mild Pain (1) Comment: Data Unavailable   No LMP for male patient.  Allergies reviewed: Yes  Medications reviewed: Yes    Medications: Medication refills not needed today.  Pharmacy name entered into [employer]:    [facility] - [place], [place] - [address] [id number] [place], [place] - [address], [place] - [address] [facility] [phone]  [facility] - [place], [place] - [address] [place] [address]    Frailty Screening:   Is the patient here for a new oncology consult visit in cancer care? 2. No    PHQ9:  Did this patient require a PHQ9?: No      Clinical concerns:  would like to arrange massage therapy/ and has a lot of anxiety but is speaking w someone currently       Chasity Rodriguez MA                Again, thank you for allowing me to participate in the care of your patient.        Sincerely,        JUS Lester CNP    Electronically signed"

## 2025-03-14 ENCOUNTER — ONCOLOGY VISIT (OUTPATIENT)
Dept: ONCOLOGY | Facility: HOSPITAL | Age: 65
End: 2025-03-14
Attending: NURSE PRACTITIONER
Payer: MEDICARE

## 2025-03-14 DIAGNOSIS — F41.9 ANXIETY: ICD-10-CM

## 2025-03-14 DIAGNOSIS — F41.1 GENERALIZED ANXIETY DISORDER: Primary | ICD-10-CM

## 2025-03-14 PROCEDURE — 90837 PSYTX W PT 60 MINUTES: CPT | Performed by: PSYCHOLOGIST

## 2025-03-14 NOTE — PROGRESS NOTES
"[facility] Oncology- Psychotherapy                                    Progress Note    Patient Name: Ramu Espinoza  Date: 3/14/2025           Service Type: Individual      Session Start Time: 1450  Session End Time: 1545     Session Length: 55 mins     Session #: 1    Attendees: Client attended alone    Service Modality:  In-person    DATA  Interactive Complexity: No  Crisis: No        Progress Since Last Session (Related to Symptoms / Goals / Homework):   Symptoms:  Pt reports anxiety sx including panic.   Pt reports he is getting feedback that he is a nervous person from his wife and needs to see someone.     Homework:  None      Episode of Care Goals: Satisfactory progress - ACTION (Actively working towards change); Intervened by reinforcing change plan / affirming steps taken     Current / Ongoing Stressors and Concerns:   Pt reports he has had a recurrence of medical issues and is struggling with managing anxiety related to his dx.      Pt reports he is receiving chemotherapy and that is causing stress.      Pt reports a major concern is a \"shy bladder\" that causes panic sx when he needs to use a public restroom.      Social Hx  Pt reports he is retired and feels \"lost.\" Pt reports he was a  for his career.     Pt reports he has three living siblings and has lost four siblings.     Pt reports there has been a lot of anxiety in his family.     Pt reports he is  to \"Nai.\"      Treatment Objective(s) Addressed in This Session:   use at least 3 coping skills for anxiety management in the next 2 weeks       Intervention:   CBT: ,  Emotion Focused Therapy: ,  Solution Focused: ,    Assessments completed prior to visit:  none       ASSESSMENT: Current Emotional / Mental Status (status of significant symptoms):   Risk status (Self / Other harm or suicidal ideation)   Patient denies current fears or concerns for personal safety.   Patient denies current or recent suicidal " ideation or behaviors.   Patient denies current or recent homicidal ideation or behaviors.   Patient denies current or recent self injurious behavior or ideation.   Patient denies other safety concerns.   Patient reports there has been no change in risk factors since their last session.     Patient reports there has been no change in protective factors since their last session.     Recommended that patient call 911 or go to the local ED should there be a change in any of these risk factors     Appearance:   Appropriate    Eye Contact:   Good    Psychomotor Behavior: Normal    Attitude:   Cooperative    Orientation:   All   Speech    Rate / Production: Normal     Volume:  Normal    Mood:    Anxious    Affect:    Labile    Thought Content:  Clear    Thought Form:  Coherent  Logical    Insight:    Fair      Medication Review:   No changes to current psychiatric medication(s)     Medication Compliance:   Yes     Changes in Health Issues:   Yes: cancer dx, Associated Psychological Distress     Chemical Use Review:   Substance Use: Chemical use reviewed, no active concerns identified      Tobacco Use: No current tobacco use.      Diagnosis:  1. Anxiety    F41.1 BRANDEN    Collateral Reports Completed:   Not Applicable    PLAN: (Patient Tasks / Therapist Tasks / Other)  Return in a week, utilize coping skills discussed.         Chago Griffin LP                                                         ______________________________________________________________________    Individual Treatment Plan    Patient's Name: Ramu Espinoza  YOB: 1960    Date of Creation: 3/17/2025    Date Treatment Plan Last Reviewed/Revised:     DSM5 Diagnoses: 300.02 (F41.1) Generalized Anxiety Disorder  Psychosocial / Contextual Factors: cancer dx  PROMIS (reviewed every 90 days):     Referral / Collaboration:  Referral to another professional/service is not indicated at this time..    Anticipated number of session for this  episode of care: 9-12 sessions  Anticipation frequency of session: Weekly  Anticipated Duration of each session: 53 or more minutes  Treatment plan will be reviewed in 90 days or when goals have been changed.       MeasurableTreatment Goal(s) related to diagnosis / functional impairment(s)  Goal 1: Patient will reduce anxiety sx     Objective #A (Patient Action)    Patient will use at least 3 coping skills for anxiety management in the next 2 weeks.  Status: New - Date: 3/17/25      Intervention(s)  Therapist will teach emotional regulation skills.   .    Objective #B  Patient will practice deep breathing at least 3X a day.  Status: New - Date: 3/17/25      Intervention(s)  Therapist will teach emotional regulation skills.   .      Goal 2: Patient will reduce anxiety around specific situations      Objective #A (Patient Action)    Status: New - Date: 3/17/25      Patient will use distraction each time intrusive worry surfaces.    Intervention(s)  Therapist will teach emotional regulation skills.   .    Objective #B  Patient will use cognitive strategies identified in therapy to challenge anxious thoughts.    Status: New - Date: 3/17/25      Intervention(s)  Therapist will teach CBT skills .    Patient has reviewed and agreed to the above plan.      Chago Griffin LP  March 14, 2025

## 2025-03-14 NOTE — LETTER
"3/14/2025      Ramu Espinoza  [address] [place] [facility] [address]      Dear Colleague,    Thank you for referring your patient, Ramu Espinoza, to the [facility] [place]. Please see a copy of my visit note below.        [facility] Oncology- Psychotherapy                                    Progress Note    Patient Name: Ramu Espinoza  Date: 3/14/2025           Service Type: Individual      Session Start Time: 1450  Session End Time: 1545     Session Length: 55 mins     Session #: 1    Attendees: Client attended alone    Service Modality:  In-person    DATA  Interactive Complexity: No  Crisis: No        Progress Since Last Session (Related to Symptoms / Goals / Homework):   Symptoms:  Pt reports anxiety sx including panic.   Pt reports he is getting feedback that he is a nervous person from his wife and needs to see someone.     Homework:  None      Episode of Care Goals: Satisfactory progress - ACTION (Actively working towards change); Intervened by reinforcing change plan / affirming steps taken     Current / Ongoing Stressors and Concerns:   Pt reports he has had a recurrence of medical issues and is struggling with managing anxiety related to his dx.      Pt reports he is receiving chemotherapy and that is causing stress.      Pt reports a major concern is a \"shy bladder\" that causes panic sx when he needs to use a public restroom.      Social Hx  Pt reports he is retired and feels \"lost.\" Pt reports he was a  for his career.     Pt reports he has three living siblings and has lost four siblings.     Pt reports there has been a lot of anxiety in his family.     Pt reports he is  to \"Nai.\"      Treatment Objective(s) Addressed in This Session:   use at least 3 coping skills for anxiety management in the next 2 weeks       Intervention:   CBT: ,  Emotion Focused Therapy: ,  Solution Focused: ,    Assessments completed prior to visit:  none "       ASSESSMENT: Current Emotional / Mental Status (status of significant symptoms):   Risk status (Self / Other harm or suicidal ideation)   Patient denies current fears or concerns for personal safety.   Patient denies current or recent suicidal ideation or behaviors.   Patient denies current or recent homicidal ideation or behaviors.   Patient denies current or recent self injurious behavior or ideation.   Patient denies other safety concerns.   Patient reports there has been no change in risk factors since their last session.     Patient reports there has been no change in protective factors since their last session.     Recommended that patient call 911 or go to the local ED should there be a change in any of these risk factors     Appearance:   Appropriate    Eye Contact:   Good    Psychomotor Behavior: Normal    Attitude:   Cooperative    Orientation:   All   Speech    Rate / Production: Normal     Volume:  Normal    Mood:    Anxious    Affect:    Labile    Thought Content:  Clear    Thought Form:  Coherent  Logical    Insight:    Fair      Medication Review:   No changes to current psychiatric medication(s)     Medication Compliance:   Yes     Changes in Health Issues:   Yes: cancer dx, Associated Psychological Distress     Chemical Use Review:   Substance Use: Chemical use reviewed, no active concerns identified      Tobacco Use: No current tobacco use.      Diagnosis:  1. Anxiety    F41.1 BRANDEN    Collateral Reports Completed:   Not Applicable    PLAN: (Patient Tasks / Therapist Tasks / Other)  Return in a week, utilize coping skills discussed.         Chago Griffin LP                                                         ______________________________________________________________________    Individual Treatment Plan    Patient's Name: Ramu Espinoza  YOB: 1960    Date of Creation: 3/17/2025    Date Treatment Plan Last Reviewed/Revised:     DSM5 Diagnoses: 300.02 (F41.1)  Generalized Anxiety Disorder  Psychosocial / Contextual Factors: cancer dx  PROMIS (reviewed every 90 days):     Referral / Collaboration:  Referral to another professional/service is not indicated at this time..    Anticipated number of session for this episode of care: 9-12 sessions  Anticipation frequency of session: Weekly  Anticipated Duration of each session: 53 or more minutes  Treatment plan will be reviewed in 90 days or when goals have been changed.       MeasurableTreatment Goal(s) related to diagnosis / functional impairment(s)  Goal 1: Patient will reduce anxiety sx     Objective #A (Patient Action)    Patient will use at least 3 coping skills for anxiety management in the next 2 weeks.  Status: New - Date: 3/17/25      Intervention(s)  Therapist will teach emotional regulation skills.   .    Objective #B  Patient will practice deep breathing at least 3X a day.  Status: New - Date: 3/17/25      Intervention(s)  Therapist will teach emotional regulation skills.   .      Goal 2: Patient will reduce anxiety around specific situations      Objective #A (Patient Action)    Status: New - Date: 3/17/25      Patient will use distraction each time intrusive worry surfaces.    Intervention(s)  Therapist will teach emotional regulation skills.   .    Objective #B  Patient will use cognitive strategies identified in therapy to challenge anxious thoughts.    Status: New - Date: 3/17/25      Intervention(s)  Therapist will teach CBT skills .    Patient has reviewed and agreed to the above plan.      Chago Griffin LP  March 14, 2025      Again, thank you for allowing me to participate in the care of your patient.        Sincerely,        Chago Griffin LP    Electronically signed

## 2025-03-24 ENCOUNTER — ONCOLOGY VISIT (OUTPATIENT)
Dept: ONCOLOGY | Facility: HOSPITAL | Age: 65
End: 2025-03-24
Attending: PSYCHOLOGIST
Payer: MEDICARE

## 2025-03-24 DIAGNOSIS — F41.1 GENERALIZED ANXIETY DISORDER: Primary | ICD-10-CM

## 2025-03-24 PROCEDURE — 90837 PSYTX W PT 60 MINUTES: CPT | Performed by: PSYCHOLOGIST

## 2025-03-24 NOTE — PROGRESS NOTES
"[facility] Oncology- Psychotherapy                                     Progress Note     Patient Name: Ramu Espinoza                      Date:   3/24/2025                                              Service Type: Individual                            Session Start Time:  1345              Session End Time:    1440                Session Length:        55 mins                Session #:      2     Attendees:     Client attended alone     Service Modality:  In-person     DATA  Interactive Complexity: No  Crisis: No                               Progress Since Last Session (Related to Symptoms / Goals / Homework):              Symptoms:  Pt reports anxiety sx including panic.      Pt reports a phobia of urinating in public.    Pt reports he would like to taper off his medication and I directed him to his provider prescribing meds.     Pt reports he is using breathing and distraction to cope. We discussed also CBT skills. Guided meditation, exercise, eating well, sleeping well also.      Homework:  None                            Episode of Care Goals: Satisfactory progress - ACTION (Actively working towards change); Intervened by reinforcing change plan / affirming steps taken                 Current / Ongoing Stressors and Concerns:              Pt reports he has had a recurrence of medical issues and is struggling with managing anxiety related to his dx.                  Pt reports he is receiving chemotherapy and that is causing stress.                  Pt reports a major concern is a \"shy bladder\" that causes panic sx when he needs to use a public restroom.       Pt reports he would like to taper off his medication and I directed him to his provider prescribing meds.     Pt reports he is using breathing and distraction to cope. We discussed also CBT skills. Guided meditation, exercise, eating well, sleeping well also.                     Social Hx  Pt reports he is retired and feels \"lost.\" Pt " "reports he was a  for his career.      Pt reports he has three living siblings and has lost four siblings.      Pt reports there has been a lot of anxiety in his family.      Pt reports he is  to \"Nai.\"                  Treatment Objective(s) Addressed in This Session:          use at least 3 coping skills for anxiety management in the next 2 weeks                    Intervention:              CBT: ,  Emotion Focused Therapy: ,  Solution Focused: ,     Assessments completed prior to visit:  none                    ASSESSMENT: Current Emotional / Mental Status (status of significant symptoms):              Risk status (Self / Other harm or suicidal ideation)              Patient denies current fears or concerns for personal safety.              Patient denies current or recent suicidal ideation or behaviors.              Patient denies current or recent homicidal ideation or behaviors.              Patient denies current or recent self injurious behavior or ideation.              Patient denies other safety concerns.              Patient reports there has been no change in risk factors since their last session.                Patient reports there has been no change in protective factors since their last session.                Recommended that patient call 911 or go to the local ED should there be a change in any of these risk factors                 Appearance:                            Appropriate               Eye Contact:                           Good               Psychomotor Behavior:          Normal               Attitude:                                   Cooperative               Orientation:                             All              Speech                          Rate / Production:       Normal                           Volume:                       Normal               Mood:                                      Anxious               Affect:                               "        Labile               Thought Content:                    Clear               Thought Form:                        Coherent  Logical               Insight:                                     Fair                  Medication Review:              No changes to current psychiatric medication(s)                 Medication Compliance:              Yes                 Changes in Health Issues:              Yes: cancer dx, Associated Psychological Distress                 Chemical Use Review:              Substance Use: Chemical use reviewed, no active concerns identified                  Tobacco Use: No current tobacco use.       Diagnosis:  1. Anxiety    F41.1 BRANDEN     Collateral Reports Completed:              Not Applicable     PLAN: (Patient Tasks / Therapist Tasks / Other)  Return in a week, utilize coping skills discussed.            Chago Griffin LP                                                           ______________________________________________________________________     Individual Treatment Plan     Patient's Name: Ramu Espinoza                    YOB: 1960     Date of Creation: 3/17/2025     Date Treatment Plan Last Reviewed/Revised:      DSM5 Diagnoses: 300.02 (F41.1) Generalized Anxiety Disorder  Psychosocial / Contextual Factors: cancer dx  PROMIS (reviewed every 90 days):      Referral / Collaboration:  Referral to another professional/service is not indicated at this time..     Anticipated number of session for this episode of care: 9-12 sessions  Anticipation frequency of session: Weekly  Anticipated Duration of each session: 53 or more minutes  Treatment plan will be reviewed in 90 days or when goals have been changed.         MeasurableTreatment Goal(s) related to diagnosis / functional impairment(s)  Goal 1: Patient will reduce anxiety sx      Objective #A (Patient Action)                          Patient will use at least 3 coping skills for anxiety management in  the next 2 weeks.  Status: New - Date: 3/17/25       Intervention(s)  Therapist will teach emotional regulation skills.   .     Objective #B  Patient will practice deep breathing at least 3X a day.  Status: New - Date: 3/17/25       Intervention(s)  Therapist will teach emotional regulation skills.   .        Goal 2: Patient will reduce anxiety around specific situations        Objective #A (Patient Action)                          Status: New - Date: 3/17/25       Patient will use distraction each time intrusive worry surfaces.     Intervention(s)  Therapist will teach emotional regulation skills.   .     Objective #B  Patient will use cognitive strategies identified in therapy to challenge anxious thoughts.                      Status: New - Date: 3/17/25       Intervention(s)  Therapist will teach CBT skills .     Patient has reviewed and agreed to the above plan.        Chago Griffin LP                    March 24, 2025

## 2025-03-24 NOTE — LETTER
"3/24/2025      Ramu Espinoza  [address] [facility] [address]      Dear Colleague,    Thank you for referring your patient, Ramu Espinoza, to the [facility] [place]. Please see a copy of my visit note below.              [facility] Oncology- Psychotherapy                                     Progress Note     Patient Name: Ramu Espinoza                      Date:   3/24/2025                                              Service Type: Individual                            Session Start Time:  1345              Session End Time:    1440                Session Length:        55 mins                Session #:      2     Attendees:     Client attended alone     Service Modality:  In-person     DATA  Interactive Complexity: No  Crisis: No                               Progress Since Last Session (Related to Symptoms / Goals / Homework):              Symptoms:  Pt reports anxiety sx including panic.      Pt reports a phobia of urinating in public.    Pt reports he would like to taper off his medication and I directed him to his provider prescribing meds.     Pt reports he is using breathing and distraction to cope. We discussed also CBT skills. Guided meditation, exercise, eating well, sleeping well also.      Homework:  None                            Episode of Care Goals: Satisfactory progress - ACTION (Actively working towards change); Intervened by reinforcing change plan / affirming steps taken                 Current / Ongoing Stressors and Concerns:              Pt reports he has had a recurrence of medical issues and is struggling with managing anxiety related to his dx.                  Pt reports he is receiving chemotherapy and that is causing stress.                  Pt reports a major concern is a \"shy bladder\" that causes panic sx when he needs to use a public restroom.       Pt reports he would like to taper off his medication and I directed him to his provider " "prescribing meds.     Pt reports he is using breathing and distraction to cope. We discussed also CBT skills. Guided meditation, exercise, eating well, sleeping well also.                     Social Hx  Pt reports he is retired and feels \"lost.\" Pt reports he was a  for his career.      Pt reports he has three living siblings and has lost four siblings.      Pt reports there has been a lot of anxiety in his family.      Pt reports he is  to \"[place].\"                  Treatment Objective(s) Addressed in This Session:          use at least 3 coping skills for anxiety management in the next 2 weeks                    Intervention:              CBT: ,  Emotion Focused Therapy: ,  Solution Focused: ,     Assessments completed prior to visit:  none                    ASSESSMENT: Current Emotional / Mental Status (status of significant symptoms):              Risk status (Self / Other harm or suicidal ideation)              Patient denies current fears or concerns for personal safety.              Patient denies current or recent suicidal ideation or behaviors.              Patient denies current or recent homicidal ideation or behaviors.              Patient denies current or recent self injurious behavior or ideation.              Patient denies other safety concerns.              Patient reports there has been no change in risk factors since their last session.                Patient reports there has been no change in protective factors since their last session.                Recommended that patient call 911 or go to the local ED should there be a change in any of these risk factors                 Appearance:                            Appropriate               Eye Contact:                           Good               Psychomotor Behavior:          Normal               Attitude:                                   Cooperative               Orientation:                             All         "      Speech                          Rate / Production:       Normal                           Volume:                       Normal               Mood:                                      Anxious               Affect:                                      Labile               Thought Content:                    Clear               Thought Form:                        Coherent  Logical               Insight:                                     Fair                  Medication Review:              No changes to current psychiatric medication(s)                 Medication Compliance:              Yes                 Changes in Health Issues:              Yes: cancer dx, Associated Psychological Distress                 Chemical Use Review:              Substance Use: Chemical use reviewed, no active concerns identified                  Tobacco Use: No current tobacco use.       Diagnosis:  1. Anxiety    F41.1 BRANDEN     Collateral Reports Completed:              Not Applicable     PLAN: (Patient Tasks / Therapist Tasks / Other)  Return in a week, utilize coping skills discussed.            Chago Griffin LP                                                           ______________________________________________________________________     Individual Treatment Plan     Patient's Name: Ramu Espinoza                    YOB: 1960     Date of Creation: 3/17/2025     Date Treatment Plan Last Reviewed/Revised:      DSM5 Diagnoses: 300.02 (F41.1) Generalized Anxiety Disorder  Psychosocial / Contextual Factors: cancer dx  PROMIS (reviewed every 90 days):      Referral / Collaboration:  Referral to another professional/service is not indicated at this time..     Anticipated number of session for this episode of care: 9-12 sessions  Anticipation frequency of session: Weekly  Anticipated Duration of each session: 53 or more minutes  Treatment plan will be reviewed in 90 days or when goals have been changed.          MeasurableTreatment Goal(s) related to diagnosis / functional impairment(s)  Goal 1: Patient will reduce anxiety sx      Objective #A (Patient Action)                          Patient will use at least 3 coping skills for anxiety management in the next 2 weeks.  Status: New - Date: 3/17/25       Intervention(s)  Therapist will teach emotional regulation skills.   .     Objective #B  Patient will practice deep breathing at least 3X a day.  Status: New - Date: 3/17/25       Intervention(s)  Therapist will teach emotional regulation skills.   .        Goal 2: Patient will reduce anxiety around specific situations        Objective #A (Patient Action)                          Status: New - Date: 3/17/25       Patient will use distraction each time intrusive worry surfaces.     Intervention(s)  Therapist will teach emotional regulation skills.   .     Objective #B  Patient will use cognitive strategies identified in therapy to challenge anxious thoughts.                      Status: New - Date: 3/17/25       Intervention(s)  Therapist will teach CBT skills .     Patient has reviewed and agreed to the above plan.        Chago Griffin LP                    March 24, 2025                 Again, thank you for allowing me to participate in the care of your patient.        Sincerely,        Chago Griffin LP    Electronically signed

## 2025-04-03 DIAGNOSIS — C91.10 CHRONIC LYMPHOCYTIC LEUKEMIA (H): Primary | ICD-10-CM

## 2025-04-09 ENCOUNTER — INFUSION THERAPY VISIT (OUTPATIENT)
Dept: INFUSION THERAPY | Facility: HOSPITAL | Age: 65
End: 2025-04-09
Payer: MEDICARE

## 2025-04-09 ENCOUNTER — ONCOLOGY VISIT (OUTPATIENT)
Dept: ONCOLOGY | Facility: HOSPITAL | Age: 65
End: 2025-04-09
Payer: MEDICARE

## 2025-04-09 VITALS
OXYGEN SATURATION: 97 % | BODY MASS INDEX: 30.61 KG/M2 | TEMPERATURE: 97.8 F | WEIGHT: 238.5 LBS | HEART RATE: 68 BPM | RESPIRATION RATE: 20 BRPM | DIASTOLIC BLOOD PRESSURE: 76 MMHG | SYSTOLIC BLOOD PRESSURE: 126 MMHG

## 2025-04-09 DIAGNOSIS — F41.1 GENERALIZED ANXIETY DISORDER: ICD-10-CM

## 2025-04-09 DIAGNOSIS — C91.10 CHRONIC LYMPHOCYTIC LEUKEMIA (H): Primary | ICD-10-CM

## 2025-04-09 DIAGNOSIS — Z51.11 ENCOUNTER FOR ANTINEOPLASTIC CHEMOTHERAPY: ICD-10-CM

## 2025-04-09 LAB
ALBUMIN SERPL BCG-MCNC: 4.3 G/DL (ref 3.5–5.2)
ALP SERPL-CCNC: 93 U/L (ref 40–150)
ALT SERPL W P-5'-P-CCNC: 22 U/L (ref 0–70)
ANION GAP SERPL CALCULATED.3IONS-SCNC: 8 MMOL/L (ref 7–15)
AST SERPL W P-5'-P-CCNC: 21 U/L (ref 0–45)
BASOPHILS # BLD AUTO: 0 10E3/UL (ref 0–0.2)
BASOPHILS NFR BLD AUTO: 0 %
BILIRUB SERPL-MCNC: 0.5 MG/DL
BUN SERPL-MCNC: 14 MG/DL (ref 8–23)
CALCIUM SERPL-MCNC: 9.7 MG/DL (ref 8.8–10.4)
CHLORIDE SERPL-SCNC: 104 MMOL/L (ref 98–107)
CREAT SERPL-MCNC: 0.78 MG/DL (ref 0.67–1.17)
EGFRCR SERPLBLD CKD-EPI 2021: >90 ML/MIN/1.73M2
EOSINOPHIL # BLD AUTO: 0 10E3/UL (ref 0–0.7)
EOSINOPHIL NFR BLD AUTO: 1 %
ERYTHROCYTE [DISTWIDTH] IN BLOOD BY AUTOMATED COUNT: 13.5 % (ref 10–15)
GLUCOSE SERPL-MCNC: 97 MG/DL (ref 70–99)
HCO3 SERPL-SCNC: 29 MMOL/L (ref 22–29)
HCT VFR BLD AUTO: 45.4 % (ref 40–53)
HGB BLD-MCNC: 14.7 G/DL (ref 13.3–17.7)
IMM GRANULOCYTES # BLD: 0 10E3/UL
IMM GRANULOCYTES NFR BLD: 0 %
LYMPHOCYTES # BLD AUTO: 0.7 10E3/UL (ref 0.8–5.3)
LYMPHOCYTES NFR BLD AUTO: 15 %
MCH RBC QN AUTO: 26.3 PG (ref 26.5–33)
MCHC RBC AUTO-ENTMCNC: 32.4 G/DL (ref 31.5–36.5)
MCV RBC AUTO: 81 FL (ref 78–100)
MONOCYTES # BLD AUTO: 0.6 10E3/UL (ref 0–1.3)
MONOCYTES NFR BLD AUTO: 13 %
NEUTROPHILS # BLD AUTO: 3.5 10E3/UL (ref 1.6–8.3)
NEUTROPHILS NFR BLD AUTO: 71 %
NRBC # BLD AUTO: 0 10E3/UL
NRBC BLD AUTO-RTO: 0 /100
PHOSPHATE SERPL-MCNC: 3.2 MG/DL (ref 2.5–4.5)
PLATELET # BLD AUTO: 213 10E3/UL (ref 150–450)
POTASSIUM SERPL-SCNC: 4.5 MMOL/L (ref 3.4–5.3)
PROT SERPL-MCNC: 6.6 G/DL (ref 6.4–8.3)
RBC # BLD AUTO: 5.58 10E6/UL (ref 4.4–5.9)
SODIUM SERPL-SCNC: 141 MMOL/L (ref 135–145)
URATE SERPL-MCNC: 7.2 MG/DL (ref 3.4–7)
WBC # BLD AUTO: 4.9 10E3/UL (ref 4–11)

## 2025-04-09 PROCEDURE — 85018 HEMOGLOBIN: CPT | Performed by: NURSE PRACTITIONER

## 2025-04-09 PROCEDURE — 250N000013 HC RX MED GY IP 250 OP 250 PS 637: Performed by: NURSE PRACTITIONER

## 2025-04-09 PROCEDURE — 96413 CHEMO IV INFUSION 1 HR: CPT

## 2025-04-09 PROCEDURE — 36591 DRAW BLOOD OFF VENOUS DEVICE: CPT | Performed by: NURSE PRACTITIONER

## 2025-04-09 PROCEDURE — G2211 COMPLEX E/M VISIT ADD ON: HCPCS | Performed by: INTERNAL MEDICINE

## 2025-04-09 PROCEDURE — G0463 HOSPITAL OUTPT CLINIC VISIT: HCPCS | Mod: 25 | Performed by: INTERNAL MEDICINE

## 2025-04-09 PROCEDURE — 84550 ASSAY OF BLOOD/URIC ACID: CPT | Performed by: NURSE PRACTITIONER

## 2025-04-09 PROCEDURE — 250N000011 HC RX IP 250 OP 636: Mod: JZ | Performed by: NURSE PRACTITIONER

## 2025-04-09 PROCEDURE — 85004 AUTOMATED DIFF WBC COUNT: CPT | Performed by: NURSE PRACTITIONER

## 2025-04-09 PROCEDURE — 84100 ASSAY OF PHOSPHORUS: CPT | Performed by: NURSE PRACTITIONER

## 2025-04-09 PROCEDURE — 258N000003 HC RX IP 258 OP 636: Performed by: NURSE PRACTITIONER

## 2025-04-09 PROCEDURE — 80053 COMPREHEN METABOLIC PANEL: CPT | Performed by: NURSE PRACTITIONER

## 2025-04-09 PROCEDURE — 99214 OFFICE O/P EST MOD 30 MIN: CPT | Performed by: INTERNAL MEDICINE

## 2025-04-09 PROCEDURE — 96415 CHEMO IV INFUSION ADDL HR: CPT

## 2025-04-09 RX ORDER — EPINEPHRINE 1 MG/ML
0.3 INJECTION, SOLUTION INTRAMUSCULAR; SUBCUTANEOUS EVERY 5 MIN PRN
Status: DISCONTINUED | OUTPATIENT
Start: 2025-04-09 | End: 2025-04-09 | Stop reason: HOSPADM

## 2025-04-09 RX ORDER — ALBUTEROL SULFATE 0.83 MG/ML
2.5 SOLUTION RESPIRATORY (INHALATION)
Status: DISCONTINUED | OUTPATIENT
Start: 2025-04-09 | End: 2025-04-09 | Stop reason: HOSPADM

## 2025-04-09 RX ORDER — DIPHENHYDRAMINE HYDROCHLORIDE 50 MG/ML
25 INJECTION, SOLUTION INTRAMUSCULAR; INTRAVENOUS
Status: DISCONTINUED | OUTPATIENT
Start: 2025-04-09 | End: 2025-04-09 | Stop reason: HOSPADM

## 2025-04-09 RX ORDER — HYDROCORTISONE 25 MG/G
1 CREAM TOPICAL
COMMUNITY
Start: 2025-03-04 | End: 2025-05-03

## 2025-04-09 RX ORDER — DIPHENHYDRAMINE HYDROCHLORIDE 50 MG/ML
50 INJECTION, SOLUTION INTRAMUSCULAR; INTRAVENOUS
Status: DISCONTINUED | OUTPATIENT
Start: 2025-04-09 | End: 2025-04-09 | Stop reason: HOSPADM

## 2025-04-09 RX ORDER — MEPERIDINE HYDROCHLORIDE 25 MG/ML
25 INJECTION INTRAMUSCULAR; INTRAVENOUS; SUBCUTANEOUS
Status: DISCONTINUED | OUTPATIENT
Start: 2025-04-09 | End: 2025-04-09 | Stop reason: HOSPADM

## 2025-04-09 RX ORDER — ACETAMINOPHEN 325 MG/1
325 TABLET ORAL ONCE
Status: COMPLETED | OUTPATIENT
Start: 2025-04-09 | End: 2025-04-09

## 2025-04-09 RX ORDER — ACETAMINOPHEN 325 MG/1
650 TABLET ORAL ONCE
Status: COMPLETED | OUTPATIENT
Start: 2025-04-09 | End: 2025-04-09

## 2025-04-09 RX ORDER — ALBUTEROL SULFATE 90 UG/1
1-2 INHALANT RESPIRATORY (INHALATION)
Status: DISCONTINUED | OUTPATIENT
Start: 2025-04-09 | End: 2025-04-09 | Stop reason: HOSPADM

## 2025-04-09 RX ORDER — HEPARIN SODIUM (PORCINE) LOCK FLUSH IV SOLN 100 UNIT/ML 100 UNIT/ML
5 SOLUTION INTRAVENOUS
Status: DISCONTINUED | OUTPATIENT
Start: 2025-04-09 | End: 2025-04-09 | Stop reason: HOSPADM

## 2025-04-09 RX ORDER — DIPHENHYDRAMINE HCL 50 MG
50 CAPSULE ORAL ONCE
Status: COMPLETED | OUTPATIENT
Start: 2025-04-09 | End: 2025-04-09

## 2025-04-09 RX ORDER — METHYLPREDNISOLONE SODIUM SUCCINATE 40 MG/ML
40 INJECTION INTRAMUSCULAR; INTRAVENOUS
Status: DISCONTINUED | OUTPATIENT
Start: 2025-04-09 | End: 2025-04-09 | Stop reason: HOSPADM

## 2025-04-09 RX ADMIN — HEPARIN 5 ML: 100 SYRINGE at 15:41

## 2025-04-09 RX ADMIN — SODIUM CHLORIDE 250 ML: 0.9 INJECTION, SOLUTION INTRAVENOUS at 12:03

## 2025-04-09 RX ADMIN — ACETAMINOPHEN 650 MG: 325 TABLET ORAL at 12:04

## 2025-04-09 RX ADMIN — DIPHENHYDRAMINE HYDROCHLORIDE 50 MG: 50 CAPSULE ORAL at 12:04

## 2025-04-09 RX ADMIN — OBINUTUZUMAB 1000 MG: 1000 INJECTION, SOLUTION, CONCENTRATE INTRAVENOUS at 12:28

## 2025-04-09 RX ADMIN — ACETAMINOPHEN 325 MG: 325 TABLET ORAL at 12:12

## 2025-04-09 ASSESSMENT — PAIN SCALES - GENERAL: PAINLEVEL_OUTOF10: NO PAIN (0)

## 2025-04-09 NOTE — PROGRESS NOTES
[facility] cancer Care Progress Note  Patient: Ramu Espinoza   MRN:  [id number]   Date of Service : 2025        Reason for visit      Problem List Items Addressed This Visit          Hematologic    Chronic lymphocytic leukemia (H)  Cy inducer Ibrutinib - Primary       Behavioral    Generalized Anxiety Disorder     Other Visit Diagnoses       Encounter for antineoplastic chemotherapy                 Assessment     1.  A very pleasant 64 year old  gentleman with stage IV CLL diagnosed in 2012.  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  He has been treated with Imbruvica for over 10 years.   Now off of it since 2023 secondary to atrial fibrillation.  Labs indicate rising white blood cell count which is not unexpected in his situation.  Typically after Imbruvica stopped most patients have relapse of CLL.  Started on treatment with venetoclax and obinutuzumab.  Initially started obinutuzumab in 2024.  Then venetoclax was added.  He is responding quite well.    2.  Severe anxiety.  Met with Donovan becker.  Still not optimally managed.  3.  History of paroxysmal atrial fibrillation.  The episodes were getting quite frequent therefore discontinued Imbruvica.  The episodes have decreased in frequency tremendously.  4.  History of pericardial effusion in 2019.  Again no recurrence.  5.  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  6.  Fully COVID vaccinated.      Plan     Patient presenting with active CLL on active therapy.  Also severe anxiety.  Reviewed labs including CBC CMP uric acid.  Ordered treatment with venetoclax.    At this time we will continue with venetoclax and last dose of Obinutuzumab.  Spent a bit of time talking about his anxiety.  He has already met with Donovan becker.  He spoke with her nursing assistant as well.  I think combination of medication and therapy probably will help in the most.  We also talked about exercise etc.  Continue monthly checkup  with labs.  The longitudinal plan of care for the diagnosis(es)/condition(s) as documented were addressed during this visit. Due to the added complexity in care, I will continue to support Clive in the subsequent management and with ongoing continuity of care.     Clinical stage      Stage IV    History     Ramu Espinoza is a very pleasant 64 year old  male with a history of CLL diagnosed in 02/2012 presenting with elevated white count in upper 10s/lower 20s with a peripheral blood smear showing atypical lymphocytosis suspicious for CLL.  He was completely asymptomatic, normal hemoglobin and platelet count.  His peripheral blood flow cytometry revealed CD5 co-expressing kappa light chains restricted B cells confirming the diagnosis of CLL.  He has been on observation.  His white count has been slowly going up.    In June 2014 his white count was over 100,000.  So he had a CAT scan and that showed increasing lymphadenopathy and splenomegaly.  Platelet count has been going down.    I offered participation in clinical study in which patients are randomized between fludarabine and cyclophosphamide Rituxan or Imbruvica and Rituxan.  He has been randomized to Imbruvica and rituximab arm. He started on October 2014.  In November 2014 he got Rituxan for the first time.  Tolerated well. Finished that. Now on Imbruvica alone. Tolerating it well.     In May 2017 he was found to have paroxysmal afib when he complained of some fluttering sensation. He had holter monitor which led to the diagnosis. Most days he feels well.     In March 2019 he was found to have pericardial effusion when he started have some chest pain on deep inspiration. Had it tapped. Held Imbruvica for 3 weeks. Then resumed it.  Tolerated it fine. So continues it.    He continued his medication until October 2023.  On October 11, 2023 he discontinued it.  This was done because he was having increasing frequency of A-fib episodes.  He was seen by couple of  cardiologist in the [facility] system.  They were recommend that he should consider discontinuing ibrutinib.    After discontinuing ibrutinib his A-fib episodes have significantly decreased.  He did have an episode of anxiety.  He has been on some medication to help with the anxiety.    We have been following him for his CLL.  In July 2024 when he came his white count was starting to go up.  White count was up 220,000 in November 2024.  At that time we had discussions about treatment.    We discussed the option of BTK inhibitor versus starting treatment with venetoclax and obinutuzumab.  Because of limited finite duration of this treatment we proceeded with obinutuzumab infusions followed by venetoclax.  He has finished obinutuzumab.  Currently on venetoclax.  He has significant response to the treatment.  His white count has normalized hemoglobin plate count on have normalized. He did get a Port-A-Cath placed.  Overall tolerating with moderate side effects.  Feels tired and fatigued at times.  His lymph nodes have definitely shrunk.    He has been dealing with significant anxiety.  He was  selected for jury duty which triggered excess anxiety a lot.  He has fear of certain things in public..      Past Medical History     Past Medical History:   Diagnosis Date    BPH (benign prostatic hyperplasia)     CLL (chronic lymphocytic leukemia) (H)     Lymphadenopathy     Created by Conversion  Replacement Utility updated for latest IMO load    Paroxysmal atrial fibrillation with rapid ventricular response (H)     Sleep apnea     hypertension, being slightly overweight, anemic, having reflux, anxiety, epididymitis, hyperlipidemia and tinnitus      Review of system      Details noted in the history of present illness.  A detailed review of systems is otherwise negative.      Physical exam        /76 (BP Location: Right arm, Patient Position: Sitting, Cuff Size: Adult Large)   Pulse 68   Temp 97.8  F (36.6  C)  (Oral)   Resp 20   Wt 108.2 kg (238 lb 8 oz)   SpO2 97%   BMI 30.61 kg/m      GENERAL: No acute distress. Cooperative in conversation.   HEENT:  Pupils are equal, round and reactive. Oral mucosa is clean and intact. No ulcerations or mucositis noted. No bleeding noted.  RESP:Chest symmetric lungs are clear bilaterally per auscultation. Regular respiratory rate. No wheezes or rhonchi.  CV: Normal S1 S2 Regular, rate and rhythm.     ABD: Nondistended, soft, nontender. Positive bowel sounds. No organomegaly.   EXTREMITIES: No lower extremity edema.   NEURO: Non- focal. Alert and oriented x3.  Cranial nerves appear intact.  PSYCH: Within normal limits. No depression or anxiety.  SKIN: Warm dry intact.      Lab results Reviewed      Recent Results (from the past week)   Comprehensive metabolic panel   Result Value Ref Range    Sodium 141 135 - 145 mmol/L    Potassium 4.5 3.4 - 5.3 mmol/L    Carbon Dioxide (CO2) 29 22 - 29 mmol/L    Anion Gap 8 7 - 15 mmol/L    Urea Nitrogen 14.0 8.0 - 23.0 mg/dL    Creatinine 0.78 0.67 - 1.17 mg/dL    GFR Estimate >90 >60 mL/min/1.73m2    Calcium 9.7 8.8 - 10.4 mg/dL    Chloride 104 98 - 107 mmol/L    Glucose 97 70 - 99 mg/dL    Alkaline Phosphatase 93 40 - 150 U/L    AST 21 0 - 45 U/L    ALT 22 0 - 70 U/L    Protein Total 6.6 6.4 - 8.3 g/dL    Albumin 4.3 3.5 - 5.2 g/dL    Bilirubin Total 0.5 <=1.2 mg/dL   Phosphorus   Result Value Ref Range    Phosphorus 3.2 2.5 - 4.5 mg/dL   Uric acid   Result Value Ref Range    Uric Acid 7.2 (H) 3.4 - 7.0 mg/dL   CBC with platelets and differential   Result Value Ref Range    WBC Count 4.9 4.0 - 11.0 10e3/uL    RBC Count 5.58 4.40 - 5.90 10e6/uL    Hemoglobin 14.7 13.3 - 17.7 g/dL    Hematocrit 45.4 40.0 - 53.0 %    MCV 81 78 - 100 fL    MCH 26.3 (L) 26.5 - 33.0 pg    MCHC 32.4 31.5 - 36.5 g/dL    RDW 13.5 10.0 - 15.0 %    Platelet Count 213 150 - 450 10e3/uL    % Neutrophils 71 %    % Lymphocytes 15 %    % Monocytes 13 %    % Eosinophils 1 %     % Basophils 0 %    % Immature Granulocytes 0 %    NRBCs per 100 WBC 0 <1 /100    Absolute Neutrophils 3.5 1.6 - 8.3 10e3/uL    Absolute Lymphocytes 0.7 (L) 0.8 - 5.3 10e3/uL    Absolute Monocytes 0.6 0.0 - 1.3 10e3/uL    Absolute Eosinophils 0.0 0.0 - 0.7 10e3/uL    Absolute Basophils 0.0 0.0 - 0.2 10e3/uL    Absolute Immature Granulocytes 0.0 <=0.4 10e3/uL    Absolute NRBCs 0.0 10e3/uL       Imaging results Reviewed        No results found.      Signed by: Dennis Garza MD      This note has been dictated using voice recognition software. Any grammatical or context distortions are unintentional and inherent to the software

## 2025-04-09 NOTE — PROGRESS NOTES
Infusion Nursing Note:  Ramu Espinoza presents today for cycle 6 day 1 treatment with Obinutuzumab.    Patient seen by provider today: Yes: Dr Garza   present during visit today: Not Applicable.    Note: Clive was educated on his plan of care and each medication given was reviewed prior to administration.  .He received treatment as ordered      Intravenous Access:  Implanted Port.    Treatment Conditions:  Lab Results   Component Value Date    HGB 14.7 04/09/2025    WBC 4.9 04/09/2025    ANEU 1.5 (L) 12/13/2024    ANEUTAUTO 3.5 04/09/2025     04/09/2025        Lab Results   Component Value Date     04/09/2025    POTASSIUM 4.5 04/09/2025    CR 0.78 04/09/2025    ZOHAIB 9.7 04/09/2025    BILITOTAL 0.5 04/09/2025    ALBUMIN 4.3 04/09/2025    ALT 22 04/09/2025    AST 21 04/09/2025       Results reviewed, labs MET treatment parameters, ok to proceed with treatment.      Post Infusion Assessment:  Patient tolerated infusion without incident.  Blood return noted pre and post infusion.  Site patent and intact, free from redness, edema or discomfort.  No evidence of extravasations.  Access discontinued per protocol.       Discharge Plan:   Patient discharged in stable condition accompanied by: wife.  Departure Mode: Ambulatory.      Ne Rangel RN

## 2025-04-09 NOTE — PROGRESS NOTES
"Oncology Rooming Note    April 9, 2025 11:14 AM   Ramu Espinoza is a 64 year old male who presents for:    Chief Complaint   Patient presents with    Oncology Clinic Visit     Return visit 4 weeks with lab and infusion. Chronic lymphocytic leukemia     Initial Vitals: /76 (BP Location: Right arm, Patient Position: Sitting, Cuff Size: Adult Large)   Pulse 68   Temp 97.8  F (36.6  C) (Oral)   Resp 20   Wt 108.2 kg (238 lb 8 oz)   SpO2 97%   BMI 30.61 kg/m   Estimated body mass index is 30.61 kg/m  as calculated from the following:    Height as of 3/12/25: 1.88 m (6' 2.02\").    Weight as of this encounter: 108.2 kg (238 lb 8 oz). Body surface area is 2.38 meters squared.  No Pain (0) Comment: Data Unavailable   No LMP for male patient.  Allergies reviewed: Yes  Medications reviewed: Yes    Medications: Medication refills not needed today.  Pharmacy name entered into [employer]:    [facility] - [place], [place] - [address] [address] [facility] [id number] [place], [place] [address] [place] [address] [facility] [phone]  [facility] - [place], [place] - [address] [place] [address]    Frailty Screening:   Is the patient here for a new oncology consult visit in cancer care? 2. No    PHQ9:  Did this patient require a PHQ9?: No      Clinical concerns: off balance yesterday. Would like referral to mental health-has seen Dr Griffin, but wasn't a great fit.  Dr Garza was notified.      Marylu Warner, [facility]            "

## 2025-04-09 NOTE — LETTER
"4/9/2025      Ramu Espinoza  [address] [place] [address]      Dear Colleague,    Thank you for referring your patient, Ramu Espinoza, to the [facility] [place]. Please see a copy of my visit note below.    Oncology Rooming Note    April 9, 2025 11:14 AM   Ramu Espinoza is a 64 year old male who presents for:    Chief Complaint   Patient presents with     Oncology Clinic Visit     Return visit 4 weeks with lab and infusion. Chronic lymphocytic leukemia     Initial Vitals: /76 (BP Location: Right arm, Patient Position: Sitting, Cuff Size: Adult Large)   Pulse 68   Temp 97.8  F (36.6  C) (Oral)   Resp 20   Wt 108.2 kg (238 lb 8 oz)   SpO2 97%   BMI 30.61 kg/m   Estimated body mass index is 30.61 kg/m  as calculated from the following:    Height as of 3/12/25: 1.88 m (6' 2.02\").    Weight as of this encounter: 108.2 kg (238 lb 8 oz). Body surface area is 2.38 meters squared.  No Pain (0) Comment: Data Unavailable   No LMP for male patient.  Allergies reviewed: Yes  Medications reviewed: Yes    Medications: Medication refills not needed today.  Pharmacy name entered into Intentio:    [facility] - [place], [place] - [address] [facility] [address] [place], [place] - [address] [facility] [place], [place] - [address] [facility] [phone]  [facility] - [place], [place] - [address] [place] [address]    Frailty Screening:   Is the patient here for a new oncology consult visit in cancer care? 2. No    PHQ9:  Did this patient require a PHQ9?: No      Clinical concerns: off balance yesterday. Would like referral to mental health-has seen Dr Griffin, but wasn't a great fit.  Dr Garza was notified.      Marylu Warner, [facility] cancer Care Progress Note  Patient: Ramu Espinoza   MRN:  [id number]   Date of Service : Apr 9, 2025        Reason for visit      Problem List Items Addressed This " Visit          Hematologic    Chronic lymphocytic leukemia (H)  Cy inducer Ibrutinib - Primary       Behavioral    Generalized Anxiety Disorder     Other Visit Diagnoses       Encounter for antineoplastic chemotherapy                 Assessment     1.  A very pleasant 64 year old  gentleman with stage IV CLL diagnosed in 2012.  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  He has been treated with Imbruvica for over 10 years.   Now off of it since 2023 secondary to atrial fibrillation.  Labs indicate rising white blood cell count which is not unexpected in his situation.  Typically after Imbruvica stopped most patients have relapse of CLL.  Started on treatment with venetoclax and obinutuzumab.  Initially started obinutuzumab in 2024.  Then venetoclax was added.  He is responding quite well.    2.  Severe anxiety.  Met with Donovan becker.  Still not optimally managed.  3.  History of paroxysmal atrial fibrillation.  The episodes were getting quite frequent therefore discontinued Imbruvica.  The episodes have decreased in frequency tremendously.  4.  History of pericardial effusion in 2019.  Again no recurrence.  5.  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  6.  Fully COVID vaccinated.      Plan     Patient presenting with active CLL on active therapy.  Also severe anxiety.  Reviewed labs including CBC CMP uric acid.  Ordered treatment with venetoclax.    At this time we will continue with venetoclax and last dose of Obinutuzumab.  Spent a bit of time talking about his anxiety.  He has already met with Donovan becker.  He spoke with her nursing assistant as well.  I think combination of medication and therapy probably will help in the most.  We also talked about exercise etc.  Continue monthly checkup with labs.  The longitudinal plan of care for the diagnosis(es)/condition(s) as documented were addressed during this visit. Due to the added complexity in care, I will continue to  support Clive in the subsequent management and with ongoing continuity of care.     Clinical stage      Stage IV    History     Ramu Espinoza is a very pleasant 64 year old  male with a history of CLL diagnosed in 02/2012 presenting with elevated white count in upper 10s/lower 20s with a peripheral blood smear showing atypical lymphocytosis suspicious for CLL.  He was completely asymptomatic, normal hemoglobin and platelet count.  His peripheral blood flow cytometry revealed CD5 co-expressing kappa light chains restricted B cells confirming the diagnosis of CLL.  He has been on observation.  His white count has been slowly going up.    In June 2014 his white count was over 100,000.  So he had a CAT scan and that showed increasing lymphadenopathy and splenomegaly.  Platelet count has been going down.    I offered participation in clinical study in which patients are randomized between fludarabine and cyclophosphamide Rituxan or Imbruvica and Rituxan.  He has been randomized to Imbruvica and rituximab arm. He started on October 2014.  In November 2014 he got Rituxan for the first time.  Tolerated well. Finished that. Now on Imbruvica alone. Tolerating it well.     In May 2017 he was found to have paroxysmal afib when he complained of some fluttering sensation. He had holter monitor which led to the diagnosis. Most days he feels well.     In March 2019 he was found to have pericardial effusion when he started have some chest pain on deep inspiration. Had it tapped. Held Imbruvica for 3 weeks. Then resumed it.  Tolerated it fine. So continues it.    He continued his medication until October 2023.  On October 11, 2023 he discontinued it.  This was done because he was having increasing frequency of A-fib episodes.  He was seen by couple of cardiologist in the [facility] system.  They were recommend that he should consider discontinuing ibrutinib.    After discontinuing ibrutinib his A-fib episodes have significantly  decreased.  He did have an episode of anxiety.  He has been on some medication to help with the anxiety.    We have been following him for his CLL.  In July 2024 when he came his white count was starting to go up.  White count was up 220,000 in November 2024.  At that time we had discussions about treatment.    We discussed the option of BTK inhibitor versus starting treatment with venetoclax and obinutuzumab.  Because of limited finite duration of this treatment we proceeded with obinutuzumab infusions followed by venetoclax.  He has finished obinutuzumab.  Currently on venetoclax.  He has significant response to the treatment.  His white count has normalized hemoglobin plate count on have normalized. He did get a Port-A-Cath placed.  Overall tolerating with moderate side effects.  Feels tired and fatigued at times.  His lymph nodes have definitely shrunk.    He has been dealing with significant anxiety.  He was  selected for jury duty which triggered excess anxiety a lot.  He has fear of certain things in public..      Past Medical History     Past Medical History:   Diagnosis Date     BPH (benign prostatic hyperplasia)      CLL (chronic lymphocytic leukemia) (H)      Lymphadenopathy     Created by Conversion  Replacement Utility updated for latest IMO load     Paroxysmal atrial fibrillation with rapid ventricular response (H)      Sleep apnea     hypertension, being slightly overweight, anemic, having reflux, anxiety, epididymitis, hyperlipidemia and tinnitus      Review of system      Details noted in the history of present illness.  A detailed review of systems is otherwise negative.      Physical exam        /76 (BP Location: Right arm, Patient Position: Sitting, Cuff Size: Adult Large)   Pulse 68   Temp 97.8  F (36.6  C) (Oral)   Resp 20   Wt 108.2 kg (238 lb 8 oz)   SpO2 97%   BMI 30.61 kg/m      GENERAL: No acute distress. Cooperative in conversation.   HEENT:  Pupils are equal, round and  reactive. Oral mucosa is clean and intact. No ulcerations or mucositis noted. No bleeding noted.  RESP:Chest symmetric lungs are clear bilaterally per auscultation. Regular respiratory rate. No wheezes or rhonchi.  CV: Normal S1 S2 Regular, rate and rhythm.     ABD: Nondistended, soft, nontender. Positive bowel sounds. No organomegaly.   EXTREMITIES: No lower extremity edema.   NEURO: Non- focal. Alert and oriented x3.  Cranial nerves appear intact.  PSYCH: Within normal limits. No depression or anxiety.  SKIN: Warm dry intact.      Lab results Reviewed      Recent Results (from the past week)   Comprehensive metabolic panel   Result Value Ref Range    Sodium 141 135 - 145 mmol/L    Potassium 4.5 3.4 - 5.3 mmol/L    Carbon Dioxide (CO2) 29 22 - 29 mmol/L    Anion Gap 8 7 - 15 mmol/L    Urea Nitrogen 14.0 8.0 - 23.0 mg/dL    Creatinine 0.78 0.67 - 1.17 mg/dL    GFR Estimate >90 >60 mL/min/1.73m2    Calcium 9.7 8.8 - 10.4 mg/dL    Chloride 104 98 - 107 mmol/L    Glucose 97 70 - 99 mg/dL    Alkaline Phosphatase 93 40 - 150 U/L    AST 21 0 - 45 U/L    ALT 22 0 - 70 U/L    Protein Total 6.6 6.4 - 8.3 g/dL    Albumin 4.3 3.5 - 5.2 g/dL    Bilirubin Total 0.5 <=1.2 mg/dL   Phosphorus   Result Value Ref Range    Phosphorus 3.2 2.5 - 4.5 mg/dL   Uric acid   Result Value Ref Range    Uric Acid 7.2 (H) 3.4 - 7.0 mg/dL   CBC with platelets and differential   Result Value Ref Range    WBC Count 4.9 4.0 - 11.0 10e3/uL    RBC Count 5.58 4.40 - 5.90 10e6/uL    Hemoglobin 14.7 13.3 - 17.7 g/dL    Hematocrit 45.4 40.0 - 53.0 %    MCV 81 78 - 100 fL    MCH 26.3 (L) 26.5 - 33.0 pg    MCHC 32.4 31.5 - 36.5 g/dL    RDW 13.5 10.0 - 15.0 %    Platelet Count 213 150 - 450 10e3/uL    % Neutrophils 71 %    % Lymphocytes 15 %    % Monocytes 13 %    % Eosinophils 1 %    % Basophils 0 %    % Immature Granulocytes 0 %    NRBCs per 100 WBC 0 <1 /100    Absolute Neutrophils 3.5 1.6 - 8.3 10e3/uL    Absolute Lymphocytes 0.7 (L) 0.8 - 5.3 10e3/uL     Absolute Monocytes 0.6 0.0 - 1.3 10e3/uL    Absolute Eosinophils 0.0 0.0 - 0.7 10e3/uL    Absolute Basophils 0.0 0.0 - 0.2 10e3/uL    Absolute Immature Granulocytes 0.0 <=0.4 10e3/uL    Absolute NRBCs 0.0 10e3/uL       Imaging results Reviewed        No results found.      Signed by: Dennis Garza MD      This note has been dictated using voice recognition software. Any grammatical or context distortions are unintentional and inherent to the software       Again, thank you for allowing me to participate in the care of your patient.        Sincerely,        Dennis Garza MD    Electronically signed

## 2025-04-21 ENCOUNTER — LAB REQUISITION (OUTPATIENT)
Dept: LAB | Facility: CLINIC | Age: 65
End: 2025-04-21
Payer: MEDICARE

## 2025-04-21 DIAGNOSIS — R42 DIZZINESS AND GIDDINESS: ICD-10-CM

## 2025-04-21 LAB
ALBUMIN SERPL BCG-MCNC: 4.3 G/DL (ref 3.5–5.2)
ALP SERPL-CCNC: 84 U/L (ref 40–150)
ALT SERPL W P-5'-P-CCNC: 22 U/L (ref 0–70)
ANION GAP SERPL CALCULATED.3IONS-SCNC: 12 MMOL/L (ref 7–15)
AST SERPL W P-5'-P-CCNC: 19 U/L (ref 0–45)
BILIRUB SERPL-MCNC: 0.3 MG/DL
BUN SERPL-MCNC: 14.3 MG/DL (ref 8–23)
CALCIUM SERPL-MCNC: 9.5 MG/DL (ref 8.8–10.4)
CHLORIDE SERPL-SCNC: 105 MMOL/L (ref 98–107)
CREAT SERPL-MCNC: 0.9 MG/DL (ref 0.67–1.17)
EGFRCR SERPLBLD CKD-EPI 2021: >90 ML/MIN/1.73M2
GLUCOSE SERPL-MCNC: 95 MG/DL (ref 70–99)
HCO3 SERPL-SCNC: 24 MMOL/L (ref 22–29)
POTASSIUM SERPL-SCNC: 4.5 MMOL/L (ref 3.4–5.3)
PROT SERPL-MCNC: 6.3 G/DL (ref 6.4–8.3)
SODIUM SERPL-SCNC: 141 MMOL/L (ref 135–145)
T4 FREE SERPL-MCNC: 1.22 NG/DL (ref 0.9–1.7)
TSH SERPL DL<=0.005 MIU/L-ACNC: 3.58 UIU/ML (ref 0.3–4.2)
VIT B12 SERPL-MCNC: 486 PG/ML (ref 232–1245)

## 2025-04-21 PROCEDURE — 80053 COMPREHEN METABOLIC PANEL: CPT | Mod: ORL | Performed by: FAMILY MEDICINE

## 2025-04-21 PROCEDURE — 84443 ASSAY THYROID STIM HORMONE: CPT | Mod: ORL | Performed by: FAMILY MEDICINE

## 2025-04-21 PROCEDURE — 84439 ASSAY OF FREE THYROXINE: CPT | Mod: ORL | Performed by: FAMILY MEDICINE

## 2025-04-21 PROCEDURE — 82607 VITAMIN B-12: CPT | Mod: ORL | Performed by: FAMILY MEDICINE

## 2025-05-01 DIAGNOSIS — C91.10 CHRONIC LYMPHOCYTIC LEUKEMIA (H): Primary | ICD-10-CM

## 2025-05-07 ENCOUNTER — TELEPHONE (OUTPATIENT)
Dept: ONCOLOGY | Facility: HOSPITAL | Age: 65
End: 2025-05-07

## 2025-05-07 ENCOUNTER — INFUSION THERAPY VISIT (OUTPATIENT)
Dept: INFUSION THERAPY | Facility: HOSPITAL | Age: 65
End: 2025-05-07
Payer: MEDICARE

## 2025-05-07 DIAGNOSIS — C91.10 CHRONIC LYMPHOCYTIC LEUKEMIA (H): Primary | ICD-10-CM

## 2025-05-07 DIAGNOSIS — Z98.890 HISTORY OF VASCULAR ACCESS DEVICE: ICD-10-CM

## 2025-05-07 LAB
ALBUMIN SERPL BCG-MCNC: 4.1 G/DL (ref 3.5–5.2)
ALP SERPL-CCNC: 81 U/L (ref 40–150)
ALT SERPL W P-5'-P-CCNC: 22 U/L (ref 0–70)
ANION GAP SERPL CALCULATED.3IONS-SCNC: 8 MMOL/L (ref 7–15)
AST SERPL W P-5'-P-CCNC: 15 U/L (ref 0–45)
BASOPHILS # BLD AUTO: 0 10E3/UL (ref 0–0.2)
BASOPHILS NFR BLD AUTO: 0 %
BILIRUB SERPL-MCNC: 0.4 MG/DL
BUN SERPL-MCNC: 17.9 MG/DL (ref 8–23)
CALCIUM SERPL-MCNC: 9.2 MG/DL (ref 8.8–10.4)
CHLORIDE SERPL-SCNC: 104 MMOL/L (ref 98–107)
CREAT SERPL-MCNC: 0.8 MG/DL (ref 0.67–1.17)
EGFRCR SERPLBLD CKD-EPI 2021: >90 ML/MIN/1.73M2
EOSINOPHIL # BLD AUTO: 0.1 10E3/UL (ref 0–0.7)
EOSINOPHIL NFR BLD AUTO: 1 %
ERYTHROCYTE [DISTWIDTH] IN BLOOD BY AUTOMATED COUNT: 14.3 % (ref 10–15)
GLUCOSE SERPL-MCNC: 83 MG/DL (ref 70–99)
HCO3 SERPL-SCNC: 27 MMOL/L (ref 22–29)
HCT VFR BLD AUTO: 44.4 % (ref 40–53)
HGB BLD-MCNC: 14.4 G/DL (ref 13.3–17.7)
IMM GRANULOCYTES # BLD: 0 10E3/UL
IMM GRANULOCYTES NFR BLD: 1 %
LYMPHOCYTES # BLD AUTO: 0.7 10E3/UL (ref 0.8–5.3)
LYMPHOCYTES NFR BLD AUTO: 10 %
MCH RBC QN AUTO: 26.2 PG (ref 26.5–33)
MCHC RBC AUTO-ENTMCNC: 32.4 G/DL (ref 31.5–36.5)
MCV RBC AUTO: 81 FL (ref 78–100)
MONOCYTES # BLD AUTO: 0.6 10E3/UL (ref 0–1.3)
MONOCYTES NFR BLD AUTO: 8 %
NEUTROPHILS # BLD AUTO: 5.7 10E3/UL (ref 1.6–8.3)
NEUTROPHILS NFR BLD AUTO: 80 %
NRBC # BLD AUTO: 0 10E3/UL
NRBC BLD AUTO-RTO: 0 /100
PHOSPHATE SERPL-MCNC: 3.2 MG/DL (ref 2.5–4.5)
PLATELET # BLD AUTO: 187 10E3/UL (ref 150–450)
POTASSIUM SERPL-SCNC: 4.5 MMOL/L (ref 3.4–5.3)
PROT SERPL-MCNC: 6.4 G/DL (ref 6.4–8.3)
RBC # BLD AUTO: 5.5 10E6/UL (ref 4.4–5.9)
SODIUM SERPL-SCNC: 139 MMOL/L (ref 135–145)
URATE SERPL-MCNC: 6.4 MG/DL (ref 3.4–7)
WBC # BLD AUTO: 7.1 10E3/UL (ref 4–11)

## 2025-05-07 PROCEDURE — 84550 ASSAY OF BLOOD/URIC ACID: CPT | Performed by: NURSE PRACTITIONER

## 2025-05-07 PROCEDURE — 80053 COMPREHEN METABOLIC PANEL: CPT | Performed by: NURSE PRACTITIONER

## 2025-05-07 PROCEDURE — 36591 DRAW BLOOD OFF VENOUS DEVICE: CPT | Performed by: NURSE PRACTITIONER

## 2025-05-07 PROCEDURE — 250N000011 HC RX IP 250 OP 636: Performed by: NURSE PRACTITIONER

## 2025-05-07 PROCEDURE — 84100 ASSAY OF PHOSPHORUS: CPT | Performed by: NURSE PRACTITIONER

## 2025-05-07 PROCEDURE — 85004 AUTOMATED DIFF WBC COUNT: CPT | Performed by: NURSE PRACTITIONER

## 2025-05-07 RX ORDER — HEPARIN SODIUM (PORCINE) LOCK FLUSH IV SOLN 100 UNIT/ML 100 UNIT/ML
5 SOLUTION INTRAVENOUS
Status: DISCONTINUED | OUTPATIENT
Start: 2025-05-07 | End: 2025-05-07 | Stop reason: HOSPADM

## 2025-05-07 RX ORDER — HEPARIN SODIUM (PORCINE) LOCK FLUSH IV SOLN 100 UNIT/ML 100 UNIT/ML
5 SOLUTION INTRAVENOUS
OUTPATIENT
Start: 2025-05-07

## 2025-05-07 RX ORDER — HEPARIN SODIUM,PORCINE 10 UNIT/ML
5-20 VIAL (ML) INTRAVENOUS DAILY PRN
OUTPATIENT
Start: 2025-05-07

## 2025-05-07 RX ADMIN — Medication 5 ML: at 10:38

## 2025-05-07 NOTE — ORAL ONC MGMT
Oral Chemotherapy Monitoring Program    Subjective/Objective:  Ramu Espinoza is a 65 year old male contacted by phone for a follow-up visit for oral chemotherapy.  Clive is tolerating the venetoclax but he complains of fatigue and feeling tired. He is still able to complete self-care tasks but he states he is really struggling to be able to do anything and if he does complete something he feels really tired after. He was worried it was his heart but his ECHO from Feb was fine, he does have a follow up visit with cardiology next month. He also have significant anxiety and has seen a psychiatrist for it. He has sleep apnea and uses a CPAP but states he isn't great at cleaning it so doesn't use it consistently.         2/24/2025    12:00 PM 2/25/2025     2:00 PM 3/6/2025    10:00 AM 3/28/2025    11:00 AM 4/3/2025     9:00 AM 5/1/2025     8:00 AM 5/7/2025     1:00 PM   ORAL CHEMOTHERAPY   Assessment Type Left Voicemail Monthly Follow up Refill Left Voicemail Refill Refill Monthly Follow up   Diagnosis Code Chronic Lymphocytic Leukemia (CLL) Chronic Lymphocytic Leukemia (CLL) Chronic Lymphocytic Leukemia (CLL) Chronic Lymphocytic Leukemia (CLL) Chronic Lymphocytic Leukemia (CLL) Chronic Lymphocytic Leukemia (CLL) Chronic Lymphocytic Leukemia (CLL)   Providers Dr. Greg Garza   Clinic Name/Location [facility]   Drug Name Venclexta (venetoclax)  Venclexta (venetoclax)  Venclexta (venetoclax)  Venclexta (venetoclax) Venclexta (venetoclax) Venclexta (venetoclax) Venclexta (venetoclax)   Dose 400 mg  400 mg  400 mg  400 mg 400 mg 400 mg 400 mg   Current Schedule Daily Daily Daily Daily Daily Daily Daily   Cycle Details Continuous  Continuous  Continuous  Continuous Continuous Continuous Continuous   Doses missed in last 2 weeks  0     0   Adherence Assessment  Adherent     Adherent   Adverse  "Effects  Fatigue     Fatigue   Fatigue  Grade 1     Grade 2   Pharmacist Intervention(fatigue)  Yes     Yes   Intervention(s)  Patient education     Patient education   Any new drug interactions?  No     No   Is the dose as ordered appropriate for the patient?  Yes     Yes       Data saved with a previous flowsheet row definition       Last PHQ-2 Score on record:       2/13/2025    12:58 PM 1/10/2023    11:17 AM   PHQ-2 ( 1999 [employer])   Q1: Little interest or pleasure in doing things 0 0   Q2: Feeling down, depressed or hopeless 0 0   PHQ-2 Score 0 0       Vitals:  BP:   BP Readings from Last 1 Encounters:   04/09/25 126/76     Wt Readings from Last 1 Encounters:   04/09/25 108.2 kg (238 lb 8 oz)     Estimated body surface area is 2.38 meters squared as calculated from the following:    Height as of 3/12/25: 1.88 m (6' 2.02\").    Weight as of 4/9/25: 108.2 kg (238 lb 8 oz).    Labs:  _  Result Component Current Result Ref Range   Sodium 139 (5/7/2025) 135 - 145 mmol/L     _  Result Component Current Result Ref Range   Potassium 4.5 (5/7/2025) 3.4 - 5.3 mmol/L     _  Result Component Current Result Ref Range   Calcium 9.2 (5/7/2025) 8.8 - 10.4 mg/dL     No results found for Mag within last 30 days.     _  Result Component Current Result Ref Range   Phosphorus 3.2 (5/7/2025) 2.5 - 4.5 mg/dL     _  Result Component Current Result Ref Range   Albumin 4.1 (5/7/2025) 3.5 - 5.2 g/dL     _  Result Component Current Result Ref Range   Urea Nitrogen 17.9 (5/7/2025) 8.0 - 23.0 mg/dL     _  Result Component Current Result Ref Range   Creatinine 0.80 (5/7/2025) 0.67 - 1.17 mg/dL     _  Result Component Current Result Ref Range   AST 15 (5/7/2025) 0 - 45 U/L     _  Result Component Current Result Ref Range   ALT 22 (5/7/2025) 0 - 70 U/L     _  Result Component Current Result Ref Range   Bilirubin Total 0.4 (5/7/2025) <=1.2 mg/dL     _  Result Component Current Result Ref Range   WBC Count 7.1 (5/7/2025) 4.0 - 11.0 10e3/uL " "    _  Result Component Current Result Ref Range   Hemoglobin 14.4 (5/7/2025) 13.3 - 17.7 g/dL     _  Result Component Current Result Ref Range   Platelet Count 187 (5/7/2025) 150 - 450 10e3/uL     _  Result Component Current Result Ref Range   Absolute Neutrophils 5.7 (5/7/2025) 1.6 - 8.3 10e3/uL     No results found for ANC within last 30 days.          Assessment/Plan:  Clive is tolerating venetoclax with some moderate fatigue that he feels is affecting his ability to do things. We discussed really trying hard to use his CPAP consistently as that might be making him more tired during the day. Also discussed that exercise and activity is the best \"medicine\" for fatigue so try to do that as often as he can. Might benefit from palliative care visit so will reach out to care team to see how to set that up. Also discussed his labs from today and how everything looks good from that perspective.     Follow-Up:  We will review provider note and labs on 6/4 and since Clive is tolerating the venetoclax pretty well aside from the fatigue we will move his calls to quarterly, next call ~August.    Refill Due:  5/28/25    Maren Hansen, [place]  Oral Chemotherapy Pharmacist  [facility] Phone: [phone]  In Basket Pools:   [facility] ORAL CHEMOTHERAPY PHARMACIST   [facility] ORAL CHEMOTHERAPY PHARMACIST      "

## 2025-05-27 DIAGNOSIS — C91.10 CHRONIC LYMPHOCYTIC LEUKEMIA (H): Primary | ICD-10-CM

## 2025-06-03 NOTE — PROGRESS NOTES
[facility] cancer Care Progress Note  Patient: Ramu Espinoza   MRN:  [id number]   Date of Service : 2025        Reason for visit      Problem List Items Addressed This Visit          Hematologic    Chronic lymphocytic leukemia (H)  Cy inducer Ibrutinib - Primary       Behavioral    Generalized Anxiety Disorder        Assessment     1.  A very pleasant 65 year old  gentleman with stage IV CLL diagnosed in 2012.  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  He has been treated with Imbruvica for over 10 years.   Now off of it since 2023 secondary to atrial fibrillation.  Labs indicate rising white blood cell count which is not unexpected in his situation.  Typically after Imbruvica stopped most patients have relapse of CLL.  Started on treatment with venetoclax and obinutuzumab.  Initially started obinutuzumab in 2024.  Then venetoclax was added.  He is responding quite well.    2.  Severe anxiety.  Met with Donovan becker.  Still not optimally managed.  3.  History of paroxysmal atrial fibrillation.  The episodes were getting quite frequent therefore discontinued Imbruvica.  The episodes have decreased in frequency tremendously.  4.  History of pericardial effusion in .  He has had no recurrence.  5.  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      Plan     Patient with CLL ongoing therapy.  He also has severe anxiety.  Reviewed notes from psychology and his primary care.  Reviewed labs including CBC CMP and uric acid.  Ordered labs.  Ordered treatment with venetoclax.  Monitoring needed for neutropenia.    Recommend treatment with venetoclax 400 mg p.o. daily. Plan to take it until 2026.  Close monitoring for neutropenia.  Monitor his uric acid for gout.  Continue to follow-up with his psychologist.  Good diet and gentle exercise.  The longitudinal plan of care for the diagnosis(es)/condition(s) as documented were addressed during this visit. Due to  the added complexity in care, I will continue to support Clive in the subsequent management and with ongoing continuity of care.     Clinical stage      Stage IV    History     Ramu Espinoza is a very pleasant 65 year old  male with a history of CLL diagnosed in 02/2012 presenting with elevated white count in upper 10s/lower 20s with a peripheral blood smear showing atypical lymphocytosis suspicious for CLL.  He was completely asymptomatic, normal hemoglobin and platelet count.  His peripheral blood flow cytometry revealed CD5 co-expressing kappa light chains restricted B cells confirming the diagnosis of CLL.  He has been on observation.  His white count has been slowly going up.    In June 2014 his white count was over 100,000.  So he had a CAT scan and that showed increasing lymphadenopathy and splenomegaly.  Platelet count has been going down.    I offered participation in clinical study in which patients are randomized between fludarabine and cyclophosphamide Rituxan or Imbruvica and Rituxan.  He has been randomized to Imbruvica and rituximab arm. He started on October 2014.  In November 2014 he got Rituxan for the first time.  Tolerated well. Finished that. Now on Imbruvica alone. Tolerating it well.     In May 2017 he was found to have paroxysmal afib when he complained of some fluttering sensation. He had holter monitor which led to the diagnosis. Most days he feels well.     In March 2019 he was found to have pericardial effusion when he started have some chest pain on deep inspiration. Had it tapped. Held Imbruvica for 3 weeks. Then resumed it.  Tolerated it fine. So continues it.    He continued his medication until October 2023.  On October 11, 2023 he discontinued it.  This was done because he was having increasing frequency of A-fib episodes.  He was seen by couple of cardiologist in the [facility] system.  They were recommend that he should consider discontinuing ibrutinib.    After discontinuing  "ibrutinib his A-fib episodes have significantly decreased.  He did have an episode of anxiety.  He has been on some medication to help with the anxiety.    We have been following him for his CLL.  In July 2024 when he came his white count was starting to go up.  White count was up 220,000 in November 2024.  At that time we had discussions about treatment.    We discussed the option of BTK inhibitor versus starting treatment with venetoclax and obinutuzumab.  Because of limited finite duration of this treatment we proceeded with obinutuzumab infusions followed by venetoclax.  He has finished obinutuzumab.  Currently on venetoclax.  He has significant response to the treatment.  His white count has normalized hemoglobin plate count on have normalized. He did get a Port-A-Cath placed.  Overall tolerating with moderate side effects.  Feels tired and fatigued at times.  His lymph nodes have definitely shrunk.    He has been dealing with significant anxiety.  He was  selected for jury duty which triggered excess anxiety a lot.  He has fear of performing certain things in public.      He finished his prescribed doses of obinutuzumab.  Now continues on venetoclax.  Comes in today for scheduled follow-up.    Past Medical History     Past Medical History:   Diagnosis Date    BPH (benign prostatic hyperplasia)     CLL (chronic lymphocytic leukemia) (H)     Lymphadenopathy     Created by Conversion  Replacement Utility updated for latest IMO load    Paroxysmal atrial fibrillation with rapid ventricular response (H)     Sleep apnea     hypertension, being slightly overweight, anemic, having reflux, anxiety, epididymitis, hyperlipidemia and tinnitus    Review of system      Details noted in the history of present illness.  A detailed review of systems is otherwise negative.      Physical exam        /62   Pulse 72   Temp 98.4  F (36.9  C)   Resp 18   Ht 1.88 m (6' 2.02\")   Wt 107.4 kg (236 lb 11.2 oz)   SpO2 95%   " BMI 30.37 kg/m      GENERAL: No acute distress. Cooperative in conversation.   HEENT:  Pupils are equal, round and reactive. Oral mucosa is clean and intact. No ulcerations or mucositis noted. No bleeding noted.  RESP:Chest symmetric lungs are clear bilaterally per auscultation. Regular respiratory rate. No wheezes or rhonchi.  CV: Normal S1 S2 Regular, rate and rhythm.     ABD: Nondistended, soft, nontender. Positive bowel sounds. No organomegaly.   EXTREMITIES: No lower extremity edema.   NEURO: Non- focal. Alert and oriented x3.  Cranial nerves appear intact.  PSYCH: Within normal limits. No depression or anxiety.  SKIN: Warm dry intact.      Lab results Reviewed      Recent Results (from the past week)   Comprehensive metabolic panel   Result Value Ref Range    Sodium 139 135 - 145 mmol/L    Potassium 4.2 3.4 - 5.3 mmol/L    Carbon Dioxide (CO2) 25 22 - 29 mmol/L    Anion Gap 9 7 - 15 mmol/L    Urea Nitrogen 21.1 8.0 - 23.0 mg/dL    Creatinine 0.90 0.67 - 1.17 mg/dL    GFR Estimate >90 >60 mL/min/1.73m2    Calcium 9.3 8.8 - 10.4 mg/dL    Chloride 105 98 - 107 mmol/L    Glucose 104 (H) 70 - 99 mg/dL    Alkaline Phosphatase 86 40 - 150 U/L    AST 17 0 - 45 U/L    ALT 24 0 - 70 U/L    Protein Total 6.4 6.4 - 8.3 g/dL    Albumin 4.0 3.5 - 5.2 g/dL    Bilirubin Total 0.6 <=1.2 mg/dL   Phosphorus   Result Value Ref Range    Phosphorus 3.0 2.5 - 4.5 mg/dL   Uric acid   Result Value Ref Range    Uric Acid 7.0 3.4 - 7.0 mg/dL   CBC with platelets and differential   Result Value Ref Range    WBC Count 6.2 4.0 - 11.0 10e3/uL    RBC Count 5.54 4.40 - 5.90 10e6/uL    Hemoglobin 14.4 13.3 - 17.7 g/dL    Hematocrit 45.0 40.0 - 53.0 %    MCV 81 78 - 100 fL    MCH 26.0 (L) 26.5 - 33.0 pg    MCHC 32.0 31.5 - 36.5 g/dL    RDW 15.5 (H) 10.0 - 15.0 %    Platelet Count 221 150 - 450 10e3/uL    % Neutrophils 76 %    % Lymphocytes 12 %    % Monocytes 9 %    % Eosinophils 2 %    % Basophils 1 %    % Immature Granulocytes 1 %    NRBCs  per 100 WBC 0 <1 /100    Absolute Neutrophils 4.7 1.6 - 8.3 10e3/uL    Absolute Lymphocytes 0.8 0.8 - 5.3 10e3/uL    Absolute Monocytes 0.6 0.0 - 1.3 10e3/uL    Absolute Eosinophils 0.1 0.0 - 0.7 10e3/uL    Absolute Basophils 0.0 0.0 - 0.2 10e3/uL    Absolute Immature Granulocytes 0.0 <=0.4 10e3/uL    Absolute NRBCs 0.0 10e3/uL       Imaging results Reviewed        No results found.      Signed by: Dennis Garza MD      This note has been dictated using voice recognition software. Any grammatical or context distortions are unintentional and inherent to the software

## 2025-06-04 ENCOUNTER — INFUSION THERAPY VISIT (OUTPATIENT)
Dept: INFUSION THERAPY | Facility: HOSPITAL | Age: 65
End: 2025-06-04
Attending: INTERNAL MEDICINE
Payer: MEDICARE

## 2025-06-04 ENCOUNTER — ONCOLOGY VISIT (OUTPATIENT)
Dept: ONCOLOGY | Facility: HOSPITAL | Age: 65
End: 2025-06-04
Attending: INTERNAL MEDICINE
Payer: MEDICARE

## 2025-06-04 VITALS
SYSTOLIC BLOOD PRESSURE: 115 MMHG | BODY MASS INDEX: 30.38 KG/M2 | HEART RATE: 72 BPM | HEIGHT: 74 IN | DIASTOLIC BLOOD PRESSURE: 62 MMHG | OXYGEN SATURATION: 95 % | TEMPERATURE: 98.4 F | RESPIRATION RATE: 18 BRPM | WEIGHT: 236.7 LBS

## 2025-06-04 DIAGNOSIS — C91.10 CHRONIC LYMPHOCYTIC LEUKEMIA (H): Primary | ICD-10-CM

## 2025-06-04 DIAGNOSIS — F41.1 GENERALIZED ANXIETY DISORDER: ICD-10-CM

## 2025-06-04 DIAGNOSIS — Z98.890 HISTORY OF VASCULAR ACCESS DEVICE: ICD-10-CM

## 2025-06-04 LAB
ALBUMIN SERPL BCG-MCNC: 4 G/DL (ref 3.5–5.2)
ALP SERPL-CCNC: 86 U/L (ref 40–150)
ALT SERPL W P-5'-P-CCNC: 24 U/L (ref 0–70)
ANION GAP SERPL CALCULATED.3IONS-SCNC: 9 MMOL/L (ref 7–15)
AST SERPL W P-5'-P-CCNC: 17 U/L (ref 0–45)
BASOPHILS # BLD AUTO: 0 10E3/UL (ref 0–0.2)
BASOPHILS NFR BLD AUTO: 1 %
BILIRUB SERPL-MCNC: 0.6 MG/DL
BUN SERPL-MCNC: 21.1 MG/DL (ref 8–23)
CALCIUM SERPL-MCNC: 9.3 MG/DL (ref 8.8–10.4)
CHLORIDE SERPL-SCNC: 105 MMOL/L (ref 98–107)
CREAT SERPL-MCNC: 0.9 MG/DL (ref 0.67–1.17)
EGFRCR SERPLBLD CKD-EPI 2021: >90 ML/MIN/1.73M2
EOSINOPHIL # BLD AUTO: 0.1 10E3/UL (ref 0–0.7)
EOSINOPHIL NFR BLD AUTO: 2 %
ERYTHROCYTE [DISTWIDTH] IN BLOOD BY AUTOMATED COUNT: 15.5 % (ref 10–15)
GLUCOSE SERPL-MCNC: 104 MG/DL (ref 70–99)
HCO3 SERPL-SCNC: 25 MMOL/L (ref 22–29)
HCT VFR BLD AUTO: 45 % (ref 40–53)
HGB BLD-MCNC: 14.4 G/DL (ref 13.3–17.7)
IMM GRANULOCYTES # BLD: 0 10E3/UL
IMM GRANULOCYTES NFR BLD: 1 %
LYMPHOCYTES # BLD AUTO: 0.8 10E3/UL (ref 0.8–5.3)
LYMPHOCYTES NFR BLD AUTO: 12 %
MCH RBC QN AUTO: 26 PG (ref 26.5–33)
MCHC RBC AUTO-ENTMCNC: 32 G/DL (ref 31.5–36.5)
MCV RBC AUTO: 81 FL (ref 78–100)
MONOCYTES # BLD AUTO: 0.6 10E3/UL (ref 0–1.3)
MONOCYTES NFR BLD AUTO: 9 %
NEUTROPHILS # BLD AUTO: 4.7 10E3/UL (ref 1.6–8.3)
NEUTROPHILS NFR BLD AUTO: 76 %
NRBC # BLD AUTO: 0 10E3/UL
NRBC BLD AUTO-RTO: 0 /100
PHOSPHATE SERPL-MCNC: 3 MG/DL (ref 2.5–4.5)
PLATELET # BLD AUTO: 221 10E3/UL (ref 150–450)
POTASSIUM SERPL-SCNC: 4.2 MMOL/L (ref 3.4–5.3)
PROT SERPL-MCNC: 6.4 G/DL (ref 6.4–8.3)
RBC # BLD AUTO: 5.54 10E6/UL (ref 4.4–5.9)
SODIUM SERPL-SCNC: 139 MMOL/L (ref 135–145)
URATE SERPL-MCNC: 7 MG/DL (ref 3.4–7)
WBC # BLD AUTO: 6.2 10E3/UL (ref 4–11)

## 2025-06-04 PROCEDURE — 80053 COMPREHEN METABOLIC PANEL: CPT | Performed by: INTERNAL MEDICINE

## 2025-06-04 PROCEDURE — 85048 AUTOMATED LEUKOCYTE COUNT: CPT | Performed by: INTERNAL MEDICINE

## 2025-06-04 PROCEDURE — 36591 DRAW BLOOD OFF VENOUS DEVICE: CPT | Performed by: INTERNAL MEDICINE

## 2025-06-04 PROCEDURE — 99214 OFFICE O/P EST MOD 30 MIN: CPT | Performed by: INTERNAL MEDICINE

## 2025-06-04 PROCEDURE — G0463 HOSPITAL OUTPT CLINIC VISIT: HCPCS | Performed by: INTERNAL MEDICINE

## 2025-06-04 PROCEDURE — 250N000011 HC RX IP 250 OP 636: Performed by: NURSE PRACTITIONER

## 2025-06-04 PROCEDURE — 84550 ASSAY OF BLOOD/URIC ACID: CPT | Performed by: INTERNAL MEDICINE

## 2025-06-04 PROCEDURE — 84100 ASSAY OF PHOSPHORUS: CPT | Performed by: INTERNAL MEDICINE

## 2025-06-04 PROCEDURE — G2211 COMPLEX E/M VISIT ADD ON: HCPCS | Performed by: INTERNAL MEDICINE

## 2025-06-04 RX ORDER — HEPARIN SODIUM (PORCINE) LOCK FLUSH IV SOLN 100 UNIT/ML 100 UNIT/ML
5 SOLUTION INTRAVENOUS
OUTPATIENT
Start: 2025-06-04

## 2025-06-04 RX ORDER — HEPARIN SODIUM (PORCINE) LOCK FLUSH IV SOLN 100 UNIT/ML 100 UNIT/ML
5 SOLUTION INTRAVENOUS
Status: DISCONTINUED | OUTPATIENT
Start: 2025-06-04 | End: 2025-06-04 | Stop reason: HOSPADM

## 2025-06-04 RX ORDER — HEPARIN SODIUM,PORCINE 10 UNIT/ML
5-20 VIAL (ML) INTRAVENOUS DAILY PRN
OUTPATIENT
Start: 2025-06-04

## 2025-06-04 RX ADMIN — Medication 5 ML: at 09:30

## 2025-06-04 ASSESSMENT — PAIN SCALES - GENERAL: PAINLEVEL_OUTOF10: NO PAIN (0)

## 2025-06-04 NOTE — PROGRESS NOTES
"Oncology Rooming Note    2025 9:52 AM   Ramu Espinoza is a 65 year old male who presents for:    Chief Complaint   Patient presents with    Oncology Clinic Visit     Chronic lymphocytic leukemia (H)  Cy inducer Ibrutinib     Initial Vitals: /62   Pulse 72   Temp 98.4  F (36.9  C)   Resp 18   Ht 1.88 m (6' 2.02\")   Wt 107.4 kg (236 lb 11.2 oz)   SpO2 95%   BMI 30.37 kg/m   Estimated body mass index is 30.37 kg/m  as calculated from the following:    Height as of this encounter: 1.88 m (6' 2.02\").    Weight as of this encounter: 107.4 kg (236 lb 11.2 oz). Body surface area is 2.37 meters squared.  No Pain (0) Comment: Data Unavailable   No LMP for male patient.  Allergies reviewed: Yes  Medications reviewed: Yes    Medications: Medication refills not needed today.  Pharmacy name entered into [facility]:    [facility] [place], [place] - [address]  [id number] - [place], [place] - [address]  [facility] - [place], [place] - [address] [facility] - [place] - [facility] [address]    Frailty Screening:   Is the patient here for a new oncology consult visit in cancer care? 2. No    PHQ9:  Did this patient require a PHQ9?: No      Clinical concerns: Tired and dizzy     Maren Hernandez LPN             "

## 2025-06-04 NOTE — LETTER
2025      Ramu Espinoza  [address]  [place] [address]      Dear Colleague,    Thank you for referring your patient, Ramu Espinoza, to the [facility] [place]. Please see a copy of my visit note below.    [facility] cancer Care Progress Note  Patient: Ramu Espinoza   MRN:  [id number]   Date of Service : 2025        Reason for visit      Problem List Items Addressed This Visit          Hematologic    Chronic lymphocytic leukemia (H)  Cy inducer Ibrutinib - Primary       Behavioral    Generalized Anxiety Disorder        Assessment     1.  A very pleasant 65 year old  gentleman with stage IV CLL diagnosed in 2012.  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  He has been treated with Imbruvica for over 10 years.   Now off of it since 2023 secondary to atrial fibrillation.  Labs indicate rising white blood cell count which is not unexpected in his situation.  Typically after Imbruvica stopped most patients have relapse of CLL.  Started on treatment with venetoclax and obinutuzumab.  Initially started obinutuzumab in 2024.  Then venetoclax was added.  He is responding quite well.    2.  Severe anxiety.  Met with Donovan becker.  Still not optimally managed.  3.  History of paroxysmal atrial fibrillation.  The episodes were getting quite frequent therefore discontinued Imbruvica.  The episodes have decreased in frequency tremendously.  4.  History of pericardial effusion in 2019.  He has had no recurrence.  5.  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      Plan     Patient with CLL ongoing therapy.  He also has severe anxiety.  Reviewed notes from psychology and his primary care.  Reviewed labs including CBC CMP and uric acid.  Ordered labs.  Ordered treatment with venetoclax.  Monitoring needed for neutropenia.    Recommend treatment with venetoclax 400 mg p.o. daily. Plan to take it until 2026.  Close monitoring for  neutropenia.  Monitor his uric acid for gout.  Continue to follow-up with his psychologist.  Good diet and gentle exercise.  The longitudinal plan of care for the diagnosis(es)/condition(s) as documented were addressed during this visit. Due to the added complexity in care, I will continue to support Clive in the subsequent management and with ongoing continuity of care.     Clinical stage      Stage IV    History     Ramu Espinoza is a very pleasant 65 year old  male with a history of CLL diagnosed in 02/2012 presenting with elevated white count in upper 10s/lower 20s with a peripheral blood smear showing atypical lymphocytosis suspicious for CLL.  He was completely asymptomatic, normal hemoglobin and platelet count.  His peripheral blood flow cytometry revealed CD5 co-expressing kappa light chains restricted B cells confirming the diagnosis of CLL.  He has been on observation.  His white count has been slowly going up.    In June 2014 his white count was over 100,000.  So he had a CAT scan and that showed increasing lymphadenopathy and splenomegaly.  Platelet count has been going down.    I offered participation in clinical study in which patients are randomized between fludarabine and cyclophosphamide Rituxan or Imbruvica and Rituxan.  He has been randomized to Imbruvica and rituximab arm. He started on October 2014.  In November 2014 he got Rituxan for the first time.  Tolerated well. Finished that. Now on Imbruvica alone. Tolerating it well.     In May 2017 he was found to have paroxysmal afib when he complained of some fluttering sensation. He had holter monitor which led to the diagnosis. Most days he feels well.     In March 2019 he was found to have pericardial effusion when he started have some chest pain on deep inspiration. Had it tapped. Held Imbruvica for 3 weeks. Then resumed it.  Tolerated it fine. So continues it.    He continued his medication until October 2023.  On October 11, 2023 he  discontinued it.  This was done because he was having increasing frequency of A-fib episodes.  He was seen by couple of cardiologist in the [facility] system.  They were recommend that he should consider discontinuing ibrutinib.    After discontinuing ibrutinib his A-fib episodes have significantly decreased.  He did have an episode of anxiety.  He has been on some medication to help with the anxiety.    We have been following him for his CLL.  In July 2024 when he came his white count was starting to go up.  White count was up 220,000 in November 2024.  At that time we had discussions about treatment.    We discussed the option of BTK inhibitor versus starting treatment with venetoclax and obinutuzumab.  Because of limited finite duration of this treatment we proceeded with obinutuzumab infusions followed by venetoclax.  He has finished obinutuzumab.  Currently on venetoclax.  He has significant response to the treatment.  His white count has normalized hemoglobin plate count on have normalized. He did get a Port-A-Cath placed.  Overall tolerating with moderate side effects.  Feels tired and fatigued at times.  His lymph nodes have definitely shrunk.    He has been dealing with significant anxiety.  He was  selected for jury duty which triggered excess anxiety a lot.  He has fear of performing certain things in public.      He finished his prescribed doses of obinutuzumab.  Now continues on venetoclax.  Comes in today for scheduled follow-up.    Past Medical History     Past Medical History:   Diagnosis Date     BPH (benign prostatic hyperplasia)      CLL (chronic lymphocytic leukemia) (H)      Lymphadenopathy     Created by Conversion  Replacement Utility updated for latest IMO load     Paroxysmal atrial fibrillation with rapid ventricular response (H)      Sleep apnea     hypertension, being slightly overweight, anemic, having reflux, anxiety, epididymitis, hyperlipidemia and tinnitus    Review of system     "  Details noted in the history of present illness.  A detailed review of systems is otherwise negative.      Physical exam        /62   Pulse 72   Temp 98.4  F (36.9  C)   Resp 18   Ht 1.88 m (6' 2.02\")   Wt 107.4 kg (236 lb 11.2 oz)   SpO2 95%   BMI 30.37 kg/m      GENERAL: No acute distress. Cooperative in conversation.   HEENT:  Pupils are equal, round and reactive. Oral mucosa is clean and intact. No ulcerations or mucositis noted. No bleeding noted.  RESP:Chest symmetric lungs are clear bilaterally per auscultation. Regular respiratory rate. No wheezes or rhonchi.  CV: Normal S1 S2 Regular, rate and rhythm.     ABD: Nondistended, soft, nontender. Positive bowel sounds. No organomegaly.   EXTREMITIES: No lower extremity edema.   NEURO: Non- focal. Alert and oriented x3.  Cranial nerves appear intact.  PSYCH: Within normal limits. No depression or anxiety.  SKIN: Warm dry intact.      Lab results Reviewed      Recent Results (from the past week)   Comprehensive metabolic panel   Result Value Ref Range    Sodium 139 135 - 145 mmol/L    Potassium 4.2 3.4 - 5.3 mmol/L    Carbon Dioxide (CO2) 25 22 - 29 mmol/L    Anion Gap 9 7 - 15 mmol/L    Urea Nitrogen 21.1 8.0 - 23.0 mg/dL    Creatinine 0.90 0.67 - 1.17 mg/dL    GFR Estimate >90 >60 mL/min/1.73m2    Calcium 9.3 8.8 - 10.4 mg/dL    Chloride 105 98 - 107 mmol/L    Glucose 104 (H) 70 - 99 mg/dL    Alkaline Phosphatase 86 40 - 150 U/L    AST 17 0 - 45 U/L    ALT 24 0 - 70 U/L    Protein Total 6.4 6.4 - 8.3 g/dL    Albumin 4.0 3.5 - 5.2 g/dL    Bilirubin Total 0.6 <=1.2 mg/dL   Phosphorus   Result Value Ref Range    Phosphorus 3.0 2.5 - 4.5 mg/dL   Uric acid   Result Value Ref Range    Uric Acid 7.0 3.4 - 7.0 mg/dL   CBC with platelets and differential   Result Value Ref Range    WBC Count 6.2 4.0 - 11.0 10e3/uL    RBC Count 5.54 4.40 - 5.90 10e6/uL    Hemoglobin 14.4 13.3 - 17.7 g/dL    Hematocrit 45.0 40.0 - 53.0 %    MCV 81 78 - 100 fL    MCH 26.0 (L) " "26.5 - 33.0 pg    MCHC 32.0 31.5 - 36.5 g/dL    RDW 15.5 (H) 10.0 - 15.0 %    Platelet Count 221 150 - 450 10e3/uL    % Neutrophils 76 %    % Lymphocytes 12 %    % Monocytes 9 %    % Eosinophils 2 %    % Basophils 1 %    % Immature Granulocytes 1 %    NRBCs per 100 WBC 0 <1 /100    Absolute Neutrophils 4.7 1.6 - 8.3 10e3/uL    Absolute Lymphocytes 0.8 0.8 - 5.3 10e3/uL    Absolute Monocytes 0.6 0.0 - 1.3 10e3/uL    Absolute Eosinophils 0.1 0.0 - 0.7 10e3/uL    Absolute Basophils 0.0 0.0 - 0.2 10e3/uL    Absolute Immature Granulocytes 0.0 <=0.4 10e3/uL    Absolute NRBCs 0.0 10e3/uL       Imaging results Reviewed        No results found.      Signed by: Dennis Garza MD      This note has been dictated using voice recognition software. Any grammatical or context distortions are unintentional and inherent to the software        Oncology Rooming Note    2025 9:52 AM   Ramu Espinoza is a 65 year old male who presents for:    Chief Complaint   Patient presents with     Oncology Clinic Visit     Chronic lymphocytic leukemia (H)  Cy inducer Ibrutinib     Initial Vitals: /62   Pulse 72   Temp 98.4  F (36.9  C)   Resp 18   Ht 1.88 m (6' 2.02\")   Wt 107.4 kg (236 lb 11.2 oz)   SpO2 95%   BMI 30.37 kg/m   Estimated body mass index is 30.37 kg/m  as calculated from the following:    Height as of this encounter: 1.88 m (6' 2.02\").    Weight as of this encounter: 107.4 kg (236 lb 11.2 oz). Body surface area is 2.37 meters squared.  No Pain (0) Comment: Data Unavailable   No LMP for male patient.  Allergies reviewed: Yes  Medications reviewed: Yes    Medications: Medication refills not needed today.  Pharmacy name entered into [facility]:    [facility] - [place], [place] - [facility] [address]  [id number] - [place], [place] - [address]  [facility] - [place], [place] - [address] [facility] - [place], [place] - [facility] [address]    Frailty " Screening:   Is the patient here for a new oncology consult visit in cancer care? 2. No    PHQ9:  Did this patient require a PHQ9?: No      Clinical concerns: Tired and dizzy     Maren Hernandez LPN               Again, thank you for allowing me to participate in the care of your patient.        Sincerely,        Dennis Garza MD    Electronically signed

## 2025-06-24 DIAGNOSIS — C91.10 CHRONIC LYMPHOCYTIC LEUKEMIA (H): Primary | ICD-10-CM

## 2025-07-13 ENCOUNTER — HEALTH MAINTENANCE LETTER (OUTPATIENT)
Age: 65
End: 2025-07-13

## 2025-07-23 DIAGNOSIS — C91.10 CHRONIC LYMPHOCYTIC LEUKEMIA (H): Primary | ICD-10-CM

## 2025-08-06 ENCOUNTER — INFUSION THERAPY VISIT (OUTPATIENT)
Dept: INFUSION THERAPY | Facility: HOSPITAL | Age: 65
End: 2025-08-06
Attending: INTERNAL MEDICINE
Payer: MEDICARE

## 2025-08-06 ENCOUNTER — ONCOLOGY VISIT (OUTPATIENT)
Dept: ONCOLOGY | Facility: HOSPITAL | Age: 65
End: 2025-08-06
Attending: INTERNAL MEDICINE
Payer: MEDICARE

## 2025-08-06 ENCOUNTER — PATIENT OUTREACH (OUTPATIENT)
Dept: ONCOLOGY | Facility: HOSPITAL | Age: 65
End: 2025-08-06

## 2025-08-06 VITALS
WEIGHT: 244 LBS | TEMPERATURE: 97.7 F | DIASTOLIC BLOOD PRESSURE: 61 MMHG | OXYGEN SATURATION: 96 % | RESPIRATION RATE: 18 BRPM | SYSTOLIC BLOOD PRESSURE: 94 MMHG | HEART RATE: 71 BPM | BODY MASS INDEX: 31.32 KG/M2 | HEIGHT: 74 IN

## 2025-08-06 DIAGNOSIS — F41.1 GENERALIZED ANXIETY DISORDER: ICD-10-CM

## 2025-08-06 DIAGNOSIS — Z98.890 HISTORY OF VASCULAR ACCESS DEVICE: Primary | ICD-10-CM

## 2025-08-06 DIAGNOSIS — C91.10 CHRONIC LYMPHOCYTIC LEUKEMIA (H): ICD-10-CM

## 2025-08-06 DIAGNOSIS — C91.10 CHRONIC LYMPHOCYTIC LEUKEMIA (H): Primary | ICD-10-CM

## 2025-08-06 DIAGNOSIS — Z51.11 ENCOUNTER FOR ANTINEOPLASTIC CHEMOTHERAPY: ICD-10-CM

## 2025-08-06 LAB
ALBUMIN SERPL BCG-MCNC: 4.2 G/DL (ref 3.5–5.2)
ALP SERPL-CCNC: 85 U/L (ref 40–150)
ALT SERPL W P-5'-P-CCNC: 27 U/L (ref 0–70)
ANION GAP SERPL CALCULATED.3IONS-SCNC: 13 MMOL/L (ref 7–15)
AST SERPL W P-5'-P-CCNC: 21 U/L (ref 0–45)
BILIRUB SERPL-MCNC: 0.5 MG/DL
BUN SERPL-MCNC: 14.7 MG/DL (ref 8–23)
CALCIUM SERPL-MCNC: 9.5 MG/DL (ref 8.8–10.4)
CHLORIDE SERPL-SCNC: 106 MMOL/L (ref 98–107)
CREAT SERPL-MCNC: 0.89 MG/DL (ref 0.67–1.17)
EGFRCR SERPLBLD CKD-EPI 2021: >90 ML/MIN/1.73M2
ERYTHROCYTE [DISTWIDTH] IN BLOOD BY AUTOMATED COUNT: 13.4 % (ref 10–15)
GLUCOSE SERPL-MCNC: 91 MG/DL (ref 70–99)
HCO3 SERPL-SCNC: 23 MMOL/L (ref 22–29)
HCT VFR BLD AUTO: 42.1 % (ref 40–53)
HGB BLD-MCNC: 14.2 G/DL (ref 13.3–17.7)
LDH SERPL L TO P-CCNC: 151 U/L (ref 0–250)
MCH RBC QN AUTO: 28.8 PG (ref 26.5–33)
MCHC RBC AUTO-ENTMCNC: 33.7 G/DL (ref 31.5–36.5)
MCV RBC AUTO: 85 FL (ref 78–100)
PLATELET # BLD AUTO: 242 10E3/UL (ref 150–450)
POTASSIUM SERPL-SCNC: 4.7 MMOL/L (ref 3.4–5.3)
PROT SERPL-MCNC: 6.5 G/DL (ref 6.4–8.3)
RBC # BLD AUTO: 4.93 10E6/UL (ref 4.4–5.9)
SODIUM SERPL-SCNC: 142 MMOL/L (ref 135–145)
WBC # BLD AUTO: 6.5 10E3/UL (ref 4–11)

## 2025-08-06 PROCEDURE — G0463 HOSPITAL OUTPT CLINIC VISIT: HCPCS | Performed by: INTERNAL MEDICINE

## 2025-08-06 PROCEDURE — 36591 DRAW BLOOD OFF VENOUS DEVICE: CPT

## 2025-08-06 PROCEDURE — 85014 HEMATOCRIT: CPT

## 2025-08-06 PROCEDURE — 83615 LACTATE (LD) (LDH) ENZYME: CPT

## 2025-08-06 PROCEDURE — 80053 COMPREHEN METABOLIC PANEL: CPT

## 2025-08-06 PROCEDURE — 250N000011 HC RX IP 250 OP 636: Performed by: NURSE PRACTITIONER

## 2025-08-06 RX ORDER — KETOCONAZOLE 20 MG/ML
SHAMPOO, SUSPENSION TOPICAL DAILY PRN
COMMUNITY
Start: 2025-06-19

## 2025-08-06 RX ORDER — HEPARIN SODIUM (PORCINE) LOCK FLUSH IV SOLN 100 UNIT/ML 100 UNIT/ML
5 SOLUTION INTRAVENOUS
Status: DISCONTINUED | OUTPATIENT
Start: 2025-08-06 | End: 2025-08-06 | Stop reason: HOSPADM

## 2025-08-06 RX ORDER — SERTRALINE HYDROCHLORIDE 100 MG/1
100 TABLET, FILM COATED ORAL DAILY
COMMUNITY

## 2025-08-06 RX ORDER — HEPARIN SODIUM (PORCINE) LOCK FLUSH IV SOLN 100 UNIT/ML 100 UNIT/ML
5 SOLUTION INTRAVENOUS
OUTPATIENT
Start: 2025-08-06

## 2025-08-06 RX ORDER — UBIDECARENONE 30 MG
1 CAPSULE ORAL DAILY
COMMUNITY

## 2025-08-06 RX ORDER — HEPARIN SODIUM,PORCINE 10 UNIT/ML
5-20 VIAL (ML) INTRAVENOUS DAILY PRN
OUTPATIENT
Start: 2025-08-06

## 2025-08-06 RX ADMIN — Medication 5 ML: at 10:26

## 2025-08-06 ASSESSMENT — PAIN SCALES - GENERAL: PAINLEVEL_OUTOF10: NO PAIN (0)

## 2025-08-12 ENCOUNTER — TRANSCRIBE ORDERS (OUTPATIENT)
Dept: OTHER | Age: 65
End: 2025-08-12

## 2025-08-12 DIAGNOSIS — C91.10 CLL (CHRONIC LYMPHOCYTIC LEUKEMIA) (H): ICD-10-CM

## 2025-08-12 DIAGNOSIS — F41.9 ANXIETY: Primary | ICD-10-CM

## 2025-08-13 ENCOUNTER — PATIENT OUTREACH (OUTPATIENT)
Dept: CARE COORDINATION | Facility: CLINIC | Age: 65
End: 2025-08-13
Payer: MEDICARE

## 2025-08-25 DIAGNOSIS — C91.10 CHRONIC LYMPHOCYTIC LEUKEMIA (H): Primary | ICD-10-CM

## 2025-09-03 ENCOUNTER — OFFICE VISIT (OUTPATIENT)
Facility: CLINIC | Age: 65
End: 2025-09-03
Attending: INTERNAL MEDICINE
Payer: MEDICARE

## 2025-09-03 DIAGNOSIS — F41.1 GAD (GENERALIZED ANXIETY DISORDER): ICD-10-CM

## 2025-09-03 DIAGNOSIS — F41.9 ANXIETY: ICD-10-CM

## 2025-09-03 DIAGNOSIS — F33.0 MAJOR DEPRESSIVE DISORDER, RECURRENT EPISODE, MILD: Primary | ICD-10-CM

## 2025-09-03 DIAGNOSIS — C91.10 CLL (CHRONIC LYMPHOCYTIC LEUKEMIA) (H): ICD-10-CM

## (undated) RX ORDER — CEFAZOLIN SODIUM/WATER 2 G/20 ML
SYRINGE (ML) INTRAVENOUS
Status: DISPENSED
Start: 2024-11-15

## (undated) RX ORDER — FENTANYL CITRATE 50 UG/ML
INJECTION, SOLUTION INTRAMUSCULAR; INTRAVENOUS
Status: DISPENSED
Start: 2024-11-15